# Patient Record
Sex: FEMALE | Race: WHITE | NOT HISPANIC OR LATINO | Employment: OTHER | URBAN - METROPOLITAN AREA
[De-identification: names, ages, dates, MRNs, and addresses within clinical notes are randomized per-mention and may not be internally consistent; named-entity substitution may affect disease eponyms.]

---

## 2017-01-03 ENCOUNTER — TELEPHONE (OUTPATIENT)
Dept: FAMILY MEDICINE | Facility: OTHER | Age: 62
End: 2017-01-03

## 2017-01-03 DIAGNOSIS — R05.9 COUGH: Primary | ICD-10-CM

## 2017-01-03 RX ORDER — CODEINE PHOSPHATE AND GUAIFENESIN 10; 100 MG/5ML; MG/5ML
1 SOLUTION ORAL EVERY 4 HOURS PRN
Qty: 120 ML | Refills: 0 | Status: SHIPPED | OUTPATIENT
Start: 2017-01-03 | End: 2017-02-06

## 2017-01-03 NOTE — TELEPHONE ENCOUNTER
1:22 PM    Reason for Call: OVERBOOK    Patient is having the following symptoms:chest cold cough The patient is requesting an appointment for flaim    Was an appointment offered for this call? no  Preferred method for responding to this message self    If we cannot reach you directly, may we leave a detailed response at the number you provided? Yes  Can this message wait until your PCP/provider returns, if unavailable today? lenard Wei

## 2017-01-03 NOTE — TELEPHONE ENCOUNTER
Pt can wait until Fri to come in if she can get some cough syrup for productive cough, clear phlegm??? To [facility]

## 2017-01-04 ENCOUNTER — HOSPITAL ENCOUNTER (EMERGENCY)
Facility: HOSPITAL | Age: 62
Discharge: HOME | End: 2017-01-04
Attending: PHYSICIAN ASSISTANT | Admitting: PHYSICIAN ASSISTANT
Payer: MEDICARE

## 2017-01-04 VITALS
OXYGEN SATURATION: 97 % | DIASTOLIC BLOOD PRESSURE: 79 MMHG | SYSTOLIC BLOOD PRESSURE: 145 MMHG | TEMPERATURE: 97.2 F | RESPIRATION RATE: 14 BRPM | HEART RATE: 54 BPM

## 2017-01-04 DIAGNOSIS — R53.83 FATIGUE, UNSPECIFIED TYPE: ICD-10-CM

## 2017-01-04 DIAGNOSIS — J01.00 ACUTE MAXILLARY SINUSITIS, RECURRENCE NOT SPECIFIED: ICD-10-CM

## 2017-01-04 LAB
ALBUMIN SERPL-MCNC: 3.4 G/DL (ref 3.4–5)
ALP SERPL-CCNC: 54 U/L (ref 40–150)
ALT SERPL W P-5'-P-CCNC: 35 U/L (ref 0–50)
ANION GAP SERPL CALCULATED.3IONS-SCNC: 10 MMOL/L (ref 3–14)
AST SERPL W P-5'-P-CCNC: 13 U/L (ref 0–45)
BASOPHILS # BLD AUTO: 0.1 10E9/L (ref 0–0.2)
BASOPHILS NFR BLD AUTO: 0.8 %
BILIRUB SERPL-MCNC: 0.3 MG/DL (ref 0.2–1.3)
BUN SERPL-MCNC: 18 MG/DL (ref 7–30)
CALCIUM SERPL-MCNC: 9 MG/DL (ref 8.5–10.1)
CHLORIDE SERPL-SCNC: 104 MMOL/L (ref 94–109)
CK SERPL-CCNC: 109 U/L (ref 30–225)
CO2 SERPL-SCNC: 27 MMOL/L (ref 20–32)
CREAT SERPL-MCNC: 1.07 MG/DL (ref 0.52–1.04)
DEPRECATED S PYO AG THROAT QL EIA: NORMAL
DIFFERENTIAL METHOD BLD: NORMAL
EOSINOPHIL # BLD AUTO: 0.2 10E9/L (ref 0–0.7)
EOSINOPHIL NFR BLD AUTO: 3 %
ERYTHROCYTE [DISTWIDTH] IN BLOOD BY AUTOMATED COUNT: 13.4 % (ref 10–15)
GFR SERPL CREATININE-BSD FRML MDRD: 52 ML/MIN/1.7M2
GLUCOSE SERPL-MCNC: 106 MG/DL (ref 70–99)
HCT VFR BLD AUTO: 39.2 % (ref 35–47)
HETEROPH AB SER QL: NEGATIVE
HGB BLD-MCNC: 13.9 G/DL (ref 11.7–15.7)
IMM GRANULOCYTES # BLD: 0.1 10E9/L (ref 0–0.4)
IMM GRANULOCYTES NFR BLD: 0.8 %
LYMPHOCYTES # BLD AUTO: 1.9 10E9/L (ref 0.8–5.3)
LYMPHOCYTES NFR BLD AUTO: 29.3 %
MCH RBC QN AUTO: 30.8 PG (ref 26.5–33)
MCHC RBC AUTO-ENTMCNC: 35.5 G/DL (ref 31.5–36.5)
MCV RBC AUTO: 87 FL (ref 78–100)
MICRO REPORT STATUS: NORMAL
MONOCYTES # BLD AUTO: 0.5 10E9/L (ref 0–1.3)
MONOCYTES NFR BLD AUTO: 7.4 %
NEUTROPHILS # BLD AUTO: 3.7 10E9/L (ref 1.6–8.3)
NEUTROPHILS NFR BLD AUTO: 58.7 %
NRBC # BLD AUTO: 0 10*3/UL
NRBC BLD AUTO-RTO: 0 /100
PLATELET # BLD AUTO: 215 10E9/L (ref 150–450)
POTASSIUM SERPL-SCNC: 3.5 MMOL/L (ref 3.4–5.3)
PROT SERPL-MCNC: 7.1 G/DL (ref 6.8–8.8)
RBC # BLD AUTO: 4.51 10E12/L (ref 3.8–5.2)
SODIUM SERPL-SCNC: 141 MMOL/L (ref 133–144)
SPECIMEN SOURCE: NORMAL
TSH SERPL DL<=0.05 MIU/L-ACNC: 1.63 MU/L (ref 0.4–4)
WBC # BLD AUTO: 6.3 10E9/L (ref 4–11)

## 2017-01-04 PROCEDURE — 99213 OFFICE O/P EST LOW 20 MIN: CPT | Performed by: PHYSICIAN ASSISTANT

## 2017-01-04 PROCEDURE — 85025 COMPLETE CBC W/AUTO DIFF WBC: CPT | Performed by: PHYSICIAN ASSISTANT

## 2017-01-04 PROCEDURE — 82550 ASSAY OF CK (CPK): CPT | Performed by: PHYSICIAN ASSISTANT

## 2017-01-04 PROCEDURE — 86308 HETEROPHILE ANTIBODY SCREEN: CPT | Performed by: PHYSICIAN ASSISTANT

## 2017-01-04 PROCEDURE — 80053 COMPREHEN METABOLIC PANEL: CPT | Performed by: PHYSICIAN ASSISTANT

## 2017-01-04 PROCEDURE — 87880 STREP A ASSAY W/OPTIC: CPT | Performed by: PHYSICIAN ASSISTANT

## 2017-01-04 PROCEDURE — 36415 COLL VENOUS BLD VENIPUNCTURE: CPT | Performed by: PHYSICIAN ASSISTANT

## 2017-01-04 PROCEDURE — 87081 CULTURE SCREEN ONLY: CPT | Performed by: PHYSICIAN ASSISTANT

## 2017-01-04 PROCEDURE — 99213 OFFICE O/P EST LOW 20 MIN: CPT

## 2017-01-04 PROCEDURE — 84443 ASSAY THYROID STIM HORMONE: CPT | Performed by: PHYSICIAN ASSISTANT

## 2017-01-04 RX ORDER — DOXYCYCLINE 100 MG/1
100 CAPSULE ORAL 2 TIMES DAILY
Qty: 14 CAPSULE | Refills: 0 | Status: SHIPPED | OUTPATIENT
Start: 2017-01-04 | End: 2017-01-11

## 2017-01-04 ASSESSMENT — ENCOUNTER SYMPTOMS
FATIGUE: 1
MYALGIAS: 1
HEADACHES: 1
CHILLS: 1
COUGH: 0
SINUS PRESSURE: 1
EYES NEGATIVE: 1

## 2017-01-04 NOTE — DISCHARGE INSTRUCTIONS
- Doxy will cover sinuses. (Thyroid?, Vtamin D?, Lupus?).     FYI sinuses:  1. Dry out congestion with flonase (1spray in both nostrils 2x daily for 3-5 days) and pseudoephedrine (1-2 tabs every 4-6 hrs for 3-5 days)   2. Use a saline spray/Neti Pot/sinus flush (King Med Sinus Rinse) 2-3 times daily to irrigate sinuses/mucosal tissue. This dilutes and moves secretions.   3. Tylenol or ibuprofen for pain and fevers as needed.   4. Plenty of fluids and rest as needed.   5. Chew, yawn and speak to help eustachian tubes drain.   - Consider the following over-the-counter products if you are older than 1 year and not pregnant: honey/chestal for cough relief and sambucus/elderberry for viral upper-respiratory symptoms.

## 2017-01-04 NOTE — ED AVS SNAPSHOT
[facility]    [address] [address]    Phone:  [phone]                                       Cassy Avila   MRN: [id number]    Department:  [facility]   Date of Visit:  1/4/2017           After Visit Summary Signature Page     I have received my discharge instructions, and my questions have been answered. I have discussed any challenges I see with this plan with the nurse or doctor.    ..........................................................................................................................................  Patient/Patient Representative Signature      ..........................................................................................................................................  Patient Representative Print Name and Relationship to Patient    ..................................................               ................................................  Date                                            Time    ..........................................................................................................................................  Reviewed by Signature/Title    ...................................................              ..............................................  Date                                                            Time

## 2017-01-04 NOTE — ED AVS SNAPSHOT
[facility]    [address] [address]    Phone:  [phone]                                       Cassy Avila   MRN: [id number]    Department:  [facility]   Date of Visit:  1/4/2017           Patient Information     Date Of Birth          1955        Your diagnoses for this visit were:     Fatigue, unspecified type     Acute maxillary sinusitis, recurrence not specified        You were seen by Juan Luis Hale PA.      Follow-up Information     Follow up with Eliz Hartman NP.    Specialties:  Family Practice, Psychiatry    Why:  As needed    Contact information:     [facility]  [address] [id number] [phone]          Discharge Instructions       - Doxy will cover sinuses. (Thyroid?, Vtamin D?, Lupus?).     FYI sinuses:  1. Dry out congestion with flonase (1spray in both nostrils 2x daily for 3-5 days) and pseudoephedrine (1-2 tabs every 4-6 hrs for 3-5 days)   2. Use a saline spray/Neti Pot/sinus flush (King Med Sinus Rinse) 2-3 times daily to irrigate sinuses/mucosal tissue. This dilutes and moves secretions.   3. Tylenol or ibuprofen for pain and fevers as needed.   4. Plenty of fluids and rest as needed.   5. Chew, yawn and speak to help eustachian tubes drain.   - Consider the following over-the-counter products if you are older than 1 year and not pregnant: honey/chestal for cough relief and sambucus/elderberry for viral upper-respiratory symptoms.    Discharge References/Attachments     SINUSITIS (ANTIBIOTIC TREATMENT) (ENGLISH)      Future Appointments        Provider Department Dept Phone Center    1/6/2017 2:30 PM Eliz Hartman NP [facility] [phone] [facility]         Review of your medicines      START taking        Dose / Directions Last dose taken    doxycycline 100 MG capsule   Commonly known as:  VIBRAMYCIN   Dose:  100 mg   Quantity:  14 capsule        Take 1 capsule (100 mg) by mouth 2 times daily for  7 days   Refills:  0          Our records show that you are taking the medicines listed below. If these are incorrect, please call your family doctor or clinic.        Dose / Directions Last dose taken    aspirin 81 MG EC tablet   Dose:  81 mg        Take 81 mg by mouth daily HS   Refills:  0        atenolol 50 MG tablet   Commonly known as:  TENORMIN   Quantity:  135 tablet        TAKE 1 AND ONE-HALF TABLET BY MOUTH DAILY   Refills:  2        escitalopram 20 MG tablet   Commonly known as:  LEXAPRO   Quantity:  90 tablet        TAKE 1 TABLET BY MOUTH EVERY DAY   Refills:  1        fluticasone 50 MCG/ACT spray   Commonly known as:  FLONASE   Dose:  1-2 spray   Quantity:  16 g        Spray 1-2 sprays into both nostrils daily   Refills:  0        guaiFENesin-codeine 100-10 MG/5ML Soln solution   Commonly known as:  ROBITUSSIN AC   Dose:  1 tsp.   Quantity:  120 mL        Take 5 mLs by mouth every 4 hours as needed for cough   Refills:  0        hydrochlorothiazide 25 MG tablet   Commonly known as:  HYDRODIURIL   Dose:  25 mg   Quantity:  90 tablet        Take 1 tablet (25 mg) by mouth daily   Refills:  2        HYDROcodone-acetaminophen 5-325 MG per tablet   Commonly known as:  NORCO   Dose:  1-2 tablet   Quantity:  50 tablet        Take 1-2 tablets by mouth every 4 hours as needed for other (Moderate to Severe Pain)   Refills:  0        losartan 50 MG tablet   Commonly known as:  COZAAR   Quantity:  180 tablet        TAKE 2 TABLETS BY MOUTH EVERY DAY   Refills:  2        omega 3 1000 MG Caps   Dose:  3 g   Quantity:  90 capsule        Take 3 g by mouth daily   Refills:  0        VITAMIN D3 PO   Dose:  3000 mg        Take 3,000 mg by mouth daily AM   Refills:  0        warfarin 5 MG tablet   Commonly known as:  COUMADIN   Quantity:  135 tablet        TAKE 1 AND ONE-HALF TABLET BY MOUTH ON MONDAY WEDNESDAY AND FRIDAYS AND 1 TABLET ON ALL OTHER DAYS OF THE WEEK   Refills:  0                Prescriptions were sent or  printed at these locations (1 Prescription)                   [facility] - [place], [place] - [address] AT [facility] [address]   [address], [place] [address]    Telephone:  [phone]   Fax:  [phone]   Hours:                  E-Prescribed (1 of 1)         doxycycline (VIBRAMYCIN) 100 MG capsule                Procedures and tests performed during your visit     Beta strep group A culture    CBC with platelets differential    CK total    Comprehensive metabolic panel    Mononucleosis screen    Rapid strep screen    TSH      Orders Needing Specimen Collection     None      Pending Results     Date and Time Order Name Status Description    1/4/2017 1455 Beta strep group A culture In process             Pending Culture Results     Date and Time Order Name Status Description    1/4/2017 1455 Beta strep group A culture In process             Thank you for choosing [facility]       Thank you for choosing [facility] for your care. Our goal is always to provide you with excellent care. Hearing back from our patients is one way we can continue to improve our services. Please take a few minutes to complete the written survey that you may receive in the mail after you visit with us. Thank you!        [facility] Information     [facility] gives you secure access to your electronic health record. If you see a primary care provider, you can also send messages to your care team and make appointments. If you have questions, please call your primary care clinic.  If you do not have a primary care provider, please call [phone] and they will assist you.        Care EveryWhere ID     This is your Care EveryWhere ID. This could be used by other organizations to access your [facility] medical records  [id number]        After Visit Summary       This is your record. Keep this with you and show to your community pharmacist(s) and doctor(s) at your next visit.

## 2017-01-04 NOTE — ED NOTES
Pt presents today with significant other for c/o sinus pain and pressure and fatigue for 3 weeks.

## 2017-01-04 NOTE — ED PROVIDER NOTES
History     Chief Complaint   Patient presents with     Fatigue     X 3 weeks     Sinusitis     X 3 weeks     The history is provided by the patient and a significant other. No  was used.     Cassy Avila is a 61 year old female who presents with 5 weeks of fatigue and 3 weeks of sinusitis. She is concerned about the fatigue as she feels like she is up and alert for about 4 hrs at a time until she needs to rest. She reports body aches that came on over past few weeks, possibly with sinus symptoms. Sinus symptoms include: POSITIVE nasal congestion, NO purulent nasal discharge, POSITIVE mild intermittent headache, Maxillary facial pain, throat-clearing cough, NO tooth pain, POSITIVE myalgias, POSITIVE sweats and chills.      She denies CP, ankle swelling. NO new medications, supplements or dietary changes. She is concerned about possible mono as brief contact with child that is mono positive.     I have reviewed the Medications, Allergies, Past Medical History in the Epic system.    Review of Systems   Constitutional: Positive for chills and fatigue.   HENT: Positive for congestion and sinus pressure.    Eyes: Negative.    Respiratory: Negative for cough (throat clearing).    Genitourinary: Negative.    Musculoskeletal: Positive for myalgias.   Skin: Negative.    Neurological: Positive for headaches.       Physical Exam   BP: 145/79 mmHg  Pulse: 54  Temp: 97.2  F (36.2  C)  Resp: 14  SpO2: 97 %  Physical Exam   Constitutional: She is oriented to person, place, and time. She appears well-developed and well-nourished. No distress.   HENT:   Head: Atraumatic.   Right Ear: External ear normal. Tympanic membrane is not erythematous.   Left Ear: External ear normal. Tympanic membrane is not erythematous.   Nose: Mucosal edema present.   Mouth/Throat: Posterior oropharyngeal erythema present. No posterior oropharyngeal edema.   Eyes: Conjunctivae are normal.   Neck: Normal range of motion.    Cardiovascular: Normal rate and normal heart sounds.    Pulmonary/Chest: Effort normal and breath sounds normal.   Neurological: She is alert and oriented to person, place, and time.   Skin: Skin is warm and dry.   Psychiatric: She has a normal mood and affect.   Nursing note and vitals reviewed.      ED Course   Procedures     Labs Ordered and Resulted from Time of ED Arrival Up to the Time of Departure from the ED   COMPREHENSIVE METABOLIC PANEL - Abnormal; Notable for the following:     Glucose 106 (*)     Creatinine 1.07 (*)     GFR Estimate 52 (*)     All other components within normal limits   CBC WITH PLATELETS DIFFERENTIAL   MONONUCLEOSIS SCREEN   TSH   CK TOTAL   RAPID STREP SCREEN   BETA STREP GROUP A CULTURE       Assessments & Plan (with Medical Decision Making)     I have reviewed the nursing notes.    I have reviewed the findings, diagnosis, plan and need for follow up with the patient.    Discharge Medication List as of 1/4/2017  4:15 PM      START taking these medications    Details   doxycycline (VIBRAMYCIN) 100 MG capsule Take 1 capsule (100 mg) by mouth 2 times daily for 7 days, Disp-14 capsule, R-0, E-Prescribe             Final diagnoses:   Fatigue, unspecified type   Acute maxillary sinusitis, recurrence not specified   Labs are acceptable. Will treat sinusitis as above. Continue saline, honey-tea and gargles. Discussed keeping appt with PCP in 48 hrs to re-evaluate. Pt will seek attention sooner with worsening despite treatment. Patient verbally educated and given appropriate education sheets for each of the diagnoses and has no questions.    Juan Luis Hale PA-C   1/4/2017   4:55 PM    1/4/2017   [facility]      Juan Luis Hale PA  01/04/17 8528

## 2017-01-05 ENCOUNTER — ANTICOAGULATION THERAPY VISIT (OUTPATIENT)
Dept: ANTICOAGULATION | Facility: OTHER | Age: 62
End: 2017-01-05

## 2017-01-05 DIAGNOSIS — I26.99 PULMONARY EMBOLISM AND INFARCTION (H): ICD-10-CM

## 2017-01-05 DIAGNOSIS — Z79.01 LONG-TERM (CURRENT) USE OF ANTICOAGULANTS: Primary | ICD-10-CM

## 2017-01-05 NOTE — PROGRESS NOTES
ANTICOAGULATION FOLLOW-UP CLINIC VISIT    Patient Name:  Cassy Avila  Date:  1/5/2017  Contact Type:  Telephone    SUBJECTIVE:     Patient Findings     Positives Antibiotic use or infection    Comments Started doxycycline on 1/4/17 for 7 days. Will complete Tues 1/10/17. States she is scheduled to do PST on Monday 1/9/17           OBJECTIVE    INR   Date Value Ref Range Status   12/12/2016 2.5  Final       ASSESSMENT / PLAN  INR assessment THER    Recheck INR In: 4 DAYS    INR Location Home INR      Anticoagulation Summary as of 1/5/2017     INR goal 2.0-3.0   Selected INR No new INR was available at the time of this encounter.   Maintenance plan 7.5 mg (5 mg x 1.5) on Mon, Wed, Fri; 5 mg (5 mg x 1) all other days   Full instructions 1/6: 5 mg; Otherwise 7.5 mg on Mon, Wed, Fri; 5 mg all other days   Weekly total 42.5 mg   Plan last modified Iram Lord RN (7/20/2016)   Next INR check 1/9/2017   Priority INR   Target end date Indefinite    Indications   Long-term (current) use of anticoagulants [Z79.01] [Z79.01]  Pulmonary embolism and infarction (H) [I26.99]  Deep vein thrombosis (DVT) (H) [I82.409] [I82.409]         Anticoagulation Episode Summary     INR check location Home Draw    Preferred lab     Send INR reminders to [facility] ANTICOAG POOL    Comments PST - [employer] [facility] [employer].  Started Celebrex 10/28 approx 2mos.       Anticoagulation Care Providers     Provider Role Specialty Phone number    Eliz Hartman NP [facility] [phone]            See the Encounter Report to view Anticoagulation Flowsheet and Dosing Calendar (Go to Encounters tab in chart review, and find the Anticoagulation Therapy Visit)        Manju Escalera, RN

## 2017-01-06 ENCOUNTER — OFFICE VISIT (OUTPATIENT)
Dept: FAMILY MEDICINE | Facility: OTHER | Age: 62
End: 2017-01-06
Attending: NURSE PRACTITIONER
Payer: MEDICARE

## 2017-01-06 VITALS
TEMPERATURE: 97.9 F | OXYGEN SATURATION: 97 % | DIASTOLIC BLOOD PRESSURE: 76 MMHG | HEART RATE: 58 BPM | HEIGHT: 65 IN | BODY MASS INDEX: 38.65 KG/M2 | RESPIRATION RATE: 18 BRPM | WEIGHT: 232 LBS | SYSTOLIC BLOOD PRESSURE: 118 MMHG

## 2017-01-06 DIAGNOSIS — E66.9 OBESITY, UNSPECIFIED OBESITY SEVERITY, UNSPECIFIED OBESITY TYPE: Primary | ICD-10-CM

## 2017-01-06 LAB
BACTERIA SPEC CULT: NORMAL
MICRO REPORT STATUS: NORMAL
SPECIMEN SOURCE: NORMAL

## 2017-01-06 PROCEDURE — 99212 OFFICE O/P EST SF 10 MIN: CPT | Performed by: NURSE PRACTITIONER

## 2017-01-06 PROCEDURE — 99212 OFFICE O/P EST SF 10 MIN: CPT

## 2017-01-06 ASSESSMENT — PAIN SCALES - GENERAL: PAINLEVEL: SEVERE PAIN (6)

## 2017-01-06 NOTE — PROGRESS NOTES
SUBJECTIVE:  Cassy Avila is a 61 year old female   Chief Complaint   Patient presents with     Weight Problem     Patient is concerned about weight gain.  Questions about diet pills       Active diagnoses this visit:    Patient is here to discuss weight gain.    She is asking for a diet pill to help to lose weight.         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, congenital     Mammographic microcalcification      resolved from problem list     Herpes zoster without mention of complication      resolved from problem list     Closed dislocation of shoulder, unspecified site      Thrombosis of leg      Gastro-oesophageal reflux disease      Arthritis      Other chronic pain      Major depression 2014     Factor V Leiden (H)      Coughing        Past Surgical History   Procedure Laterality Date     Arthroscopy shoulder       right, bone spurs     ------------other-------------       shoulder replacement; Provider: Karen     Esophagastroduodenoscopy       with biopsy and endoscopic U/S     Cholecystectomy       Elbow ulnar tunnel release  2002     Anastacio/bso        section       x3     Pionidal cyst excision       Fusion lumbar anterior with james cages       L5-S1     Endoscopic sinus surgery, septoplasty, turbinoplasty, maxillary sinusotomy, combined N/A 2015      Procedure: COMBINED ENDOSCOPIC SINUS SURGERY, SEPTOPLASTY, TURBINOPLASTY, MAXILLARY SINUSOTOMY;  Surgeon: Seema Conn MD;  Location: [facility]     Transposition ulnar nerve (elbow)       Arthroplasty knee  6/20/2014     Procedure: ARTHROPLASTY KNEE;  Surgeon: Sean Alexander MD;  Location: [facility]     Orthopedic surgery  2-15     right shoulder     Orthopedic surgery  8/28/15     right knee     Excise neuroma lower extremity Left 7/13/2016     Procedure: EXCISE NEUROMA LOWER EXTREMITY;  Surgeon: Edi Reed MD;  Location: [facility] OR       Family History   Problem Relation Age of Onset     CANCER Paternal Uncle      cause of death     CANCER Mother      Colon Polyps Mother      Heart Failure Mother 87     congestive, cause of death     Myocardial Infarction Mother      myocardial infarction     C.A.D. Brother      Other - See Comments       factor 5 - family h/o     Myocardial Infarction Father      myocardial infarction - cause of death     C.A.D. Father      Asthma No family hx of        Social History   Substance Use Topics     Smoking status: Former Smoker -- 0.50 packs/day for 30 years     Types: Cigarettes, Pipe     Smokeless tobacco: Never Used      Comment: quit in 1999     Alcohol Use: No       Current Outpatient Prescriptions   Medication     doxycycline (VIBRAMYCIN) 100 MG capsule     guaiFENesin-codeine (ROBITUSSIN AC) 100-10 MG/5ML SOLN solution     losartan (COZAAR) 50 MG tablet     warfarin (COUMADIN) 5 MG tablet     escitalopram (LEXAPRO) 20 MG tablet     fluticasone (FLONASE) 50 MCG/ACT nasal spray     HYDROcodone-acetaminophen (NORCO) 5-325 MG per tablet     hydrochlorothiazide (HYDRODIURIL) 25 MG tablet     atenolol (TENORMIN) 50 MG tablet     Cholecalciferol (VITAMIN D3 PO)     omega 3 1000 MG CAPS     aspirin 81 MG EC tablet     No current facility-administered medications for this visit.     Facility-Administered Medications Ordered in Other Visits   Medication     ondansetron (ZOFRAN)  "injection          Allergies   Allergen Reactions     Ace Inhibitors Cough     Albuterol Sulfate Hives     DuoNeb     Amlodipine Besylate Swelling     Norvasc     Amoxicillin      Cephalexin Monohydrate Hives     Keflex     Erythromycin Base [Kdc:Yellow Dye+Erythromycin+Brilliant Blue Fcf] Nausea and Vomiting     Ipratropium Bromide Hives     DuoNeb     Meloxicam Other (See Comments)     Mobic - confusion, depression     Adhesive Tape Rash     Prochlorperazine Edisylate Swelling and Rash     Compazine     Prochlorperazine Maleate Swelling and Rash       REVIEW OF SYSTEMS  Skin: negative  Eyes: negative  Ears/Nose/Throat: recent URI, went to ER, is better  Respiratory: No shortness of breath, dyspnea on exertion, cough, or hemoptysis  Cardiovascular: negative  Gastrointestinal: negative  Genitourinary: negative  Musculoskeletal: back pain  Neurologic: negative  Psychiatric: negative  Hematologic/Lymphatic/Immunologic: negative      OBJECTIVE:  /76 mmHg  Pulse 58  Temp(Src) 97.9  F (36.6  C) (Tympanic)  Resp 18  Ht 5' 5\" (1.651 m)  Wt 232 lb (105.235 kg)  BMI 38.61 kg/m2  SpO2 97%  Constitutional: healthy, alert, no distress and cooperative  Head: Normocephalic. No masses, lesions, or tenderness  Neck: Neck supple. No adenopathy. Thyroid symmetric.  ENT: URI symptoms are improved  Cardiovascular: PMI normal. No murmurs, clicks gallops or rub  Respiratory: negative, Percussion normal. Good diaphragmatic excursion. Lungs clear  Gastrointestinal: Abdomen soft, non-tender. BS normal. No masses, organomegaly  Musculoskeletal: back pain, upcoming appointment to discuss surgery      1. Obesity, unspecified obesity severity, unspecified obesity type  Diet discussed, also possible referral to dietician      Eliz ENCARNACION  [phone]          "

## 2017-01-06 NOTE — MR AVS SNAPSHOT
After Visit Summary   1/6/2017    Cassy Avila    MRN: [id number]           Patient Information     Date Of Birth          1955        Visit Information        Provider Department      1/6/2017 2:30 PM Eliz Hartman NP [facility]        Today's Diagnoses     Obesity, unspecified obesity severity, unspecified obesity type    -  1       Care Instructions      1. Obesity, unspecified obesity severity, unspecified obesity type  Diet discussed, also possible referral to dietician      Eliz Hartman -[facility]  [phone]                Follow-ups after your visit        Who to contact     If you have questions or need follow up information about today's clinic visit or your schedule please contact [facility] ([facility]] directly at [phone].  Normal or non-critical lab and imaging results will be communicated to you by [employer], letter or phone within 4 business days after the clinic has received the results. If you do not hear from us within 7 days, please contact the clinic through [employer] or phone. If you have a critical or abnormal lab result, we will notify you by phone as soon as possible.  Submit refill requests through [employer] or call your pharmacy and they will forward the refill request to us. Please allow 3 business days for your refill to be completed.          Additional Information About Your Visit        MyChart Information     [employer] gives you secure access to your electronic health record. If you see a primary care provider, you can also send messages to your care team and make appointments. If you have questions, please call your primary care clinic.  If you do not have a primary care provider, please call [phone] and they will assist you.        Care EveryWhere ID     This is your Care EveryWhere ID. This could be used by other organizations to access your [facility] medical records  [id number]        Your Vitals Were     Pulse Temperature Respirations Height  "BMI (Body Mass Index) Pulse Oximetry    58 97.9  F (36.6  C) (Tympanic) 18 5' 5\" (1.651 m) 38.61 kg/m2 97%       Blood Pressure from Last 3 Encounters:   01/06/17 118/76   01/04/17 145/79   10/05/16 100/60    Weight from Last 3 Encounters:   01/06/17 232 lb (105.235 kg)   10/05/16 232 lb (105.235 kg)   08/22/16 230 lb 14.4 oz (104.736 kg)              Today, you had the following     No orders found for display         Today's Medication Changes          These changes are accurate as of: 1/6/17  3:32 PM.  If you have any questions, ask your nurse or doctor.               These medicines have changed or have updated prescriptions.        Dose/Directions    atenolol 50 MG tablet   Commonly known as:  TENORMIN   This may have changed:  See the new instructions.   Used for:  HTN (hypertension)        TAKE 1 AND ONE-HALF TABLET BY MOUTH DAILY   Quantity:  135 tablet   Refills:  2       hydrochlorothiazide 25 MG tablet   Commonly known as:  HYDRODIURIL   This may have changed:  additional instructions   Used for:  Essential hypertension        Dose:  25 mg   Take 1 tablet (25 mg) by mouth daily   Quantity:  90 tablet   Refills:  2                Primary Care Provider Office Phone # Fax #    Eliz NORAH Hartman [phone]       [address] [address]        Thank you!     Thank you for choosing [facility]  for your care. Our goal is always to provide you with excellent care. Hearing back from our patients is one way we can continue to improve our services. Please take a few minutes to complete the written survey that you may receive in the mail after your visit with us. Thank you!             Your Updated Medication List - Protect others around you: Learn how to safely use, store and throw away your medicines at www.disposemymeds.org.          This list is accurate as of: 1/6/17  3:32 PM.  Always use your most recent med list.                   Brand Name Dispense " Instructions for use    aspirin 81 MG EC tablet      Take 81 mg by mouth daily HS       atenolol 50 MG tablet    TENORMIN    135 tablet    TAKE 1 AND ONE-HALF TABLET BY MOUTH DAILY       doxycycline 100 MG capsule    VIBRAMYCIN    14 capsule    Take 1 capsule (100 mg) by mouth 2 times daily for 7 days       escitalopram 20 MG tablet    LEXAPRO    90 tablet    TAKE 1 TABLET BY MOUTH EVERY DAY       fluticasone 50 MCG/ACT spray    FLONASE    16 g    Spray 1-2 sprays into both nostrils daily       guaiFENesin-codeine 100-10 MG/5ML Soln solution    ROBITUSSIN AC    120 mL    Take 5 mLs by mouth every 4 hours as needed for cough       hydrochlorothiazide 25 MG tablet    HYDRODIURIL    90 tablet    Take 1 tablet (25 mg) by mouth daily       HYDROcodone-acetaminophen 5-325 MG per tablet    NORCO    50 tablet    Take 1-2 tablets by mouth every 4 hours as needed for other (Moderate to Severe Pain)       losartan 50 MG tablet    COZAAR    180 tablet    TAKE 2 TABLETS BY MOUTH EVERY DAY       omega 3 1000 MG Caps     90 capsule    Take 3 g by mouth daily       VITAMIN D3 PO      Take 3,000 mg by mouth daily AM       warfarin 5 MG tablet    COUMADIN    135 tablet    TAKE 1 AND ONE-HALF TABLET BY MOUTH ON MONDAY WEDNESDAY AND FRIDAYS AND 1 TABLET ON ALL OTHER DAYS OF THE WEEK

## 2017-01-06 NOTE — NURSING NOTE
"Chief Complaint   Patient presents with     Weight Problem     Patient is concerned about weight gain.  Questions about diet pills       Initial /76 mmHg  Pulse 58  Temp(Src) 97.9  F (36.6  C) (Tympanic)  Resp 18  Ht 5' 5\" (1.651 m)  Wt 232 lb (105.235 kg)  BMI 38.61 kg/m2  SpO2 97% Estimated body mass index is 38.61 kg/(m^2) as calculated from the following:    Height as of this encounter: 5' 5\" (1.651 m).    Weight as of this encounter: 232 lb (105.235 kg).  BP completed using cuff size: large    Pamela Nunes    "

## 2017-01-06 NOTE — PATIENT INSTRUCTIONS
1. Obesity, unspecified obesity severity, unspecified obesity type  Diet discussed, also possible referral to dietician      Eliz Hartman [facility]  [phone]

## 2017-01-09 ENCOUNTER — TRANSFERRED RECORDS (OUTPATIENT)
Dept: HEALTH INFORMATION MANAGEMENT | Facility: HOSPITAL | Age: 62
End: 2017-01-09

## 2017-01-09 ENCOUNTER — ANTICOAGULATION THERAPY VISIT (OUTPATIENT)
Dept: ANTICOAGULATION | Facility: OTHER | Age: 62
End: 2017-01-09

## 2017-01-09 DIAGNOSIS — I26.99 PULMONARY EMBOLISM AND INFARCTION (H): ICD-10-CM

## 2017-01-09 DIAGNOSIS — Z79.01 LONG-TERM (CURRENT) USE OF ANTICOAGULANTS: Primary | ICD-10-CM

## 2017-01-09 LAB — INR PPP: 2.1

## 2017-01-09 NOTE — PROGRESS NOTES
ANTICOAGULATION FOLLOW-UP CLINIC VISIT    Patient Name:  Cassy Avila  Date:  1/9/2017  Contact Type:  Telephone    SUBJECTIVE:     Patient Findings     Positives Antibiotic use or infection    Comments Doxycycline to Wednesday 1/11/17           OBJECTIVE    INR   Date Value Ref Range Status   01/09/2017 2.1  Final       ASSESSMENT / PLAN  INR assessment THER    Recheck INR In: 2 WEEKS    INR Location Home INR      Anticoagulation Summary as of 1/9/2017     INR goal 2.0-3.0   Selected INR 2.1 (1/9/2017)   Maintenance plan 7.5 mg (5 mg x 1.5) on Mon, Wed, Fri; 5 mg (5 mg x 1) all other days   Full instructions 1/9: 5 mg; Otherwise 7.5 mg on Mon, Wed, Fri; 5 mg all other days   Weekly total 42.5 mg   Plan last modified Iram Lord RN (7/20/2016)   Next INR check 1/23/2017   Priority INR   Target end date Indefinite    Indications   Long-term (current) use of anticoagulants [Z79.01] [Z79.01]  Pulmonary embolism and infarction (H) [I26.99]  Deep vein thrombosis (DVT) (H) [I82.409] [I82.409]         Anticoagulation Episode Summary     INR check location Home Draw    Preferred lab     Send INR reminders to [facility] ANTICOAG POOL    Comments PST - [facility] [facility].  Started Celebrex 10/28 approx 2mos.       Anticoagulation Care Providers     Provider Role Specialty Phone number    Minnie NORAH [facility] [phone]            See the Encounter Report to view Anticoagulation Flowsheet and Dosing Calendar (Go to Encounters tab in chart review, and find the Anticoagulation Therapy Visit)        Manju Escalera RN

## 2017-01-10 ENCOUNTER — TRANSFERRED RECORDS (OUTPATIENT)
Dept: HEALTH INFORMATION MANAGEMENT | Facility: HOSPITAL | Age: 62
End: 2017-01-10

## 2017-01-23 ENCOUNTER — TRANSFERRED RECORDS (OUTPATIENT)
Dept: HEALTH INFORMATION MANAGEMENT | Facility: HOSPITAL | Age: 62
End: 2017-01-23

## 2017-01-23 ENCOUNTER — ANTICOAGULATION THERAPY VISIT (OUTPATIENT)
Dept: ANTICOAGULATION | Facility: OTHER | Age: 62
End: 2017-01-23

## 2017-01-23 DIAGNOSIS — I26.99 PULMONARY EMBOLISM AND INFARCTION (H): ICD-10-CM

## 2017-01-23 DIAGNOSIS — I82.409 DEEP VEIN THROMBOSIS (DVT) (H): ICD-10-CM

## 2017-01-23 DIAGNOSIS — Z79.01 LONG-TERM (CURRENT) USE OF ANTICOAGULANTS: Primary | ICD-10-CM

## 2017-01-23 LAB — INR PPP: 2.6

## 2017-01-23 NOTE — PROGRESS NOTES
ANTICOAGULATION FOLLOW-UP CLINIC VISIT    Patient Name:  Cassy Avila  Date:  1/23/2017  Contact Type:  Telephone/ message left on home phone re: warfarin dosing and PSt recheck date. she is to notify us if any bleeding/bruisng, changes in diet/meds/activity or questions.    SUBJECTIVE:     Patient Findings     Positives No Problem Findings           OBJECTIVE    INR   Date Value Ref Range Status   01/23/2017 2.6  Final       ASSESSMENT / PLAN  INR assessment THER    Recheck INR In: 4 WEEKS    INR Location Home INR      Anticoagulation Summary as of 1/23/2017     INR goal 2.0-3.0   Selected INR 2.6 (1/23/2017)   Maintenance plan 7.5 mg (5 mg x 1.5) on Mon, Wed, Fri; 5 mg (5 mg x 1) all other days   Full instructions 7.5 mg on Mon, Wed, Fri; 5 mg all other days   Weekly total 42.5 mg   No change documented Manju Escalera RN   Plan last modified Iram Lord RN (7/20/2016)   Next INR check 2/20/2017   Priority INR   Target end date Indefinite    Indications   Long-term (current) use of anticoagulants [Z79.01] [Z79.01]  Pulmonary embolism and infarction (H) [I26.99]  Deep vein thrombosis (DVT) (H) [I82.409] [I82.409]         Anticoagulation Episode Summary     INR check location Home Draw    Preferred lab     Send INR reminders to [facility] POOL    Comments PST - [employer] [facility].  Started Celebrex 10/28 approx 2mos.       Anticoagulation Care Providers     Provider Role Specialty Phone number    Eliz Hartman NP [facility] [phone]            See the Encounter Report to view Anticoagulation Flowsheet and Dosing Calendar (Go to Encounters tab in chart review, and find the Anticoagulation Therapy Visit)        Manju Escalera, RN

## 2017-02-01 DIAGNOSIS — F33.9 MAJOR DEPRESSION, RECURRENT (H): Primary | ICD-10-CM

## 2017-02-01 RX ORDER — ESCITALOPRAM OXALATE 20 MG/1
20 TABLET ORAL DAILY
Qty: 90 TABLET | Refills: 1 | Status: SHIPPED | OUTPATIENT
Start: 2017-02-01 | End: 2017-08-08

## 2017-02-01 NOTE — TELEPHONE ENCOUNTER
Lexapro       Last Written Prescription Date: 8/10/16  Last Fill Quantity: 90; # refills: 1  Last Office Visit with [facility], [facility] or  Health prescribing provider:  1/6/17        Last PHQ-9 score on record=   PHQ-9 SCORE 1/11/2016   Total Score -   Total Score 7       AST       13   1/4/2017  ALT       35   1/4/2017

## 2017-02-06 ENCOUNTER — OFFICE VISIT (OUTPATIENT)
Dept: FAMILY MEDICINE | Facility: OTHER | Age: 62
End: 2017-02-06
Attending: NURSE PRACTITIONER
Payer: MEDICARE

## 2017-02-06 ENCOUNTER — ANTICOAGULATION THERAPY VISIT (OUTPATIENT)
Dept: ANTICOAGULATION | Facility: OTHER | Age: 62
End: 2017-02-06

## 2017-02-06 VITALS
DIASTOLIC BLOOD PRESSURE: 74 MMHG | BODY MASS INDEX: 37.11 KG/M2 | TEMPERATURE: 98.7 F | HEART RATE: 72 BPM | WEIGHT: 223 LBS | RESPIRATION RATE: 14 BRPM | SYSTOLIC BLOOD PRESSURE: 118 MMHG

## 2017-02-06 DIAGNOSIS — I82.409 DEEP VEIN THROMBOSIS (DVT) (H): ICD-10-CM

## 2017-02-06 DIAGNOSIS — H65.191 OTHER ACUTE NONSUPPURATIVE OTITIS MEDIA OF RIGHT EAR: ICD-10-CM

## 2017-02-06 DIAGNOSIS — Z79.01 LONG-TERM (CURRENT) USE OF ANTICOAGULANTS: Primary | ICD-10-CM

## 2017-02-06 DIAGNOSIS — R05.9 COUGH: Primary | ICD-10-CM

## 2017-02-06 DIAGNOSIS — I26.99 PULMONARY EMBOLISM AND INFARCTION (H): ICD-10-CM

## 2017-02-06 PROCEDURE — 99213 OFFICE O/P EST LOW 20 MIN: CPT | Performed by: NURSE PRACTITIONER

## 2017-02-06 PROCEDURE — 99212 OFFICE O/P EST SF 10 MIN: CPT

## 2017-02-06 RX ORDER — DOXYCYCLINE 100 MG/1
100 CAPSULE ORAL 2 TIMES DAILY
Qty: 20 CAPSULE | Refills: 0 | Status: SHIPPED | OUTPATIENT
Start: 2017-02-06 | End: 2017-02-27

## 2017-02-06 RX ORDER — CODEINE PHOSPHATE AND GUAIFENESIN 10; 100 MG/5ML; MG/5ML
1-2 SOLUTION ORAL EVERY 6 HOURS PRN
Qty: 180 ML | Refills: 0 | Status: ON HOLD | OUTPATIENT
Start: 2017-02-06 | End: 2017-03-14

## 2017-02-06 ASSESSMENT — ANXIETY QUESTIONNAIRES
7. FEELING AFRAID AS IF SOMETHING AWFUL MIGHT HAPPEN: NOT AT ALL
1. FEELING NERVOUS, ANXIOUS, OR ON EDGE: NOT AT ALL
6. BECOMING EASILY ANNOYED OR IRRITABLE: NOT AT ALL
2. NOT BEING ABLE TO STOP OR CONTROL WORRYING: NOT AT ALL
3. WORRYING TOO MUCH ABOUT DIFFERENT THINGS: NOT AT ALL
GAD7 TOTAL SCORE: 0
IF YOU CHECKED OFF ANY PROBLEMS ON THIS QUESTIONNAIRE, HOW DIFFICULT HAVE THESE PROBLEMS MADE IT FOR YOU TO DO YOUR WORK, TAKE CARE OF THINGS AT HOME, OR GET ALONG WITH OTHER PEOPLE: NOT DIFFICULT AT ALL
5. BEING SO RESTLESS THAT IT IS HARD TO SIT STILL: NOT AT ALL

## 2017-02-06 ASSESSMENT — PATIENT HEALTH QUESTIONNAIRE - PHQ9: 5. POOR APPETITE OR OVEREATING: NOT AT ALL

## 2017-02-06 NOTE — PROGRESS NOTES
ANTICOAGULATION FOLLOW-UP CLINIC VISIT    Patient Name:  Cassy Avila  Date:  2/6/2017  Contact Type:  Telephone    SUBJECTIVE:     Patient Findings     Positives Antibiotic use or infection, Activity level change    Comments Doxycycline 100mg BID x 10d begins today for Ear and Sinus Infection.  Discussed alternate Coumadin dosing and INR recheck information by phone.  Call ended with pt stating understanding and questions answered.           OBJECTIVE    INR   Date Value Ref Range Status   01/23/2017 2.6  Final       ASSESSMENT / PLAN  No question data found.  Anticoagulation Summary as of 2/6/2017     INR goal 2.0-3.0   Selected INR No new INR was available at the time of this encounter.   Maintenance plan 7.5 mg (5 mg x 1.5) on Mon, Wed, Fri; 5 mg (5 mg x 1) all other days   Full instructions 2/6: 5 mg; 2/8: 5 mg; 2/10: 5 mg; Otherwise 7.5 mg on Mon, Wed, Fri; 5 mg all other days   Weekly total 42.5 mg   Plan last modified Iram Lord RN (7/20/2016)   Next INR check 2/13/2017   Priority INR   Target end date Indefinite    Indications   Long-term (current) use of anticoagulants [Z79.01] [Z79.01]  Pulmonary embolism and infarction (H) [I26.99]  Deep vein thrombosis (DVT) (H) [I82.409] [I82.409]         Anticoagulation Episode Summary     INR check location Home Draw    Preferred lab     Send INR reminders to [facility] ANTICOAG POOL    Comments PST - [employer] [facility].      Anticoagulation Care Providers     Provider Role Specialty Phone number    Eliz Hartman NP [facility] [phone]            See the Encounter Report to view Anticoagulation Flowsheet and Dosing Calendar (Go to Encounters tab in chart review, and find the Anticoagulation Therapy Visit)        Manju Roberts RN

## 2017-02-06 NOTE — NURSING NOTE
"Chief Complaint   Patient presents with     URI     Patient reports chest congestion, stuffy head and painful ears.     Depression     Due       Initial /74 mmHg  Pulse 72  Temp(Src) 98.7  F (37.1  C) (Tympanic)  Resp 14  Wt 223 lb (101.152 kg) Estimated body mass index is 37.11 kg/(m^2) as calculated from the following:    Height as of 1/6/17: 5' 5\" (1.651 m).    Weight as of this encounter: 223 lb (101.152 kg).  Medication Reconciliation: complete   Lis Vickers      "

## 2017-02-06 NOTE — MR AVS SNAPSHOT
After Visit Summary   2/6/2017    Cassy Avila    MRN: [id number]           Patient Information     Date Of Birth          1955        Visit Information        Provider Department      2/6/2017 9:45 AM Maegan Sharma NP [facility]        Today's Diagnoses     Cough    -  1     Other acute nonsuppurative otitis media of right ear           Care Instructions        ASSESSMENT/PLAN:  1. Cough  symptomatic  - guaiFENesin-codeine (ROBITUSSIN AC) 100-10 MG/5ML SOLN solution; Take 5-10 mLs by mouth every 6 hours as needed  Dispense: 180 mL; Refill: 0    2. Other acute nonsuppurative otitis media of right ear  symtpomatic  - doxycycline (VIBRAMYCIN) 100 MG capsule; Take 1 capsule (100 mg) by mouth 2 times daily  Dispense: 20 capsule; Refill: 0      Contact coumadin clinic with new use of antibiotics.   Use acetaminophen, ibuprofen,   Increase fluids and rest.   Follow up if symptoms do not improve or worsen    Maegan Sharma,   Certified Adult Nurse Practitioner  [phone]                     My Depression Action Plan  Name: Cassy Avila   Date of Birth 1955  Date: 2/6/2017    My doctor: Eliz Hartman   My clinic: [facility]  [address] [place] [id number]  [phone]          GREEN    ZONE   Good Control    What it looks like:     Things are going generally well. You have normal up s and down s. You may even feel depressed from time to time, but bad moods usually last less than a day.   What you need to do:  1. Continue to care for yourself (see self care plan)  2. Check your depression survival kit and update it as needed  3. Follow your physician s recommendations including any medication.  4. Do not stop taking medication unless you consult with your physician first.           YELLOW         ZONE Getting Worse    What it looks like:     Depression is starting to interfere with your life.     It may be hard to get out of  bed; you may be starting to isolate yourself from others.    Symptoms of depression are starting to last most all day and this has happened for several days.     You may have suicidal thoughts but they are not constant.   What you need to do:     1. Call your care team, your response to treatment will improve if you keep your care team informed of your progress. Yellow periods are signs an adjustment may need to be made.     2. Continue your self-care, even if you have to fake it!    3. Talk to someone in your support network    4. Open up your depression survival kit           RED    ZONE Medical Alert - Get Help    What it looks like:     Depression is seriously interfering with your life.     You may experience these or other symptoms: You can t get out of bed most days, can t work or engage in other necessary activities, you have trouble taking care of basic hygiene, or basic responsibilities, thoughts of suicide or death that will not go away, self-injurious behavior.     What you need to do:  1. Call your care team and request a same-day appointment. If they are not available (weekends or after hours) call your local crisis line, emergency room or 911.      Electronically signed by: Lis Vickers, February 6, 2017    Depression Self Care Plan / Survival Kit    Self-Care for Depression  Here s the deal. Your body and mind are really not as separate as most people think.  What you do and think affects how you feel and how you feel influences what you do and think. This means if you do things that people who feel good do, it will help you feel better.  Sometimes this is all it takes.  There is also a place for medication and therapy depending on how severe your depression is, so be sure to consult with your medical provider and/ or Behavioral Health Consultant if your symptoms are worsening or not improving.     In order to better manage my stress, I will:    Exercise  Get some form of exercise, every day. This  will help reduce pain and release endorphins, the  feel good  chemicals in your brain. This is almost as good as taking antidepressants!  This is not the same as joining a gym and then never going! (they count on that by the way ) It can be as simple as just going for a walk or doing some gardening, anything that will get you moving.      Hygiene   Maintain good hygiene (Get out of bed in the morning, Make your bed, Brush your teeth, Take a shower, and Get dressed like you were going to work, even if you are unemployed).  If your clothes don't fit try to get ones that do.    Diet  I will strive to eat foods that are good for me, drink plenty of water, and avoid excessive sugar, caffeine, alcohol, and other mood-altering substances.  Some foods that are helpful in depression are: complex carbohydrates, B vitamins, flaxseed, fish or fish oil, fresh fruits and vegetables.    Psychotherapy  I agree to participate in Individual Therapy (if recommended).    Medication  If prescribed medications, I agree to take them.  Missing doses can result in serious side effects.  I understand that drinking alcohol, or other illicit drug use, may cause potential side effects.  I will not stop my medication abruptly without first discussing it with my provider.    Staying Connected With Others  I will stay in touch with my friends, family members, and my primary care provider/team.    Use your imagination  Be creative.  We all have a creative side; it doesn t matter if it s oil painting, sand castles, or mud pies! This will also kick up the endorphins.    Witness Beauty  (AKA stop and smell the roses) Take a look outside, even in mid-winter. Notice colors, textures. Watch the squirrels and birds.     Service to others  Be of service to others.  There is always someone else in need.  By helping others we can  get out of ourselves  and remember the really important things.  This also provides opportunities for practicing all the other  parts of the program.    Humor  Laugh and be silly!  Adjust your TV habits for less news and crime-drama and more comedy.    Control your stress  Try breathing deep, massage therapy, biofeedback, and meditation. Find time to relax each day.     My support system    Clinic Contact:  Phone number:    Contact 1:  Phone number:    Contact 2:  Phone number:    Baptism/:  Phone number:    Therapist:  Phone number:    Local [facility] center:    Phone number:    Other community support:  Phone number:            Follow-ups after your visit        Who to contact     If you have questions or need follow up information about today's clinic visit or your schedule please contact [facility] directly at [phone].  Normal or non-critical lab and imaging results will be communicated to you by [employer], letter or phone within 4 business days after the clinic has received the results. If you do not hear from us within 7 days, please contact the clinic through [employer] or phone. If you have a critical or abnormal lab result, we will notify you by phone as soon as possible.  Submit refill requests through [facility] or call your pharmacy and they will forward the refill request to us. Please allow 3 business days for your refill to be completed.          Additional Information About Your Visit        [employer] Information     [facility] gives you secure access to your electronic health record. If you see a primary care provider, you can also send messages to your care team and make appointments. If you have questions, please call your primary care clinic.  If you do not have a primary care provider, please call [phone] and they will assist you.        Care EveryWhere ID     This is your Care EveryWhere ID. This could be used by other organizations to access your [place] medical records  [id number]        Your Vitals Were     Pulse Temperature Respirations             72 98.7  F (37.1  C) (Tympanic) 14           Blood Pressure from Last 3 Encounters:   02/06/17 118/74   01/06/17 118/76   01/04/17 145/79    Weight from Last 3 Encounters:   02/06/17 223 lb (101.152 kg)   01/06/17 232 lb (105.235 kg)   10/05/16 232 lb (105.235 kg)              Today, you had the following     No orders found for display         Today's Medication Changes          These changes are accurate as of: 2/6/17 10:16 AM.  If you have any questions, ask your nurse or doctor.               Start taking these medicines.        Dose/Directions    doxycycline 100 MG capsule   Commonly known as:  VIBRAMYCIN   Used for:  Other acute nonsuppurative otitis media of right ear   Started by:  Maegan Sharma NP        Dose:  100 mg   Take 1 capsule (100 mg) by mouth 2 times daily   Quantity:  20 capsule   Refills:  0       guaiFENesin-codeine 100-10 MG/5ML Soln solution   Commonly known as:  ROBITUSSIN AC   Used for:  Cough   Started by:  Maegan Sharma NP        Dose:  1-2 tsp.   Take 5-10 mLs by mouth every 6 hours as needed   Quantity:  180 mL   Refills:  0         These medicines have changed or have updated prescriptions.        Dose/Directions    atenolol 50 MG tablet   Commonly known as:  TENORMIN   This may have changed:  See the new instructions.   Used for:  HTN (hypertension)        TAKE 1 AND ONE-HALF TABLET BY MOUTH DAILY   Quantity:  135 tablet   Refills:  2       hydrochlorothiazide 25 MG tablet   Commonly known as:  HYDRODIURIL   This may have changed:  additional instructions   Used for:  Essential hypertension        Dose:  25 mg   Take 1 tablet (25 mg) by mouth daily   Quantity:  90 tablet   Refills:  2            Where to get your medicines      These medications were sent to [id number] - [place] - [address] AT [facility] [address]  [address] [address]     Phone:  [phone]    - doxycycline 100 MG capsule      Some of these will need a paper prescription  and others can be bought over the counter.  Ask your nurse if you have questions.     Bring a paper prescription for each of these medications    - guaiFENesin-codeine 100-10 MG/5ML Soln solution             Primary Care Provider Office Phone # Fax #    Eliz Hartman [phone]       [address] [address]        Thank you!     Thank you for choosing [facility]  for your care. Our goal is always to provide you with excellent care. Hearing back from our patients is one way we can continue to improve our services. Please take a few minutes to complete the written survey that you may receive in the mail after your visit with us. Thank you!             Your Updated Medication List - Protect others around you: Learn how to safely use, store and throw away your medicines at www.disposemymeds.org.          This list is accurate as of: 2/6/17 10:16 AM.  Always use your most recent med list.                   Brand Name Dispense Instructions for use    aspirin 81 MG EC tablet      Take 81 mg by mouth daily HS       atenolol 50 MG tablet    TENORMIN    135 tablet    TAKE 1 AND ONE-HALF TABLET BY MOUTH DAILY       doxycycline 100 MG capsule    VIBRAMYCIN    20 capsule    Take 1 capsule (100 mg) by mouth 2 times daily       escitalopram 20 MG tablet    LEXAPRO    90 tablet    Take 1 tablet (20 mg) by mouth daily       fluticasone 50 MCG/ACT spray    FLONASE    16 g    Spray 1-2 sprays into both nostrils daily       guaiFENesin-codeine 100-10 MG/5ML Soln solution    ROBITUSSIN AC    180 mL    Take 5-10 mLs by mouth every 6 hours as needed       hydrochlorothiazide 25 MG tablet    HYDRODIURIL    90 tablet    Take 1 tablet (25 mg) by mouth daily       HYDROcodone-acetaminophen 5-325 MG per tablet    NORCO    50 tablet    Take 1-2 tablets by mouth every 4 hours as needed for other (Moderate to Severe Pain)       losartan 50 MG tablet    COZAAR    180 tablet    TAKE 2  TABLETS BY MOUTH EVERY DAY       omega 3 1000 MG Caps     90 capsule    Take 3 g by mouth daily       VITAMIN D3 PO      Take 3,000 mg by mouth daily AM       warfarin 5 MG tablet    COUMADIN    135 tablet    TAKE 1 AND ONE-HALF TABLET BY MOUTH ON MONDAY WEDNESDAY AND FRIDAYS AND 1 TABLET ON ALL OTHER DAYS OF THE WEEK

## 2017-02-06 NOTE — PROGRESS NOTES
CHIEF COMPLAINT:  Chief Complaint   Patient presents with     URI     Patient reports chest congestion, stuffy head and painful ears.     Depression     Due       SUBJECTIVE:   Cassy Avila  is here today because of:Sinus Pain and Cough  The patient has had symptoms of cough, earache, sore throat, nasal congestion/runny nose, sinus pain, chest congestion, wheezing, myalgias and fatigue.   Onset of symptoms was 2 days ago. Course of illness is worsening.  Patient denies exposure to illness at home or work/school.   Patient denies vomiting and diarrhea  Treatment measures tried include robitussin AC, helped with cough - now out. .  Patient is not a smoker        Past Medical History   Diagnosis Date     Migraine, unspecified, without mention of intractable migraine without mention of status migrainosus 3/5/2001     Unspecified essential hypertension 2/20/2001     Other pulmonary embolism and infarction 4/11/2003     Contact dermatitis and other eczema, due to unspecified cause 6/1/2004     Cervicalgia 1/9/2001     Edema 1/18/2002     Tachycardia, unspecified 2/12/2001     Long term (current) use of anticoagulants 8/20/2003     Other and unspecified hyperlipidemia 7/11/2002     Neurofibromatosis, unspecified(237.70) 8/22/2012     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, congenital     Mammographic microcalcification 2003     resolved from problem list     Herpes zoster without mention of complication 2003     resolved from problem list     Closed dislocation of shoulder, unspecified site 2000     Thrombosis of leg      Gastro-oesophageal reflux disease      Arthritis      Other chronic pain      Major depression 8/8/2014     Factor V Leiden (H)      Coughing      Past Surgical History   Procedure Laterality Date     Arthroscopy shoulder  2012     right, bone spurs     ------------other-------------  2012     shoulder replacement; Provider: Karen      Esophagastroduodenoscopy       with biopsy and endoscopic U/S     Cholecystectomy       Elbow ulnar tunnel release       Anastacio/bso        section       x3     Pionidal cyst excision       Fusion lumbar anterior with bak cages       L5-S1     Endoscopic sinus surgery, septoplasty, turbinoplasty, maxillary sinusotomy, combined N/A 2015     Procedure: COMBINED ENDOSCOPIC SINUS SURGERY, SEPTOPLASTY, TURBINOPLASTY, MAXILLARY SINUSOTOMY;  Surgeon: Seema Conn MD;  Location: [facility] OR     Transposition ulnar nerve (elbow)       Arthroplasty knee  2014     Procedure: ARTHROPLASTY KNEE;  Surgeon: Sean Alexander MD;  Location: [facility] OR     Orthopedic surgery  2-15     right shoulder     Orthopedic surgery  8/28/15     right knee     Excise neuroma lower extremity Left 2016     Procedure: EXCISE NEUROMA LOWER EXTREMITY;  Surgeon: Edi Reed MD;  Location: [facility] OR     Current Outpatient Prescriptions   Medication Sig Dispense Refill     escitalopram (LEXAPRO) 20 MG tablet Take 1 tablet (20 mg) by mouth daily 90 tablet 1     losartan (COZAAR) 50 MG tablet TAKE 2 TABLETS BY MOUTH EVERY  tablet 2     warfarin (COUMADIN) 5 MG tablet TAKE 1 AND ONE-HALF TABLET BY MOUTH ON  AND  AND 1 TABLET ON ALL OTHER DAYS OF THE WEEK 135 tablet 0     HYDROcodone-acetaminophen (NORCO) 5-325 MG per tablet Take 1-2 tablets by mouth every 4 hours as needed for other (Moderate to Severe Pain) 50 tablet 0     hydrochlorothiazide (HYDRODIURIL) 25 MG tablet Take 1 tablet (25 mg) by mouth daily (Patient taking differently: Take 25 mg by mouth daily AM) 90 tablet 2     atenolol (TENORMIN) 50 MG tablet TAKE 1 AND ONE-HALF TABLET BY MOUTH DAILY (Patient taking differently: TAKE 1 AND ONE-HALF TABLET BY MOUTH DAILY in AM) 135 tablet 2     Cholecalciferol (VITAMIN D3 PO) Take 3,000 mg by mouth daily AM       omega 3 1000 MG CAPS Take 3 g by mouth daily  90 capsule      aspirin 81 MG EC tablet  Take 81 mg by mouth daily HS       fluticasone (FLONASE) 50 MCG/ACT nasal spray Spray 1-2 sprays into both nostrils daily 16 g 0      Allergies   Allergen Reactions     Ace Inhibitors Cough     Albuterol Sulfate Hives     DuoNeb     Amlodipine Besylate Swelling     Norvasc     Amoxicillin      Cephalexin Monohydrate Hives     Keflex     Erythromycin Base [Kdc:Yellow Dye+Erythromycin+Brilliant Blue Fcf] Nausea and Vomiting     Ipratropium Bromide Hives     DuoNeb     Meloxicam Other (See Comments)     Mobic - confusion, depression     Adhesive Tape Rash     Prochlorperazine Edisylate Swelling and Rash     Compazine     Prochlorperazine Maleate Swelling and Rash       Family and Social History are reviewed.    REVIEW OF SYSTEMS  Skin: negative  Eyes: negative  Ears/Nose/Throat: as above  Respiratory: Shortness of breath-  and Cough- productive  Cardiovascular: negative  Gastrointestinal: negative  Genitourinary: negative  Musculoskeletal: myalgia  Neurologic: negative  Psychiatric: negative  Hematologic/Lymphatic/Immunologic: negative  Endocrine: negative    OBJECTIVE:   Vital signs:/74 mmHg  Pulse 72  Temp(Src) 98.7  F (37.1  C) (Tympanic)  Resp 14  Wt 223 lb (101.152 kg)   General: alert and no distress  Skin is unremarkable.  HEENT: left TM normal without fluid or infection, right TM red, dull, bulging, neck has bilateral anterior cervical nodes enlarged, pharynx erythematous without exudate and post nasal drip noted.  Lungs chest clear to IPPA, no tachypnea, retractions or cyanosis and S1, S2 normal, no murmur, no gallop, rate regular  Rapid Strep Test is not performed    LABS AND IMAGING  Results for orders placed or performed in visit on 01/23/17   INR   Result Value Ref Range    INR 2.6        ASSESSMENT/PLAN:  1. Cough  symptomatic  - guaiFENesin-codeine (ROBITUSSIN AC) 100-10 MG/5ML SOLN solution; Take 5-10 mLs by mouth every 6 hours as needed  Dispense: 180 mL; Refill: 0    2. Other acute  nonsuppurative otitis media of right ear  symtpomatic  - doxycycline (VIBRAMYCIN) 100 MG capsule; Take 1 capsule (100 mg) by mouth 2 times daily  Dispense: 20 capsule; Refill: 0      Contact coumadin clinic with new use of antibiotics.   Use acetaminophen, ibuprofen,   Increase fluids and rest.   Follow up if symptoms do not improve or worsen    Maegan Sharma,   Certified Adult Nurse Practitioner  [phone]

## 2017-02-07 ASSESSMENT — PATIENT HEALTH QUESTIONNAIRE - PHQ9: SUM OF ALL RESPONSES TO PHQ QUESTIONS 1-9: 0

## 2017-02-07 ASSESSMENT — ANXIETY QUESTIONNAIRES: GAD7 TOTAL SCORE: 0

## 2017-02-13 ENCOUNTER — ANTICOAGULATION THERAPY VISIT (OUTPATIENT)
Dept: ANTICOAGULATION | Facility: OTHER | Age: 62
End: 2017-02-13

## 2017-02-13 ENCOUNTER — TRANSFERRED RECORDS (OUTPATIENT)
Dept: HEALTH INFORMATION MANAGEMENT | Facility: HOSPITAL | Age: 62
End: 2017-02-13

## 2017-02-13 DIAGNOSIS — I82.409 DEEP VEIN THROMBOSIS (DVT) (H): ICD-10-CM

## 2017-02-13 DIAGNOSIS — I26.99 PULMONARY EMBOLISM AND INFARCTION (H): ICD-10-CM

## 2017-02-13 DIAGNOSIS — Z79.01 LONG-TERM (CURRENT) USE OF ANTICOAGULANTS: ICD-10-CM

## 2017-02-13 LAB — INR PPP: 3.2

## 2017-02-13 NOTE — PROGRESS NOTES
ANTICOAGULATION FOLLOW-UP CLINIC VISIT    Patient Name:  Cassy Avila  Date:  2/13/2017  Contact Type:  Telephone    SUBJECTIVE:     Patient Findings     Positives Antibiotic use or infection    Comments Doxycycline 100mg BID x 10d through 2/16/17.  New dosing instructions left on the pt's cell phone voice messaging system per her request.  Pt informed the slight elevation is most likely due to ABX, despite dose adjustment.             OBJECTIVE    INR   Date Value Ref Range Status   02/13/2017 3.2  Final       ASSESSMENT / PLAN  INR assessment SUPRA    Recheck INR In: 2 WEEKS    INR Location Home INR      Anticoagulation Summary as of 2/13/2017     INR goal 2.0-3.0   Today's INR 3.2!   Maintenance plan 7.5 mg (5 mg x 1.5) on Mon, Wed, Fri; 5 mg (5 mg x 1) all other days   Full instructions 2/13: 5 mg; 2/15: 5 mg; Otherwise 7.5 mg on Mon, Wed, Fri; 5 mg all other days   Weekly total 42.5 mg   Plan last modified Iram Lord RN (7/20/2016)   Next INR check 2/27/2017   Priority INR   Target end date Indefinite    Indications   Long-term (current) use of anticoagulants [Z79.01] [Z79.01]  Pulmonary embolism and infarction (H) [I26.99]  Deep vein thrombosis (DVT) (H) [I82.409] [I82.409]         Anticoagulation Episode Summary     INR check location Home Draw    Preferred lab     Send INR reminders to [facility] POOL    Comments PST - [facility].      Anticoagulation Care Providers     Provider Role Specialty Phone number    Eliz Hartman NP [facility] [phone]            See the Encounter Report to view Anticoagulation Flowsheet and Dosing Calendar (Go to Encounters tab in chart review, and find the Anticoagulation Therapy Visit)        Manju Roberts RN

## 2017-02-13 NOTE — MR AVS SNAPSHOT
Cassy CHIU Austin   2/13/2017   Anticoagulation Therapy Visit    Description:  61 year old female   Provider:  Eliz Hartman NP   Department:  [facility] Anti Coagulation           INR as of 2/13/2017     Today's INR 3.2!      Anticoagulation Summary as of 2/13/2017     INR goal 2.0-3.0   Today's INR 3.2!   Full instructions 2/13: 5 mg; 2/15: 5 mg; Otherwise 7.5 mg on Mon, Wed, Fri; 5 mg all other days   Next INR check 2/27/2017    Indications   Long-term (current) use of anticoagulants [Z79.01] [Z79.01]  Pulmonary embolism and infarction (H) [I26.99]  Deep vein thrombosis (DVT) (H) [I82.409] [I82.409]         February 2017 Details    Sun Mon Tue Wed Thu Fri Sat        1               2               3               4                 5               6               7               8               9               10               11                 12               13      5 mg   See details      14      5 mg         15      5 mg         16      5 mg         17      7.5 mg         18      5 mg           19      5 mg         20      7.5 mg         21      5 mg         22      7.5 mg         23      5 mg         24      7.5 mg         25      5 mg           26      5 mg         27            28                    Date Details   02/13 This INR check   Take 5 mg (5 mg tablets x 1)   Doxycycline thru 2/16/17       Date of next INR:  2/27/2017         How to take your warfarin dose     To take:  5 mg Take 1 of the 5 mg tablets.    To take:  7.5 mg Take 1.5 of the 5 mg tablets.

## 2017-02-27 ENCOUNTER — ANTICOAGULATION THERAPY VISIT (OUTPATIENT)
Dept: ANTICOAGULATION | Facility: OTHER | Age: 62
End: 2017-02-27

## 2017-02-27 ENCOUNTER — TRANSFERRED RECORDS (OUTPATIENT)
Dept: HEALTH INFORMATION MANAGEMENT | Facility: HOSPITAL | Age: 62
End: 2017-02-27

## 2017-02-27 DIAGNOSIS — Z79.01 LONG-TERM (CURRENT) USE OF ANTICOAGULANTS: ICD-10-CM

## 2017-02-27 DIAGNOSIS — I26.99 PULMONARY EMBOLISM AND INFARCTION (H): ICD-10-CM

## 2017-02-27 LAB — INR PPP: 2.4

## 2017-02-27 NOTE — PROGRESS NOTES
ANTICOAGULATION FOLLOW-UP CLINIC VISIT    Patient Name:  Cassy Avila  Date:  2/27/2017  Contact Type:  Telephone    SUBJECTIVE:     Patient Findings     Positives No Problem Findings           OBJECTIVE    INR   Date Value Ref Range Status   02/27/2017 2.4  Final       ASSESSMENT / PLAN  INR assessment THER    Recheck INR In: 4 WEEKS    INR Location Home INR      Anticoagulation Summary as of 2/27/2017     INR goal 2.0-3.0   Today's INR 2.4   Maintenance plan 7.5 mg (5 mg x 1.5) on Mon, Wed, Fri; 5 mg (5 mg x 1) all other days   Full instructions 7.5 mg on Mon, Wed, Fri; 5 mg all other days   Weekly total 42.5 mg   No change documented Manju Escalera RN   Plan last modified Iram Lord RN (7/20/2016)   Next INR check 3/27/2017   Priority INR   Target end date Indefinite    Indications   Long-term (current) use of anticoagulants [Z79.01] [Z79.01]  Pulmonary embolism and infarction (H) [I26.99]  Deep vein thrombosis (DVT) (H) [I82.409] [I82.409]         Anticoagulation Episode Summary     INR check location Home Draw    Preferred lab     Send INR reminders to [facility]TICOAG POOL    Comments PST - [facility].      Anticoagulation Care Providers     Provider Role Specialty Phone number    Eliz Hartman, NOR[facility] [phone]            See the Encounter Report to view Anticoagulation Flowsheet and Dosing Calendar (Go to Encounters tab in chart review, and find the Anticoagulation Therapy Visit)        Manju Escalera, RN

## 2017-02-27 NOTE — MR AVS SNAPSHOT
Cassy APPLE Avila   2/27/2017   Anticoagulation Therapy Visit    Description:  61 year old female   Provider:  Eliz Hartman NP   Department:  [facility]           INR as of 2/27/2017     Today's INR 2.4      Anticoagulation Summary as of 2/27/2017     INR goal 2.0-3.0   Today's INR 2.4   Full instructions 7.5 mg on Mon, Wed, Fri; 5 mg all other days   Next INR check 3/27/2017    Indications   Long-term (current) use of anticoagulants [Z79.01] [Z79.01]  Pulmonary embolism and infarction (H) [I26.99]  Deep vein thrombosis (DVT) (H) [I82.409] [I82.409]         February 2017 Details    Sun Mon Tue Wed Thu Fri Sat        1               2               3               4                 5               6               7               8               9               10               11                 12               13               14               15               16               17               18                 19               20               21               22               23               24               25                 26               27      7.5 mg   See details      28      5 mg              Date Details   02/27 This INR check               How to take your warfarin dose     To take:  5 mg Take 1 of the 5 mg tablets.    To take:  7.5 mg Take 1.5 of the 5 mg tablets.           March 2017 Details    Sun Mon Tue Wed Thu Fri Sat        1      7.5 mg         2      5 mg         3      7.5 mg         4      5 mg           5      5 mg         6      7.5 mg         7      5 mg         8      7.5 mg         9      5 mg         10      7.5 mg         11      5 mg           12      5 mg         13      7.5 mg         14      5 mg         15      7.5 mg         16      5 mg         17      7.5 mg         18      5 mg           19      5 mg         20      7.5 mg         21      5 mg         22      7.5 mg         23      5 mg         24      7.5 mg         25      5 mg           26      5 mg          27            28               29               30               31                 Date Details   No additional details    Date of next INR:  3/27/2017         How to take your warfarin dose     To take:  5 mg Take 1 of the 5 mg tablets.    To take:  7.5 mg Take 1.5 of the 5 mg tablets.

## 2017-03-14 ENCOUNTER — HOSPITAL ENCOUNTER (INPATIENT)
Facility: HOSPITAL | Age: 62
LOS: 1 days | Discharge: HOME | DRG: 194 | End: 2017-03-15
Attending: PHYSICIAN ASSISTANT | Admitting: INTERNAL MEDICINE
Payer: MEDICARE

## 2017-03-14 DIAGNOSIS — Z79.01 LONG-TERM (CURRENT) USE OF ANTICOAGULANTS: Primary | ICD-10-CM

## 2017-03-14 DIAGNOSIS — J96.01 ACUTE RESPIRATORY FAILURE WITH HYPOXIA (H): ICD-10-CM

## 2017-03-14 DIAGNOSIS — I82.409 DEEP VEIN THROMBOSIS (DVT) (H): ICD-10-CM

## 2017-03-14 DIAGNOSIS — J18.9 PNEUMONIA OF LEFT LOWER LOBE DUE TO INFECTIOUS ORGANISM: ICD-10-CM

## 2017-03-14 LAB
ALBUMIN SERPL-MCNC: 3.3 G/DL (ref 3.4–5)
ALP SERPL-CCNC: 52 U/L (ref 40–150)
ALT SERPL W P-5'-P-CCNC: 30 U/L (ref 0–50)
ANION GAP SERPL CALCULATED.3IONS-SCNC: 11 MMOL/L (ref 3–14)
AST SERPL W P-5'-P-CCNC: 10 U/L (ref 0–45)
BASOPHILS # BLD AUTO: 0 10E9/L (ref 0–0.2)
BASOPHILS NFR BLD AUTO: 0.2 %
BILIRUB SERPL-MCNC: 0.8 MG/DL (ref 0.2–1.3)
BUN SERPL-MCNC: 12 MG/DL (ref 7–30)
CALCIUM SERPL-MCNC: 8.9 MG/DL (ref 8.5–10.1)
CHLORIDE SERPL-SCNC: 103 MMOL/L (ref 94–109)
CO2 SERPL-SCNC: 23 MMOL/L (ref 20–32)
CREAT SERPL-MCNC: 0.97 MG/DL (ref 0.52–1.04)
DIFFERENTIAL METHOD BLD: ABNORMAL
EOSINOPHIL # BLD AUTO: 0 10E9/L (ref 0–0.7)
EOSINOPHIL NFR BLD AUTO: 0 %
ERYTHROCYTE [DISTWIDTH] IN BLOOD BY AUTOMATED COUNT: 13.9 % (ref 10–15)
FLUAV+FLUBV AG SPEC QL: NEGATIVE
FLUAV+FLUBV AG SPEC QL: NORMAL
GFR SERPL CREATININE-BSD FRML MDRD: 58 ML/MIN/1.7M2
GLUCOSE SERPL-MCNC: 102 MG/DL (ref 70–99)
HCT VFR BLD AUTO: 37.1 % (ref 35–47)
HGB BLD-MCNC: 13.2 G/DL (ref 11.7–15.7)
IMM GRANULOCYTES # BLD: 0.1 10E9/L (ref 0–0.4)
IMM GRANULOCYTES NFR BLD: 0.5 %
INR PPP: 2.2 (ref 0.8–1.2)
LACTATE SERPL-SCNC: 0.7 MMOL/L (ref 0.4–2)
LYMPHOCYTES # BLD AUTO: 1 10E9/L (ref 0.8–5.3)
LYMPHOCYTES NFR BLD AUTO: 8.2 %
MCH RBC QN AUTO: 31.4 PG (ref 26.5–33)
MCHC RBC AUTO-ENTMCNC: 35.6 G/DL (ref 31.5–36.5)
MCV RBC AUTO: 88 FL (ref 78–100)
MONOCYTES # BLD AUTO: 0.9 10E9/L (ref 0–1.3)
MONOCYTES NFR BLD AUTO: 7.9 %
NEUTROPHILS # BLD AUTO: 9.9 10E9/L (ref 1.6–8.3)
NEUTROPHILS NFR BLD AUTO: 83.2 %
NRBC # BLD AUTO: 0 10*3/UL
NRBC BLD AUTO-RTO: 0 /100
PLATELET # BLD AUTO: 192 10E9/L (ref 150–450)
POTASSIUM SERPL-SCNC: 3.6 MMOL/L (ref 3.4–5.3)
PROT SERPL-MCNC: 7.2 G/DL (ref 6.8–8.8)
RBC # BLD AUTO: 4.2 10E12/L (ref 3.8–5.2)
SODIUM SERPL-SCNC: 137 MMOL/L (ref 133–144)
SPECIMEN SOURCE: NORMAL
WBC # BLD AUTO: 11.8 10E9/L (ref 4–11)

## 2017-03-14 PROCEDURE — 25000132 ZZH RX MED GY IP 250 OP 250 PS 637: Mod: GY | Performed by: PHYSICIAN ASSISTANT

## 2017-03-14 PROCEDURE — 96365 THER/PROPH/DIAG IV INF INIT: CPT

## 2017-03-14 PROCEDURE — 12000000 ZZH R&B MED SURG/OB

## 2017-03-14 PROCEDURE — 71020 ZZHC CHEST TWO VIEWS, FRONT/LAT: CPT | Mod: TC

## 2017-03-14 PROCEDURE — 96375 TX/PRO/DX INJ NEW DRUG ADDON: CPT

## 2017-03-14 PROCEDURE — 80053 COMPREHEN METABOLIC PANEL: CPT | Performed by: PHYSICIAN ASSISTANT

## 2017-03-14 PROCEDURE — 99285 EMERGENCY DEPT VISIT HI MDM: CPT | Mod: 25

## 2017-03-14 PROCEDURE — 85610 PROTHROMBIN TIME: CPT | Performed by: PHYSICIAN ASSISTANT

## 2017-03-14 PROCEDURE — 36415 COLL VENOUS BLD VENIPUNCTURE: CPT | Performed by: PHYSICIAN ASSISTANT

## 2017-03-14 PROCEDURE — 99285 EMERGENCY DEPT VISIT HI MDM: CPT | Performed by: PHYSICIAN ASSISTANT

## 2017-03-14 PROCEDURE — 85025 COMPLETE CBC W/AUTO DIFF WBC: CPT | Performed by: PHYSICIAN ASSISTANT

## 2017-03-14 PROCEDURE — 83605 ASSAY OF LACTIC ACID: CPT | Performed by: PHYSICIAN ASSISTANT

## 2017-03-14 PROCEDURE — 87804 INFLUENZA ASSAY W/OPTIC: CPT | Performed by: FAMILY MEDICINE

## 2017-03-14 PROCEDURE — 25000128 H RX IP 250 OP 636: Performed by: PHYSICIAN ASSISTANT

## 2017-03-14 PROCEDURE — A9270 NON-COVERED ITEM OR SERVICE: HCPCS | Mod: GY | Performed by: PHYSICIAN ASSISTANT

## 2017-03-14 PROCEDURE — 87040 BLOOD CULTURE FOR BACTERIA: CPT | Performed by: PHYSICIAN ASSISTANT

## 2017-03-14 PROCEDURE — 99223 1ST HOSP IP/OBS HIGH 75: CPT | Mod: AI | Performed by: INTERNAL MEDICINE

## 2017-03-14 RX ORDER — LOSARTAN POTASSIUM 50 MG/1
50 TABLET ORAL DAILY
Status: DISCONTINUED | OUTPATIENT
Start: 2017-03-15 | End: 2017-03-15 | Stop reason: HOSPADM

## 2017-03-14 RX ORDER — SODIUM CHLORIDE AND POTASSIUM CHLORIDE 150; 900 MG/100ML; MG/100ML
INJECTION, SOLUTION INTRAVENOUS CONTINUOUS
Status: DISCONTINUED | OUTPATIENT
Start: 2017-03-14 | End: 2017-03-15 | Stop reason: HOSPADM

## 2017-03-14 RX ORDER — HYDROCHLOROTHIAZIDE 25 MG/1
25 TABLET ORAL DAILY
Status: DISCONTINUED | OUTPATIENT
Start: 2017-03-15 | End: 2017-03-15 | Stop reason: HOSPADM

## 2017-03-14 RX ORDER — CEFTRIAXONE SODIUM 2 G/50ML
2 INJECTION, SOLUTION INTRAVENOUS ONCE
Status: COMPLETED | OUTPATIENT
Start: 2017-03-14 | End: 2017-03-14

## 2017-03-14 RX ORDER — ATENOLOL 50 MG/1
50 TABLET ORAL DAILY
Status: DISCONTINUED | OUTPATIENT
Start: 2017-03-15 | End: 2017-03-15 | Stop reason: HOSPADM

## 2017-03-14 RX ORDER — CEFTRIAXONE SODIUM 1 G
1 VIAL (EA) INJECTION ONCE
Status: DISCONTINUED | OUTPATIENT
Start: 2017-03-14 | End: 2017-03-14

## 2017-03-14 RX ORDER — WARFARIN SODIUM 5 MG/1
5 TABLET ORAL
Status: DISCONTINUED | OUTPATIENT
Start: 2017-03-15 | End: 2017-03-15

## 2017-03-14 RX ORDER — CHLORAL HYDRATE 500 MG
3 CAPSULE ORAL DAILY
Status: DISCONTINUED | OUTPATIENT
Start: 2017-03-15 | End: 2017-03-15 | Stop reason: HOSPADM

## 2017-03-14 RX ORDER — LEVOFLOXACIN 5 MG/ML
INJECTION, SOLUTION INTRAVENOUS
Status: COMPLETED
Start: 2017-03-14 | End: 2017-03-15

## 2017-03-14 RX ORDER — ESCITALOPRAM OXALATE 20 MG/1
20 TABLET ORAL DAILY
Status: DISCONTINUED | OUTPATIENT
Start: 2017-03-15 | End: 2017-03-15 | Stop reason: HOSPADM

## 2017-03-14 RX ORDER — ACETAMINOPHEN 325 MG/1
975 TABLET ORAL ONCE
Status: COMPLETED | OUTPATIENT
Start: 2017-03-14 | End: 2017-03-14

## 2017-03-14 RX ORDER — ASPIRIN 81 MG/1
81 TABLET ORAL DAILY
Status: DISCONTINUED | OUTPATIENT
Start: 2017-03-15 | End: 2017-03-15 | Stop reason: HOSPADM

## 2017-03-14 RX ORDER — LEVOFLOXACIN 5 MG/ML
750 INJECTION, SOLUTION INTRAVENOUS EVERY 24 HOURS
Status: DISCONTINUED | OUTPATIENT
Start: 2017-03-14 | End: 2017-03-15

## 2017-03-14 RX ADMIN — AZITHROMYCIN MONOHYDRATE 500 MG: 500 INJECTION, POWDER, LYOPHILIZED, FOR SOLUTION INTRAVENOUS at 21:55

## 2017-03-14 RX ADMIN — ACETAMINOPHEN 975 MG: 325 TABLET, FILM COATED ORAL at 20:19

## 2017-03-14 RX ADMIN — CEFTRIAXONE SODIUM 2 G: 2 INJECTION, SOLUTION INTRAVENOUS at 21:23

## 2017-03-14 ASSESSMENT — ENCOUNTER SYMPTOMS
CHEST TIGHTNESS: 0
NAUSEA: 0
COUGH: 0
BACK PAIN: 0
ABDOMINAL PAIN: 0
WHEEZING: 0
HEADACHES: 1
SORE THROAT: 0
STRIDOR: 0
PALPITATIONS: 0
VOMITING: 0
NECK PAIN: 0
NECK STIFFNESS: 0
CHILLS: 1
FEVER: 1
SHORTNESS OF BREATH: 1

## 2017-03-14 NOTE — IP AVS SNAPSHOT
[facility]    [address] [address]    Phone:  [phone]    Fax:  [phone]                                       After Visit Summary   3/14/2017    Cassy Avila    MRN: [id number]           After Visit Summary Signature Page     I have received my discharge instructions, and my questions have been answered. I have discussed any challenges I see with this plan with the nurse or doctor.    ..........................................................................................................................................  Patient/Patient Representative Signature      ..........................................................................................................................................  Patient Representative Print Name and Relationship to Patient    ..................................................               ................................................  Date                                            Time    ..........................................................................................................................................  Reviewed by Signature/Title    ...................................................              ..............................................  Date                                                            Time

## 2017-03-14 NOTE — IP AVS SNAPSHOT
MRN:[id number]                      After Visit Summary   3/14/2017    Cassy Avila    MRN: [id number]           Thank you!     Thank you for choosing [facility] for your care. Our goal is always to provide you with excellent care. Hearing back from our patients is one way we can continue to improve our services. Please take a few minutes to complete the written survey that you may receive in the mail after you visit with us. Thank you!        Patient Information     Date Of Birth          1955        About your hospital stay     You were admitted on:  March 14, 2017 You last received care in the:  [facility]    You were discharged on:  March 15, 2017        Reason for your hospital stay       This patient is a 61 year old who presented to ED noting fever with chills that started while she was baby sitting her grand children the day piror to presentation. She left to rest at home, but then developed pleuritic pain over left lateral chest with dyspnea with any exertion. She had a fever up to 102.5 F. As she continued to have high fever this morning, and felt extremely weak and tired,therefore decided to come to ED. She has had no cough or sputum. Chest radiograph showed a left lower lobe focal infiltrate.  After arrival to medical wards, symptoms have improved markedly.  She has persistent pleuritic pain.  It is, however, improved.  She is no longer requiring supplemental oxygen.  She feels fit to leave the hospital.  She was discharged to continue a course of levofloxacin and continue INR monitoring at home with coordination with INR clinic.                  Who to Call     For medical emergencies, please call 911.  For non-urgent questions about your medical care, please call your primary care provider or clinic, [phone]          Attending Provider     Provider Specialty    Jelly Hebert PA-C Emergency Medicine    Briana Singleton MD Internal Medicine    Ever Mishra  MD PRATIBHA Internal Medicine       Primary Care Provider Office Phone # Fax #    Eliz Hartman [phone]        [facility] [address] [address]        After Care Instructions     Activity       Your activity upon discharge: activity as tolerated            Diet       Follow this diet upon discharge: Orders Placed This Encounter      Regular Diet Adult            Discharge Instructions       Use incentive spirometer or cough and take deep breaths frequently while awake, at least every 2-3 hours.                  Follow-up Appointments     Follow-up and recommended labs and tests        Follow up with primary care provider, Eliz Hartman, within 7 days for hospital follow- up.    Contact INR clinic with INR measurements. Please check INR daily until stablized, or as directed by clinic  2.5 mg warfarin tonight (3/15)                  Your next 10 appointments already scheduled     Mar 20, 2017  1:15 PM CDT   (Arrive by 1:00 PM)   Office Visit with Maegan Sharma NP   [facility] ([facility] ([facility] )    [address] [id number] [phone]           Bring a current list of meds and any records pertaining to this visit.  For Physicals, please bring immunization records and any forms needing to be filled out.    Please arrive 15 minutes early to complete paperwork and register.              Additional Services     INR Clinic Referral                 Future tests that were ordered for you     XR Chest 2 Views                 Further instructions from your care team       A follow up appointment has been scheduled for March 20th at 1:00pm with Randall Batres. If you are unable to make this appointment please call [phone] to reschedule.    Warfarin Instruction     You have started taking a medicine called warfarin. This is a blood-thinning medicine (anticoagulant). It helps prevent and treat blood clots.      Before leaving the hospital, make  "sure you know how much to take and how long to take it.      You will need regular blood tests to make sure your blood is clotting safely. It is very important to see your doctor for regular blood tests.    Talk to your doctor before taking any new medicine (this includes over-the-counter drugs and herbal products). Many medicines can interact with warfarin. This may cause more bleeding or too much clotting.     Eating a lot of vitamin K--found in green, leafy vegetables--can change the way warfarin works in your body. Do NOT avoid these foods. Instead, try to eat the same amount each day.     Bleeding is the most common side-effect of warfarin. You may notice bleeding gums, a bloody nose, bruises and bleeding longer when you cut yourself. See a doctor at once if:   o You cough up blood  o You find blood in your stool (poop)  o You have a deep cut, or a cut that bleeds longer than 10 minutes   o You have a bad cut, hard fall, accident or hit your head (go to urgent care or the emergency room).    For women who can get pregnant: This medicine can harm an unborn baby. Be very careful not to get pregnant while taking this medicine. If you think you might be pregnant, call your doctor right away.    For more information, read \"Guide to Warfarin Therapy,  the booklet you received in the hospital.        Pending Results     Date and Time Order Name Status Description    3/15/2017 0026 Strep Pneumoniae Agn 13 yrs and older In process     3/14/2017 2017 Blood culture Preliminary     3/14/2017 2017 Blood culture Preliminary             Statement of Approval     Ordered          03/15/17 1336  I have reviewed and agree with all the recommendations and orders detailed in this document.  EFFECTIVE NOW     Approved and electronically signed by:  Ever Mishra MD             Admission Information     Date & Time Provider Department Dept. Phone    3/14/2017 Ever Mishra MD [facility] [phone]      Your " "Vitals Were     Blood Pressure Temperature Respirations Height Weight Pulse Oximetry    130/56 96.8  F (36  C) (Tympanic) 20 1.651 m (5' 5\") 102.3 kg (225 lb 8.5 oz) 96%    BMI (Body Mass Index)                   37.53 kg/m2           [facility] Information     [facility]  gives you secure access to your electronic health record. If you see a primary care provider, you can also send messages to your care team and make appointments. If you have questions, please call your primary care clinic.  If you do not have a primary care provider, please call [phone] and they will assist you.        Care EveryWhere ID     This is your Care EveryWhere ID. This could be used by other organizations to access your [facility] medical records  [id number]           Review of your medicines      START taking        Dose / Directions    acetaminophen 325 MG tablet   Commonly known as:  TYLENOL   Used for:  Pneumonia of left lower lobe due to infectious organism        Dose:  975 mg   Take 3 tablets (975 mg) by mouth every 4 hours as needed for mild pain or fever   Quantity:  100 tablet   Refills:  0       levofloxacin 750 MG tablet   Commonly known as:  LEVAQUIN   Indication:  Community Acquired Pneumonia   Used for:  Pneumonia of left lower lobe due to infectious organism        Dose:  750 mg   Take 1 tablet (750 mg) by mouth every 24 hours for 4 days   Quantity:  4 tablet   Refills:  0       oxyCODONE 5 MG IR tablet   Commonly known as:  ROXICODONE   Used for:  Pneumonia of left lower lobe due to infectious organism        Dose:  5 mg   Take 1 tablet (5 mg) by mouth every 4 hours as needed for severe pain   Quantity:  10 tablet   Refills:  0         CONTINUE these medicines which may have CHANGED, or have new prescriptions. If we are uncertain of the size of tablets/capsules you have at home, strength may be listed as something that might have changed.        Dose / Directions    atenolol 50 MG tablet   Commonly known as:  TENORMIN "   This may have changed:  See the new instructions.   Used for:  HTN (hypertension)        TAKE 1 AND ONE-HALF TABLET BY MOUTH DAILY   Quantity:  135 tablet   Refills:  2       hydrochlorothiazide 25 MG tablet   Commonly known as:  HYDRODIURIL   This may have changed:  additional instructions   Used for:  Essential hypertension        Dose:  25 mg   Take 1 tablet (25 mg) by mouth daily   Quantity:  90 tablet   Refills:  2       warfarin 5 MG tablet   Commonly known as:  COUMADIN   This may have changed:  See the new instructions.   Used for:  Long-term (current) use of anticoagulants        Take 1/2 tab tonight, then TAKE 1 AND ONE-HALF TABLET BY MOUTH ON MONDAY WEDNESDAY AND FRIDAYS AND 1 TABLET ON ALL OTHER DAYS OF THE WEEK or as directed   Quantity:  135 tablet   Refills:  0         CONTINUE these medicines which have NOT CHANGED        Dose / Directions    aspirin 81 MG EC tablet        Dose:  81 mg   Take 81 mg by mouth daily HS   Refills:  0       escitalopram 20 MG tablet   Commonly known as:  LEXAPRO   Used for:  Major depression, recurrent (H)        Dose:  20 mg   Take 1 tablet (20 mg) by mouth daily   Quantity:  90 tablet   Refills:  1       losartan 50 MG tablet   Commonly known as:  COZAAR   Used for:  Benign essential hypertension        TAKE 2 TABLETS BY MOUTH EVERY DAY   Quantity:  180 tablet   Refills:  2       omega 3 1000 MG Caps        Dose:  3 g   Take 3 g by mouth daily   Quantity:  90 capsule   Refills:  0       VITAMIN D3 PO        Dose:  3000 mg   Take 3,000 mg by mouth daily AM   Refills:  0            Where to get your medicines      These medications were sent to [facility] [address]  [place], [place] - [address] AT [facility] [address]  [address], [place] [address]     Phone:  [phone]     levofloxacin 750 MG tablet    warfarin 5 MG tablet         Some of these will need a paper prescription and others can be bought over the counter. Ask your nurse if  you have questions.     Bring a paper prescription for each of these medications     oxyCODONE 5 MG IR tablet       You don't need a prescription for these medications     acetaminophen 325 MG tablet                Protect others around you: Learn how to safely use, store and throw away your medicines at www.disposemymeds.org.             Medication List: This is a list of all your medications and when to take them. Check marks below indicate your daily home schedule. Keep this list as a reference.      Medications           Morning Afternoon Evening Bedtime As Needed    acetaminophen 325 MG tablet   Commonly known as:  TYLENOL   Take 3 tablets (975 mg) by mouth every 4 hours as needed for mild pain or fever   Last time this was given:  975 mg on 3/15/2017  9:55 AM                                aspirin 81 MG EC tablet   Take 81 mg by mouth daily HS   Last time this was given:  81 mg on 3/15/2017  8:58 AM                                atenolol 50 MG tablet   Commonly known as:  TENORMIN   TAKE 1 AND ONE-HALF TABLET BY MOUTH DAILY   Last time this was given:  50 mg on 3/15/2017  8:58 AM                                escitalopram 20 MG tablet   Commonly known as:  LEXAPRO   Take 1 tablet (20 mg) by mouth daily                                hydrochlorothiazide 25 MG tablet   Commonly known as:  HYDRODIURIL   Take 1 tablet (25 mg) by mouth daily   Last time this was given:  25 mg on 3/15/2017  8:58 AM                                levofloxacin 750 MG tablet   Commonly known as:  LEVAQUIN   Take 1 tablet (750 mg) by mouth every 24 hours for 4 days                                losartan 50 MG tablet   Commonly known as:  COZAAR   TAKE 2 TABLETS BY MOUTH EVERY DAY   Last time this was given:  50 mg on 3/15/2017  8:58 AM                                omega 3 1000 MG Caps   Take 3 g by mouth daily   Last time this was given:  3 g on 3/15/2017  8:55 AM                                oxyCODONE 5 MG IR tablet   Commonly  known as:  ROXICODONE   Take 1 tablet (5 mg) by mouth every 4 hours as needed for severe pain                                VITAMIN D3 PO   Take 3,000 mg by mouth daily AM   Last time this was given:  3,000 Units on 3/15/2017  8:57 AM                                warfarin 5 MG tablet   Commonly known as:  COUMADIN   Take 1/2 tab tonight, then TAKE 1 AND ONE-HALF TABLET BY MOUTH ON MONDAY WEDNESDAY AND FRIDAYS AND 1 TABLET ON ALL OTHER DAYS OF THE WEEK or as directed                                          More Information        Patient Education    Levofloxacin Ophthalmic drops, solution    Levofloxacin Oral solution    Levofloxacin Oral tablet    Levofloxacin Solution for injection    Levofloxacin, Dextrose Solution for injection  Levofloxacin Oral tablet  What is this medicine?  LEVOFLOXACIN (brittani voe FLOX a sin) is a quinolone antibiotic. It is used to treat certain kinds of bacterial infections. It will not work for colds, flu, or other viral infections.  This medicine may be used for other purposes; ask your health care provider or pharmacist if you have questions.  What should I tell my health care provider before I take this medicine?  They need to know if you have any of these conditions:    cerebral disease    irregular heartbeat    kidney disease    seizure disorder    an unusual or allergic reaction to levofloxacin, other antibiotics or medicines, foods, dyes, or preservatives    pregnant or trying to get pregnant    breast-feeding  How should I use this medicine?  Take this medicine by mouth with a full glass of water. Follow the directions on the prescription label. This medicine can be taken with or without food. Take your medicine at regular intervals. Do not take your medicine more often than directed. Do not skip doses or stop your medicine early even if you feel better. Do not stop taking except on your doctor's advice.  A special MedGuide will be given to you by the pharmacist with each  prescription and refill. Be sure to read this information carefully each time.  Talk to your pediatrician regarding the use of this medicine in children. While this drug may be prescribed for children as young as 6 months for selected conditions, precautions do apply.  Overdosage: If you think you have taken too much of this medicine contact a poison control center or emergency room at once.  NOTE: This medicine is only for you. Do not share this medicine with others.  What if I miss a dose?  If you miss a dose, take it as soon as you remember. If it is almost time for your next dose, take only that dose. Do not take double or extra doses.  What may interact with this medicine?  Do not take this medicine with any of the following medications:    arsenic trioxide    chloroquine    droperidol    medicines for irregular heart rhythm like amiodarone, disopyramide, dofetilide, flecainide, quinidine, procainamide, sotalol    some medicines for depression or mental problems like phenothiazines, pimozide, and ziprasidone  This medicine may also interact with the following medications:    amoxapine    antacids    cisapride    dairy products    didanosine (ddI) buffered tablets or powder    haloperidol    multivitamins    NSAIDS, medicines for pain and inflammation, like ibuprofen or naproxen    retinoid products like tretinoin or isotretinoin    risperidone    some other antibiotics like clarithromycin or erythromycin    sucralfate    theophylline    warfarin  This list may not describe all possible interactions. Give your health care provider a list of all the medicines, herbs, non-prescription drugs, or dietary supplements you use. Also tell them if you smoke, drink alcohol, or use illegal drugs. Some items may interact with your medicine.  What should I watch for while using this medicine?  Tell your doctor or health care professional if your symptoms do not improve or if they get worse. Drink several glasses of water a  day and cut down on drinks that contain caffeine. You must not get dehydrated while taking this medicine.  You may get drowsy or dizzy. Do not drive, use machinery, or do anything that needs mental alertness until you know how this medicine affects you. Do not sit or stand up quickly, especially if you are an older patient. This reduces the risk of dizzy or fainting spells.  This medicine can make you more sensitive to the sun. Keep out of the sun. If you cannot avoid being in the sun, wear protective clothing and use a sunscreen. Do not use sun lamps or tanning beds/booths. Contact your doctor if you get a sunburn.  If you are a diabetic monitor your blood glucose carefully. If you get an unusual reading stop taking this medicine and call your doctor right away.  Do not treat diarrhea with over-the-counter products. Contact your doctor if you have diarrhea that lasts more than 2 days or if the diarrhea is severe and watery.  Avoid antacids, calcium, iron, and zinc products for 2 hours before and 2 hours after taking a dose of this medicine.  What side effects may I notice from receiving this medicine?  Side effects that you should report to your doctor or health care professional as soon as possible:    allergic reactions like skin rash or hives, swelling of the face, lips, or tongue    changes in vision    confusion, nightmares or hallucinations    difficulty breathing    irregular heartbeat, chest pain    joint, muscle or tendon pain    pain or difficulty passing urine    persistent headache with or without blurred vision    redness, blistering, peeling or loosening of the skin, including inside the mouth    seizures    unusual pain, numbness, tingling, or weakness    vaginal irritation, discharge  Side effects that usually do not require medical attention (report to your doctor or health care professional if they continue or are bothersome):    diarrhea    dry mouth    headache    stomach upset,  nausea    trouble sleeping  This list may not describe all possible side effects. Call your doctor for medical advice about side effects. You may report side effects to FDA at 6-168-BSX-6124.  Where should I keep my medicine?  Keep out of the reach of children.  Store at room temperature between 15 and 30 degrees C (59 and 86 degrees F). Keep in a tightly closed container. Throw away any unused medicine after the expiration date.  NOTE:This sheet is a summary. It may not cover all possible information. If you have questions about this medicine, talk to your doctor, pharmacist, or health care provider. Copyright  2016 [employer]                Patient Education    Oxycodone Hydrochloride Oral capsule    Oxycodone Hydrochloride Oral solution    Oxycodone Hydrochloride Oral tablet    Oxycodone Hydrochloride Oral tablet [Abuse Deterrent]    Oxycodone Hydrochloride Oral tablet, extended-release  Oxycodone Hydrochloride Oral tablet  What is this medicine?  OXYCODONE (ox i KOE done) is a pain reliever. It is used to treat moderate to severe pain.  This medicine may be used for other purposes; ask your health care provider or pharmacist if you have questions.  What should I tell my health care provider before I take this medicine?  They need to know if you have any of these conditions:    Sha's disease    brain tumor    drug abuse or addiction    head injury    heart disease    if you frequently drink alcohol containing drinks    kidney disease or problems going to the bathroom    liver disease    lung disease, asthma, or breathing problems    mental problems    an unusual or allergic reaction to oxycodone, codeine, hydrocodone, morphine, other medicines, foods, dyes, or preservatives    pregnant or trying to get pregnant    breast-feeding  How should I use this medicine?  Take this medicine by mouth with a glass of water. Follow the directions on the prescription label. You can take it with or without food. If it  upsets your stomach, take it with food. Take your medicine at regular intervals. Do not take it more often than directed. Do not stop taking except on your doctor's advice.  Some brands of this medicine, like Oxecta, have special instructions. Ask your doctor or pharmacist if these directions are for you: Do not cut, crush or chew this medicine. Swallow only one tablet at a time. Do not wet, soak, or lick the tablet before you take it.  Talk to your pediatrician regarding the use of this medicine in children. Special care may be needed.  Overdosage: If you think you have taken too much of this medicine contact a poison control center or emergency room at once.  NOTE: This medicine is only for you. Do not share this medicine with others.  What if I miss a dose?  If you miss a dose, take it as soon as you can. If it is almost time for your next dose, take only that dose. Do not take double or extra doses.  What may interact with this medicine?    alcohol    antihistamines    certain medicines used for nausea like chlorpromazine, droperidol    erythromycin    ketoconazole    medicines for depression, anxiety, or psychotic disturbances    medicines for sleep    muscle relaxants    naloxone    naltrexone    narcotic medicines (opiates) for pain    nilotinib    phenobarbital    phenytoin    rifampin    ritonavir    voriconazole  This list may not describe all possible interactions. Give your health care provider a list of all the medicines, herbs, non-prescription drugs, or dietary supplements you use. Also tell them if you smoke, drink alcohol, or use illegal drugs. Some items may interact with your medicine.  What should I watch for while using this medicine?  Tell your doctor or health care professional if your pain does not go away, if it gets worse, or if you have new or a different type of pain. You may develop tolerance to the medicine. Tolerance means that you will need a higher dose of the medicine for pain  relief. Tolerance is normal and is expected if you take this medicine for a long time.  Do not suddenly stop taking your medicine because you may develop a severe reaction. Your body becomes used to the medicine. This does NOT mean you are addicted. Addiction is a behavior related to getting and using a drug for a non-medical reason. If you have pain, you have a medical reason to take pain medicine. Your doctor will tell you how much medicine to take. If your doctor wants you to stop the medicine, the dose will be slowly lowered over time to avoid any side effects.  You may get drowsy or dizzy when you first start taking this medicine or change doses. Do not drive, use machinery, or do anything that may be dangerous until you know how the medicine affects you. Stand or sit up slowly.  There are different types of narcotic medicines (opiates) for pain. If you take more than one type at the same time, you may have more side effects. Give your health care provider a list of all medicines you use. Your doctor will tell you how much medicine to take. Do not take more medicine than directed. Call emergency for help if you have problems breathing.  This medicine will cause constipation. Try to have a bowel movement at least every 2 to 3 days. If you do not have a bowel movement for 3 days, call your doctor or health care professional.  Your mouth may get dry. Drinking water, chewing sugarless gum, or sucking on hard candy may help. See your dentist every 6 months.  What side effects may I notice from receiving this medicine?  Side effects that you should report to your doctor or health care professional as soon as possible:    allergic reactions like skin rash, itching or hives, swelling of the face, lips, or tongue    breathing problems    confusion    feeling faint or lightheaded, falls    trouble passing urine or change in the amount of urine    unusually weak or tired  Side effects that usually do not require medical  attention (report to your doctor or health care professional if they continue or are bothersome):    constipation    dry mouth    itching    nausea, vomiting    upset stomach  This list may not describe all possible side effects. Call your doctor for medical advice about side effects. You may report side effects to FDA at 6-448-FDA-1471.  Where should I keep my medicine?  Keep out of the reach of children. This medicine can be abused. Keep your medicine in a safe place to protect it from theft. Do not share this medicine with anyone. Selling or giving away this medicine is dangerous and against the law.  Store at room temperature between 15 and 30 degrees C (59 and 86 degrees F). Protect from light. Keep container tightly closed.  This medicine may cause accidental overdose and death if it is taken by other adults, children, or pets. Flush any unused medicine down the toilet to reduce the chance of harm. Do not use the medicine after the expiration date.  NOTE: This sheet is a summary. It may not cover all possible information. If you have questions about this medicine, talk to your doctor, pharmacist, or health care provider.  NOTE:This sheet is a summary. It may not cover all possible information. If you have questions about this medicine, talk to your doctor, pharmacist, or health care provider. Copyright  2016 [employer] Standard                Patient Education    Acetaminophen Chewable tablet    Acetaminophen Elixir    Acetaminophen Oral capsule    Acetaminophen Oral disintegrating tablet    Acetaminophen Oral drops, solution    Acetaminophen Oral drops, suspension    Acetaminophen Oral solution    Acetaminophen Oral suspension    Acetaminophen Oral tablet    Acetaminophen Oral tablet, biphasic release    Acetaminophen Oral tablet, extended-release    Acetaminophen Rectal suppository    Acetaminophen Solution for injection  Acetaminophen Oral tablet  What is this medicine?  ACETAMINOPHEN (a set a MARIPOSA mays) is a  pain reliever. It is used to treat mild pain and fever.  This medicine may be used for other purposes; ask your health care provider or pharmacist if you have questions.  What should I tell my health care provider before I take this medicine?  They need to know if you have any of these conditions:    if you often drink alcohol    liver disease    an unusual or allergic reaction to acetaminophen, other medicines, foods, dyes, or preservatives    pregnant or trying to get pregnant    breast-feeding  How should I use this medicine?  Take this medicine by mouth with a glass of water. Follow the directions on the package or prescription label. Take your medicine at regular intervals. Do not take your medicine more often than directed.  Talk to your pediatrician regarding the use of this medicine in children. While this drug may be prescribed for children as young as 6 years of age for selected conditions, precautions do apply.  Overdosage: If you think you have taken too much of this medicine contact a poison control center or emergency room at once.  NOTE: This medicine is only for you. Do not share this medicine with others.  What if I miss a dose?  If you miss a dose, take it as soon as you can. If it is almost time for your next dose, take only that dose. Do not take double or extra doses.  What may interact with this medicine?    alcohol    imatinib    isoniazid    other medicines with acetaminophen  This list may not describe all possible interactions. Give your health care provider a list of all the medicines, herbs, non-prescription drugs, or dietary supplements you use. Also tell them if you smoke, drink alcohol, or use illegal drugs. Some items may interact with your medicine.  What should I watch for while using this medicine?  Tell your doctor or health care professional if the pain lasts more than 10 days (5 days for children), if it gets worse, or if there is a new or different kind of pain. Also, check  with your doctor if a fever lasts for more than 3 days.  Do not take other medicines that contain acetaminophen with this medicine. Always read labels carefully. If you have questions, ask your doctor or pharmacist.  If you take too much acetaminophen get medical help right away. Too much acetaminophen can be very dangerous and cause liver damage. Even if you do not have symptoms, it is important to get help right away.  What side effects may I notice from receiving this medicine?  Side effects that you should report to your doctor or health care professional as soon as possible:    allergic reactions like skin rash, itching or hives, swelling of the face, lips, or tongue    breathing problems    fever or sore throat    redness, blistering, peeling or loosening of the skin, including inside the mouth    trouble passing urine or change in the amount of urine    unusual bleeding or bruising    unusually weak or tired    yellowing of the eyes or skin  Side effects that usually do not require medical attention (report to your doctor or health care professional if they continue or are bothersome):    headache    nausea, stomach upset  This list may not describe all possible side effects. Call your doctor for medical advice about side effects. You may report side effects to FDA at 4-390-FDA-4105.  Where should I keep my medicine?  Keep out of reach of children.  Store at room temperature between 20 and 25 degrees C (68 and 77 degrees F). Protect from moisture and heat. Throw away any unused medicine after the expiration date.  NOTE:This sheet is a summary. It may not cover all possible information. If you have questions about this medicine, talk to your doctor, pharmacist, or health care provider. Copyright  2016 Gold Standard                Pneumonia (Adult)  Pneumonia is an infection deep within the lungs. It is in the small air sacs (alveoli). Pneumonia may be caused by a virus or bacteria. Pneumonia caused by  bacteria is usually treated with an antibiotic. Severe cases may need to be treated in the hospital. Milder cases can be treated at home. Symptoms usually start to get better during the first 2 days of treatment.    Home care  Follow these guidelines when caring for yourself at home:    Rest at home for the first 2 to 3 days, or until you feel stronger. Don t let yourself get overly tired when you go back to your activities.    Stay away from cigarette smoke - yours or other people s.    You may use acetaminophen or ibuprofen to control fever or pain, unless another medicine was prescribed. If you have chronic liver or kidney disease, talk with your health care provider before using these medicines. Also talk with your provider if you ve had a stomach ulcer or GI bleeding. Don t give aspirin to anyone younger than 18 years of age who is ill with a fever. It may cause severe liver damage.    Your appetite may be poor, so a light diet is fine.    Drink 6 to 8 glasses of fluids every day to make sure you are getting enough fluids. Beverages can include water, sport drinks, sodas without caffeine, juices, tea, or soup. Fluids will help loosen secretions in the lung. This will make it easier for you to cough up the phlegm (sputum). If you also have heart or kidney disease, check with your health care provider before you drink extra fluids.    Take antibiotic medicine prescribed until it is all gone, even if you are feeling better after a few days.  Follow-up care  Follow up with your health care provider in the next 2 to 3 days, or as advised. This is to be sure the medicine is helping you get better.  If you are 65 or older, you should get a pneumococcal vaccine and a yearly flu (influenza) shot. You should also get these vaccines if you have chronic lung disease like asthma, emphysema, or COPD. Ask your provider about this.  When to seek medical advice  Call your health care provider right away if any of these  occur:    You don t get better within the first 48 hours of treatment    Shortness of breath gets worse    Rapid breathing (more than 25 breaths per minute)    Coughing up blood    Chest pain gets worse with breathing    Fever of 102 F (38 C) or higher that doesn t get better with fever medicine    Weakness, dizziness, or fainting that gets worse    Thirst or dry mouth that gets worse    Sinus pain, headache, or a stiff neck    Chest pain not caused by coughing    9666-1313 [employer]. [address] [place] [address] [id number]. All rights reserved. This information is not intended as a substitute for professional medical care. Always follow your healthcare professional's instructions.                Discharge Instructions for Pneumonia  You have been diagnosed with pneumonia, a serious lung infection. Most cases of pneumonia are caused by bacteria. Pneumonia most often occurs in older adults, young children, and people with chronic health problems.  Home care    Take your medication exactly as directed. Don t skip doses. Continue taking your antibiotics as directed until they are all gone -- even if you start to feel better. This will prevent the pneumonia from coming back.    Drink at least 8 glasses of water daily, unless directed otherwise. This helps to loosen and thin secretions so that you can cough them up.    Use a cool-mist humidifier in your bedroom. Be sure to clean the humidifier daily.    Coughing up mucus is normal. Don t use medications to suppress your cough unless your cough is dry, painful, or interferes with your sleep. You may use an expectorant if ordered by your doctor.    Warm compresses or a heating pad on the lowest setting can be used to relieve chest discomfort. Use several times a day for 15 to 20 minutes at a time. (To prevent injuring your skin, be sure the temperature of the compress or heating pad is warm, not hot.)    Get plenty of rest until your fever, shortness of  breath, and chest pain go away.    Plan to get a flu shot every year.    Ask your doctor about pneumonia vaccinations.     Follow-up care  Make a follow-up appointment as directed by our staff.     When to seek medical care  Call 911 right away if you have any of the following:    Chest pain    Trouble breathing    Blue lips or fingernails  Otherwise, call your doctor if you have any of the following:    Fever above 101.5 F  (38.6 C)    Yellow, green, bloody, or smelly sputum    More than normal mucus production    Vomiting     7627-9075 [employer]. [address] [place] [id number]. All rights reserved. This information is not intended as a substitute for professional medical care. Always follow your healthcare professional's instructions.                What is Pneumonia?  Pneumonia is a serious lung infection. Many cases of pneumonia are caused by bacteria or viruses. Other less common causes include    Fungi    Chemicals    Gases    You may also get pneumonia after another illness, such as a cold, flu, or bronchitis. Those most at risk include the elderly and people with chronic health problems.  Healthy Lungs    Air travels in and out of the lungs through tubes called airways.    The tubes branch into smaller passages called bronchioles. These end in balloonlike sacs called alveoli.    Blood vessels surrounding the alveoli take oxygen into the bloodstream. At the same time, the alveoli remove carbon dioxide (a waste gas) from the blood. The carbon dioxide is then exhaled.  When You Have Pneumonia      Pneumonia causes the bronchioles and the alveoli to fill with excess mucus and become inflamed.    Your body s response may be to cough. This can help clear out the fluid.    The fluid (or mucus) you cough up may appear green or dark yellow.    The excess mucus may make you feel short of breath.    The inflammation and infection may give you a fever.  What are the Symptoms?  Symptoms of  pneumonia can come without warning. At first, you may think you have a cold or flu. But symptoms may get worse quickly, turning into pneumonia. Symyptoms can be different for bacterial and viral pneumonia. Common symptoms may include the following:    Severe cough with green or yellow mucus that doesn't improve or gets worse    Fever and chills    Nausea, vomiting or diarrhea    Shortness of breath with normal daily activities    Increased heart rate    Chest pain or discomfort when breathing in or coughing    Headache    Excessive sweating and clammy skin    4908-5078 [employer]. [address] [place] [address]. All rights reserved. This information is not intended as a substitute for professional medical care. Always follow your healthcare professional's instructions.                Treating Pneumonia  Pneumonia is an infection of one or both of the lungs. Pneumonia:    Is usually caused by a virus    Can be very serious, especially in infants, young children, and older adults. And also in those with other long-term health problems or weakened immune systems    Is sometimes treated at home and sometimes in the hospital    Antibiotic Medications  Antibiotics may be prescribed or administered for pneumonia caused by bacteria. They may be pills or oral medications, or shots or injections. Or they may be given intravenously (IV) or into the vein. If you are taking oral medications at home:    Fill your prescription and start taking your medication as soon as you can.    You will likely start to feel better in a day or two, but don t stop taking the antibiotic.    Use a pill organizer to help you remember to take your medication.    Let your health care provider know if you have side effects.    Take your medication exactly as directed on the label. Talk to your pharmacist if you have any questions.  Antiviral Medications  Antiviral medication may be prescribed or given for pneumonia cause by a  virus. For example, antiviral medication may be prescribed for pneumonia caused by the flu virus. Antibiotics do not work against viruses. If you are taking antiviral medication at home:    Fill your prescription and start taking your medication as soon as you can.    Talk with your provider or pharmacist about possible side effects.    Take the medication exactly as instructed.  To Relieve Symptoms  There are many medications that can help relieve symptoms of pneumonia. Some are prescription and some are over-the-counter.  Your health care provider may recommend:    Acetaminophen or ibuprofen to lower your fever and to lessen headache or other pain    Cough medication to loosen mucus or to reduce coughing  Make sure you check with your health care provider or pharmacist before taking any over-the-counter medications.  Special Treatments  If you are hospitalized for pneumonia, you may have other therapies, including:    Inhaled medications to help with breathing or chest congestion    Supplemental oxygen to increase low oxygen levels  Drink Fluids and Eat Healthy  You should eat healthy to help your body fight the infection and drink a lot of fluids to replace fluids lost from fever and to help loosen mucus in the chest.    Diet. Make health food choices, including fruits and vegetables, lean meats and other proteins, 100% whole grain and low- or no-fat dairy products.    Fluids. Drinks at least 6-8 tall glasses a day. Water and 100% fruit or vegetable juice are best.  Get Plenty of Rest and Sleep  You may be more tired than usual for a while. It is important to get enough sleep at night. And, it's important to rest during the day. Talk with your health care provider if coughing or other symptoms are interfering with your sleep.  Preventing the Spread of Germs  The best thing you can do to prevent spreading germs is to wash your hands often. You should:    Rub your hand with soap and water for 20 to 30  seconds.    Clean in between your fingers, the backs of your hands, and around your nails.    Dry your hands on a separate towel or use paper towels.  You should also:    Keep alcohol-based hand  nearby.    Make sure you also clean surfaces that you touch. Use a product that kills all types of germs.    Stay away from others until you are feeling better.  When to Call Your Health Care Provider  Call your health care provider if you have any of these:    Symptoms get worse    Fever continues    Shortness of breath gets worse    Increased mucus or mucus that is darker in color    Coughing gets worse    Lips or fingers are bluish in color    Side effects from your medication     9342-5182 [employer]. [address] [place] [address], [place] [address]. All rights reserved. This information is not intended as a substitute for professional medical care. Always follow your healthcare professional's instructions.                Patient Education    Warfarin Sodium Oral tablet    Warfarin Sodium Solution for injection  Warfarin Sodium Oral tablet  What is this medicine?  WARFARIN (WAR far in) is an anticoagulant. It is used to treat or prevent clots in the veins, arteries, lungs, or heart.  This medicine may be used for other purposes; ask your health care provider or pharmacist if you have questions.  What should I tell my health care provider before I take this medicine?  They need to know if you have any of these conditions:    alcoholism    anemia    bleeding disorders    cancer    diabetes    heart disease    high blood pressure    history of bleeding in the gastrointestinal tract    history of stroke or other brain injury or disease    kidney or liver disease    protein C deficiency    protein S deficiency    psychosis or dementia    recent injury, recent or planned surgery or procedure    an unusual or allergic reaction to warfarin, other medicines, foods, dyes, or preservatives    pregnant or trying to  get pregnant    breast-feeding  How should I use this medicine?  Take this medicine by mouth with a glass of water. Follow the directions on the prescription label. You can take this medicine with or without food. Take your medicine at the same time each day. Do not take it more often than directed. Do not stop taking except on your doctor's advice. Stopping this medicine may increase your risk of a blood clot. Be sure to refill your prescription before you run out of medicine.  If your doctor or healthcare professional calls to change your dose, write down the dose and any other instructions. Always read the dose and instructions back to him or her to make sure you understand them. Tell your doctor or healthcare professional what strength of tablets you have on hand. Ask how many tablets you should take to equal your new dose. Write the date on the new instructions and keep them near your medicine. If you are told to stop taking your medicine until your next blood test, call your doctor or healthcare professional if you do not hear anything within 24 hours of the test to find out your new dose or when to restart your prior dose.  A special MedGuide will be given to you by the pharmacist with each prescription and refill. Be sure to read this information carefully each time.  Talk to your pediatrician regarding the use of this medicine in children. Special care may be needed.  Overdosage: If you think you have taken too much of this medicine contact a poison control center or emergency room at once.  NOTE: This medicine is only for you. Do not share this medicine with others.  What if I miss a dose?  It is important not to miss a dose. If you miss a dose, call your healthcare provider. Take the dose as soon as possible on the same day. If it is almost time for your next dose, take only that dose. Do not take double or extra doses to make up for a missed dose.  What may interact with this medicine?  Do not take this  medicine with any of the following medications:    agents that prevent or dissolve blood clots    aspirin or other salicylates    danshen    dextrothyroxine    mifepristone    [place]'s Wort    red yeast rice  This medicine may also interact with the following medications:    acetaminophen    agents that lower cholesterol    alcohol    allopurinol    amiodarone    antibiotics or medicines for treating bacterial, fungal or viral infections    azathioprine    barbiturate medicines for inducing sleep or treating seizures    certain medicines for diabetes    certain medicines for heart rhythm problems    certain medicines for high blood pressure    chloral hydrate    cisapride    disulfiram    female hormones, including contraceptive or birth control pills    general anesthetics    herbal or dietary products like garlic, ginkgo, ginseng, green tea, or kava kava    influenza virus vaccine    male hormones    medicines for mental depression or psychosis    medicines for some types of cancer    medicines for stomach problems    methylphenidate    NSAIDs, medicines for pain and inflammation, like ibuprofen or naproxen    propoxyphene    quinidine, quinine    raloxifene    seizure or epilepsy medicine like carbamazepine, phenytoin, and valproic acid    steroids like cortisone and prednisone    tamoxifen    thyroid medicine    tramadol    vitamin c, vitamin e, and vitamin K    zafirlukast    zileuton  This list may not describe all possible interactions. Give your health care provider a list of all the medicines, herbs, non-prescription drugs, or dietary supplements you use. Also tell them if you smoke, drink alcohol, or use illegal drugs. Some items may interact with your medicine.  What should I watch for while using this medicine?  Visit your doctor or health care professional for regular checks on your progress. You will need to have a blood test called a PT/INR regularly. The PT/INR blood test is done to make sure you  are getting the right dose of this medicine. It is important to not miss your appointment for the blood tests. When you first start taking this medicine, these tests are done often. Once the correct dose is determined and you take your medicine properly, these tests can be done less often.  Notify your doctor or health care professional and seek emergency treatment if you develop breathing problems; changes in vision; chest pain; severe, sudden headache; pain, swelling, warmth in the leg; trouble speaking; sudden numbness or weakness of the face, arm or leg. These can be signs that your condition has gotten worse.  While you are taking this medicine, carry an identification card with your name, the name and dose of medicine(s) being used, and the name and phone number of your doctor or health care professional or person to contact in an emergency.  Do not start taking or stop taking any medicines or over-the-counter medicines except on the advice of your doctor or health care professional.  You should discuss your diet with your doctor or health care professional. Do not make major changes in your diet. Vitamin K can affect how well this medicine works. Many foods contain vitamin K. It is important to eat a consistent amount of foods with vitamin K. Other foods with vitamin K that you should eat in consistent amounts are asparagus, basil, beef or pork liver, black eyed peas, broccoli, brussel sprouts, cabbage, chickpeas, cucumber with peel, green onions, green tea, okra, parsley, peas, thyme, and green leafy vegetables like beet greens, bethany greens, endive, kale, mustard greens, spinach, turnip greens, watercress, or certain lettuces like green leaf or wero.  This medicine can cause birth defects or bleeding in an unborn child. Women of childbearing age should use effective birth control while taking this medicine. If a woman becomes pregnant while taking this medicine, she should discuss the potential risks  and her options with her health care professional.  Avoid sports and activities that might cause injury while you are using this medicine. Severe falls or injuries can cause unseen bleeding. Be careful when using sharp tools or knives. Consider using an electric razor. Take special care brushing or flossing your teeth. Report any injuries, bruising, or red spots on the skin to your doctor or health care professional.  If you have an illness that causes vomiting, diarrhea, or fever for more than a few days, contact your doctor. Also check with your doctor if you are unable to eat for several days. These problems can change the effect of this medicine.  Even after you stop taking this medicine, it takes several days before your body recovers its normal ability to clot blood. Ask your doctor or health care professional how long you need to be careful. If you are going to have surgery or dental work, tell your doctor or health care professional that you have been taking this medicine.  What side effects may I notice from receiving this medicine?  Side effects that you should report to your doctor or health care professional as soon as possible:    back pain    chills    dizziness    fever    heavy menstrual bleeding or vaginal bleeding    painful, blue, or purple toes    painful, prolonged erection    signs and symptoms of bleeding such as bloody or black, tarry stools; red or dark-brown urine; spitting up blood or brown material that looks like coffee grounds; red spots on the skin; unusual bruising or bleeding from the eye, gums, or noseskin rash, itching or skin damage    stomach pain    unusually weak or tired    yellowing of skin or eyes  Side effects that usually do not require medical attention (report to your doctor or health care professional if they continue or are bothersome):    diarrhea    hair loss  This list may not describe all possible side effects. Call your doctor for medical advice about side  effects. You may report side effects to FDA at 4-613-MMZ-5493.  Where should I keep my medicine?  Keep out of the reach of children.  Store at room temperature between 15 and 30 degrees C (59 and 86 degrees F). Protect from light. Throw away any unused medicine after the expiration date. Do not flush down the toilet.  NOTE: This sheet is a summary. It may not cover all possible information. If you have questions about this medicine, talk to your doctor, pharmacist, or health care provider.  NOTE:This sheet is a summary. It may not cover all possible information. If you have questions about this medicine, talk to your doctor, pharmacist, or health care provider. Copyright  2016 Gold Standard

## 2017-03-15 VITALS
RESPIRATION RATE: 20 BRPM | HEIGHT: 65 IN | OXYGEN SATURATION: 96 % | BODY MASS INDEX: 37.58 KG/M2 | TEMPERATURE: 96.8 F | SYSTOLIC BLOOD PRESSURE: 130 MMHG | DIASTOLIC BLOOD PRESSURE: 56 MMHG | WEIGHT: 225.53 LBS

## 2017-03-15 LAB
ALBUMIN UR-MCNC: NEGATIVE MG/DL
APPEARANCE UR: CLEAR
BACTERIA #/AREA URNS HPF: ABNORMAL /HPF
BILIRUB UR QL STRIP: NEGATIVE
COLOR UR AUTO: YELLOW
GLUCOSE UR STRIP-MCNC: NEGATIVE MG/DL
HGB UR QL STRIP: ABNORMAL
INR PPP: 2.37 (ref 0.8–1.2)
KETONES UR STRIP-MCNC: NEGATIVE MG/DL
LEUKOCYTE ESTERASE UR QL STRIP: ABNORMAL
MICRO REPORT STATUS: NORMAL
MUCOUS THREADS #/AREA URNS LPF: PRESENT /LPF
NITRATE UR QL: NEGATIVE
PH UR STRIP: 6 PH (ref 4.7–8)
RBC #/AREA URNS AUTO: 11 /HPF (ref 0–2)
S PNEUM AG SPEC QL: NORMAL
SP GR UR STRIP: 1.01 (ref 1–1.03)
SPECIMEN SOURCE: NORMAL
SQUAMOUS #/AREA URNS AUTO: 4 /HPF (ref 0–1)
URN SPEC COLLECT METH UR: ABNORMAL
UROBILINOGEN UR STRIP-MCNC: NORMAL MG/DL (ref 0–2)
WBC #/AREA URNS AUTO: 29 /HPF (ref 0–2)

## 2017-03-15 PROCEDURE — 25000132 ZZH RX MED GY IP 250 OP 250 PS 637: Mod: GY | Performed by: INTERNAL MEDICINE

## 2017-03-15 PROCEDURE — A9270 NON-COVERED ITEM OR SERVICE: HCPCS | Mod: GY | Performed by: INTERNAL MEDICINE

## 2017-03-15 PROCEDURE — 81001 URINALYSIS AUTO W/SCOPE: CPT | Performed by: INTERNAL MEDICINE

## 2017-03-15 PROCEDURE — 36415 COLL VENOUS BLD VENIPUNCTURE: CPT | Performed by: INTERNAL MEDICINE

## 2017-03-15 PROCEDURE — 99239 HOSP IP/OBS DSCHRG MGMT >30: CPT | Performed by: INTERNAL MEDICINE

## 2017-03-15 PROCEDURE — 25000125 ZZHC RX 250: Performed by: INTERNAL MEDICINE

## 2017-03-15 PROCEDURE — 25000128 H RX IP 250 OP 636

## 2017-03-15 PROCEDURE — 87899 AGENT NOS ASSAY W/OPTIC: CPT | Performed by: INTERNAL MEDICINE

## 2017-03-15 PROCEDURE — 85610 PROTHROMBIN TIME: CPT | Performed by: INTERNAL MEDICINE

## 2017-03-15 RX ORDER — IBUPROFEN 600 MG/1
600 TABLET, FILM COATED ORAL EVERY 6 HOURS PRN
Status: DISCONTINUED | OUTPATIENT
Start: 2017-03-15 | End: 2017-03-15

## 2017-03-15 RX ORDER — OXYCODONE HYDROCHLORIDE 5 MG/1
5 TABLET ORAL EVERY 4 HOURS PRN
Qty: 10 TABLET | Refills: 0 | Status: SHIPPED | OUTPATIENT
Start: 2017-03-15 | End: 2017-07-25

## 2017-03-15 RX ORDER — LEVOFLOXACIN 250 MG/1
250 TABLET, FILM COATED ORAL EVERY 24 HOURS
Status: DISCONTINUED | OUTPATIENT
Start: 2017-03-15 | End: 2017-03-15 | Stop reason: HOSPADM

## 2017-03-15 RX ORDER — WARFARIN SODIUM 5 MG/1
TABLET ORAL
Qty: 135 TABLET | Refills: 0 | Status: SHIPPED
Start: 2017-03-15 | End: 2017-07-25

## 2017-03-15 RX ORDER — NALOXONE HYDROCHLORIDE 0.4 MG/ML
.1-.4 INJECTION, SOLUTION INTRAMUSCULAR; INTRAVENOUS; SUBCUTANEOUS
Status: DISCONTINUED | OUTPATIENT
Start: 2017-03-15 | End: 2017-03-15 | Stop reason: HOSPADM

## 2017-03-15 RX ORDER — OXYCODONE HYDROCHLORIDE 5 MG/1
5 TABLET ORAL EVERY 4 HOURS PRN
Status: DISCONTINUED | OUTPATIENT
Start: 2017-03-15 | End: 2017-03-15 | Stop reason: HOSPADM

## 2017-03-15 RX ORDER — ACETAMINOPHEN 325 MG/1
975 TABLET ORAL EVERY 4 HOURS PRN
Qty: 100 TABLET | COMMUNITY
Start: 2017-03-15 | End: 2018-04-03

## 2017-03-15 RX ORDER — LEVOFLOXACIN 750 MG/1
750 TABLET, FILM COATED ORAL EVERY 24 HOURS
Qty: 4 TABLET | Refills: 0 | Status: SHIPPED | OUTPATIENT
Start: 2017-03-15 | End: 2017-03-19

## 2017-03-15 RX ORDER — WARFARIN SODIUM 2.5 MG/1
2.5 TABLET ORAL
Status: DISCONTINUED | OUTPATIENT
Start: 2017-03-15 | End: 2017-03-15 | Stop reason: HOSPADM

## 2017-03-15 RX ORDER — ACETAMINOPHEN 325 MG/1
975 TABLET ORAL EVERY 4 HOURS PRN
Status: DISCONTINUED | OUTPATIENT
Start: 2017-03-15 | End: 2017-03-15 | Stop reason: HOSPADM

## 2017-03-15 RX ADMIN — ASPIRIN 81 MG: 81 TABLET, COATED ORAL at 08:58

## 2017-03-15 RX ADMIN — LEVOFLOXACIN 750 MG: 5 INJECTION, SOLUTION INTRAVENOUS at 00:21

## 2017-03-15 RX ADMIN — POTASSIUM CHLORIDE AND SODIUM CHLORIDE: 900; 150 INJECTION, SOLUTION INTRAVENOUS at 11:14

## 2017-03-15 RX ADMIN — HYDROCHLOROTHIAZIDE 25 MG: 25 TABLET ORAL at 08:58

## 2017-03-15 RX ADMIN — Medication 3 G: at 08:55

## 2017-03-15 RX ADMIN — POTASSIUM CHLORIDE AND SODIUM CHLORIDE: 900; 150 INJECTION, SOLUTION INTRAVENOUS at 00:21

## 2017-03-15 RX ADMIN — IBUPROFEN 600 MG: 600 TABLET ORAL at 04:10

## 2017-03-15 RX ADMIN — ACETAMINOPHEN 975 MG: 325 TABLET, FILM COATED ORAL at 09:55

## 2017-03-15 RX ADMIN — ATENOLOL 50 MG: 50 TABLET ORAL at 08:58

## 2017-03-15 RX ADMIN — LOSARTAN POTASSIUM 50 MG: 50 TABLET, FILM COATED ORAL at 08:58

## 2017-03-15 RX ADMIN — VITAMIN D, TAB 1000IU (100/BT) 3000 UNITS: 25 TAB at 08:57

## 2017-03-15 ASSESSMENT — PAIN DESCRIPTION - DESCRIPTORS
DESCRIPTORS: ACHING
DESCRIPTORS: PATIENT UNABLE TO DESCRIBE
DESCRIPTORS: SORE
DESCRIPTORS: SHARP

## 2017-03-15 NOTE — PLAN OF CARE
Problem: Pneumonia (Adult)  Goal: Signs and Symptoms of Listed Potential Problems Will be Absent or Manageable (Pneumonia)  Signs and symptoms of listed potential problems will be absent or manageable by discharge/transition of care (reference Pneumonia (Adult) CPG).   Outcome: Improving    03/15/17 0100   Pneumonia   Problems Assessed (Pneumonia) all   Problems Present (Pneumonia) respiratory compromise         Problem: Goal Outcome Summary  Goal: Goal Outcome Summary  Outcome: Improving  WEENED DOWN TO 1LPN nc, SATS 91-97% WITH Activity. SBA. Lung sounds dim LT>RT.  Snoring and breif (<7 seconds) apneic periods. Improved with HOB 30 degrees.   Pleasant, encouraging IS.   Still need a UA.

## 2017-03-15 NOTE — PLAN OF CARE
Problem: Discharge Planning  Goal: Discharge Planning (Adult, OB, Behavioral, Peds)  Outcome: Adequate for Discharge Date Met:  03/15/17  Patient discharged at 3:15 PM via wheel chair accompanied by spouse and staff. Prescriptions filled and sent with patient upon discharge. All belongings sent with patient.      Discharge instructions reviewed with Patient. Listed belongings gathered and returned to patient. Yes     Patient discharged to Home.   Report called to N/A     Core Measures and Vaccines  Core Measures applicable during stay: Yes. If yes, state diagnosis: Pneumonia  Pneumonia and Influenza given prior to discharge, if indicated: No and N/A     Surgical Patient   Surgical Procedures during stay: No  Did patient receive discharge instruction on wound care and recognition of infection symptoms? N/A     MISC  Follow up appointment made:  Yes  Home and hospital aquired medications returned to patient: N/A  Patient reports pain was well managed at discharge: Yes

## 2017-03-15 NOTE — PLAN OF CARE
[facility] Inpatient Admission Note:    Patient admitted to 3104/3104-1 at approximately 2200 via cart accompanied by spouse from emergency room . Report received from Adriana ALVARADO RN. in SBAR format at 2150 via telephone. Patient ambulated to bed via self.. Patient is alert and oriented X 3, denies pain; rates at 7 on 0-10 scale.  Patient oriented to room, unit, hourly rounding, and plan of care. Explained admission packet and patient handbook with patient bill of rights brochure. Will continue to monitor and document as needed.     Inpatient Nursing criteria listed below was met:      Health care directives status obtained and documented: No      Care Everywhere authorization obtained N/A      MRSA swab completed for patient 65 years and older: N/A      Patient identifies a surrogate decision maker: Yes If yes, who:Barry-spouse Contact Information:[phone].      Core Measure diagnosis present:Yes. If yes, state diagnosis: pneumonia.       If initial lactic acid >2.0, repeat lactic acid drawn within one hour of arrival to unit: Yes. If no, state reason: lactic=0.7.      Vaccination assessment and education completed: Yes   Vaccinations received prior to admission: Pneumovax yes  Influenza(seasonal)  YES   Vaccination(s) ordered: not given today because already vaccinated.      Clergy visit ordered if patient requests: not ordered-goes to [facility] in [facility].      Skin issues/needs documented: N/A      Isolation Patient: no Education given, correct sign in place and documentation row added to PCS:  No      Fall Prevention Yes: Care plan updated, education given and documented, sticker and magnet in place: Yes      Care Plan initiated: Yes      Education Documented (including assessment): Yes      Patient has discharge needs : No If yes, please explain:N/A.

## 2017-03-15 NOTE — PLAN OF CARE
Problem: Goal Outcome Summary  Goal: Goal Outcome Summary  Outcome: No Change  A&O. VSS, afebrile. C/o left rib cage pain 6/10, administered 975mg tylenol with decrease to 4/10. LS fine crackles in the bases with dyspnea on exertion reported. No need for oxygen, RA maintaining sats in high 90's. UA collected with abnormal results. Up ambulating in room independently during shift with no increase in work of breathing. Showered this shift. Good oral intake and adequate output. No falls or injuries this shift with call light with in reach.

## 2017-03-15 NOTE — DISCHARGE INSTRUCTIONS
A follow up appointment has been scheduled for March 20th at 1:00pm with Randall Batres. If you are unable to make this appointment please call [phone] to reschedule.

## 2017-03-15 NOTE — PLAN OF CARE
Problem: Patient Goal: Social Work Focus  Goal: 1. Patient Goal: Social Work Focus  Outcome: Adequate for Discharge Date Met:  03/15/17  Assessment completed via flowsheet.  Cassy is awake, up in her chair. Her PCP is Eliz Hartman NP. She has not seen a dental provider since getting her upper plate, but has a provider she could see if desired. She see's Dr Mcgowan for eye care. She does not have a healthcare directive and received the information to complete with a request that she bring it in when done. She uses [facility] in [place]. She is not a .      She lives with her  and 4 dogs in a house in [place]. 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 She is generally independent with personal cares, household upkeep, and in the community. She had one fall in the past few months after she slipped on ice. She has 2 daughters and one son and feels well supported by family and friends. She has adequate resources for food, meds, heat, and transportation.      No sleep concerns. She uses celexa and can adjust the dose if needed. She would also be able to talk with her PCP if she felt it were necessary. She has never worked with a counselor. She does not smoke, seldom drinks alcohol, and does not use street drugs. Her bogdan is important to her, but she does not want any bogdan support at this time.

## 2017-03-15 NOTE — DISCHARGE SUMMARY
[facility]    Discharge Summary  Hospitalist    Date of Admission:  3/14/2017  Date of Discharge:  3/15/2017  Discharging Provider: Ever Baltazar  Date of Service (when I saw the patient): 03/15/17    Discharge Diagnoses   Left lower lobe pneumonia, likely pneumococcal.  Hypoxemia related to pneumonia, resolved.  Pleuritic chest pain related to pneumonia  Hypertension, related to above, resolved  Atelectasis related to pleuritic chest pain.  Warfarin anticoagulation.  Established factor V Leiden    History of Present Illness   This patient is a 61 year old Jose, who presented to emergency Department with the complaint of fever with chills that started while she was baby sitting her grandchildren the day prior to presentation.She started shivering and felt extremely weak, with symptoms severe enough that she called her  to come and relieve her, so she could rest at home. However, during the night night she developed pleuritic pain over left lateral chest aand noted progressive dyspnea with minimal exertion.  She also developed a fever up to 102.5 F. Continued to have high fever this morning, and she continued to feelt extremely weak and tired, leading her to present to ED.She does not report any cough or sputum. Evaluation in emergency department including chest radiograph which showed a left lower lobe airspace infiltrate. She was referred for further evaluation and management  Hospital Course   Cassy Avila was admitted on 3/14/2017.  The following problems were addressed during her hospitalization:    1.  Community-acquired pneumonia.  Likely pneumococcal pneumonia with abrupt onset of symptoms including fever, chills without rigors, and sweats with generalized fatigue.  Chest radiograph shows an air space infiltrate.  There are also areas of atelectasis likely related to her pleuritic pain and local hypopnea. She has, however, showed a prominent improvement in terms of the symptoms.   Bedtime of discharge, she is awake, alert and anxious to leave the hospital.  She has not required supplemental oxygen for a number of hours.  Pleuritic pain is under better control.  She was initially treated with azithromycin and ceftriaxone in the emergency department.  She carries a history of hives in response to cephalexin, although she notes that this was in the remote past.  Nevertheless, because of concerns for a possible adverse reaction treatment was changed to levofloxacin and she received a dose of this last night.  Therefore, we will continue levofloxacin with a plan for course of treatment of a total of 5 days.  This will require adjustment and her warfarin dosing.  She did receive pneumococcal vaccine in the past.  It is quite likely that this attenuated her response.  I discussed with her that her development of pneumonia is not a failure of vaccine.  She should receive a follow-up chest radiograph in 3-6 weeks. I have discussed with her the importance of regular pulmonary hygiene.  She already received an incentive spirometer which she can continue to use.  I have discussed with her that deep breaths and cough are likely as affective as the use of any particular device.  2.  Hypoxemia.  Relatively brief in duration and likely representing atelectasis rather than parenchymal gas exchange abnormality resulting from her pneumonia.  She is not require supplemental oxygen for a number of hours.  She did not have other findings suggestive of overt respiratory failure such as tachypnea or increased respiratory effort.   3.  Warfarin anticoagulation.  Factor V Leiden with in the past, deep vein thrombosis and pulmonary embolism.  She is chronically treated with aspirin and warfarin. bboth levofloxacin as well as changing her oral intake because of her acute illness.  We will  Complicate her warfarin dosing.  She monitors INR at home and communicates with the INR clinic by telephone.  INR mildly elevated  "today.  I have asked her to take 2.5 mg tonight with follow-up INR levels beginning tomorrow coordinated by the INR clinic.  4.  Hypertension.  Likely related to acute pain as well as generally her acute illness.  This resolved with initial treatment in emergency department.  55.  Pleuritic chest pain.  Localized to left lateral chest wall, quite consistent with location of her infiltrate.  She is reasonably concerned with use of nonsteroidals, given increased risk of  Platelet dysfunction and bleeding.  She has had a reasonable response to acetaminophen so far today.  We will also give short course of as needed oxycodone.  I have had discussion with her regarding treatment of pleuritic pain, the importance of vigorous pulmonary hygiene, and that oxycodone likely will only be required at night when pain may be more noticeable.    Ever Baltazar    Significant Results and Procedures   Chest radiograph demonstrating atelectasis and a left lower lobe infiltrate    Pending Results   These results will be followed up by Eliz Hartman  Unresulted Labs Ordered in the Past 30 Days of this Admission     Date and Time Order Name Status Description    3/15/2017 0026 Strep Pneumoniae Agn 13 yrs and older In process     3/14/2017 2017 Blood culture Preliminary     3/14/2017 2017 Blood culture Preliminary           Code Status   Full Code       Primary Care Physician   lEiz Hartman    Vital signs:  Temp: 96.8  F (36  C) Temp src: Tympanic BP: 130/56   Heart Rate: 63 Resp: 20 SpO2: 96 % O2 Device: None (Room air) Oxygen Delivery: 1 LPM Height: 165.1 cm (5' 5\") Weight: 102.3 kg (225 lb 8.5 oz)  Estimated body mass index is 37.53 kg/(m^2) as calculated from the following:    Height as of this encounter: 1.651 m (5' 5\").    Weight as of this encounter: 102.3 kg (225 lb 8.5 oz).    Awake, alert, pleasant, interactive woman sitting in chair on medical wards.  Speech is clear.  Oriented ×3.HEENT: Pupils equal, conjugate. No icterus or " nystagmus. Oral mucosa moist. No facial asymmetry.   Neck: Supple, jugular veins not elevated. Trachea midline   Chest: No chest wall movement asymmetry. Aeration preserved the basis. Accessory muscles not in use. Expiratory time not increased. No tidal wheezes. Few left mid zone medium rhonchi.  No egophony.  Slight dullness to percussion in left lateral chest wall. No discrete crackles.   Cardiac: PMI not displaced. S1, S2 unremarkable. No S3, S4. P2 not accentuated. No murmurs. Carotid upstroke preserved. Carotid amplitude preserved.   Abdomen: Soft. No palpation or percussion tenderness. No distention. Normoactive bowel sounds. Liver and spleen not increased in size. No bruits, masses, or pulsations.   Extremities: No lower extremity edema. Extremities warm distally.  Brisk capillary refill.No eccymoses, clubbing.   Neurologic: Mental state above. Motor 5/5 and bilaterally equal. Tone preserved. No fasiculations or tremors. Sensation intact to light touch. DTR 2/4 and bilaterally equal.     Discharge Disposition   Discharged to home  Condition at discharge: Stable    Consultations This Hospital Stay   PHARMACY TO DOSE WARFARIN    Time Spent on this Encounter   I, Ever Baltazar, personally saw the patient today and spent greater than 30 minutes discharging this patient.    Discharge Orders     XR Chest 2 Views     INR Clinic Referral     Reason for your hospital stay   This patient is a 61 year old who presented to ED noting fever with chills that started while she was baby sitting her grand children the day piror to presentation. She left to rest at home, but then developed pleuritic pain over left lateral chest with dyspnea with any exertion. She had a fever up to 102.5 F. As she continued to have high fever this morning, and felt extremely weak and tired,therefore decided to come to ED. She has had no cough or sputum. Chest radiograph showed a left lower lobe focal infiltrate.  After arrival to medical  wards, symptoms have improved markedly.  She has persistent pleuritic pain.  It is, however, improved.  She is no longer requiring supplemental oxygen.  She feels fit to leave the hospital.  She was discharged to continue a course of levofloxacin and continue INR monitoring at home with coordination with INR clinic.     Follow-up and recommended labs and tests    Follow up with primary care provider, Eliz Hartman, within 7 days for hospital follow- up.    Contact INR clinic with INR measurements. Please check INR daily until stablized, or as directed by clinic  2.5 mg warfarin tonight (3/15)     Activity   Your activity upon discharge: activity as tolerated     Discharge Instructions   Use incentive spirometer or cough and take deep breaths frequently while awake, at least every 2-3 hours.     Full Code     Diet   Follow this diet upon discharge: Orders Placed This Encounter     Regular Diet Adult       Discharge Medications   Current Discharge Medication List      START taking these medications    Details   oxyCODONE (ROXICODONE) 5 MG IR tablet Take 1 tablet (5 mg) by mouth every 4 hours as needed for severe pain  Qty: 10 tablet, Refills: 0    Associated Diagnoses: Pneumonia of left lower lobe due to infectious organism      acetaminophen (TYLENOL) 325 MG tablet Take 3 tablets (975 mg) by mouth every 4 hours as needed for mild pain or fever  Qty: 100 tablet    Associated Diagnoses: Pneumonia of left lower lobe due to infectious organism      levofloxacin (LEVAQUIN) 750 MG tablet Take 1 tablet (750 mg) by mouth every 24 hours for 4 days  Qty: 4 tablet, Refills: 0    Associated Diagnoses: Pneumonia of left lower lobe due to infectious organism         CONTINUE these medications which have CHANGED    Details   warfarin (COUMADIN) 5 MG tablet Take 1/2 tab tonight, then TAKE 1 AND ONE-HALF TABLET BY MOUTH ON MONDAY WEDNESDAY AND FRIDAYS AND 1 TABLET ON ALL OTHER DAYS OF THE WEEK or as directed  Qty: 135 tablet, Refills: 0     Associated Diagnoses: Long-term (current) use of anticoagulants         CONTINUE these medications which have NOT CHANGED    Details   escitalopram (LEXAPRO) 20 MG tablet Take 1 tablet (20 mg) by mouth daily  Qty: 90 tablet, Refills: 1    Associated Diagnoses: Major depression, recurrent (H)      losartan (COZAAR) 50 MG tablet TAKE 2 TABLETS BY MOUTH EVERY DAY  Qty: 180 tablet, Refills: 2    Associated Diagnoses: Benign essential hypertension      hydrochlorothiazide (HYDRODIURIL) 25 MG tablet Take 1 tablet (25 mg) by mouth daily  Qty: 90 tablet, Refills: 2    Associated Diagnoses: Essential hypertension      atenolol (TENORMIN) 50 MG tablet TAKE 1 AND ONE-HALF TABLET BY MOUTH DAILY  Qty: 135 tablet, Refills: 2    Associated Diagnoses: HTN (hypertension)      Cholecalciferol (VITAMIN D3 PO) Take 3,000 mg by mouth daily AM      omega 3 1000 MG CAPS Take 3 g by mouth daily   Qty: 90 capsule      aspirin 81 MG EC tablet Take 81 mg by mouth daily HS           Allergies   Allergies   Allergen Reactions     Ace Inhibitors Cough     Albuterol Sulfate Hives     DuoNeb     Amlodipine Besylate Swelling     Norvasc     Amoxicillin      Cephalexin Monohydrate Hives     Keflex     Erythromycin Base [Kdc:Yellow Dye+Erythromycin+Brilliant Blue Fcf] Nausea and Vomiting     Ipratropium Bromide Hives     DuoNeb     Meloxicam Other (See Comments)     Mobic - confusion, depression     Adhesive Tape Rash     Prochlorperazine Edisylate Swelling and Rash     Compazine     Prochlorperazine Maleate Swelling and Rash     Data   Most Recent 3 CBC's:  Recent Labs   Lab Test  03/14/17 2028 01/04/17   1459  09/28/15   1207   WBC  11.8*  6.3  6.0   HGB  13.2  13.9  13.6   MCV  88  87  84   PLT  192  215  228      Most Recent 3 BMP's:  Recent Labs   Lab Test  03/14/17 2028 01/04/17   1459  07/13/16   1017  01/11/16   0935   NA  137  141   --   138   POTASSIUM  3.6  3.5  3.6  3.4   CHLORIDE  103  104   --   102   CO2  23  27   --   28    BUN  12  18   --   12   CR  0.97  1.07*   --   1.02   ANIONGAP  11  10   --   8   JIMENA  8.9  9.0   --   9.0   GLC  102*  106*   --   103*     Most Recent 2 LFT's:  Recent Labs   Lab Test  03/14/17 2028 01/04/17   1459   AST  10  13   ALT  30  35   ALKPHOS  52  54   BILITOTAL  0.8  0.3     Most Recent INR's and Anticoagulation Dosing History:  Anticoagulation Dose History     Recent Dosing and Labs Latest Ref Rng & Units 12/12/2016 1/9/2017 1/23/2017 2/13/2017 2/27/2017 3/14/2017 3/15/2017    INR 0.80 - 1.20 2.5 2.1 2.6 3.2 2.4 2.20(H) 2.37(H)    INR 0.86 - 1.14 - - - - - - -    INR Point of Care 0.86 - 1.14 - - - - - - -        Most Recent 3 Troponin's:No lab results found.  Most Recent Cholesterol Panel:  Recent Labs   Lab Test  01/11/16   0935   CHOL  217*   LDL  126*   HDL  38*   TRIG  265*     Most Recent 6 Bacteria Isolates From Any Culture (See EPIC Reports for Culture Details):  Recent Labs   Lab Test  03/14/17 2038 03/14/17 2028 01/04/17   1455  01/11/16   0934   CULT  No growth after 9 hours  No growth after 10 hours  No beta hemolytic Streptococcus Group A isolated  10,000 to 50,000 colonies/mL Mixed bacterial mirian No further identification or   sensitivity done  *     Most Recent TSH, T4 and A1c Labs:  Recent Labs   Lab Test  01/04/17   1459   09/28/15   1207   11/05/13   1528   TSH  1.63   < >  2.85   < >   --    T4   --    --   0.88   --   0.61   A1C   --    --    --    --   5.5    < > = values in this interval not displayed.     Results for orders placed or performed during the hospital encounter of 03/14/17   Chest XR,  PA & LAT    Narrative    CHEST TWO VIEWS    REPORT:  There is left lower lobe opacity consistent with pneumonia.  The right lung is clear.  The heart is normal in size.  Costophrenic  angles are sharp.    IMPRESSION:  LEFT LOWER LOBE INFILTRATE SUSPICIOUS FOR PNEUMONIA.  Exam Date: Mar 14, 2017 08:44:56 PM  Author: MARCELLE VENEGAS  This report is preliminary and  transcribed

## 2017-03-15 NOTE — PLAN OF CARE
Face to face report given with opportunity to observe patient.    Report given to MINDY Esquivel   3/15/2017  7:41 AM

## 2017-03-15 NOTE — ED NOTES
61 year old female presents to ER with c/o  10/10 left sided rib pain, which started approx 2 days ago, symptoms worsening today. Also states having episdes of shortness of breath with activity, c/o nausea but no vomiting. Denies cough. See assessments. Call light within reach

## 2017-03-15 NOTE — PLAN OF CARE
Face to face report given with opportunity to observe patient.    Report given to Seda MCGINNIS RN.    Barbie Nava RN.  3/14/2017  11:35 PM

## 2017-03-15 NOTE — ED PROVIDER NOTES
History     Chief Complaint   Patient presents with     Fever     fever, lt lower rib pain     HPI  Cassy Avila is a 61 year old female who presents with fevers, left lower rib pain, dyspnea, body aches, and headache x 2 days. She denies cough or sore throat. She is on coumadin for Factor V Leiden deficiency.     I have reviewed the Medications, Allergies, Past Medical and Surgical History, and Social History in the Epic system.    Review of Systems   Constitutional: Positive for chills and fever.   HENT: Negative for congestion and sore throat.    Respiratory: Positive for shortness of breath. Negative for cough, chest tightness, wheezing and stridor.    Cardiovascular: Negative for chest pain, palpitations and leg swelling.   Gastrointestinal: Negative for abdominal pain, nausea and vomiting.   Genitourinary: Negative.    Musculoskeletal: Negative for back pain, neck pain and neck stiffness.   Skin: Negative.    Neurological: Positive for headaches.   All other systems reviewed and are negative.    Past Medical History:   Past Medical History   Diagnosis Date     Arthritis      Cervicalgia 1/9/2001     Closed dislocation of shoulder, unspecified site 2000     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, congenital     Contact dermatitis and other eczema, due to unspecified cause 6/1/2004     Coughing      Edema 1/18/2002     Factor V Leiden (H)      Gastro-oesophageal reflux disease      Herpes zoster without mention of complication 2003     resolved from problem list     Long term (current) use of anticoagulants 8/20/2003     Major depression 8/8/2014     Mammographic microcalcification 2003     resolved from problem list     Migraine, unspecified, without mention of intractable migraine without mention of status migrainosus 3/5/2001     Neurofibromatosis, unspecified(237.70) 8/22/2012     Other and unspecified hyperlipidemia 7/11/2002     Other chronic pain      Other  pulmonary embolism and infarction 2003     Tachycardia, unspecified 2001     Thrombosis of leg      Unspecified essential hypertension 2001       Past Surgical History   Procedure Laterality Date     Arthroscopy shoulder       right, bone spurs     ------------other-------------       shoulder replacement; Provider: Karen     Esophagastroduodenoscopy       with biopsy and endoscopic U/S     Cholecystectomy       Elbow ulnar tunnel release       Anastacio/bso        section       x3     Pionidal cyst excision       Fusion lumbar anterior with james cages       L5-S1     Endoscopic sinus surgery, septoplasty, turbinoplasty, maxillary sinusotomy, combined N/A 2015     Procedure: COMBINED ENDOSCOPIC SINUS SURGERY, SEPTOPLASTY, TURBINOPLASTY, MAXILLARY SINUSOTOMY;  Surgeon: Seema Conn MD;  Location: [facility]     Transposition ulnar nerve (elbow)       Arthroplasty knee  2014     Procedure: ARTHROPLASTY KNEE;  Surgeon: Sean Alexander MD;  Location: [facility]     Orthopedic surgery  2-15     right shoulder     Orthopedic surgery  8/28/15     right knee     Excise neuroma lower extremity Left 2016     Procedure: EXCISE NEUROMA LOWER EXTREMITY;  Surgeon: Edi Reed MD;  Location: [facility] OR       Social History     Social History     Marital status:      Spouse name: N/A     Number of children: N/A     Years of education: N/A     Occupational History     Not on file.     Social History Main Topics     Smoking status: Former Smoker     Packs/day: 0.50     Years: 30.00     Types: Cigarettes, Pipe     Smokeless tobacco: Never Used      Comment: quit in      Alcohol use No     Drug use: No     Sexual activity: Yes     Partners: Male     Other Topics Concern     Blood Transfusions Yes     Caffeine Concern Yes     2 cups coffee daily     Exercise Yes     walking, aerobic daily (5-10 hrs/week)     Parent/Sibling W/ Cabg, Mi Or Angioplasty Before 65f 55m? Yes      Social History Narrative       Patient's Medications   New Prescriptions    No medications on file   Previous Medications    ASPIRIN 81 MG EC TABLET    Take 81 mg by mouth daily HS    ATENOLOL (TENORMIN) 50 MG TABLET    TAKE 1 AND ONE-HALF TABLET BY MOUTH DAILY    CHOLECALCIFEROL (VITAMIN D3 PO)    Take 3,000 mg by mouth daily AM    ESCITALOPRAM (LEXAPRO) 20 MG TABLET    Take 1 tablet (20 mg) by mouth daily    FLUTICASONE (FLONASE) 50 MCG/ACT NASAL SPRAY    Spray 1-2 sprays into both nostrils daily    GUAIFENESIN-CODEINE (ROBITUSSIN AC) 100-10 MG/5ML SOLN SOLUTION    Take 5-10 mLs by mouth every 6 hours as needed    HYDROCHLOROTHIAZIDE (HYDRODIURIL) 25 MG TABLET    Take 1 tablet (25 mg) by mouth daily    LOSARTAN (COZAAR) 50 MG TABLET    TAKE 2 TABLETS BY MOUTH EVERY DAY    OMEGA 3 1000 MG CAPS    Take 3 g by mouth daily     WARFARIN (COUMADIN) 5 MG TABLET    TAKE 1 AND ONE-HALF TABLET BY MOUTH ON MONDAY WEDNESDAY AND FRIDAYS AND 1 TABLET ON ALL OTHER DAYS OF THE WEEK   Modified Medications    No medications on file   Discontinued Medications    HYDROCODONE-ACETAMINOPHEN (NORCO) 5-325 MG PER TABLET    Take 1-2 tablets by mouth every 4 hours as needed for other (Moderate to Severe Pain)       Allergies: Ace inhibitors; Albuterol sulfate; Amlodipine besylate; Amoxicillin; Cephalexin monohydrate; Erythromycin base [kdc:yellow dye+erythromycin+brilliant blue fcf]; Ipratropium bromide; Meloxicam; Adhesive tape; Prochlorperazine edisylate; and Prochlorperazine maleate    Physical Exam   BP: (!) 205/95  Heart Rate: 90  Temp: (!) 103.6  F (39.8  C)  Resp: 20  SpO2: 94 %  Physical Exam   Constitutional: She is oriented to person, place, and time. She appears well-developed and well-nourished.  Non-toxic appearance. She does not have a sickly appearance. She appears ill. No distress.   HENT:   Head: Normocephalic and atraumatic.   Right Ear: External ear normal.   Left Ear: External ear normal.   Nose: Nose normal.    Mouth/Throat: Oropharynx is clear and moist. No oropharyngeal exudate.   Eyes: Conjunctivae and EOM are normal. Pupils are equal, round, and reactive to light. Right eye exhibits no discharge. Left eye exhibits no discharge. No scleral icterus.   Neck: Normal range of motion. Neck supple.   Cardiovascular: Normal rate, regular rhythm, normal heart sounds and intact distal pulses.  Exam reveals no gallop and no friction rub.    No murmur heard.  Pulmonary/Chest: Effort normal. No respiratory distress. She has decreased breath sounds in the right lower field and the left lower field. She has no wheezes. She has no rales. She exhibits no tenderness.   Abdominal: Soft. Bowel sounds are normal. There is no tenderness.   Musculoskeletal: She exhibits no edema.   Lymphadenopathy:     She has no cervical adenopathy.   Neurological: She is alert and oriented to person, place, and time. No cranial nerve deficit. Coordination normal.   Skin: Skin is warm and dry. No rash noted. She is not diaphoretic. No erythema. No pallor.   Psychiatric: She has a normal mood and affect. Her behavior is normal. Judgment and thought content normal.   Nursing note and vitals reviewed.      ED Course     ED Course     Procedures             Labs Ordered and Resulted from Time of ED Arrival Up to the Time of Departure from the ED   CBC WITH PLATELETS DIFFERENTIAL - Abnormal; Notable for the following:        Result Value    WBC 11.8 (*)     Absolute Neutrophil 9.9 (*)     All other components within normal limits   COMPREHENSIVE METABOLIC PANEL - Abnormal; Notable for the following:     Glucose 102 (*)     GFR Estimate 58 (*)     Albumin 3.3 (*)     All other components within normal limits   INR - Abnormal; Notable for the following:     INR 2.20 (*)     All other components within normal limits   LACTIC ACID   PERIPHERAL IV CATHETER   INFLUENZA A/B ANTIGEN   BLOOD CULTURE   BLOOD CULTURE       Assessments & Plan (with Medical Decision  Making)   Cassy has a LLL pneumonia confirmed via XR. Her white count is up at 11.8 with left shift. Lactic acid is WNL. Blood cultures x 2 drawn and pending. No signs of sepsis. She was given Tylenol 1g PO for her fever of 103.6 upon arrival and her fever has been coming down. She drops down to 89%RA with exertion in the room. She requests admission. Rocephin 2g IV and Azithromycin 500mg IV given in ED. She was kindly accepted for admission by Dr. Luna to our med/surg unit for further care.     I have reviewed the nursing notes.    I have reviewed the findings, diagnosis, plan and need for follow up with the patient.    New Prescriptions    No medications on file       Final diagnoses:   Pneumonia of left lower lobe due to infectious organism   Acute respiratory failure with hypoxia (H)       3/14/2017   [facility]     Jelly Hebert PA-C  03/14/17 0838

## 2017-03-15 NOTE — PLAN OF CARE
Problem: Goal Outcome Summary  Goal: Goal Outcome Summary  Outcome: No Change  Sent up from ER with DX=pneumonia, on 2LNP O2 with sats=92%, MG=314/51-Hr=74-R=20 & T=99.2, states pneumonia pain #7-Hospitalist will order ? Motrin or other analgesia when examined.

## 2017-03-15 NOTE — H&P
History and Physical     Cassy Avila MRN# [id number]   YOB: 1955 Age: 61 year old      Date of Admission:  3/14/2017    Primary care provider: Eliz Hartman          Assessment and Plan:   Active Problems:    Community acquired pneumonia    Assessment: Cassy Avila ,61 year old female presents with sudden onset of fever with chills , left sided pleuritic chest pain and shortness of breath on exertion since last night.Has had high fevers up to 103 F.Chest X-ray shows left lower lobe patchy consolidation.Signs and symptoms consistent with CAP.  Patient does not have a cough.She was hypoxic on room air with oxygen saturation around 89%.    Plan: 1.Blood cultures drawn.              2. Oxygen supplementation as she was hypoxic on room air,2 L per minute by nasal                cannula.              3. Levaquin 750 mg IV daily.              4. Gentle hydration with 1 liter of 0.9 NS with 20 meq Kcl ,as patient appears toxic and                 dehydrated.              5. Urine for Strep Pneumo antigen.     Hypoxia : Secondary to CAP ,supplement oxygen.    Pleuritic chest pain : on the left , which correlated with location of pneumonia.                                    Treat with Ibuprofen.  Factor 5 Leiden deficiency : Levaquin may increase INR ,will monitor PT/INR daily and decrease                                                Coumadin dose if INR increases.                  Chief Complaint:    Fever with chills since yesterday.    History is obtained from the patient         History of Present Illness:   This patient is a 61 year old female who presents with the complaint of fever with chills that started while she was baby sitting her grand children.She started shivering and felt extremely weak. Called her  to come and relieve her , so she could rest at home. But last night she developed pain over left lateral chest whenever she took a breath and she began feel very short of breath with  any exertion.Developed a fever up to 102.5 F last night. Continued to have high fever this morning,and she felt extremely weak and tired,therefore decided to come to ED.She does not report any cough or sputum. Shortness of breath improved after she was put on oxygen in the ED.             Past Medical History:     Past Medical History   Diagnosis Date     Arthritis      Cervicalgia 2001     Closed dislocation of shoulder, unspecified site      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, congenital     Contact dermatitis and other eczema, due to unspecified cause 2004     Coughing      Edema 2002     Factor V Leiden (H)      Gastro-oesophageal reflux disease      Herpes zoster without mention of complication      resolved from problem list     Long term (current) use of anticoagulants 2003     Major depression 2014     Mammographic microcalcification      resolved from problem list     Migraine, unspecified, without mention of intractable migraine without mention of status migrainosus 3/5/2001     Neurofibromatosis, unspecified(237.70) 2012     Other and unspecified hyperlipidemia 2002     Other chronic pain      Other pulmonary embolism and infarction 2003     Tachycardia, unspecified 2001     Thrombosis of leg      Unspecified essential hypertension 2001             Past Surgical History:     Past Surgical History   Procedure Laterality Date     Arthroscopy shoulder       right, bone spurs     ------------other-------------       shoulder replacement; Provider: Karen     Esophagastroduodenoscopy       with biopsy and endoscopic U/S     Cholecystectomy       Elbow ulnar tunnel release  2002     Anastacio/bso        section       x3     Pionidal cyst excision       Fusion lumbar anterior with james cages       L5-S1     Endoscopic sinus surgery, septoplasty, turbinoplasty, maxillary sinusotomy, combined  N/A 4/29/2015     Procedure: COMBINED ENDOSCOPIC SINUS SURGERY, SEPTOPLASTY, TURBINOPLASTY, MAXILLARY SINUSOTOMY;  Surgeon: Seema Conn MD;  Location: [facility]     Transposition ulnar nerve (elbow)       Arthroplasty knee  6/20/2014     Procedure: ARTHROPLASTY KNEE;  Surgeon: Sean Alexander MD;  Location: [facility]     Orthopedic surgery  2-15     right shoulder     Orthopedic surgery  8/28/15     right knee     Excise neuroma lower extremity Left 7/13/2016     Procedure: EXCISE NEUROMA LOWER EXTREMITY;  Surgeon: Edi Reed MD;  Location: [facility] OR               Gynecologic History:   Patient is post-menopausal          Obstetrical History:   This patient's obstetrical history is not applicable to this admission         Social History:     Social History   Substance Use Topics     Smoking status: Former Smoker     Packs/day: 0.50     Years: 30.00     Types: Cigarettes, Pipe     Smokeless tobacco: Never Used      Comment: quit in 1999     Alcohol use No             Family History:     Family History   Problem Relation Age of Onset     CANCER Paternal Uncle      cause of death     CANCER Mother      Colon Polyps Mother      Heart Failure Mother 87     congestive, cause of death     Myocardial Infarction Mother      myocardial infarction     C.A.D. Brother      Other - See Comments       factor 5 - family h/o     Myocardial Infarction Father      myocardial infarction - cause of death     C.A.D. Father      Asthma No family hx of              Immunizations:     Immunization History   Administered Date(s) Administered     Hepatitis A Vac Ped/Adol-2 Dose 04/15/2008, 10/15/2008     Hepatitis B 04/16/2008, 07/03/2008, 10/15/2008     Influenza (H1N1) 12/09/2009     Influenza (IIV3) 11/15/2005, 10/24/2006, 10/11/2007, 10/15/2008, 09/24/2010, 09/25/2012, 10/16/2013     Influenza Vaccine IM 3yrs+ 4 Valent IIV4 10/21/2014, 09/28/2015, 10/18/2016     Influenza vaccine ages 6-35 months 11/02/2011     Pneumococcal 23  valent 09/28/2015     Tdap (Adacel,Boostrix) 08/18/2011            Allergies:     Allergies   Allergen Reactions     Ace Inhibitors Cough     Albuterol Sulfate Hives     DuoNeb     Amlodipine Besylate Swelling     Norvasc     Amoxicillin      Cephalexin Monohydrate Hives     Keflex     Erythromycin Base [Kdc:Yellow Dye+Erythromycin+Brilliant Blue Fcf] Nausea and Vomiting     Ipratropium Bromide Hives     DuoNeb     Meloxicam Other (See Comments)     Mobic - confusion, depression     Adhesive Tape Rash     Prochlorperazine Edisylate Swelling and Rash     Compazine     Prochlorperazine Maleate Swelling and Rash             Medications:     Prescriptions Prior to Admission   Medication Sig Dispense Refill Last Dose     escitalopram (LEXAPRO) 20 MG tablet Take 1 tablet (20 mg) by mouth daily 90 tablet 1 3/13/2017 at 2100     losartan (COZAAR) 50 MG tablet TAKE 2 TABLETS BY MOUTH EVERY  tablet 2 3/14/2017 at 0900     warfarin (COUMADIN) 5 MG tablet TAKE 1 AND ONE-HALF TABLET BY MOUTH ON MONDAY WEDNESDAY AND FRIDAYS AND 1 TABLET ON ALL OTHER DAYS OF THE WEEK 135 tablet 0 3/14/2017 at 0900     hydrochlorothiazide (HYDRODIURIL) 25 MG tablet Take 1 tablet (25 mg) by mouth daily (Patient taking differently: Take 25 mg by mouth daily AM) 90 tablet 2 3/14/2017 at 0900     atenolol (TENORMIN) 50 MG tablet TAKE 1 AND ONE-HALF TABLET BY MOUTH DAILY (Patient taking differently: TAKE 1 AND ONE-HALF TABLET BY MOUTH DAILY in AM) 135 tablet 2 3/14/2017 at 0900     Cholecalciferol (VITAMIN D3 PO) Take 3,000 mg by mouth daily AM   3/14/2017 at 0900     omega 3 1000 MG CAPS Take 3 g by mouth daily  90 capsule  3/14/2017 at 0900     aspirin 81 MG EC tablet Take 81 mg by mouth daily HS   3/14/2017 at 0900             Review of Systems:   Constitutional : reports fever,chills , fatigue and loss of appetite.  HEENT :No nasal congestion,sore throat,headache , dizziness, ear ache.  Pulmonary:Pleuritic chest pain over left lateral chest.  No cough, shortness of breath on exertion.  GI : No abdominal pain,nausea, vomiting or diarrhea. Reports constipation.   : No dysuria,urgency, hematuria , stress incontinence.  Musculoskeletal :has DJD in large joints and SI joint.  Neuro : Hx of migraines , no CVA or TIA or syncope.No neuropathy or seizures.  Heme : no anemia, has Factor 5 Leiden deficiency.  Endo : No DM type 2 or thyroid disease ,Has dyslipidemia and obesity.  Psych : Denies depression,anxiety,insomnia or hallucinations.              Physical Exam:   Vitals were reviewed  -  Temp: 99.2  F (37.3  C) Temp  Min: 98.9  F (37.2  C)  Max: 103.6  F (39.8  C)  Constitutional:   awake, alert, cooperative, and appears stated age, toxic and flushed and clammy.  HEENT : Lips are dry, oral mucosa is dry, tongue is coated,no sinus tenderness.  NECK :Supple, no adenopathy, no JVD or carotid bruit.  CHEST : Symmetrical , moves equally with respirations , resonant ,breath sounds are equal,coarse crackles heard over left base.  HEART : No cardiomegaly, no palpable thrill or parasternal heave, mild tachycardia, normal S1and S2, no murmur.  ABDOMEN : obese, non tender, liver and spleen are not palpable, bowel sounds are present.  EXTREMITIES : no cyanosis , edema or clubbing noted. Bruise noted over proximal medial leg which is 3 weeks old per patient.  NEURO : alert and oriented X3 , no cranial nerve deficits, motor strength equal on either side,no sensory loss.  SKIN: warm and dry, face is flushed.          Data:     Lab Results   Component Value Date    WBC 11.8 (H) 03/14/2017    HGB 13.2 03/14/2017    HCT 37.1 03/14/2017     03/14/2017     03/14/2017    POTASSIUM 3.6 03/14/2017    CHLORIDE 103 03/14/2017    CO2 23 03/14/2017    BUN 12 03/14/2017    CR 0.97 03/14/2017     (H) 03/14/2017    NTBNP 174 (H) 01/12/2016    AST 10 03/14/2017    ALT 30 03/14/2017    ALKPHOS 52 03/14/2017    BILITOTAL 0.8 03/14/2017    INR 2.20 (H) 03/14/2017

## 2017-03-16 ENCOUNTER — TELEPHONE (OUTPATIENT)
Dept: CASE MANAGEMENT | Facility: HOSPITAL | Age: 62
End: 2017-03-16

## 2017-03-16 ENCOUNTER — ANTICOAGULATION THERAPY VISIT (OUTPATIENT)
Dept: ANTICOAGULATION | Facility: OTHER | Age: 62
End: 2017-03-16

## 2017-03-16 DIAGNOSIS — I26.99 PULMONARY EMBOLISM AND INFARCTION (H): ICD-10-CM

## 2017-03-16 DIAGNOSIS — Z79.01 LONG-TERM (CURRENT) USE OF ANTICOAGULANTS: ICD-10-CM

## 2017-03-16 NOTE — TELEPHONE ENCOUNTER
Cassy Avila, was discharged to home on 3/15/17  from [facility]. I spoke today with Cassy regarding her discharge.   She indicates she  receive(d) sufficient information upon discharge. Medications were reviewed in full on discharge, including: Medications to be started ; medications to be continued from preadmission and any side effects. Prescriptions were received at discharge and were able to be filled. Medications are being taken as prescribed.   She indicates she has an appointment scheduled for Mar 20 and has transportation available. She was able to confirm her appointment time.   She did not have any questions regarding discharge instructions or condition.  Per their request, the following employee (s) can be recognized for their outstanding services delivered:Everyone did very well.  Suggestions to improve service: Her room was too hot.  She was informed she may receive a survey in the mail from the hospital. Asked if she would kindly complete the survey in order for [facility] to know if services fully met patient needs.

## 2017-03-16 NOTE — PHARMACY-ANTICOAGULATION SERVICE
Patient discharged on 3/15/17.  I called and spoke to Manju at the Coumadin clinic today.  I gave her patient's INRs while inhouse as well as her daily Coumadin dosing.  I told her the discharge order of 2.5 mg x1 on 3/15/17, followed by 7.5 mg M,W,F and 5 mg ROW.  I also let her know that patient has discharge order for Levaquin 750 mg po daily x4 days

## 2017-03-16 NOTE — PROGRESS NOTES
ANTICOAGULATION FOLLOW-UP CLINIC VISIT    Patient Name:  Cassy Avila  Date:  3/16/2017  Contact Type:  Telephone    SUBJECTIVE:     Patient Findings     Comments levaquin x 4 days, starting 3/16, done on 3/20.  Had been in hospital and was discharged on 3/15/17.            OBJECTIVE    INR   Date Value Ref Range Status   03/15/2017 2.37 (H) 0.80 - 1.20 Final       ASSESSMENT / PLAN  No question data found.  Anticoagulation Summary as of 3/16/2017     INR goal 2.0-3.0   Today's INR No new INR was available at the time of this encounter.   Maintenance plan 7.5 mg (5 mg x 1.5) on Mon, Wed, Fri; 5 mg (5 mg x 1) all other days   Full instructions 3/17: 5 mg; 3/18: 2.5 mg; Otherwise 7.5 mg on Mon, Wed, Fri; 5 mg all other days   Weekly total 42.5 mg   Plan last modified Iram Lord RN (7/20/2016)   Next INR check 3/21/2017   Priority INR   Target end date Indefinite    Indications   Long-term (current) use of anticoagulants [Z79.01] [Z79.01]  Pulmonary embolism and infarction (H) [I26.99]  Deep vein thrombosis (DVT) (H) [I82.409] [I82.409]         Anticoagulation Episode Summary     INR check location Home Draw    Preferred lab     Send INR reminders to [facility] ANTICOAG POOL    Comments PST - [facility].      Anticoagulation Care Providers     Provider Role Specialty Phone number    Flmadalyn NORAH [facility] [phone]            See the Encounter Report to view Anticoagulation Flowsheet and Dosing Calendar (Go to Encounters tab in chart review, and find the Anticoagulation Therapy Visit)        Manju Escalera, RN

## 2017-03-17 DIAGNOSIS — J18.9 PNEUMONIA OF LEFT LOWER LOBE DUE TO INFECTIOUS ORGANISM: ICD-10-CM

## 2017-03-20 ENCOUNTER — ANTICOAGULATION THERAPY VISIT (OUTPATIENT)
Dept: ANTICOAGULATION | Facility: OTHER | Age: 62
End: 2017-03-20

## 2017-03-20 ENCOUNTER — OFFICE VISIT (OUTPATIENT)
Dept: FAMILY MEDICINE | Facility: OTHER | Age: 62
End: 2017-03-20
Attending: NURSE PRACTITIONER
Payer: MEDICARE

## 2017-03-20 ENCOUNTER — TRANSFERRED RECORDS (OUTPATIENT)
Dept: HEALTH INFORMATION MANAGEMENT | Facility: HOSPITAL | Age: 62
End: 2017-03-20

## 2017-03-20 VITALS
TEMPERATURE: 97.9 F | DIASTOLIC BLOOD PRESSURE: 78 MMHG | SYSTOLIC BLOOD PRESSURE: 122 MMHG | BODY MASS INDEX: 37.15 KG/M2 | HEIGHT: 65 IN | WEIGHT: 223 LBS | HEART RATE: 58 BPM

## 2017-03-20 DIAGNOSIS — Z79.01 LONG-TERM (CURRENT) USE OF ANTICOAGULANTS: ICD-10-CM

## 2017-03-20 DIAGNOSIS — J18.9 COMMUNITY ACQUIRED PNEUMONIA: Primary | ICD-10-CM

## 2017-03-20 DIAGNOSIS — I26.99 PULMONARY EMBOLISM AND INFARCTION (H): ICD-10-CM

## 2017-03-20 LAB
BACTERIA SPEC CULT: NORMAL
BACTERIA SPEC CULT: NORMAL
INR PPP: 1.9
Lab: NORMAL
MICRO REPORT STATUS: NORMAL
MICRO REPORT STATUS: NORMAL
SPECIMEN SOURCE: NORMAL
SPECIMEN SOURCE: NORMAL

## 2017-03-20 PROCEDURE — 99212 OFFICE O/P EST SF 10 MIN: CPT

## 2017-03-20 PROCEDURE — 99213 OFFICE O/P EST LOW 20 MIN: CPT | Performed by: NURSE PRACTITIONER

## 2017-03-20 NOTE — NURSING NOTE
"Chief Complaint   Patient presents with     Other     Patient is following up, she had pneumonia. She was hopitalized last Tuesday and Wednesday. Finished Levaquin yesterday.     Other     Would like to discuss [employer]       Initial /78 (BP Location: Left arm, Patient Position: Chair, Cuff Size: Adult Large)  Pulse 58  Temp 97.9  F (36.6  C) (Tympanic)  Ht 5' 5\" (1.651 m)  Wt 223 lb (101.2 kg)  Breastfeeding? No  BMI 37.11 kg/m2 Estimated body mass index is 37.11 kg/(m^2) as calculated from the following:    Height as of this encounter: 5' 5\" (1.651 m).    Weight as of this encounter: 223 lb (101.2 kg).  Medication Reconciliation: complete   Lis Vickers      "

## 2017-03-20 NOTE — PROGRESS NOTES
SUBJECTIVE:  Cassy Avila, 61 year old, female presents with the following Chief Complaint(s) with HPI to follow:  Chief Complaint   Patient presents with     Other     Patient is following up, she had pneumonia. She was hopitalized last Tuesday and Wednesday. Finished Levaquin yesterday.     Other     Would like to discuss [employer]          HPI:  Cassy presents today with the above concern. She was hospitalized through the ED on 3/14/2017 for CAP. She was discharged to home on 3/15/2017 with Levaquin. She took her last dose of Levaquin yesterday. Her INR was re-checked today since completion of antibiotics and dose adjusted by [facility]. She continues to feel fatigued but has a good appetite, is drinking plenty of fluids, and is voiding every couple of hours without dysuria. She continues to have 4/10 left lower rib pain. She has tried acetaminophen for the pain without relief and 2 nights ago she tried an oxycodone she had at home with relief of pain but she thinks it kept her up all night so she did not get any sleep. Her cough has resolved but she still feels rattling in her chest like there is something to cough up. Denies SOB at rest but has dyspnea on exertion. Denies otalgia, sore throat, nasal congestion, sinus discomfort. She has not tried a humidifier but is diffusing eucalyptus oil.        Patient Active Problem List   Diagnosis     Migraine     Essential hypertension     Pulmonary embolism and infarction (H)     Contact dermatitis and other eczema, due to unspecified cause     Edema     Hyperlipidemia     Neurofibromatosis (H)     Advanced care planning/counseling discussion     S/P total hip arthroplasty     Total knee replacement status     Major depression     Anticoagulated     Long-term (current) use of anticoagulants [Z79.01]     Deep vein thrombosis (DVT) (H) [I82.409]     Community acquired pneumonia       Past Medical History   Diagnosis Date     Arthritis      Cervicalgia  2001     Closed dislocation of shoulder, unspecified site 2000     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, congenital     Contact dermatitis and other eczema, due to unspecified cause 2004     Coughing      Edema 2002     Factor V Leiden (H)      Gastro-oesophageal reflux disease      Herpes zoster without mention of complication      resolved from problem list     Long term (current) use of anticoagulants 2003     Major depression 2014     Mammographic microcalcification      resolved from problem list     Migraine, unspecified, without mention of intractable migraine without mention of status migrainosus 3/5/2001     Neurofibromatosis, unspecified(237.70) 2012     Other and unspecified hyperlipidemia 2002     Other chronic pain      Other pulmonary embolism and infarction 2003     Tachycardia, unspecified 2001     Thrombosis of leg      Unspecified essential hypertension 2001       Past Surgical History   Procedure Laterality Date     Arthroscopy shoulder       right, bone spurs     ------------other-------------       shoulder replacement; Provider: Karen     Esophagastroduodenoscopy       with biopsy and endoscopic U/S     Cholecystectomy       Elbow ulnar tunnel release       Anastacio/bso        section       x3     Pionidal cyst excision       Fusion lumbar anterior with james cages       L5-S1     Endoscopic sinus surgery, septoplasty, turbinoplasty, maxillary sinusotomy, combined N/A 2015     Procedure: COMBINED ENDOSCOPIC SINUS SURGERY, SEPTOPLASTY, TURBINOPLASTY, MAXILLARY SINUSOTOMY;  Surgeon: Seema Conn MD;  Location: [facility]     Transposition ulnar nerve (elbow)       Arthroplasty knee  2014     Procedure: ARTHROPLASTY KNEE;  Surgeon: Sean Alexander MD;  Location: [facility]     Orthopedic surgery  2-15     right shoulder     Orthopedic surgery  8/28/15     right knee      Excise neuroma lower extremity Left 7/13/2016     Procedure: EXCISE NEUROMA LOWER EXTREMITY;  Surgeon: Edi Reed MD;  Location: [facility]       Family History   Problem Relation Age of Onset     CANCER Paternal Uncle      cause of death     CANCER Mother      Colon Polyps Mother      Heart Failure Mother 87     congestive, cause of death     Myocardial Infarction Mother      myocardial infarction     C.A.D. Brother      Other - See Comments       factor 5 - family h/o     Myocardial Infarction Father      myocardial infarction - cause of death     C.A.D. Father      Asthma No family hx of        Social History   Substance Use Topics     Smoking status: Former Smoker     Packs/day: 0.50     Years: 30.00     Types: Cigarettes, Pipe     Smokeless tobacco: Never Used      Comment: quit in 1999     Alcohol use No       Current Outpatient Prescriptions   Medication Sig Dispense Refill     oxyCODONE (ROXICODONE) 5 MG IR tablet Take 1 tablet (5 mg) by mouth every 4 hours as needed for severe pain 10 tablet 0     acetaminophen (TYLENOL) 325 MG tablet Take 3 tablets (975 mg) by mouth every 4 hours as needed for mild pain or fever 100 tablet      warfarin (COUMADIN) 5 MG tablet Take 1/2 tab tonight, then TAKE 1 AND ONE-HALF TABLET BY MOUTH ON MONDAY WEDNESDAY AND FRIDAYS AND 1 TABLET ON ALL OTHER DAYS OF THE WEEK or as directed 135 tablet 0     escitalopram (LEXAPRO) 20 MG tablet Take 1 tablet (20 mg) by mouth daily 90 tablet 1     losartan (COZAAR) 50 MG tablet TAKE 2 TABLETS BY MOUTH EVERY  tablet 2     atenolol (TENORMIN) 50 MG tablet TAKE 1 AND ONE-HALF TABLET BY MOUTH DAILY (Patient taking differently: TAKE 1 AND ONE-HALF TABLET BY MOUTH DAILY in AM) 135 tablet 2     Cholecalciferol (VITAMIN D3 PO) Take 3,000 mg by mouth daily AM       omega 3 1000 MG CAPS Take 3 g by mouth daily  90 capsule      aspirin 81 MG EC tablet Take 81 mg by mouth daily HS         Allergies   Allergen Reactions     Ace Inhibitors  Cough     Albuterol Sulfate Hives     DuoNeb     Amlodipine Besylate Swelling     Norvasc     Amoxicillin      Cephalexin Monohydrate Hives     Keflex     Erythromycin Base [Kdc:Yellow Dye+Erythromycin+Brilliant Blue Fcf] Nausea and Vomiting     Ipratropium Bromide Hives     DuoNeb     Meloxicam Other (See Comments)     Mobic - confusion, depression     Adhesive Tape Rash     Prochlorperazine Edisylate Swelling and Rash     Compazine     Prochlorperazine Maleate Swelling and Rash       Recent Labs   Lab Test  03/14/17   2028  01/04/17   1459   01/11/16   0935   02/17/15   1149  08/08/14   0815   11/05/13   1528   A1C   --    --    --    --    --    --    --    --   5.5   LDL   --    --    --   126*   --   119  51   --    --    HDL   --    --    --   38*   --   36*  33*   --    --    TRIG   --    --    --   265*   --   272*  149   --    --    ALT  30  35   --    --    --    --   29   < >   --    CR  0.97  1.07*   --   1.02   < >  0.78  0.81   < >   --    GFRESTIMATED  58*  52*   --   55*   < >  75  72   < >   --    GFRESTBLACK  70  63   --   67   < >  >90   GFR Calc    87   < >   --    POTASSIUM  3.6  3.5   < >  3.4   < >  4.0  3.8   < >   --    TSH   --   1.63   --   3.62   < >   --   2.07   --    --     < > = values in this interval not displayed.      BP Readings from Last 3 Encounters:   03/20/17 122/78   03/15/17 130/56   02/06/17 118/74    Wt Readings from Last 3 Encounters:   03/20/17 223 lb (101.2 kg)   03/14/17 225 lb 8.5 oz (102.3 kg)   02/06/17 223 lb (101.2 kg)                    REVIEW OF SYSTEMS  Skin: negative for, rash  Ears/Nose/Throat: As above  Respiratory: Dyspnea on exertion- See above, No shortness of breath, No cough and No hemoptysis. History of PE.  Cardiovascular: negative for, chest pain and exertional chest pain or pressure  Gastrointestinal: As above  Genitourinary: As above  Musculoskeletal: positive for chronic back pain  Hematologic/Lymphatic/Immunologic: negative for,  chills, fever and swollen nodes  Endocrine: negative for and diabetes    OBJECTIVE:    B/P: 122/78, T: 97.9, P: 58, R: Data Unavailable, W: 223 lbs 0 oz, BMI: Body mass index is 37.11 kg/(m^2).  Constitutional: healthy, alert and no distress  Head: Normocephalic. No masses, lesions, tenderness or abnormalities  ENT: ENT exam normal, no neck nodes or sinus tenderness, throat normal without erythema or exudate and sinuses nontender  Neck: neck supple, no lymphadenopathy, trachea midline, no thyromegaly.  Carotid arteries without bruit  Cardiovascular: RRR. Normal S1 and S2. No S3 or S4. No murmurs, clicks gallops or rub  Respiratory:  Good diaphragmatic excursion. Lungs clear  Musculoskeletal: gait normal  Skin: no suspicious lesions or rashes  Neurologic: Gait normal  Psychiatric: mentation appears normal and affect normal/bright  Hematologic/Lymphatic/Immunologic: normal ant/post cervical and supraclavicular nodes      ASSESSMENT / PLAN:  1. Community acquired pneumonia  Resolved  - Antibiotic treatment completed but it may take up to 1 month to feel improvement in symptoms of fatigue, shortness of breath, and pain in left rib area.  - You can try a humidifier at night.  - Continue with over the counter acetaminophen for discomfort. You can also apply heat and/or cold packs to left lower ribs for comfort.   - If your symptoms worsen or you have new symptoms such as fever return to the clinic for further evaluation.  - return to clinic for repeat chest XR (order placed) on 4/3/2017.    Note provided for weight loss supplement.      STACY Leiva-Student    Patient is seen in conjunction with NP student.  History is reviewed with patient and pertinent portions of the exam are repeated.  Assessment and plan is reviewed with the patient.    Maegan Sharma,   Certified Adult Nurse Practitioner  [phone]

## 2017-03-20 NOTE — MR AVS SNAPSHOT
Cassy Shawkevon   3/20/2017   Anticoagulation Therapy Visit    Description:  61 year old female   Provider:  Eliz Hartman NP   Department:  [facility] Anti Coagulation           INR as of 3/20/2017     Today's INR 1.9!      Anticoagulation Summary as of 3/20/2017     INR goal 2.0-3.0   Today's INR 1.9!   Full instructions 7.5 mg on Mon, Wed, Fri; 5 mg all other days   Next INR check 4/3/2017    Indications   Long-term (current) use of anticoagulants [Z79.01] [Z79.01]  Pulmonary embolism and infarction (H) [I26.99]  Deep vein thrombosis (DVT) (H) [I82.409] [I82.409]         March 2017 Details    Sun Mon Tue Wed Thu Fri Sat        1               2               3               4                 5               6               7               8               9               10               11                 12               13               14               15               16               17               18                 19               20      7.5 mg   See details      21      5 mg         22      7.5 mg         23      5 mg         24      7.5 mg         25      5 mg           26      5 mg         27      7.5 mg         28      5 mg         29      7.5 mg         30      5 mg         31      7.5 mg           Date Details   03/20 This INR check               How to take your warfarin dose     To take:  5 mg Take 1 of the 5 mg tablets.    To take:  7.5 mg Take 1.5 of the 5 mg tablets.           April 2017 Details    Sun Mon Tue Wed Thu Fri Sat           1      5 mg           2      5 mg         3            4               5               6               7               8                 9               10               11               12               13               14               15                 16               17               18               19               20               21               22                 23               24               25               26               27               28                29 30                      Date Details   No additional details    Date of next INR:  4/3/2017         How to take your warfarin dose     To take:  5 mg Take 1 of the 5 mg tablets.    To take:  7.5 mg Take 1.5 of the 5 mg tablets.

## 2017-03-20 NOTE — MR AVS SNAPSHOT
"              After Visit Summary   3/20/2017    Cassy Avila    MRN: [id number]           Patient Information     Date Of Birth          1955        Visit Information        Provider Department      3/20/2017 1:15 PM Maegan Sharma NP [facility]3]        Today's Diagnoses     Community acquired pneumonia    -  1       Follow-ups after your visit        Follow-up notes from your care team     Return in about 3 weeks (around 4/10/2017), or if symptoms worsen or fail to improve.      Who to contact     If you have questions or need follow up information about today's clinic visit or your schedule please contact [facility] directly at [phone].  Normal or non-critical lab and imaging results will be communicated to you by MyChart, letter or phone within 4 business days after the clinic has received the results. If you do not hear from us within 7 days, please contact the clinic through [employer] or phone. If you have a critical or abnormal lab result, we will notify you by phone as soon as possible.  Submit refill requests through [employer] or call your pharmacy and they will forward the refill request to us. Please allow 3 business days for your refill to be completed.          Additional Information About Your Visit        MyChart Information     [employer] gives you secure access to your electronic health record. If you see a primary care provider, you can also send messages to your care team and make appointments. If you have questions, please call your primary care clinic.  If you do not have a primary care provider, please call [phone] and they will assist you.        Care EveryWhere ID     This is your Care EveryWhere ID. This could be used by other organizations to access your [facility] medical records  [id number]        Your Vitals Were     Pulse Temperature Height Breastfeeding? BMI (Body Mass Index)       58 97.9  F (36.6  C) (Tympanic) 5' 5\" (1.651 m) No " 37.11 kg/m2        Blood Pressure from Last 3 Encounters:   03/20/17 122/78   03/15/17 130/56   02/06/17 118/74    Weight from Last 3 Encounters:   03/20/17 223 lb (101.2 kg)   03/14/17 225 lb 8.5 oz (102.3 kg)   02/06/17 223 lb (101.2 kg)              Today, you had the following     No orders found for display         Today's Medication Changes          These changes are accurate as of: 3/20/17  3:32 PM.  If you have any questions, ask your nurse or doctor.               These medicines have changed or have updated prescriptions.        Dose/Directions    atenolol 50 MG tablet   Commonly known as:  TENORMIN   This may have changed:  See the new instructions.   Used for:  HTN (hypertension)        TAKE 1 AND ONE-HALF TABLET BY MOUTH DAILY   Quantity:  135 tablet   Refills:  2                Primary Care Provider Office Phone # Fax #    Eliz SchroederNORAH arriaga [phone]       [address] [address]        Thank you!     Thank you for choosing [facility]  for your care. Our goal is always to provide you with excellent care. Hearing back from our patients is one way we can continue to improve our services. Please take a few minutes to complete the written survey that you may receive in the mail after your visit with us. Thank you!             Your Updated Medication List - Protect others around you: Learn how to safely use, store and throw away your medicines at www.disposemymeds.org.          This list is accurate as of: 3/20/17  3:32 PM.  Always use your most recent med list.                   Brand Name Dispense Instructions for use    acetaminophen 325 MG tablet    TYLENOL    100 tablet    Take 3 tablets (975 mg) by mouth every 4 hours as needed for mild pain or fever       aspirin 81 MG EC tablet      Take 81 mg by mouth daily HS       atenolol 50 MG tablet    TENORMIN    135 tablet    TAKE 1 AND ONE-HALF TABLET BY MOUTH DAILY       escitalopram 20 MG tablet     LEXAPRO    90 tablet    Take 1 tablet (20 mg) by mouth daily       losartan 50 MG tablet    COZAAR    180 tablet    TAKE 2 TABLETS BY MOUTH EVERY DAY       omega 3 1000 MG Caps     90 capsule    Take 3 g by mouth daily       oxyCODONE 5 MG IR tablet    ROXICODONE    10 tablet    Take 1 tablet (5 mg) by mouth every 4 hours as needed for severe pain       VITAMIN D3 PO      Take 3,000 mg by mouth daily AM       warfarin 5 MG tablet    COUMADIN    135 tablet    Take 1/2 tab tonight, then TAKE 1 AND ONE-HALF TABLET BY MOUTH ON MONDAY WEDNESDAY AND FRIDAYS AND 1 TABLET ON ALL OTHER DAYS OF THE WEEK or as directed

## 2017-03-20 NOTE — PROGRESS NOTES
ANTICOAGULATION FOLLOW-UP CLINIC VISIT    Patient Name:  Cassy Avila  Date:  3/20/2017  Contact Type:  Telephone    SUBJECTIVE:     Patient Findings     Comments Done with antibiotics yesterday           OBJECTIVE    INR   Date Value Ref Range Status   03/20/2017 1.9  Final       ASSESSMENT / PLAN  INR assessment THER    Recheck INR In: 2 WEEKS    INR Location Home INR      Anticoagulation Summary as of 3/20/2017     INR goal 2.0-3.0   Today's INR 1.9!   Maintenance plan 7.5 mg (5 mg x 1.5) on Mon, Wed, Fri; 5 mg (5 mg x 1) all other days   Full instructions 7.5 mg on Mon, Wed, Fri; 5 mg all other days   Weekly total 42.5 mg   No change documented Manju Escalera RN   Plan last modified Iram Lord RN (7/20/2016)   Next INR check 4/3/2017   Priority INR   Target end date Indefinite    Indications   Long-term (current) use of anticoagulants [Z79.01] [Z79.01]  Pulmonary embolism and infarction (H) [I26.99]  Deep vein thrombosis (DVT) (H) [I82.409] [I82.409]         Anticoagulation Episode Summary     INR check location Home Draw    Preferred lab     Send INR reminders to HC ANTICOAG POOL    Comments PST - [facility].      Anticoagulation Care Providers     Provider Role Specialty Phone number    Eliz Hartman NP [facility] [phone]            See the Encounter Report to view Anticoagulation Flowsheet and Dosing Calendar (Go to Encounters tab in chart review, and find the Anticoagulation Therapy Visit)        Manju Escalera, RN

## 2017-03-20 NOTE — LETTER
[facility]  [address]  [place] [address]  Phone: [phone]    March 20, 2017        Cassy Avila  [address] [address]          To whom it may concern:    RE: Cassy Avila    Patient was seen and treated today at our clinic.  She is advised not to use [employer] due to underlying medical conditions.     Please contact me for questions or concerns.      Sincerely,        Maegan Sharma NP

## 2017-03-30 ENCOUNTER — TRANSFERRED RECORDS (OUTPATIENT)
Dept: HEALTH INFORMATION MANAGEMENT | Facility: HOSPITAL | Age: 62
End: 2017-03-30

## 2017-04-03 ENCOUNTER — TRANSFERRED RECORDS (OUTPATIENT)
Dept: HEALTH INFORMATION MANAGEMENT | Facility: HOSPITAL | Age: 62
End: 2017-04-03

## 2017-04-03 ENCOUNTER — ANTICOAGULATION THERAPY VISIT (OUTPATIENT)
Dept: ANTICOAGULATION | Facility: OTHER | Age: 62
End: 2017-04-03

## 2017-04-03 DIAGNOSIS — I26.99 PULMONARY EMBOLISM AND INFARCTION (H): ICD-10-CM

## 2017-04-03 DIAGNOSIS — Z79.01 LONG-TERM (CURRENT) USE OF ANTICOAGULANTS: ICD-10-CM

## 2017-04-03 LAB — INR PPP: 2.1

## 2017-04-03 NOTE — PROGRESS NOTES
ANTICOAGULATION FOLLOW-UP CLINIC VISIT    Patient Name:  Cassy Avila  Date:  4/3/2017  Contact Type:  Telephone    SUBJECTIVE:     Patient Findings     Positives No Problem Findings           OBJECTIVE    INR   Date Value Ref Range Status   04/03/2017 2.1  Final       ASSESSMENT / PLAN  INR assessment THER    Recheck INR In: 4 WEEKS    INR Location Clinic      Anticoagulation Summary as of 4/3/2017     INR goal 2.0-3.0   Today's INR 2.1   Maintenance plan 7.5 mg (5 mg x 1.5) on Mon, Wed, Fri; 5 mg (5 mg x 1) all other days   Full instructions 7.5 mg on Mon, Wed, Fri; 5 mg all other days   Weekly total 42.5 mg   No change documented Manju Escalera RN   Plan last modified Iram Lord RN (7/20/2016)   Next INR check 5/1/2017   Priority INR   Target end date Indefinite    Indications   Long-term (current) use of anticoagulants [Z79.01] [Z79.01]  Pulmonary embolism and infarction (H) [I26.99]  Deep vein thrombosis (DVT) (H) [I82.409] [I82.409]         Anticoagulation Episode Summary     INR check location Home Draw    Preferred lab     Send INR reminders to [facility] ANTICOAG POOL    Comments PST - [facility].      Anticoagulation Care Providers     Provider Role Specialty Phone number    Eliz Hartman, [facility] [phone]            See the Encounter Report to view Anticoagulation Flowsheet and Dosing Calendar (Go to Encounters tab in chart review, and find the Anticoagulation Therapy Visit)        Manju Escalera, RN

## 2017-04-03 NOTE — MR AVS SNAPSHOT
Cassy APPLE Avila   4/3/2017   Anticoagulation Therapy Visit    Description:  61 year old female   Provider:  Eliz Hartman NP   Department:  [facility] Anti Coagulation           INR as of 4/3/2017     Today's INR 2.1      Anticoagulation Summary as of 4/3/2017     INR goal 2.0-3.0   Today's INR 2.1   Full instructions 7.5 mg on Mon, Wed, Fri; 5 mg all other days   Next INR check 5/1/2017    Indications   Long-term (current) use of anticoagulants [Z79.01] [Z79.01]  Pulmonary embolism and infarction (H) [I26.99]  Deep vein thrombosis (DVT) (H) [I82.409] [I82.409]         April 2017 Details    Sun Mon Tue Wed Thu Fri Sat           1                 2               3      7.5 mg   See details      4      5 mg         5      7.5 mg         6      5 mg         7      7.5 mg         8      5 mg           9      5 mg         10      7.5 mg         11      5 mg         12      7.5 mg         13      5 mg         14      7.5 mg         15      5 mg           16      5 mg         17      7.5 mg         18      5 mg         19      7.5 mg         20      5 mg         21      7.5 mg         22      5 mg           23      5 mg         24      7.5 mg         25      5 mg         26      7.5 mg         27      5 mg         28      7.5 mg         29      5 mg           30      5 mg                Date Details   04/03 This INR check               How to take your warfarin dose     To take:  5 mg Take 1 of the 5 mg tablets.    To take:  7.5 mg Take 1.5 of the 5 mg tablets.           May 2017 Details    Sun Mon Tue Wed Thu Fri Sat      1            2               3               4               5               6                 7               8               9               10               11               12               13                 14               15               16               17               18               19               20                 21               22               23               24               25                26               27                 28               29               30               31                   Date Details   No additional details    Date of next INR:  5/1/2017         How to take your warfarin dose     To take:  7.5 mg Take 1.5 of the 5 mg tablets.

## 2017-04-10 DIAGNOSIS — J18.9 PNEUMONIA OF LEFT LOWER LOBE DUE TO INFECTIOUS ORGANISM: Primary | ICD-10-CM

## 2017-04-10 PROCEDURE — 71020 ZZHC CHEST TWO VIEWS, FRONT/LAT: CPT | Mod: TC

## 2017-04-17 ENCOUNTER — TELEPHONE (OUTPATIENT)
Dept: FAMILY MEDICINE | Facility: OTHER | Age: 62
End: 2017-04-17

## 2017-04-17 NOTE — TELEPHONE ENCOUNTER
9:44 AM    Reason for Call: Phone Call    Description: Pt is requesting the results from an Xray on 04/10/2017 Monday.  Nurse please advise with a call back.    Was an appointment offered for this call? No    Preferred method for responding to this message: Telephone Call: [phone] primary phone for the pt    If we cannot reach you directly, may we leave a detailed response at the number you provided? Yes    Can this message wait until your PCP/provider returns, if available today? Not applicable, PCP Minnie is in today.    Jade Burns

## 2017-04-17 NOTE — TELEPHONE ENCOUNTER
Was ordered by Dr. Osorio - results as follows:    IMPRESSION: THE LEFT LOWER LOBE PROCESS SEEN ON MARCH 14, 2017 HAS  REGRESSED. THE APPEARANCE OF THE CHEST RADIOGRAPH IS NOW SIMILAR  COMPARED TO THE JANUARY 19, 2013 STUDY.  Exam Date: Apr 10, 2017 01:40:00 PM  Author: OSKAR CHANEY  This report is final and null    Pneumonia appears to be improving.

## 2017-04-24 DIAGNOSIS — I10 ESSENTIAL HYPERTENSION: ICD-10-CM

## 2017-04-24 NOTE — TELEPHONE ENCOUNTER
HCTZ      Last Written Prescription Date: 5/23/16  Last Fill Quantity: 90, # refills: 2  Last Office Visit with [facility], [facility] or [facility] prescribing provider: 3/20/17       Potassium   Date Value Ref Range Status   03/14/2017 3.6 3.4 - 5.3 mmol/L Final     Creatinine   Date Value Ref Range Status   03/14/2017 0.97 0.52 - 1.04 mg/dL Final     BP Readings from Last 3 Encounters:   03/20/17 122/78   03/15/17 130/56   02/06/17 118/74

## 2017-04-26 RX ORDER — HYDROCHLOROTHIAZIDE 25 MG/1
25 TABLET ORAL DAILY
Qty: 90 TABLET | Refills: 0 | Status: SHIPPED | OUTPATIENT
Start: 2017-04-26 | End: 2017-05-31

## 2017-04-26 NOTE — TELEPHONE ENCOUNTER
Please note that HCTZ is not on current Epic medication list.Discontinued on 3.15.17. Medication pended at this time.Thank you.

## 2017-05-01 ENCOUNTER — ANTICOAGULATION THERAPY VISIT (OUTPATIENT)
Dept: ANTICOAGULATION | Facility: OTHER | Age: 62
End: 2017-05-01

## 2017-05-01 ENCOUNTER — TRANSFERRED RECORDS (OUTPATIENT)
Dept: HEALTH INFORMATION MANAGEMENT | Facility: HOSPITAL | Age: 62
End: 2017-05-01

## 2017-05-01 DIAGNOSIS — I26.99 PULMONARY EMBOLISM AND INFARCTION (H): ICD-10-CM

## 2017-05-01 DIAGNOSIS — Z79.01 LONG-TERM (CURRENT) USE OF ANTICOAGULANTS: ICD-10-CM

## 2017-05-01 LAB — INR PPP: 2.2

## 2017-05-01 NOTE — PROGRESS NOTES
ANTICOAGULATION FOLLOW-UP CLINIC VISIT    Patient Name:  Cassy Avila  Date:  5/1/2017  Contact Type:  Telephone    SUBJECTIVE:     Patient Findings     Positives No Problem Findings           OBJECTIVE    INR   Date Value Ref Range Status   05/01/2017 2.2  Final       ASSESSMENT / PLAN  INR assessment THER    Recheck INR In: 4 WEEKS    INR Location Home INR      Anticoagulation Summary as of 5/1/2017     INR goal 2.0-3.0   Today's INR 2.2   Maintenance plan 7.5 mg (5 mg x 1.5) on Mon, Wed, Fri; 5 mg (5 mg x 1) all other days   Full instructions 7.5 mg on Mon, Wed, Fri; 5 mg all other days   Weekly total 42.5 mg   No change documented Manju Escalera RN   Plan last modified Iram Lord RN (7/20/2016)   Next INR check 5/30/2017   Priority INR   Target end date Indefinite    Indications   Long-term (current) use of anticoagulants [Z79.01] [Z79.01]  Pulmonary embolism and infarction (H) [I26.99]  Deep vein thrombosis (DVT) (H) [I82.409] [I82.409]         Anticoagulation Episode Summary     INR check location Home Draw    Preferred lab     Send INR reminders to HC ANTICOAG POOL    Comments PST - [facility].      Anticoagulation Care Providers     Provider Role Specialty Phone number    Eliz Hartman, [facility] [phone]            See the Encounter Report to view Anticoagulation Flowsheet and Dosing Calendar (Go to Encounters tab in chart review, and find the Anticoagulation Therapy Visit)        Manju Escalera, RN

## 2017-05-11 ENCOUNTER — ANTICOAGULATION THERAPY VISIT (OUTPATIENT)
Dept: ANTICOAGULATION | Facility: OTHER | Age: 62
End: 2017-05-11

## 2017-05-11 DIAGNOSIS — I26.99 PULMONARY EMBOLISM AND INFARCTION (H): ICD-10-CM

## 2017-05-11 DIAGNOSIS — Z79.01 LONG-TERM (CURRENT) USE OF ANTICOAGULANTS: ICD-10-CM

## 2017-05-11 NOTE — MR AVS SNAPSHOT
Cassy APPLE Avila   5/11/2017   Anticoagulation Therapy Visit    Description:  61 year old female   Provider:  Eliz Hartman NP   Department:  [facility] Anti Coagulation           INR as of 5/11/2017     Today's INR No new INR was available at the time of this encounter.      Anticoagulation Summary as of 5/11/2017     INR goal 2.0-3.0   Today's INR No new INR was available at the time of this encounter.   Full instructions 5/31: Hold; 6/1: Hold; 6/2: 10 mg; Otherwise 7.5 mg on Mon, Wed, Fri; 5 mg all other days   Next INR check 5/30/2017    Indications   Long-term (current) use of anticoagulants [Z79.01] [Z79.01]  Pulmonary embolism and infarction (H) [I26.99]  Deep vein thrombosis (DVT) (H) [I82.409] [I82.409]         May 2017 Details    Sun Mon Tue Wed Thu Fri Sat      1               2               3               4               5               6                 7               8               9               10               11      5 mg   See details      12      7.5 mg         13      5 mg           14      5 mg         15      7.5 mg         16      5 mg         17      7.5 mg         18      5 mg         19      7.5 mg         20      5 mg           21      5 mg         22      7.5 mg         23      5 mg         24      7.5 mg         25      5 mg         26      7.5 mg         27      5 mg           28      5 mg         29      7.5 mg         30            31                   Date Details   05/11 This INR check       Date of next INR:  5/30/2017         How to take your warfarin dose     To take:  5 mg Take 1 of the 5 mg tablets.    To take:  7.5 mg Take 1.5 of the 5 mg tablets.

## 2017-05-11 NOTE — PROGRESS NOTES
ANTICOAGULATION FOLLOW-UP CLINIC VISIT    Patient Name:  Cassy Avila  Date:  5/11/2017  Contact Type:  Telephone    SUBJECTIVE:     Patient Findings     Comments Having some kind of neuro stimulator inserted into spine. She states that she was told to hold warfarin for only 1 day. Procedure 6/1/17           OBJECTIVE    INR   Date Value Ref Range Status   05/01/2017 2.2  Final       ASSESSMENT / PLAN  No question data found.  Anticoagulation Summary as of 5/11/2017     INR goal 2.0-3.0   Today's INR No new INR was available at the time of this encounter.   Maintenance plan 7.5 mg (5 mg x 1.5) on Mon, Wed, Fri; 5 mg (5 mg x 1) all other days   Full instructions 5/31: Hold; 6/1: Hold; 6/2: 10 mg; Otherwise 7.5 mg on Mon, Wed, Fri; 5 mg all other days   Weekly total 42.5 mg   Plan last modified Iram Lord RN (7/20/2016)   Next INR check 5/30/2017   Priority INR   Target end date Indefinite    Indications   Long-term (current) use of anticoagulants [Z79.01] [Z79.01]  Pulmonary embolism and infarction (H) [I26.99]  Deep vein thrombosis (DVT) (H) [I82.409] [I82.409]         Anticoagulation Episode Summary     INR check location Home Draw    Preferred lab     Send INR reminders to [facility] ANTICOAG POOL    Comments PST - [facility].  having neuro stimulator inserted into back 6/1/17, she states warfarin hold only 1 day per provider she is seeing      Anticoagulation Care Providers     Provider Role Specialty Phone number    Eliz Hartman NP [facility] [phone]            See the Encounter Report to view Anticoagulation Flowsheet and Dosing Calendar (Go to Encounters tab in chart review, and find the Anticoagulation Therapy Visit)        Manuj Escalera, RN

## 2017-05-23 DIAGNOSIS — I10 BENIGN ESSENTIAL HYPERTENSION: ICD-10-CM

## 2017-05-25 RX ORDER — ATENOLOL 50 MG/1
TABLET ORAL
Qty: 135 TABLET | Refills: 1 | Status: SHIPPED | OUTPATIENT
Start: 2017-05-25 | End: 2017-07-12

## 2017-05-25 NOTE — TELEPHONE ENCOUNTER
Atenolol      Last Written Prescription Date: 2/9/2016  Last Fill Quantity: 135, # refills: 5    Last Office Visit with [facility], [facility] or [facility] prescribing provider:  3/20/2017   Future Office Visit:        BP Readings from Last 3 Encounters:   03/20/17 122/78   03/15/17 130/56   02/06/17 118/74

## 2017-05-30 ENCOUNTER — TRANSFERRED RECORDS (OUTPATIENT)
Dept: HEALTH INFORMATION MANAGEMENT | Facility: HOSPITAL | Age: 62
End: 2017-05-30

## 2017-05-30 ENCOUNTER — ANTICOAGULATION THERAPY VISIT (OUTPATIENT)
Dept: ANTICOAGULATION | Facility: OTHER | Age: 62
End: 2017-05-30

## 2017-05-30 DIAGNOSIS — Z79.01 LONG-TERM (CURRENT) USE OF ANTICOAGULANTS: ICD-10-CM

## 2017-05-30 DIAGNOSIS — I26.99 PULMONARY EMBOLISM AND INFARCTION (H): ICD-10-CM

## 2017-05-30 LAB — INR PPP: 2

## 2017-05-30 NOTE — PROGRESS NOTES
ANTICOAGULATION FOLLOW-UP CLINIC VISIT    Patient Name:  Cassy Avila  Date:  5/30/2017  Contact Type:  Telephone    SUBJECTIVE:     Patient Findings     Comments Nerve stimulator being put in back on 6/1/17.  Warfarin hold for 3 days.She states she was told he will tell her after procedure when she should restart warfarin. She will call warfarin clinic on Fri 6/2 to let us know what he told her           OBJECTIVE    INR   Date Value Ref Range Status   05/30/2017 2.0  Final       ASSESSMENT / PLAN  INR assessment THER    Recheck INR In: 3 DAYS    INR Location Home INR      Anticoagulation Summary as of 5/30/2017     INR goal 2.0-3.0   Today's INR 2.0   Maintenance plan 7.5 mg (5 mg x 1.5) on Mon, Wed, Fri; 5 mg (5 mg x 1) all other days   Full instructions 5/30: Hold; 5/31: Hold; 6/1: Hold; 6/2: 10 mg; Otherwise 7.5 mg on Mon, Wed, Fri; 5 mg all other days   Weekly total 42.5 mg   Plan last modified Iram Lord RN (7/20/2016)   Next INR check 6/2/2017   Priority INR   Target end date Indefinite    Indications   Long-term (current) use of anticoagulants [Z79.01] [Z79.01]  Pulmonary embolism and infarction (H) [I26.99]  Deep vein thrombosis (DVT) (H) [I82.409] [I82.409]         Anticoagulation Episode Summary     INR check location Home Draw    Preferred lab     Send INR reminders to [facility] ANTICOAG POOL    Comments PST - [facility].  having neuro stimulator inserted into back 6/1/17, she states warfarin hold only 1 day per provider she is seeing      Anticoagulation Care Providers     Provider Role Specialty Phone number    FlmadalynEliz NP [facility] [phone]            See the Encounter Report to view Anticoagulation Flowsheet and Dosing Calendar (Go to Encounters tab in chart review, and find the Anticoagulation Therapy Visit)        Manju Escalera, RN

## 2017-05-30 NOTE — MR AVS SNAPSHOT
Cassy CHIU [place]   5/30/2017   Anticoagulation Therapy Visit    Description:  61 year old female   Provider:  Eliz Hartman NP   Department:  [facility] Anti Coagulation           INR as of 5/30/2017     Today's INR 2.0      Anticoagulation Summary as of 5/30/2017     INR goal 2.0-3.0   Today's INR 2.0   Full instructions 5/30: Hold; 5/31: Hold; 6/1: Hold; 6/2: 10 mg; Otherwise 7.5 mg on Mon, Wed, Fri; 5 mg all other days   Next INR check 6/2/2017    Indications   Long-term (current) use of anticoagulants [Z79.01] [Z79.01]  Pulmonary embolism and infarction (H) [I26.99]  Deep vein thrombosis (DVT) (H) [I82.409] [I82.409]         May 2017 Details    Sun Mon Tue Wed Thu Fri Sat      1               2               3               4               5               6                 7               8               9               10               11               12               13                 14               15               16               17               18               19               20                 21               22               23               24               25               26               27                 28               29               30      Hold   See details      31      Hold             Date Details   05/30 This INR check               How to take your warfarin dose     Hold Do not take your warfarin dose. See the Details table to the right for additional instructions.                June 2017 Details    Sun Mon Tue Wed Thu Fri Sat         1      Hold         2            3                 4               5               6               7               8               9               10                 11               12               13               14               15               16               17                 18               19               20               21               22               23               24                 25               26               27                28               29               30                 Date Details   No additional details    Date of next INR:  6/2/2017         How to take your warfarin dose     To take:  10 mg Take 2 of the 5 mg tablets.    Hold Do not take your warfarin dose. See the Details table to the right for additional instructions.

## 2017-05-31 DIAGNOSIS — I10 ESSENTIAL HYPERTENSION: ICD-10-CM

## 2017-05-31 RX ORDER — HYDROCHLOROTHIAZIDE 25 MG/1
25 TABLET ORAL DAILY
Qty: 90 TABLET | Refills: 2 | Status: SHIPPED | OUTPATIENT
Start: 2017-05-31 | End: 2018-03-12

## 2017-05-31 NOTE — TELEPHONE ENCOUNTER
HCTZ      Last Written Prescription Date: 4/26/17  Last Fill Quantity: 90, # refills: 0  Last Office Visit with [facility], [facility] or [facility] prescribing provider: 4/20/17       Potassium   Date Value Ref Range Status   03/14/2017 3.6 3.4 - 5.3 mmol/L Final     Creatinine   Date Value Ref Range Status   03/14/2017 0.97 0.52 - 1.04 mg/dL Final     BP Readings from Last 3 Encounters:   03/20/17 122/78   03/15/17 130/56   02/06/17 118/74

## 2017-06-02 ENCOUNTER — ANTICOAGULATION THERAPY VISIT (OUTPATIENT)
Dept: ANTICOAGULATION | Facility: OTHER | Age: 62
End: 2017-06-02

## 2017-06-02 DIAGNOSIS — I26.99 PULMONARY EMBOLISM AND INFARCTION (H): ICD-10-CM

## 2017-06-02 DIAGNOSIS — Z79.01 LONG-TERM (CURRENT) USE OF ANTICOAGULANTS: ICD-10-CM

## 2017-06-02 LAB — INR PPP: 1.2

## 2017-06-02 RX ORDER — CIPROFLOXACIN 250 MG/1
250 TABLET, FILM COATED ORAL 2 TIMES DAILY
COMMUNITY
Start: 2017-06-01 | End: 2017-06-10

## 2017-06-02 NOTE — MR AVS SNAPSHOT
Cassy [place]   6/2/2017   Anticoagulation Therapy Visit    Description:  61 year old female   Provider:  Eliz Hartman NP   Department:  [facility] Anti Coagulation           INR as of 6/2/2017     Today's INR 1.2!      Anticoagulation Summary as of 6/2/2017     INR goal 2.0-3.0   Today's INR 1.2!   Full instructions 6/2: 10 mg; 6/3: 10 mg; Otherwise 7.5 mg on Mon, Wed, Fri; 5 mg all other days   Next INR check 6/7/2017    Indications   Long-term (current) use of anticoagulants [Z79.01] [Z79.01]  Pulmonary embolism and infarction (H) [I26.99]  Deep vein thrombosis (DVT) (H) [I82.409] [I82.409]         June 2017 Details    Sun Mon Tue Wed Thu Fri Sat         1               2      10 mg   See details      3      10 mg           4      5 mg         5      7.5 mg         6      5 mg         7            8               9               10                 11               12               13               14               15               16               17                 18               19               20               21               22               23               24                 25               26               27               28               29               30                 Date Details   06/02 This INR check       Date of next INR:  6/7/2017         How to take your warfarin dose     To take:  5 mg Take 1 of the 5 mg tablets.    To take:  7.5 mg Take 1.5 of the 5 mg tablets.    To take:  10 mg Take 2 of the 5 mg tablets.

## 2017-06-02 NOTE — PROGRESS NOTES
ANTICOAGULATION FOLLOW-UP CLINIC VISIT    Patient Name:  Cassy Avila  Date:  6/2/2017  Contact Type:  Telephone    SUBJECTIVE:     Patient Findings     Comments Had nerve stimulator inserted on  6/1/17.  States back pain is gone. She said this stimulator is a temporary one but if it works permanent one will be put in. She sees MD on 6/9/17           OBJECTIVE    INR   Date Value Ref Range Status   06/02/2017 1.2  Final       ASSESSMENT / PLAN  INR assessment SUB    Recheck INR In: 5 DAYS    INR Location Home INR      Anticoagulation Summary as of 6/2/2017     INR goal 2.0-3.0   Today's INR 1.2!   Maintenance plan 7.5 mg (5 mg x 1.5) on Mon, Wed, Fri; 5 mg (5 mg x 1) all other days   Full instructions 6/2: 10 mg; 6/3: 10 mg; Otherwise 7.5 mg on Mon, Wed, Fri; 5 mg all other days   Weekly total 42.5 mg   Plan last modified Iram Lord RN (7/20/2016)   Next INR check 6/7/2017   Priority INR   Target end date Indefinite    Indications   Long-term (current) use of anticoagulants [Z79.01] [Z79.01]  Pulmonary embolism and infarction (H) [I26.99]  Deep vein thrombosis (DVT) (H) [I82.409] [I82.409]         Anticoagulation Episode Summary     INR check location Home Draw    Preferred lab     Send INR reminders to [facility] ANTICOAG POOL    Comments PST - [facility].  having neuro stimulator inserted into back 6/1/17, she states warfarin hold only 1 day per provider she is seeing      Anticoagulation Care Providers     Provider Role Specialty Phone number    Eliz Hartman NP [facility] [phone]            See the Encounter Report to view Anticoagulation Flowsheet and Dosing Calendar (Go to Encounters tab in chart review, and find the Anticoagulation Therapy Visit)        Manju Escalera, RN

## 2017-06-07 ENCOUNTER — ANTICOAGULATION THERAPY VISIT (OUTPATIENT)
Dept: ANTICOAGULATION | Facility: OTHER | Age: 62
End: 2017-06-07

## 2017-06-07 ENCOUNTER — TRANSFERRED RECORDS (OUTPATIENT)
Dept: HEALTH INFORMATION MANAGEMENT | Facility: HOSPITAL | Age: 62
End: 2017-06-07

## 2017-06-07 DIAGNOSIS — Z79.01 LONG-TERM (CURRENT) USE OF ANTICOAGULANTS: ICD-10-CM

## 2017-06-07 DIAGNOSIS — I26.99 PULMONARY EMBOLISM AND INFARCTION (H): ICD-10-CM

## 2017-06-07 LAB — INR PPP: 2.1

## 2017-06-07 NOTE — PROGRESS NOTES
ANTICOAGULATION FOLLOW-UP CLINIC VISIT    Patient Name:  Cassy Avila  Date:  6/7/2017  Contact Type:  Telephone    SUBJECTIVE:     Patient Findings     Comments Implant removed yesterday.  States will have permanent one put in about 3 weeks from today. She states she was told that the doctor does not want her bridged with lovenox prior to insertion of neurotransmitter and also wants warfarin held for at least 3 days after procedure with no lovenox.  Will talk to Dr. Tai's nurse and patient provider, GABBY Hartman regarding this so we can appropriately dose patient.            OBJECTIVE    INR   Date Value Ref Range Status   06/07/2017 2.1  Final       ASSESSMENT / PLAN  INR assessment THER    Recheck INR In: 3 WEEKS    INR Location Home INR      Anticoagulation Summary as of 6/7/2017     INR goal 2.0-3.0   Today's INR 2.1   Maintenance plan 7.5 mg (5 mg x 1.5) on Mon, Wed, Fri; 5 mg (5 mg x 1) all other days   Full instructions 7.5 mg on Mon, Wed, Fri; 5 mg all other days   Weekly total 42.5 mg   No change documented Manju Escalera, RN   Plan last modified Iram Lodr, RN (7/20/2016)   Next INR check 6/28/2017   Priority INR   Target end date Indefinite    Indications   Long-term (current) use of anticoagulants [Z79.01] [Z79.01]  Pulmonary embolism and infarction (H) [I26.99]  Deep vein thrombosis (DVT) (H) [I82.409] [I82.409]         Anticoagulation Episode Summary     INR check location Home Draw    Preferred lab     Send INR reminders to  ANTICOAG POOL    Comments PST - [facility].  having neuro stimulator inserted into back around end of June she states warfarin 5 days per provider she is seeing      Anticoagulation Care Providers     Provider Role Specialty Phone number    Eliz Hartman NP Responsible [facility] [phone]            See the Encounter Report to view Anticoagulation Flowsheet and Dosing Calendar (Go to Encounters tab in chart review, and find the Anticoagulation  Therapy Visit)        Manju Escalera RN

## 2017-06-07 NOTE — MR AVS SNAPSHOT
Cassy CHIU [place]   6/7/2017   Anticoagulation Therapy Visit    Description:  61 year old female   Provider:  Eliz Hartman NP   Department:  Hc Anti Coagulation           INR as of 6/7/2017     Today's INR 2.1      Anticoagulation Summary as of 6/7/2017     INR goal 2.0-3.0   Today's INR 2.1   Full instructions 7.5 mg on Mon, Wed, Fri; 5 mg all other days   Next INR check 6/28/2017    Indications   Long-term (current) use of anticoagulants [Z79.01] [Z79.01]  Pulmonary embolism and infarction (H) [I26.99]  Deep vein thrombosis (DVT) (H) [I82.409] [I82.409]         June 2017 Details    Sun Mon Tue Wed Thu Fri Sat         1               2               3                 4               5               6               7      7.5 mg   See details      8      5 mg         9      7.5 mg         10      5 mg           11      5 mg         12      7.5 mg         13      5 mg         14      7.5 mg         15      5 mg         16      7.5 mg         17      5 mg           18      5 mg         19      7.5 mg         20      5 mg         21      7.5 mg         22      5 mg         23      7.5 mg         24      5 mg           25      5 mg         26      7.5 mg         27      5 mg         28            29               30                 Date Details   06/07 This INR check       Date of next INR:  6/28/2017         How to take your warfarin dose     To take:  5 mg Take 1 of the 5 mg tablets.    To take:  7.5 mg Take 1.5 of the 5 mg tablets.

## 2017-07-03 ENCOUNTER — TRANSFERRED RECORDS (OUTPATIENT)
Dept: HEALTH INFORMATION MANAGEMENT | Facility: HOSPITAL | Age: 62
End: 2017-07-03

## 2017-07-03 ENCOUNTER — ANTICOAGULATION THERAPY VISIT (OUTPATIENT)
Dept: ANTICOAGULATION | Facility: OTHER | Age: 62
End: 2017-07-03

## 2017-07-03 DIAGNOSIS — Z79.01 LONG-TERM (CURRENT) USE OF ANTICOAGULANTS: ICD-10-CM

## 2017-07-03 DIAGNOSIS — I26.99 PULMONARY EMBOLISM AND INFARCTION (H): ICD-10-CM

## 2017-07-03 LAB — INR PPP: 2.8

## 2017-07-03 NOTE — MR AVS SNAPSHOT
Cassy CHIU [place]   7/3/2017   Anticoagulation Therapy Visit    Description:  61 year old female   Provider:  Eliz Hartman NP   Department:  Hc Anti Coagulation           INR as of 7/3/2017     Today's INR 2.8      Anticoagulation Summary as of 7/3/2017     INR goal 2.0-3.0   Today's INR 2.8   Full instructions 7.5 mg on Mon, Wed, Fri; 5 mg all other days   Next INR check 7/10/2017    Indications   Long-term (current) use of anticoagulants [Z79.01] [Z79.01]  Pulmonary embolism and infarction (H) [I26.99]  Deep vein thrombosis (DVT) (H) [I82.409] [I82.409]         July 2017 Details    Sun Mon Tue Wed Thu Fri Sat           1                 2               3      7.5 mg   See details      4      5 mg         5      7.5 mg         6      5 mg         7      7.5 mg         8      5 mg           9      5 mg         10            11               12               13               14               15                 16               17               18               19               20               21               22                 23               24               25               26               27               28               29                 30               31                     Date Details   07/03 This INR check       Date of next INR:  7/10/2017         How to take your warfarin dose     To take:  5 mg Take 1 of the 5 mg tablets.    To take:  7.5 mg Take 1.5 of the 5 mg tablets.

## 2017-07-03 NOTE — PROGRESS NOTES
ANTICOAGULATION FOLLOW-UP CLINIC VISIT    Patient Name:  Cassy Avila  Date:  7/3/2017  Contact Type:  Telephone    SUBJECTIVE:     Patient Findings     Comments Will be having permanent neuro stimulator inserted on 7/20/17           OBJECTIVE    INR   Date Value Ref Range Status   07/03/2017 2.8  Final       ASSESSMENT / PLAN  INR assessment THER    Recheck INR In: 1 WEEK    INR Location Clinic      Anticoagulation Summary as of 7/3/2017     INR goal 2.0-3.0   Today's INR 2.8   Maintenance plan 7.5 mg (5 mg x 1.5) on Mon, Wed, Fri; 5 mg (5 mg x 1) all other days   Full instructions 7.5 mg on Mon, Wed, Fri; 5 mg all other days   Weekly total 42.5 mg   No change documented Manju Escalera RN   Plan last modified Iram Lord RN (7/20/2016)   Next INR check 7/10/2017   Priority INR   Target end date Indefinite    Indications   Long-term (current) use of anticoagulants [Z79.01] [Z79.01]  Pulmonary embolism and infarction (H) [I26.99]  Deep vein thrombosis (DVT) (H) [I82.409] [I82.409]         Anticoagulation Episode Summary     INR check location Home Draw    Preferred lab     Send INR reminders to HC ANTICOAG POOL    Comments PST - [facility].  having neuro stimulator inserted into back around end of June she states warfarin 5 days per provider she is seeing      Anticoagulation Care Providers     Provider Role Specialty Phone number    Minnie NORAH [facility] [phone]            See the Encounter Report to view Anticoagulation Flowsheet and Dosing Calendar (Go to Encounters tab in chart review, and find the Anticoagulation Therapy Visit)        Manju Escalera, RN

## 2017-07-12 ENCOUNTER — OFFICE VISIT (OUTPATIENT)
Dept: FAMILY MEDICINE | Facility: OTHER | Age: 62
End: 2017-07-12
Attending: NURSE PRACTITIONER
Payer: MEDICARE

## 2017-07-12 VITALS
SYSTOLIC BLOOD PRESSURE: 122 MMHG | HEART RATE: 56 BPM | RESPIRATION RATE: 18 BRPM | TEMPERATURE: 98.1 F | OXYGEN SATURATION: 94 % | HEIGHT: 65 IN | BODY MASS INDEX: 38.09 KG/M2 | DIASTOLIC BLOOD PRESSURE: 78 MMHG | WEIGHT: 228.6 LBS

## 2017-07-12 DIAGNOSIS — I10 ESSENTIAL HYPERTENSION: ICD-10-CM

## 2017-07-12 DIAGNOSIS — M47.16 LUMBAR SPONDYLOSIS WITH MYELOPATHY: ICD-10-CM

## 2017-07-12 DIAGNOSIS — Z01.818 PREOP GENERAL PHYSICAL EXAM: Primary | ICD-10-CM

## 2017-07-12 DIAGNOSIS — Z79.01 LONG TERM (CURRENT) USE OF ANTICOAGULANTS: ICD-10-CM

## 2017-07-12 DIAGNOSIS — M51.379 DEGENERATION OF LUMBAR OR LUMBOSACRAL INTERVERTEBRAL DISC: ICD-10-CM

## 2017-07-12 LAB
ALBUMIN SERPL-MCNC: 3.6 G/DL (ref 3.4–5)
ALBUMIN UR-MCNC: NEGATIVE MG/DL
ALP SERPL-CCNC: 53 U/L (ref 40–150)
ALT SERPL W P-5'-P-CCNC: 34 U/L (ref 0–50)
ANION GAP SERPL CALCULATED.3IONS-SCNC: 7 MMOL/L (ref 3–14)
APPEARANCE UR: CLEAR
APTT PPP: 38 SEC (ref 24–37)
AST SERPL W P-5'-P-CCNC: 19 U/L (ref 0–45)
BASOPHILS # BLD AUTO: 0.1 10E9/L (ref 0–0.2)
BASOPHILS NFR BLD AUTO: 0.9 %
BILIRUB SERPL-MCNC: 0.4 MG/DL (ref 0.2–1.3)
BILIRUB UR QL STRIP: NEGATIVE
BUN SERPL-MCNC: 12 MG/DL (ref 7–30)
CALCIUM SERPL-MCNC: 9.4 MG/DL (ref 8.5–10.1)
CHLORIDE SERPL-SCNC: 104 MMOL/L (ref 94–109)
CO2 SERPL-SCNC: 28 MMOL/L (ref 20–32)
COLOR UR AUTO: YELLOW
CREAT SERPL-MCNC: 0.91 MG/DL (ref 0.52–1.04)
DIFFERENTIAL METHOD BLD: NORMAL
EOSINOPHIL # BLD AUTO: 0.2 10E9/L (ref 0–0.7)
EOSINOPHIL NFR BLD AUTO: 2.8 %
ERYTHROCYTE [DISTWIDTH] IN BLOOD BY AUTOMATED COUNT: 14.3 % (ref 10–15)
GFR SERPL CREATININE-BSD FRML MDRD: 63 ML/MIN/1.7M2
GLUCOSE SERPL-MCNC: 92 MG/DL (ref 70–99)
GLUCOSE UR STRIP-MCNC: NEGATIVE MG/DL
HCT VFR BLD AUTO: 39.4 % (ref 35–47)
HGB BLD-MCNC: 13.9 G/DL (ref 11.7–15.7)
HGB UR QL STRIP: NEGATIVE
INR BLD: 2.8 (ref 0.86–1.14)
KETONES UR STRIP-MCNC: NEGATIVE MG/DL
LEUKOCYTE ESTERASE UR QL STRIP: ABNORMAL
LYMPHOCYTES # BLD AUTO: 1.6 10E9/L (ref 0.8–5.3)
LYMPHOCYTES NFR BLD AUTO: 27.4 %
MCH RBC QN AUTO: 30.3 PG (ref 26.5–33)
MCHC RBC AUTO-ENTMCNC: 35.3 G/DL (ref 31.5–36.5)
MCV RBC AUTO: 86 FL (ref 78–100)
MONOCYTES # BLD AUTO: 0.6 10E9/L (ref 0–1.3)
MONOCYTES NFR BLD AUTO: 10.4 %
NEUTROPHILS # BLD AUTO: 3.3 10E9/L (ref 1.6–8.3)
NEUTROPHILS NFR BLD AUTO: 58.5 %
NITRATE UR QL: NEGATIVE
NON-SQ EPI CELLS #/AREA URNS LPF: NORMAL /LPF
PH UR STRIP: 7.5 PH (ref 5–7)
PLATELET # BLD AUTO: 192 10E9/L (ref 150–450)
POTASSIUM SERPL-SCNC: 3.6 MMOL/L (ref 3.4–5.3)
PROT SERPL-MCNC: 7 G/DL (ref 6.8–8.8)
RBC # BLD AUTO: 4.58 10E12/L (ref 3.8–5.2)
RBC #/AREA URNS AUTO: NORMAL /HPF (ref 0–2)
SODIUM SERPL-SCNC: 139 MMOL/L (ref 133–144)
SP GR UR STRIP: 1.01 (ref 1–1.03)
URN SPEC COLLECT METH UR: ABNORMAL
UROBILINOGEN UR STRIP-ACNC: 0.2 EU/DL (ref 0.2–1)
WBC # BLD AUTO: 5.7 10E9/L (ref 4–11)
WBC #/AREA URNS AUTO: NORMAL /HPF (ref 0–2)

## 2017-07-12 PROCEDURE — 81001 URINALYSIS AUTO W/SCOPE: CPT | Mod: ZL | Performed by: NURSE PRACTITIONER

## 2017-07-12 PROCEDURE — 36416 COLLJ CAPILLARY BLOOD SPEC: CPT | Mod: ZL | Performed by: NURSE PRACTITIONER

## 2017-07-12 PROCEDURE — 85730 THROMBOPLASTIN TIME PARTIAL: CPT | Mod: ZL | Performed by: NURSE PRACTITIONER

## 2017-07-12 PROCEDURE — 99214 OFFICE O/P EST MOD 30 MIN: CPT | Performed by: NURSE PRACTITIONER

## 2017-07-12 PROCEDURE — 93005 ELECTROCARDIOGRAM TRACING: CPT

## 2017-07-12 PROCEDURE — 93010 ELECTROCARDIOGRAM REPORT: CPT | Performed by: INTERNAL MEDICINE

## 2017-07-12 PROCEDURE — 36415 COLL VENOUS BLD VENIPUNCTURE: CPT | Mod: ZL | Performed by: NURSE PRACTITIONER

## 2017-07-12 PROCEDURE — 85025 COMPLETE CBC W/AUTO DIFF WBC: CPT | Mod: ZL | Performed by: NURSE PRACTITIONER

## 2017-07-12 PROCEDURE — 80053 COMPREHEN METABOLIC PANEL: CPT | Mod: ZL | Performed by: NURSE PRACTITIONER

## 2017-07-12 PROCEDURE — 85610 PROTHROMBIN TIME: CPT | Mod: QW,ZL | Performed by: NURSE PRACTITIONER

## 2017-07-12 PROCEDURE — 99213 OFFICE O/P EST LOW 20 MIN: CPT | Mod: 25

## 2017-07-12 ASSESSMENT — PATIENT HEALTH QUESTIONNAIRE - PHQ9: 5. POOR APPETITE OR OVEREATING: SEVERAL DAYS

## 2017-07-12 ASSESSMENT — ANXIETY QUESTIONNAIRES
5. BEING SO RESTLESS THAT IT IS HARD TO SIT STILL: NOT AT ALL
GAD7 TOTAL SCORE: 1
3. WORRYING TOO MUCH ABOUT DIFFERENT THINGS: NOT AT ALL
1. FEELING NERVOUS, ANXIOUS, OR ON EDGE: NOT AT ALL
7. FEELING AFRAID AS IF SOMETHING AWFUL MIGHT HAPPEN: NOT AT ALL
6. BECOMING EASILY ANNOYED OR IRRITABLE: NOT AT ALL
IF YOU CHECKED OFF ANY PROBLEMS ON THIS QUESTIONNAIRE, HOW DIFFICULT HAVE THESE PROBLEMS MADE IT FOR YOU TO DO YOUR WORK, TAKE CARE OF THINGS AT HOME, OR GET ALONG WITH OTHER PEOPLE: NOT DIFFICULT AT ALL
2. NOT BEING ABLE TO STOP OR CONTROL WORRYING: NOT AT ALL

## 2017-07-12 ASSESSMENT — PAIN SCALES - GENERAL: PAINLEVEL: EXTREME PAIN (8)

## 2017-07-12 NOTE — PROGRESS NOTES
[facility]  [address]  [facility] [address]  [phone]  Dept: [phone]    PRE-OP EVALUATION:  Today's date: 2017    Cassy Avila (: 1955) presents for pre-operative evaluation assessment as requested by Dr. Tai.  She requires evaluation and anesthesia risk assessment prior to undergoing surgery/procedure for treatment of back pain .  Proposed procedure: Stimulator implant of spine.    Date of Surgery/ Procedure: 17  Time of Surgery/ Procedure: 0945  Hospital/Surgical Facility: [place], [place]    Primary Physician: Eliz Hartman  Type of Anesthesia Anticipated: to be determined    Patient has a Health Care Directive or Living Will:  NO    1. NO - Do you have a history of heart attack, stroke, stent, bypass or surgery on an artery in the head, neck, heart or legs?  2. NO - Do you ever have any pain or discomfort in your chest?  3. NO - Do you have a history of  Heart Failure?  4. NO - Are you troubled by shortness of breath when: walking on the level, up a slight hill or at night?  5. NO - Do you currently have a cold, bronchitis or other respiratory infection?  6. yes - Do you have a cough, shortness of breath or wheezing?  7. NO - Do you sometimes get pains in the calves of your legs when you walk?  8. yes - Do you or anyone in your family have previous history of blood clots? On coumadin, has been working with coumadin clinic for stop and start.  She is not a candidate for bridging with lovenox due to spinal surgery.    9. NO - Do you or does anyone in your family have a serious bleeding problem such as prolonged bleeding following surgeries or cuts?  10. NO - Have you ever had problems with anemia or been told to take iron pills?  11. NO - Have you had any abnormal blood loss such as black, tarry or bloody stools, or abnormal vaginal bleeding?  12. NO - Have you ever had a blood transfusion?  13. NO - Have you or any of your relatives  ever had problems with anesthesia?  14. yes - Do you have sleep apnea, excessive snoring or daytime drowsiness?  15. NO - Do you have any prosthetic heart valves?  16. YES  - Do you have prosthetic joints? Left shoulder and left knee  17. NO - Is there any chance that you may be pregnant?      HPI:                                                      Brief HPI related to upcoming procedure: Longstanding back issues.  She has had several MRI studies which indicated lumbar spondylosis, DDD.  She notes radicular pain down right leg.  She has had injections in lumbar area and SI joints, no relief.  She had a trial of pain stimulator which was successful.  The decision has been made to proceed with implantable device as consertative and surgical measures have been unsuccessful.        HYPERTENSION - Patient has longstanding history of mod-severe HTN , currently denies any symptoms referable to elevated blood pressure. Specifically denies chest pain, palpitations, dyspnea, orthopnea, PND or peripheral edema. Blood pressure readings have not been in normal range. Current medication regimen is as listed below. Patient denies any side effects of medication.                     BP Readings from Last 6 Encounters:   07/12/17 122/78   03/20/17 122/78   03/15/17 130/56   02/06/17 118/74   01/06/17 118/76   01/04/17 145/79                                                                                                                                                                            .  DEPRESSION - Patient has a long history of Depression of moderate severity requiring medication for control with recent symptoms being stable..Current symptoms of depression include none.                             She is on coumadin for DVT - she will stop as arranged with coumadin clinic due to upcoming procedure - she will restart the day following her procedure.                                                                                                                                                              .    MEDICAL HISTORY:                                                      Patient Active Problem List    Diagnosis Date Noted     Lumbar spondylosis with myelopathy 07/12/2017     Priority: Medium     Degeneration of lumbar or lumbosacral intervertebral disc 07/12/2017     Priority: Medium     Community acquired pneumonia 03/14/2017     Priority: Medium     Long-term (current) use of anticoagulants [Z79.01] 07/20/2016     Priority: Medium     Deep vein thrombosis (DVT) (H) [I82.409] 07/20/2016     Priority: Medium     Anticoagulated 04/29/2015     Priority: Medium     Major depression 08/08/2014     Priority: Medium     Total knee replacement status 06/20/2014     Priority: Medium     Advanced care planning/counseling discussion 03/12/2013     Priority: Medium     Pt has information at home , not completed       Neurofibromatosis (H) 08/22/2012     Priority: Medium     Problem list name updated by automated process. Provider to review       Contact dermatitis and other eczema, due to unspecified cause 06/01/2004     Priority: Medium     Pulmonary embolism and infarction (H) 04/11/2003     Priority: Medium     Problem list name updated by automated process. Provider to review       Hyperlipidemia 07/11/2002     Priority: Medium     Problem list name updated by automated process. Provider to review       Edema 01/18/2002     Priority: Medium     Migraine 03/05/2001     Priority: Medium     Problem list name updated by automated process. Provider to review       Essential hypertension 02/20/2001     Priority: Medium     Problem list name updated by automated process. 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, congenital     Contact  dermatitis and other eczema, due to unspecified cause 2004     Coughing      Edema 2002     Factor V Leiden (H)      Gastro-oesophageal reflux disease      Herpes zoster without mention of complication     resolved from problem list     Long term (current) use of anticoagulants 2003     Major depression 2014     Mammographic microcalcification     resolved from problem list     Migraine, unspecified, without mention of intractable migraine without mention of status migrainosus 3/5/2001     Neurofibromatosis (H) 2012     Other and unspecified hyperlipidemia 2002     Other chronic pain      Other pulmonary embolism and infarction 2003     Tachycardia, unspecified 2001     Thrombosis of leg      Unspecified essential hypertension 2001     Past Surgical History:   Procedure Laterality Date     ------------OTHER-------------      shoulder replacement; Provider: Karen     ARTHROPLASTY KNEE  2014    Procedure: ARTHROPLASTY KNEE;  Surgeon: Sean Alexander MD;  Location: [facility] OR     ARTHROSCOPY SHOULDER  2012    right, bone spurs      SECTION      x3     CHOLECYSTECTOMY       elbow ulnar tunnel release       ENDOSCOPIC SINUS SURGERY, SEPTOPLASTY, TURBINOPLASTY, MAXILLARY SINUSOTOMY, COMBINED N/A 2015    Procedure: COMBINED ENDOSCOPIC SINUS SURGERY, SEPTOPLASTY, TURBINOPLASTY, MAXILLARY SINUSOTOMY;  Surgeon: Seema Conn MD;  Location: [facility] OR     esophagastroduodenoscopy      with biopsy and endoscopic U/S     EXCISE NEUROMA LOWER EXTREMITY Left 2016    Procedure: EXCISE NEUROMA LOWER EXTREMITY;  Surgeon: Edi Reed MD;  Location: [facility] OR     FUSION LUMBAR ANTERIOR WITH MARCY CAGES      L5-S1     ORTHOPEDIC SURGERY  2-15    right shoulder     ORTHOPEDIC SURGERY  8/28/15    right knee     pionidal cyst excision       SARAHI/BSO       TRANSPOSITION ULNAR NERVE (ELBOW)       Current Outpatient Prescriptions   Medication Sig Dispense  Refill     hydrochlorothiazide (HYDRODIURIL) 25 MG tablet Take 1 tablet (25 mg) by mouth daily 90 tablet 2     oxyCODONE (ROXICODONE) 5 MG IR tablet Take 1 tablet (5 mg) by mouth every 4 hours as needed for severe pain 10 tablet 0     acetaminophen (TYLENOL) 325 MG tablet Take 3 tablets (975 mg) by mouth every 4 hours as needed for mild pain or fever 100 tablet      warfarin (COUMADIN) 5 MG tablet Take 1/2 tab tonight, then TAKE 1 AND ONE-HALF TABLET BY MOUTH ON MONDAY WEDNESDAY AND FRIDAYS AND 1 TABLET ON ALL OTHER DAYS OF THE WEEK or as directed 135 tablet 0     escitalopram (LEXAPRO) 20 MG tablet Take 1 tablet (20 mg) by mouth daily 90 tablet 1     losartan (COZAAR) 50 MG tablet TAKE 2 TABLETS BY MOUTH EVERY  tablet 2     atenolol (TENORMIN) 50 MG tablet TAKE 1 AND ONE-HALF TABLET BY MOUTH DAILY (Patient taking differently: TAKE 1 AND ONE-HALF TABLET BY MOUTH DAILY in AM) 135 tablet 2     Cholecalciferol (VITAMIN D3 PO) Take 3,000 mg by mouth daily AM       omega 3 1000 MG CAPS Take 3 g by mouth daily  90 capsule      aspirin 81 MG EC tablet Take 81 mg by mouth daily HS       [DISCONTINUED] atenolol (TENORMIN) 50 MG tablet TAKE 1 AND ONE-HALF TABLETS BY MOUTH EVERY  tablet 1     OTC products: None, except as noted above    Allergies   Allergen Reactions     Ace Inhibitors Cough     Albuterol Sulfate Hives     DuoNeb     Amlodipine Besylate Swelling     Norvasc     Amoxicillin      Cephalexin Monohydrate Hives     Keflex     Erythromycin Base [Kdc:Yellow Dye+Erythromycin+Brilliant Blue Fcf] Nausea and Vomiting     Ipratropium Bromide Hives     DuoNeb     Meloxicam Other (See Comments)     Mobic - confusion, depression     Adhesive Tape Rash     Prochlorperazine Edisylate Swelling and Rash     Compazine     Prochlorperazine Maleate Swelling and Rash      Latex Allergy: NO    Social History   Substance Use Topics     Smoking status: Former Smoker     Packs/day: 0.50     Years: 30.00     Types:  "Cigarettes, Pipe     Smokeless tobacco: Never Used      Comment: quit in 1999     Alcohol use No     History   Drug Use No       REVIEW OF SYSTEMS:                                                    C: NEGATIVE for fever, chills, change in weight  I: NEGATIVE for worrisome rashes, moles or lesions  E: NEGATIVE for vision changes or irritation  E/M: NEGATIVE for ear, mouth and throat problems  R: NEGATIVE for significant cough or SOB  CV: NEGATIVE for chest pain, palpitations or peripheral edema  GI: NEGATIVE for nausea, abdominal pain, heartburn, or change in bowel habits  : NEGATIVE for frequency, dysuria, or hematuria  M: POSITIVE:  Low back pain, chronic  N: NEGATIVE for weakness, dizziness or paresthesias  E: NEGATIVE for temperature intolerance, skin/hair changes  H: NEGATIVE for bleeding problems  P: NEGATIVE for changes in mood or affect    EXAM:                                                    /78 (BP Location: Left arm, Patient Position: Chair, Cuff Size: Adult Large)  Pulse 56  Temp 98.1  F (36.7  C) (Tympanic)  Resp 18  Ht 5' 5\" (1.651 m)  Wt 228 lb 9.6 oz (103.7 kg)  SpO2 94%  BMI 38.04 kg/m2    GENERAL APPEARANCE: healthy, alert and no distress     HENT: ear canals and TM's normal and nose and mouth without ulcers or lesions     NECK: no adenopathy, no asymmetry, masses, or scars and thyroid normal to palpation     RESP: lungs clear to auscultation - no rales, rhonchi or wheezes     CV: regular rates and rhythm, normal S1 S2, no S3 or S4 and no murmur, click or rub     ABDOMEN:  soft, nontender, no HSM or masses and bowel sounds normal     MS: extremities normal- no gross deformities noted, no evidence of inflammation in joints, FROM in all extremities.     SKIN: no suspicious lesions or rashes     NEURO: Normal strength and tone, sensory exam grossly normal, mentation intact and speech normal     PSYCH: mentation appears normal. and affect normal/bright    DIAGNOSTICS:                  "                                     Results for orders placed or performed in visit on 07/12/17   CBC with platelets and differential   Result Value Ref Range    WBC 5.7 4.0 - 11.0 10e9/L    RBC Count 4.58 3.8 - 5.2 10e12/L    Hemoglobin 13.9 11.7 - 15.7 g/dL    Hematocrit 39.4 35.0 - 47.0 %    MCV 86 78 - 100 fl    MCH 30.3 26.5 - 33.0 pg    MCHC 35.3 31.5 - 36.5 g/dL    RDW 14.3 10.0 - 15.0 %    Platelet Count 192 150 - 450 10e9/L    Diff Method Automated Method     % Neutrophils 58.5 %    % Lymphocytes 27.4 %    % Monocytes 10.4 %    % Eosinophils 2.8 %    % Basophils 0.9 %    Absolute Neutrophil 3.3 1.6 - 8.3 10e9/L    Absolute Lymphocytes 1.6 0.8 - 5.3 10e9/L    Absolute Monocytes 0.6 0.0 - 1.3 10e9/L    Absolute Eosinophils 0.2 0.0 - 0.7 10e9/L    Absolute Basophils 0.1 0.0 - 0.2 10e9/L   *[facility]scopic [facility]   Result Value Ref Range    Color Urine Yellow     Appearance Urine Clear     Glucose Urine Negative NEG mg/dL    Bilirubin Urine Negative NEG    Ketones Urine Negative NEG mg/dL    Specific Gravity Urine 1.015 1.003 - 1.035    Blood Urine Negative NEG    pH Urine 7.5 (H) 5.0 - 7.0 pH    Protein Albumin Urine Negative NEG mg/dL    Urobilinogen Urine 0.2 0.2 - 1.0 EU/dL    Nitrite Urine Negative NEG    Leukocyte Esterase Urine Small (A) NEG    Source Midstream Urine    Comprehensive metabolic panel   Result Value Ref Range    Sodium 139 133 - 144 mmol/L    Potassium 3.6 3.4 - 5.3 mmol/L    Chloride 104 94 - 109 mmol/L    Carbon Dioxide 28 20 - 32 mmol/L    Anion Gap 7 3 - 14 mmol/L    Glucose 92 70 - 99 mg/dL    Urea Nitrogen 12 7 - 30 mg/dL    Creatinine 0.91 0.52 - 1.04 mg/dL    GFR Estimate 63 >60 mL/min/1.7m2    GFR Estimate If Black 76 >60 mL/min/1.7m2    Calcium 9.4 8.5 - 10.1 mg/dL    Bilirubin Total 0.4 0.2 - 1.3 mg/dL    Albumin 3.6 3.4 - 5.0 g/dL    Protein Total 7.0 6.8 - 8.8 g/dL    Alkaline Phosphatase 53 40 - 150 U/L    ALT 34 0 - 50 U/L    AST 19 0 - 45  U/L   INR point of care   Result Value Ref Range    INR Point of Care 2.8 (H) 0.86 - 1.14   Partial thromboplastin time   Result Value Ref Range    PTT 38 (H) 24.00 - 37.00 sec   Urine Microscopic   Result Value Ref Range    WBC Urine O - 2 0 - 2 /HPF    RBC Urine O - 2 0 - 2 /HPF    Squamous Epithelial /LPF Urine Few FEW /LPF     EKG reviewed with Dr. Pittman, no acute ST changes noted.     Recent Labs   Lab Test 07/03/17 06/07/17 03/14/17 2028   01/04/17   1459   11/05/13   1528   HGB   --    --    --   13.2   --   13.9   < >   --    PLT   --    --    --   192   --   215   < >   --    INR  2.8  2.1   < >  2.20*   < >   --    < >   --    NA   --    --    --   137   --   141   < >   --    POTASSIUM   --    --    --   3.6   --   3.5   < >   --    CR   --    --    --   0.97   --   1.07*   < >   --    A1C   --    --    --    --    --    --    --   5.5    < > = values in this interval not displayed.        IMPRESSION:                                                    Reason for surgery/procedure: longstanding low back pain  Diagnosis/reason for consult: anesthesia risk assessment    The proposed surgical procedure is considered INTERMEDIATE risk.    REVISED CARDIAC RISK INDEX  The patient has the following serious cardiovascular risks for perioperative complications such as (MI, PE, VFib and 3  AV Block):  No serious cardiac risks  INTERPRETATION: 0 risks: Class I (very low risk - 0.4% complication rate)    The patient has the following additional risks for perioperative complications:    History of DVT and PE, coumadin therapy that will be stopped due to upcoming procedure as directed by coumadin clinic.       ICD-10-CM    1. Preop general physical exam Z01.818 CBC with platelets and differential     *UA reflex to Microscopic and Culture - [facility]     Comprehensive metabolic panel     EKG 12-lead complete w/read - (Clinic Performed)     Partial thromboplastin time   2. Lumbar spondylosis with  myelopathy M47.16    3. Degeneration of lumbar or lumbosacral intervertebral disc M51.37    4. Essential hypertension I10    5. Long term (current) use of anticoagulants Z79.01 INR point of care     Partial thromboplastin time       RECOMMENDATIONS:                                                        Cardiovascular Risk  Performs 4 METs exercise without symptoms (Climb a flight of stairs and Walk on level ground at 15 minutes per mile (4 miles/hour)) .         APPROVAL GIVEN to proceed with proposed procedure, without further diagnostic evaluation       Signed Electronically by: Maegan Sharma NP    Copy of this evaluation report is provided to requesting physician.    [facility] Preop Guidelines

## 2017-07-13 ENCOUNTER — ANTICOAGULATION THERAPY VISIT (OUTPATIENT)
Dept: ANTICOAGULATION | Facility: OTHER | Age: 62
End: 2017-07-13

## 2017-07-13 DIAGNOSIS — Z79.01 LONG-TERM (CURRENT) USE OF ANTICOAGULANTS: ICD-10-CM

## 2017-07-13 DIAGNOSIS — I26.99 PULMONARY EMBOLISM AND INFARCTION (H): ICD-10-CM

## 2017-07-13 ASSESSMENT — ANXIETY QUESTIONNAIRES: GAD7 TOTAL SCORE: 1

## 2017-07-13 ASSESSMENT — PATIENT HEALTH QUESTIONNAIRE - PHQ9: SUM OF ALL RESPONSES TO PHQ QUESTIONS 1-9: 3

## 2017-07-13 NOTE — MR AVS SNAPSHOT
Cassy CHIU [place]   7/13/2017   Anticoagulation Therapy Visit    Description:  61 year old female   Provider:  Eliz Hartman NP   Department:  [facility] Anti Coagulation           INR as of 7/13/2017     Today's INR 2.8 (7/12/2017)      Anticoagulation Summary as of 7/13/2017     INR goal 2.0-3.0   Today's INR 2.8 (7/12/2017)   Full instructions 7/17: Hold; 7/18: Hold; 7/19: Hold; 7/20: Hold; 7/21: 10 mg; 7/22: 10 mg; Otherwise 7.5 mg on Mon, Wed, Fri; 5 mg all other days   Next INR check 7/25/2017    Indications   Long-term (current) use of anticoagulants [Z79.01] [Z79.01]  Pulmonary embolism and infarction (H) [I26.99]  Deep vein thrombosis (DVT) (H) [I82.409] [I82.409]         July 2017 Details    Sun Mon Tue Wed Thu Fri Sat           1                 2               3               4               5               6               7               8                 9               10               11               12               13      5 mg   See details      14      7.5 mg         15      5 mg           16      5 mg         17      Hold         18      Hold         19      Hold         20      Hold         21      10 mg         22      10 mg           23      5 mg         24      7.5 mg         25            26               27               28               29                 30               31                     Date Details   07/13 This INR check       Date of next INR:  7/25/2017         How to take your warfarin dose     To take:  5 mg Take 1 of the 5 mg tablets.    To take:  7.5 mg Take 1.5 of the 5 mg tablets.    To take:  10 mg Take 2 of the 5 mg tablets.    Hold Do not take your warfarin dose. See the Details table to the right for additional instructions.

## 2017-07-13 NOTE — PROGRESS NOTES
ANTICOAGULATION FOLLOW-UP CLINIC VISIT    Patient Name:  Cassy Avila  Date:  7/13/2017  Contact Type:  Telephone    SUBJECTIVE:     Patient Findings     Comments Procedure on 7/20/17  Per patient she is not to be bridged with lovenox while off warfarin.  Her surgeon and OLLIE Batres both are aware of this and have approved per patient as she had pre op eval on 7/12/17.             OBJECTIVE    INR Point of Care   Date Value Ref Range Status   07/12/2017 2.8 (H) 0.86 - 1.14 Final     Comment:     This test is intended for monitoring Coumadin therapy.  Results are not   accurate   in patients with prolonged INR due to factor deficiency.         ASSESSMENT / PLAN  INR assessment THER    Recheck INR In: 2 WEEKS    INR Location Home INR      Anticoagulation Summary as of 7/13/2017     INR goal 2.0-3.0   Today's INR 2.8 (7/12/2017)   Maintenance plan 7.5 mg (5 mg x 1.5) on Mon, Wed, Fri; 5 mg (5 mg x 1) all other days   Full instructions 7/17: Hold; 7/18: Hold; 7/19: Hold; 7/20: Hold; 7/21: 10 mg; 7/22: 10 mg; Otherwise 7.5 mg on Mon, Wed, Fri; 5 mg all other days   Weekly total 42.5 mg   Plan last modified Iram Lord RN (7/20/2016)   Next INR check 7/25/2017   Priority INR   Target end date Indefinite    Indications   Long-term (current) use of anticoagulants [Z79.01] [Z79.01]  Pulmonary embolism and infarction (H) [I26.99]  Deep vein thrombosis (DVT) (H) [I82.409] [I82.409]         Anticoagulation Episode Summary     INR check location Home Draw    Preferred lab     Send INR reminders to [facility] ANTICOAG POOL    Comments PST - [facility].  having neuro stimulator inserted into back around end of June she states warfarin 5 days per provider she is seeing      Anticoagulation Care Providers     Provider Role Specialty Phone number    Eliz Hartman NP [facility] [phone]            See the Encounter Report to view Anticoagulation Flowsheet and Dosing Calendar (Go to Encounters tab in  chart review, and find the Anticoagulation Therapy Visit)        Manju Escalera RN

## 2017-07-17 ENCOUNTER — TELEPHONE (OUTPATIENT)
Dept: FAMILY MEDICINE | Facility: OTHER | Age: 62
End: 2017-07-17

## 2017-07-17 NOTE — TELEPHONE ENCOUNTER
Faxed PreOp 7/12/17, Labs, Demo, Med List and EKG to: Dr. Rojas [facility]  Ph. [phone] Fx. [phone] for Procedure 7/20/17

## 2017-07-17 NOTE — PROGRESS NOTES
Faxed PreOp 7/12/17, Labs, Demo, Med List and EKG to: Dr. Rojas [facility]  Ph. [phone]  Fx. [phone] for Procedure 7/20/17

## 2017-07-25 ENCOUNTER — ANTICOAGULATION THERAPY VISIT (OUTPATIENT)
Dept: ANTICOAGULATION | Facility: OTHER | Age: 62
End: 2017-07-25

## 2017-07-25 ENCOUNTER — TRANSFERRED RECORDS (OUTPATIENT)
Dept: HEALTH INFORMATION MANAGEMENT | Facility: HOSPITAL | Age: 62
End: 2017-07-25

## 2017-07-25 DIAGNOSIS — Z79.01 LONG-TERM (CURRENT) USE OF ANTICOAGULANTS: ICD-10-CM

## 2017-07-25 DIAGNOSIS — I26.99 PULMONARY EMBOLISM AND INFARCTION (H): ICD-10-CM

## 2017-07-25 LAB — INR PPP: 1.8

## 2017-07-25 RX ORDER — WARFARIN SODIUM 5 MG/1
TABLET ORAL
Qty: 150 TABLET | Refills: 3 | Status: SHIPPED | OUTPATIENT
Start: 2017-07-25 | End: 2018-08-08

## 2017-07-25 RX ORDER — CYANOCOBALAMIN (VITAMIN B-12) 1000 MCG
1 TABLET, SUBLINGUAL SUBLINGUAL EVERY OTHER DAY
COMMUNITY
End: 2018-01-02

## 2017-07-25 NOTE — PROGRESS NOTES
ANTICOAGULATION FOLLOW-UP CLINIC VISIT    Patient Name:  Cassy Avila  Date:  7/25/2017  Contact Type:  Telephone    SUBJECTIVE:     Patient Findings     Positives Other complaints    Comments Had neurostimulator put in her back last week. She states she is having no pain at all           OBJECTIVE    INR   Date Value Ref Range Status   07/25/2017 1.8  Final       ASSESSMENT / PLAN  INR assessment SUB    Recheck INR In: 4 WEEKS    INR Location Home INR      Anticoagulation Summary as of 7/25/2017     INR goal 2.0-3.0   Today's INR 1.8!   Maintenance plan 7.5 mg (5 mg x 1.5) on Mon, Wed, Fri; 5 mg (5 mg x 1) all other days   Full instructions 7/25: 7.5 mg; Otherwise 7.5 mg on Mon, Wed, Fri; 5 mg all other days   Weekly total 42.5 mg   Plan last modified Iram Lord RN (7/20/2016)   Next INR check 8/22/2017   Priority INR   Target end date Indefinite    Indications   Long-term (current) use of anticoagulants [Z79.01] [Z79.01]  Pulmonary embolism and infarction (H) [I26.99]  Deep vein thrombosis (DVT) (H) [I82.409] [I82.409]         Anticoagulation Episode Summary     INR check location Home Draw    Preferred lab     Send INR reminders to [facility] ANTICOAG POOL    Comments PST - [facility].  having neuro stimulator inserted into back around end of June she states warfarin 5 days per provider she is seeing      Anticoagulation Care Providers     Provider Role Specialty Phone number    Eliz Hartman, [facility] [phone]            See the Encounter Report to view Anticoagulation Flowsheet and Dosing Calendar (Go to Encounters tab in chart review, and find the Anticoagulation Therapy Visit)        Manju Escalera, RN

## 2017-07-25 NOTE — MR AVS SNAPSHOT
Cassy APPLE Avila   7/25/2017   Anticoagulation Therapy Visit    Description:  61 year old female   Provider:  Eliz Hartman NP   Department:  [facility] Anti Coagulation           INR as of 7/25/2017     Today's INR 1.8!      Anticoagulation Summary as of 7/25/2017     INR goal 2.0-3.0   Today's INR 1.8!   Full instructions 7/25: 7.5 mg; Otherwise 7.5 mg on Mon, Wed, Fri; 5 mg all other days   Next INR check 8/22/2017    Indications   Long-term (current) use of anticoagulants [Z79.01] [Z79.01]  Pulmonary embolism and infarction (H) [I26.99]  Deep vein thrombosis (DVT) (H) [I82.409] [I82.409]         July 2017 Details    Sun Mon Tue Wed Thu Fri Sat           1                 2               3               4               5               6               7               8                 9               10               11               12               13               14               15                 16               17               18               19               20               21               22                 23               24               25      7.5 mg   See details      26      7.5 mg         27      5 mg         28      7.5 mg         29      5 mg           30      5 mg         31      7.5 mg               Date Details   07/25 This INR check               How to take your warfarin dose     To take:  5 mg Take 1 of the 5 mg tablets.    To take:  7.5 mg Take 1.5 of the 5 mg tablets.           August 2017 Details    Sun Mon Tue Wed Thu Fri Sat       1      5 mg         2      7.5 mg         3      5 mg         4      7.5 mg         5      5 mg           6      5 mg         7      7.5 mg         8      5 mg         9      7.5 mg         10      5 mg         11      7.5 mg         12      5 mg           13      5 mg         14      7.5 mg         15      5 mg         16      7.5 mg         17      5 mg         18      7.5 mg         19      5 mg           20      5 mg         21      7.5 mg         22             23               24               25               26                 27               28               29               30               31                  Date Details   No additional details    Date of next INR:  8/22/2017         How to take your warfarin dose     To take:  5 mg Take 1 of the 5 mg tablets.    To take:  7.5 mg Take 1.5 of the 5 mg tablets.

## 2017-08-08 DIAGNOSIS — F33.9 EPISODE OF RECURRENT MAJOR DEPRESSIVE DISORDER, UNSPECIFIED DEPRESSION EPISODE SEVERITY (H): ICD-10-CM

## 2017-08-08 RX ORDER — ESCITALOPRAM OXALATE 20 MG/1
20 TABLET ORAL DAILY
Qty: 90 TABLET | Refills: 1 | Status: SHIPPED | OUTPATIENT
Start: 2017-08-08 | End: 2017-10-06

## 2017-08-08 NOTE — TELEPHONE ENCOUNTER
Lexapro      Last Written Prescription Date: 2/1/2017  Last Fill Quantity: 90, # refills: 1  Last Office Visit with G primary care provider:  7/12/2017        Last PHQ-9 score on record=   PHQ-9 SCORE 7/12/2017   Total Score -   Total Score 3

## 2017-08-21 ENCOUNTER — TELEPHONE (OUTPATIENT)
Dept: FAMILY MEDICINE | Facility: OTHER | Age: 62
End: 2017-08-21

## 2017-08-21 DIAGNOSIS — E66.01 MORBID OBESITY, UNSPECIFIED OBESITY TYPE (H): Primary | ICD-10-CM

## 2017-08-21 NOTE — TELEPHONE ENCOUNTER
12:42 PM    Reason for Call: Phone Call    Description: Kaitlin would like either Eliz Hartman or Randall Batres to do a referral to Dr. Ortiz at [facility] for Weight loss. Please call Kaitlin to advise    Was an appointment offered for this call? No      Preferred method for responding to this message: [phone]    If we cannot reach you directly, may we leave a detailed response at the number you provided?  Yes    Manju Grier

## 2017-08-22 ENCOUNTER — TRANSFERRED RECORDS (OUTPATIENT)
Dept: HEALTH INFORMATION MANAGEMENT | Facility: HOSPITAL | Age: 62
End: 2017-08-22

## 2017-08-22 ENCOUNTER — ANTICOAGULATION THERAPY VISIT (OUTPATIENT)
Dept: ANTICOAGULATION | Facility: OTHER | Age: 62
End: 2017-08-22

## 2017-08-22 DIAGNOSIS — I10 ESSENTIAL HYPERTENSION: Primary | ICD-10-CM

## 2017-08-22 DIAGNOSIS — Z79.01 LONG-TERM (CURRENT) USE OF ANTICOAGULANTS: ICD-10-CM

## 2017-08-22 DIAGNOSIS — I26.99 PULMONARY EMBOLISM AND INFARCTION (H): ICD-10-CM

## 2017-08-22 DIAGNOSIS — I10 ESSENTIAL HYPERTENSION: ICD-10-CM

## 2017-08-22 LAB — INR PPP: 3

## 2017-08-22 RX ORDER — METOPROLOL SUCCINATE 50 MG/1
75 TABLET, EXTENDED RELEASE ORAL DAILY
Qty: 90 TABLET | Refills: 1 | Status: SHIPPED | OUTPATIENT
Start: 2017-08-22 | End: 2017-08-22

## 2017-08-22 RX ORDER — METOPROLOL SUCCINATE 50 MG/1
TABLET, EXTENDED RELEASE ORAL
Qty: 135 TABLET | Refills: 2 | Status: SHIPPED | OUTPATIENT
Start: 2017-08-22 | End: 2018-08-14

## 2017-08-22 NOTE — PROGRESS NOTES
ANTICOAGULATION FOLLOW-UP CLINIC VISIT    Patient Name:  Cassy Avila  Date:  8/22/2017  Contact Type:  Telephone/ message left on home phone    SUBJECTIVE:     Patient Findings     Comments Call to patient home phone and message left re: receipt of PSt result, warfarin dosing and PST recheck date. She is to notify warfarin clinic if any bleeding/bruising, changes in diet/meds/activity or questions. Also requested that she increase vit K intake a little bit.            OBJECTIVE    INR   Date Value Ref Range Status   08/22/2017 3.0  Final       ASSESSMENT / PLAN  INR assessment THER    Recheck INR In: 4 WEEKS    INR Location Home INR      Anticoagulation Summary as of 8/22/2017     INR goal 2.0-3.0   Today's INR 3.0   Maintenance plan 7.5 mg (5 mg x 1.5) on Mon, Wed, Fri; 5 mg (5 mg x 1) all other days   Full instructions 7.5 mg on Mon, Wed, Fri; 5 mg all other days   Weekly total 42.5 mg   No change documented Manju Escalera RN   Plan last modified Iram Lord RN (7/20/2016)   Next INR check 9/19/2017   Priority INR   Target end date Indefinite    Indications   Long-term (current) use of anticoagulants [Z79.01] [Z79.01]  Pulmonary embolism and infarction (H) [I26.99]  Deep vein thrombosis (DVT) (H) [I82.409] [I82.409]         Anticoagulation Episode Summary     INR check location Home Draw    Preferred lab     Send INR reminders to HC ANTICOAG POOL    Comments PST - [facility].  having neuro stimulator inserted into back around end of June she states warfarin 5 days per provider she is seeing      Anticoagulation Care Providers     Provider Role Specialty Phone number    Flmadalyn NORAH [facility] [phone]            See the Encounter Report to view Anticoagulation Flowsheet and Dosing Calendar (Go to Encounters tab in chart review, and find the Anticoagulation Therapy Visit)        Manju Escalera, RN

## 2017-08-22 NOTE — MR AVS SNAPSHOT
Cassy CHIU Austin   8/22/2017   Anticoagulation Therapy Visit    Description:  61 year old female   Provider:  Eliz Hartman NP   Department:  Hc Anti Coagulation           INR as of 8/22/2017     Today's INR 3.0      Anticoagulation Summary as of 8/22/2017     INR goal 2.0-3.0   Today's INR 3.0   Full instructions 7.5 mg on Mon, Wed, Fri; 5 mg all other days   Next INR check 9/19/2017    Indications   Long-term (current) use of anticoagulants [Z79.01] [Z79.01]  Pulmonary embolism and infarction (H) [I26.99]  Deep vein thrombosis (DVT) (H) [I82.409] [I82.409]         August 2017 Details    Sun Mon Tue Wed Thu Fri Sat       1               2               3               4               5                 6               7               8               9               10               11               12                 13               14               15               16               17               18               19                 20               21               22      5 mg   See details      23      7.5 mg         24      5 mg         25      7.5 mg         26      5 mg           27      5 mg         28      7.5 mg         29      5 mg         30      7.5 mg         31      5 mg            Date Details   08/22 This INR check               How to take your warfarin dose     To take:  5 mg Take 1 of the 5 mg tablets.    To take:  7.5 mg Take 1.5 of the 5 mg tablets.           September 2017 Details    Sun Mon Tue Wed Thu Fri Sat          1      7.5 mg         2      5 mg           3      5 mg         4      7.5 mg         5      5 mg         6      7.5 mg         7      5 mg         8      7.5 mg         9      5 mg           10      5 mg         11      7.5 mg         12      5 mg         13      7.5 mg         14      5 mg         15      7.5 mg         16      5 mg           17      5 mg         18      7.5 mg         19            20               21               22               23                 24                25               26               27               28               29               30                Date Details   No additional details    Date of next INR:  9/19/2017         How to take your warfarin dose     To take:  5 mg Take 1 of the 5 mg tablets.    To take:  7.5 mg Take 1.5 of the 5 mg tablets.

## 2017-09-19 ENCOUNTER — ANTICOAGULATION THERAPY VISIT (OUTPATIENT)
Dept: ANTICOAGULATION | Facility: OTHER | Age: 62
End: 2017-09-19

## 2017-09-19 ENCOUNTER — TRANSFERRED RECORDS (OUTPATIENT)
Dept: HEALTH INFORMATION MANAGEMENT | Facility: HOSPITAL | Age: 62
End: 2017-09-19

## 2017-09-19 DIAGNOSIS — I26.99 PULMONARY EMBOLISM AND INFARCTION (H): ICD-10-CM

## 2017-09-19 DIAGNOSIS — Z79.01 LONG-TERM (CURRENT) USE OF ANTICOAGULANTS: ICD-10-CM

## 2017-09-19 LAB — INR POINT OF CARE: 2.1 (ref 0.86–1.14)

## 2017-09-19 NOTE — MR AVS SNAPSHOT
Cassy CHIU Austin   9/19/2017   Anticoagulation Therapy Visit    Description:  61 year old female   Provider:  Eliz Hartman NP   Department:  Hc Anti Coagulation           INR as of 9/19/2017     Today's INR 2.1      Anticoagulation Summary as of 9/19/2017     INR goal 2.0-3.0   Today's INR 2.1   Full instructions 7.5 mg on Mon, Wed, Fri; 5 mg all other days   Next INR check 10/17/2017    Indications   Long-term (current) use of anticoagulants [Z79.01] [Z79.01]  Pulmonary embolism and infarction (H) [I26.99]  Deep vein thrombosis (DVT) (H) [I82.409] [I82.409]         September 2017 Details    Sun Mon Tue Wed Thu Fri Sat          1               2                 3               4               5               6               7               8               9                 10               11               12               13               14               15               16                 17               18               19      5 mg   See details      20      7.5 mg         21      5 mg         22      7.5 mg         23      5 mg           24      5 mg         25      7.5 mg         26      5 mg         27      7.5 mg         28      5 mg         29      7.5 mg         30      5 mg          Date Details   09/19 This INR check               How to take your warfarin dose     To take:  5 mg Take 1 of the 5 mg tablets.    To take:  7.5 mg Take 1.5 of the 5 mg tablets.           October 2017 Details    Sun Mon Tue Wed Thu Fri Sat     1      5 mg         2      7.5 mg         3      5 mg         4      7.5 mg         5      5 mg         6      7.5 mg         7      5 mg           8      5 mg         9      7.5 mg         10      5 mg         11      7.5 mg         12      5 mg         13      7.5 mg         14      5 mg           15      5 mg         16      7.5 mg         17            18               19               20               21                 22               23               24               25                26               27               28                 29               30               31                    Date Details   No additional details    Date of next INR:  10/17/2017         How to take your warfarin dose     To take:  5 mg Take 1 of the 5 mg tablets.    To take:  7.5 mg Take 1.5 of the 5 mg tablets.

## 2017-09-19 NOTE — PROGRESS NOTES
ANTICOAGULATION FOLLOW-UP CLINIC VISIT    Patient Name:  Cassy Avlia  Date:  9/19/2017  Contact Type:  Telephone    SUBJECTIVE:     Patient Findings     Positives No Problem Findings           OBJECTIVE    INR Protime   Date Value Ref Range Status   09/19/2017 2.1 (A) 0.86 - 1.14 Final       ASSESSMENT / PLAN  INR assessment THER    Recheck INR In: 4 WEEKS    INR Location Clinic      Anticoagulation Summary as of 9/19/2017     INR goal 2.0-3.0   Today's INR 2.1   Maintenance plan 7.5 mg (5 mg x 1.5) on Mon, Wed, Fri; 5 mg (5 mg x 1) all other days   Full instructions 7.5 mg on Mon, Wed, Fri; 5 mg all other days   Weekly total 42.5 mg   No change documented Manju Escalera RN   Plan last modified Iram Lord RN (7/20/2016)   Next INR check 10/17/2017   Priority INR   Target end date Indefinite    Indications   Long-term (current) use of anticoagulants [Z79.01] [Z79.01]  Pulmonary embolism and infarction (H) [I26.99]  Deep vein thrombosis (DVT) (H) [I82.409] [I82.409]         Anticoagulation Episode Summary     INR check location Home Draw    Preferred lab     Send INR reminders to HC ANTICOAG POOL    Comments PST - [facility].  having neuro stimulator inserted into back around end of June she states warfarin 5 days per provider she is seeing      Anticoagulation Care Providers     Provider Role Specialty Phone number    Minnie NORAH [facility] [phone]            See the Encounter Report to view Anticoagulation Flowsheet and Dosing Calendar (Go to Encounters tab in chart review, and find the Anticoagulation Therapy Visit)        Manju Escalera, RN

## 2017-10-02 DIAGNOSIS — I10 BENIGN ESSENTIAL HYPERTENSION: ICD-10-CM

## 2017-10-03 RX ORDER — LOSARTAN POTASSIUM 50 MG/1
TABLET ORAL
Qty: 180 TABLET | Refills: 2 | Status: SHIPPED | OUTPATIENT
Start: 2017-10-03 | End: 2018-06-21

## 2017-10-03 NOTE — TELEPHONE ENCOUNTER
losartan (COZAAR) 50 MG tablet    Last Written Prescription Date: 12/29/16  Last Fill Quantity: 180, # refills: 0  Last Office Visit with G, P or [facility] prescribing provider: 7/12/17       Potassium   Date Value Ref Range Status   07/12/2017 3.6 3.4 - 5.3 mmol/L Final     Creatinine   Date Value Ref Range Status   07/12/2017 0.91 0.52 - 1.04 mg/dL Final     BP Readings from Last 3 Encounters:   07/12/17 122/78   03/20/17 122/78   03/15/17 130/56

## 2017-10-05 ENCOUNTER — TELEPHONE (OUTPATIENT)
Dept: FAMILY MEDICINE | Facility: OTHER | Age: 62
End: 2017-10-05

## 2017-10-05 NOTE — PATIENT INSTRUCTIONS
"10:19 AM    Reason for Call: Phone Call    Description: Patient is due soon for her refill, but patient would like if Rx can be increased to 2 pills per day. Patient stated: Eliz Gorerosiebart told me \" I could take 2 pills if needed, and I have been taking two the last few weeks.\" Patient uses "[facility]" for pharmacy.    Was an appointment offered for this call? No  If yes : Appointment type              Date    Preferred method for responding to this message: Telephone Call  What is your phone number ?    If we cannot reach you directly, may we leave a detailed response at the number you provided? Yes    Can this message wait until your PCP/provider returns, if available today? YES,     Blanche Welch     "

## 2017-10-06 DIAGNOSIS — F33.9 EPISODE OF RECURRENT MAJOR DEPRESSIVE DISORDER, UNSPECIFIED DEPRESSION EPISODE SEVERITY (H): ICD-10-CM

## 2017-10-06 RX ORDER — ESCITALOPRAM OXALATE 20 MG/1
TABLET ORAL
Qty: 90 TABLET | Refills: 1 | COMMUNITY
Start: 2017-10-06 | End: 2018-02-16

## 2017-10-06 NOTE — TELEPHONE ENCOUNTER
Pt requesting to increase Lexapro to 20mg daily and an extra dose if needed.  Ok per Eliz, called to [facility]

## 2017-10-06 NOTE — TELEPHONE ENCOUNTER
Spoke with pt, per Eliz may increase to Lexapro 20mg 2 daily, pt reports takes 1 daily, and another if needed.  Medication changed on med list and called to [facility]

## 2017-10-17 ENCOUNTER — ALLIED HEALTH/NURSE VISIT (OUTPATIENT)
Dept: FAMILY MEDICINE | Facility: OTHER | Age: 62
End: 2017-10-17
Attending: FAMILY MEDICINE
Payer: MEDICARE

## 2017-10-17 ENCOUNTER — ANTICOAGULATION THERAPY VISIT (OUTPATIENT)
Dept: ANTICOAGULATION | Facility: OTHER | Age: 62
End: 2017-10-17

## 2017-10-17 DIAGNOSIS — I26.99 PULMONARY EMBOLISM AND INFARCTION (H): ICD-10-CM

## 2017-10-17 DIAGNOSIS — Z79.01 LONG-TERM (CURRENT) USE OF ANTICOAGULANTS: ICD-10-CM

## 2017-10-17 DIAGNOSIS — Z23 NEED FOR PROPHYLACTIC VACCINATION AND INOCULATION AGAINST INFLUENZA: Primary | ICD-10-CM

## 2017-10-17 LAB — INR PPP: 2.3

## 2017-10-17 PROCEDURE — G0008 ADMIN INFLUENZA VIRUS VAC: HCPCS

## 2017-10-17 PROCEDURE — 90686 IIV4 VACC NO PRSV 0.5 ML IM: CPT

## 2017-10-17 NOTE — PROGRESS NOTES
Injectable Influenza Immunization Documentation    1.  Is the person to be vaccinated sick today?   No    2. Does the person to be vaccinated have an allergy to a component   of the vaccine?   No    3. Has the person to be vaccinated ever had a serious reaction   to influenza vaccine in the past?   No    4. Has the person to be vaccinated ever had Guillain-Barré syndrome?   No    Form completed by Patria Rodriguez

## 2017-10-17 NOTE — PROGRESS NOTES
ANTICOAGULATION FOLLOW-UP CLINIC VISIT    Patient Name:  Cassy Avila  Date:  10/17/2017  Contact Type:  Telephone    SUBJECTIVE:     Patient Findings     Positives No Problem Findings           OBJECTIVE    INR   Date Value Ref Range Status   10/17/2017 2.3  Final       ASSESSMENT / PLAN  INR assessment THER    Recheck INR In: 4 WEEKS    INR Location Home INR      Anticoagulation Summary as of 10/17/2017     INR goal 2.0-3.0   Today's INR 2.3   Maintenance plan 7.5 mg (5 mg x 1.5) on Mon, Wed, Fri; 5 mg (5 mg x 1) all other days   Full instructions 7.5 mg on Mon, Wed, Fri; 5 mg all other days   Weekly total 42.5 mg   No change documented Manju Escalera RN   Plan last modified Iram Lord RN (7/20/2016)   Next INR check 11/14/2017   Priority INR   Target end date Indefinite    Indications   Long-term (current) use of anticoagulants [Z79.01] [Z79.01]  Pulmonary embolism and infarction (H) [I26.99]  Deep vein thrombosis (DVT) (H) [I82.409] [I82.409]         Anticoagulation Episode Summary     INR check location Home Draw    Preferred lab     Send INR reminders to HC ANTICOAG POOL    Comments PST - [facility].  having neuro stimulator inserted into back around end of June she states warfarin 5 days per provider she is seeing      Anticoagulation Care Providers     Provider Role Specialty Phone number    Minnie ElizNORAH [facility] [phone]            See the Encounter Report to view Anticoagulation Flowsheet and Dosing Calendar (Go to Encounters tab in chart review, and find the Anticoagulation Therapy Visit)        Manju Escalera, RN

## 2017-10-17 NOTE — MR AVS SNAPSHOT
Cassy CHIU Austin   10/17/2017   Anticoagulation Therapy Visit    Description:  61 year old female   Provider:  Eliz Hartman NP   Department:  Hc Anti Coagulation           INR as of 10/17/2017     Today's INR 2.3      Anticoagulation Summary as of 10/17/2017     INR goal 2.0-3.0   Today's INR 2.3   Full instructions 7.5 mg on Mon, Wed, Fri; 5 mg all other days   Next INR check 11/14/2017    Indications   Long-term (current) use of anticoagulants [Z79.01] [Z79.01]  Pulmonary embolism and infarction (H) [I26.99]  Deep vein thrombosis (DVT) (H) [I82.409] [I82.409]         October 2017 Details    Sun Mon Tue Wed Thu Fri Sat     1               2               3               4               5               6               7                 8               9               10               11               12               13               14                 15               16               17      5 mg   See details      18      7.5 mg         19      5 mg         20      7.5 mg         21      5 mg           22      5 mg         23      7.5 mg         24      5 mg         25      7.5 mg         26      5 mg         27      7.5 mg         28      5 mg           29      5 mg         30      7.5 mg         31      5 mg              Date Details   10/17 This INR check               How to take your warfarin dose     To take:  5 mg Take 1 of the 5 mg tablets.    To take:  7.5 mg Take 1.5 of the 5 mg tablets.           November 2017 Details    Sun Mon Tue Wed Thu Fri Sat        1      7.5 mg         2      5 mg         3      7.5 mg         4      5 mg           5      5 mg         6      7.5 mg         7      5 mg         8      7.5 mg         9      5 mg         10      7.5 mg         11      5 mg           12      5 mg         13      7.5 mg         14            15               16               17               18                 19               20               21               22               23               24                25                 26               27               28               29               30                  Date Details   No additional details    Date of next INR:  11/14/2017         How to take your warfarin dose     To take:  5 mg Take 1 of the 5 mg tablets.    To take:  7.5 mg Take 1.5 of the 5 mg tablets.

## 2017-10-17 NOTE — MR AVS SNAPSHOT
After Visit Summary   10/17/2017    Cassy Avila    MRN: [id number]           Patient Information     Date Of Birth          1955        Visit Information        Provider Department      10/17/2017 1:50 PM MT FLU SHOT [facility]        Today's Diagnoses     Need for prophylactic vaccination and inoculation against influenza    -  1       Follow-ups after your visit        Your next 10 appointments already scheduled     Oct 17, 2017  1:50 PM CDT   (Arrive by 1:35 PM)   Flu Shot with MT FLU SHOT [facility] ([facility] )    [address]  [facility] [address]   [phone]              Who to contact     If you have questions or need follow up information about today's clinic visit or your schedule please contact [facility] directly at [phone].  Normal or non-critical lab and imaging results will be communicated to you by Scriptedhart, letter or phone within 4 business days after the clinic has received the results. If you do not hear from us within 7 days, please contact the clinic through Scriptedhart or phone. If you have a critical or abnormal lab result, we will notify you by phone as soon as possible.  Submit refill requests through Peerby or call your pharmacy and they will forward the refill request to us. Please allow 3 business days for your refill to be completed.          Additional Information About Your Visit        MyChart Information     Peerby gives you secure access to your electronic health record. If you see a primary care provider, you can also send messages to your care team and make appointments. If you have questions, please call your primary care clinic.  If you do not have a primary care provider, please call [phone] and they will assist you.        Care EveryWhere ID     This is your Care EveryWhere ID. This could be used by other organizations to access your [place]  medical records  [id number]         Blood Pressure from Last 3 Encounters:   07/12/17 122/78   03/20/17 122/78   03/15/17 130/56    Weight from Last 3 Encounters:   07/12/17 228 lb 9.6 oz (103.7 kg)   03/20/17 223 lb (101.2 kg)   03/14/17 225 lb 8.5 oz (102.3 kg)              We Performed the Following     ADMIN INFLUENZA (For MEDICARE Patients ONLY) []     FLU VAC, SPLIT VIRUS IM > 3 YO (QUADRIVALENT) [95859]        Primary Care Provider Office Phone # Fax #    Eliz HartmanNORAH [phone]       [facility] [address] [place] [address]        Equal Access to Services     SWEETIE PARRISH : aJmes lehmano Soomaali, waaxda luqadaha, qaybta kaalmada adeegyada, jeanie brito. So [facility] [phone].    ATENCIÓN: Si habla español, tiene a noriega disposición servicios gratuitos de asistencia lingüística. Llame al [phone].    We comply with applicable federal civil rights laws and [place] laws. We do not discriminate on the basis of race, color, national origin, age, disability, sex, sexual orientation, or gender identity.            Thank you!     Thank you for choosing [facility]  for your care. Our goal is always to provide you with excellent care. Hearing back from our patients is one way we can continue to improve our services. Please take a few minutes to complete the written survey that you may receive in the mail after your visit with us. Thank you!             Your Updated Medication List - Protect others around you: Learn how to safely use, store and throw away your medicines at www.disposemymeds.org.          This list is accurate as of: 10/17/17  1:44 PM.  Always use your most recent med list.                   Brand Name Dispense Instructions for use Diagnosis    acetaminophen 325 MG tablet    TYLENOL    100 tablet    Take 3 tablets (975 mg) by mouth every 4 hours as needed for mild pain or fever    Pneumonia of left lower lobe due to  infectious organism (H)       aspirin 81 MG EC tablet      Take 81 mg by mouth daily HS        escitalopram 20 MG tablet    LEXAPRO    90 tablet    Take 20mg daily, may take an extra 20mg once daily as needed    Episode of recurrent major depressive disorder, unspecified depression episode severity (H)       hydrochlorothiazide 25 MG tablet    HYDRODIURIL    90 tablet    Take 1 tablet (25 mg) by mouth daily    Essential hypertension       losartan 50 MG tablet    COZAAR    180 tablet    TAKE 2 TABLETS BY MOUTH EVERY DAY    Benign essential hypertension       metoprolol 50 MG 24 hr tablet    TOPROL-XL    135 tablet    TAKE 1 AND ONE-HALF TABLETS BY MOUTH EVERY DAY    Essential hypertension       omega 3 1000 MG Caps     90 capsule    Take 3 g by mouth daily        Vitamin B-12 500 MCG Subl      Place 1 tablet under the tongue every other day        VITAMIN D3 PO      Take 3,000 mg by mouth daily AM        warfarin 5 MG tablet    COUMADIN    150 tablet    7.5mg Mon,Wed,Fri and 5mg all other days or as directed by warfarin clinic    Long-term (current) use of anticoagulants

## 2017-11-14 ENCOUNTER — ANTICOAGULATION THERAPY VISIT (OUTPATIENT)
Dept: ANTICOAGULATION | Facility: OTHER | Age: 62
End: 2017-11-14

## 2017-11-14 ENCOUNTER — TRANSFERRED RECORDS (OUTPATIENT)
Dept: HEALTH INFORMATION MANAGEMENT | Facility: HOSPITAL | Age: 62
End: 2017-11-14

## 2017-11-14 DIAGNOSIS — I82.409 DEEP VEIN THROMBOSIS (DVT) (H): ICD-10-CM

## 2017-11-14 DIAGNOSIS — I26.99 PULMONARY EMBOLISM AND INFARCTION (H): ICD-10-CM

## 2017-11-14 DIAGNOSIS — Z79.01 LONG-TERM (CURRENT) USE OF ANTICOAGULANTS: ICD-10-CM

## 2017-11-14 LAB — INR PPP: 2.3

## 2017-11-14 NOTE — MR AVS SNAPSHOT
Cassy CHIU Austin   11/14/2017   Anticoagulation Therapy Visit    Description:  62 year old female   Provider:  Eliz Hartman NP   Department:  [facility]           INR as of 11/14/2017     Today's INR 2.3      Anticoagulation Summary as of 11/14/2017     INR goal 2.0-3.0   Today's INR 2.3   Full instructions 7.5 mg on Mon, Wed, Fri; 5 mg all other days   Next INR check 12/12/2017    Indications   Long-term (current) use of anticoagulants [Z79.01] [Z79.01]  Pulmonary embolism and infarction (H) [I26.99]  Deep vein thrombosis (DVT) (H) [I82.409] [I82.409]         November 2017 Details    Sun Mon Tue Wed Thu Fri Sat        1               2               3               4                 5               6               7               8               9               10               11                 12               13               14      5 mg   See details      15      7.5 mg         16      5 mg         17      7.5 mg         18      5 mg           19      5 mg         20      7.5 mg         21      5 mg         22      7.5 mg         23      5 mg         24      7.5 mg         25      5 mg           26      5 mg         27      7.5 mg         28      5 mg         29      7.5 mg         30      5 mg            Date Details   11/14 This INR check               How to take your warfarin dose     To take:  5 mg Take 1 of the 5 mg tablets.    To take:  7.5 mg Take 1.5 of the 5 mg tablets.           December 2017 Details    Sun Mon Tue Wed Thu Fri Sat          1      7.5 mg         2      5 mg           3      5 mg         4      7.5 mg         5      5 mg         6      7.5 mg         7      5 mg         8      7.5 mg         9      5 mg           10      5 mg         11      7.5 mg         12            13               14               15               16                 17               18               19               20               21               22               23                 24                25               26               27               28               29               30                 31                      Date Details   No additional details    Date of next INR:  12/12/2017         How to take your warfarin dose     To take:  5 mg Take 1 of the 5 mg tablets.    To take:  7.5 mg Take 1.5 of the 5 mg tablets.

## 2017-11-14 NOTE — PROGRESS NOTES
ANTICOAGULATION FOLLOW-UP CLINIC VISIT    Patient Name:  Cassy Avila  Date:  11/14/2017  Contact Type:  Telephone    SUBJECTIVE:     Patient Findings     Positives No Problem Findings    Comments INR results received by fax from [facility] today.  INR results/Coumadin dosing instructions and INR recheck discussed by phone w/pt today.  Call ended with questions answered and understanding stated.           OBJECTIVE    INR   Date Value Ref Range Status   11/14/2017 2.3  Final       ASSESSMENT / PLAN  INR assessment THER    Recheck INR In: 4 WEEKS    INR Location Home INR      Anticoagulation Summary as of 11/14/2017     INR goal 2.0-3.0   Today's INR 2.3   Maintenance plan 7.5 mg (5 mg x 1.5) on Mon, Wed, Fri; 5 mg (5 mg x 1) all other days   Full instructions 7.5 mg on Mon, Wed, Fri; 5 mg all other days   Weekly total 42.5 mg   No change documented Manju Roberts RN   Plan last modified Iram Lord RN (7/20/2016)   Next INR check 12/12/2017   Priority INR   Target end date Indefinite    Indications   Long-term (current) use of anticoagulants [Z79.01] [Z79.01]  Pulmonary embolism and infarction (H) [I26.99]  Deep vein thrombosis (DVT) (H) [I82.409] [I82.409]         Anticoagulation Episode Summary     INR check location Home Draw    Preferred lab     Send INR reminders to  ANTICOAG POOL    Comments PST - [facility].        Anticoagulation Care Providers     Provider Role Specialty Phone number    Minnie Eliz, [facility] [phone]            See the Encounter Report to view Anticoagulation Flowsheet and Dosing Calendar (Go to Encounters tab in chart review, and find the Anticoagulation Therapy Visit)        Manju Roberts, MAKAYLA

## 2017-12-19 ENCOUNTER — ANTICOAGULATION THERAPY VISIT (OUTPATIENT)
Dept: ANTICOAGULATION | Facility: OTHER | Age: 62
End: 2017-12-19
Payer: MEDICARE

## 2017-12-19 ENCOUNTER — TRANSFERRED RECORDS (OUTPATIENT)
Dept: HEALTH INFORMATION MANAGEMENT | Facility: HOSPITAL | Age: 62
End: 2017-12-19

## 2017-12-19 DIAGNOSIS — Z79.01 LONG-TERM (CURRENT) USE OF ANTICOAGULANTS: ICD-10-CM

## 2017-12-19 DIAGNOSIS — I26.99 PULMONARY EMBOLISM AND INFARCTION (H): ICD-10-CM

## 2017-12-19 DIAGNOSIS — I82.409 DEEP VEIN THROMBOSIS (DVT) (H): ICD-10-CM

## 2017-12-19 LAB — INR PPP: 2.4

## 2017-12-19 NOTE — PROGRESS NOTES
ANTICOAGULATION FOLLOW-UP CLINIC VISIT    Patient Name:  Cassy Avila  Date:  12/19/2017  Contact Type:  Telephone    SUBJECTIVE:     Patient Findings     Positives No Problem Findings    Comments PST INR results received by fax today from [facility].  PST INR results/Coumadin dosing instructions and INR recheck information left on the pt's telephone voice messaging system today.  Pt encouraged to contact the AC clinic with any questions.concerns or new issues.           OBJECTIVE    INR   Date Value Ref Range Status   12/19/2017 2.4  Final       ASSESSMENT / PLAN  INR assessment THER    Recheck INR In: 4 WEEKS    INR Location Home INR      Anticoagulation Summary as of 12/19/2017     INR goal 2.0-3.0   Today's INR 2.4   Maintenance plan 7.5 mg (5 mg x 1.5) on Mon, Wed, Fri; 5 mg (5 mg x 1) all other days   Full instructions 7.5 mg on Mon, Wed, Fri; 5 mg all other days   Weekly total 42.5 mg   No change documented Manju Roberts RN   Plan last modified Iram Lord RN (7/20/2016)   Next INR check 1/16/2018   Priority INR   Target end date Indefinite    Indications   Long-term (current) use of anticoagulants [Z79.01] [Z79.01]  Pulmonary embolism and infarction (H) [I26.99]  Deep vein thrombosis (DVT) (H) [I82.409] [I82.409]         Anticoagulation Episode Summary     INR check location Home Draw    Preferred lab     Send INR reminders to [facility] ANTICOAG POOL    Comments PST - [facility].        Anticoagulation Care Providers     Provider Role Specialty Phone number    Eliz Hartman NP [facility] [phone]            See the Encounter Report to view Anticoagulation Flowsheet and Dosing Calendar (Go to Encounters tab in chart review, and find the Anticoagulation Therapy Visit)        Manju Roberts, RN

## 2017-12-19 NOTE — MR AVS SNAPSHOT
Cassy CHIU Austin   12/19/2017   Anticoagulation Therapy Visit    Description:  62 year old female   Provider:  Eliz Hartman NP   Department:  [facility]           INR as of 12/19/2017     Today's INR 2.4      Anticoagulation Summary as of 12/19/2017     INR goal 2.0-3.0   Today's INR 2.4   Full instructions 7.5 mg on Mon, Wed, Fri; 5 mg all other days   Next INR check 1/16/2018    Indications   Long-term (current) use of anticoagulants [Z79.01] [Z79.01]  Pulmonary embolism and infarction (H) [I26.99]  Deep vein thrombosis (DVT) (H) [I82.409] [I82.409]         December 2017 Details    Sun Mon Tue Wed Thu Fri Sat          1               2                 3               4               5               6               7               8               9                 10               11               12               13               14               15               16                 17               18               19      5 mg   See details      20      7.5 mg         21      5 mg         22      7.5 mg         23      5 mg           24      5 mg         25      7.5 mg         26      5 mg         27      7.5 mg         28      5 mg         29      7.5 mg         30      5 mg           31      5 mg                Date Details   12/19 This INR check               How to take your warfarin dose     To take:  5 mg Take 1 of the 5 mg tablets.    To take:  7.5 mg Take 1.5 of the 5 mg tablets.           January 2018 Details    Sun Mon Tue Wed Thu Fri Sat      1      7.5 mg         2      5 mg         3      7.5 mg         4      5 mg         5      7.5 mg         6      5 mg           7      5 mg         8      7.5 mg         9      5 mg         10      7.5 mg         11      5 mg         12      7.5 mg         13      5 mg           14      5 mg         15      7.5 mg         16            17               18               19               20                 21               22               23                24               25               26               27                 28               29               30               31                   Date Details   No additional details    Date of next INR:  1/16/2018         How to take your warfarin dose     To take:  5 mg Take 1 of the 5 mg tablets.    To take:  7.5 mg Take 1.5 of the 5 mg tablets.

## 2018-01-02 ENCOUNTER — OFFICE VISIT (OUTPATIENT)
Dept: FAMILY MEDICINE | Facility: OTHER | Age: 63
End: 2018-01-02
Attending: NURSE PRACTITIONER
Payer: MEDICARE

## 2018-01-02 VITALS
BODY MASS INDEX: 37.94 KG/M2 | HEART RATE: 60 BPM | SYSTOLIC BLOOD PRESSURE: 110 MMHG | WEIGHT: 228 LBS | DIASTOLIC BLOOD PRESSURE: 70 MMHG | RESPIRATION RATE: 14 BRPM | TEMPERATURE: 98.2 F

## 2018-01-02 DIAGNOSIS — Z80.0 FAMILY HISTORY OF COLON CANCER: ICD-10-CM

## 2018-01-02 DIAGNOSIS — L91.8 SKIN TAG: ICD-10-CM

## 2018-01-02 DIAGNOSIS — J01.01 ACUTE RECURRENT MAXILLARY SINUSITIS: Primary | ICD-10-CM

## 2018-01-02 DIAGNOSIS — F33.1 MODERATE EPISODE OF RECURRENT MAJOR DEPRESSIVE DISORDER (H): ICD-10-CM

## 2018-01-02 DIAGNOSIS — H65.91 FLUID LEVEL BEHIND TYMPANIC MEMBRANE OF RIGHT EAR: ICD-10-CM

## 2018-01-02 DIAGNOSIS — Z12.31 ENCOUNTER FOR SCREENING MAMMOGRAM FOR BREAST CANCER: ICD-10-CM

## 2018-01-02 DIAGNOSIS — Z12.11 SPECIAL SCREENING FOR MALIGNANT NEOPLASMS, COLON: ICD-10-CM

## 2018-01-02 PROBLEM — J18.9 COMMUNITY ACQUIRED PNEUMONIA: Status: RESOLVED | Noted: 2017-03-14 | Resolved: 2018-01-02

## 2018-01-02 PROCEDURE — G0463 HOSPITAL OUTPT CLINIC VISIT: HCPCS

## 2018-01-02 PROCEDURE — 99215 OFFICE O/P EST HI 40 MIN: CPT | Performed by: NURSE PRACTITIONER

## 2018-01-02 RX ORDER — AZITHROMYCIN 250 MG/1
TABLET, FILM COATED ORAL
Qty: 11 TABLET | Refills: 0 | Status: SHIPPED | OUTPATIENT
Start: 2018-01-02 | End: 2018-01-17

## 2018-01-02 ASSESSMENT — ANXIETY QUESTIONNAIRES
3. WORRYING TOO MUCH ABOUT DIFFERENT THINGS: NOT AT ALL
GAD7 TOTAL SCORE: 0
IF YOU CHECKED OFF ANY PROBLEMS ON THIS QUESTIONNAIRE, HOW DIFFICULT HAVE THESE PROBLEMS MADE IT FOR YOU TO DO YOUR WORK, TAKE CARE OF THINGS AT HOME, OR GET ALONG WITH OTHER PEOPLE: NOT DIFFICULT AT ALL
1. FEELING NERVOUS, ANXIOUS, OR ON EDGE: NOT AT ALL
5. BEING SO RESTLESS THAT IT IS HARD TO SIT STILL: NOT AT ALL
2. NOT BEING ABLE TO STOP OR CONTROL WORRYING: NOT AT ALL
6. BECOMING EASILY ANNOYED OR IRRITABLE: NOT AT ALL
4. TROUBLE RELAXING: NOT AT ALL
7. FEELING AFRAID AS IF SOMETHING AWFUL MIGHT HAPPEN: NOT AT ALL

## 2018-01-02 ASSESSMENT — PATIENT HEALTH QUESTIONNAIRE - PHQ9: SUM OF ALL RESPONSES TO PHQ QUESTIONS 1-9: 5

## 2018-01-02 NOTE — NURSING NOTE
"Chief Complaint   Patient presents with     Ear Problem     Skin Tags       Initial /70 (BP Location: Right arm, Patient Position: Sitting, Cuff Size: Adult Large)  Pulse 60  Temp 98.2  F (36.8  C)  Resp 14  Wt 228 lb (103.4 kg)  BMI 37.94 kg/m2 Estimated body mass index is 37.94 kg/(m^2) as calculated from the following:    Height as of 7/12/17: 5' 5\" (1.651 m).    Weight as of this encounter: 228 lb (103.4 kg).  Medication Reconciliation: complete     Patria Rodriguez      "

## 2018-01-02 NOTE — PROGRESS NOTES
SUBJECTIVE:   Cassy Avila is a 62 year old female who presents to clinic today for the following health issues:      ENT Symptoms             Symptoms: cc Present Absent Comment   Fever/Chills   x    Fatigue  x     Muscle Aches   x    Eye Irritation   x    Sneezing   x    Nasal Eliel/Drg   x    Sinus Pressure/Pain  x     Loss of smell   x    Dental pain   x    Sore Throat   x    Swollen Glands   x    Ear Pain/Fullness  x     Cough   x    Wheeze   x    Chest Pain   x    Shortness of breath   x    Rash   x    Other   x      Symptom duration:  right ear pain   Symptom severity:  10   Treatments tried:  ricebag   Contacts:       History of nasal polyp, and cyst, cannot blow sinus drainage from nose        Concern - skin tags  Onset:     Description:   braline and under right arm    Intensity: moderate    Progression of Symptoms:  worsening and constant    Accompanying Signs & Symptoms:  multiplying    Previous history of similar problem:       Precipitating factors:   Worsened by: clothing rubbing    Alleviating factors:  Improved by: nothing    Therapies Tried and outcome: nothing  nothing        Depression Followup    Status since last visit: Stable     See PHQ-9 for current symptoms.  Other associated symptoms: None    Complicating factors:   Significant life event:  No   Current substance abuse:  None  Anxiety or Panic symptoms:  No        PHQ-9 Score and MyChart F/U Questions 2/6/2017 7/12/2017 1/2/2018   Total Score 0 3 5   Q9: Suicide Ideation Not at all Not at all Not at all     In the past two weeks have you had thoughts of suicide or self-harm?  No.    Do you have concerns about your personal safety or the safety of others?   No    PHQ-9  English  PHQ-9   Any Language  Suicide Assessment Five-step Evaluation and Treatment (SAFE-T)      History of migraines, resolved, since piercing years ago      Due for colon cancer screening - referred  Due for mammogram - ordered  Chronic disease management due - will  schedule    Problem list and histories reviewed & adjusted, as indicated.  Additional history: as documented    Patient Active Problem List   Diagnosis     Essential hypertension     Pulmonary embolism and infarction (H)     Contact dermatitis and other eczema, due to unspecified cause     Edema     Hyperlipidemia LDL goal <100     Neurofibromatosis (H)     Advanced care planning/counseling discussion     Moderate episode of recurrent major depressive disorder (H)     Long-term (current) use of anticoagulants [Z79.01]     Deep vein thrombosis (DVT) (H) [I82.409]     Lumbar spondylosis with myelopathy     Degeneration of lumbar or lumbosacral intervertebral disc     Past Surgical History:   Procedure Laterality Date     ------------OTHER-------------      shoulder replacement; Provider: Karen     ARTHROPLASTY KNEE  2014    Procedure: ARTHROPLASTY KNEE;  Surgeon: Sean Alexander MD;  Location: [facility] OR     ARTHROSCOPY SHOULDER  2012    right, bone spurs      SECTION      x3     CHOLECYSTECTOMY       elbow ulnar tunnel release       ELECTROTHERMAL THERAPY INTRADISC  2017    stimulator     ENDOSCOPIC SINUS SURGERY, SEPTOPLASTY, TURBINOPLASTY, MAXILLARY SINUSOTOMY, COMBINED N/A 2015    Procedure: COMBINED ENDOSCOPIC SINUS SURGERY, SEPTOPLASTY, TURBINOPLASTY, MAXILLARY SINUSOTOMY;  Surgeon: Seema Conn MD;  Location: [facility] OR     esophagastroduodenoscopy      with biopsy and endoscopic U/S     EXCISE NEUROMA LOWER EXTREMITY Left 2016    Procedure: EXCISE NEUROMA LOWER EXTREMITY;  Surgeon: Edi Reed MD;  Location: [facility] OR     FUSION LUMBAR ANTERIOR WITH MARCY CAGES      L5-S1     ORTHOPEDIC SURGERY  2-15    right shoulder     ORTHOPEDIC SURGERY  8/28/15    right knee     pionidal cyst excision       SARAHI/BSO       TRANSPOSITION ULNAR NERVE (ELBOW)         Social History   Substance Use Topics     Smoking status: Former Smoker     Packs/day: 0.50     Years: 30.00     Types:  Cigarettes, Pipe     Smokeless tobacco: Never Used      Comment: quit in 1999     Alcohol use No     Family History   Problem Relation Age of Onset     CANCER Mother      Colon Polyps Mother      Heart Failure Mother 87     congestive, cause of death     Myocardial Infarction Mother      myocardial infarction     Myocardial Infarction Father      myocardial infarction - cause of death     C.A.D. Father      CANCER Paternal Uncle      cause of death     C.A.D. Brother      Other - See Comments Other      factor 5 - family h/o     Asthma No family hx of          Current Outpatient Prescriptions   Medication Sig Dispense Refill     escitalopram (LEXAPRO) 20 MG tablet Take 20mg daily, may take an extra 20mg once daily as needed 90 tablet 1     losartan (COZAAR) 50 MG tablet TAKE 2 TABLETS BY MOUTH EVERY  tablet 2     metoprolol (TOPROL-XL) 50 MG 24 hr tablet TAKE 1 AND ONE-HALF TABLETS BY MOUTH EVERY  tablet 2     warfarin (COUMADIN) 5 MG tablet 7.5mg Mon,Wed,Fri and 5mg all other days or as directed by warfarin clinic 150 tablet 3     hydrochlorothiazide (HYDRODIURIL) 25 MG tablet Take 1 tablet (25 mg) by mouth daily 90 tablet 2     acetaminophen (TYLENOL) 325 MG tablet Take 3 tablets (975 mg) by mouth every 4 hours as needed for mild pain or fever 100 tablet      Cholecalciferol (VITAMIN D3 PO) Take 3,000 mg by mouth daily AM       omega 3 1000 MG CAPS Take 3 g by mouth daily  90 capsule      aspirin 81 MG EC tablet Take 81 mg by mouth daily HS       Allergies   Allergen Reactions     Ace Inhibitors Cough     Albuterol Sulfate Hives     DuoNeb     Amlodipine Besylate Swelling     Norvasc     Amoxicillin      Cephalexin Monohydrate Hives     Keflex     Erythromycin Base [Kdc:Yellow Dye+Erythromycin+Brilliant Blue Fcf] Nausea and Vomiting     Ipratropium Bromide Hives     DuoNeb     Meloxicam Other (See Comments)     Mobic - confusion, depression     Adhesive Tape Rash     Prochlorperazine Edisylate  Swelling and Rash     Compazine     Prochlorperazine Maleate Swelling and Rash     Recent Labs   Lab Test  07/12/17   1117  03/14/17   2028  01/04/17   1459   01/11/16   0935   02/17/15   1149  08/08/14   0815   11/05/13   1528   A1C   --    --    --    --    --    --    --    --    --   5.5   LDL   --    --    --    --   126*   --   119  51   --    --    HDL   --    --    --    --   38*   --   36*  33*   --    --    TRIG   --    --    --    --   265*   --   272*  149   --    --    ALT  34  30  35   --    --    --    --   29   < >   --    CR  0.91  0.97  1.07*   --   1.02   < >  0.78  0.81   < >   --    GFRESTIMATED  63  58*  52*   --   55*   < >  75  72   < >   --    GFRESTBLACK  76  70  63   --   67   < >  >90   GFR Calc    87   < >   --    POTASSIUM  3.6  3.6  3.5   < >  3.4   < >  4.0  3.8   < >   --    TSH   --    --   1.63   --   3.62   < >   --   2.07   --    --     < > = values in this interval not displayed.      BP Readings from Last 3 Encounters:   01/02/18 110/70   07/12/17 122/78   03/20/17 122/78    Wt Readings from Last 3 Encounters:   01/02/18 228 lb (103.4 kg)   07/12/17 228 lb 9.6 oz (103.7 kg)   03/20/17 223 lb (101.2 kg)                  Labs reviewed in EPIC          Reviewed and updated as needed this visit by clinical staffTobacco  Allergies  Meds  Med Hx  Surg Hx  Fam Hx  Soc Hx      Reviewed and updated as needed this visit by Provider         ROS:  C: NEGATIVE for fever, chills, change in weight  INTEGUMENTARY/SKIN: as above  E: NEGATIVE for vision changes or irritation  ENT:  Right ear pain, pressure, sinus pressure - green drainage  R: NEGATIVE for significant cough or SOB  B: NEGATIVE for masses, tenderness or discharge  CV: NEGATIVE for chest pain, palpitations or peripheral edema  GI: NEGATIVE for nausea, abdominal pain, heartburn, or change in bowel habits  : NEGATIVE for frequency, dysuria, or hematuria  MUSCULOSKELETAL:chronic myalgia  N: NEGATIVE for  weakness, dizziness or paresthesias  E: NEGATIVE for temperature intolerance, skin/hair changes  H: NEGATIVE for bleeding problems  P: NEGATIVE for changes in mood or affect    OBJECTIVE:     /70 (BP Location: Right arm, Patient Position: Sitting, Cuff Size: Adult Large)  Pulse 60  Temp 98.2  F (36.8  C)  Resp 14  Wt 228 lb (103.4 kg)  BMI 37.94 kg/m2  Body mass index is 37.94 kg/(m^2).       GENERAL: healthy, alert and no distress  EYES: Eyes grossly normal to inspection, PERRL and conjunctivae and sclerae normal  HENT: right ear: erythematous, bulging membrane - effusion  NECK: no adenopathy, no asymmetry, masses, or scars and thyroid normal to palpation  RESP: lungs clear to auscultation - no rales, rhonchi or wheezes  CV: regular rate and rhythm, normal S1 S2, no S3 or S4, no murmur, click or rub, no peripheral edema and peripheral pulses strong  ABDOMEN: soft, nontender, no hepatosplenomegaly, no masses and bowel sounds normal  MS: no gross musculoskeletal defects noted, no edema  SKIN:  Skin tags - extensive, right axilla -and beneath right breast  PSYCH: mentation appears normal, affect normal/bright  LYMPH: no cervical, supraclavicular, axillary adenopathy      Visit time:   Over 40 minutes are spent with the patient, over 50% of which was in education and counseling regarding current conditions and treatment/therapy.  Time spent with patient, including examination, face to face patient education, review of disease process, and plan of care  20 mn  Visit Content: During our face to face time, patient education was provided regarding diagnosis, and treatment pan.  Relevant laboratory and diagnostic testing is reviewed prior to visit, and updated at this appointment as indicated  Health Maintenance - updated as appropriate.  Record review completed      ASSESSMENT/PLAN:     Depression; recurrent episode-- Moderate   Associated with the following complications:    None   Plan:  No changes in the  patient's current treatment plan        1. Acute recurrent maxillary sinusitis  azithromycin (ZITHROMAX) 250 MG tablet; Two tablets first day, then one tablet daily for 9 days.  Dispense: 11 tablet; Refill: 0  Fluids  Rest  Humidity at home - add bacteriostatic solution to humidifier  OTC Mucinex liquid cough and cold  Add Zicam or other zinc daily until you feel better    2. Fluid level behind tympanic membrane of right ear  Chiropractic care discussed  OTC zyrtec PRN    3. Skin tag  Return for excision - scheduled    4. Moderate episode of recurrent major depressive disorder (H)  Continue plan of care  DEPRESSION EDUCATION - see attached    5. Encounter for screening mammogram for breast cancer  [facility] Screening Digital Bilateral ([facility]); Future    6. Special screening for malignant neoplasms, colon  GENERAL SURG ADULT REFERRAL    7. Family history of colon cancer  GENERAL SURG ADULT REFERRAL        FU visit scheduled, please come fasting so we can update chronic disease management visits      Eliz Hartman NP  [facility]

## 2018-01-02 NOTE — Clinical Note
Referred to you for colon cancer screening - [place] Positive FH, last scope 2008, no polyps Is on chronic coumadin - if she needs a pre-op, I will ask Dr Pittman to do it.

## 2018-01-02 NOTE — MR AVS SNAPSHOT
After Visit Summary   1/2/2018    Cassy Avila    MRN: [id number]           Patient Information     Date Of Birth          1955        Visit Information        Provider Department      1/2/2018 8:30 AM Eliz Hartman, NP [facility]        Today's Diagnoses     Acute recurrent maxillary sinusitis    -  1    Fluid level behind tympanic membrane of right ear        Skin tag        Moderate episode of recurrent major depressive disorder (H)        Encounter for screening mammogram for breast cancer        Special screening for malignant neoplasms, colon        Family history of colon cancer          Care Instructions        1. Acute recurrent maxillary sinusitis  azithromycin (ZITHROMAX) 250 MG tablet; Two tablets first day, then one tablet daily for 9 days.  Dispense: 11 tablet; Refill: 0  Fluids  Rest  Humidity at home - add bacteriostatic solution to humidifier  OTC Mucinex liquid cough and cold  Add Zicam or other zinc daily until you feel better    2. Fluid level behind tympanic membrane of right ear  - Chiropractic care discussed  - OTC zyrtec PRN    3. Skin tag  - Return for excision - scheduled    4. Moderate episode of recurrent major depressive disorder (H)  - Continue plan of care  - DEPRESSION EDUCATION - see attached    5. Encounter for screening mammogram for breast cancer  - MA Screening Digital Bilateral ([facility]); Future    6. Special screening for malignant neoplasms, colon  - GENERAL SURG ADULT REFERRAL    7. Family history of colon cancer  - GENERAL SURG ADULT REFERRAL        FU visit scheduled, please come fasting so we can update chronic disease management visits      Eliz Hartman NP  [facility]            Follow-ups after your visit        Additional Services     GENERAL SURG ADULT REFERRAL       Your provider has referred you to: General surgery consult - Dr Pang, [facility]  Please be aware that coverage of these services is subject to  the terms and limitations of your health insurance plan.  Call member services at your health plan with any benefit or coverage questions.      Please bring the following with you to your appointment:    (1) Any X-Rays, CTs or MRIs which have been performed.  Contact the facility where they were done to arrange for  prior to your scheduled appointment.   (2) List of current medications   (3) This referral request   (4) Any documents/labs given to you for this referral                  Future tests that were ordered for you today     Open Future Orders        Priority Expected Expires Ordered    MA Screening Digital Bilateral ([facility]) Routine  1/2/2019 1/2/2018            Who to contact     If you have questions or need follow up information about today's clinic visit or your schedule please contact [facility] directly at [phone].  Normal or non-critical lab and imaging results will be communicated to you by MyChart, letter or phone within 4 business days after the clinic has received the results. If you do not hear from us within 7 days, please contact the clinic through MyChart or phone. If you have a critical or abnormal lab result, we will notify you by phone as soon as possible.  Submit refill requests through [employer] or call your pharmacy and they will forward the refill request to us. Please allow 3 business days for your refill to be completed.          Additional Information About Your Visit        [employer] Information     [employer] gives you secure access to your electronic health record. If you see a primary care provider, you can also send messages to your care team and make appointments. If you have questions, please call your primary care clinic.  If you do not have a primary care provider, please call [phone] and they will assist you.        Care EveryWhere ID     This is your Care EveryWhere ID. This could be used by other organizations to access your [facility]  medical records  [id number]        Your Vitals Were     Pulse Temperature Respirations BMI (Body Mass Index)          60 98.2  F (36.8  C) 14 37.94 kg/m2         Blood Pressure from Last 3 Encounters:   01/02/18 110/70   07/12/17 122/78   03/20/17 122/78    Weight from Last 3 Encounters:   01/02/18 228 lb (103.4 kg)   07/12/17 228 lb 9.6 oz (103.7 kg)   03/20/17 223 lb (101.2 kg)              We Performed the Following     DEPRESSION EDUCATION     GENERAL SURG ADULT REFERRAL          Today's Medication Changes          These changes are accurate as of: 1/2/18  9:33 AM.  If you have any questions, ask your nurse or doctor.               Start taking these medicines.        Dose/Directions    azithromycin 250 MG tablet   Commonly known as:  ZITHROMAX   Used for:  Acute recurrent maxillary sinusitis   Started by:  Eliz Hartman NP        Two tablets first day, then one tablet daily for 9 days.   Quantity:  11 tablet   Refills:  0         Stop taking these medicines if you haven't already. Please contact your care team if you have questions.     Vitamin B-12 500 MCG Subl   Stopped by:  Eliz Hartman NP                Where to get your medicines      These medications were sent to [facility] [address] [place] [address] [address] [facility] [address]  [address], [place] [address]     Phone:  [phone]     azithromycin 250 MG tablet                Primary Care Provider Office Phone # Fax #    Eliz Hartman [phone] [address]  [place] [address]        Equal Access to Services     [facility] AH: Hadii aad ku hadasho Soomaali, waaxda luqadaha, qaybta kaalmada adeegyada, jeanie brito. So [facility] [phone].    ATENCIÓN: Si habla español, tiene a noriega disposición servicios gratuitos de asistencia lingüística. Llame al [phone].    We comply with applicable federal civil rights laws and [place] laws. We do not  discriminate on the basis of race, color, national origin, age, disability, sex, sexual orientation, or gender identity.            Thank you!     Thank you for choosing [facility]  for your care. Our goal is always to provide you with excellent care. Hearing back from our patients is one way we can continue to improve our services. Please take a few minutes to complete the written survey that you may receive in the mail after your visit with us. Thank you!             Your Updated Medication List - Protect others around you: Learn how to safely use, store and throw away your medicines at www.disposemymeds.org.          This list is accurate as of: 1/2/18  9:33 AM.  Always use your most recent med list.                   Brand Name Dispense Instructions for use Diagnosis    acetaminophen 325 MG tablet    TYLENOL    100 tablet    Take 3 tablets (975 mg) by mouth every 4 hours as needed for mild pain or fever    Pneumonia of left lower lobe due to infectious organism (H)       aspirin 81 MG EC tablet      Take 81 mg by mouth daily HS        azithromycin 250 MG tablet    ZITHROMAX    11 tablet    Two tablets first day, then one tablet daily for 9 days.    Acute recurrent maxillary sinusitis       escitalopram 20 MG tablet    LEXAPRO    90 tablet    Take 20mg daily, may take an extra 20mg once daily as needed    Episode of recurrent major depressive disorder, unspecified depression episode severity (H)       hydrochlorothiazide 25 MG tablet    HYDRODIURIL    90 tablet    Take 1 tablet (25 mg) by mouth daily    Essential hypertension       losartan 50 MG tablet    COZAAR    180 tablet    TAKE 2 TABLETS BY MOUTH EVERY DAY    Benign essential hypertension       metoprolol 50 MG 24 hr tablet    TOPROL-XL    135 tablet    TAKE 1 AND ONE-HALF TABLETS BY MOUTH EVERY DAY    Essential hypertension       omega 3 1000 MG Caps     90 capsule    Take 3 g by mouth daily        VITAMIN D3 PO      Take 3,000 mg by mouth  daily AM        warfarin 5 MG tablet    COUMADIN    150 tablet    7.5mg Mon,Wed,Fri and 5mg all other days or as directed by warfarin clinic    Long-term (current) use of anticoagulants

## 2018-01-02 NOTE — PATIENT INSTRUCTIONS
1. Acute recurrent maxillary sinusitis  azithromycin (ZITHROMAX) 250 MG tablet; Two tablets first day, then one tablet daily for 9 days.  Dispense: 11 tablet; Refill: 0  Fluids  Rest  Humidity at home - add bacteriostatic solution to humidifier  OTC Mucinex liquid cough and cold  Add Zicam or other zinc daily until you feel better    2. Fluid level behind tympanic membrane of right ear  - Chiropractic care discussed  - OTC zyrtec PRN    3. Skin tag  - Return for excision - scheduled    4. Moderate episode of recurrent major depressive disorder (H)  - Continue plan of care  - DEPRESSION EDUCATION - see attached    5. Encounter for screening mammogram for breast cancer  - MA Screening Digital Bilateral ([facility]); Future    6. Special screening for malignant neoplasms, colon  - GENERAL SURG ADULT REFERRAL    7. Family history of colon cancer  - GENERAL SURG ADULT REFERRAL        FU visit scheduled, please come fasting so we can update chronic disease management visits      Eliz Hartman NP  [facility]

## 2018-01-02 NOTE — LETTER
My Depression Action Plan  Name: Cassy Avila   Date of Birth 1955  Date: 1/2/2018    My doctor: Eliz Hartman   My clinic: [facility]  [address] [place] [address] [address]  [place] [address]  [phone]          GREEN    ZONE   Good Control    What it looks like:     Things are going generally well. You have normal up s and down s. You may even feel depressed from time to time, but bad moods usually last less than a day.   What you need to do:  1. Continue to care for yourself (see self care plan)  2. Check your depression survival kit and update it as needed  3. Follow your physician s recommendations including any medication.  4. Do not stop taking medication unless you consult with your physician first.           YELLOW         ZONE Getting Worse    What it looks like:     Depression is starting to interfere with your life.     It may be hard to get out of bed; you may be starting to isolate yourself from others.    Symptoms of depression are starting to last most all day and this has happened for several days.     You may have suicidal thoughts but they are not constant.   What you need to do:     1. Call your care team, your response to treatment will improve if you keep your care team informed of your progress. Yellow periods are signs an adjustment may need to be made.     2. Continue your self-care, even if you have to fake it!    3. Talk to someone in your support network    4. Open up your depression survival kit           RED    ZONE Medical Alert - Get Help    What it looks like:     Depression is seriously interfering with your life.     You may experience these or other symptoms: You can t get out of bed most days, can t work or engage in other necessary activities, you have trouble taking care of basic hygiene, or basic responsibilities, thoughts of suicide or death that will not go away, self-injurious behavior.     What you need to do:  1. Call your care team and  request a same-day appointment. If they are not available (weekends or after hours) call your local crisis line, emergency room or 911.      Electronically signed by: Eliz Hartman, January 2, 2018    Depression Self Care Plan / Survival Kit    Self-Care for Depression  Here s the deal. Your body and mind are really not as separate as most people think.  What you do and think affects how you feel and how you feel influences what you do and think. This means if you do things that people who feel good do, it will help you feel better.  Sometimes this is all it takes.  There is also a place for medication and therapy depending on how severe your depression is, so be sure to consult with your medical provider and/ or Behavioral Health Consultant if your symptoms are worsening or not improving.     In order to better manage my stress, I will:    Exercise  Get some form of exercise, every day. This will help reduce pain and release endorphins, the  feel good  chemicals in your brain. This is almost as good as taking antidepressants!  This is not the same as joining a gym and then never going! (they count on that by the way ) It can be as simple as just going for a walk or doing some gardening, anything that will get you moving.      Hygiene   Maintain good hygiene (Get out of bed in the morning, Make your bed, Brush your teeth, Take a shower, and Get dressed like you were going to work, even if you are unemployed).  If your clothes don't fit try to get ones that do.    Diet  I will strive to eat foods that are good for me, drink plenty of water, and avoid excessive sugar, caffeine, alcohol, and other mood-altering substances.  Some foods that are helpful in depression are: complex carbohydrates, B vitamins, flaxseed, fish or fish oil, fresh fruits and vegetables.    Psychotherapy  I agree to participate in Individual Therapy (if recommended).    Medication  If prescribed medications, I agree to take them.  Missing doses can  result in serious side effects.  I understand that drinking alcohol, or other illicit drug use, may cause potential side effects.  I will not stop my medication abruptly without first discussing it with my provider.    Staying Connected With Others  I will stay in touch with my friends, family members, and my primary care provider/team.    Use your imagination  Be creative.  We all have a creative side; it doesn t matter if it s oil painting, sand castles, or mud pies! This will also kick up the endorphins.    Witness Beauty  (AKA stop and smell the roses) Take a look outside, even in mid-winter. Notice colors, textures. Watch the squirrels and birds.     Service to others  Be of service to others.  There is always someone else in need.  By helping others we can  get out of ourselves  and remember the really important things.  This also provides opportunities for practicing all the other parts of the program.    Humor  Laugh and be silly!  Adjust your TV habits for less news and crime-drama and more comedy.    Control your stress  Try breathing deep, massage therapy, biofeedback, and meditation. Find time to relax each day.     My support system    Clinic Contact:  Phone number:    Contact 1:  Phone number:    Contact 2:  Phone number:    Lutheran/:  Phone number:    Therapist:  Phone number:    Local crisis center:    Phone number:    Other community support:  Phone number:

## 2018-01-03 ENCOUNTER — RADIANT APPOINTMENT (OUTPATIENT)
Dept: MAMMOGRAPHY | Facility: OTHER | Age: 63
End: 2018-01-03
Attending: NURSE PRACTITIONER
Payer: MEDICARE

## 2018-01-03 DIAGNOSIS — Z12.31 ENCOUNTER FOR SCREENING MAMMOGRAM FOR BREAST CANCER: ICD-10-CM

## 2018-01-03 PROCEDURE — 77067 SCR MAMMO BI INCL CAD: CPT | Mod: TC

## 2018-01-03 ASSESSMENT — ANXIETY QUESTIONNAIRES: GAD7 TOTAL SCORE: 0

## 2018-01-08 ENCOUNTER — TELEPHONE (OUTPATIENT)
Dept: FAMILY MEDICINE | Facility: OTHER | Age: 63
End: 2018-01-08

## 2018-01-08 ENCOUNTER — ANTICOAGULATION THERAPY VISIT (OUTPATIENT)
Dept: ANTICOAGULATION | Facility: OTHER | Age: 63
End: 2018-01-08
Payer: MEDICARE

## 2018-01-08 DIAGNOSIS — H65.191 ACUTE MUCOID OTITIS MEDIA OF RIGHT EAR: Primary | ICD-10-CM

## 2018-01-08 DIAGNOSIS — Z79.01 LONG-TERM (CURRENT) USE OF ANTICOAGULANTS: ICD-10-CM

## 2018-01-08 DIAGNOSIS — I26.99 PULMONARY EMBOLISM AND INFARCTION (H): ICD-10-CM

## 2018-01-08 LAB — INR PPP: 2.4

## 2018-01-08 NOTE — TELEPHONE ENCOUNTER
4:00 PM    Reason for Call: Phone Call    Description: Pt called and stated her RT ear is not getting better. Wondering if she should see Dr Conn? Please call her back at [phone]    Was an appointment offered for this call? No  If yes : Appointment type              Date    Preferred method for responding to this message: Telephone Call  What is your phone number ?    If we cannot reach you directly, may we leave a detailed response at the number you provided? Yes    Can this message wait until your PCP/provider returns, if available today? Not applicable    More Gutierrez

## 2018-01-08 NOTE — PROGRESS NOTES
ANTICOAGULATION FOLLOW-UP CLINIC VISIT    Patient Name:  Cassy Avila  Date:  1/8/2018  Contact Type:  Telephone    SUBJECTIVE:     Patient Findings     Positives Antibiotic use or infection    Comments Call from patient stating she was on a zpak last week. States she will probably get a different antibiotic as she still is not feeling well and still is having really bad ear pain. She did a PST and result was 2.4.  We discussed warfarin dosing and INR recheck date and she verbalized understanding. She will let me know if she gets put on another antibiotic.            OBJECTIVE    INR   Date Value Ref Range Status   01/08/2018 2.4  Final       ASSESSMENT / PLAN  INR assessment THER    Recheck INR In: 4 WEEKS    INR Location Home INR      Anticoagulation Summary as of 1/8/2018     INR goal 2.0-3.0   Today's INR 2.4   Maintenance plan 7.5 mg (5 mg x 1.5) on Mon, Wed, Fri; 5 mg (5 mg x 1) all other days   Full instructions 7.5 mg on Mon, Wed, Fri; 5 mg all other days   Weekly total 42.5 mg   No change documented Manju Escalera RN   Plan last modified Iram Lord RN (7/20/2016)   Next INR check 2/5/2018   Priority INR   Target end date Indefinite    Indications   Long-term (current) use of anticoagulants [Z79.01] [Z79.01]  Pulmonary embolism and infarction (H) [I26.99]  Deep vein thrombosis (DVT) (H) [I82.409] [I82.409]         Anticoagulation Episode Summary     INR check location Home Draw    Preferred lab     Send INR reminders to [facility] ANTICOAG POOL    Comments PST - [facility].        Anticoagulation Care Providers     Provider Role Specialty Phone number    Eliz Hartman NP [facility] [phone]            See the Encounter Report to view Anticoagulation Flowsheet and Dosing Calendar (Go to Encounters tab in chart review, and find the Anticoagulation Therapy Visit)        Manju Escalera, RN

## 2018-01-08 NOTE — MR AVS SNAPSHOT
Cassy Shawkevon   1/8/2018   Anticoagulation Therapy Visit    Description:  62 year old female   Provider:  Eliz Hartman NP   Department:  [facility] Anti Coagulation           INR as of 1/8/2018     Today's INR 2.4      Anticoagulation Summary as of 1/8/2018     INR goal 2.0-3.0   Today's INR 2.4   Full instructions 7.5 mg on Mon, Wed, Fri; 5 mg all other days   Next INR check 2/5/2018    Indications   Long-term (current) use of anticoagulants [Z79.01] [Z79.01]  Pulmonary embolism and infarction (H) [I26.99]  Deep vein thrombosis (DVT) (H) [I82.409] [I82.409]         January 2018 Details    Sun Mon Tue Wed Thu Fri Sat      1               2               3               4               5               6                 7               8      7.5 mg   See details      9      5 mg         10      7.5 mg         11      5 mg         12      7.5 mg         13      5 mg           14      5 mg         15      7.5 mg         16      5 mg         17      7.5 mg         18      5 mg         19      7.5 mg         20      5 mg           21      5 mg         22      7.5 mg         23      5 mg         24      7.5 mg         25      5 mg         26      7.5 mg         27      5 mg           28      5 mg         29      7.5 mg         30      5 mg         31      7.5 mg             Date Details   01/08 This INR check               How to take your warfarin dose     To take:  5 mg Take 1 of the 5 mg tablets.    To take:  7.5 mg Take 1.5 of the 5 mg tablets.           February 2018 Details    Sun Mon Tue Wed Thu Fri Sat         1      5 mg         2      7.5 mg         3      5 mg           4      5 mg         5            6               7               8               9               10                 11               12               13               14               15               16               17                 18               19               20               21               22               23               24                  25               26               27               28                   Date Details   No additional details    Date of next INR:  2/5/2018         How to take your warfarin dose     To take:  5 mg Take 1 of the 5 mg tablets.    To take:  7.5 mg Take 1.5 of the 5 mg tablets.

## 2018-01-11 DIAGNOSIS — H91.93 DECREASED HEARING OF BOTH EARS: Primary | ICD-10-CM

## 2018-01-17 ENCOUNTER — APPOINTMENT (OUTPATIENT)
Dept: LAB | Facility: OTHER | Age: 63
End: 2018-01-17
Attending: NURSE PRACTITIONER
Payer: MEDICARE

## 2018-01-17 ENCOUNTER — OFFICE VISIT (OUTPATIENT)
Dept: FAMILY MEDICINE | Facility: OTHER | Age: 63
End: 2018-01-17
Attending: NURSE PRACTITIONER
Payer: MEDICARE

## 2018-01-17 VITALS
TEMPERATURE: 97.2 F | BODY MASS INDEX: 37.99 KG/M2 | WEIGHT: 228 LBS | SYSTOLIC BLOOD PRESSURE: 118 MMHG | HEIGHT: 65 IN | RESPIRATION RATE: 14 BRPM | HEART RATE: 62 BPM | DIASTOLIC BLOOD PRESSURE: 72 MMHG | OXYGEN SATURATION: 96 %

## 2018-01-17 DIAGNOSIS — L91.8 SKIN TAG: ICD-10-CM

## 2018-01-17 DIAGNOSIS — Z53.8 STATIN MEDICATION NOT PRESCRIBED PER PHYSICIAN ORDERS: ICD-10-CM

## 2018-01-17 DIAGNOSIS — I10 ESSENTIAL HYPERTENSION: Primary | ICD-10-CM

## 2018-01-17 DIAGNOSIS — M71.372 OTHER BURSAL CYST, LEFT ANKLE AND FOOT: ICD-10-CM

## 2018-01-17 DIAGNOSIS — F33.1 MODERATE EPISODE OF RECURRENT MAJOR DEPRESSIVE DISORDER (H): ICD-10-CM

## 2018-01-17 DIAGNOSIS — E78.5 HYPERLIPIDEMIA LDL GOAL <100: ICD-10-CM

## 2018-01-17 LAB
ALBUMIN UR-MCNC: NEGATIVE MG/DL
ANION GAP SERPL CALCULATED.3IONS-SCNC: 13 MMOL/L (ref 3–14)
APPEARANCE UR: CLEAR
BILIRUB UR QL STRIP: NEGATIVE
BUN SERPL-MCNC: 13 MG/DL (ref 7–30)
CALCIUM SERPL-MCNC: 8.8 MG/DL (ref 8.5–10.1)
CHLORIDE SERPL-SCNC: 102 MMOL/L (ref 94–109)
CHOLEST SERPL-MCNC: 218 MG/DL
CO2 SERPL-SCNC: 24 MMOL/L (ref 20–32)
COLOR UR AUTO: YELLOW
CREAT SERPL-MCNC: 0.91 MG/DL (ref 0.52–1.04)
GFR SERPL CREATININE-BSD FRML MDRD: 62 ML/MIN/1.7M2
GLUCOSE SERPL-MCNC: 98 MG/DL (ref 70–99)
GLUCOSE UR STRIP-MCNC: NEGATIVE MG/DL
HDLC SERPL-MCNC: 43 MG/DL
HGB UR QL STRIP: NEGATIVE
KETONES UR STRIP-MCNC: NEGATIVE MG/DL
LDLC SERPL CALC-MCNC: 137 MG/DL
LEUKOCYTE ESTERASE UR QL STRIP: NEGATIVE
NITRATE UR QL: NEGATIVE
NONHDLC SERPL-MCNC: 175 MG/DL
PH UR STRIP: 6.5 PH (ref 5–7)
POTASSIUM SERPL-SCNC: 3.2 MMOL/L (ref 3.4–5.3)
SODIUM SERPL-SCNC: 139 MMOL/L (ref 133–144)
SOURCE: NORMAL
SP GR UR STRIP: 1.02 (ref 1–1.03)
T4 FREE SERPL-MCNC: 0.89 NG/DL (ref 0.76–1.46)
TRIGL SERPL-MCNC: 190 MG/DL
TSH SERPL DL<=0.005 MIU/L-ACNC: 5.75 MU/L (ref 0.4–4)
UROBILINOGEN UR STRIP-ACNC: 0.2 EU/DL (ref 0.2–1)

## 2018-01-17 PROCEDURE — 81003 URINALYSIS AUTO W/O SCOPE: CPT | Mod: ZL | Performed by: NURSE PRACTITIONER

## 2018-01-17 PROCEDURE — 36415 COLL VENOUS BLD VENIPUNCTURE: CPT | Mod: ZL | Performed by: NURSE PRACTITIONER

## 2018-01-17 PROCEDURE — 80048 BASIC METABOLIC PNL TOTAL CA: CPT | Mod: ZL | Performed by: NURSE PRACTITIONER

## 2018-01-17 PROCEDURE — 11200 RMVL SKIN TAGS UP TO&INC 15: CPT | Performed by: NURSE PRACTITIONER

## 2018-01-17 PROCEDURE — 80061 LIPID PANEL: CPT | Mod: ZL | Performed by: NURSE PRACTITIONER

## 2018-01-17 PROCEDURE — 84443 ASSAY THYROID STIM HORMONE: CPT | Mod: ZL | Performed by: NURSE PRACTITIONER

## 2018-01-17 PROCEDURE — 99214 OFFICE O/P EST MOD 30 MIN: CPT | Mod: 25 | Performed by: NURSE PRACTITIONER

## 2018-01-17 PROCEDURE — 84439 ASSAY OF FREE THYROXINE: CPT | Mod: ZL | Performed by: NURSE PRACTITIONER

## 2018-01-17 PROCEDURE — G0463 HOSPITAL OUTPT CLINIC VISIT: HCPCS | Mod: 25

## 2018-01-17 ASSESSMENT — PATIENT HEALTH QUESTIONNAIRE - PHQ9
5. POOR APPETITE OR OVEREATING: SEVERAL DAYS
SUM OF ALL RESPONSES TO PHQ QUESTIONS 1-9: 5

## 2018-01-17 ASSESSMENT — ANXIETY QUESTIONNAIRES
3. WORRYING TOO MUCH ABOUT DIFFERENT THINGS: SEVERAL DAYS
6. BECOMING EASILY ANNOYED OR IRRITABLE: SEVERAL DAYS
2. NOT BEING ABLE TO STOP OR CONTROL WORRYING: NOT AT ALL
7. FEELING AFRAID AS IF SOMETHING AWFUL MIGHT HAPPEN: NOT AT ALL
1. FEELING NERVOUS, ANXIOUS, OR ON EDGE: NOT AT ALL
GAD7 TOTAL SCORE: 3
5. BEING SO RESTLESS THAT IT IS HARD TO SIT STILL: NOT AT ALL

## 2018-01-17 NOTE — NURSING NOTE
"Chief Complaint   Patient presents with     Depression     Hypertension     Hyperlipidemia     Derm Problem     Skin tag removal, under arm pit, bra line and back       Initial /72 (BP Location: Left arm, Patient Position: Sitting, Cuff Size: Adult Large)  Pulse 62  Temp 97.2  F (36.2  C) (Tympanic)  Resp 14  Ht 5' 5\" (1.651 m)  Wt 228 lb (103.4 kg)  SpO2 96%  BMI 37.94 kg/m2 Estimated body mass index is 37.94 kg/(m^2) as calculated from the following:    Height as of this encounter: 5' 5\" (1.651 m).    Weight as of this encounter: 228 lb (103.4 kg).  Medication Reconciliation: padmini Vickers      "

## 2018-01-17 NOTE — LETTER
My Depression Action Plan  Name: Cassy Avila   Date of Birth 1955  Date: 1/17/2018    My doctor: Eliz Hartman   My clinic: [facility]  [address] [place] [address] [address]  [place] [address]  [phone]          GREEN    ZONE   Good Control    What it looks like:     Things are going generally well. You have normal up s and down s. You may even feel depressed from time to time, but bad moods usually last less than a day.   What you need to do:  1. Continue to care for yourself (see self care plan)  2. Check your depression survival kit and update it as needed  3. Follow your physician s recommendations including any medication.  4. Do not stop taking medication unless you consult with your physician first.           YELLOW         ZONE Getting Worse    What it looks like:     Depression is starting to interfere with your life.     It may be hard to get out of bed; you may be starting to isolate yourself from others.    Symptoms of depression are starting to last most all day and this has happened for several days.     You may have suicidal thoughts but they are not constant.   What you need to do:     1. Call your care team, your response to treatment will improve if you keep your care team informed of your progress. Yellow periods are signs an adjustment may need to be made.     2. Continue your self-care, even if you have to fake it!    3. Talk to someone in your support network    4. Open up your depression survival kit           RED    ZONE Medical Alert - Get Help    What it looks like:     Depression is seriously interfering with your life.     You may experience these or other symptoms: You can t get out of bed most days, can t work or engage in other necessary activities, you have trouble taking care of basic hygiene, or basic responsibilities, thoughts of suicide or death that will not go away, self-injurious behavior.     What you need to do:  1. Call your care team and  request a same-day appointment. If they are not available (weekends or after hours) call your local crisis line, emergency room or 911.      Electronically signed by: Eliz Hartman, January 17, 2018    Depression Self Care Plan / Survival Kit    Self-Care for Depression  Here s the deal. Your body and mind are really not as separate as most people think.  What you do and think affects how you feel and how you feel influences what you do and think. This means if you do things that people who feel good do, it will help you feel better.  Sometimes this is all it takes.  There is also a place for medication and therapy depending on how severe your depression is, so be sure to consult with your medical provider and/ or Behavioral Health Consultant if your symptoms are worsening or not improving.     In order to better manage my stress, I will:    Exercise  Get some form of exercise, every day. This will help reduce pain and release endorphins, the  feel good  chemicals in your brain. This is almost as good as taking antidepressants!  This is not the same as joining a gym and then never going! (they count on that by the way ) It can be as simple as just going for a walk or doing some gardening, anything that will get you moving.      Hygiene   Maintain good hygiene (Get out of bed in the morning, Make your bed, Brush your teeth, Take a shower, and Get dressed like you were going to work, even if you are unemployed).  If your clothes don't fit try to get ones that do.    Diet  I will strive to eat foods that are good for me, drink plenty of water, and avoid excessive sugar, caffeine, alcohol, and other mood-altering substances.  Some foods that are helpful in depression are: complex carbohydrates, B vitamins, flaxseed, fish or fish oil, fresh fruits and vegetables.    Psychotherapy  I agree to participate in Individual Therapy (if recommended).    Medication  If prescribed medications, I agree to take them.  Missing doses can  result in serious side effects.  I understand that drinking alcohol, or other illicit drug use, may cause potential side effects.  I will not stop my medication abruptly without first discussing it with my provider.    Staying Connected With Others  I will stay in touch with my friends, family members, and my primary care provider/team.    Use your imagination  Be creative.  We all have a creative side; it doesn t matter if it s oil painting, sand castles, or mud pies! This will also kick up the endorphins.    Witness Beauty  (AKA stop and smell the roses) Take a look outside, even in mid-winter. Notice colors, textures. Watch the squirrels and birds.     Service to others  Be of service to others.  There is always someone else in need.  By helping others we can  get out of ourselves  and remember the really important things.  This also provides opportunities for practicing all the other parts of the program.    Humor  Laugh and be silly!  Adjust your TV habits for less news and crime-drama and more comedy.    Control your stress  Try breathing deep, massage therapy, biofeedback, and meditation. Find time to relax each day.     My support system    Clinic Contact:  Phone number:    Contact 1:  Phone number:    Contact 2:  Phone number:    Muslim/:  Phone number:    Therapist:  Phone number:    Local crisis center:    Phone number:    Other community support:  Phone number:

## 2018-01-17 NOTE — PROGRESS NOTES
Cant take statin  Willing to try Lopid?  If so, pls call in  Fish oil 3 per day  Low fat diet    TSH a bit up, recheck 4 weeks    Potassium a bit low - add potassium rich foods, recheck that as well, 4 weeks.    Eliz MCFARLANE  [phone]

## 2018-01-17 NOTE — PROGRESS NOTES
SUBJECTIVE:   Cassy Avila is a 62 year old female who presents to clinic today for the following health issues:      Hyperlipidemia Follow-Up    Rate your low fat/cholesterol diet?: good    Taking statin?  No    Other lipid medications/supplements?:  Fish oil/Omega 3, dose  without side effects    Hypertension Follow-up    Outpatient blood pressures are not being checked.    Low Salt Diet: low salt    Depression and Anxiety Follow-Up    Status since last visit: No change    Other associated symptoms:None    Complicating factors:     Significant life event: No     Current substance abuse: None      PHQ-9 Score and MyChart F/U Questions 2/6/2017 7/12/2017 1/2/2018   Total Score 0 3 5   Q9: Suicide Ideation Not at all Not at all Not at all     MARGA-7 SCORE 2/6/2017 7/12/2017 1/2/2018   Total Score 0 1 0     In the past two weeks have you had thoughts of suicide or self-harm?  No.    Do you have concerns about your personal safety or the safety of others?   No    PHQ-9  English  PHQ-9   Any Language  GAD7  Suicide Assessment Five-step Evaluation and Treatment (SAFE-T)      History of DVT and PE, on coumadin, managed by our coumadin clinic.      Left outer ankle - cyst - larger, soft, tender, requests ortho consultation      Skin tags - see smart set      Amount of exercise or physical activity: No    Problems taking medications regularly: No    Medication side effects: none      Diet: low salt and low fat/cholesterol        EXAM: MA SCREENING DIGITAL BILATERAL, 1/3/2018 10:11 AM     COMPARISONS: 2011 2016     HISTORY: annual overdue; Encounter for screening mammogram for breast  cancer     FINDINGS: No dominant masses or malignant calcifications are noted.  This of asymmetric densities in the left breast which are stable from  previous examination.     BREAST DENSITY: The breasts are heterogeneously dense.         IMPRESSION: BI-RADS CATEGORY: 2 - Benign.     RECOMMENDED FOLLOW-UP: Annual Mammography.        MARCELLE  MD THEO        Problem list and histories reviewed & adjusted, as indicated.  Additional history: as documented    Patient Active Problem List   Diagnosis     Essential hypertension     Pulmonary embolism and infarction (H)     Contact dermatitis and other eczema, due to unspecified cause     Edema     Hyperlipidemia LDL goal <100     Neurofibromatosis (H)     Advanced care planning/counseling discussion     Moderate episode of recurrent major depressive disorder (H)     Long-term (current) use of anticoagulants [Z79.01]     Deep vein thrombosis (DVT) (H) [I82.409]     Lumbar spondylosis with myelopathy     Degeneration of lumbar or lumbosacral intervertebral disc     Family history of colon cancer     Statin medication not prescribed per physician orders     Past Surgical History:   Procedure Laterality Date     ------------OTHER-------------      shoulder replacement; Provider: Karen     ARTHROPLASTY KNEE  2014    Procedure: ARTHROPLASTY KNEE;  Surgeon: Sean Alexander MD;  Location: [facility] OR     ARTHROSCOPY SHOULDER  2012    right, bone spurs      SECTION      x3     CHOLECYSTECTOMY       elbow ulnar tunnel release       ELECTROTHERMAL THERAPY INTRADISC  2017    stimulator     ENDOSCOPIC SINUS SURGERY, SEPTOPLASTY, TURBINOPLASTY, MAXILLARY SINUSOTOMY, COMBINED N/A 2015    Procedure: COMBINED ENDOSCOPIC SINUS SURGERY, SEPTOPLASTY, TURBINOPLASTY, MAXILLARY SINUSOTOMY;  Surgeon: Seema Conn MD;  Location: [facility] OR     esophagastroduodenoscopy      with biopsy and endoscopic U/S     EXCISE NEUROMA LOWER EXTREMITY Left 2016    Procedure: EXCISE NEUROMA LOWER EXTREMITY;  Surgeon: Edi Reed MD;  Location: [facility] OR     FUSION LUMBAR ANTERIOR WITH MARCY CAGES      L5-S1     ORTHOPEDIC SURGERY  2-15    right shoulder     ORTHOPEDIC SURGERY  8/28/15    right knee     pionidal cyst excision       SARAHI/BSO       TRANSPOSITION ULNAR NERVE (ELBOW)         Social History    Substance Use Topics     Smoking status: Former Smoker     Packs/day: 0.50     Years: 30.00     Types: Cigarettes, Pipe     Smokeless tobacco: Never Used      Comment: quit in 1999     Alcohol use No     Family History   Problem Relation Age of Onset     CANCER Mother      Colon Polyps Mother      Heart Failure Mother 87     congestive, cause of death     Myocardial Infarction Mother      myocardial infarction     Myocardial Infarction Father      myocardial infarction - cause of death     C.A.D. Father      CANCER Paternal Uncle      cause of death     C.A.D. Brother      Other - See Comments Other      factor 5 - family h/o     Asthma No family hx of          Current Outpatient Prescriptions   Medication Sig Dispense Refill     STATIN NOT PRESCRIBED, INTENTIONAL, Please choose reason not prescribed, below       escitalopram (LEXAPRO) 20 MG tablet Take 20mg daily, may take an extra 20mg once daily as needed 90 tablet 1     losartan (COZAAR) 50 MG tablet TAKE 2 TABLETS BY MOUTH EVERY  tablet 2     metoprolol (TOPROL-XL) 50 MG 24 hr tablet TAKE 1 AND ONE-HALF TABLETS BY MOUTH EVERY  tablet 2     warfarin (COUMADIN) 5 MG tablet 7.5mg Mon,Wed,Fri and 5mg all other days or as directed by warfarin clinic 150 tablet 3     hydrochlorothiazide (HYDRODIURIL) 25 MG tablet Take 1 tablet (25 mg) by mouth daily 90 tablet 2     acetaminophen (TYLENOL) 325 MG tablet Take 3 tablets (975 mg) by mouth every 4 hours as needed for mild pain or fever 100 tablet      Cholecalciferol (VITAMIN D3 PO) Take 3,000 mg by mouth daily AM       omega 3 1000 MG CAPS Take 3 g by mouth daily  90 capsule      aspirin 81 MG EC tablet Take 81 mg by mouth daily HS       Allergies   Allergen Reactions     Ace Inhibitors Cough     Albuterol Sulfate Hives     DuoNeb     Amlodipine Besylate Swelling     Norvasc     Amoxicillin      Atorvastatin      myualgia     Cephalexin Monohydrate Hives     Keflex     Erythromycin Base [Kdc:Yellow  Dye+Erythromycin+Brilliant Blue Fcf] Nausea and Vomiting     Ipratropium Bromide Hives     DuoNeb     Meloxicam Other (See Comments)     Mobic - confusion, depression     Adhesive Tape Rash     Prochlorperazine Edisylate Swelling and Rash     Compazine     Prochlorperazine Maleate Swelling and Rash     Recent Labs   Lab Test  07/12/17   1117  03/14/17   2028  01/04/17   1459   01/11/16   0935   02/17/15   1149  08/08/14   0815   11/05/13   1528   A1C   --    --    --    --    --    --    --    --    --   5.5   LDL   --    --    --    --   126*   --   119  51   --    --    HDL   --    --    --    --   38*   --   36*  33*   --    --    TRIG   --    --    --    --   265*   --   272*  149   --    --    ALT  34  30  35   --    --    --    --   29   < >   --    CR  0.91  0.97  1.07*   --   1.02   < >  0.78  0.81   < >   --    GFRESTIMATED  63  58*  52*   --   55*   < >  75  72   < >   --    GFRESTBLACK  76  70  63   --   67   < >  >90   GFR Calc    87   < >   --    POTASSIUM  3.6  3.6  3.5   < >  3.4   < >  4.0  3.8   < >   --    TSH   --    --   1.63   --   3.62   < >   --   2.07   --    --     < > = values in this interval not displayed.      BP Readings from Last 3 Encounters:   01/17/18 118/72   01/02/18 110/70   07/12/17 122/78    Wt Readings from Last 3 Encounters:   01/17/18 228 lb (103.4 kg)   01/02/18 228 lb (103.4 kg)   07/12/17 228 lb 9.6 oz (103.7 kg)                  Labs reviewed in EPIC          Reviewed and updated as needed this visit by clinical staffTobacco  Allergies  Meds       Reviewed and updated as needed this visit by Provider         ROS:  C: NEGATIVE for fever, chills, change in weight  INTEGUMENTARY/SKIN: see smart set  E: NEGATIVE for vision changes or irritation  E/M: NEGATIVE for ear, mouth and throat problems  R: NEGATIVE for significant cough or SOB  B: NEGATIVE for masses, tenderness or discharge  CV: NEGATIVE for chest pain, palpitations or peripheral edema  GI:  "NEGATIVE for nausea, abdominal pain, heartburn, or change in bowel habits  : NEGATIVE for frequency, dysuria, or hematuria  MUSCULOSKELETAL:as above  N: NEGATIVE for weakness, dizziness or paresthesias  E: NEGATIVE for temperature intolerance, skin/hair changes  H: NEGATIVE for bleeding problems  P: NEGATIVE for changes in mood or affect    OBJECTIVE:     /72 (BP Location: Left arm, Patient Position: Sitting, Cuff Size: Adult Large)  Pulse 62  Temp 97.2  F (36.2  C) (Tympanic)  Resp 14  Ht 5' 5\" (1.651 m)  Wt 228 lb (103.4 kg)  SpO2 96%  BMI 37.94 kg/m2  Body mass index is 37.94 kg/(m^2).       GENERAL: healthy, alert and no distress  EYES: Eyes grossly normal to inspection, PERRL and conjunctivae and sclerae normal  HENT: Bilateral middle ear effusion sees Dr Conn in near future  NECK: no adenopathy, no asymmetry, masses, or scars and thyroid normal to palpation  RESP: lungs clear to auscultation - no rales, rhonchi or wheezes  BREAST: normal without masses, tenderness or nipple discharge and no palpable axillary masses or adenopathy  CV: regular rate and rhythm, normal S1 S2, no S3 or S4, no murmur, click or rub, no peripheral edema and peripheral pulses strong  ABDOMEN: soft, nontender, no hepatosplenomegaly, no masses and bowel sounds normal  MS: Left ankle - lateral malleolus - soft cyst, tender, would like to see Ortho  SKIN: see below - smart set  PSYCH: mentation appears normal, affect normal/bright        Skin tag excision.   Scattered skin tags - beneath each breast - and bilateral axilla.  Narrow base, catching on clothing.     Pause for the cause has been completed prior to the prceedure.   2. Cassy was identified by both name and date of birth   3. The correct site was identified   4. Site was marked by provider    5. Written informed consent correct and signed or verbal authorization  to proceed was obtained   6. Verifed necessary supplies, equipment, and diagnostics are available "    7. Time out was performed immediately prior to procedure    Objective: The lesion(s) is/are of the above mentioned size and location and is/are typical. The area was prepped and appropriately anesthetized. Using the usual technique, excision with an iris scissors was performed. An appropriate dressing was applied. The procedure was well tolerated and without complications.     Assessment: skin tags    Plan: Follow up: The patient may return prn.. Wound care instructions provided.  Patient was instructed to be alert for any signs of cutaneous infection.       Results for orders placed or performed in visit on 01/17/18 (from the past 48 hour(s))   Lipid Profile   Result Value Ref Range    Cholesterol 218 (H) <200 mg/dL    Triglycerides 190 (H) <150 mg/dL    HDL Cholesterol 43 (L) >49 mg/dL    LDL Cholesterol Calculated 137 (H) <100 mg/dL    Non HDL Cholesterol 175 (H) <130 mg/dL   Basic metabolic panel   Result Value Ref Range    Sodium 139 133 - 144 mmol/L    Potassium 3.2 (L) 3.4 - 5.3 mmol/L    Chloride 102 94 - 109 mmol/L    Carbon Dioxide 24 20 - 32 mmol/L    Anion Gap 13 3 - 14 mmol/L    Glucose 98 70 - 99 mg/dL    Urea Nitrogen 13 7 - 30 mg/dL    Creatinine 0.91 0.52 - 1.04 mg/dL    GFR Estimate 62 >60 mL/min/1.7m2    GFR Estimate If Black 75 >60 mL/min/1.7m2    Calcium 8.8 8.5 - 10.1 mg/dL   TSH with free T4 reflex   Result Value Ref Range    TSH 5.75 (H) 0.40 - 4.00 mU/L   T4 free   Result Value Ref Range    T4 Free 0.89 0.76 - 1.46 ng/dL   *UA reflex to Microscopic   Result Value Ref Range    Color Urine Yellow     Appearance Urine Clear     Glucose Urine Negative NEG^Negative mg/dL    Bilirubin Urine Negative NEG^Negative    Ketones Urine Negative NEG^Negative mg/dL    Specific Gravity Urine 1.020 1.003 - 1.035    Blood Urine Negative NEG^Negative    pH Urine 6.5 5.0 - 7.0 pH    Protein Albumin Urine Negative NEG^Negative mg/dL    Urobilinogen Urine 0.2 0.2 - 1.0 EU/dL    Nitrite Urine Negative  NEG^Negative    Leukocyte Esterase Urine Negative NEG^Negative    Source Midstream Urine          Visit time:   Over 25  minutes are spent with the patient, over 50% of which was in education and counseling regarding current conditions and treatment/therapy.  Time spent with patient, including examination, face to face patient education, review of disease process, and plan of care  15  mn  Visit Content: During our face to face time, patient education was provided regarding diagnosis, and treatment pan.  Relevant laboratory and diagnostic testing is reviewed prior to visit, and updated at this appointment as indicated  Health Maintenance - updated as appropriate.  Record review completed            ASSESSMENT/PLAN:     Hyperlipidemia; controlled   Plan:  No changes in the patient's current treatment plan    Hypertension; controlled   Associated with the following complications:    None   Plan:  No changes in the patient's current treatment plan    Depression; recurrent episode-- Mild   Associated with the following complications:    None   Plan:  No changes in the patient's current treatment plan          1. Essential hypertension  - UA reflex to Microscopic  - Basic metabolic panel  - TSH with free T4 reflex    2. Hyperlipidemia LDL goal <100  - STATIN NOT PRESCRIBED, INTENTIONAL,; Please choose reason not prescribed, below  - Lipid Profile    3. Statin medication not prescribed per physician orders  - Myalgia    4. Moderate episode of recurrent major depressive disorder (H)  - Continue plan of care  - DEPRESSION EDUCATION    5. Other bursal cyst, left ankle and foot  - ORTHOPEDICS ADULT REFERRAL    6. Skin tags  - REMOVAL OF SKIN TAGS, FIRST 15         Elzi Hartman NP  [facility]

## 2018-01-17 NOTE — PATIENT INSTRUCTIONS
1. Essential hypertension  - UA reflex to Microscopic  - Basic metabolic panel  - TSH with free T4 reflex    2. Hyperlipidemia LDL goal <100  - STATIN NOT PRESCRIBED, INTENTIONAL,; Please choose reason not prescribed, below  - Lipid Profile    3. Statin medication not prescribed per physician orders  - Myalgia    4. Moderate episode of recurrent major depressive disorder (H)  - Continue plan of care  - DEPRESSION EDUCATION    5. Other bursal cyst, left ankle and foot  - ORTHOPEDICS ADULT REFERRAL    6. Skin tags  - REMOVAL OF SKIN TAGS, FIRST 15         Eliz Hartman NP  [facility]

## 2018-01-17 NOTE — MR AVS SNAPSHOT
After Visit Summary   1/17/2018    Cassy Avila    MRN: [id number]           Patient Information     Date Of Birth          1955        Visit Information        Provider Department      1/17/2018 8:15 AM Eliz Hartman NP [facility]        Today's Diagnoses     Essential hypertension    -  1    Hyperlipidemia LDL goal <100        Statin medication not prescribed per physician orders        Moderate episode of recurrent major depressive disorder (H)        Other bursal cyst, left ankle and foot        Skin tag          Care Instructions      1. Essential hypertension  - UA reflex to Microscopic  - Basic metabolic panel  - TSH with free T4 reflex    2. Hyperlipidemia LDL goal <100  - STATIN NOT PRESCRIBED, INTENTIONAL,; Please choose reason not prescribed, below  - Lipid Profile    3. Statin medication not prescribed per physician orders  - Myalgia    4. Moderate episode of recurrent major depressive disorder (H)  - Continue plan of care  - DEPRESSION EDUCATION    5. Other bursal cyst, left ankle and foot  - ORTHOPEDICS ADULT REFERRAL    6. Skin tags  - REMOVAL OF SKIN TAGS, FIRST 15         Eliz Hartman NP  [facility]              Follow-ups after your visit        Additional Services     ORTHOPEDICS ADULT REFERRAL       Your provider has referred you to: [facility] [place]  Please be aware that coverage of these services is subject to the terms and limitations of your health insurance plan.  Call member services at your health plan with any benefit or coverage questions.      Please bring the following to your appointment:    >>   Any x-rays, CTs or MRIs which have been performed.  Contact the facility where they were done to arrange for  prior to your scheduled appointment.    >>   List of current medications   >>   This referral request   >>   Any documents/labs given to you for this referral                  Your next 10 appointments already scheduled   "   Jan 29, 2018  8:45 AM CST   (Arrive by 8:30 AM)   New Visit with Flora Sharp   [facility] [place] ([facility] [place] [facility] [place] [facility]    [address]  [place] [address]   [phone]            Jan 29, 2018  9:15 AM CST   (Arrive by 9:00 AM)   Return Visit with Seema Conn MD   [facility] [place] ([facility] [place] [facility] [place] )    [address]  [place] [address]   [phone]              Who to contact     If you have questions or need follow up information about today's clinic visit or your schedule please contact [facility] directly at [phone].  Normal or non-critical lab and imaging results will be communicated to you by MyChart, letter or phone within 4 business days after the clinic has received the results. If you do not hear from us within 7 days, please contact the clinic through MyChart or phone. If you have a critical or abnormal lab result, we will notify you by phone as soon as possible.  Submit refill requests through [employer] or call your pharmacy and they will forward the refill request to us. Please allow 3 business days for your refill to be completed.          Additional Information About Your Visit        [employer] Information     [employer]Account gives you secure access to your electronic health record. If you see a primary care provider, you can also send messages to your care team and make appointments. If you have questions, please call your primary care clinic.  If you do not have a primary care provider, please call [phone] and they will assist you.        Care EveryWhere ID     This is your Care EveryWhere ID. This could be used by other organizations to access your [place] medical records  [id number]        Your Vitals Were     Pulse Temperature Respirations Height Pulse Oximetry BMI (Body Mass Index)    62 97.2  F (36.2  C) (Tympanic) 14 5' 5\" (1.651 m) 96% 37.94 kg/m2       Blood Pressure from Last 3 Encounters:   01/17/18 " 118/72   01/02/18 110/70   07/12/17 122/78    Weight from Last 3 Encounters:   01/17/18 228 lb (103.4 kg)   01/02/18 228 lb (103.4 kg)   07/12/17 228 lb 9.6 oz (103.7 kg)              We Performed the Following     *UA reflex to Microscopic     Basic metabolic panel     DEPRESSION EDUCATION     Lipid Profile     ORTHOPEDICS ADULT REFERRAL     REMOVAL OF SKIN TAGS, FIRST 15     TSH with free T4 reflex          Today's Medication Changes          These changes are accurate as of: 1/17/18  9:05 AM.  If you have any questions, ask your nurse or doctor.               Start taking these medicines.        Dose/Directions    STATIN NOT PRESCRIBED (INTENTIONAL)   Used for:  Hyperlipidemia LDL goal <100   Started by:  Eliz Hartman NP        Please choose reason not prescribed, below   Refills:  0            Where to get your medicines      Some of these will need a paper prescription and others can be bought over the counter.  Ask your nurse if you have questions.     You don't need a prescription for these medications     STATIN NOT PRESCRIBED (INTENTIONAL)                Primary Care Provider Office Phone # Fax #    Eliz Hartman [phone] [address]  [place] [address]        Equal Access to Services     [facility] AH: Hadii aad ku hadasho Soomaali, waaxda luqadaha, qaybta kaalmada adeegyada, jeanie brito. So [facility] [phone].    ATENCIÓN: Si habla español, tiene a noriega disposición servicios gratuitos de asistencia lingüística. Llame al [phone].    We comply with applicable federal civil rights laws and [place] laws. We do not discriminate on the basis of race, color, national origin, age, disability, sex, sexual orientation, or gender identity.            Thank you!     Thank you for choosing [facility]  for your care. Our goal is always to provide you with excellent care. Hearing back from our patients is one way we can continue to  improve our services. Please take a few minutes to complete the written survey that you may receive in the mail after your visit with us. Thank you!             Your Updated Medication List - Protect others around you: Learn how to safely use, store and throw away your medicines at www.disposemymeds.org.          This list is accurate as of: 1/17/18  9:05 AM.  Always use your most recent med list.                   Brand Name Dispense Instructions for use Diagnosis    acetaminophen 325 MG tablet    TYLENOL    100 tablet    Take 3 tablets (975 mg) by mouth every 4 hours as needed for mild pain or fever    Pneumonia of left lower lobe due to infectious organism (H)       aspirin 81 MG EC tablet      Take 81 mg by mouth daily HS        escitalopram 20 MG tablet    LEXAPRO    90 tablet    Take 20mg daily, may take an extra 20mg once daily as needed    Episode of recurrent major depressive disorder, unspecified depression episode severity (H)       hydrochlorothiazide 25 MG tablet    HYDRODIURIL    90 tablet    Take 1 tablet (25 mg) by mouth daily    Essential hypertension       losartan 50 MG tablet    COZAAR    180 tablet    TAKE 2 TABLETS BY MOUTH EVERY DAY    Benign essential hypertension       metoprolol succinate 50 MG 24 hr tablet    TOPROL-XL    135 tablet    TAKE 1 AND ONE-HALF TABLETS BY MOUTH EVERY DAY    Essential hypertension       omega 3 1000 MG Caps     90 capsule    Take 3 g by mouth daily        STATIN NOT PRESCRIBED (INTENTIONAL)      Please choose reason not prescribed, below    Hyperlipidemia LDL goal <100       VITAMIN D3 PO      Take 3,000 mg by mouth daily AM        warfarin 5 MG tablet    COUMADIN    150 tablet    7.5mg Mon,Wed,Fri and 5mg all other days or as directed by warfarin clinic    Long-term (current) use of anticoagulants

## 2018-01-17 NOTE — Clinical Note
Chronic disease mgmt and skin tag excision, smart set for derm attached.  No path pending.   Split bill jane Hartman [facility] [phone]

## 2018-01-18 ENCOUNTER — TELEPHONE (OUTPATIENT)
Dept: FAMILY MEDICINE | Facility: OTHER | Age: 63
End: 2018-01-18

## 2018-01-18 DIAGNOSIS — E03.9 HYPOTHYROIDISM, UNSPECIFIED TYPE: Primary | ICD-10-CM

## 2018-01-18 DIAGNOSIS — E78.5 HYPERLIPIDEMIA LDL GOAL <100: ICD-10-CM

## 2018-01-18 RX ORDER — GEMFIBROZIL 600 MG/1
600 TABLET, FILM COATED ORAL 2 TIMES DAILY
Qty: 180 TABLET | Refills: 1 | Status: SHIPPED | OUTPATIENT
Start: 2018-01-18 | End: 2018-02-05

## 2018-01-19 ENCOUNTER — TRANSFERRED RECORDS (OUTPATIENT)
Dept: HEALTH INFORMATION MANAGEMENT | Facility: HOSPITAL | Age: 63
End: 2018-01-19

## 2018-01-19 ASSESSMENT — ANXIETY QUESTIONNAIRES: GAD7 TOTAL SCORE: 3

## 2018-01-22 ENCOUNTER — ANTICOAGULATION THERAPY VISIT (OUTPATIENT)
Dept: ANTICOAGULATION | Facility: OTHER | Age: 63
End: 2018-01-22
Payer: MEDICARE

## 2018-01-22 ENCOUNTER — TRANSFERRED RECORDS (OUTPATIENT)
Dept: HEALTH INFORMATION MANAGEMENT | Facility: HOSPITAL | Age: 63
End: 2018-01-22

## 2018-01-22 DIAGNOSIS — I26.99 PULMONARY EMBOLISM AND INFARCTION (H): ICD-10-CM

## 2018-01-22 DIAGNOSIS — Z79.01 LONG-TERM (CURRENT) USE OF ANTICOAGULANTS: ICD-10-CM

## 2018-01-22 LAB — INR POINT OF CARE: 2.7 (ref 0.86–1.14)

## 2018-01-22 NOTE — MR AVS SNAPSHOT
Cassy Laceyjared   1/22/2018   Anticoagulation Therapy Visit    Description:  62 year old female   Provider:  Eliz Hartman NP   Department:  Hc Anti Coagulation           INR as of 1/22/2018     Today's INR 2.7      Anticoagulation Summary as of 1/22/2018     INR goal 2.0-3.0   Today's INR 2.7   Full instructions 7.5 mg on Mon, Wed, Fri; 5 mg all other days   Next INR check 2/5/2018    Indications   Long-term (current) use of anticoagulants [Z79.01] [Z79.01]  Pulmonary embolism and infarction (H) [I26.99]  Deep vein thrombosis (DVT) (H) [I82.409] [I82.409]         January 2018 Details    Sun Mon Tue Wed Thu Fri Sat      1               2               3               4               5               6                 7               8               9               10               11               12               13                 14               15               16               17               18               19               20                 21               22      7.5 mg   See details      23      5 mg         24      7.5 mg         25      5 mg         26      7.5 mg         27      5 mg           28      5 mg         29      7.5 mg         30      5 mg         31      7.5 mg             Date Details   01/22 This INR check               How to take your warfarin dose     To take:  5 mg Take 1 of the 5 mg tablets.    To take:  7.5 mg Take 1.5 of the 5 mg tablets.           February 2018 Details    Sun Mon Tue Wed Thu Fri Sat         1      5 mg         2      7.5 mg         3      5 mg           4      5 mg         5            6               7               8               9               10                 11               12               13               14               15               16               17                 18               19               20               21               22               23               24                 25               26               27               28                    Date Details   No additional details    Date of next INR:  2/5/2018         How to take your warfarin dose     To take:  5 mg Take 1 of the 5 mg tablets.    To take:  7.5 mg Take 1.5 of the 5 mg tablets.

## 2018-01-22 NOTE — PROGRESS NOTES
ANTICOAGULATION FOLLOW-UP CLINIC VISIT    Patient Name:  Cassy Avila  Date:  1/22/2018  Contact Type:  Telephone    SUBJECTIVE:     Patient Findings     Positives Other complaints    Comments INR result received from Jorge. Call placed to patient and spoke to her re: PST result, warfarin dosing and PST recheck.  She started gemfibrozil 600 BID recently. She verbalized understanding of warfarin dosing and INR recheck date and has no questions.            OBJECTIVE    INR Protime   Date Value Ref Range Status   01/22/2018 2.7 (A) 0.86 - 1.14 Final       ASSESSMENT / PLAN  INR assessment THER    Recheck INR In: 2 WEEKS    INR Location Clinic      Anticoagulation Summary as of 1/22/2018     INR goal 2.0-3.0   Today's INR 2.7   Maintenance plan 7.5 mg (5 mg x 1.5) on Mon, Wed, Fri; 5 mg (5 mg x 1) all other days   Full instructions 7.5 mg on Mon, Wed, Fri; 5 mg all other days   Weekly total 42.5 mg   No change documented Manju Escalera RN   Plan last modified Iram Lord RN (7/20/2016)   Next INR check 2/5/2018   Priority INR   Target end date Indefinite    Indications   Long-term (current) use of anticoagulants [Z79.01] [Z79.01]  Pulmonary embolism and infarction (H) [I26.99]  Deep vein thrombosis (DVT) (H) [I82.409] [I82.409]         Anticoagulation Episode Summary     INR check location Home Draw    Preferred lab     Send INR reminders to [facility] POOL    Comments PST - [facility] [facility].        Anticoagulation Care Providers     Provider Role Specialty Phone number    Eliz Hartman NP [facility] [phone]            See the Encounter Report to view Anticoagulation Flowsheet and Dosing Calendar (Go to Encounters tab in chart review, and find the Anticoagulation Therapy Visit)        Manju Escalera, RN

## 2018-01-24 DIAGNOSIS — E03.9 HYPOTHYROIDISM, UNSPECIFIED TYPE: ICD-10-CM

## 2018-01-24 LAB — TSH SERPL DL<=0.005 MIU/L-ACNC: 3.88 MU/L (ref 0.4–4)

## 2018-01-24 PROCEDURE — 84443 ASSAY THYROID STIM HORMONE: CPT | Mod: ZL | Performed by: NURSE PRACTITIONER

## 2018-01-24 PROCEDURE — 36415 COLL VENOUS BLD VENIPUNCTURE: CPT | Mod: ZL | Performed by: NURSE PRACTITIONER

## 2018-01-29 ENCOUNTER — OFFICE VISIT (OUTPATIENT)
Dept: OTOLARYNGOLOGY | Facility: OTHER | Age: 63
End: 2018-01-29
Attending: NURSE PRACTITIONER
Payer: MEDICARE

## 2018-01-29 ENCOUNTER — OFFICE VISIT (OUTPATIENT)
Dept: AUDIOLOGY | Facility: OTHER | Age: 63
End: 2018-01-29
Attending: NURSE PRACTITIONER
Payer: MEDICARE

## 2018-01-29 VITALS
BODY MASS INDEX: 38.32 KG/M2 | DIASTOLIC BLOOD PRESSURE: 66 MMHG | SYSTOLIC BLOOD PRESSURE: 120 MMHG | HEART RATE: 67 BPM | HEIGHT: 65 IN | TEMPERATURE: 98.5 F | WEIGHT: 230 LBS

## 2018-01-29 DIAGNOSIS — Z98.890 HISTORY OF ENDOSCOPIC SINUS SURGERY: ICD-10-CM

## 2018-01-29 DIAGNOSIS — H69.91 ETD (EUSTACHIAN TUBE DYSFUNCTION), RIGHT: Primary | ICD-10-CM

## 2018-01-29 DIAGNOSIS — H69.92 DYSFUNCTION OF LEFT EUSTACHIAN TUBE: ICD-10-CM

## 2018-01-29 DIAGNOSIS — H90.3 SENSORINEURAL HEARING LOSS, BILATERAL: ICD-10-CM

## 2018-01-29 DIAGNOSIS — H65.491 COME (CHRONIC OTITIS MEDIA WITH EFFUSION), RIGHT: ICD-10-CM

## 2018-01-29 DIAGNOSIS — H92.01 OTALGIA, RIGHT: ICD-10-CM

## 2018-01-29 PROCEDURE — 92504 EAR MICROSCOPY EXAMINATION: CPT | Performed by: OTOLARYNGOLOGY

## 2018-01-29 PROCEDURE — 31231 NASAL ENDOSCOPY DX: CPT | Performed by: OTOLARYNGOLOGY

## 2018-01-29 PROCEDURE — 99214 OFFICE O/P EST MOD 30 MIN: CPT | Mod: 25 | Performed by: OTOLARYNGOLOGY

## 2018-01-29 PROCEDURE — 92567 TYMPANOMETRY: CPT | Performed by: AUDIOLOGIST

## 2018-01-29 PROCEDURE — 92557 COMPREHENSIVE HEARING TEST: CPT | Performed by: AUDIOLOGIST

## 2018-01-29 PROCEDURE — G0463 HOSPITAL OUTPT CLINIC VISIT: HCPCS | Mod: 25

## 2018-01-29 PROCEDURE — 2894A VOIDCORRECT: CPT | Mod: 25 | Performed by: OTOLARYNGOLOGY

## 2018-01-29 ASSESSMENT — PAIN SCALES - GENERAL: PAINLEVEL: MODERATE PAIN (4)

## 2018-01-29 NOTE — MR AVS SNAPSHOT
After Visit Summary   1/29/2018    Cassy Avila    MRN: [id number]           Patient Information     Date Of Birth          1955        Visit Information        Provider Department      1/29/2018 9:15 AM Seema Conn MD [facility]        Care Instructions    Thank you for allowing Dr. Conn and our ENT team to participate in your care.  If your medications are too expensive, please give the nurse a call.  We can possibly change this medication.  If you have a scheduling or an appointment question please contact [facility] Unit Coordinator at their direct line [phone].   ALL nursing questions or concerns can be directed to your ENT nurse at: [phone] - [place]    Follow up at [facility] on February 23rd      Instructions for Myringotomy Tubes ( Ear Tubes)    Recovery - The placement of ear tubes is a brief operation, and therefore the recovery from the anesthetic is usually less than a day.  However, in young children the sleep patterns, feeding, and behavior can be altered for several days.  Try to return to the daily routine as soon as possible and this issue will resolve without problems.  There are no restrictions to diet or activity after ear tube placement.    Medications - Children and adults can return to all preoperative medications after this procedure, including blood thinners.  You were sent home with ear drops, please use them as directed to assist in the rapid healing of the ear drum around the tube.  Pain medication may have been sent home with you, but a vast majority of the time, over the counter Tylenol or ibuprofen (advil) I sufficient. Finish prescription ear drops (4 drops twice a day).     Complications - A low grade fever (up to 100 degrees ) is not unusual in the day after tubes are placed.  Treat this with cool wash cloths to the forehead and Tylenol.  If the fever is higher, or does not respond to medication,  call the Doctor s office or call service after hours.  A small amount of bloody drainage can occur for a day or two after ear tubes, and is perfectly normal, continue the ear drops as directed and it will clear up.    Water Precautions - Recent clinical research has shown that absolute water precautions are not always necessary.  Ear plugs or water head bands are not necessary unless the ear is actively draining, or if your child does not like the sensation of water in the ear.    Follow up - Approximately 1 month after the tubes are placed I like to examine the ears to make sure there are no signs of complications, which are extremely rare.  You should already have an appointment in 1 month with ENT PA and audiology.  If not, call our office at [phone].  In some unusual cases the ears  reject  the tubes.  Depending on the situation, a hearing test may or may not be performed at that time.  Afterwards, follow up is done every 6 months, but of course earlier if there are any issues or problems.    Advantages of Tubes - After ear tube placement, there are certain benefits from having a direct communication of the middle ear space with the ear canal.  In the event of drainage from the ears with ear tubes in place ( which is common with colds and flus ) use the ear drops you were discharged home with using the same dosage and instructions.  This will clear up the ears without the need for oral antibiotics a majority of the time.  Another advantage is that with tubes in place, the ears automatically adjust to changes in atmospheric pressure ( such as in airplanes or elevation ).  In other words, if the tubes are open the ears will not hurt or pop!            Follow-ups after your visit        Your next 10 appointments already scheduled     Jan 31, 2018  1:15 PM CST   (Arrive by 1:00 PM)   MR ANKLE LEFT W/O CONTRAST with HIMR1   HI MRI ([facility] )    [address] [address]    [phone]           Take your medicines as usual, unless your doctor tells you not to. Bring a list of your current medicines to your exam (including vitamins, minerals and over-the-counter drugs). Also bring the results of similar scans you may have had.  Please remove any body piercings and hair extensions before you arrive.  Follow your doctor s orders. If you do not, we may have to postpone your exam.  You will not have contrast for this exam. You do not need to do anything special to prepare.  The MRI machine uses a strong magnet. Please wear clothes without metal (snaps, zippers). A sweatsuit works well, or we may give you a hospital gown.   **IMPORTANT** THE INSTRUCTIONS BELOW ARE ONLY FOR THOSE PATIENTS WHO HAVE BEEN TOLD THEY WILL RECEIVE SEDATION OR GENERAL ANESTHESIA DURING THEIR MRI PROCEDURE:  IF YOU WILL RECEIVE SEDATION (take medicine to help you relax during your exam):   You must get the medicine from your doctor before you arrive. Bring the medicine to the exam. Do not take it at home.   Arrive one hour early. Bring someone who can take you home after the test. Your medicine will make you sleepy. After the exam, you may not drive, take a bus or take a taxi by yourself.   No eating 8 hours before your exam. You may have clear liquids up until 4 hours before your exam. (Clear liquids include water, clear tea, black coffee and fruit juice without pulp.)  IF YOU WILL RECEIVE ANESTHESIA (be asleep for your exam):   Arrive 1 1/2 hours early. Bring someone who can take you home after the test. You may not drive, take a bus or take a taxi by yourself.   No eating 8 hours before your exam. You may have clear liquids up until 4 hours before your exam. (Clear liquids include water, clear tea, black coffee and fruit juice without pulp.)   You will spend four to five hours in the recovery room.  Please call the Imaging Department at your exam site with any questions.            Feb 05, 2018 10:00 AM CST  "  (Arrive by 9:45 AM)   CONSULT with Jaime Pang, DO   [facility] ([facility] )    [place] [address]   [phone]              Who to contact     If you have questions or need follow up information about today's clinic visit or your schedule please contact [facility] directly at [phone].  Normal or non-critical lab and imaging results will be communicated to you by MyChart, letter or phone within 4 business days after the clinic has received the results. If you do not hear from us within 7 days, please contact the clinic through ufindadshart or phone. If you have a critical or abnormal lab result, we will notify you by phone as soon as possible.  Submit refill requests through [employer] or call your pharmacy and they will forward the refill request to us. Please allow 3 business days for your refill to be completed.          Additional Information About Your Visit        [employer] Information     [employer] gives you secure access to your electronic health record. If you see a primary care provider, you can also send messages to your care team and make appointments. If you have questions, please call your primary care clinic.  If you do not have a primary care provider, please call [phone] and they will assist you.        Care EveryWhere ID     This is your Care EveryWhere ID. This could be used by other organizations to access your [facility] medical records  [id number]        Your Vitals Were     Pulse Temperature Height BMI (Body Mass Index)          67 98.5  F (36.9  C) (Tympanic) 5' 5\" (1.651 m) 38.27 kg/m2         Blood Pressure from Last 3 Encounters:   01/29/18 120/66   01/17/18 118/72   01/02/18 110/70    Weight from Last 3 Encounters:   01/29/18 230 lb (104.3 kg)   01/17/18 228 lb (103.4 kg)   01/02/18 228 lb (103.4 kg)              Today, you had the following     No orders found for display       Primary Care Provider Office Phone " # Fax #    Eliz Hartman [phone] [address]  [place] [facility] [address]        Equal Access to Services     SWEETIE PARRISH : Hadii mic ku hadpilaro Soomaali, waaxda luqadaha, qaybta kaalmada adeegjoseda, jeanie gaines naemauro blair laJenniferangie brito. So [facility] [phone].    ATENCIÓN: Si habla español, tiene a noriega disposición servicios gratuitos de asistencia lingüística. Llame al [phone].    We comply with applicable federal civil rights laws and [place] laws. We do not discriminate on the basis of race, color, national origin, age, disability, sex, sexual orientation, or gender identity.            Thank you!     [facility] you [facility]  [facility]. Our goal is always to provide you with excellent care. Hearing back from our patients is one way we can continue to improve our services. Please take a few minutes to complete the written survey that you may receive in the mail after your visit with us. Thank you!             Your Updated Medication List - Protect others around you: Learn how to safely use, store and throw away your medicines at www.disposemymeds.org.          This list is accurate as of 1/29/18  9:51 AM.  Always use your most recent med list.                   Brand Name Dispense Instructions for use Diagnosis    acetaminophen 325 MG tablet    TYLENOL    100 tablet    Take 3 tablets (975 mg) by mouth every 4 hours as needed for mild pain or fever    Pneumonia of left lower lobe due to infectious organism (H)       aspirin 81 MG EC tablet      Take 81 mg by mouth daily HS        escitalopram 20 MG tablet    LEXAPRO    90 tablet    Take 20mg daily, may take an extra 20mg once daily as needed    Episode of recurrent major depressive disorder, unspecified depression episode severity (H)       gemfibrozil 600 MG tablet    LOPID    180 tablet    Take 1 tablet (600 mg) by mouth 2 times daily    Hyperlipidemia LDL goal <100       hydrochlorothiazide 25  MG tablet    HYDRODIURIL    90 tablet    Take 1 tablet (25 mg) by mouth daily    Essential hypertension       losartan 50 MG tablet    COZAAR    180 tablet    TAKE 2 TABLETS BY MOUTH EVERY DAY    Benign essential hypertension       metoprolol succinate 50 MG 24 hr tablet    TOPROL-XL    135 tablet    TAKE 1 AND ONE-HALF TABLETS BY MOUTH EVERY DAY    Essential hypertension       omega 3 1000 MG Caps     90 capsule    Take 3 g by mouth daily        STATIN NOT PRESCRIBED (INTENTIONAL)      Please choose reason not prescribed, below    Hyperlipidemia LDL goal <100       VITAMIN D3 PO      Take 3,000 mg by mouth daily AM        warfarin 5 MG tablet    COUMADIN    150 tablet    7.5mg Mon,Wed,Fri and 5mg all other days or as directed by warfarin clinic    Long-term (current) use of anticoagulants

## 2018-01-29 NOTE — PROGRESS NOTES
Audiology Evaluation Completed. Please refer SCANNED AUDIOGRAM and/or TYMPANOGRAM for BACKGROUND, RESULTS, RECOMMENDATIONS.    Reyna Hanley M.S., [facility]  Audiologist #[id number]

## 2018-01-29 NOTE — NURSING NOTE
"Chief Complaint   Patient presents with     Ear Problem     Pt has been referred by Minnie for acute mucoid OM of right ear.  Pt states that her ear still hurts.       Initial /66 (BP Location: Right arm, Cuff Size: Adult Large)  Pulse 67  Temp 98.5  F (36.9  C) (Tympanic)  Ht 5' 5\" (1.651 m)  Wt 230 lb (104.3 kg)  BMI 38.27 kg/m2 Estimated body mass index is 38.27 kg/(m^2) as calculated from the following:    Height as of this encounter: 5' 5\" (1.651 m).    Weight as of this encounter: 230 lb (104.3 kg).  Medication Reconciliation: complete   Ira Ji LPN      "

## 2018-01-29 NOTE — PROGRESS NOTES
Otolaryngology Consultation    Patient: Cassy Avila  : 1955    Patient presents with:  Ear Problem: Pt has been referred by Minnie for acute mucoid OM of right ear.  Pt states that her ear still hurts.      HPI:  Cassy Avila is a 62 year old female seen today for hx of right mucoid OM  Symptoms include Right ear pressure, pain, no vertigo, no flux HL, no bothersome tinnitus  tx with zithromax /2 with relief    Symptoms present on right only for over 10years, no prior tubes    One episode of seconds of rotatory vertigo last month, no chronic vertigo    No known dental problems, no dentalgia or bruxism    No otorrhea  Distant FESS uses netti pot daily and no sinus complaints, no recurrent sinusitis    Audiogram 18 reviewed with Cassy:  Normal thresholds with symmetric mild to moderate HFSNHL, Type A tymp R, Ad left, SRT 20 dB AU, normal discrim    Last seen 2015 for sinus complaints, note reviewed      Current Outpatient Rx   Medication Sig Dispense Refill     STATIN NOT PRESCRIBED, INTENTIONAL, Please choose reason not prescribed, below       escitalopram (LEXAPRO) 20 MG tablet Take 20mg daily, may take an extra 20mg once daily as needed 90 tablet 1     losartan (COZAAR) 50 MG tablet TAKE 2 TABLETS BY MOUTH EVERY  tablet 2     metoprolol (TOPROL-XL) 50 MG 24 hr tablet TAKE 1 AND ONE-HALF TABLETS BY MOUTH EVERY  tablet 2     warfarin (COUMADIN) 5 MG tablet 7.5mg Mon,Wed,Fri and 5mg all other days or as directed by warfarin clinic 150 tablet 3     hydrochlorothiazide (HYDRODIURIL) 25 MG tablet Take 1 tablet (25 mg) by mouth daily 90 tablet 2     acetaminophen (TYLENOL) 325 MG tablet Take 3 tablets (975 mg) by mouth every 4 hours as needed for mild pain or fever 100 tablet      Cholecalciferol (VITAMIN D3 PO) Take 3,000 mg by mouth daily AM       omega 3 1000 MG CAPS Take 3 g by mouth daily  90 capsule      aspirin 81 MG EC tablet Take 81 mg by mouth daily HS       gemfibrozil  (LOPID) 600 MG tablet Take 1 tablet (600 mg) by mouth 2 times daily (Patient not taking: Reported on 1/29/2018) 180 tablet 1       Allergies: Ace inhibitors; Albuterol sulfate; Amlodipine besylate; Amoxicillin; Atorvastatin; Cephalexin monohydrate; Erythromycin base [kdc:yellow dye+erythromycin+brilliant blue fcf]; Ipratropium bromide; Meloxicam; Adhesive tape; Prochlorperazine edisylate; and Prochlorperazine maleate     Past Medical History:   Diagnosis Date     Arthritis      Cervicalgia 1/9/2001     Closed dislocation of shoulder, unspecified site 2000     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 Provider to review     Factor V Leiden (H)      Family history of colon cancer 1/2/2018     Gastro-oesophageal reflux disease      Herpes zoster without mention of complication 2003    resolved from problem list     Hyperlipidemia LDL goal <100 7/11/2002     Problem list name updated by automated process. Provider to review     Long term (current) use of anticoagulants 8/20/2003     Major depression 8/8/2014     Mammographic microcalcification 2003    resolved from problem list     Migraine, unspecified, without mention of intractable migraine without mention of status migrainosus 3/5/2001     Moderate episode of recurrent major depressive disorder (H) 8/8/2014     Neurofibromatosis, unspecified(237.70) 8/22/2012     Other and unspecified hyperlipidemia 7/11/2002     Other chronic pain      Other pulmonary embolism and infarction 4/11/2003     Statin medication not prescribed per physician orders 1/17/2018     Tachycardia, unspecified 2/12/2001     Thrombosis of leg      Unspecified essential hypertension 2/20/2001       Past Surgical History:   Procedure Laterality Date      "------------OTHER-------------      shoulder replacement; Provider: Karen     ARTHROPLASTY KNEE  2014    Procedure: ARTHROPLASTY KNEE;  Surgeon: Sean Alexander MD;  Location: [facility] OR     ARTHROSCOPY SHOULDER      right, bone spurs      SECTION      x3     CHOLECYSTECTOMY       elbow ulnar tunnel release       ELECTROTHERMAL THERAPY INTRADISC  2017    stimulator     ENDOSCOPIC SINUS SURGERY, SEPTOPLASTY, TURBINOPLASTY, MAXILLARY SINUSOTOMY, COMBINED N/A 2015    Procedure: COMBINED ENDOSCOPIC SINUS SURGERY, SEPTOPLASTY, TURBINOPLASTY, MAXILLARY SINUSOTOMY;  Surgeon: Seema Conn MD;  Location: [facility] OR     esophagastroduodenoscopy      with biopsy and endoscopic U/S     EXCISE NEUROMA LOWER EXTREMITY Left 2016    Procedure: EXCISE NEUROMA LOWER EXTREMITY;  Surgeon: Edi Reed MD;  Location: [facility]     FUSION LUMBAR ANTERIOR WITH MARCY CAGES      L5-S1     ORTHOPEDIC SURGERY  2-15    right shoulder     ORTHOPEDIC SURGERY  8/28/15    right knee     pionidal cyst excision       SARAHI/BSO       TRANSPOSITION ULNAR NERVE (ELBOW)         ENT family history reviewed    Social History   Substance Use Topics     Smoking status: Former Smoker     Packs/day: 0.50     Years: 30.00     Types: Cigarettes, Pipe     Smokeless tobacco: Never Used      Comment: quit in      Alcohol use No       Review of Systems  ROS: 10 point ROS neg other than the symptoms noted above in the HPI     Physical Exam  /66 (BP Location: Right arm, Cuff Size: Adult Large)  Pulse 67  Temp 98.5  F (36.9  C) (Tympanic)  Ht 5' 5\" (1.651 m)  Wt 230 lb (104.3 kg)  BMI 38.27 kg/m2  General - The patient is well nourished and well developed, and appears to have good nutritional status.  Alert and oriented to person and place, answers questions and cooperates with examination appropriately.   Head and Face - Normocephalic and atraumatic, with no gross asymmetry noted.  The facial nerve is intact, " with strong symmetric movements.  Voice and Breathing - The patient was breathing comfortably without the use of accessory muscles. There was no wheezing, stridor, or stertor.  The patients voice was clear and strong, and had appropriate pitch and quality.  Ears -.examined under microscopy bilaterally  The external auditory canals are patent, the tympanic membranes are intact without effusion, retraction or mass.  Bony landmarks are intact.  Right TM with slight anterior retraction  TMJ no cepitus, click or tenderness to bimanual palpation, upper dentures removed, no mass  Eyes - Extraocular movements intact, and the pupils were reactive to light.  Sclera were not icteric or injected, conjunctiva were pink and moist.  Mouth - Examination of the oral cavity showed pink, healthy oral mucosa. No lesions or ulcerations noted.  The tongue was mobile and midline, and the dentition were in good condition.    Throat - The walls of the oropharynx were smooth, pink, moist, symmetric, and had no lesions or ulcerations.  The tonsillar pillars and soft palate were symmetric.  The uvula was midline on elevation.    Neck - Normal midline excursion of the laryngotracheal complex during swallowing.  Full range of motion on passive movement.  Palpation of the occipital, submental, submandibular, internal jugular chain, and supraclavicular nodes did not demonstrate any abnormal lymph nodes or masses.  Palpation of the thyroid was soft and smooth, with no nodules or goiter appreciated.  The trachea was mobile and midline.  Nose - External contour is symmetric, no gross deflection or scars.  Nasal mucosa is pink and moist with no abnormal mucus.  The septum was intact  no TH    To evaluate the nose and sinuses in the post operative state, I performed rigid nasal endoscopy. The LPN had previously sprayed both nares with lidocaine and neosynephrine.    I began with the LEFT side using a 0 degree rigid nasal endoscope, and then similarly  examined the RIGHT side    Findings:  Inferior turbinates:  Non edematous  Middle turbinate and middle meatus:  No purulence, no polyposis, no synechiae, slight crusting removed left MT  Antrostomy patent left, cannot clearly viz R but no purulence nor edema    Mucosa is  healthy throughout without polyps nor polypoid degeneration  ET patent bilaterally without edema, no NP mass        Impression and Plan- Cassy Avila is a 62 year old female with:    ICD-10-CM    1. ETD (Eustachian tube dysfunction), right H69.81    2. COME (chronic otitis media with effusion), right H65.31    3. Otalgia, right H92.01    4. History of endoscopic sinus surgery Z98.890      I discussed the risks and complications of RIGHT myringotomy with insertion of  t- tube including anesthesia, bleeding, infection, change in hearing or hearing loss, tympanic membrane perforation, need for additional surgery, chronic ear drainage, tube occlusion or need for tube reinsertion, cholesteatoma.   Alternatives to tympanostomy tube insertion were discussed, and are largely limited to surveillance.  Medical therapy (antibiotics, antihistamines, decongestants, systemic steroids, and topical nasal steroids) are ineffective for bilateral chronic otitis media with effusion, and not recommended.  Antibiotics are indicated in recurrent acute otitis media during active infections.  All questions were answered and the patient/and or guardian wishes are to proceed with surgical intervention.     She does NOT need to come off of her coumadin, d/w her today    Kaitlin states she cannot tolerate an office procedure    Sinuses look great, continue suha med irrigations      Seema Conn D.O.  Otolaryngology/Head and Neck Surgery  Allergy

## 2018-01-29 NOTE — MR AVS SNAPSHOT
After Visit Summary   1/29/2018    Cassy Avila    MRN: [id number]           Patient Information     Date Of Birth          1955        Visit Information        Provider Department      1/29/2018 8:45 AM Reyna Hanley, [facility]        Today's Diagnoses     Dysfunction of left eustachian tube        Sensorineural hearing loss, bilateral           Follow-ups after your visit        Your next 10 appointments already scheduled     Jan 29, 2018  8:45 AM CST   (Arrive by 8:30 AM)   New Visit with Flora Sharp   [facility] ([facility] )    [address] [address]   [phone]            Jan 29, 2018  9:15 AM CST   (Arrive by 9:00 AM)   Return Visit with Seema Conn MD   [facility] ([facility] )    [address] [address]   [phone]            Jan 31, 2018  1:15 PM CST   (Arrive by 1:00 PM)   MR ANKLE LEFT W/O CONTRAST with [address] MRI ([facility] )    [address] [id number] [phone]           Take your medicines as usual, unless your doctor tells you not to. Bring a list of your current medicines to your exam (including vitamins, minerals and over-the-counter drugs). Also bring the results of similar scans you may have had.  Please remove any body piercings and hair extensions before you arrive.  Follow your doctor s orders. If you do not, we may have to postpone your exam.  You will not have contrast for this exam. You do not need to do anything special to prepare.  The MRI machine uses a strong magnet. Please wear clothes without metal (snaps, zippers). A sweatsuit works well, or we may give you a hospital gown.   **IMPORTANT** THE INSTRUCTIONS BELOW ARE ONLY FOR THOSE PATIENTS WHO HAVE BEEN TOLD THEY WILL RECEIVE SEDATION OR GENERAL ANESTHESIA DURING THEIR MRI PROCEDURE:  IF YOU WILL RECEIVE SEDATION (take medicine  to help you relax during your exam):   You must get the medicine from your doctor before you arrive. Bring the medicine to the exam. Do not take it at home.   Arrive one hour early. Bring someone who can take you home after the test. Your medicine will make you sleepy. After the exam, you may not drive, take a bus or take a taxi by yourself.   No eating 8 hours before your exam. You may have clear liquids up until 4 hours before your exam. (Clear liquids include water, clear tea, black coffee and fruit juice without pulp.)  IF YOU WILL RECEIVE ANESTHESIA (be asleep for your exam):   Arrive 1 1/2 hours early. Bring someone who can take you home after the test. You may not drive, take a bus or take a taxi by yourself.   No eating 8 hours before your exam. You may have clear liquids up until 4 hours before your exam. (Clear liquids include water, clear tea, black coffee and fruit juice without pulp.)   You will spend four to five hours in the recovery room.  Please call the Imaging Department at your exam site with any questions.            Feb 05, 2018 10:00 AM CST   (Arrive by 9:45 AM)   CONSULT with Jaime Pang,    [facility] ([facility] )    [address] [place] [address]   [phone]              Who to contact     If you have questions or need follow up information about today's clinic visit or your schedule please contact [facility] directly at [phone].  Normal or non-critical lab and imaging results will be communicated to you by MyChart, letter or phone within 4 business days after the clinic has received the results. If you do not hear from us within 7 days, please contact the clinic through [employer] or phone. If you have a critical or abnormal lab result, we will notify you by phone as soon as possible.  Submit refill requests through [employer] or call your pharmacy and they will forward the refill request to us. Please allow 3 business days  for your refill to be completed.          Additional Information About Your Visit        MyChart Information     [employer] gives you secure access to your electronic health record. If you see a primary care provider, you can also send messages to your care team and make appointments. If you have questions, please call your primary care clinic.  If you do not have a primary care provider, please call [phone] and they will assist you.        Care EveryWhere ID     This is your Care EveryWhere ID. This could be used by other organizations to access your [place] medical records  [id number]         Blood Pressure from Last 3 Encounters:   01/17/18 118/72   01/02/18 110/70   07/12/17 122/78    Weight from Last 3 Encounters:   01/17/18 228 lb (103.4 kg)   01/02/18 228 lb (103.4 kg)   07/12/17 228 lb 9.6 oz (103.7 kg)              We Performed the Following     AUDIOGRAM/TYMPANOGRAM - INTERFACE        Primary Care Provider Office Phone # Fax #    Eliz NORAH Hartman [phone] [address]  [place] [facility] [address]        Equal Access to Services     [facility] : Hadii aad ku hadasho Soomaali, waaxda luqadaha, qaybta kaalmada adeegyajude, jeanie tobar . So [facility] [phone].    ATENCIÓN: Si habla español, tiene a noriega disposición servicios gratuitos de asistencia lingüística. [facility] [phone].    We comply with applicable federal civil rights laws and [place] laws. We do not discriminate on the basis of race, color, national origin, age, disability, sex, sexual orientation, or gender identity.            Thank you!     Thank you for choosing [facility]  for your care. Our goal is always to provide you with excellent care. Hearing back from our patients is one way we can continue to improve our services. Please take a few minutes to complete the written survey that you may receive in the mail after your visit with us. Thank you!             Your  Updated Medication List - Protect others around you: Learn how to safely use, store and throw away your medicines at www.disposemymeds.org.          This list is accurate as of 1/29/18  8:29 AM.  Always use your most recent med list.                   Brand Name Dispense Instructions for use Diagnosis    acetaminophen 325 MG tablet    TYLENOL    100 tablet    Take 3 tablets (975 mg) by mouth every 4 hours as needed for mild pain or fever    Pneumonia of left lower lobe due to infectious organism (H)       aspirin 81 MG EC tablet      Take 81 mg by mouth daily HS        escitalopram 20 MG tablet    LEXAPRO    90 tablet    Take 20mg daily, may take an extra 20mg once daily as needed    Episode of recurrent major depressive disorder, unspecified depression episode severity (H)       gemfibrozil 600 MG tablet    LOPID    180 tablet    Take 1 tablet (600 mg) by mouth 2 times daily    Hyperlipidemia LDL goal <100       hydrochlorothiazide 25 MG tablet    HYDRODIURIL    90 tablet    Take 1 tablet (25 mg) by mouth daily    Essential hypertension       losartan 50 MG tablet    COZAAR    180 tablet    TAKE 2 TABLETS BY MOUTH EVERY DAY    Benign essential hypertension       metoprolol succinate 50 MG 24 hr tablet    TOPROL-XL    135 tablet    TAKE 1 AND ONE-HALF TABLETS BY MOUTH EVERY DAY    Essential hypertension       omega 3 1000 MG Caps     90 capsule    Take 3 g by mouth daily        STATIN NOT PRESCRIBED (INTENTIONAL)      Please choose reason not prescribed, below    Hyperlipidemia LDL goal <100       VITAMIN D3 PO      Take 3,000 mg by mouth daily AM        warfarin 5 MG tablet    COUMADIN    150 tablet    7.5mg Mon,Wed,Fri and 5mg all other days or as directed by warfarin clinic    Long-term (current) use of anticoagulants

## 2018-01-29 NOTE — PATIENT INSTRUCTIONS
Thank you for allowing Dr. Conn and our ENT team to participate in your care.  If your medications are too expensive, please give the nurse a call.  We can possibly change this medication.  If you have a scheduling or an appointment question please contact [facility] our Health Unit Coordinator at their direct line [phone].   ALL nursing questions or concerns can be directed to your ENT nurse at: [phone] Kendra Wallace    Follow up at [facility] on February 23rd      Instructions for Myringotomy Tubes ( Ear Tubes)    Recovery - The placement of ear tubes is a brief operation, and therefore the recovery from the anesthetic is usually less than a day.  However, in young children the sleep patterns, feeding, and behavior can be altered for several days.  Try to return to the daily routine as soon as possible and this issue will resolve without problems.  There are no restrictions to diet or activity after ear tube placement.    Medications - Children and adults can return to all preoperative medications after this procedure, including blood thinners.  You were sent home with ear drops, please use them as directed to assist in the rapid healing of the ear drum around the tube.  Pain medication may have been sent home with you, but a vast majority of the time, over the counter Tylenol or ibuprofen (advil) I sufficient. Finish prescription ear drops (4 drops twice a day).     Complications - A low grade fever (up to 100 degrees ) is not unusual in the day after tubes are placed.  Treat this with cool wash cloths to the forehead and Tylenol.  If the fever is higher, or does not respond to medication, call the Doctor s office or call service after hours.  A small amount of bloody drainage can occur for a day or two after ear tubes, and is perfectly normal, continue the ear drops as directed and it will clear up.    Water Precautions - Recent clinical research has shown that absolute water precautions are  not always necessary.  Ear plugs or water head bands are not necessary unless the ear is actively draining, or if your child does not like the sensation of water in the ear.    Follow up - Approximately 1 month after the tubes are placed I like to examine the ears to make sure there are no signs of complications, which are extremely rare.  You should already have an appointment in 1 month with ENT PA and audiology.  If not, call our office at [phone].  In some unusual cases the ears  reject  the tubes.  Depending on the situation, a hearing test may or may not be performed at that time.  Afterwards, follow up is done every 6 months, but of course earlier if there are any issues or problems.    Advantages of Tubes - After ear tube placement, there are certain benefits from having a direct communication of the middle ear space with the ear canal.  In the event of drainage from the ears with ear tubes in place ( which is common with colds and flus ) use the ear drops you were discharged home with using the same dosage and instructions.  This will clear up the ears without the need for oral antibiotics a majority of the time.  Another advantage is that with tubes in place, the ears automatically adjust to changes in atmospheric pressure ( such as in airplanes or elevation ).  In other words, if the tubes are open the ears will not hurt or pop!

## 2018-01-29 NOTE — LETTER
2018         RE: Cassy Avila  [address] [place] [address]        Dear Colleague,    Thank you for referring your patient, Cassy Avila, to the [facility]. Please see a copy of my visit note below.    Otolaryngology Consultation    Patient: Cassy Avila  : 1955    Patient presents with:  Ear Problem: Pt has been referred by Minnie for acute mucoid OM of right ear.  Pt states that her ear still hurts.      HPI:  Cassy Avila is a 62 year old female seen today for hx of right mucoid OM  Symptoms include Right ear pressure, pain, no vertigo, no flux HL, no bothersome tinnitus  tx with zithromax  with relief    Symptoms present on right only for over 10years, no prior tubes    One episode of seconds of rotatory vertigo last month, no chronic vertigo    No known dental problems, no dentalgia or bruxism    No otorrhea  Distant FESS uses netti pot daily and no sinus complaints, no recurrent sinusitis    Audiogram 18 reviewed with Cassy:  Normal thresholds with symmetric mild to moderate HFSNHL, Type A tymp R, Ad left, SRT 20 dB AU, normal discrim    Last seen 2015 for sinus complaints, note reviewed      Current Outpatient Rx   Medication Sig Dispense Refill     STATIN NOT PRESCRIBED, INTENTIONAL, Please choose reason not prescribed, below       escitalopram (LEXAPRO) 20 MG tablet Take 20mg daily, may take an extra 20mg once daily as needed 90 tablet 1     losartan (COZAAR) 50 MG tablet TAKE 2 TABLETS BY MOUTH EVERY  tablet 2     metoprolol (TOPROL-XL) 50 MG 24 hr tablet TAKE 1 AND ONE-HALF TABLETS BY MOUTH EVERY  tablet 2     warfarin (COUMADIN) 5 MG tablet 7.5mg Mon,Wed,Fri and 5mg all other days or as directed by warfarin clinic 150 tablet 3     hydrochlorothiazide (HYDRODIURIL) 25 MG tablet Take 1 tablet (25 mg) by mouth daily 90 tablet 2     acetaminophen (TYLENOL) 325 MG tablet Take 3 tablets (975 mg) by mouth every 4 hours as needed for  mild pain or fever 100 tablet      Cholecalciferol (VITAMIN D3 PO) Take 3,000 mg by mouth daily AM       omega 3 1000 MG CAPS Take 3 g by mouth daily  90 capsule      aspirin 81 MG EC tablet Take 81 mg by mouth daily HS       gemfibrozil (LOPID) 600 MG tablet Take 1 tablet (600 mg) by mouth 2 times daily (Patient not taking: Reported on 1/29/2018) 180 tablet 1       Allergies: Ace inhibitors; Albuterol sulfate; Amlodipine besylate; Amoxicillin; Atorvastatin; Cephalexin monohydrate; Erythromycin base [kdc:yellow dye+erythromycin+brilliant blue fcf]; Ipratropium bromide; Meloxicam; Adhesive tape; Prochlorperazine edisylate; and Prochlorperazine maleate     Past Medical History:   Diagnosis Date     Arthritis      Cervicalgia 1/9/2001     Closed dislocation of shoulder, unspecified site 2000     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 Provider to review     Factor V Leiden (H)      Family history of colon cancer 1/2/2018     Gastro-oesophageal reflux disease      Herpes zoster without mention of complication 2003    resolved from problem list     Hyperlipidemia LDL goal <100 7/11/2002     Problem list name updated by automated process. Provider to review     Long term (current) use of anticoagulants 8/20/2003     Major depression 8/8/2014     Mammographic microcalcification 2003    resolved from problem list     Migraine, unspecified, without mention of intractable migraine without mention of status migrainosus 3/5/2001     Moderate episode of recurrent major depressive disorder (H) 8/8/2014     Neurofibromatosis, unspecified(237.70) 8/22/2012     Other and unspecified hyperlipidemia 7/11/2002     Other chronic pain      Other pulmonary embolism and infarction 4/11/2003      "Statin medication not prescribed per physician orders 2018     Tachycardia, unspecified 2001     Thrombosis of leg      Unspecified essential hypertension 2001       Past Surgical History:   Procedure Laterality Date     ------------OTHER-------------      shoulder replacement; Provider: Karen     ARTHROPLASTY KNEE  2014    Procedure: ARTHROPLASTY KNEE;  Surgeon: Sean Alexander MD;  Location: [facility]     ARTHROSCOPY SHOULDER      right, bone spurs      SECTION      x3     CHOLECYSTECTOMY       elbow ulnar tunnel release       ELECTROTHERMAL THERAPY INTRADISC  2017    stimulator     ENDOSCOPIC SINUS SURGERY, SEPTOPLASTY, TURBINOPLASTY, MAXILLARY SINUSOTOMY, COMBINED N/A 2015    Procedure: COMBINED ENDOSCOPIC SINUS SURGERY, SEPTOPLASTY, TURBINOPLASTY, MAXILLARY SINUSOTOMY;  Surgeon: Seema Conn MD;  Location: [facility]     esophagastroduodenoscopy      with biopsy and endoscopic U/S     EXCISE NEUROMA LOWER EXTREMITY Left 2016    Procedure: EXCISE NEUROMA LOWER EXTREMITY;  Surgeon: Edi Reed MD;  Location: [facility]     FUSION LUMBAR ANTERIOR WITH MARCY CAGES      L5-S1     ORTHOPEDIC SURGERY  2-15    right shoulder     ORTHOPEDIC SURGERY  8/28/15    right knee     pionidal cyst excision       SARAHI/BSO       TRANSPOSITION ULNAR NERVE (ELBOW)         ENT family history reviewed    Social History   Substance Use Topics     Smoking status: Former Smoker     Packs/day: 0.50     Years: 30.00     Types: Cigarettes, Pipe     Smokeless tobacco: Never Used      Comment: quit in      Alcohol use No       Review of Systems  ROS: 10 point ROS neg other than the symptoms noted above in the HPI     Physical Exam  /66 (BP Location: Right arm, Cuff Size: Adult Large)  Pulse 67  Temp 98.5  F (36.9  C) (Tympanic)  Ht 5' 5\" (1.651 m)  Wt 230 lb (104.3 kg)  BMI 38.27 kg/m2  General - The patient is well nourished and well developed, and appears to have good " nutritional status.  Alert and oriented to person and place, answers questions and cooperates with examination appropriately.   Head and Face - Normocephalic and atraumatic, with no gross asymmetry noted.  The facial nerve is intact, with strong symmetric movements.  Voice and Breathing - The patient was breathing comfortably without the use of accessory muscles. There was no wheezing, stridor, or stertor.  The patients voice was clear and strong, and had appropriate pitch and quality.  Ears -.examined under microscopy bilaterally  The external auditory canals are patent, the tympanic membranes are intact without effusion, retraction or mass.  Bony landmarks are intact.  Right TM with slight anterior retraction  TMJ no cepitus, click or tenderness to bimanual palpation, upper dentures removed, no mass  Eyes - Extraocular movements intact, and the pupils were reactive to light.  Sclera were not icteric or injected, conjunctiva were pink and moist.  Mouth - Examination of the oral cavity showed pink, healthy oral mucosa. No lesions or ulcerations noted.  The tongue was mobile and midline, and the dentition were in good condition.    Throat - The walls of the oropharynx were smooth, pink, moist, symmetric, and had no lesions or ulcerations.  The tonsillar pillars and soft palate were symmetric.  The uvula was midline on elevation.    Neck - Normal midline excursion of the laryngotracheal complex during swallowing.  Full range of motion on passive movement.  Palpation of the occipital, submental, submandibular, internal jugular chain, and supraclavicular nodes did not demonstrate any abnormal lymph nodes or masses.  Palpation of the thyroid was soft and smooth, with no nodules or goiter appreciated.  The trachea was mobile and midline.  Nose - External contour is symmetric, no gross deflection or scars.  Nasal mucosa is pink and moist with no abnormal mucus.  The septum was intact   no TH    To evaluate the nose and  sinuses in the post operative state, I performed rigid nasal endoscopy. The LPN had previously sprayed both nares with lidocaine and neosynephrine.    I began with the LEFT side using a 0 degree rigid nasal endoscope, and then similarly examined the RIGHT side    Findings:  Inferior turbinates:  Non edematous  Middle turbinate and middle meatus:  No purulence, no polyposis, no synechiae, slight crusting removed left MT  Antrostomy patent left, cannot clearly viz R but no purulence nor edema    Mucosa is  healthy throughout without polyps nor polypoid degeneration  ET patent bilaterally without edema, no NP mass        Impression and Plan- Cassy APPLE Avila is a 62 year old female with:    ICD-10-CM    1. ETD (Eustachian tube dysfunction), right H69.81    2. COME (chronic otitis media with effusion), right H65.31    3. Otalgia, right H92.01    4. History of endoscopic sinus surgery Z98.890      I discussed the risks and complications of RIGHT myringotomy with insertion of  t- tube including anesthesia, bleeding, infection, change in hearing or hearing loss, tympanic membrane perforation, need for additional surgery, chronic ear drainage, tube occlusion or need for tube reinsertion, cholesteatoma.   Alternatives to tympanostomy tube insertion were discussed, and are largely limited to surveillance.  Medical therapy (antibiotics, antihistamines, decongestants, systemic steroids, and topical nasal steroids) are ineffective for bilateral chronic otitis media with effusion, and not recommended.  Antibiotics are indicated in recurrent acute otitis media during active infections.  All questions were answered and the patient/and or guardian wishes are to proceed with surgical intervention.     She does NOT need to come off of her coumadin, d/w her today    Kaitlin states she cannot tolerate an office procedure    Sinuses look great, continue suha med irrigations      Seema Conn D.O.  Otolaryngology/Head and Neck  Surgery  Allergy      Again, thank you for allowing me to participate in the care of your patient.        Sincerely,        Seema Conn MD

## 2018-01-31 ENCOUNTER — HOSPITAL ENCOUNTER (OUTPATIENT)
Dept: MRI IMAGING | Facility: HOSPITAL | Age: 63
Discharge: HOME | End: 2018-01-31
Attending: ORTHOPAEDIC SURGERY | Admitting: ORTHOPAEDIC SURGERY
Payer: MEDICARE

## 2018-01-31 DIAGNOSIS — M25.572 LEFT ANKLE PAIN: ICD-10-CM

## 2018-01-31 PROCEDURE — 73721 MRI JNT OF LWR EXTRE W/O DYE: CPT | Mod: TC,LT

## 2018-02-05 ENCOUNTER — TRANSFERRED RECORDS (OUTPATIENT)
Dept: HEALTH INFORMATION MANAGEMENT | Facility: HOSPITAL | Age: 63
End: 2018-02-05

## 2018-02-05 ENCOUNTER — OFFICE VISIT (OUTPATIENT)
Dept: SURGERY | Facility: OTHER | Age: 63
End: 2018-02-05
Attending: NURSE PRACTITIONER
Payer: MEDICARE

## 2018-02-05 ENCOUNTER — ANTICOAGULATION THERAPY VISIT (OUTPATIENT)
Dept: ANTICOAGULATION | Facility: OTHER | Age: 63
End: 2018-02-05
Payer: MEDICARE

## 2018-02-05 ENCOUNTER — TELEPHONE (OUTPATIENT)
Dept: SURGERY | Facility: OTHER | Age: 63
End: 2018-02-05

## 2018-02-05 VITALS
SYSTOLIC BLOOD PRESSURE: 122 MMHG | OXYGEN SATURATION: 95 % | HEART RATE: 67 BPM | BODY MASS INDEX: 38.32 KG/M2 | HEIGHT: 65 IN | DIASTOLIC BLOOD PRESSURE: 68 MMHG | TEMPERATURE: 97.9 F | WEIGHT: 230 LBS

## 2018-02-05 DIAGNOSIS — Z79.01 LONG-TERM (CURRENT) USE OF ANTICOAGULANTS: ICD-10-CM

## 2018-02-05 DIAGNOSIS — E66.812 CLASS 2 OBESITY DUE TO EXCESS CALORIES WITH BODY MASS INDEX (BMI) OF 38.0 TO 38.9 IN ADULT, UNSPECIFIED WHETHER SERIOUS COMORBIDITY PRESENT: ICD-10-CM

## 2018-02-05 DIAGNOSIS — E66.09 CLASS 2 OBESITY DUE TO EXCESS CALORIES WITH BODY MASS INDEX (BMI) OF 38.0 TO 38.9 IN ADULT, UNSPECIFIED WHETHER SERIOUS COMORBIDITY PRESENT: ICD-10-CM

## 2018-02-05 DIAGNOSIS — Z12.11 SPECIAL SCREENING FOR MALIGNANT NEOPLASMS, COLON: Primary | ICD-10-CM

## 2018-02-05 DIAGNOSIS — Z86.718 PERSONAL HISTORY OF DVT (DEEP VEIN THROMBOSIS): Primary | ICD-10-CM

## 2018-02-05 DIAGNOSIS — I10 ESSENTIAL HYPERTENSION: ICD-10-CM

## 2018-02-05 DIAGNOSIS — Z01.818 PRE-OP EXAM: ICD-10-CM

## 2018-02-05 DIAGNOSIS — I26.99 PULMONARY EMBOLISM AND INFARCTION (H): ICD-10-CM

## 2018-02-05 DIAGNOSIS — Z80.0 FAMILY HISTORY OF COLON CANCER: ICD-10-CM

## 2018-02-05 LAB — INR PPP: 2.2

## 2018-02-05 PROCEDURE — 99214 OFFICE O/P EST MOD 30 MIN: CPT | Performed by: CLINICAL NURSE SPECIALIST

## 2018-02-05 PROCEDURE — G0463 HOSPITAL OUTPT CLINIC VISIT: HCPCS

## 2018-02-05 RX ORDER — SODIUM, POTASSIUM,MAG SULFATES 17.5-3.13G
2 SOLUTION, RECONSTITUTED, ORAL ORAL SEE ADMIN INSTRUCTIONS
Qty: 2 BOTTLE | Refills: 0 | Status: SHIPPED | OUTPATIENT
Start: 2018-02-05 | End: 2018-04-03

## 2018-02-05 ASSESSMENT — PAIN SCALES - GENERAL: PAINLEVEL: NO PAIN (0)

## 2018-02-05 NOTE — PROGRESS NOTES
Surgery Consult Clinic Note      RE: Cassy Avila  : 1955  CONNIE: 2018      Chief Complaint:  Screening colon malignant neoplasm  Blood in stool    History of Present Illness:  I am seeing Cassy Avila at the request of Eliz Hartman NP for evaluation regarding screening malignant colon neoplasm and consideration for colonoscopy.  She reports a family history of cancerous polyps in her mother, diagnosed in her late 80's.  She had an episode of blood in her stool approximately two months ago.  She denies changes in bowel habits, weight loss, or abdominal pain.  Previous abdominal surgeries include hysterectomy, cholecystectomy, and back surgery with placement of a neurostimulator.  She had a colonoscopy in  which reports was normal, the report is not available to me at this time.  She specifically denies fevers, chills, nausea, vomiting, chest pain, shortness of breath, palpitations, sore throat, cough, or generalized feeling ill.      Medical history:  Past Medical History:   Diagnosis Date     Arthritis      Cervicalgia 2001     Closed dislocation of shoulder, unspecified site      Congenital deficiency of other clotting factors 2012    factor V deficiency, congenital     Contact dermatitis and other eczema, due to unspecified cause 2004     Coughing      Edema 2002     Essential hypertension 2001     Problem list name updated by automated process. Provider to review     Factor V Leiden (H)      Family history of colon cancer 2018     Gastro-oesophageal reflux disease      Herpes zoster without mention of complication     resolved from problem list     Hyperlipidemia LDL goal <100 2002     Problem list name updated by automated process. Provider to review     Long term (current) use of anticoagulants 2003     Major depression 2014     Mammographic microcalcification     resolved from problem list     Migraine, unspecified, without mention of  intractable migraine without mention of status migrainosus 3/5/2001     Moderate episode of recurrent major depressive disorder (H) 2014     Neurofibromatosis, unspecified(237.70) 2012     Other and unspecified hyperlipidemia 2002     Other chronic pain      Other pulmonary embolism and infarction 2003     Statin medication not prescribed per physician orders 2018     Tachycardia, unspecified 2001     Thrombosis of leg      Unspecified essential hypertension 2001       Surgical history:  Past Surgical History:   Procedure Laterality Date     ------------OTHER-------------      shoulder replacement; Provider: Karen     ARTHROPLASTY KNEE  2014    Procedure: ARTHROPLASTY KNEE;  Surgeon: Sean Alexander MD;  Location: [facility]     ARTHROSCOPY SHOULDER  2012    right, bone spurs      SECTION      x3     CHOLECYSTECTOMY       elbow ulnar tunnel release       ELECTROTHERMAL THERAPY INTRADISC  2017    stimulator     ENDOSCOPIC SINUS SURGERY, SEPTOPLASTY, TURBINOPLASTY, MAXILLARY SINUSOTOMY, COMBINED N/A 2015    Procedure: COMBINED ENDOSCOPIC SINUS SURGERY, SEPTOPLASTY, TURBINOPLASTY, MAXILLARY SINUSOTOMY;  Surgeon: Seema Conn MD;  Location: [facility]     esophagastroduodenoscopy      with biopsy and endoscopic U/S     EXCISE NEUROMA LOWER EXTREMITY Left 2016    Procedure: EXCISE NEUROMA LOWER EXTREMITY;  Surgeon: Edi Reed MD;  Location: [facility]     FUSION LUMBAR ANTERIOR WITH MARCY CAGES      L5-S1     ORTHOPEDIC SURGERY  2-15    right shoulder     ORTHOPEDIC SURGERY  8/28/15    right knee     pionidal cyst excision       SARAHI/BSO       TRANSPOSITION ULNAR NERVE (ELBOW)         Family history:  Family History   Problem Relation Age of Onset     CANCER Mother      Colon Polyps Mother      Heart Failure Mother 87     congestive, cause of death     Myocardial Infarction Mother      myocardial infarction     Myocardial Infarction Father       myocardial infarction - cause of death     C.A.D. Father      CANCER Paternal Uncle      cause of death     C.A.D. Brother      Other - See Comments Other      factor 5 - family h/o     Asthma No family hx of        Medications:  Current Outpatient Prescriptions   Medication Sig Dispense Refill     STATIN NOT PRESCRIBED, INTENTIONAL, Please choose reason not prescribed, below       escitalopram (LEXAPRO) 20 MG tablet Take 20mg daily, may take an extra 20mg once daily as needed 90 tablet 1     losartan (COZAAR) 50 MG tablet TAKE 2 TABLETS BY MOUTH EVERY  tablet 2     metoprolol (TOPROL-XL) 50 MG 24 hr tablet TAKE 1 AND ONE-HALF TABLETS BY MOUTH EVERY  tablet 2     warfarin (COUMADIN) 5 MG tablet 7.5mg Mon,Wed,Fri and 5mg all other days or as directed by warfarin clinic 150 tablet 3     hydrochlorothiazide (HYDRODIURIL) 25 MG tablet Take 1 tablet (25 mg) by mouth daily 90 tablet 2     acetaminophen (TYLENOL) 325 MG tablet Take 3 tablets (975 mg) by mouth every 4 hours as needed for mild pain or fever 100 tablet      Cholecalciferol (VITAMIN D3 PO) Take 3,000 mg by mouth daily AM       omega 3 1000 MG CAPS Take 3 g by mouth daily  90 capsule      aspirin 81 MG EC tablet Take 81 mg by mouth daily HS       Allergies:  The patient is allergic to ace inhibitors; albuterol sulfate; amlodipine besylate; amoxicillin; atorvastatin; cephalexin monohydrate; erythromycin base [kdc:yellow dye+erythromycin+brilliant blue fcf]; ipratropium bromide; meloxicam; adhesive tape; prochlorperazine edisylate; and prochlorperazine maleate.  .  Social history:  Social History   Substance Use Topics     Smoking status: Former Smoker     Packs/day: 0.50     Years: 30.00     Types: Cigarettes, Pipe     Smokeless tobacco: Never Used      Comment: quit in 1999     Alcohol use No     Marital status: .  Occupation: Retired.    Review of Systems:    Constitutional: Negative for fever, chills and weight loss.   HENT: Negative for  "ear pain, nosebleeds, congestion, sore throat, tinnitus and ear discharge.    Eyes: Negative for blurred vision, double vision, photophobia and pain.   Respiratory: Negative for cough, hemoptysis, shortness of breath, wheezing and stridor.    Cardiovascular: Negative for chest pain, palpitations and orthopnea.   Gastrointestinal: Negative for heartburn, nausea, vomiting, abdominal pain and blood in stool.   Genitourinary: Negative for urgency, frequency and hematuria.   Musculoskeletal: Negative for myalgias, back pain and joint pain.   Neurological: Negative for tingling, speech change and headaches.   Endo/Heme/Allergies: Factor V Leiden, chronic coumadin    Psychiatric/Behavioral: Negative for depression, suicidal ideas and hallucinations. The patient is not nervous/anxious.    Physical Examination:  /68 (BP Location: Right arm, Patient Position: Chair, Cuff Size: Adult Large)  Pulse 67  Temp 97.9  F (36.6  C) (Tympanic)  Ht 5' 5\" (1.651 m)  Wt 230 lb (104.3 kg)  SpO2 95%  BMI 38.27 kg/m2  General: AAOx4, NAD, WN/WD, ambulating without assistance  HEENT:NCAT, EOMI, PERRL Sclerae anicteric; Trachea mideline, no JVD  Chest:   Clear to auscultation bilaterally.  Cardiac: S1S2 , regular rate and rhythm without additional sounds  Abdomen: Obese, soft, non-tender, non-distended  Extremities: Cursory exam unremarkable.  No peripheral edema noted.  Skin: Warm, dry, < 2 sec cap refill  Neuro: CN 2-12 grossly intact, no focal deficit, GCS 15  Psych: Pleasant, calm, asks appropriate questions      Assessment/Plan:  #1 Screening colon malignant neoplasm  #2 First degree relative with cancer colon polyp  #3 Factor V Leiden  #4 HTN  #5 Obesity    Cassy Avila and I had a discussion about colonoscopies.  The indications, risks, benefits, althernatives and technical aspects of whole colon colonoscopy were outlined with risks including, but not limited to, perforation, bleeding and inability to visualize entire " colon.  Management of each was reviewed including the risk for life saving surgery and possible admittance to the hospital.  Her questions were asked and answered.    We will schedule colonoscopy with Dr. Pang at the next available date at [facility].  She has a neurotransmitter and she has the device to shut it off, if necessary.  She is on coumadin, therefore will require bridging with Lovenox, I discussed this with her and she verbalizes understanding.  She reports that she is scheduled for a pre-op evaluation with Eliz Hartman NP next week.  Iraida Larson [facility]  [address] [place] [address]    Referring Provider:  Eliz Hartman NP  [address] [place] [address] [place] [address]     Primary Care Provider:  Eliz Hartman

## 2018-02-05 NOTE — PROGRESS NOTES
ANTICOAGULATION FOLLOW-UP CLINIC VISIT    Patient Name:  Cassy Avila  Date:  2/5/2018  Contact Type:  Telephone    SUBJECTIVE:     Patient Findings     Comments Call from patient stating she did PST.  She states PST was 2.2.  She has no new medications and no bleeding/bruising. She states she is feeling good.  We discussed warfarin dosing and INR recheck date and she verbalized understanding and has no questions           OBJECTIVE    INR   Date Value Ref Range Status   02/05/2018 2.2  Final       ASSESSMENT / PLAN  INR assessment THER    Recheck INR In: 4 WEEKS    INR Location Home INR      Anticoagulation Summary as of 2/5/2018     INR goal 2.0-3.0   Today's INR 2.2   Maintenance plan 7.5 mg (5 mg x 1.5) on Mon, Wed, Fri; 5 mg (5 mg x 1) all other days   Full instructions 7.5 mg on Mon, Wed, Fri; 5 mg all other days   Weekly total 42.5 mg   No change documented Manju Escalera RN   Plan last modified Iram Lord RN (7/20/2016)   Next INR check 3/5/2018   Priority INR   Target end date Indefinite    Indications   Long-term (current) use of anticoagulants [Z79.01] [Z79.01]  Pulmonary embolism and infarction (H) [I26.99]  Deep vein thrombosis (DVT) (H) [I82.409] [I82.409]         Anticoagulation Episode Summary     INR check location Home Draw    Preferred lab     Send INR reminders to  [facility]    Comments PST - [facility].        Anticoagulation Care Providers     Provider Role Specialty Phone number    Eliz Hartman NP [facility] [phone]            See the Encounter Report to view Anticoagulation Flowsheet and Dosing Calendar (Go to Encounters tab in chart review, and find the Anticoagulation Therapy Visit)        Manju Escalera, RN

## 2018-02-05 NOTE — NURSING NOTE
"Chief Complaint   Patient presents with     Consult     Colon-Referred by Eliz Hartman       Initial /68 (BP Location: Right arm, Patient Position: Chair, Cuff Size: Adult Large)  Pulse 67  Temp 97.9  F (36.6  C) (Tympanic)  Ht 5' 5\" (1.651 m)  Wt 230 lb (104.3 kg)  SpO2 95%  BMI 38.27 kg/m2 Estimated body mass index is 38.27 kg/(m^2) as calculated from the following:    Height as of this encounter: 5' 5\" (1.651 m).    Weight as of this encounter: 230 lb (104.3 kg).  Medication Reconciliation: complete     Cassy Mandujano LPN           "

## 2018-02-05 NOTE — TELEPHONE ENCOUNTER
Surgical orders faxed to [facility] for colonoscopy on 2/15/18 with Dr. Melendrez. Confirmation fax received by surgical nurse on 2/5/18.     Johana Ashton

## 2018-02-05 NOTE — MR AVS SNAPSHOT
Cassy CHIU [place]   2/5/2018   Anticoagulation Therapy Visit    Description:  62 year old female   Provider:  Eliz Hartman NP   Department:  [facility] Anti Coagulation           INR as of 2/5/2018     Today's INR 2.2      Anticoagulation Summary as of 2/5/2018     INR goal 2.0-3.0   Today's INR 2.2   Full instructions 7.5 mg on Mon, Wed, Fri; 5 mg all other days   Next INR check 3/5/2018    Indications   Long-term (current) use of anticoagulants [Z79.01] [Z79.01]  Pulmonary embolism and infarction (H) [I26.99]  Deep vein thrombosis (DVT) (H) [I82.409] [I82.409]         February 2018 Details    Sun Mon Tue Wed Thu Fri Sat         1               2               3                 4               5      7.5 mg   See details      6      5 mg         7      7.5 mg         8      5 mg         9      7.5 mg         10      5 mg           11      5 mg         12      7.5 mg         13      5 mg         14      7.5 mg         15      5 mg         16      7.5 mg         17      5 mg           18      5 mg         19      7.5 mg         20      5 mg         21      7.5 mg         22      5 mg         23      7.5 mg         24      5 mg           25      5 mg         26      7.5 mg         27      5 mg         28      7.5 mg             Date Details   02/05 This INR check               How to take your warfarin dose     To take:  5 mg Take 1 of the 5 mg tablets.    To take:  7.5 mg Take 1.5 of the 5 mg tablets.           March 2018 Details    Sun Mon Tue Wed Thu Fri Sat         1      5 mg         2      7.5 mg         3      5 mg           4      5 mg         5            6               7               8               9               10                 11               12               13               14               15               16               17                 18               19               20               21               22               23               24                 25               26               27                28               29               30               31                Date Details   No additional details    Date of next INR:  3/5/2018         How to take your warfarin dose     To take:  5 mg Take 1 of the 5 mg tablets.    To take:  7.5 mg Take 1.5 of the 5 mg tablets.

## 2018-02-05 NOTE — MR AVS SNAPSHOT
After Visit Summary   2/5/2018    Cassy Avila    MRN: [id number]           Patient Information     Date Of Birth          1955        Visit Information        Provider Department      2/5/2018 10:00 AM Iraida Larson APRN [facility]        Today's Diagnoses     Special screening for malignant neoplasms, colon    -  1      Care Instructions     Your procedure will be at the [facility] in [place]  [address] [place] [address]  Mary () [phone]  Fax Number [phone]            Thank you for allowing Dr. Pang and our surgical team to participate in your care.  If you have a scheduling or an appointment question please contact Aretha our Health Unit Coordinator at her direct line [phone].   ALL nursing questions or concerns can be directed to Johana at: [phone]     You are scheduled for a: colonoscopy  Your procedure date is: 2/15/18    You need a friend or family member available to drive you home AND stay with you for 24 hours after you leave the hospital. You will not be allowed to drive yourself. IF you need to take a taxi or the bus you MUST have a responsible person to ride with you. YOUR PROCEDURE WILL BE CANCELLED IF YOU DO NOT HAVE A RESPONSIBLE ADULT TO DRIVE YOU HOME.       You CANNOT have anything to eat or drink after midnight the night before your surgery, ncluding water and coffee. Your stomach needs to be completely empty. Do NOT chew gum, suck on hard candy, or smoke. You can brush your teeth the morning of surgery.       You need to call our Surgery Education Nurses 1-2 weeks prior to your surgery date at  [phone] or toll free [phone]. Please have you medication and allergy lists ready.      Stop your aspirin or other NSAIDs(Ibuprofen, Motrin, Aleve, Celebrex, Naproxen, etc...) 7 days before your surgery.      Hospital admitting will call you the day before your surgery with your arrival  time. If you are scheduled on a Monday admitting will call you the Friday before.      Please call your primary care physician if you should become ill within 24 hours of scheduled surgery. (ex.vomiting, diarrhea, fever)    Surgery center will contact you the day before your procedure between the hours of noon and 5 pm with the time you need to register in admitting at the hospital. Call Johana with any questions [phone]    On your prep day (2-14-18) you are going to hold your hydrchlorothiazide.     day of surgery you are going to take your metoprolol and your cozaar with just sips of water to get the medications down.      I will let the coumadin girls know about your procedure so they will contact you about bridging to lovenox.     Hold all other medications day of surgery. Once you return home you may resume like normal.    Hold your fish oil for 5 days prior to your procedure. Once you return home you may resume that like normal also.             Follow-ups after your visit        Your next 10 appointments already scheduled     Feb 14, 2018 11:00 AM CST   (Arrive by 10:45 AM)   Pre-Op physical with Eliz Hartman NP   [facility] ([facility] )    [address]  [facility] [address]   [phone]              Who to contact     If you have questions or need follow up information about today's clinic visit or your schedule please contact [facility] directly at [phone].  Normal or non-critical lab and imaging results will be communicated to you by MyChart, letter or phone within 4 business days after the clinic has received the results. If you do not hear from us within 7 days, please contact the clinic through MyChart or phone. If you have a critical or abnormal lab result, we will notify you by phone as soon as possible.  Submit refill requests through [employer] or call your pharmacy and they will forward the refill request to us. Please allow  "3 business days for your refill to be completed.          Additional Information About Your Visit        MyChart Information     [facility] gives you secure access to your electronic health record. If you see a primary care provider, you can also send messages to your care team and make appointments. If you have questions, please call your primary care clinic.  If you do not have a primary care provider, please call [phone] and they will assist you.        Care EveryWhere ID     This is your Care EveryWhere ID. This could be used by other organizations to access your [facility] medical records  [id number]        Your Vitals Were     Pulse Temperature Height Pulse Oximetry BMI (Body Mass Index)       67 97.9  F (36.6  C) (Tympanic) 5' 5\" (1.651 m) 95% 38.27 kg/m2        Blood Pressure from Last 3 Encounters:   02/05/18 122/68   01/29/18 120/66   01/17/18 118/72    Weight from Last 3 Encounters:   02/05/18 230 lb (104.3 kg)   01/29/18 230 lb (104.3 kg)   01/17/18 228 lb (103.4 kg)              Today, you had the following     No orders found for display         Today's Medication Changes          These changes are accurate as of 2/5/18 10:25 AM.  If you have any questions, ask your nurse or doctor.               Stop taking these medicines if you haven't already. Please contact your care team if you have questions.     gemfibrozil 600 MG tablet   Commonly known as:  LOPID   Stopped by:  Iraida Larson APRN CNS                    Primary Care Provider Office Phone # Fax #    Eliz Hartman [phone] [address]  [place] [facility] [address]        Equal Access to Services     [facility]: Hadii mic Hernandez, wilver velasco, qaybta jeanie alonso . So [facility] [phone].    ATENCIÓN: Si habla español, tiene a noriega disposición servicios gratuitos de asistencia lingüística. Llame al [phone].    We comply with applicable federal " civil rights laws and [place] laws. We do not discriminate on the basis of race, color, national origin, age, disability, sex, sexual orientation, or gender identity.            Thank you!     Thank you for choosing [facility]  for your care. Our goal is always to provide you with excellent care. Hearing back from our patients is one way we can continue to improve our services. Please take a few minutes to complete the written survey that you may receive in the mail after your visit with us. Thank you!             Your Updated Medication List - Protect others around you: Learn how to safely use, store and throw away your medicines at www.disposemymeds.org.          This list is accurate as of 2/5/18 10:25 AM.  Always use your most recent med list.                   Brand Name Dispense Instructions for use Diagnosis    acetaminophen 325 MG tablet    TYLENOL    100 tablet    Take 3 tablets (975 mg) by mouth every 4 hours as needed for mild pain or fever    Pneumonia of left lower lobe due to infectious organism (H)       aspirin 81 MG EC tablet      Take 81 mg by mouth daily HS        escitalopram 20 MG tablet    LEXAPRO    90 tablet    Take 20mg daily, may take an extra 20mg once daily as needed    Episode of recurrent major depressive disorder, unspecified depression episode severity (H)       hydrochlorothiazide 25 MG tablet    HYDRODIURIL    90 tablet    Take 1 tablet (25 mg) by mouth daily    Essential hypertension       losartan 50 MG tablet    COZAAR    180 tablet    TAKE 2 TABLETS BY MOUTH EVERY DAY    Benign essential hypertension       metoprolol succinate 50 MG 24 hr tablet    TOPROL-XL    135 tablet    TAKE 1 AND ONE-HALF TABLETS BY MOUTH EVERY DAY    Essential hypertension       omega 3 1000 MG Caps     90 capsule    Take 3 g by mouth daily        STATIN NOT PRESCRIBED (INTENTIONAL)      Please choose reason not prescribed, below    Hyperlipidemia LDL goal <100       VITAMIN D3 PO       Take 3,000 mg by mouth daily AM        warfarin 5 MG tablet    COUMADIN    150 tablet    7.5mg Mon,Wed,Fri and 5mg all other days or as directed by warfarin clinic    Long-term (current) use of anticoagulants

## 2018-02-05 NOTE — LETTER
Allergies   Ace Inhibitors Cough     Past Updates...   Albuterol Sulfate Hives     Past Updates...   DuoNeb      Amlodipine Besylate Swelling     Past Updates...   Norvasc      Amoxicillin    6/21/2013  Past Updates...   Cephalexin Monohydrate Hives     Past Updates...   Keflex      Erythromycin Base [Kdc:yellow Dye+erythromycin+brilliant Blue Fcf] Nausea and Vomiting     Past Updates...   Ipratropium Bromide Hives     Past Updates...   DuoNeb      Atorvastatin   Allergy or Hypersensitivity 1/17/2018  Past Updates...   myualgia      Meloxicam Other (See Comments)     Past Updates...   Mobic - confusion, depression      Adhesive Tape Rash Low    Past Updates...   Prochlorperazine Edisylate Swelling, Rash Low    Past Updates...   Compazine      Prochlorperazine Maleate Swelling, Rash Low    Past Updates...      [place] as Reviewed

## 2018-02-05 NOTE — PATIENT INSTRUCTIONS
Your procedure will be at the [facility] in [place]  [address] [facility] [address]  Mary () [phone]  Fax Number [phone]            Thank you for allowing Dr. Pang and our surgical team to participate in your care.  If you have a scheduling or an appointment question please contact Aretha [facility] Health Unit Coordinator at her direct line [phone].   ALL nursing questions or concerns can be directed to Johana at: [phone]     You are scheduled for a: colonoscopy  Your procedure date is: 2/15/18    You need a friend or family member available to drive you home AND stay with you for 24 hours after you leave the hospital. You will not be allowed to drive yourself. IF you need to take a taxi or the bus you MUST have a responsible person to ride with you. YOUR PROCEDURE WILL BE CANCELLED IF YOU DO NOT HAVE A RESPONSIBLE ADULT TO DRIVE YOU HOME.       You CANNOT have anything to eat or drink after midnight the night before your surgery, ncluding water and coffee. Your stomach needs to be completely empty. Do NOT chew gum, suck on hard candy, or smoke. You can brush your teeth the morning of surgery.       You need to call our Surgery Education Nurses 1-2 weeks prior to your surgery date at  [phone] or toll free [phone]. Please have you medication and allergy lists ready.      Stop your aspirin or other NSAIDs(Ibuprofen, Motrin, Aleve, Celebrex, Naproxen, etc...) 7 days before your surgery.      Hospital admitting will call you the day before your surgery with your arrival time. If you are scheduled on a Monday admitting will call you the Friday before.      Please call your primary care physician if you should become ill within 24 hours of scheduled surgery. (ex.vomiting, diarrhea, fever)    Surgery center will contact you the day before your procedure between the hours of noon and 5 pm with the time you need to register in admitting at the hospital. Call  Johana with any questions [phone]    On your prep day (2-14-18) you are going to hold your hydrchlorothiazide.     day of surgery you are going to take your metoprolol and your cozaar with just sips of water to get the medications down.      I will let the coumadin girls know about your procedure so they will contact you about bridging to lovenox.     Hold all other medications day of surgery. Once you return home you may resume like normal.    Hold your fish oil for 5 days prior to your procedure. Once you return home you may resume that like normal also.

## 2018-02-07 ENCOUNTER — TRANSFERRED RECORDS (OUTPATIENT)
Dept: HEALTH INFORMATION MANAGEMENT | Facility: HOSPITAL | Age: 63
End: 2018-02-07

## 2018-02-08 ENCOUNTER — ANTICOAGULATION THERAPY VISIT (OUTPATIENT)
Dept: ANTICOAGULATION | Facility: OTHER | Age: 63
End: 2018-02-08
Payer: MEDICARE

## 2018-02-08 DIAGNOSIS — I26.99 PULMONARY EMBOLISM AND INFARCTION (H): ICD-10-CM

## 2018-02-08 DIAGNOSIS — Z79.01 LONG-TERM (CURRENT) USE OF ANTICOAGULANTS: ICD-10-CM

## 2018-02-08 NOTE — PROGRESS NOTES
ANTICOAGULATION FOLLOW-UP CLINIC VISIT    Patient Name:  Cassy Avila  Date:  2/8/2018  Contact Type:  Telephone    SUBJECTIVE:     Patient Findings     Comments Call from patient stating she is having another surgery on 1/22/18, 1 week after the first surgery and was told to hold her warfarin for 5 days prior. . We discussed that she is bridged with lovenox 40mg daily for the 1st surgery and to give her back warfarin for 2 days and then hold again for 5 days for surgery on the 22nd is pointless. We discussed that she should continue her lovenox between surgeries.  We discussed warfarin and lovenox dosing pre and post colonoscopy and pre and post second surgery on 1/22/18 for removal of ankle lesions. She verbalized understanding of warfarin/lovenox dosing and has no questions. Increased amount of lovenox ordered for patient.            OBJECTIVE    INR   Date Value Ref Range Status   02/05/2018 2.2  Final       ASSESSMENT / PLAN  No question data found.  Anticoagulation Summary as of 2/8/2018     INR goal 2.0-3.0   Today's INR No new INR was available at the time of this encounter.   Maintenance plan 7.5 mg (5 mg x 1.5) on Mon, Wed, Fri; 5 mg (5 mg x 1) all other days   Full instructions 2/10: Hold; 2/11: Hold; 2/12: Hold; 2/13: Hold; 2/14: Hold; 2/15: Hold; 2/16: Hold; 2/17: Hold; 2/18: Hold; 2/19: Hold; 2/20: Hold; 2/21: Hold; 2/23: 10 mg; 2/24: 10 mg; 2/25: 7.5 mg; Otherwise 7.5 mg on Mon, Wed, Fri; 5 mg all other days   Weekly total 42.5 mg   Plan last modified Iram Lord RN (7/20/2016)   Next INR check 2/28/2018   Priority INR   Target end date Indefinite    Indications   Long-term (current) use of anticoagulants [Z79.01] [Z79.01]  Pulmonary embolism and infarction (H) [I26.99]  Deep vein thrombosis (DVT) (H) [I82.409] [I82.409]         Anticoagulation Episode Summary     INR check location Home Draw    Preferred lab     Send INR reminders to [facility] POOL    Comments PST - [facility]  Monitoring.  colonoscopy on 1/15/18 and removal of ankle lesion on 1/22/18.  Bridged with lovenox 40mg daily      Anticoagulation Care Providers     Provider Role Specialty Phone number    Eliz Hartman NP [facility] [phone]            See the Encounter Report to view Anticoagulation Flowsheet and Dosing Calendar (Go to Encounters tab in chart review, and find the Anticoagulation Therapy Visit)        Manju Escalera RN

## 2018-02-08 NOTE — Clinical Note
Kaitlin Wellington is having a colonoscopy and a week later is having a lesion removed from her ankle. She was told to hold warfarin for 5 days for the lesion removal.  Instead of putting her on warfarin for 2 days between procedures I am just having her continue her lovenox 40mg daily.  If you want anything different just let me know.  Manju Escalera RN Anticoagulation clinic

## 2018-02-08 NOTE — MR AVS SNAPSHOT
Cassy Avila   2/8/2018   Anticoagulation Therapy Visit    Description:  62 year old female   Provider:  Eliz Hartman NP   Department:  [facility] Anti Coagulation           INR as of 2/8/2018     Today's INR No new INR was available at the time of this encounter.      Anticoagulation Summary as of 2/8/2018     INR goal 2.0-3.0   Today's INR No new INR was available at the time of this encounter.   Full instructions 2/10: Hold; 2/11: Hold; 2/12: Hold; 2/13: Hold; 2/14: Hold; 2/15: Hold; 2/16: Hold; 2/17: Hold; 2/18: Hold; 2/19: Hold; 2/20: Hold; 2/21: Hold; 2/23: 10 mg; 2/24: 10 mg; 2/25: 7.5 mg; Otherwise 7.5 mg on Mon, Wed, Fri; 5 mg all other days   Next INR check 2/28/2018    Indications   Long-term (current) use of anticoagulants [Z79.01] [Z79.01]  Pulmonary embolism and infarction (H) [I26.99]  Deep vein thrombosis (DVT) (H) [I82.409] [I82.409]         February 2018 Details    Sun Mon Tue Wed Thu Fri Sat         1               2               3                 4               5               6               7               8      5 mg   See details      9      7.5 mg         10      Hold           11      Hold         12      Hold   See details      13      Hold   See details      14      Hold   See details      15      Hold   See details      16      Hold   See details      17      Hold   See details        18      Hold   See details      19      Hold   See details      20      Hold   See details      21      Hold   See details      22      5 mg   See details      23      10 mg   See details      24      10 mg   See details        25      7.5 mg   See details      26      7.5 mg   See details      27      5 mg   See details      28      See details          Date Details   02/08 This INR check      02/12 Hold dose   lovenox 40mg in AM      02/13 Hold dose   lovenox 40mg in AM      02/14 Hold dose   lovenox 40mg in AM      02/15 Hold dose   NO lovenox      02/16 Hold dose   lovenox 40mg in AM      02/17 Hold  dose   lovenox 40mg in AM      02/18 Hold dose   lovenox 40mg in AM      02/19 Hold dose   lovenox 40mg in AM      02/20 Hold dose   lovenox 40mg in AM      02/21 Hold dose   lovenox 40mg in AM      02/22 no lovenox      02/23 Take 10 mg (5 mg tablets x 2)   lovenox 40mg in AM      02/24 Take 10 mg (5 mg tablets x 2)   lovenox 40mg in AM      02/25 Take 7.5 mg (5 mg tablets x 1.5)   lovenox 40mg in AM      02/26 lovenox 40mg in AM      02/27 lovenox 40mg in AM      02/28 lovenox 40mg in AM       Date of next INR:  2/28/2018         How to take your warfarin dose     To take:  5 mg Take 1 of the 5 mg tablets.    To take:  7.5 mg Take 1.5 of the 5 mg tablets.    To take:  10 mg Take 2 of the 5 mg tablets.    Hold Do not take your warfarin dose. See the Details table to the right for additional instructions.

## 2018-02-14 ENCOUNTER — RADIANT APPOINTMENT (OUTPATIENT)
Dept: GENERAL RADIOLOGY | Facility: OTHER | Age: 63
End: 2018-02-14
Attending: INTERNAL MEDICINE
Payer: MEDICARE

## 2018-02-14 ENCOUNTER — OFFICE VISIT (OUTPATIENT)
Dept: INTERNAL MEDICINE | Facility: OTHER | Age: 63
End: 2018-02-14
Attending: INTERNAL MEDICINE
Payer: MEDICARE

## 2018-02-14 VITALS
BODY MASS INDEX: 36.65 KG/M2 | TEMPERATURE: 98.4 F | OXYGEN SATURATION: 97 % | WEIGHT: 220 LBS | HEIGHT: 65 IN | SYSTOLIC BLOOD PRESSURE: 132 MMHG | HEART RATE: 56 BPM | DIASTOLIC BLOOD PRESSURE: 82 MMHG

## 2018-02-14 DIAGNOSIS — Z01.818 PREOP GENERAL PHYSICAL EXAM: ICD-10-CM

## 2018-02-14 DIAGNOSIS — Z01.818 PREOP GENERAL PHYSICAL EXAM: Primary | ICD-10-CM

## 2018-02-14 LAB
BASOPHILS # BLD AUTO: 0.1 10E9/L (ref 0–0.2)
BASOPHILS NFR BLD AUTO: 1 %
DIFFERENTIAL METHOD BLD: NORMAL
EOSINOPHIL # BLD AUTO: 0.1 10E9/L (ref 0–0.7)
EOSINOPHIL NFR BLD AUTO: 2.3 %
ERYTHROCYTE [DISTWIDTH] IN BLOOD BY AUTOMATED COUNT: 13.6 % (ref 10–15)
HCT VFR BLD AUTO: 36.3 % (ref 35–47)
HGB BLD-MCNC: 13 G/DL (ref 11.7–15.7)
LYMPHOCYTES # BLD AUTO: 2.1 10E9/L (ref 0.8–5.3)
LYMPHOCYTES NFR BLD AUTO: 40.7 %
MCH RBC QN AUTO: 30.4 PG (ref 26.5–33)
MCHC RBC AUTO-ENTMCNC: 35.8 G/DL (ref 31.5–36.5)
MCV RBC AUTO: 85 FL (ref 78–100)
MONOCYTES # BLD AUTO: 0.6 10E9/L (ref 0–1.3)
MONOCYTES NFR BLD AUTO: 11.6 %
NEUTROPHILS # BLD AUTO: 2.3 10E9/L (ref 1.6–8.3)
NEUTROPHILS NFR BLD AUTO: 44.4 %
PLATELET # BLD AUTO: 189 10E9/L (ref 150–450)
RBC # BLD AUTO: 4.28 10E12/L (ref 3.8–5.2)
WBC # BLD AUTO: 5.3 10E9/L (ref 4–11)

## 2018-02-14 PROCEDURE — G0463 HOSPITAL OUTPT CLINIC VISIT: HCPCS | Mod: 25

## 2018-02-14 PROCEDURE — 93005 ELECTROCARDIOGRAM TRACING: CPT

## 2018-02-14 PROCEDURE — 71046 X-RAY EXAM CHEST 2 VIEWS: CPT | Mod: TC,FY

## 2018-02-14 PROCEDURE — 85025 COMPLETE CBC W/AUTO DIFF WBC: CPT | Mod: ZL | Performed by: INTERNAL MEDICINE

## 2018-02-14 PROCEDURE — 80048 BASIC METABOLIC PNL TOTAL CA: CPT | Mod: ZL | Performed by: INTERNAL MEDICINE

## 2018-02-14 PROCEDURE — 99214 OFFICE O/P EST MOD 30 MIN: CPT | Mod: 25 | Performed by: INTERNAL MEDICINE

## 2018-02-14 PROCEDURE — 93010 ELECTROCARDIOGRAM REPORT: CPT | Performed by: INTERNAL MEDICINE

## 2018-02-14 PROCEDURE — 36415 COLL VENOUS BLD VENIPUNCTURE: CPT | Mod: ZL | Performed by: INTERNAL MEDICINE

## 2018-02-14 ASSESSMENT — ANXIETY QUESTIONNAIRES
5. BEING SO RESTLESS THAT IT IS HARD TO SIT STILL: NOT AT ALL
6. BECOMING EASILY ANNOYED OR IRRITABLE: NOT AT ALL
2. NOT BEING ABLE TO STOP OR CONTROL WORRYING: SEVERAL DAYS
GAD7 TOTAL SCORE: 1
IF YOU CHECKED OFF ANY PROBLEMS ON THIS QUESTIONNAIRE, HOW DIFFICULT HAVE THESE PROBLEMS MADE IT FOR YOU TO DO YOUR WORK, TAKE CARE OF THINGS AT HOME, OR GET ALONG WITH OTHER PEOPLE: NOT DIFFICULT AT ALL
1. FEELING NERVOUS, ANXIOUS, OR ON EDGE: NOT AT ALL
7. FEELING AFRAID AS IF SOMETHING AWFUL MIGHT HAPPEN: NOT AT ALL
3. WORRYING TOO MUCH ABOUT DIFFERENT THINGS: NOT AT ALL

## 2018-02-14 ASSESSMENT — PATIENT HEALTH QUESTIONNAIRE - PHQ9: 5. POOR APPETITE OR OVEREATING: NOT AT ALL

## 2018-02-14 ASSESSMENT — PAIN SCALES - GENERAL: PAINLEVEL: NO PAIN (0)

## 2018-02-14 NOTE — PROGRESS NOTES
Faxed PreOp 2/14/18 ECG Labs Chest Xrays to:  Dr. Pang, [facility]   Ph. [phone] Fx. [phone]    Basic Metabolic panel final to be available by noon 2/15/18

## 2018-02-14 NOTE — MR AVS SNAPSHOT
After Visit Summary   2/14/2018    Cassy Avila    MRN: [id number]           Patient Information     Date Of Birth          1955        Visit Information        Provider Department      2/14/2018 2:00 PM Facundo Pittman, DO [facility] [place]        Today's Diagnoses     Preop general physical exam    -  1      Care Instructions    NPO after midnight  Take Toprol XL with a small sip of water morning of procedure      Before Your Surgery      Call your surgeon if there is any change in your health. This includes signs of a cold or flu (such as a sore throat, runny nose, cough, rash or fever).    Do not smoke, drink alcohol or take over the counter medicine (unless your surgeon or primary care doctor tells you to) for the 24 hours before and after surgery.    If you take prescribed drugs: Follow your doctor s orders about which medicines to take and which to stop until after surgery.    Eating and drinking prior to surgery: follow the instructions from your surgeon    Take a shower or bath the night before surgery. Use the soap your surgeon gave you to gently clean your skin. If you do not have soap from your surgeon, use your regular soap. Do not shave or scrub the surgery site.  Wear clean pajamas and have clean sheets on your bed.           Follow-ups after your visit        Your next 10 appointments already scheduled     Mar 22, 2018  2:15 PM CDT   (Arrive by 2:00 PM)   Hearing Eval with Flora Sharp   [facility] [place] ([facility] [place] )    [address]  [place] [address]   [phone]            Mar 22, 2018  2:45 PM CDT   (Arrive by 2:30 PM)   Post Op with Michelle Ly PA-C   [facility] [place] ([facility] [place] )    [address]  [place] [address]   [phone]              Future tests that were ordered for you today     Open Future Orders        Priority Expected Expires Ordered    XR CHEST 2 VW (Clinic  "Performed) Routine 2/14/2018 2/14/2019 2/14/2018            Who to contact     If you have questions or need follow up information about today's clinic visit or your schedule please contact [facility] directly at [phone].  Normal or non-critical lab and imaging results will be communicated to you by MyChart, letter or phone within 4 business days after the clinic has received the results. If you do not hear from us within 7 days, please contact the clinic through [facility] or phone. If you have a critical or abnormal lab result, we will notify you by phone as soon as possible.  Submit refill requests through [employer] or call your pharmacy and they will forward the refill request to us. Please allow 3 business days for your refill to be completed.          Additional Information About Your Visit        [facility] Information     [employer] gives you secure access to your electronic health record. If you see a primary care provider, you can also send messages to your care team and make appointments. If you have questions, please call your primary care clinic.  If you do not have a primary care provider, please call [phone] and they will assist you.        Care EveryWhere ID     This is your Care EveryWhere ID. This could be used by other organizations to access your [facility] records  [id number]        Your Vitals Were     Pulse Temperature Height Pulse Oximetry BMI (Body Mass Index)       56 98.4  F (36.9  C) (Tympanic) 5' 5\" (1.651 m) 97% 36.61 kg/m2        Blood Pressure from Last 3 Encounters:   02/14/18 132/82   02/05/18 122/68   01/29/18 120/66    Weight from Last 3 Encounters:   02/14/18 220 lb (99.8 kg)   02/05/18 230 lb (104.3 kg)   01/29/18 230 lb (104.3 kg)              We Performed the Following     Basic metabolic panel     CBC with platelets and differential     EKG 12-lead complete w/read - (Clinic Performed)        Primary Care Provider Office Phone # Fax #    Eliz Hartman NP " [phone]       [address] [place]  [place] [address]        Equal Access to Services     DILAN PARRISH : Hadii aad ku hadpilarmyesha Marlenecarlos, wasabasda gabyshirleneha, yaya kachela araujo, jeanie siennain hayaapineda sonimauro anahiyangberlin brito. So [facility] [phone].    ATENCIÓN: Si habla español, tiene a noriega disposición servicios gratuitos de asistencia lingüística. Llame al [phone].    We comply with applicable federal civil rights laws and [place] laws. We do not discriminate on the basis of race, color, national origin, age, disability, sex, sexual orientation, or gender identity.            Thank you!     Thank you for choosing [facility]  for your care. Our goal is always to provide you with excellent care. Hearing back from our patients is one way we can continue to improve our services. Please take a few minutes to complete the written survey that you may receive in the mail after your visit with us. Thank you!             Your Updated Medication List - Protect others around you: Learn how to safely use, store and throw away your medicines at www.disposemymeds.org.          This list is accurate as of 2/14/18  2:29 PM.  Always use your most recent med list.                   Brand Name Dispense Instructions for use Diagnosis    acetaminophen 325 MG tablet    TYLENOL    100 tablet    Take 3 tablets (975 mg) by mouth every 4 hours as needed for mild pain or fever    Pneumonia of left lower lobe due to infectious organism (H)       aspirin 81 MG EC tablet      Take 81 mg by mouth daily HS        enoxaparin 40 MG/0.4ML injection    LOVENOX    8 Syringe    Inject 0.4 mLs (40 mg) Subcutaneous daily (with breakfast) for 8 doses    Long-term (current) use of anticoagulants, Pulmonary embolism and infarction (H)       escitalopram 20 MG tablet    LEXAPRO    90 tablet    Take 20mg daily, may take an extra 20mg once daily as needed    Episode of recurrent major depressive disorder, unspecified  depression episode severity (H)       hydrochlorothiazide 25 MG tablet    HYDRODIURIL    90 tablet    Take 1 tablet (25 mg) by mouth daily    Essential hypertension       losartan 50 MG tablet    COZAAR    180 tablet    TAKE 2 TABLETS BY MOUTH EVERY DAY    Benign essential hypertension       metoprolol succinate 50 MG 24 hr tablet    TOPROL-XL    135 tablet    TAKE 1 AND ONE-HALF TABLETS BY MOUTH EVERY DAY    Essential hypertension       Na Sulfate-K Sulfate-Mg Sulf solution    SUPREP BOWEL PREP    2 Bottle    Take 354 mLs (2 Bottles) by mouth See Admin Instructions    Special screening for malignant neoplasms, colon       omega 3 1000 MG Caps     90 capsule    Take 3 g by mouth daily        STATIN NOT PRESCRIBED (INTENTIONAL)      Please choose reason not prescribed, below    Hyperlipidemia LDL goal <100       VITAMIN D3 PO      Take 3,000 mg by mouth daily AM        warfarin 5 MG tablet    COUMADIN    150 tablet    7.5mg Mon,Wed,Fri and 5mg all other days or as directed by warfarin clinic    Long-term (current) use of anticoagulants

## 2018-02-14 NOTE — PROGRESS NOTES
[facility]  [address]  [place] [address]  [phone]  Dept: [phone]    PRE-OP EVALUATION:  Today's date: 2018    Cassy Avila (: 1955) presents for pre-operative evaluation assessment as requested by Dr. Pang, Dr. Conn, and Dr. Alexander.  She requires evaluation and anesthesia risk assessment prior to undergoing surgery/procedure for treatment of colonoscopy, Fluid in ear drum, cyst on left ankle.  Proposed procedure: Colonoscopy, Right ear tube placement and excising the cyst off ankle.    Date of Surgery/ Procedure: Colonoscopy (2/15/18), Cyst removal (2018), right tube placement (2018)  Time of Surgery/ Procedure: [facility]  Hospital/Surgical Facility: Colonoscopy and Right Tube Placement ([facility]) and Cyst removal ([facility])    Primary Physician: Eliz Hartman  Type of Anesthesia Anticipated: to be determined    Patient has a Health Care Directive or Living Will:  NO    1. NO - Do you have a history of heart attack, stroke, stent, bypass or surgery on an artery in the head, neck, heart or legs?  2. NO - Do you ever have any pain or discomfort in your chest?  3. NO - Do you have a history of  Heart Failure?  4. NO - Are you troubled by shortness of breath when: walking on the level, up a slight hill or at night?  5. NO - Do you currently have a cold, bronchitis or other respiratory infection?  6. NO - Do you have a cough, shortness of breath or wheezing?  7. NO - Do you sometimes get pains in the calves of your legs when you walk?  8. YES - DO YOU OR ANYONE IN YOUR FAMILY HAVE PREVIOUS HISTORY OF BLOOD CLOTS? Yes patient does.  9. YES - DO YOU OR DOES ANYONE IN YOUR FAMILY HAVE A SERIOUS BLEEDING PROBLEM SUCH AS PROLONGED BLEEDING FOLLOWING SURGERIES OR CUTS? Yes, mother. Patient clots too easily.  10. NO - Have you ever had problems with anemia or been told to take iron pills?  11. NO - Have you had any  abnormal blood loss such as black, tarry or bloody stools, or abnormal vaginal bleeding?  12. NO - Have you ever had a blood transfusion?  13. NO - Have you or any of your relatives ever had problems with anesthesia?  14. NO - Do you have sleep apnea, excessive snoring or daytime drowsiness?  15. NO - Do you have any prosthetic heart valves?  16. YES - DO YOU HAVE PROSTHETIC JOINTS? Left knee and left shoulder.  17. NO - Is there any chance that you could be pregnant? NO      HPI:                                                      Brief HPI related to upcoming procedure: Colonoscopy, Tube in right ear and Lipoma removal from left ankle.        DVT -  History of factor V Lidein on chronic coumadin therapy.  Lovenox Bridge currently.      HYPERTENSION - Patient has longstanding history of mod-severe HTN , currently denies any symptoms referable to elevated blood pressure. Specifically denies chest pain, palpitations, dyspnea, orthopnea, PND or peripheral edema. Blood pressure readings have been in normal range. Current medication regimen is as listed below. Patient denies any side effects of medication.                                               HYPERLIPIDEMIA - Patient has a long history of significant Hyperlipidemia requiring medication for treatment with recent good control. Patient reports no problems or side effects with the medication.         DEPRESSION - Patient has a long history of Depression of moderate severity requiring medication for control with recent symptoms being stable. Current symptoms of depression include depressed mood.    The patient also has a history of stimulator in her spine-7/20/17.                                                                                                                                  MEDICAL HISTORY:                                                      Patient Active Problem List    Diagnosis Date Noted     Statin medication not prescribed per physician  orders 01/17/2018     Priority: Medium     Family history of colon cancer 01/02/2018     Priority: Medium     Lumbar spondylosis with myelopathy 07/12/2017     Priority: Medium     Degeneration of lumbar or lumbosacral intervertebral disc 07/12/2017     Priority: Medium     Long-term (current) use of anticoagulants [Z79.01] 07/20/2016     Priority: Medium     Deep vein thrombosis (DVT) (H) [I82.409] 07/20/2016     Priority: Medium     Moderate episode of recurrent major depressive disorder (H) 08/08/2014     Priority: Medium     Advanced care planning/counseling discussion 03/12/2013     Priority: Medium     Pt has information at home , not completed       Neurofibromatosis (H) 08/22/2012     Priority: Medium     Problem list name updated by automated process. Provider to review       Contact dermatitis and other eczema, due to unspecified cause 06/01/2004     Priority: Medium     Pulmonary embolism and infarction (H) 04/11/2003     Priority: Medium     Problem list name updated by automated process. Provider to review       Hyperlipidemia LDL goal <100 07/11/2002     Priority: Medium     Problem list name updated by automated process. Provider to review       Edema 01/18/2002     Priority: Medium     Essential hypertension 02/20/2001     Priority: Medium     Problem list name updated by automated process. 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 Provider to review     Factor V Leiden (H)      Family history of colon cancer 1/2/2018     Gastro-oesophageal reflux disease      Herpes zoster without mention of  complication     resolved from problem list     Hyperlipidemia LDL goal <100 2002     Problem list name updated by automated process. Provider to review     Long term (current) use of anticoagulants 2003     Major depression 2014     Mammographic microcalcification     resolved from problem list     Migraine, unspecified, without mention of intractable migraine without mention of status migrainosus 3/5/2001     Moderate episode of recurrent major depressive disorder (H) 2014     Neurofibromatosis, unspecified(237.70) 2012     Other and unspecified hyperlipidemia 2002     Other chronic pain      Other pulmonary embolism and infarction 2003     Statin medication not prescribed per physician orders 2018     Tachycardia, unspecified 2001     Thrombosis of leg      Unspecified essential hypertension 2001     Past Surgical History:   Procedure Laterality Date     ------------OTHER-------------      shoulder replacement; Provider: Karen     ARTHROPLASTY KNEE  2014    Procedure: ARTHROPLASTY KNEE;  Surgeon: Sean Alexander MD;  Location: [facility] OR     ARTHROSCOPY SHOULDER  2012    right, bone spurs      SECTION      x3     CHOLECYSTECTOMY       elbow ulnar tunnel release  2002     ELECTROTHERMAL THERAPY INTRADISC  2017    stimulator     ENDOSCOPIC SINUS SURGERY, SEPTOPLASTY, TURBINOPLASTY, MAXILLARY SINUSOTOMY, COMBINED N/A 2015    Procedure: COMBINED ENDOSCOPIC SINUS SURGERY, SEPTOPLASTY, TURBINOPLASTY, MAXILLARY SINUSOTOMY;  Surgeon: Seema Conn MD;  Location: [facility] OR     esophagastroduodenoscopy      with biopsy and endoscopic U/S     EXCISE NEUROMA LOWER EXTREMITY Left 2016    Procedure: EXCISE NEUROMA LOWER EXTREMITY;  Surgeon: Edi Reed MD;  Location: [facility] OR     FUSION LUMBAR ANTERIOR WITH MARCY CAGES      L5-S1     ORTHOPEDIC SURGERY  2-15    right shoulder     ORTHOPEDIC SURGERY  8/28/15    right knee     pionidal  cyst excision       SARAHI/BSO       TRANSPOSITION ULNAR NERVE (ELBOW)       Current Outpatient Prescriptions   Medication Sig Dispense Refill     enoxaparin (LOVENOX) 40 MG/0.4ML injection Inject 0.4 mLs (40 mg) Subcutaneous daily (with breakfast) for 8 doses 8 Syringe 1     Na Sulfate-K Sulfate-Mg Sulf (SUPREP BOWEL PREP) solution Take 354 mLs (2 Bottles) by mouth See Admin Instructions 2 Bottle 0     STATIN NOT PRESCRIBED, INTENTIONAL, Please choose reason not prescribed, below       escitalopram (LEXAPRO) 20 MG tablet Take 20mg daily, may take an extra 20mg once daily as needed 90 tablet 1     losartan (COZAAR) 50 MG tablet TAKE 2 TABLETS BY MOUTH EVERY  tablet 2     metoprolol (TOPROL-XL) 50 MG 24 hr tablet TAKE 1 AND ONE-HALF TABLETS BY MOUTH EVERY  tablet 2     warfarin (COUMADIN) 5 MG tablet 7.5mg Mon,Wed,Fri and 5mg all other days or as directed by warfarin clinic 150 tablet 3     hydrochlorothiazide (HYDRODIURIL) 25 MG tablet Take 1 tablet (25 mg) by mouth daily 90 tablet 2     Cholecalciferol (VITAMIN D3 PO) Take 3,000 mg by mouth daily AM       omega 3 1000 MG CAPS Take 3 g by mouth daily  90 capsule      acetaminophen (TYLENOL) 325 MG tablet Take 3 tablets (975 mg) by mouth every 4 hours as needed for mild pain or fever (Patient not taking: Reported on 2/14/2018) 100 tablet      aspirin 81 MG EC tablet Take 81 mg by mouth daily HS       OTC products: None, except as noted above    Allergies   Allergen Reactions     Ace Inhibitors Cough     Albuterol Sulfate Hives     DuoNeb     Amlodipine Besylate Swelling     Norvasc     Amoxicillin      Atorvastatin      myualgia     Cephalexin Monohydrate Hives     Keflex     Erythromycin Base [Kdc:Yellow Dye+Erythromycin+Brilliant Blue Fcf] Nausea and Vomiting     Ipratropium Bromide Hives     DuoNeb     Meloxicam Other (See Comments)     Mobic - confusion, depression     Adhesive Tape Rash     Prochlorperazine Edisylate Swelling and Rash     Compazine  "    Prochlorperazine Maleate Swelling and Rash      Latex Allergy: NO    Social History   Substance Use Topics     Smoking status: Former Smoker     Packs/day: 0.50     Years: 30.00     Types: Cigarettes, Pipe     Smokeless tobacco: Never Used      Comment: quit in 1999     Alcohol use No     History   Drug Use No       REVIEW OF SYSTEMS:                                                    Review Of Systems  Skin: negative  Eyes: negative  Ears/Nose/Throat: Right ear plugged  Respiratory: No shortness of breath, dyspnea on exertion, cough, or hemoptysis  Cardiovascular: negative  Gastrointestinal: negative  Genitourinary: negative  Musculoskeletal: as above  Neurologic: negative  Psychiatric: negative  Hematologic/Lymphatic/Immunologic: negative  Endocrine: diabetes    EXAM:                                                    /82 (BP Location: Left arm, Patient Position: Chair, Cuff Size: Adult Large)  Pulse 56  Temp 98.4  F (36.9  C) (Tympanic)  Ht 5' 5\" (1.651 m)  Wt 220 lb (99.8 kg)  SpO2 97%  BMI 36.61 kg/m2    GENERAL APPEARANCE: healthy, alert and no distress     EYES: EOMI, PERRL     HENT: ear canals and TM's normal and nose and mouth without ulcers or lesions     NECK: no adenopathy, no asymmetry, masses, or scars and thyroid normal to palpation, no bruits.       RESP: lungs clear to auscultation - no rales, rhonchi or wheezes     CV: regular rates and rhythm, normal S1 S2, no S3 or S4 and no murmur, click or rub     ABDOMEN:  Obese-soft, nontender, no HSM or masses and bowel sounds normal     MS: Left ankle Lipoma.       SKIN: no suspicious lesions or rashes     NEURO: No focal neurologic deficits.       PSYCH: mentation appears normal. and affect normal/bright     LYMPHATICS: No cervical, or supraclavicular nodes    DIAGNOSTICS:                                                    EKG: Sinus Bradycardia -unchanged from previous  Chest XRay:  Reinaldo Mandel MD 2/14/2018            Narrative    "          PROCEDURE:  XR CHEST 2 VW    HISTORY:  ; Preop general physical exam.     COMPARISON:  None.    FINDINGS:   The cardiac silhouette is normal in size. The pulmonary vasculature is  normal.  Costophrenic angle region on the right there is some linear  opacities and just above the left hemidiaphragm is some ill-defined  somewhat nodular opacities. These are both stable and unchanged from  previous examination in April 2017. No pleural effusion or  pneumothorax.             Impression             IMPRESSION:  Bibasilar areas of increased density stable from previous  examination in April 2017      MARCELLE VENEGAS MD          Recent Labs   Lab Test 02/05/18 01/22/18 01/17/18   0822   07/12/17   1117   03/14/17 2028   11/05/13   1528   HGB   --    --    --    --   13.9   --   13.2   < >   --    PLT   --    --    --    --   192   --   192   < >   --    INR  2.2  2.7*   --    < >  2.8*   < >  2.20*   < >   --    NA   --    --   139   --   139   --   137   < >   --    POTASSIUM   --    --   3.2*   --   3.6   --   3.6   < >   --    CR   --    --   0.91   --   0.91   --   0.97   < >   --    A1C   --    --    --    --    --    --    --    --   5.5    < > = values in this interval not displayed.      Results for orders placed or performed in visit on 02/14/18   CBC with platelets and differential   Result Value Ref Range    WBC 5.3 4.0 - 11.0 10e9/L    RBC Count 4.28 3.8 - 5.2 10e12/L    Hemoglobin 13.0 11.7 - 15.7 g/dL    Hematocrit 36.3 35.0 - 47.0 %    MCV 85 78 - 100 fl    MCH 30.4 26.5 - 33.0 pg    MCHC 35.8 31.5 - 36.5 g/dL    RDW 13.6 10.0 - 15.0 %    Platelet Count 189 150 - 450 10e9/L    Diff Method Automated Method     % Neutrophils 44.4 %    % Lymphocytes 40.7 %    % Monocytes 11.6 %    % Eosinophils 2.3 %    % Basophils 1.0 %    Absolute Neutrophil 2.3 1.6 - 8.3 10e9/L    Absolute Lymphocytes 2.1 0.8 - 5.3 10e9/L    Absolute Monocytes 0.6 0.0 - 1.3 10e9/L    Absolute Eosinophils 0.1 0.0 - 0.7 10e9/L     Absolute Basophils 0.1 0.0 - 0.2 10e9/L       IMPRESSION:                                                    Reason for surgery/procedure: As above    The proposed surgical procedure is considered LOW risk.    REVISED CARDIAC RISK INDEX  The patient has the following serious cardiovascular risks for perioperative complications such as (MI, PE, VFib and 3  AV Block):  No serious cardiac risks  INTERPRETATION: 0 risks: Class I (very low risk - 0.4% complication rate)    The patient has the following additional risks for perioperative complications:  No identified additional risks  The 10-year ASCVD risk score (Moon DC Jr, et al., 2013) is: 7.7%    Values used to calculate the score:      Age: 62 years      Sex: Female      Is Non- : No      Diabetic: No      Tobacco smoker: No      Systolic Blood Pressure: 132 mmHg      Is BP treated: Yes      HDL Cholesterol: 43 mg/dL      Total Cholesterol: 218 mg/dL      ICD-10-CM    1. Preop general physical exam Z01.818        RECOMMENDATIONS:                                                      --Patient is to take Lovenox until above procedures are complete and to hold Lovenox on morning of procedure dates. She will then resume Coumadin after these are complete.      APPROVAL GIVEN to proceed with proposed procedures, without further diagnostic evaluation.       Signed Electronically by: Facundo Pittman DO    Copy of this evaluation report is provided to requesting physician.    [place] Preop Guidelines

## 2018-02-14 NOTE — PATIENT INSTRUCTIONS
NPO after midnight  Take Toprol XL with a small sip of water morning of procedure      Before Your Surgery      Call your surgeon if there is any change in your health. This includes signs of a cold or flu (such as a sore throat, runny nose, cough, rash or fever).    Do not smoke, drink alcohol or take over the counter medicine (unless your surgeon or primary care doctor tells you to) for the 24 hours before and after surgery.    If you take prescribed drugs: Follow your doctor s orders about which medicines to take and which to stop until after surgery.    Eating and drinking prior to surgery: follow the instructions from your surgeon    Take a shower or bath the night before surgery. Use the soap your surgeon gave you to gently clean your skin. If you do not have soap from your surgeon, use your regular soap. Do not shave or scrub the surgery site.  Wear clean pajamas and have clean sheets on your bed.

## 2018-02-14 NOTE — NURSING NOTE
"Chief Complaint   Patient presents with     Pre-Op Exam     ON 02/15/2018 colonoscopy with Dr. Pang at [facility]. then on 02-22-18 Left ankle lipoma excise with Dr. Alexander at [facility]. Then on 02/23/2018 right ear tube surgery with Dr. Conn at [facility].        Initial /82 (BP Location: Left arm, Patient Position: Chair, Cuff Size: Adult Large)  Pulse 56  Temp 98.4  F (36.9  C) (Tympanic)  Ht 5' 5\" (1.651 m)  Wt 220 lb (99.8 kg)  SpO2 97%  BMI 36.61 kg/m2 Estimated body mass index is 36.61 kg/(m^2) as calculated from the following:    Height as of this encounter: 5' 5\" (1.651 m).    Weight as of this encounter: 220 lb (99.8 kg).  Medication Reconciliation: complete   Liv Trevino LPN    "

## 2018-02-15 ENCOUNTER — OFFICE VISIT (OUTPATIENT)
Dept: ANESTHESIOLOGY | Facility: HOSPITAL | Age: 63
End: 2018-02-15
Attending: OTOLARYNGOLOGY
Payer: MEDICARE

## 2018-02-15 LAB
ANION GAP SERPL CALCULATED.3IONS-SCNC: 9 MMOL/L (ref 3–14)
BUN SERPL-MCNC: 11 MG/DL (ref 7–30)
CALCIUM SERPL-MCNC: 9.1 MG/DL (ref 8.5–10.1)
CHLORIDE SERPL-SCNC: 104 MMOL/L (ref 94–109)
CO2 SERPL-SCNC: 26 MMOL/L (ref 20–32)
CREAT SERPL-MCNC: 0.94 MG/DL (ref 0.52–1.04)
GFR SERPL CREATININE-BSD FRML MDRD: 60 ML/MIN/1.7M2
GLUCOSE SERPL-MCNC: 81 MG/DL (ref 70–99)
POTASSIUM SERPL-SCNC: 3.5 MMOL/L (ref 3.4–5.3)
SODIUM SERPL-SCNC: 139 MMOL/L (ref 133–144)

## 2018-02-15 PROCEDURE — G0121 COLON CA SCRN NOT HI RSK IND: HCPCS | Performed by: SURGERY

## 2018-02-15 PROCEDURE — 00812 ANES LWR INTST SCR COLSC: CPT | Mod: QZ | Performed by: NURSE ANESTHETIST, CERTIFIED REGISTERED

## 2018-02-15 ASSESSMENT — ANXIETY QUESTIONNAIRES: GAD7 TOTAL SCORE: 1

## 2018-02-15 ASSESSMENT — PATIENT HEALTH QUESTIONNAIRE - PHQ9: SUM OF ALL RESPONSES TO PHQ QUESTIONS 1-9: 10

## 2018-02-16 DIAGNOSIS — F33.9 EPISODE OF RECURRENT MAJOR DEPRESSIVE DISORDER, UNSPECIFIED DEPRESSION EPISODE SEVERITY (H): ICD-10-CM

## 2018-02-16 RX ORDER — ESCITALOPRAM OXALATE 20 MG/1
TABLET ORAL
Qty: 90 TABLET | Refills: 0 | Status: SHIPPED | OUTPATIENT
Start: 2018-02-16 | End: 2018-04-13

## 2018-02-20 DIAGNOSIS — H91.93 DECREASED HEARING OF BOTH EARS: Primary | ICD-10-CM

## 2018-02-22 ENCOUNTER — TRANSFERRED RECORDS (OUTPATIENT)
Dept: HEALTH INFORMATION MANAGEMENT | Facility: HOSPITAL | Age: 63
End: 2018-02-22

## 2018-02-23 ENCOUNTER — OFFICE VISIT (OUTPATIENT)
Dept: ANESTHESIOLOGY | Facility: HOSPITAL | Age: 63
End: 2018-02-23
Attending: OTOLARYNGOLOGY
Payer: MEDICARE

## 2018-02-23 PROCEDURE — 00170 ANES INTRAORAL PX NOS: CPT | Mod: QZ | Performed by: NURSE ANESTHETIST, CERTIFIED REGISTERED

## 2018-02-23 PROCEDURE — 69436 CREATE EARDRUM OPENING: CPT | Performed by: OTOLARYNGOLOGY

## 2018-02-28 ENCOUNTER — ANTICOAGULATION THERAPY VISIT (OUTPATIENT)
Dept: ANTICOAGULATION | Facility: OTHER | Age: 63
End: 2018-02-28
Payer: MEDICARE

## 2018-02-28 ENCOUNTER — TRANSFERRED RECORDS (OUTPATIENT)
Dept: HEALTH INFORMATION MANAGEMENT | Facility: HOSPITAL | Age: 63
End: 2018-02-28

## 2018-02-28 DIAGNOSIS — Z79.01 LONG-TERM (CURRENT) USE OF ANTICOAGULANTS: ICD-10-CM

## 2018-02-28 DIAGNOSIS — I26.99 PULMONARY EMBOLISM AND INFARCTION (H): ICD-10-CM

## 2018-02-28 LAB — INR POINT OF CARE: 1.8 (ref 0.86–1.14)

## 2018-02-28 NOTE — MR AVS SNAPSHOT
Cassy CHIU Austin   2/28/2018   Anticoagulation Therapy Visit    Description:  62 year old female   Provider:  Eliz Hartman NP   Department:  [facility] Anti Coagulation           INR as of 2/28/2018     Today's INR 1.8!      Anticoagulation Summary as of 2/28/2018     INR goal 2.0-3.0   Today's INR 1.8!   Full instructions 2/28: 10 mg; Otherwise 7.5 mg on Mon, Wed, Fri; 5 mg all other days   Next INR check 3/2/2018    Indications   Long-term (current) use of anticoagulants [Z79.01] [Z79.01]  Pulmonary embolism and infarction (H) [I26.99]  Deep vein thrombosis (DVT) (H) [I82.409] [I82.409]         February 2018 Details    Sun Mon Tue Wed Thu Fri Sat         1               2               3                 4               5               6               7               8               9               10                 11               12               13               14               15               16               17                 18               19               20               21               22               23               24                 25               26               27               28      10 mg   See details          Date Details   02/28 This INR check   Take 10 mg (5 mg tablets x 2)   lovenox 40mg in AM               How to take your warfarin dose     To take:  10 mg Take 2 of the 5 mg tablets.           March 2018 Details    Sun Mon Tue Wed Thu Fri Sat         1      5 mg   See details      2      See details      3                 4               5               6               7               8               9               10                 11               12               13               14               15               16               17                 18               19               20               21               22               23               24                 25               26               27               28               29               30               31                 Date Details   03/01 lovenox 40mg in AM      03/02 lovenox 40mg in AM       Date of next INR:  3/2/2018         How to take your warfarin dose     To take:  5 mg Take 1 of the 5 mg tablets.    To take:  7.5 mg Take 1.5 of the 5 mg tablets.

## 2018-02-28 NOTE — PROGRESS NOTES
ANTICOAGULATION FOLLOW-UP CLINIC VISIT    Patient Name:  Cassy Avila  Date:  2/28/2018  Contact Type:  Telephone    SUBJECTIVE:     Patient Findings     Positives Other complaints    Comments PSt done by patient and she called result to warfarin clinic. She states she is feeling good. She has surgery last week for PE tube. States this AM there was a tiny bit of blood on cotton ball when she removed it. We discussed that bleeding and if it continues she should call ENT.  We also discussed since her INR is 1.8 today she should take 10mg warfarin today, 5mg tomorrow, continue lovenox 40mg daily and recheck INR in 2 days. She verbalized understanding of instruction and has no questions.            OBJECTIVE    INR Protime   Date Value Ref Range Status   02/28/2018 1.8 (A) 0.86 - 1.14 Final       ASSESSMENT / PLAN  INR assessment THER    Recheck INR In: 2 DAYS    INR Location Home INR      Anticoagulation Summary as of 2/28/2018     INR goal 2.0-3.0   Today's INR 1.8!   Maintenance plan 7.5 mg (5 mg x 1.5) on Mon, Wed, Fri; 5 mg (5 mg x 1) all other days   Full instructions 2/28: 10 mg; Otherwise 7.5 mg on Mon, Wed, Fri; 5 mg all other days   Weekly total 42.5 mg   Plan last modified Iram Lord RN (7/20/2016)   Next INR check 3/2/2018   Priority INR   Target end date Indefinite    Indications   Long-term (current) use of anticoagulants [Z79.01] [Z79.01]  Pulmonary embolism and infarction (H) [I26.99]  Deep vein thrombosis (DVT) (H) [I82.409] [I82.409]         Anticoagulation Episode Summary     INR check location Home Draw    Preferred lab     Send INR reminders to  ANTICO POOL    Comments PST - [facility].  colonoscopy on 1/15/18 and removal of ankle lesion on 1/22/18.  Bridged with lovenox 40mg daily      Anticoagulation Care Providers     Provider Role Specialty Phone number    Eliz Hartman NP [facility] [phone]            See the Encounter Report to view  Anticoagulation Flowsheet and Dosing Calendar (Go to Encounters tab in chart review, and find the Anticoagulation Therapy Visit)        Manju Escalera RN

## 2018-03-02 ENCOUNTER — TRANSFERRED RECORDS (OUTPATIENT)
Dept: HEALTH INFORMATION MANAGEMENT | Facility: HOSPITAL | Age: 63
End: 2018-03-02

## 2018-03-02 ENCOUNTER — ANTICOAGULATION THERAPY VISIT (OUTPATIENT)
Dept: ANTICOAGULATION | Facility: OTHER | Age: 63
End: 2018-03-02
Payer: MEDICARE

## 2018-03-02 DIAGNOSIS — Z79.01 LONG-TERM (CURRENT) USE OF ANTICOAGULANTS: ICD-10-CM

## 2018-03-02 DIAGNOSIS — I26.99 PULMONARY EMBOLISM AND INFARCTION (H): ICD-10-CM

## 2018-03-02 LAB — INR PPP: 2.3

## 2018-03-02 NOTE — MR AVS SNAPSHOT
Cassy CHIU Austin   3/2/2018   Anticoagulation Therapy Visit    Description:  62 year old female   Provider:  Eliz Hartman NP   Department:  [facility] Anti Coagulation           INR as of 3/2/2018     Today's INR 2.3      Anticoagulation Summary as of 3/2/2018     INR goal 2.0-3.0   Today's INR 2.3   Full instructions 7.5 mg on Mon, Wed, Fri; 5 mg all other days   Next INR check 3/13/2018    Indications   Long-term (current) use of anticoagulants [Z79.01] [Z79.01]  Pulmonary embolism and infarction (H) [I26.99]  Deep vein thrombosis (DVT) (H) [I82.409] [I82.409]         March 2018 Details    Sun Mon Tue Wed Thu Fri Sat         1               2      7.5 mg   See details      3      5 mg           4      5 mg         5      7.5 mg         6      5 mg         7      7.5 mg         8      5 mg         9      7.5 mg         10      5 mg           11      5 mg         12      7.5 mg         13            14               15               16               17                 18               19               20               21               22               23               24                 25               26               27               28               29               30               31                Date Details   03/02 This INR check   lovenox 40mg in AM       Date of next INR:  3/13/2018         How to take your warfarin dose     To take:  5 mg Take 1 of the 5 mg tablets.    To take:  7.5 mg Take 1.5 of the 5 mg tablets.

## 2018-03-12 DIAGNOSIS — I10 ESSENTIAL HYPERTENSION: ICD-10-CM

## 2018-03-13 ENCOUNTER — ANTICOAGULATION THERAPY VISIT (OUTPATIENT)
Dept: ANTICOAGULATION | Facility: OTHER | Age: 63
End: 2018-03-13
Payer: MEDICARE

## 2018-03-13 ENCOUNTER — TRANSFERRED RECORDS (OUTPATIENT)
Dept: HEALTH INFORMATION MANAGEMENT | Facility: CLINIC | Age: 63
End: 2018-03-13

## 2018-03-13 DIAGNOSIS — I26.99 PULMONARY EMBOLISM AND INFARCTION (H): ICD-10-CM

## 2018-03-13 DIAGNOSIS — Z79.01 LONG-TERM (CURRENT) USE OF ANTICOAGULANTS: ICD-10-CM

## 2018-03-13 LAB — INR PPP: 2.6

## 2018-03-13 RX ORDER — HYDROCHLOROTHIAZIDE 25 MG/1
TABLET ORAL
Qty: 90 TABLET | Refills: 2 | Status: SHIPPED | OUTPATIENT
Start: 2018-03-13 | End: 2018-12-04

## 2018-03-13 NOTE — MR AVS SNAPSHOT
Cassy Laceyjared   3/13/2018   Anticoagulation Therapy Visit    Description:  62 year old female   Provider:  Eliz Hartman NP   Department:  [facility]c Anti Coagulation           INR as of 3/13/2018     Today's INR 2.6      Anticoagulation Summary as of 3/13/2018     INR goal 2.0-3.0   Today's INR 2.6   Full instructions 3/24: Hold; 3/25: Hold; 3/27: 10 mg; Otherwise 7.5 mg on Mon, Wed, Fri; 5 mg all other days   Next INR check 4/2/2018    Indications   Long-term (current) use of anticoagulants [Z79.01] [Z79.01]  Pulmonary embolism and infarction (H) [I26.99]  Deep vein thrombosis (DVT) (H) [I82.409] [I82.409]         March 2018 Details    Sun Mon Tue Wed Thu Fri Sat         1               2               3                 4               5               6               7               8               9               10                 11               12               13      5 mg   See details      14      7.5 mg         15      5 mg         16      7.5 mg         17      5 mg           18      5 mg         19      7.5 mg         20      5 mg         21      7.5 mg         22      5 mg         23      7.5 mg         24      Hold           25      Hold         26      7.5 mg         27      10 mg         28      7.5 mg         29      5 mg         30      7.5 mg         31      5 mg          Date Details   03/13 This INR check               How to take your warfarin dose     To take:  5 mg Take 1 of the 5 mg tablets.    To take:  7.5 mg Take 1.5 of the 5 mg tablets.    To take:  10 mg Take 2 of the 5 mg tablets.    Hold Do not take your warfarin dose. See the Details table to the right for additional instructions.                April 2018 Details    Sun Mon Tue Wed Thu Fri Sat     1      5 mg         2            3               4               5               6               7                 8               9               10               11               12               13               14                 15                16               17               18               19               20               21                 22               23               24               25               26               27               28                 29               30                     Date Details   No additional details    Date of next INR:  4/2/2018         How to take your warfarin dose     To take:  5 mg Take 1 of the 5 mg tablets.    To take:  7.5 mg Take 1.5 of the 5 mg tablets.

## 2018-03-13 NOTE — PROGRESS NOTES
ANTICOAGULATION FOLLOW-UP CLINIC VISIT    Patient Name:  Cassy Avila  Date:  3/13/2018  Contact Type:  Telephone    SUBJECTIVE:     Patient Findings     Positives Other complaints    Comments PST done and result faxed to warfarin clinic via [facility]. Call placed to patient and spoke to her re: INR result, warfarin dosing and INR recheck date. She is having a lipoma removed from ankle on 3/26/18 and is to hold warfarin x 2 days. We discussed what to do with warfarin dosing after procedure. She verbalized understanding and has no questions           OBJECTIVE    INR   Date Value Ref Range Status   03/13/2018 2.6  Final       ASSESSMENT / PLAN  INR assessment THER    Recheck INR In: 3 WEEKS    INR Location Home INR      Anticoagulation Summary as of 3/13/2018     INR goal 2.0-3.0   Today's INR 2.6   Maintenance plan 7.5 mg (5 mg x 1.5) on Mon, Wed, Fri; 5 mg (5 mg x 1) all other days   Full instructions 3/24: Hold; 3/25: Hold; 3/27: 10 mg; Otherwise 7.5 mg on Mon, Wed, Fri; 5 mg all other days   Weekly total 42.5 mg   Plan last modified Iram Lord RN (7/20/2016)   Next INR check 4/2/2018   Priority INR   Target end date Indefinite    Indications   Long-term (current) use of anticoagulants [Z79.01] [Z79.01]  Pulmonary embolism and infarction (H) [I26.99]  Deep vein thrombosis (DVT) (H) [I82.409] [I82.409]         Anticoagulation Episode Summary     INR check location Home Draw    Preferred lab     Send INR reminders to [facility] ANTICOAG POOL    Comments PST - [facility].  colonoscopy on 1/15/18 and removal of ankle lesion on 1/22/18.  Bridged with lovenox 40mg daily      Anticoagulation Care Providers     Provider Role Specialty Phone number    Eliz Hartman NP [facility] [phone]            See the Encounter Report to view Anticoagulation Flowsheet and Dosing Calendar (Go to Encounters tab in chart review, and find the Anticoagulation Therapy Visit)        Manju LEVIN  Jw RN

## 2018-03-13 NOTE — TELEPHONE ENCOUNTER
HYDROCHLOROTHIAZIDE 25MG TABLETS      Last Written Prescription Date:  053117  Last Fill Quantity: 90,   # refills: 2  Last Office Visit: [id number]  Future Office visit:       Routing refill request to provider for review/approval because:  Medication is reported/historical

## 2018-03-22 ENCOUNTER — OFFICE VISIT (OUTPATIENT)
Dept: OTOLARYNGOLOGY | Facility: OTHER | Age: 63
End: 2018-03-22
Attending: AUDIOLOGIST
Payer: MEDICARE

## 2018-03-22 ENCOUNTER — OFFICE VISIT (OUTPATIENT)
Dept: AUDIOLOGY | Facility: OTHER | Age: 63
End: 2018-03-22
Attending: AUDIOLOGIST
Payer: MEDICARE

## 2018-03-22 VITALS
BODY MASS INDEX: 36.65 KG/M2 | SYSTOLIC BLOOD PRESSURE: 136 MMHG | OXYGEN SATURATION: 95 % | HEIGHT: 65 IN | DIASTOLIC BLOOD PRESSURE: 70 MMHG | TEMPERATURE: 97.5 F | HEART RATE: 64 BPM | WEIGHT: 220 LBS

## 2018-03-22 DIAGNOSIS — H90.3 SENSORINEURAL HEARING LOSS, BILATERAL: Primary | ICD-10-CM

## 2018-03-22 DIAGNOSIS — H69.91 ETD (EUSTACHIAN TUBE DYSFUNCTION), RIGHT: ICD-10-CM

## 2018-03-22 DIAGNOSIS — Z98.890 HISTORY OF ENDOSCOPIC SINUS SURGERY: ICD-10-CM

## 2018-03-22 DIAGNOSIS — H92.01 OTALGIA, RIGHT: ICD-10-CM

## 2018-03-22 DIAGNOSIS — Z96.22 S/P TYMPANOSTOMY TUBE PLACEMENT: Primary | ICD-10-CM

## 2018-03-22 PROCEDURE — 92552 PURE TONE AUDIOMETRY AIR: CPT | Performed by: AUDIOLOGIST

## 2018-03-22 PROCEDURE — G0463 HOSPITAL OUTPT CLINIC VISIT: HCPCS | Mod: 25

## 2018-03-22 PROCEDURE — 92567 TYMPANOMETRY: CPT | Performed by: AUDIOLOGIST

## 2018-03-22 PROCEDURE — 99213 OFFICE O/P EST LOW 20 MIN: CPT | Performed by: PHYSICIAN ASSISTANT

## 2018-03-22 PROCEDURE — 92556 SPEECH AUDIOMETRY COMPLETE: CPT | Performed by: AUDIOLOGIST

## 2018-03-22 ASSESSMENT — PAIN SCALES - GENERAL: PAINLEVEL: NO PAIN (0)

## 2018-03-22 NOTE — PROGRESS NOTES
Audiology Evaluation Completed. Please refer SCANNED AUDIOGRAM and/or TYMPANOGRAM for BACKGROUND, RESULTS, RECOMMENDATIONS.    Reyna Hanley M.S., [facility]  Audiologist #[id number]

## 2018-03-22 NOTE — MR AVS SNAPSHOT
After Visit Summary   3/22/2018    Cassy Avila    MRN: [id number]           Patient Information     Date Of Birth          1955        Visit Information        Provider Department      3/22/2018 2:15 PM [facility]        Today's Diagnoses     Sensorineural hearing loss, bilateral    -  1       Follow-ups after your visit        Your next 10 appointments already scheduled     Mar 22, 2018  2:15 PM CDT   (Arrive by 2:00 PM)   Hearing Eval with Flora Sharp   [facility] [place] ([facility] [place] )    [address]  [place] [address]   [phone]            Mar 22, 2018  2:45 PM CDT   (Arrive by 2:30 PM)   Post Op with Michelle Ly PA-C   [facility] [place] ([facility] [place] )    [address]  [place] [address]   [phone]              Who to contact     If you have questions or need follow up information about today's clinic visit or your schedule please contact [facility] directly at [phone].  Normal or non-critical lab and imaging results will be communicated to you by MyChart, letter or phone within 4 business days after the clinic has received the results. If you do not hear from us within 7 days, please contact the clinic through [employer] or phone. If you have a critical or abnormal lab result, we will notify you by phone as soon as possible.  Submit refill requests through [employer] or call your pharmacy and they will forward the refill request to us. Please allow 3 business days for your refill to be completed.          Additional Information About Your Visit        MyChart Information     [employer] gives you secure access to your electronic health record. If you see a primary care provider, you can also send messages to your care team and make appointments. If you have questions, please call your primary care clinic.  If you do not have a primary care provider, please call  [phone] and they will assist you.        Care EveryWhere ID     This is your Care EveryWhere ID. This could be used by other organizations to access your [facility] medical records  [id number]         Blood Pressure from Last 3 Encounters:   02/14/18 132/82   02/05/18 122/68   01/29/18 120/66    Weight from Last 3 Encounters:   02/14/18 220 lb (99.8 kg)   02/05/18 230 lb (104.3 kg)   01/29/18 230 lb (104.3 kg)              We Performed the Following     AUDIOGRAM/TYMPANOGRAM - INTERFACE        Primary Care Provider Office Phone # Fax #    Eliz SchroederNORAH arriaga [phone] [address]  [place] [address]        Equal Access to Services     SWEETIE PARRISH : Hadii aad ku hadasho Soomaali, waaxda luqadaha, qaybta kaalmada adeegyada, jeanie tobar . So [facility] [phone].    ATENCIÓN: Si habla español, tiene a noriega disposición servicios gratuitos de asistencia lingüística. Llame al [phone].    We comply with applicable federal civil rights laws and [place] laws. We do not discriminate on the basis of race, color, national origin, age, disability, sex, sexual orientation, or gender identity.            Thank you!     Thank you for choosing [facility]  for your care. Our goal is always to provide you with excellent care. Hearing back from our patients is one way we can continue to improve our services. Please take a few minutes to complete the written survey that you may receive in the mail after your visit with us. Thank you!             Your Updated Medication List - Protect others around you: Learn how to safely use, store and throw away your medicines at www.disposemymeds.org.          This list is accurate as of 3/22/18  1:49 PM.  Always use your most recent med list.                   Brand Name Dispense Instructions for use Diagnosis    acetaminophen 325 MG tablet    TYLENOL    100 tablet    Take 3 tablets (975 mg) by mouth every 4 hours as  needed for mild pain or fever    Pneumonia of left lower lobe due to infectious organism (H)       aspirin 81 MG EC tablet      Take 81 mg by mouth daily HS        escitalopram 20 MG tablet    LEXAPRO    90 tablet    TAKE 1 TABLET BY MOUTH EVERY DAY AND 1 TABLET BY MOUTH EVERY DAY AS NEEDED    Episode of recurrent major depressive disorder, unspecified depression episode severity (H)       hydrochlorothiazide 25 MG tablet    HYDRODIURIL    90 tablet    TAKE 1 TABLET(25 MG) BY MOUTH DAILY    Essential hypertension       losartan 50 MG tablet    COZAAR    180 tablet    TAKE 2 TABLETS BY MOUTH EVERY DAY    Benign essential hypertension       metoprolol succinate 50 MG 24 hr tablet    TOPROL-XL    135 tablet    TAKE 1 AND ONE-HALF TABLETS BY MOUTH EVERY DAY    Essential hypertension       Na Sulfate-K Sulfate-Mg Sulf solution    SUPREP BOWEL PREP    2 Bottle    Take 354 mLs (2 Bottles) by mouth See Admin Instructions    Special screening for malignant neoplasms, colon       omega 3 1000 MG Caps     90 capsule    Take 3 g by mouth daily        STATIN NOT PRESCRIBED (INTENTIONAL)      Please choose reason not prescribed, below    Hyperlipidemia LDL goal <100       VITAMIN D3 PO      Take 3,000 mg by mouth daily AM        warfarin 5 MG tablet    COUMADIN    150 tablet    7.5mg Mon,Wed,Fri and 5mg all other days or as directed by warfarin clinic    Long-term (current) use of anticoagulants

## 2018-03-22 NOTE — PATIENT INSTRUCTIONS
Tube is in good position and open  Check tubes in 6-12 months  Hearing is stable.  If ear drainage, pain- contact nurse or return to ENT    Thank you for allowing PRAVEENA Fisher and our ENT team to participate in your care.  If your medications are too expensive, please give the nurse a call.  We can possibly change this medication.  If you have a scheduling or an appointment question please contact [facility] Unit Coordinator at their direct line [phone].   ALL nursing questions or concerns can be directed to your ENT nurse at: [phone] Ira

## 2018-03-22 NOTE — PROGRESS NOTES
Chief Complaint   Patient presents with     Surgical Followup     s/p right tube placement- 2/23/18 at [facility]        History of Present Illness - Cassy Avila is a 62 year old female who is status post \Right myringotomy tube placement on 2/2/31/8.  No concerns with right ear.  There has been no drainage or bleeding from the ears, no fevers or chills.  No otorrhea. Mild fullness with right ear intermittently.   Hearing is stable.     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 Provider to review     Factor V Leiden (H)      Family history of colon cancer 1/2/2018     Gastro-oesophageal reflux disease      Herpes zoster without mention of complication 2003    resolved from problem list     Hyperlipidemia LDL goal <100 7/11/2002     Problem list name updated by automated process. Provider to review     Long term (current) use of anticoagulants 8/20/2003     Major depression 8/8/2014     Mammographic microcalcification 2003    resolved from problem list     Migraine, unspecified, without mention of intractable migraine without mention of status migrainosus 3/5/2001     Moderate episode of recurrent major depressive disorder (H) 8/8/2014     Neurofibromatosis, unspecified(237.70) 8/22/2012     Other and unspecified hyperlipidemia 7/11/2002     Other chronic pain      Other pulmonary embolism and infarction 4/11/2003     Statin medication not prescribed per physician orders 1/17/2018     Tachycardia, unspecified 2/12/2001     Thrombosis of leg      Unspecified essential hypertension 2/20/2001        Allergies   Allergen  "Reactions     Ace Inhibitors Cough     Albuterol Sulfate Hives     DuoNeb     Amlodipine Besylate Swelling     Norvasc     Amoxicillin      Atorvastatin      myualgia     Cephalexin Monohydrate Hives     Keflex     Erythromycin Base [Kdc:Yellow Dye+Erythromycin+Brilliant Blue Fcf] Nausea and Vomiting     Ipratropium Bromide Hives     DuoNeb     Meloxicam Other (See Comments)     Mobic - confusion, depression     Adhesive Tape Rash     Prochlorperazine Edisylate Swelling and Rash     Compazine     Prochlorperazine Maleate Swelling and Rash     Current Outpatient Prescriptions   Medication     hydrochlorothiazide (HYDRODIURIL) 25 MG tablet     escitalopram (LEXAPRO) 20 MG tablet     Na Sulfate-K Sulfate-Mg Sulf (SUPREP BOWEL PREP) solution     STATIN NOT PRESCRIBED, INTENTIONAL,     losartan (COZAAR) 50 MG tablet     metoprolol (TOPROL-XL) 50 MG 24 hr tablet     warfarin (COUMADIN) 5 MG tablet     acetaminophen (TYLENOL) 325 MG tablet     Cholecalciferol (VITAMIN D3 PO)     omega 3 1000 MG CAPS     aspirin 81 MG EC tablet     No current facility-administered medications for this visit.      Facility-Administered Medications Ordered in Other Visits   Medication     ondansetron (ZOFRAN) injection      ROS: 10 point ROS neg other than the symptoms noted above in the HPI.  /70 (BP Location: Right arm, Patient Position: Chair, Cuff Size: Adult Regular)  Pulse 64  Temp 97.5  F (36.4  C) (Tympanic)  Ht 5' 5\" (1.651 m)  Wt 220 lb (99.8 kg)  SpO2 95%  BMI 36.61 kg/m2    General - The patient is well nourished and well developed, and appears to have good nutritional status.    Head and Face - Normocephalic and atraumatic, with no gross asymmetry noted of the contour of the facial features.  The facial nerve is intact, with strong symmetric movements.  Eyes - Extraocular movements intact, and the pupils were reactive to light.  Sclera were not icteric or injected, conjunctiva were pink and moist.  Mouth - Examination " of the oral cavity shows pink, healthy, moist mucosa.  No lesions or ulceration noted.  The dentition are in good repair.  The tongue is mobile and midline.  Ears - Examination of the ear RIGHT  showed myringotomy tubes in good position RIGHT.  The tympanic membranes were gray and translucent.  No evidence of middle ear effusion, granulation tissue, or cholesteatoma.  Right patent tube.   LEFT TM intact without effusion        ASSESSMENT:    ICD-10-CM    1. S/P tympanostomy tube placement RIGHT Z96.22    2. Otalgia, right H92.01    3. ETD (Eustachian tube dysfunction), right H69.81    4. History of endoscopic sinus surgery Z98.890       Cassy Avila is status post bilateral myringotomy and tube placement.  No sign of complications at this point.  I have rediscussed water precautions, and will see the patient back in 6 months for a routine tube check. I have also recommended the use of the post-op ear drops in the event of otorrhea during a URI.  If the drainage continues, however, they should come to me for earlier follow up.      Michelle Ly PA-C  ENT  [facility] [place]  [phone]

## 2018-03-22 NOTE — LETTER
3/22/2018         RE: Cassy Avila  [address] [place] [address]        Dear Colleague,    Thank you for referring your patient, Cassy Avila, to the [facility]. Please see a copy of my visit note below.    Chief Complaint   Patient presents with     Surgical Followup     s/p right tube placement- 2/23/18 at [facility]        History of Present Illness - Cassy Avila is a 62 year old female who is status post \Right myringotomy tube placement on 2/2/31/8.  No concerns with right ear.  There has been no drainage or bleeding from the ears, no fevers or chills.  No otorrhea. Mild fullness with right ear intermittently.   Hearing is stable.     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 Provider to review     Factor V Leiden (H)      Family history of colon cancer 1/2/2018     Gastro-oesophageal reflux disease      Herpes zoster without mention of complication 2003    resolved from problem list     Hyperlipidemia LDL goal <100 7/11/2002     Problem list name updated by automated process. Provider to review     Long term (current) use of anticoagulants 8/20/2003     Major depression 8/8/2014     Mammographic microcalcification 2003    resolved from problem list     Migraine, unspecified, without mention of intractable migraine without mention of status migrainosus 3/5/2001     Moderate episode of recurrent major depressive disorder (H) 8/8/2014     Neurofibromatosis, unspecified(237.70) 8/22/2012     Other and unspecified hyperlipidemia 7/11/2002     Other chronic pain      Other pulmonary  "embolism and infarction 4/11/2003     Statin medication not prescribed per physician orders 1/17/2018     Tachycardia, unspecified 2/12/2001     Thrombosis of leg      Unspecified essential hypertension 2/20/2001        Allergies   Allergen Reactions     Ace Inhibitors Cough     Albuterol Sulfate Hives     DuoNeb     Amlodipine Besylate Swelling     Norvasc     Amoxicillin      Atorvastatin      myualgia     Cephalexin Monohydrate Hives     Keflex     Erythromycin Base [Kdc:Yellow Dye+Erythromycin+Brilliant Blue Fcf] Nausea and Vomiting     Ipratropium Bromide Hives     DuoNeb     Meloxicam Other (See Comments)     Mobic - confusion, depression     Adhesive Tape Rash     Prochlorperazine Edisylate Swelling and Rash     Compazine     Prochlorperazine Maleate Swelling and Rash     Current Outpatient Prescriptions   Medication     hydrochlorothiazide (HYDRODIURIL) 25 MG tablet     escitalopram (LEXAPRO) 20 MG tablet     Na Sulfate-K Sulfate-Mg Sulf (SUPREP BOWEL PREP) solution     STATIN NOT PRESCRIBED, INTENTIONAL,     losartan (COZAAR) 50 MG tablet     metoprolol (TOPROL-XL) 50 MG 24 hr tablet     warfarin (COUMADIN) 5 MG tablet     acetaminophen (TYLENOL) 325 MG tablet     Cholecalciferol (VITAMIN D3 PO)     omega 3 1000 MG CAPS     aspirin 81 MG EC tablet     No current facility-administered medications for this visit.      Facility-Administered Medications Ordered in Other Visits   Medication     ondansetron (ZOFRAN) injection      ROS: 10 point ROS neg other than the symptoms noted above in the HPI.  /70 (BP Location: Right arm, Patient Position: Chair, Cuff Size: Adult Regular)  Pulse 64  Temp 97.5  F (36.4  C) (Tympanic)  Ht 5' 5\" (1.651 m)  Wt 220 lb (99.8 kg)  SpO2 95%  BMI 36.61 kg/m2    General - The patient is well nourished and well developed, and appears to have good nutritional status.    Head and Face - Normocephalic and atraumatic, with no gross asymmetry noted of the contour of the facial " features.  The facial nerve is intact, with strong symmetric movements.  Eyes - Extraocular movements intact, and the pupils were reactive to light.  Sclera were not icteric or injected, conjunctiva were pink and moist.  Mouth - Examination of the oral cavity shows pink, healthy, moist mucosa.  No lesions or ulceration noted.  The dentition are in good repair.  The tongue is mobile and midline.  Ears - Examination of the ear RIGHT  showed myringotomy tubes in good position RIGHT.  The tympanic membranes were gray and translucent.  No evidence of middle ear effusion, granulation tissue, or cholesteatoma.  Right patent tube.   LEFT TM intact without effusion        ASSESSMENT:    ICD-10-CM    1. S/P tympanostomy tube placement RIGHT Z96.22    2. Otalgia, right H92.01    3. ETD (Eustachian tube dysfunction), right H69.81    4. History of endoscopic sinus surgery Z98.890       Cassy Avila is status post bilateral myringotomy and tube placement.  No sign of complications at this point.  I have rediscussed water precautions, and will see the patient back in 6 months for a routine tube check. I have also recommended the use of the post-op ear drops in the event of otorrhea during a URI.  If the drainage continues, however, they should come to me for earlier follow up.      Michelle Ly PA-C  ENT  [facility] [place]  [phone]      Again, thank you for allowing me to participate in the care of your patient.        Sincerely,        Michelle Ly PA-C

## 2018-03-22 NOTE — MR AVS SNAPSHOT
After Visit Summary   3/22/2018    Cassy Avila    MRN: [id number]           Patient Information     Date Of Birth          1955        Visit Information        Provider Department      3/22/2018 2:45 PM Michelle Ly PA-C [facility]        Today's Diagnoses     S/P tympanostomy tube placement RIGHT    -  1    Otalgia, right        ETD (Eustachian tube dysfunction), right        History of endoscopic sinus surgery          Care Instructions    Tube is in good position and open  Check tubes in 6-12 months  Hearing is stable.  If ear drainage, pain- contact nurse or return to ENT    Thank you for allowing PRAVEENA Fisher and our ENT team to participate in your care.  If your medications are too expensive, please give the nurse a call.  We can possibly change this medication.  If you have a scheduling or an appointment question please contact [facility] Unit Coordinator at their direct line [phone].   ALL nursing questions or concerns can be directed to your ENT nurse at: [phone] [facility]              Follow-ups after your visit        Your next 10 appointments already scheduled     Mar 22, 2018  2:15 PM CDT   (Arrive by 2:00 PM)   Hearing Eval with Flora Sharp   [facility] [place] ([facility] [place] )    [address]  [place] [id number] [phone]            Mar 22, 2018  2:45 PM CDT   (Arrive by 2:30 PM)   Post Op with Michelle Ly PA-C   [facility] ([facility] [place] )    [address]  [place] [address]   [phone]              Who to contact     If you have questions or need follow up information about today's clinic visit or your schedule please contact [facility] directly at [phone].  Normal or non-critical lab and imaging results will be communicated to you by MyChart, letter or phone within 4 business days after the clinic has received the results. If you do not hear from us  "within 7 days, please contact the clinic through [employer] or phone. If you have a critical or abnormal lab result, we will notify you by phone as soon as possible.  Submit refill requests through [employer] or call your pharmacy and they will forward the refill request to us. Please allow 3 business days for your refill to be completed.          Additional Information About Your Visit        [employer] Information     [employer] gives you secure access to your electronic health record. If you see a primary care provider, you can also send messages to your care team and make appointments. If you have questions, please call your primary care clinic.  If you do not have a primary care provider, please call [phone] and they will assist you.        Care EveryWhere ID     This is your Care EveryWhere ID. This could be used by other organizations to access your [facility] medical records  [id number]        Your Vitals Were     Pulse Temperature Height Pulse Oximetry BMI (Body Mass Index)       64 97.5  F (36.4  C) (Tympanic) 5' 5\" (1.651 m) 95% 36.61 kg/m2        Blood Pressure from Last 3 Encounters:   03/22/18 136/70   02/14/18 132/82   02/05/18 122/68    Weight from Last 3 Encounters:   03/22/18 220 lb (99.8 kg)   02/14/18 220 lb (99.8 kg)   02/05/18 230 lb (104.3 kg)              Today, you had the following     No orders found for display       Primary Care Provider Office Phone # Fax #    Eliz NORAH Hartman [phone] [address]  [facility] [address]        Equal Access to Services     [facility]: Hadii aad ku hadasho Soomaali, waaxda luqadaha, qaybta kaalmada adeegsue, jeanie tobar . So [facility] [phone].    ATENCIÓN: Si habla español, tiene a noriega disposición servicios gratuitos de asistencia lingüística. Llame al [phone].    We comply with applicable federal civil rights laws and [place] laws. We do not discriminate on the basis of race, color, " national origin, age, disability, sex, sexual orientation, or gender identity.            Thank you!     Thank you for choosing [facility]  for your care. Our goal is always to provide you with excellent care. Hearing back from our patients is one way we can continue to improve our services. Please take a few minutes to complete the written survey that you may receive in the mail after your visit with us. Thank you!             Your Updated Medication List - Protect others around you: Learn how to safely use, store and throw away your medicines at www.disposemymeds.org.          This list is accurate as of 3/22/18  2:04 PM.  Always use your most recent med list.                   Brand Name Dispense Instructions for use Diagnosis    acetaminophen 325 MG tablet    TYLENOL    100 tablet    Take 3 tablets (975 mg) by mouth every 4 hours as needed for mild pain or fever    Pneumonia of left lower lobe due to infectious organism (H)       aspirin 81 MG EC tablet      Take 81 mg by mouth daily HS        escitalopram 20 MG tablet    LEXAPRO    90 tablet    TAKE 1 TABLET BY MOUTH EVERY DAY AND 1 TABLET BY MOUTH EVERY DAY AS NEEDED    Episode of recurrent major depressive disorder, unspecified depression episode severity (H)       hydrochlorothiazide 25 MG tablet    HYDRODIURIL    90 tablet    TAKE 1 TABLET(25 MG) BY MOUTH DAILY    Essential hypertension       losartan 50 MG tablet    COZAAR    180 tablet    TAKE 2 TABLETS BY MOUTH EVERY DAY    Benign essential hypertension       metoprolol succinate 50 MG 24 hr tablet    TOPROL-XL    135 tablet    TAKE 1 AND ONE-HALF TABLETS BY MOUTH EVERY DAY    Essential hypertension       Na Sulfate-K Sulfate-Mg Sulf solution    SUPREP BOWEL PREP    2 Bottle    Take 354 mLs (2 Bottles) by mouth See Admin Instructions    Special screening for malignant neoplasms, colon       omega 3 1000 MG Caps     90 capsule    Take 3 g by mouth daily        STATIN NOT PRESCRIBED  (INTENTIONAL)      Please choose reason not prescribed, below    Hyperlipidemia LDL goal <100       VITAMIN D3 PO      Take 3,000 mg by mouth daily AM        warfarin 5 MG tablet    COUMADIN    150 tablet    7.5mg Mon,Wed,Fri and 5mg all other days or as directed by warfarin clinic    Long-term (current) use of anticoagulants

## 2018-03-22 NOTE — NURSING NOTE
"Chief Complaint   Patient presents with     Surgical Followup     s/p right tube placement- 2/23/18 at [facility]        Initial /70 (BP Location: Right arm, Patient Position: Chair, Cuff Size: Adult Regular)  Pulse 64  Temp 97.5  F (36.4  C) (Tympanic)  Ht 5' 5\" (1.651 m)  Wt 220 lb (99.8 kg)  SpO2 95%  BMI 36.61 kg/m2 Estimated body mass index is 36.61 kg/(m^2) as calculated from the following:    Height as of this encounter: 5' 5\" (1.651 m).    Weight as of this encounter: 220 lb (99.8 kg).  Medication Reconciliation: complete     Jennifer Chambers LPN      "

## 2018-03-26 ENCOUNTER — TRANSFERRED RECORDS (OUTPATIENT)
Dept: HEALTH INFORMATION MANAGEMENT | Facility: CLINIC | Age: 63
End: 2018-03-26

## 2018-04-02 ENCOUNTER — ANTICOAGULATION THERAPY VISIT (OUTPATIENT)
Dept: ANTICOAGULATION | Facility: OTHER | Age: 63
End: 2018-04-02
Payer: MEDICARE

## 2018-04-02 ENCOUNTER — TRANSFERRED RECORDS (OUTPATIENT)
Dept: HEALTH INFORMATION MANAGEMENT | Facility: CLINIC | Age: 63
End: 2018-04-02

## 2018-04-02 DIAGNOSIS — Z79.01 LONG-TERM (CURRENT) USE OF ANTICOAGULANTS: ICD-10-CM

## 2018-04-02 DIAGNOSIS — I26.99 PULMONARY EMBOLISM AND INFARCTION (H): ICD-10-CM

## 2018-04-02 LAB — INR PPP: 1.7

## 2018-04-02 NOTE — PROGRESS NOTES
ANTICOAGULATION FOLLOW-UP CLINIC VISIT    Patient Name:  Cassy Avila  Date:  4/2/2018  Contact Type:  Telephone    SUBJECTIVE:     Patient Findings     Positives No Problem Findings    Comments PST done and patient called result to warfarin clinic. Spoke to patient re: PST result, warfarin dosing and PST recheck date. She states she has a little bit of oozing from surgery site on ankle but it is not swollen, no redness and drainage is clear. She states she gets the staples out in 4 days. She verbalized understanding of warfarin dosing and INR recheck date and has no questions.            OBJECTIVE    INR   Date Value Ref Range Status   04/02/2018 1.7  Final       ASSESSMENT / PLAN  INR assessment SUB    Recheck INR In: 2 WEEKS    INR Location Home INR      Anticoagulation Summary as of 4/2/2018     INR goal 2.0-3.0   Today's INR 1.7!   Maintenance plan 7.5 mg (5 mg x 1.5) on Mon, Wed, Fri; 5 mg (5 mg x 1) all other days   Full instructions 4/2: 10 mg; Otherwise 7.5 mg on Mon, Wed, Fri; 5 mg all other days   Weekly total 42.5 mg   Plan last modified Iram Lord, RN (7/20/2016)   Next INR check 4/16/2018   Priority INR   Target end date Indefinite    Indications   Long-term (current) use of anticoagulants [Z79.01] [Z79.01]  Pulmonary embolism and infarction (H) [I26.99]  Deep vein thrombosis (DVT) (H) [I82.409] [I82.409]         Anticoagulation Episode Summary     INR check location Home Draw    Preferred lab     Send INR reminders to  ANTICOAG POOL    Comments PST - [facility].  colonoscopy on 1/15/18 and removal of ankle lesion on 1/22/18.  Bridged with lovenox 40mg daily      Anticoagulation Care Providers     Provider Role Specialty Phone number    Eliz Hartman NP [facility] [phone]            See the Encounter Report to view Anticoagulation Flowsheet and Dosing Calendar (Go to Encounters tab in chart review, and find the Anticoagulation Therapy Visit)        Manju  OLLIE Escalera, RN

## 2018-04-02 NOTE — MR AVS SNAPSHOT
Cassy Shawkevon   4/2/2018   Anticoagulation Therapy Visit    Description:  62 year old female   Provider:  Eliz Hartman NP   Department:  [facility] Anti Coagulation           INR as of 4/2/2018     Today's INR 1.7!      Anticoagulation Summary as of 4/2/2018     INR goal 2.0-3.0   Today's INR 1.7!   Full instructions 4/2: 10 mg; Otherwise 7.5 mg on Mon, Wed, Fri; 5 mg all other days   Next INR check 4/16/2018    Indications   Long-term (current) use of anticoagulants [Z79.01] [Z79.01]  Pulmonary embolism and infarction (H) [I26.99]  Deep vein thrombosis (DVT) (H) [I82.409] [I82.409]         April 2018 Details    Sun Mon Tue Wed Thu Fri Sat     1               2      10 mg   See details      3      5 mg         4      7.5 mg         5      5 mg         6      7.5 mg         7      5 mg           8      5 mg         9      7.5 mg         10      5 mg         11      7.5 mg         12      5 mg         13      7.5 mg         14      5 mg           15      5 mg         16            17               18               19               20               21                 22               23               24               25               26               27               28                 29               30                     Date Details   04/02 This INR check       Date of next INR:  4/16/2018         How to take your warfarin dose     To take:  5 mg Take 1 of the 5 mg tablets.    To take:  7.5 mg Take 1.5 of the 5 mg tablets.    To take:  10 mg Take 2 of the 5 mg tablets.

## 2018-04-03 ENCOUNTER — OFFICE VISIT (OUTPATIENT)
Dept: FAMILY MEDICINE | Facility: OTHER | Age: 63
End: 2018-04-03
Attending: NURSE PRACTITIONER
Payer: MEDICARE

## 2018-04-03 ENCOUNTER — TELEPHONE (OUTPATIENT)
Dept: FAMILY MEDICINE | Facility: OTHER | Age: 63
End: 2018-04-03

## 2018-04-03 VITALS
BODY MASS INDEX: 37.65 KG/M2 | HEIGHT: 65 IN | RESPIRATION RATE: 14 BRPM | WEIGHT: 226 LBS | HEART RATE: 74 BPM | SYSTOLIC BLOOD PRESSURE: 108 MMHG | DIASTOLIC BLOOD PRESSURE: 64 MMHG | TEMPERATURE: 98 F | OXYGEN SATURATION: 95 %

## 2018-04-03 DIAGNOSIS — R05.9 COUGH: ICD-10-CM

## 2018-04-03 DIAGNOSIS — J40 BRONCHITIS: Primary | ICD-10-CM

## 2018-04-03 PROCEDURE — G0463 HOSPITAL OUTPT CLINIC VISIT: HCPCS | Mod: 25

## 2018-04-03 PROCEDURE — G0463 HOSPITAL OUTPT CLINIC VISIT: HCPCS

## 2018-04-03 PROCEDURE — 94640 AIRWAY INHALATION TREATMENT: CPT

## 2018-04-03 PROCEDURE — 99214 OFFICE O/P EST MOD 30 MIN: CPT | Performed by: NURSE PRACTITIONER

## 2018-04-03 RX ORDER — BUDESONIDE AND FORMOTEROL FUMARATE DIHYDRATE 80; 4.5 UG/1; UG/1
2 AEROSOL RESPIRATORY (INHALATION) 2 TIMES DAILY
Qty: 1 INHALER | Refills: 1 | COMMUNITY
Start: 2018-04-03 | End: 2019-09-13

## 2018-04-03 RX ORDER — BENZONATATE 200 MG/1
200 CAPSULE ORAL 3 TIMES DAILY PRN
Qty: 30 CAPSULE | Refills: 1 | Status: SHIPPED | OUTPATIENT
Start: 2018-04-03 | End: 2018-05-30

## 2018-04-03 ASSESSMENT — ANXIETY QUESTIONNAIRES
7. FEELING AFRAID AS IF SOMETHING AWFUL MIGHT HAPPEN: NOT AT ALL
3. WORRYING TOO MUCH ABOUT DIFFERENT THINGS: NOT AT ALL
6. BECOMING EASILY ANNOYED OR IRRITABLE: SEVERAL DAYS
GAD7 TOTAL SCORE: 2
IF YOU CHECKED OFF ANY PROBLEMS ON THIS QUESTIONNAIRE, HOW DIFFICULT HAVE THESE PROBLEMS MADE IT FOR YOU TO DO YOUR WORK, TAKE CARE OF THINGS AT HOME, OR GET ALONG WITH OTHER PEOPLE: NOT DIFFICULT AT ALL
1. FEELING NERVOUS, ANXIOUS, OR ON EDGE: NOT AT ALL
2. NOT BEING ABLE TO STOP OR CONTROL WORRYING: SEVERAL DAYS
5. BEING SO RESTLESS THAT IT IS HARD TO SIT STILL: NOT AT ALL

## 2018-04-03 ASSESSMENT — PATIENT HEALTH QUESTIONNAIRE - PHQ9: 5. POOR APPETITE OR OVEREATING: NOT AT ALL

## 2018-04-03 NOTE — TELEPHONE ENCOUNTER
11:47 AM    Reason for Call: OVERBOOK    Patient is having the following symptoms: cough for 2 days.    The patient is requesting an appointment for 04/03/18 with Eliz Hartman.    Was an appointment offered for this call? No  If yes : Appointment type              Date    Preferred method for responding to this message: Telephone Call  What is your phone number ?[phone]    If we cannot reach you directly, may we leave a detailed response at the number you provided? Yes    Can this message wait until your PCP/provider returns, if unavailable today? Not applicable, PCP is in     [facility]

## 2018-04-03 NOTE — PROGRESS NOTES
SUBJECTIVE:   Cassy Avila is a 62 year old female who presents to clinic today for the following health issues:      Acute Illness   Acute illness concerns: cough  Onset: 1 weeks    Fever: no     Chills/Sweats: no     Headache (location?): no     Sinus Pressure:no    Conjunctivitis:  no    Ear Pain: a little full    Rhinorrhea: no     Congestion: no     Sore Throat: no      Cough: YES    Wheeze: no     Decreased Appetite: no     Nausea: no     Vomiting: no     Diarrhea:  no     Dysuria/Freq.: no     Fatigue/Achiness: YES- tired    Sick/Strep Exposure: YES- Maybe at a tea tasting also had ankle surgery on the 3/28     Therapies Tried and outcome: OTC Robitussin        Problem list and histories reviewed & adjusted, as indicated.  Additional history: as documented    Patient Active Problem List   Diagnosis     Essential hypertension     Pulmonary embolism and infarction (H)     Contact dermatitis and other eczema, due to unspecified cause     Edema     Hyperlipidemia LDL goal <100     Neurofibromatosis (H)     Advanced care planning/counseling discussion     Moderate episode of recurrent major depressive disorder (H)     Long-term (current) use of anticoagulants [Z79.01]     Deep vein thrombosis (DVT) (H) [I82.409]     Lumbar spondylosis with myelopathy     Degeneration of lumbar or lumbosacral intervertebral disc     Family history of colon cancer     Statin medication not prescribed per physician orders     Past Surgical History:   Procedure Laterality Date     ------------OTHER-------------      shoulder replacement; Provider: Karen     ARTHROPLASTY KNEE  2014    Procedure: ARTHROPLASTY KNEE;  Surgeon: Sean Alexander MD;  Location: [facility]     ARTHROSCOPY SHOULDER      right, bone spurs      SECTION      x3     CHOLECYSTECTOMY       COLONOSCOPY  02/15/2018    Cullen,,polyps     elbow ulnar tunnel release  2002     ELECTROTHERMAL THERAPY INTRADISC  2017    stimulator     ENDOSCOPIC  SINUS SURGERY, SEPTOPLASTY, TURBINOPLASTY, MAXILLARY SINUSOTOMY, COMBINED N/A 4/29/2015    Procedure: COMBINED ENDOSCOPIC SINUS SURGERY, SEPTOPLASTY, TURBINOPLASTY, MAXILLARY SINUSOTOMY;  Surgeon: Seema Conn MD;  Location: [facility]     esophagastroduodenoscopy  2011    with biopsy and endoscopic U/S     EXCISE NEUROMA LOWER EXTREMITY Left 7/13/2016    Procedure: EXCISE NEUROMA LOWER EXTREMITY;  Surgeon: dEi Reed MD;  Location: [facility]     FUSION LUMBAR ANTERIOR WITH BAK CAGES      L5-S1     ORTHOPEDIC SURGERY  2-15    right shoulder     ORTHOPEDIC SURGERY  8/28/15    right knee     pionidal cyst excision       SARAHI/BSO       TRANSPOSITION ULNAR NERVE (ELBOW)         Social History   Substance Use Topics     Smoking status: Former Smoker     Packs/day: 0.50     Years: 30.00     Types: Cigarettes, Pipe     Smokeless tobacco: Never Used      Comment: quit in 1999     Alcohol use No     Family History   Problem Relation Age of Onset     CANCER Mother      Colon Polyps Mother      Heart Failure Mother 87     congestive, cause of death     Myocardial Infarction Mother      myocardial infarction     Myocardial Infarction Father      myocardial infarction - cause of death     C.A.D. Father      CANCER Paternal Uncle      cause of death     C.A.D. Brother      Other - See Comments Other      factor 5 - family h/o     Asthma No family hx of          Current Outpatient Prescriptions   Medication Sig Dispense Refill     hydrochlorothiazide (HYDRODIURIL) 25 MG tablet TAKE 1 TABLET(25 MG) BY MOUTH DAILY 90 tablet 2     escitalopram (LEXAPRO) 20 MG tablet TAKE 1 TABLET BY MOUTH EVERY DAY AND 1 TABLET BY MOUTH EVERY DAY AS NEEDED 90 tablet 0     STATIN NOT PRESCRIBED, INTENTIONAL, Please choose reason not prescribed, below       losartan (COZAAR) 50 MG tablet TAKE 2 TABLETS BY MOUTH EVERY  tablet 2     metoprolol (TOPROL-XL) 50 MG 24 hr tablet TAKE 1 AND ONE-HALF TABLETS BY MOUTH EVERY  tablet 2      warfarin (COUMADIN) 5 MG tablet 7.5mg Mon,Wed,Fri and 5mg all other days or as directed by warfarin clinic 150 tablet 3     Cholecalciferol (VITAMIN D3 PO) Take 3,000 mg by mouth daily AM       omega 3 1000 MG CAPS Take 3 g by mouth daily  90 capsule      aspirin 81 MG EC tablet Take 81 mg by mouth daily HS       Allergies   Allergen Reactions     Ace Inhibitors Cough     Albuterol Sulfate Hives     DuoNeb     Amlodipine Besylate Swelling     Norvasc     Amoxicillin      Atorvastatin      myualgia     Cephalexin Monohydrate Hives     Keflex     Erythromycin Base [Kdc:Yellow Dye+Erythromycin+Brilliant Blue Fcf] Nausea and Vomiting     Ipratropium Bromide Hives     DuoNeb     Meloxicam Other (See Comments)     Mobic - confusion, depression     Adhesive Tape Rash     Prochlorperazine Edisylate Swelling and Rash     Compazine     Prochlorperazine Maleate Swelling and Rash     Recent Labs   Lab Test  02/14/18   1430  01/24/18   1336  01/17/18   0822  07/12/17   1117  03/14/17   2028  01/04/17   1459   01/11/16   0935   02/17/15   1149   11/05/13   1528   A1C   --    --    --    --    --    --    --    --    --    --    --   5.5   LDL   --    --   137*   --    --    --    --   126*   --   119   < >   --    HDL   --    --   43*   --    --    --    --   38*   --   36*   < >   --    TRIG   --    --   190*   --    --    --    --   265*   --   272*   < >   --    ALT   --    --    --   34  30  35   --    --    --    --    < >   --    CR  0.94   --   0.91  0.91  0.97  1.07*   --   1.02   < >  0.78   < >   --    GFRESTIMATED  60*   --   62  63  58*  52*   --   55*   < >  75   < >   --    GFRESTBLACK  73   --   75  76  70  63   --   67   < >  >90   GFR Calc     < >   --    POTASSIUM  3.5   --   3.2*  3.6  3.6  3.5   < >  3.4   < >  4.0   < >   --    TSH   --   3.88  5.75*   --    --   1.63   --   3.62   < >   --    < >   --     < > = values in this interval not displayed.      BP Readings from Last 3 Encounters:  "  04/03/18 108/64   03/22/18 136/70   02/14/18 132/82    Wt Readings from Last 3 Encounters:   04/03/18 226 lb (102.5 kg)   03/22/18 220 lb (99.8 kg)   02/14/18 220 lb (99.8 kg)                  Labs reviewed in EPIC    Reviewed and updated as needed this visit by clinical staff  Tobacco  Allergies  Meds       Reviewed and updated as needed this visit by Provider         ROS:  Constitutional, HEENT, cardiovascular, pulmonary, gi and gu systems are negative, except as otherwise noted.    OBJECTIVE:     /64 (BP Location: Right arm, Patient Position: Sitting, Cuff Size: Adult Large)  Pulse 74  Temp 98  F (36.7  C) (Tympanic)  Resp 14  Ht 5' 5\" (1.651 m)  Wt 226 lb (102.5 kg)  SpO2 95%  BMI 37.61 kg/m2  Body mass index is 37.61 kg/(m^2).       GENERAL: healthy, alert and no distress  EYES: Eyes grossly normal to inspection, PERRL and conjunctivae and sclerae normal  HENT: ear canals and TM's normal, nose and mouth without ulcers or lesions  NECK: no adenopathy, no asymmetry, masses, or scars and thyroid normal to palpation  RESP: lungs clear to auscultation - no rales, rhonchi or wheezes.  Deep harsh cough  CV: regular rate and rhythm, normal S1 S2, no S3 or S4, no murmur, click or rub, no peripheral edema and peripheral pulses strong  SKIN: no suspicious lesions or rashes  PSYCH: mentation appears normal, affect normal/bright        Unsure of prior reaction to albuterol, has had since - neb given in clinic to assure tolerance to bronchodilator, well tolerated.         Visit time:   Over 25 minutes are spent with the patient.   Greater than 50 % of our visit time was spent face to face and included education and counseling regarding current conditions,  medication management, and plan of care.  We discussed the importance of medication compliance.  Visit Content:   Any medication adjustment has been reviewed  Health Maintenance - updated as appropriate.  Record review completed        ASSESSMENT/PLAN: "     1. Bronchitis  - budesonide-formoterol (SYMBICORT) 80-4.5 MCG/ACT Inhaler; Inhale 2 puffs into the lungs 2 times daily  Dispense: 1 Inhaler; Refill: 1  - benzonatate (TESSALON) 200 MG capsule; Take 1 capsule (200 mg) by mouth 3 times daily as needed for cough  Dispense: 30 capsule; Refill: 1    2. Cough  - benzonatate (TESSALON) 200 MG capsule; Take 1 capsule (200 mg) by mouth 3 times daily as needed for cough  Dispense: 30 capsule; Refill: 1          Fluids  Rest  Humidity at home - add bacteriostatic solution to humidifier  OTC Mucinex liquid cough and cold  Add Zicam or other zinc daily until you feel better  Monitor for temp at home, treat as needed  Please go to the ER or UC if your symptoms worsen   Please return to clinic if unimproved          Eliz Hartman NP  [facility]

## 2018-04-03 NOTE — NURSING NOTE
"Chief Complaint   Patient presents with     Cough       Initial /64 (BP Location: Right arm, Patient Position: Sitting, Cuff Size: Adult Large)  Pulse 74  Temp 98  F (36.7  C) (Tympanic)  Resp 14  Ht 5' 5\" (1.651 m)  Wt 226 lb (102.5 kg)  SpO2 95%  BMI 37.61 kg/m2 Estimated body mass index is 37.61 kg/(m^2) as calculated from the following:    Height as of this encounter: 5' 5\" (1.651 m).    Weight as of this encounter: 226 lb (102.5 kg).  Medication Reconciliation: complete   Lis Vickers      "

## 2018-04-03 NOTE — MR AVS SNAPSHOT
After Visit Summary   4/3/2018    Cassy Avila    MRN: [id number]           Patient Information     Date Of Birth          1955        Visit Information        Provider Department      4/3/2018 1:30 PM Eilz Hartman NP [facility]        Today's Diagnoses     Bronchitis    -  1    Cough          Care Instructions      ASSESSMENT/PLAN:     1. Bronchitis  - budesonide-formoterol (SYMBICORT) 80-4.5 MCG/ACT Inhaler; Inhale 2 puffs into the lungs 2 times daily  Dispense: 1 Inhaler; Refill: 1  - benzonatate (TESSALON) 200 MG capsule; Take 1 capsule (200 mg) by mouth 3 times daily as needed for cough  Dispense: 30 capsule; Refill: 1    2. Cough  - benzonatate (TESSALON) 200 MG capsule; Take 1 capsule (200 mg) by mouth 3 times daily as needed for cough  Dispense: 30 capsule; Refill: 1          Fluids  Rest  Humidity at home - add bacteriostatic solution to humidifier  OTC Mucinex liquid cough and cold  Add Zicam or other zinc daily until you feel better  Monitor for temp at home, treat as needed  Please go to the ER or UC if your symptoms worsen   Please return to clinic if unimproved          Eliz Hartman NP  [facility]              Follow-ups after your visit        Future tests that were ordered for you today     Open Standing Orders        Priority Remaining Interval Expires Ordered    INR CLINIC REFERRAL Routine 1/1 4/2/2019 4/2/2018            Who to contact     If you have questions or need follow up information about today's clinic visit or your schedule please contact [facility] directly at [phone].  Normal or non-critical lab and imaging results will be communicated to you by MyChart, letter or phone within 4 business days after the clinic has received the results. If you do not hear from us within 7 days, please contact the clinic through MyChart or phone. If you have a critical or abnormal lab result, we will notify you by phone as soon as  "possible.  Submit refill requests through [employer] or call your pharmacy and they will forward the refill request to us. Please allow 3 business days for your refill to be completed.          Additional Information About Your Visit        [employer] Information     [employer] gives you secure access to your electronic health record. If you see a primary care provider, you can also send messages to your care team and make appointments. If you have questions, please call your primary care clinic.  If you do not have a primary care provider, please call [phone] and they will assist you.        Care EveryWhere ID     This is your Care EveryWhere ID. This could be used by other organizations to access your [place] medical records  [id number]        Your Vitals Were     Pulse Temperature Respirations Height Pulse Oximetry BMI (Body Mass Index)    74 98  F (36.7  C) (Tympanic) 14 5' 5\" (1.651 m) 95% 37.61 kg/m2       Blood Pressure from Last 3 Encounters:   04/03/18 108/64   03/22/18 136/70   02/14/18 132/82    Weight from Last 3 Encounters:   04/03/18 226 lb (102.5 kg)   03/22/18 220 lb (99.8 kg)   02/14/18 220 lb (99.8 kg)              Today, you had the following     No orders found for display         Today's Medication Changes          These changes are accurate as of 4/3/18  2:05 PM.  If you have any questions, ask your nurse or doctor.               Start taking these medicines.        Dose/Directions    benzonatate 200 MG capsule   Commonly known as:  TESSALON   Used for:  Bronchitis, Cough   Started by:  Eliz Hartman NP        Dose:  200 mg   Take 1 capsule (200 mg) by mouth 3 times daily as needed for cough   Quantity:  30 capsule   Refills:  1            Where to get your medicines      These medications were sent to [id number] - [place]  [address] [facility] [address]  [address] [address]     Phone:  [phone]     benzonatate 200 MG capsule    "             Primary Care Provider Office Phone # Fax #    Eliz HartmanNORAH [phone] [address]  [place] [address]        Equal Access to Services     SWEETIE PARRISH : Hadii aad ku hadpilarmyesha Mary, waaxda luqadaha, qaybta kaalmada aderoshni, jeanie gaines naemauro blair amadou brito. So [facility] [phone].    ATENCIÓN: Si habla español, tiene a noriega disposición servicios gratuitos de asistencia lingüística. Llame al [phone].    We comply with applicable federal civil rights laws and [place] laws. We do not discriminate on the basis of race, color, national origin, age, disability, sex, sexual orientation, or gender identity.            Thank you!     Thank you for choosing [facility]  for your care. Our goal is always to provide you with excellent care. Hearing back from our patients is one way we can continue to improve our services. Please take a few minutes to complete the written survey that you may receive in the mail after your visit with us. Thank you!             Your Updated Medication List - Protect others around you: Learn how to safely use, store and throw away your medicines at www.disposemymeds.org.          This list is accurate as of 4/3/18  2:05 PM.  Always use your most recent med list.                   Brand Name Dispense Instructions for use Diagnosis    aspirin 81 MG EC tablet      Take 81 mg by mouth daily HS        benzonatate 200 MG capsule    TESSALON    30 capsule    Take 1 capsule (200 mg) by mouth 3 times daily as needed for cough    Bronchitis, Cough       budesonide-formoterol 80-4.5 MCG/ACT Inhaler    SYMBICORT    1 Inhaler    Inhale 2 puffs into the lungs 2 times daily    Bronchitis       escitalopram 20 MG tablet    LEXAPRO    90 tablet    TAKE 1 TABLET BY MOUTH EVERY DAY AND 1 TABLET BY MOUTH EVERY DAY AS NEEDED    Episode of recurrent major depressive disorder, unspecified depression episode severity (H)        hydrochlorothiazide 25 MG tablet    HYDRODIURIL    90 tablet    TAKE 1 TABLET(25 MG) BY MOUTH DAILY    Essential hypertension       losartan 50 MG tablet    COZAAR    180 tablet    TAKE 2 TABLETS BY MOUTH EVERY DAY    Benign essential hypertension       metoprolol succinate 50 MG 24 hr tablet    TOPROL-XL    135 tablet    TAKE 1 AND ONE-HALF TABLETS BY MOUTH EVERY DAY    Essential hypertension       omega 3 1000 MG Caps     90 capsule    Take 3 g by mouth daily        STATIN NOT PRESCRIBED (INTENTIONAL)      Please choose reason not prescribed, below    Hyperlipidemia LDL goal <100       VITAMIN D3 PO      Take 3,000 mg by mouth daily AM        warfarin 5 MG tablet    COUMADIN    150 tablet    7.5mg Mon,Wed,Fri and 5mg all other days or as directed by warfarin clinic    Long-term (current) use of anticoagulants

## 2018-04-03 NOTE — PATIENT INSTRUCTIONS
ASSESSMENT/PLAN:     1. Bronchitis  - budesonide-formoterol (SYMBICORT) 80-4.5 MCG/ACT Inhaler; Inhale 2 puffs into the lungs 2 times daily  Dispense: 1 Inhaler; Refill: 1  - benzonatate (TESSALON) 200 MG capsule; Take 1 capsule (200 mg) by mouth 3 times daily as needed for cough  Dispense: 30 capsule; Refill: 1    2. Cough  - benzonatate (TESSALON) 200 MG capsule; Take 1 capsule (200 mg) by mouth 3 times daily as needed for cough  Dispense: 30 capsule; Refill: 1          Fluids  Rest  Humidity at home - add bacteriostatic solution to humidifier  OTC Mucinex liquid cough and cold  Add Zicam or other zinc daily until you feel better  Monitor for temp at home, treat as needed  Please go to the ER or UC if your symptoms worsen   Please return to clinic if unimproved          Eliz Hartman NP  [facility]

## 2018-04-04 ASSESSMENT — ANXIETY QUESTIONNAIRES: GAD7 TOTAL SCORE: 2

## 2018-04-04 ASSESSMENT — PATIENT HEALTH QUESTIONNAIRE - PHQ9: SUM OF ALL RESPONSES TO PHQ QUESTIONS 1-9: 5

## 2018-04-04 NOTE — PROGRESS NOTES
Called to check on pt, was in yesterday with cough.  No reaction to albuterol neb or symbicort.  No help with cough, up all night , tessalon perls no help.  Is feeling alittle better overall.

## 2018-04-05 ENCOUNTER — TELEPHONE (OUTPATIENT)
Dept: FAMILY MEDICINE | Facility: OTHER | Age: 63
End: 2018-04-05

## 2018-04-05 DIAGNOSIS — J40 BRONCHITIS: ICD-10-CM

## 2018-04-05 DIAGNOSIS — R05.9 COUGH: Primary | ICD-10-CM

## 2018-04-05 DIAGNOSIS — J40 BRONCHITIS: Primary | ICD-10-CM

## 2018-04-05 RX ORDER — ALBUTEROL SULFATE 0.83 MG/ML
SOLUTION RESPIRATORY (INHALATION)
Qty: 1 BOX | Refills: 1 | Status: CANCELLED | OUTPATIENT
Start: 2018-04-05

## 2018-04-05 RX ORDER — LEVALBUTEROL INHALATION SOLUTION 1.25 MG/3ML
1 SOLUTION RESPIRATORY (INHALATION) EVERY 4 HOURS PRN
Qty: 270 ML | Refills: 0 | Status: SHIPPED | OUTPATIENT
Start: 2018-04-05 | End: 2018-04-05

## 2018-04-05 RX ORDER — CODEINE PHOSPHATE AND GUAIFENESIN 10; 100 MG/5ML; MG/5ML
1-2 SOLUTION ORAL EVERY 6 HOURS PRN
Qty: 180 ML | Refills: 0 | Status: SHIPPED | OUTPATIENT
Start: 2018-04-05 | End: 2018-04-24

## 2018-04-05 NOTE — TELEPHONE ENCOUNTER
Patient called and would like a nebulizer.Will need medication too.She had a neb in office yesterday.Dx.bronchitis. Please advise.Thank you    Was offered neb machine but declined. Now wants it.

## 2018-04-05 NOTE — TELEPHONE ENCOUNTER
She has both duoneb and albuterol listed as allergy - hives.  Will try xopenex which is sent to pharmacy.  DME order is printed.

## 2018-04-05 NOTE — TELEPHONE ENCOUNTER
Pt was in 4-3-18 with URI, given tessalon pearls, no help, up all night coughing.  Requesting something else for cough.  To [facility].

## 2018-04-06 ENCOUNTER — RADIANT APPOINTMENT (OUTPATIENT)
Dept: GENERAL RADIOLOGY | Facility: OTHER | Age: 63
End: 2018-04-06
Attending: ORTHOPAEDIC SURGERY
Payer: MEDICARE

## 2018-04-06 ENCOUNTER — TRANSFERRED RECORDS (OUTPATIENT)
Dept: HEALTH INFORMATION MANAGEMENT | Facility: CLINIC | Age: 63
End: 2018-04-06

## 2018-04-06 DIAGNOSIS — R52 PAIN: ICD-10-CM

## 2018-04-06 PROCEDURE — 73502 X-RAY EXAM HIP UNI 2-3 VIEWS: CPT | Mod: TC,FY

## 2018-04-06 RX ORDER — LEVALBUTEROL INHALATION SOLUTION 1.25 MG/3ML
SOLUTION RESPIRATORY (INHALATION)
Qty: 1650 ML | Refills: 0 | Status: SHIPPED | OUTPATIENT
Start: 2018-04-06 | End: 2019-09-13

## 2018-04-13 ENCOUNTER — ANTICOAGULATION THERAPY VISIT (OUTPATIENT)
Dept: ANTICOAGULATION | Facility: OTHER | Age: 63
End: 2018-04-13
Payer: MEDICARE

## 2018-04-13 DIAGNOSIS — Z79.01 LONG-TERM (CURRENT) USE OF ANTICOAGULANTS: ICD-10-CM

## 2018-04-13 DIAGNOSIS — I26.99 PULMONARY EMBOLISM AND INFARCTION (H): ICD-10-CM

## 2018-04-13 NOTE — PROGRESS NOTES
ANTICOAGULATION FOLLOW-UP CLINIC VISIT    Patient Name:  Cassy Avila  Date:  4/13/2018  Contact Type:  Telephone    SUBJECTIVE:     Patient Findings     Positives Other complaints    Comments Patient had called warfarin clinic and left message re: upcoming MRI of hip with contrast. I called patient back. She said she was told she probably would have to stop warfarin. I told her I would call her back because if the contrast was IV there was no reason to stop warfarin. Call placed to radiology nurse and spoke to Sammie.  Per Sammie, contrast will be given directly into the hip joint and INR has to be 1.8 or lower.  I called patient back and told her that contrast would be injected into her hip and we would have to have INR at 1.8.  We discussed warfarin dosing change to ensure INR is below 1.8. Patient verbalized understanding of instruction and has no questions. She will also increase vit K intake the 2 days she is off warfarin           OBJECTIVE    INR   Date Value Ref Range Status   04/02/2018 1.7  Final       ASSESSMENT / PLAN  No question data found.  Anticoagulation Summary as of 4/13/2018     INR goal 2.0-3.0   Today's INR No new INR was available at the time of this encounter.   Maintenance plan 7.5 mg (5 mg x 1.5) on Mon, Wed, Fri; 5 mg (5 mg x 1) all other days   Full instructions 4/16: Hold; 4/17: Hold; Otherwise 7.5 mg on Mon, Wed, Fri; 5 mg all other days   Weekly total 42.5 mg   Plan last modified Iram Lord RN (7/20/2016)   Next INR check 4/18/2018   Priority INR   Target end date Indefinite    Indications   Long-term (current) use of anticoagulants [Z79.01] [Z79.01]  Pulmonary embolism and infarction (H) [I26.99]  Deep vein thrombosis (DVT) (H) [I82.409] [I82.409]         Anticoagulation Episode Summary     INR check location Home Draw    Preferred lab     Send INR reminders to  ANTICOAG POOL    Comments PST - [facility].  colonoscopy on 1/15/18 and removal of ankle lesion on  1/22/18.  Bridged with lovenox 40mg daily      Anticoagulation Care Providers     Provider Role Specialty Phone number    Eliz Hartman NP [facility] [phone]            See the Encounter Report to view Anticoagulation Flowsheet and Dosing Calendar (Go to Encounters tab in chart review, and find the Anticoagulation Therapy Visit)        Manju Escalera RN

## 2018-04-13 NOTE — MR AVS SNAPSHOT
Cassy CHIU Austin   4/13/2018   Anticoagulation Therapy Visit    Description:  62 year old female   Provider:  Eliz Hartman NP   Department:  [facility] Anti Coagulation           INR as of 4/13/2018     Today's INR No new INR was available at the time of this encounter.      Anticoagulation Summary as of 4/13/2018     INR goal 2.0-3.0   Today's INR No new INR was available at the time of this encounter.   Full instructions 4/16: Hold; 4/17: Hold; Otherwise 7.5 mg on Mon, Wed, Fri; 5 mg all other days   Next INR check 4/18/2018    Indications   Long-term (current) use of anticoagulants [Z79.01] [Z79.01]  Pulmonary embolism and infarction (H) [I26.99]  Deep vein thrombosis (DVT) (H) [I82.409] [I82.409]         Your next Anticoagulation Clinic appointment(s)     Apr 18, 2018  2:30 PM CDT   Anticoagulation Visit with [facility] ANTI COAGULATION   [facility] ([facility] )    [address] [facility] [address]   [phone]              April 2018 Details    Sun Mon Tue Wed Thu Fri Sat     1               2               3               4               5               6               7                 8               9               10               11               12               13      7.5 mg   See details      14      5 mg           15      5 mg         16      Hold         17      Hold         18            19               20               21                 22               23               24               25               26               27               28                 29               30                     Date Details   04/13 This INR check       Date of next INR:  4/18/2018         How to take your warfarin dose     To take:  5 mg Take 1 of the 5 mg tablets.    To take:  7.5 mg Take 1.5 of the 5 mg tablets.    Hold Do not take your warfarin dose. See the Details table to the right for additional instructions.

## 2018-04-18 ENCOUNTER — ANTICOAGULATION THERAPY VISIT (OUTPATIENT)
Dept: ANTICOAGULATION | Facility: OTHER | Age: 63
End: 2018-04-18
Attending: NURSE PRACTITIONER
Payer: MEDICARE

## 2018-04-18 ENCOUNTER — HOSPITAL ENCOUNTER (OUTPATIENT)
Dept: GENERAL RADIOLOGY | Facility: HOSPITAL | Age: 63
Discharge: HOME | End: 2018-04-18
Attending: ORTHOPAEDIC SURGERY | Admitting: ORTHOPAEDIC SURGERY
Payer: MEDICARE

## 2018-04-18 ENCOUNTER — HOSPITAL ENCOUNTER (OUTPATIENT)
Dept: MRI IMAGING | Facility: HOSPITAL | Age: 63
End: 2018-04-18
Attending: ORTHOPAEDIC SURGERY
Payer: MEDICARE

## 2018-04-18 DIAGNOSIS — I26.99 PULMONARY EMBOLISM AND INFARCTION (H): ICD-10-CM

## 2018-04-18 DIAGNOSIS — M25.551 RIGHT HIP PAIN: ICD-10-CM

## 2018-04-18 DIAGNOSIS — M25.559 HIP PAIN: ICD-10-CM

## 2018-04-18 DIAGNOSIS — Z79.01 LONG-TERM (CURRENT) USE OF ANTICOAGULANTS: ICD-10-CM

## 2018-04-18 LAB — INR POINT OF CARE: 1.4 (ref 0.86–1.14)

## 2018-04-18 PROCEDURE — 25000128 H RX IP 250 OP 636: Performed by: RADIOLOGY

## 2018-04-18 PROCEDURE — 85610 PROTHROMBIN TIME: CPT | Mod: QW,ZL

## 2018-04-18 PROCEDURE — A9585 GADOBUTROL INJECTION: HCPCS | Performed by: RADIOLOGY

## 2018-04-18 PROCEDURE — 73722 MRI JOINT OF LWR EXTR W/DYE: CPT | Mod: TC,RT

## 2018-04-18 PROCEDURE — 25000125 ZZHC RX 250: Performed by: RADIOLOGY

## 2018-04-18 PROCEDURE — 27093 INJECTION FOR HIP X-RAY: CPT | Mod: TC

## 2018-04-18 RX ORDER — GADOBUTROL 604.72 MG/ML
0.1 INJECTION INTRAVENOUS ONCE
Status: COMPLETED | OUTPATIENT
Start: 2018-04-18 | End: 2018-04-18

## 2018-04-18 RX ORDER — LIDOCAINE HYDROCHLORIDE 10 MG/ML
INJECTION, SOLUTION EPIDURAL; INFILTRATION; INTRACAUDAL; PERINEURAL
Status: DISCONTINUED
Start: 2018-04-18 | End: 2018-04-19 | Stop reason: HOSPADM

## 2018-04-18 RX ORDER — IOPAMIDOL 612 MG/ML
50 INJECTION, SOLUTION INTRAVASCULAR ONCE
Status: COMPLETED | OUTPATIENT
Start: 2018-04-18 | End: 2018-04-18

## 2018-04-18 RX ADMIN — GADOBUTROL 0.1 ML: 604.72 INJECTION INTRAVENOUS at 15:51

## 2018-04-18 RX ADMIN — LIDOCAINE HYDROCHLORIDE 3 ML: 10 INJECTION, SOLUTION EPIDURAL; INFILTRATION; INTRACAUDAL; PERINEURAL at 15:43

## 2018-04-18 RX ADMIN — IOPAMIDOL 5 ML: 612 INJECTION, SOLUTION INTRAVENOUS at 15:44

## 2018-04-18 NOTE — PROGRESS NOTES
ANTICOAGULATION FOLLOW-UP CLINIC VISIT    Patient Name:  Cassy Avila  Date:  4/18/2018  Contact Type:  Face to Face    SUBJECTIVE:     Patient Findings     Comments Hip injection today. INR has to be 1.8 or lower. She verbalized understanding of new warfarin dosing and PSt date and has no questions           OBJECTIVE    INR Protime   Date Value Ref Range Status   04/18/2018 1.4 (A) 0.86 - 1.14 Final       ASSESSMENT / PLAN  INR assessment THER    Recheck INR In: 5 DAYS    INR Location Clinic      Anticoagulation Summary as of 4/18/2018     INR goal 2.0-3.0   Today's INR 1.4!   Maintenance plan 7.5 mg (5 mg x 1.5) on Mon, Wed, Fri; 5 mg (5 mg x 1) all other days   Full instructions 4/19: 10 mg; 4/20: 10 mg; Otherwise 7.5 mg on Mon, Wed, Fri; 5 mg all other days   Weekly total 42.5 mg   Plan last modified Iram Lord RN (7/20/2016)   Next INR check 4/24/2018   Priority INR   Target end date Indefinite    Indications   Long-term (current) use of anticoagulants [Z79.01] [Z79.01]  Pulmonary embolism and infarction (H) [I26.99]  Deep vein thrombosis (DVT) (H) [I82.409] [I82.409]         Anticoagulation Episode Summary     INR check location Home Draw    Preferred lab     Send INR reminders to [facility] ANTICOAG POOL    Comments PST - [facility].  colonoscopy on 1/15/18 and removal of ankle lesion on 1/22/18.  Bridged with lovenox 40mg daily      Anticoagulation Care Providers     Provider Role Specialty Phone number    Eliz Hartman NP [facility] [phone]            See the Encounter Report to view Anticoagulation Flowsheet and Dosing Calendar (Go to Encounters tab in chart review, and find the Anticoagulation Therapy Visit)        Manju Escalera, RN

## 2018-04-18 NOTE — MR AVS SNAPSHOT
Cassy APPLE Avila   4/18/2018 2:30 PM   Anticoagulation Therapy Visit    Description:  62 year old female   Provider:   ANTI COAGULATION   Department:  [facility] Anti Coagulation           INR as of 4/18/2018     Today's INR 1.4!      Anticoagulation Summary as of 4/18/2018     INR goal 2.0-3.0   Today's INR 1.4!   Full instructions 4/19: 10 mg; 4/20: 10 mg; Otherwise 7.5 mg on Mon, Wed, Fri; 5 mg all other days   Next INR check 4/24/2018    Indications   Long-term (current) use of anticoagulants [Z79.01] [Z79.01]  Pulmonary embolism and infarction (H) [I26.99]  Deep vein thrombosis (DVT) (H) [I82.409] [I82.409]         April 2018 Details    Sun Mon Tue Wed Thu Fri Sat     1               2               3               4               5               6               7                 8               9               10               11               12               13               14                 15               16               17               18      7.5 mg   See details      19      10 mg         20      10 mg         21      5 mg           22      5 mg         23      7.5 mg         24            25               26               27               28                 29               30                     Date Details   04/18 This INR check       Date of next INR:  4/24/2018         How to take your warfarin dose     To take:  5 mg Take 1 of the 5 mg tablets.    To take:  7.5 mg Take 1.5 of the 5 mg tablets.    To take:  10 mg Take 2 of the 5 mg tablets.

## 2018-04-24 ENCOUNTER — OFFICE VISIT (OUTPATIENT)
Dept: FAMILY MEDICINE | Facility: OTHER | Age: 63
End: 2018-04-24
Attending: NURSE PRACTITIONER
Payer: MEDICARE

## 2018-04-24 ENCOUNTER — TRANSFERRED RECORDS (OUTPATIENT)
Dept: HEALTH INFORMATION MANAGEMENT | Facility: CLINIC | Age: 63
End: 2018-04-24

## 2018-04-24 ENCOUNTER — RADIANT APPOINTMENT (OUTPATIENT)
Dept: GENERAL RADIOLOGY | Facility: OTHER | Age: 63
End: 2018-04-24
Attending: NURSE PRACTITIONER
Payer: MEDICARE

## 2018-04-24 ENCOUNTER — ANTICOAGULATION THERAPY VISIT (OUTPATIENT)
Dept: FAMILY MEDICINE | Facility: OTHER | Age: 63
End: 2018-04-24
Payer: MEDICARE

## 2018-04-24 VITALS
HEIGHT: 65 IN | TEMPERATURE: 98.5 F | WEIGHT: 230 LBS | SYSTOLIC BLOOD PRESSURE: 126 MMHG | OXYGEN SATURATION: 97 % | BODY MASS INDEX: 38.32 KG/M2 | DIASTOLIC BLOOD PRESSURE: 76 MMHG | HEART RATE: 68 BPM | RESPIRATION RATE: 16 BRPM

## 2018-04-24 DIAGNOSIS — I26.99 PULMONARY EMBOLISM AND INFARCTION (H): ICD-10-CM

## 2018-04-24 DIAGNOSIS — M79.671 RIGHT FOOT PAIN: Primary | ICD-10-CM

## 2018-04-24 DIAGNOSIS — Z79.01 LONG-TERM (CURRENT) USE OF ANTICOAGULANTS: ICD-10-CM

## 2018-04-24 LAB — INR PPP: 2.2

## 2018-04-24 PROCEDURE — 99213 OFFICE O/P EST LOW 20 MIN: CPT | Performed by: NURSE PRACTITIONER

## 2018-04-24 PROCEDURE — 73630 X-RAY EXAM OF FOOT: CPT | Mod: TC,RT,FY

## 2018-04-24 PROCEDURE — G0463 HOSPITAL OUTPT CLINIC VISIT: HCPCS

## 2018-04-24 ASSESSMENT — ANXIETY QUESTIONNAIRES
2. NOT BEING ABLE TO STOP OR CONTROL WORRYING: NOT AT ALL
4. TROUBLE RELAXING: SEVERAL DAYS
IF YOU CHECKED OFF ANY PROBLEMS ON THIS QUESTIONNAIRE, HOW DIFFICULT HAVE THESE PROBLEMS MADE IT FOR YOU TO DO YOUR WORK, TAKE CARE OF THINGS AT HOME, OR GET ALONG WITH OTHER PEOPLE: NOT DIFFICULT AT ALL
7. FEELING AFRAID AS IF SOMETHING AWFUL MIGHT HAPPEN: NOT AT ALL
3. WORRYING TOO MUCH ABOUT DIFFERENT THINGS: NOT AT ALL
5. BEING SO RESTLESS THAT IT IS HARD TO SIT STILL: SEVERAL DAYS
6. BECOMING EASILY ANNOYED OR IRRITABLE: SEVERAL DAYS
1. FEELING NERVOUS, ANXIOUS, OR ON EDGE: SEVERAL DAYS
GAD7 TOTAL SCORE: 4

## 2018-04-24 ASSESSMENT — PAIN SCALES - GENERAL: PAINLEVEL: MODERATE PAIN (4)

## 2018-04-24 NOTE — NURSING NOTE
"Chief Complaint   Patient presents with     Musculoskeletal Problem       Initial /76 (BP Location: Left arm, Patient Position: Chair, Cuff Size: Adult Large)  Pulse 68  Temp 98.5  F (36.9  C) (Tympanic)  Resp 16  Ht 5' 5\" (1.651 m)  Wt 230 lb (104.3 kg)  SpO2 97%  BMI 38.27 kg/m2 Estimated body mass index is 38.27 kg/(m^2) as calculated from the following:    Height as of this encounter: 5' 5\" (1.651 m).    Weight as of this encounter: 230 lb (104.3 kg).  Medication Reconciliation: complete   Pamela M Lechevalier LPN      "

## 2018-04-24 NOTE — MR AVS SNAPSHOT
Cassy CHIU Austin   4/24/2018   Anticoagulation Therapy Visit    Description:  62 year old female   Provider:  Eliz Hartman NP   Department:   Family Practice           INR as of 4/24/2018     Today's INR 2.2      Anticoagulation Summary as of 4/24/2018     INR goal 2.0-3.0   Today's INR 2.2   Full instructions 7.5 mg on Mon, Wed, Fri; 5 mg all other days   Next INR check 5/1/2018    Indications   Long-term (current) use of anticoagulants [Z79.01] [Z79.01]  Pulmonary embolism and infarction (H) [I26.99]  Deep vein thrombosis (DVT) (H) [I82.409] [I82.409]         April 2018 Details    Sun Mon Tue Wed Thu Fri Sat     1               2               3               4               5               6               7                 8               9               10               11               12               13               14                 15               16               17               18               19               20               21                 22               23               24      5 mg   See details      25      7.5 mg         26      5 mg         27      7.5 mg         28      5 mg           29      5 mg         30      7.5 mg               Date Details   04/24 This INR check               How to take your warfarin dose     To take:  5 mg Take 1 of the 5 mg tablets.    To take:  7.5 mg Take 1.5 of the 5 mg tablets.           May 2018 Details    Sun Mon Tue Wed Thu Fri Sat       1            2               3               4               5                 6               7               8               9               10               11               12                 13               14               15               16               17               18               19                 20               21               22               23               24               25               26                 27               28               29               30               31                   Date Details   No additional details    Date of next INR:  5/1/2018         How to take your warfarin dose     To take:  5 mg Take 1 of the 5 mg tablets.

## 2018-04-24 NOTE — PROGRESS NOTES
SUBJECTIVE:   Cassy Avila is a 62 year old female who presents to clinic today for the following health issues:    Right foot pain    Joint Pain    Onset: 2 weeks ago    Description:   Location: right foot  Character: Stabbing    Intensity: moderate    Progression of Symptoms: worse    Accompanying Signs & Symptoms:  Other symptoms: radiation of pain to toes    History:   Previous similar pain: no       Precipitating factors:   Trauma or overuse: YES- had surgery a month ago removed mass     Alleviating factors:  Improved by: nothing  Therapies Tried and outcome: nothing       Surgery with Dr Alexander, one month ago - lipoid excision Right outer dorsum.  Over 2 weeks, she has developed foot pain, dorsal, no injury.      Problem list and histories reviewed & adjusted, as indicated.  Additional history: as documented    Patient Active Problem List   Diagnosis     Essential hypertension     Pulmonary embolism and infarction (H)     Contact dermatitis and other eczema, due to unspecified cause     Edema     Hyperlipidemia LDL goal <100     Neurofibromatosis (H)     Advanced care planning/counseling discussion     Moderate episode of recurrent major depressive disorder (H)     Long-term (current) use of anticoagulants [Z79.01]     Deep vein thrombosis (DVT) (H) [I82.409]     Lumbar spondylosis with myelopathy     Degeneration of lumbar or lumbosacral intervertebral disc     Family history of colon cancer     Statin medication not prescribed per physician orders     Past Surgical History:   Procedure Laterality Date     ------------OTHER-------------      shoulder replacement; Provider: Karen     ARTHROPLASTY KNEE  2014    Procedure: ARTHROPLASTY KNEE;  Surgeon: Sean Alexander MD;  Location: [facility]     ARTHROSCOPY SHOULDER      right, bone spurs      SECTION      x3     CHOLECYSTECTOMY       COLONOSCOPY  02/15/2018    [place],,dinesh     elbow ulnar tunnel release       ELECTROTHERMAL  THERAPY INTRADISC  2017    stimulator     ENDOSCOPIC SINUS SURGERY, SEPTOPLASTY, TURBINOPLASTY, MAXILLARY SINUSOTOMY, COMBINED N/A 4/29/2015    Procedure: COMBINED ENDOSCOPIC SINUS SURGERY, SEPTOPLASTY, TURBINOPLASTY, MAXILLARY SINUSOTOMY;  Surgeon: Seema Conn MD;  Location: [facility] OR     esophagastroduodenoscopy  2011    with biopsy and endoscopic U/S     EXCISE NEUROMA LOWER EXTREMITY Left 7/13/2016    Procedure: EXCISE NEUROMA LOWER EXTREMITY;  Surgeon: Edi Reed MD;  Location: [facility]     FUSION LUMBAR ANTERIOR WITH BAK CAGES      L5-S1     ORTHOPEDIC SURGERY  2-15    right shoulder     ORTHOPEDIC SURGERY  8/28/15    right knee     pionidal cyst excision       SARAHI/BSO       TRANSPOSITION ULNAR NERVE (ELBOW)         Social History   Substance Use Topics     Smoking status: Former Smoker     Packs/day: 0.50     Years: 30.00     Types: Cigarettes, Pipe     Smokeless tobacco: Never Used      Comment: quit in 1999     Alcohol use No     Family History   Problem Relation Age of Onset     CANCER Mother      Colon Polyps Mother      Heart Failure Mother 87     congestive, cause of death     Myocardial Infarction Mother      myocardial infarction     Myocardial Infarction Father      myocardial infarction - cause of death     C.A.D. Father      CANCER Paternal Uncle      cause of death     C.A.D. Brother      Other - See Comments Other      factor 5 - family h/o     Asthma No family hx of          Current Outpatient Prescriptions   Medication Sig Dispense Refill     aspirin 81 MG EC tablet Take 81 mg by mouth daily HS       benzonatate (TESSALON) 200 MG capsule Take 1 capsule (200 mg) by mouth 3 times daily as needed for cough 30 capsule 1     budesonide-formoterol (SYMBICORT) 80-4.5 MCG/ACT Inhaler Inhale 2 puffs into the lungs 2 times daily 1 Inhaler 1     Cholecalciferol (VITAMIN D3 PO) Take 3,000 mg by mouth daily AM       escitalopram (LEXAPRO) 20 MG tablet TAKE 1 TABLET BY MOUTH EVERY DAY AND 1  TABLET BY MOUTH EVERY DAY AS NEEDED 90 tablet 1     hydrochlorothiazide (HYDRODIURIL) 25 MG tablet TAKE 1 TABLET(25 MG) BY MOUTH DAILY 90 tablet 2     levalbuterol (XOPENEX) 1.25 MG/3ML neb solution NEBULIZE 1 VIAL EVERY 4 HOURS AS NEEDED FOR SHORTNESS OF BREATH, DYSPNEA, OR WHEEZING 1650 mL 0     losartan (COZAAR) 50 MG tablet TAKE 2 TABLETS BY MOUTH EVERY  tablet 2     metoprolol (TOPROL-XL) 50 MG 24 hr tablet TAKE 1 AND ONE-HALF TABLETS BY MOUTH EVERY  tablet 2     omega 3 1000 MG CAPS Take 3 g by mouth daily  90 capsule      order for DME Nebulizer machine and tubing    DX:  Bronchitis 1 Device 0     STATIN NOT PRESCRIBED, INTENTIONAL, Please choose reason not prescribed, below       warfarin (COUMADIN) 5 MG tablet 7.5mg Mon,Wed,Fri and 5mg all other days or as directed by warfarin clinic 150 tablet 3     Allergies   Allergen Reactions     Ace Inhibitors Cough     Amlodipine Besylate Swelling     Norvasc     Amoxicillin      Atorvastatin      myualgia     Cephalexin Monohydrate Hives     Keflex     Erythromycin Base [Kdc:Yellow Dye+Erythromycin+Brilliant Blue Fcf] Nausea and Vomiting     Meloxicam Other (See Comments)     Mobic - confusion, depression     Adhesive Tape Rash     Prochlorperazine Edisylate Swelling and Rash     Compazine     Prochlorperazine Maleate Swelling and Rash     Recent Labs   Lab Test  02/14/18   1430  01/24/18   1336  01/17/18   0822  07/12/17   1117  03/14/17   2028  01/04/17   1459   01/11/16   0935   02/17/15   1149   11/05/13   1528   A1C   --    --    --    --    --    --    --    --    --    --    --   5.5   LDL   --    --   137*   --    --    --    --   126*   --   119   < >   --    HDL   --    --   43*   --    --    --    --   38*   --   36*   < >   --    TRIG   --    --   190*   --    --    --    --   265*   --   272*   < >   --    ALT   --    --    --   34  30  35   --    --    --    --    < >   --    CR  0.94   --   0.91  0.91  0.97  1.07*   --   1.02   < >  0.78  "  < >   --    GFRESTIMATED  60*   --   62  63  58*  52*   --   55*   < >  75   < >   --    GFRESTBLACK  73   --   75  76  70  63   --   67   < >  >90   GFR Calc     < >   --    POTASSIUM  3.5   --   3.2*  3.6  3.6  3.5   < >  3.4   < >  4.0   < >   --    TSH   --   3.88  5.75*   --    --   1.63   --   3.62   < >   --    < >   --     < > = values in this interval not displayed.      BP Readings from Last 3 Encounters:   04/24/18 126/76   04/03/18 108/64   03/22/18 136/70    Wt Readings from Last 3 Encounters:   04/24/18 230 lb (104.3 kg)   04/03/18 226 lb (102.5 kg)   03/22/18 220 lb (99.8 kg)                  Labs reviewed in EPIC    Reviewed and updated as needed this visit by clinical staff  Tobacco  Allergies       Reviewed and updated as needed this visit by Provider         ROS:  Constitutional, HEENT, cardiovascular, pulmonary, gi and gu systems are negative, except as otherwise noted.    OBJECTIVE:     /76 (BP Location: Left arm, Patient Position: Chair, Cuff Size: Adult Large)  Pulse 68  Temp 98.5  F (36.9  C) (Tympanic)  Resp 16  Ht 5' 5\" (1.651 m)  Wt 230 lb (104.3 kg)  SpO2 97%  BMI 38.27 kg/m2  Body mass index is 38.27 kg/(m^2).     GENERAL: healthy, alert and no distress  EYES: Eyes grossly normal to inspection, PERRL and conjunctivae and sclerae normal  NECK: no adenopathy, no asymmetry, masses, or scars and thyroid normal to palpation  RESP: lungs clear to auscultation - no rales, rhonchi or wheezes  CV: regular rate and rhythm, normal S1 S2, no S3 or S4, no murmur, click or rub, no peripheral edema and peripheral pulses strong  MS: Well healed incision - mild ecchymosis - base of toes - tender, no swelling  SKIN: no suspicious lesions or rashes  PSYCH: mentation appears normal, affect normal/bright          XR FOOT RT G/E 3 VW     HISTORY: 62 yearsFemale ; Right foot pain     TECHNIQUE: 3 views right foot     COMPARISON: 6/24/2009     FINDINGS: There is surgical change " of bunionectomy.     There is first metatarsal phalangeal osteoarthritic change of moderate  severity. There is no evidence of fracture or dislocation.         IMPRESSION: Interval bunionectomy. There is moderate first metatarsal  phalangeal osteoarthritic change. There is no evidence of fracture or  dislocation.     ANGELIA BRANDT MD      ASSESSMENT/PLAN:     1. Right foot pain  - XR Foot Right G/E 3 Views - as discussed  - See Dr. Alexander as scheduled  - Ace wrap at home  - Supportive footwear  - Elevate   -ice    To ER as needed    Eliz Hartman NP  [facility], [place]

## 2018-04-24 NOTE — PROGRESS NOTES
ANTICOAGULATION FOLLOW-UP CLINIC VISIT    Patient Name:  Cassy Avila  Date:  4/24/2018  Contact Type:  Telephone    SUBJECTIVE:     Patient Findings     Positives Change in diet/appetite    Comments PST done by patient and she called result to warfarin clinic. She has no bleeding/bruising and no changes in diet/meds/activity at this time. She did say she is going to be starting a diet for weight loss and her intake of vit K will increase a fair amount. She will start this diet tomorrow. We discussed that she should do her INR in 1 week and warfarin dosing will be adjusted to accomodate her new diet. She verbalized understanding of instructions and had no questions.           OBJECTIVE    INR   Date Value Ref Range Status   04/24/2018 2.2  Final       ASSESSMENT / PLAN  INR assessment THER    Recheck INR In: 1 WEEK    INR Location Home INR      Anticoagulation Summary as of 4/24/2018     INR goal 2.0-3.0   Today's INR 2.2   Maintenance plan 7.5 mg (5 mg x 1.5) on Mon, Wed, Fri; 5 mg (5 mg x 1) all other days   Full instructions 7.5 mg on Mon, Wed, Fri; 5 mg all other days   Weekly total 42.5 mg   No change documented Manju Escalera, RN   Plan last modified Iram Lord, RN (7/20/2016)   Next INR check 5/1/2018   Priority INR   Target end date Indefinite    Indications   Long-term (current) use of anticoagulants [Z79.01] [Z79.01]  Pulmonary embolism and infarction (H) [I26.99]  Deep vein thrombosis (DVT) (H) [I82.409] [I82.409]         Anticoagulation Episode Summary     INR check location Home Draw    Preferred lab     Send INR reminders to [facility] POOL    Comments PST - [facility] [facility].  colonoscopy on 1/15/18 and removal of ankle lesion on 1/22/18.  Bridged with lovenox 40mg daily      Anticoagulation Care Providers     Provider Role Specialty Phone number    Eliz Hartman NP [facility] [phone]            See the Encounter Report to view Anticoagulation Flowsheet and  Dosing Calendar (Go to Encounters tab in chart review, and find the Anticoagulation Therapy Visit)        Manju Escalera RN

## 2018-04-24 NOTE — MR AVS SNAPSHOT
After Visit Summary   4/24/2018    Cassy Avila    MRN: [id number]           Patient Information     Date Of Birth          1955        Visit Information        Provider Department      4/24/2018 2:30 PM Eliz Hartman NP [facility]        Today's Diagnoses     Right foot pain    -  1      Care Instructions      ASSESSMENT/PLAN:     1. Right foot pain  - XR Foot Right G/E 3 Views - as discussed  - See Dr. Alexander as scheduled  - Ace wrap at home  - Supportive footwear  - Elevate   -ice    To ER as needed    Eliz Hartman NP  [facility]            Follow-ups after your visit        Who to contact     If you have questions or need follow up information about today's clinic visit or your schedule please contact [facility] directly at [phone].  Normal or non-critical lab and imaging results will be communicated to you by MyChart, letter or phone within 4 business days after the clinic has received the results. If you do not hear from us within 7 days, please contact the clinic through [employer] or phone. If you have a critical or abnormal lab result, we will notify you by phone as soon as possible.  Submit refill requests through [employer] or call your pharmacy and they will forward the refill request to us. Please allow 3 business days for your refill to be completed.          Additional Information About Your Visit        MyChart Information     [employer] gives you secure access to your electronic health record. If you see a primary care provider, you can also send messages to your care team and make appointments. If you have questions, please call your primary care clinic.  If you do not have a primary care provider, please call [phone] and they will assist you.        Care EveryWhere ID     This is your Care EveryWhere ID. This could be used by other organizations to access your [place] medical records  [id number]        Your Vitals Were     Pulse  "Temperature Respirations Height Pulse Oximetry BMI (Body Mass Index)    68 98.5  F (36.9  C) (Tympanic) 16 5' 5\" (1.651 m) 97% 38.27 kg/m2       Blood Pressure from Last 3 Encounters:   04/24/18 126/76   04/03/18 108/64   03/22/18 136/70    Weight from Last 3 Encounters:   04/24/18 230 lb (104.3 kg)   04/03/18 226 lb (102.5 kg)   03/22/18 220 lb (99.8 kg)              We Performed the Following     XR Foot Right G/E 3 Views        Primary Care Provider Office Phone # Fax #    Eliz Hartman [phone] [address]  [place] [address]        Equal Access to Services     DILAN PARRISH : Hadii mic steele Socarlos, waaxda luqadaha, qaybta kaalmada adeegyada, jeanie tobar . So [facility] [phone].    ATENCIÓN: Si habla español, tiene a noriega disposición servicios gratuitos de asistencia lingüística. Llame al [phone].    We comply with applicable federal civil rights laws and [place] laws. We do not discriminate on the basis of race, color, national origin, age, disability, sex, sexual orientation, or gender identity.            Thank you!     Thank you for choosing [facility]  for your care. Our goal is always to provide you with excellent care. Hearing back from our patients is one way we can continue to improve our services. Please take a few minutes to complete the written survey that you may receive in the mail after your visit with us. Thank you!             Your Updated Medication List - Protect others around you: Learn how to safely use, store and throw away your medicines at www.disposemymeds.org.          This list is accurate as of 4/24/18  3:41 PM.  Always use your most recent med list.                   Brand Name Dispense Instructions for use Diagnosis    aspirin 81 MG EC tablet      Take 81 mg by mouth daily HS        benzonatate 200 MG capsule    TESSALON    30 capsule    Take 1 capsule (200 mg) by mouth 3 times daily as " needed for cough    Bronchitis, Cough       budesonide-formoterol 80-4.5 MCG/ACT Inhaler    SYMBICORT    1 Inhaler    Inhale 2 puffs into the lungs 2 times daily    Bronchitis       escitalopram 20 MG tablet    LEXAPRO    90 tablet    TAKE 1 TABLET BY MOUTH EVERY DAY AND 1 TABLET BY MOUTH EVERY DAY AS NEEDED    Episode of recurrent major depressive disorder, unspecified depression episode severity (H)       hydrochlorothiazide 25 MG tablet    HYDRODIURIL    90 tablet    TAKE 1 TABLET(25 MG) BY MOUTH DAILY    Essential hypertension       levalbuterol 1.25 MG/3ML neb solution    XOPENEX    1650 mL    NEBULIZE 1 VIAL EVERY 4 HOURS AS NEEDED FOR SHORTNESS OF BREATH, DYSPNEA, OR WHEEZING    Bronchitis       losartan 50 MG tablet    COZAAR    180 tablet    TAKE 2 TABLETS BY MOUTH EVERY DAY    Benign essential hypertension       metoprolol succinate 50 MG 24 hr tablet    TOPROL-XL    135 tablet    TAKE 1 AND ONE-HALF TABLETS BY MOUTH EVERY DAY    Essential hypertension       omega 3 1000 MG Caps     90 capsule    Take 3 g by mouth daily        order for DME     1 Device    Nebulizer machine and tubing  DX:  Bronchitis    Bronchitis       STATIN NOT PRESCRIBED (INTENTIONAL)      Please choose reason not prescribed, below    Hyperlipidemia LDL goal <100       VITAMIN D3 PO      Take 3,000 mg by mouth daily AM        warfarin 5 MG tablet    COUMADIN    150 tablet    7.5mg Mon,Wed,Fri and 5mg all other days or as directed by warfarin clinic    Long-term (current) use of anticoagulants

## 2018-04-24 NOTE — PATIENT INSTRUCTIONS
ASSESSMENT/PLAN:     1. Right foot pain  - XR Foot Right G/E 3 Views - as discussed  - See Dr. Alexander as scheduled  - Ace wrap at home  - Supportive footwear  - Elevate   -ice    To ER as needed    Eliz Hartman NP  [facility]

## 2018-04-25 ASSESSMENT — ANXIETY QUESTIONNAIRES: GAD7 TOTAL SCORE: 4

## 2018-04-25 ASSESSMENT — PATIENT HEALTH QUESTIONNAIRE - PHQ9: SUM OF ALL RESPONSES TO PHQ QUESTIONS 1-9: 7

## 2018-05-01 ENCOUNTER — ANTICOAGULATION THERAPY VISIT (OUTPATIENT)
Dept: ANTICOAGULATION | Facility: OTHER | Age: 63
End: 2018-05-01
Payer: MEDICARE

## 2018-05-01 ENCOUNTER — TRANSFERRED RECORDS (OUTPATIENT)
Dept: HEALTH INFORMATION MANAGEMENT | Facility: CLINIC | Age: 63
End: 2018-05-01

## 2018-05-01 DIAGNOSIS — I26.99 PULMONARY EMBOLISM AND INFARCTION (H): ICD-10-CM

## 2018-05-01 DIAGNOSIS — Z79.01 LONG-TERM (CURRENT) USE OF ANTICOAGULANTS: ICD-10-CM

## 2018-05-01 LAB — INR PPP: 2.4

## 2018-05-01 NOTE — PROGRESS NOTES
ANTICOAGULATION FOLLOW-UP CLINIC VISIT    Patient Name:  Cassy Avila  Date:  5/1/2018  Contact Type:  Telephone    SUBJECTIVE:     Patient Findings     Positives Other complaints    Comments PST done and patient called result to warfarin clinic. We discussed PST result, warfarin dosing and INR recheck date. She verbalized understanding and has no questions. She states she has no changes in diet/meds/activity and no bleeding/bruising. She is having a hip injection tomorrow and states INR has to be below 3.0. She will notify me if any changes.            OBJECTIVE    INR   Date Value Ref Range Status   05/01/2018 2.4  Final       ASSESSMENT / PLAN  INR assessment THER    Recheck INR In: 4 WEEKS    INR Location Home INR      Anticoagulation Summary as of 5/1/2018     INR goal 2.0-3.0   Today's INR 2.4   Maintenance plan 7.5 mg (5 mg x 1.5) on Mon, Wed, Fri; 5 mg (5 mg x 1) all other days   Full instructions 7.5 mg on Mon, Wed, Fri; 5 mg all other days   Weekly total 42.5 mg   No change documented Manju Escalera, RN   Plan last modified Iram Lord, RN (7/20/2016)   Next INR check 5/29/2018   Priority INR   Target end date Indefinite    Indications   Long-term (current) use of anticoagulants [Z79.01] [Z79.01]  Pulmonary embolism and infarction (H) [I26.99]  Deep vein thrombosis (DVT) (H) [I82.409] [I82.409]         Anticoagulation Episode Summary     INR check location Home Draw    Preferred lab     Send INR reminders to [facility] POOL    Comments PST - [facility] [facility].  colonoscopy on 1/15/18 and removal of ankle lesion on 1/22/18.  Bridged with lovenox 40mg daily      Anticoagulation Care Providers     Provider Role Specialty Phone number    Eliz Hartman, [facility] [phone]            See the Encounter Report to view Anticoagulation Flowsheet and Dosing Calendar (Go to Encounters tab in chart review, and find the Anticoagulation Therapy Visit)        Manju Escalera,  RN

## 2018-05-01 NOTE — MR AVS SNAPSHOT
Cassy APPLE Avila   5/1/2018   Anticoagulation Therapy Visit    Description:  62 year old female   Provider:  Eliz Hartman NP   Department:  [facility]           INR as of 5/1/2018     Today's INR 2.4      Anticoagulation Summary as of 5/1/2018     INR goal 2.0-3.0   Today's INR 2.4   Full instructions 7.5 mg on Mon, Wed, Fri; 5 mg all other days   Next INR check 5/29/2018    Indications   Long-term (current) use of anticoagulants [Z79.01] [Z79.01]  Pulmonary embolism and infarction (H) [I26.99]  Deep vein thrombosis (DVT) (H) [I82.409] [I82.409]         May 2018 Details    Sun Mon Tue Wed Thu Fri Sat       1      5 mg   See details      2      7.5 mg         3      5 mg         4      7.5 mg         5      5 mg           6      5 mg         7      7.5 mg         8      5 mg         9      7.5 mg         10      5 mg         11      7.5 mg         12      5 mg           13      5 mg         14      7.5 mg         15      5 mg         16      7.5 mg         17      5 mg         18      7.5 mg         19      5 mg           20      5 mg         21      7.5 mg         22      5 mg         23      7.5 mg         24      5 mg         25      7.5 mg         26      5 mg           27      5 mg         28      7.5 mg         29            30               31                  Date Details   05/01 This INR check       Date of next INR:  5/29/2018         How to take your warfarin dose     To take:  5 mg Take 1 of the 5 mg tablets.    To take:  7.5 mg Take 1.5 of the 5 mg tablets.

## 2018-05-22 ENCOUNTER — TRANSFERRED RECORDS (OUTPATIENT)
Dept: HEALTH INFORMATION MANAGEMENT | Facility: CLINIC | Age: 63
End: 2018-05-22

## 2018-05-29 ENCOUNTER — ANTICOAGULATION THERAPY VISIT (OUTPATIENT)
Dept: ANTICOAGULATION | Facility: OTHER | Age: 63
End: 2018-05-29
Payer: MEDICARE

## 2018-05-29 ENCOUNTER — TRANSFERRED RECORDS (OUTPATIENT)
Dept: HEALTH INFORMATION MANAGEMENT | Facility: CLINIC | Age: 63
End: 2018-05-29

## 2018-05-29 DIAGNOSIS — Z79.01 LONG-TERM (CURRENT) USE OF ANTICOAGULANTS: ICD-10-CM

## 2018-05-29 DIAGNOSIS — I26.99 PULMONARY EMBOLISM AND INFARCTION (H): ICD-10-CM

## 2018-05-29 LAB — INR PPP: 2.3

## 2018-05-29 NOTE — PROGRESS NOTES
ANTICOAGULATION FOLLOW-UP CLINIC VISIT    Patient Name:  Cassy Avila  Date:  5/29/2018  Contact Type:  Telephone    SUBJECTIVE:     Patient Findings     Positives Other complaints    Comments PST done and result received via fax from [facility]. Call placed to patient and she states that she is having arthroscopic surgery on hip on 6/11/18.  She states that Dr. Grace from [facility] is doing surgery. He did not tell her where he wants INR to be. Will call there and speak to him re: INR and warfarin hold. I got a call back from [facility], who states that Dr. Loving would like INR to be 1.5 or less for surgery. Call placed to patient and spoke to her  as she was not there re: warfarin hold and will let her know if she needs lovenox bridge.           OBJECTIVE    INR   Date Value Ref Range Status   05/29/2018 2.3  Final       ASSESSMENT / PLAN  INR assessment THER    Recheck INR In: 3 WEEKS    INR Location Home INR      Anticoagulation Summary as of 5/29/2018     INR goal 2.0-3.0   Today's INR 2.3   Warfarin maintenance plan 7.5 mg (5 mg x 1.5) on Mon, Wed, Fri; 5 mg (5 mg x 1) all other days   Full warfarin instructions 6/6: Hold; 6/7: Hold; 6/8: Hold; 6/9: Hold; 6/10: Hold; Otherwise 7.5 mg on Mon, Wed, Fri; 5 mg all other days   Weekly warfarin total 42.5 mg   Plan last modified Iram Lord RN (7/20/2016)   Next INR check 6/11/2018   Priority INR   Target end date Indefinite    Indications   Long-term (current) use of anticoagulants [Z79.01] [Z79.01]  Pulmonary embolism and infarction (H) [I26.99]  Deep vein thrombosis (DVT) (H) [I82.409] [I82.409]         Anticoagulation Episode Summary     INR check location Home Draw    Preferred lab     Send INR reminders to [facility]OL    Comments PST - [facility].  Arthroscopic hip surgery on 6/11/18.  Hold warfarin x 5 days (INR 1.5 or lower). Note to GABBY Hartman re: Lovenox bridge      Anticoagulation Care Providers     Provider  Role Specialty Phone number    Eliz Hartman NP [facility] [phone]            See the Encounter Report to view Anticoagulation Flowsheet and Dosing Calendar (Go to Encounters tab in chart review, and find the Anticoagulation Therapy Visit)        Manju Escalera RN

## 2018-05-29 NOTE — MR AVS SNAPSHOT
Cassy CHIU Austin   5/29/2018   Anticoagulation Therapy Visit    Description:  62 year old female   Provider:  Eliz Hartman NP   Department:  [facility] Anti Coagulation           INR as of 5/29/2018     Today's INR 2.3      Anticoagulation Summary as of 5/29/2018     INR goal 2.0-3.0   Today's INR 2.3   Full warfarin instructions 6/6: Hold; 6/7: Hold; 6/8: Hold; 6/9: Hold; 6/10: Hold; Otherwise 7.5 mg on Mon, Wed, Fri; 5 mg all other days   Next INR check 6/11/2018    Indications   Long-term (current) use of anticoagulants [Z79.01] [Z79.01]  Pulmonary embolism and infarction (H) [I26.99]  Deep vein thrombosis (DVT) (H) [I82.409] [I82.409]         May 2018 Details    Sun Mon Tue Wed Thu Fri Sat       1               2               3               4               5                 6               7               8               9               10               11               12                 13               14               15               16               17               18               19                 20               21               22               23               24               25               26                 27               28               29      5 mg   See details      30      7.5 mg         31      5 mg            Date Details   05/29 This INR check               How to take your warfarin dose     To take:  5 mg Take 1 of the 5 mg tablets.    To take:  7.5 mg Take 1.5 of the 5 mg tablets.           June 2018 Details    Sun Mon Tue Wed Thu Fri Sat          1      7.5 mg         2      5 mg           3      5 mg         4      7.5 mg         5      5 mg         6      Hold         7      Hold         8      Hold         9      Hold           10      Hold         11            12               13               14               15               16                 17               18               19               20               21               22               23                 24                25               26               27               28               29               30                Date Details   No additional details    Date of next INR:  6/11/2018         How to take your warfarin dose     To take:  5 mg Take 1 of the 5 mg tablets.    To take:  7.5 mg Take 1.5 of the 5 mg tablets.    Hold Do not take your warfarin dose. See the Details table to the right for additional instructions.

## 2018-05-30 ENCOUNTER — OFFICE VISIT (OUTPATIENT)
Dept: INTERNAL MEDICINE | Facility: OTHER | Age: 63
End: 2018-05-30
Attending: NURSE PRACTITIONER
Payer: MEDICARE

## 2018-05-30 ENCOUNTER — RADIANT APPOINTMENT (OUTPATIENT)
Dept: GENERAL RADIOLOGY | Facility: OTHER | Age: 63
End: 2018-05-30
Attending: INTERNAL MEDICINE
Payer: MEDICARE

## 2018-05-30 ENCOUNTER — TELEPHONE (OUTPATIENT)
Dept: INTERNAL MEDICINE | Facility: OTHER | Age: 63
End: 2018-05-30

## 2018-05-30 VITALS
HEART RATE: 68 BPM | SYSTOLIC BLOOD PRESSURE: 118 MMHG | TEMPERATURE: 97.6 F | OXYGEN SATURATION: 96 % | HEIGHT: 65 IN | DIASTOLIC BLOOD PRESSURE: 70 MMHG | WEIGHT: 233 LBS | BODY MASS INDEX: 38.82 KG/M2

## 2018-05-30 DIAGNOSIS — E87.6 HYPOKALEMIA: Primary | ICD-10-CM

## 2018-05-30 DIAGNOSIS — E78.5 HYPERLIPIDEMIA LDL GOAL <100: ICD-10-CM

## 2018-05-30 DIAGNOSIS — Z01.818 PRE-OP EXAM: ICD-10-CM

## 2018-05-30 DIAGNOSIS — I26.99 PULMONARY EMBOLISM AND INFARCTION (H): ICD-10-CM

## 2018-05-30 DIAGNOSIS — I10 ESSENTIAL HYPERTENSION: ICD-10-CM

## 2018-05-30 DIAGNOSIS — Z01.818 PREOP GENERAL PHYSICAL EXAM: ICD-10-CM

## 2018-05-30 DIAGNOSIS — Z79.01 LONG-TERM (CURRENT) USE OF ANTICOAGULANTS: ICD-10-CM

## 2018-05-30 DIAGNOSIS — Z86.711 HISTORY OF PULMONARY EMBOLISM: ICD-10-CM

## 2018-05-30 DIAGNOSIS — Z01.818 PRE-OP EXAM: Primary | ICD-10-CM

## 2018-05-30 LAB
ALBUMIN UR-MCNC: NEGATIVE MG/DL
ANION GAP SERPL CALCULATED.3IONS-SCNC: 10 MMOL/L (ref 3–14)
APPEARANCE UR: CLEAR
BASOPHILS # BLD AUTO: 0.1 10E9/L (ref 0–0.2)
BASOPHILS NFR BLD AUTO: 1.1 %
BILIRUB UR QL STRIP: NEGATIVE
BUN SERPL-MCNC: 11 MG/DL (ref 7–30)
CALCIUM SERPL-MCNC: 8.8 MG/DL (ref 8.5–10.1)
CHLORIDE SERPL-SCNC: 104 MMOL/L (ref 94–109)
CO2 SERPL-SCNC: 24 MMOL/L (ref 20–32)
COLOR UR AUTO: YELLOW
CREAT SERPL-MCNC: 0.86 MG/DL (ref 0.52–1.04)
DIFFERENTIAL METHOD BLD: NORMAL
EOSINOPHIL # BLD AUTO: 0.2 10E9/L (ref 0–0.7)
EOSINOPHIL NFR BLD AUTO: 3 %
ERYTHROCYTE [DISTWIDTH] IN BLOOD BY AUTOMATED COUNT: 14.3 % (ref 10–15)
GFR SERPL CREATININE-BSD FRML MDRD: 67 ML/MIN/1.7M2
GLUCOSE SERPL-MCNC: 124 MG/DL (ref 70–99)
GLUCOSE UR STRIP-MCNC: NEGATIVE MG/DL
HCT VFR BLD AUTO: 41.5 % (ref 35–47)
HGB BLD-MCNC: 14.7 G/DL (ref 11.7–15.7)
HGB UR QL STRIP: ABNORMAL
KETONES UR STRIP-MCNC: NEGATIVE MG/DL
LEUKOCYTE ESTERASE UR QL STRIP: ABNORMAL
LYMPHOCYTES # BLD AUTO: 1.6 10E9/L (ref 0.8–5.3)
LYMPHOCYTES NFR BLD AUTO: 27.6 %
MCH RBC QN AUTO: 30.9 PG (ref 26.5–33)
MCHC RBC AUTO-ENTMCNC: 35.4 G/DL (ref 31.5–36.5)
MCV RBC AUTO: 87 FL (ref 78–100)
MONOCYTES # BLD AUTO: 0.6 10E9/L (ref 0–1.3)
MONOCYTES NFR BLD AUTO: 10 %
NEUTROPHILS # BLD AUTO: 3.3 10E9/L (ref 1.6–8.3)
NEUTROPHILS NFR BLD AUTO: 58.3 %
NITRATE UR QL: NEGATIVE
NON-SQ EPI CELLS #/AREA URNS LPF: ABNORMAL /LPF
PH UR STRIP: 6 PH (ref 5–7)
PLATELET # BLD AUTO: 192 10E9/L (ref 150–450)
POTASSIUM SERPL-SCNC: 3.2 MMOL/L (ref 3.4–5.3)
RBC # BLD AUTO: 4.76 10E12/L (ref 3.8–5.2)
RBC #/AREA URNS AUTO: ABNORMAL /HPF
SODIUM SERPL-SCNC: 138 MMOL/L (ref 133–144)
SOURCE: ABNORMAL
SP GR UR STRIP: 1.01 (ref 1–1.03)
UROBILINOGEN UR STRIP-ACNC: 0.2 EU/DL (ref 0.2–1)
WBC # BLD AUTO: 5.6 10E9/L (ref 4–11)
WBC #/AREA URNS AUTO: ABNORMAL /HPF

## 2018-05-30 PROCEDURE — 93005 ELECTROCARDIOGRAM TRACING: CPT

## 2018-05-30 PROCEDURE — 85025 COMPLETE CBC W/AUTO DIFF WBC: CPT | Mod: ZL | Performed by: INTERNAL MEDICINE

## 2018-05-30 PROCEDURE — 99215 OFFICE O/P EST HI 40 MIN: CPT | Performed by: INTERNAL MEDICINE

## 2018-05-30 PROCEDURE — 81001 URINALYSIS AUTO W/SCOPE: CPT | Mod: ZL | Performed by: INTERNAL MEDICINE

## 2018-05-30 PROCEDURE — 71046 X-RAY EXAM CHEST 2 VIEWS: CPT | Mod: TC,FY

## 2018-05-30 PROCEDURE — G0463 HOSPITAL OUTPT CLINIC VISIT: HCPCS | Mod: 25

## 2018-05-30 PROCEDURE — 36415 COLL VENOUS BLD VENIPUNCTURE: CPT | Mod: ZL | Performed by: INTERNAL MEDICINE

## 2018-05-30 PROCEDURE — 93010 ELECTROCARDIOGRAM REPORT: CPT | Performed by: INTERNAL MEDICINE

## 2018-05-30 PROCEDURE — 80048 BASIC METABOLIC PNL TOTAL CA: CPT | Mod: ZL | Performed by: INTERNAL MEDICINE

## 2018-05-30 PROCEDURE — G0463 HOSPITAL OUTPT CLINIC VISIT: HCPCS

## 2018-05-30 ASSESSMENT — PAIN SCALES - GENERAL: PAINLEVEL: MODERATE PAIN (5)

## 2018-05-30 NOTE — PROGRESS NOTES
Faxed PreOp ECG Labs and Chest Xray to Dr. Grace @ [facility]  Ph. [phone] Fx. [phone]

## 2018-05-30 NOTE — MR AVS SNAPSHOT
After Visit Summary   5/30/2018    Cassy Avila    MRN: [id number]           Patient Information     Date Of Birth          1955        Visit Information        Provider Department      5/30/2018 9:00 AM Facundo Pittman DO [facility]        Today's Diagnoses     Pre-op exam    -  1    Preop general physical exam        History of pulmonary embolism          Care Instructions    Lovenox 1mg/kg SQ BID while coumadin is on hold  Hold coumadin x 5 days prior to surgery     Do not use Lovenox the night prior to surgery or morning of surgery   Take Symbicort and Toprol XL the morning of surgery  Hold fish oil and Asa/Ibu  7 days prior to surgery        Before Your Surgery      Call your surgeon if there is any change in your health. This includes signs of a cold or flu (such as a sore throat, runny nose, cough, rash or fever).    Do not smoke, drink alcohol or take over the counter medicine (unless your surgeon or primary care doctor tells you to) for the 24 hours before and after surgery.    If you take prescribed drugs: Follow your doctor s orders about which medicines to take and which to stop until after surgery.    Eating and drinking prior to surgery: follow the instructions from your surgeon    Take a shower or bath the night before surgery. Use the soap your surgeon gave you to gently clean your skin. If you do not have soap from your surgeon, use your regular soap. Do not shave or scrub the surgery site.  Wear clean pajamas and have clean sheets on your bed.           Follow-ups after your visit        Who to contact     If you have questions or need follow up information about today's clinic visit or your schedule please contact [facility] directly at [phone].  Normal or non-critical lab and imaging results will be communicated to you by MyChart, letter or phone within 4 business days after the clinic has received the results. If you do not hear  "from us within 7 days, please contact the clinic through [employer] or phone. If you have a critical or abnormal lab result, we will notify you by phone as soon as possible.  Submit refill requests through [employer] or call your pharmacy and they will forward the refill request to us. Please allow 3 business days for your refill to be completed.          Additional Information About Your Visit        [employer] Information     [employer] gives you secure access to your electronic health record. If you see a primary care provider, you can also send messages to your care team and make appointments. If you have questions, please call your primary care clinic.  If you do not have a primary care provider, please call [phone] and they will assist you.        Care EveryWhere ID     This is your Care EveryWhere ID. This could be used by other organizations to access your [place] medical records  [id number]        Your Vitals Were     Pulse Temperature Height Pulse Oximetry BMI (Body Mass Index)       68 97.6  F (36.4  C) (Tympanic) 5' 5\" (1.651 m) 96% 38.77 kg/m2        Blood Pressure from Last 3 Encounters:   05/30/18 118/70   04/24/18 126/76   04/03/18 108/64    Weight from Last 3 Encounters:   05/30/18 233 lb (105.7 kg)   04/24/18 230 lb (104.3 kg)   04/03/18 226 lb (102.5 kg)              We Performed the Following     *UA reflex to Microscopic and Culture - [facility]/[place]     Basic metabolic panel     CBC with platelets and differential     EKG 12-lead complete w/read - (Clinic Performed)     Urine Microscopic          Today's Medication Changes          These changes are accurate as of 5/30/18 10:17 AM.  If you have any questions, ask your nurse or doctor.               Start taking these medicines.        Dose/Directions    enoxaparin 150 MG/ML injection   Commonly known as:  LOVENOX   Used for:  History of pulmonary embolism   Started by:  Facundo Pittman DO        Dose:  1 mg/kg   Inject 0.7 mLs (105 mg) " Subcutaneous every 12 hours   Quantity:  9 Syringe   Refills:  0         Stop taking these medicines if you haven't already. Please contact your care team if you have questions.     benzonatate 200 MG capsule   Commonly known as:  TESSALON   Stopped by:  Facundo Pittman, DO                Where to get your medicines      These medications were sent to [facility] [id number] - [address] [place] AT [facility] [address] [place] [address]     Phone:  [phone]     enoxaparin 150 MG/ML injection                Primary Care Provider Office Phone # Fax #    Eliz Hartman, NORAH [phone] [address] [place]  [facility] [address]        Equal Access to Services     SWEETIE PARRISH : Hadii mic baig hadasho Soomaali, waaxda luqadaha, qaybta kaalmada adeegyada, jeanie tobar . So [facility] [phone].    ATENCIÓN: Si habla español, tiene a noriega disposición servicios gratuitos de asistencia lingüística. [facility] [phone].    We comply with applicable federal civil rights laws and [place] laws. We do not discriminate on the basis of race, color, national origin, age, disability, sex, sexual orientation, or gender identity.            Thank you!     Thank you for choosing [facility]  for your care. Our goal is always to provide you with excellent care. Hearing back from our patients is one way we can continue to improve our services. Please take a few minutes to complete the written survey that you may receive in the mail after your visit with us. Thank you!             Your Updated Medication List - Protect others around you: Learn how to safely use, store and throw away your medicines at www.disposemymeds.org.          This list is accurate as of 5/30/18 10:17 AM.  Always use your most recent med list.                   Brand Name Dispense Instructions for use Diagnosis    aspirin 81 MG EC tablet      Take 81  mg by mouth daily HS        budesonide-formoterol 80-4.5 MCG/ACT Inhaler    SYMBICORT    1 Inhaler    Inhale 2 puffs into the lungs 2 times daily    Bronchitis       enoxaparin 150 MG/ML injection    LOVENOX    9 Syringe    Inject 0.7 mLs (105 mg) Subcutaneous every 12 hours    History of pulmonary embolism       escitalopram 20 MG tablet    LEXAPRO    90 tablet    TAKE 1 TABLET BY MOUTH EVERY DAY AND 1 TABLET BY MOUTH EVERY DAY AS NEEDED    Episode of recurrent major depressive disorder, unspecified depression episode severity (H)       hydrochlorothiazide 25 MG tablet    HYDRODIURIL    90 tablet    TAKE 1 TABLET(25 MG) BY MOUTH DAILY    Essential hypertension       levalbuterol 1.25 MG/3ML neb solution    XOPENEX    1650 mL    NEBULIZE 1 VIAL EVERY 4 HOURS AS NEEDED FOR SHORTNESS OF BREATH, DYSPNEA, OR WHEEZING    Bronchitis       losartan 50 MG tablet    COZAAR    180 tablet    TAKE 2 TABLETS BY MOUTH EVERY DAY    Benign essential hypertension       metoprolol succinate 50 MG 24 hr tablet    TOPROL-XL    135 tablet    TAKE 1 AND ONE-HALF TABLETS BY MOUTH EVERY DAY    Essential hypertension       omega 3 1000 MG Caps     90 capsule    Take 3 g by mouth daily        order for DME     1 Device    Nebulizer machine and tubing  DX:  Bronchitis    Bronchitis       STATIN NOT PRESCRIBED (INTENTIONAL)      Please choose reason not prescribed, below    Hyperlipidemia LDL goal <100       VITAMIN D3 PO      Take 3,000 mg by mouth daily AM        warfarin 5 MG tablet    COUMADIN    150 tablet    7.5mg Mon,Wed,Fri and 5mg all other days or as directed by warfarin clinic    Long-term (current) use of anticoagulants

## 2018-05-30 NOTE — TELEPHONE ENCOUNTER
Patient notified of lab results, will recheck potassium in 1-2 weeks order pending   GINA DAVIDSON

## 2018-05-30 NOTE — PATIENT INSTRUCTIONS
Lovenox 1mg/kg SQ BID while coumadin is on hold  Hold coumadin x 5 days prior to surgery     Do not use Lovenox the night prior to surgery or morning of surgery   Take Symbicort and Toprol XL the morning of surgery  Hold fish oil and Asa/Ibu  7 days prior to surgery        Before Your Surgery      Call your surgeon if there is any change in your health. This includes signs of a cold or flu (such as a sore throat, runny nose, cough, rash or fever).    Do not smoke, drink alcohol or take over the counter medicine (unless your surgeon or primary care doctor tells you to) for the 24 hours before and after surgery.    If you take prescribed drugs: Follow your doctor s orders about which medicines to take and which to stop until after surgery.    Eating and drinking prior to surgery: follow the instructions from your surgeon    Take a shower or bath the night before surgery. Use the soap your surgeon gave you to gently clean your skin. If you do not have soap from your surgeon, use your regular soap. Do not shave or scrub the surgery site.  Wear clean pajamas and have clean sheets on your bed.

## 2018-05-30 NOTE — PROGRESS NOTES
[facility]  [address]  [place] [address]  [phone]  Dept: [phone]    PRE-OP EVALUATION:  Today's date: 2018    Cassy Avila (: 1955) presents for pre-operative evaluation assessment as requested by Dr. Grace.  She requires evaluation and anesthesia risk assessment prior to undergoing surgery/procedure for treatment of labral tear right hip  .    Proposed Surgery/ Procedure: labral tear right hip  Date of Surgery/ Procedure: 18  Time of Surgery/ Procedure: RUST  Hospital/Surgical Facility: [facility]  Primary Physician: Eliz Hartman  Type of Anesthesia Anticipated: to be determined    Patient has a Health Care Directive or Living Will:  NO    1. NO - Do you have a history of heart attack, stroke, stent, bypass or surgery on an artery in the head, neck, heart or legs?  2. NO - Do you ever have any pain or discomfort in your chest?  3. NO - Do you have a history of  Heart Failure?  4. YES - ARE YOUR TROUBLED BY SHORTNESS OF BREATH WHEN WALKING ON THE LEVEL, UP A SLIGHT HILL OR AT NIGHT? If walking for an extended period of time  5. NO - Do you currently have a cold, bronchitis or other respiratory infection?  6. NO - Do you have a cough, shortness of breath or wheezing?  7. NO - Do you sometimes get pains in the calves of your legs when you walk?  8. YES - DO YOU OR ANYONE IN YOUR FAMILY HAVE PREVIOUS HISTORY OF BLOOD CLOTS? Self- (right leg) and  (PE)  9. NO - Do you or does anyone in your family have a serious bleeding problem such as prolonged bleeding following surgeries or cuts?  10. NO - Have you ever had problems with anemia or been told to take iron pills?  11. NO - Have you had any abnormal blood loss such as black, tarry or bloody stools, or abnormal vaginal bleeding?  12. NO - Have you ever had a blood transfusion?  13. NO - Have you or any of your relatives ever had problems with anesthesia?  14. NO - Do you have sleep  apnea, excessive snoring or daytime drowsiness?  15. NO - Do you have any prosthetic heart valves?  16. YES - DO YOU HAVE PROSTHETIC JOINTS? Left knee and left shoulder   17. NO - Is there any chance that you may be pregnant?    I was asked to see this patient in consultation regarding pre operative consultation and clearance in the context of a complex PMH>    HPI:     HPI related to upcoming procedure: Right hip labral repair.        HYPERLIPIDEMIA - Patient has a long history of significant Hyperlipidemia requiring medication for treatment with recent good control. Patient reports no problems or side effects with the medication.     HYPERTENSION - Patient has longstanding history of HTN , currently denies any symptoms referable to elevated blood pressure. Specifically denies chest pain, palpitations, dyspnea, orthopnea, PND or peripheral edema. Blood pressure readings have been in normal range. Current medication regimen is as listed below. Patient denies any side effects of medication.                                                                                                                       PE - On chronic coumadin therapy.  Coumadin will be held five days prior to surgery and she will be bridged with 1mg/kg     MEDICAL HISTORY:     Patient Active Problem List    Diagnosis Date Noted     Statin medication not prescribed per physician orders 01/17/2018     Priority: Medium     Family history of colon cancer 01/02/2018     Priority: Medium     Lumbar spondylosis with myelopathy 07/12/2017     Priority: Medium     Degeneration of lumbar or lumbosacral intervertebral disc 07/12/2017     Priority: Medium     Long-term (current) use of anticoagulants [Z79.01] 07/20/2016     Priority: Medium     Deep vein thrombosis (DVT) (H) [I82.409] 07/20/2016     Priority: Medium     Moderate episode of recurrent major depressive disorder (H) 08/08/2014     Priority: Medium     Advanced care planning/counseling  discussion 03/12/2013     Priority: Medium     Pt has information at home , not completed       Neurofibromatosis (H) 08/22/2012     Priority: Medium     Problem list name updated by automated process. Provider to review       Contact dermatitis and other eczema, due to unspecified cause 06/01/2004     Priority: Medium     Pulmonary embolism and infarction (H) 04/11/2003     Priority: Medium     Problem list name updated by automated process. Provider to review       Hyperlipidemia LDL goal <100 07/11/2002     Priority: Medium     Problem list name updated by automated process. Provider to review       Edema 01/18/2002     Priority: Medium     Essential hypertension 02/20/2001     Priority: Medium     Problem list name updated by automated process. 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 Provider to review     Factor V Leiden (H)      Family history of colon cancer 1/2/2018     Gastro-oesophageal reflux disease      Herpes zoster without mention of complication 2003    resolved from problem list     Hyperlipidemia LDL goal <100 7/11/2002     Problem list name updated by automated process. Provider to review     Long term (current) use of anticoagulants 8/20/2003     Major depression 8/8/2014     Mammographic microcalcification 2003    resolved from problem list     Migraine, unspecified, without mention of intractable migraine without mention of status migrainosus 3/5/2001     Moderate episode of recurrent major depressive disorder (H) 8/8/2014     Neurofibromatosis, unspecified(237.70) 8/22/2012     Other  and unspecified hyperlipidemia 2002     Other chronic pain      Other pulmonary embolism and infarction 2003     Statin medication not prescribed per physician orders 2018     Tachycardia, unspecified 2001     Thrombosis of leg      Unspecified essential hypertension 2001     Past Surgical History:   Procedure Laterality Date     ------------OTHER-------------      shoulder replacement; Provider: Karen     ARTHROPLASTY KNEE  2014    Procedure: ARTHROPLASTY KNEE;  Surgeon: Sean Alexander MD;  Location: [facility] OR     ARTHROSCOPY SHOULDER      right, bone spurs      SECTION      x3     CHOLECYSTECTOMY       COLONOSCOPY  02/15/2018    Bud,,polyps     elbow ulnar tunnel release       ELECTROTHERMAL THERAPY INTRADISC  2017    stimulator     ENDOSCOPIC SINUS SURGERY, SEPTOPLASTY, TURBINOPLASTY, MAXILLARY SINUSOTOMY, COMBINED N/A 2015    Procedure: COMBINED ENDOSCOPIC SINUS SURGERY, SEPTOPLASTY, TURBINOPLASTY, MAXILLARY SINUSOTOMY;  Surgeon: Seema Conn MD;  Location: [facility] OR     esophagastroduodenoscopy      with biopsy and endoscopic U/S     EXCISE NEUROMA LOWER EXTREMITY Left 2016    Procedure: EXCISE NEUROMA LOWER EXTREMITY;  Surgeon: Edi Reed MD;  Location: [facility] OR     FUSION LUMBAR ANTERIOR WITH BAK CAGES      L5-S1     ORTHOPEDIC SURGERY  -15    right shoulder     ORTHOPEDIC SURGERY  8/28/15    right knee     pionidal cyst excision       SARAHI/BSO       TRANSPOSITION ULNAR NERVE (ELBOW)       Current Outpatient Prescriptions   Medication Sig Dispense Refill     aspirin 81 MG EC tablet Take 81 mg by mouth daily HS       benzonatate (TESSALON) 200 MG capsule Take 1 capsule (200 mg) by mouth 3 times daily as needed for cough 30 capsule 1     budesonide-formoterol (SYMBICORT) 80-4.5 MCG/ACT Inhaler Inhale 2 puffs into the lungs 2 times daily 1 Inhaler 1     Cholecalciferol (VITAMIN D3 PO) Take 3,000 mg by mouth daily AM        escitalopram (LEXAPRO) 20 MG tablet TAKE 1 TABLET BY MOUTH EVERY DAY AND 1 TABLET BY MOUTH EVERY DAY AS NEEDED 90 tablet 1     hydrochlorothiazide (HYDRODIURIL) 25 MG tablet TAKE 1 TABLET(25 MG) BY MOUTH DAILY 90 tablet 2     levalbuterol (XOPENEX) 1.25 MG/3ML neb solution NEBULIZE 1 VIAL EVERY 4 HOURS AS NEEDED FOR SHORTNESS OF BREATH, DYSPNEA, OR WHEEZING 1650 mL 0     losartan (COZAAR) 50 MG tablet TAKE 2 TABLETS BY MOUTH EVERY  tablet 2     metoprolol (TOPROL-XL) 50 MG 24 hr tablet TAKE 1 AND ONE-HALF TABLETS BY MOUTH EVERY  tablet 2     omega 3 1000 MG CAPS Take 3 g by mouth daily  90 capsule      order for DME Nebulizer machine and tubing    DX:  Bronchitis 1 Device 0     STATIN NOT PRESCRIBED, INTENTIONAL, Please choose reason not prescribed, below       warfarin (COUMADIN) 5 MG tablet 7.5mg Mon,Wed,Fri and 5mg all other days or as directed by warfarin clinic 150 tablet 3     OTC products: None, except as noted above    Allergies   Allergen Reactions     Ace Inhibitors Cough     Amlodipine Besylate Swelling     Norvasc     Amoxicillin      Atorvastatin      myualgia     Cephalexin Monohydrate Hives     Keflex     Erythromycin Base [Kdc:Yellow Dye+Erythromycin+Brilliant Blue Fcf] Nausea and Vomiting     Meloxicam Other (See Comments)     Mobic - confusion, depression     Adhesive Tape Rash     Prochlorperazine Edisylate Swelling and Rash     Compazine     Prochlorperazine Maleate Swelling and Rash      Latex Allergy: NO    Social History   Substance Use Topics     Smoking status: Former Smoker     Packs/day: 0.50     Years: 30.00     Types: Cigarettes, Pipe     Smokeless tobacco: Never Used      Comment: quit in 1999     Alcohol use No     History   Drug Use No       REVIEW OF SYSTEMS:   Constitutional, neuro, ENT, endocrine, pulmonary, cardiac, gastrointestinal, genitourinary, musculoskeletal, integument and psychiatric systems are negative, except as otherwise noted.    EXAM:   /70 (BP  "Location: Right arm, Patient Position: Sitting, Cuff Size: Adult Large)  Pulse 68  Temp 97.6  F (36.4  C) (Tympanic)  Ht 5' 5\" (1.651 m)  Wt 233 lb (105.7 kg)  SpO2 96%  BMI 38.77 kg/m2    GENERAL APPEARANCE: Alert and no distress     EYES: EOMI, PERRL     HENT: ear canals and TM's normal and nose and mouth without ulcers or lesions     NECK: no adenopathy, no asymmetry, masses, or scars and thyroid normal to palpation, no bruits.       RESP: lungs clear to auscultation - no rales, rhonchi or wheezes     CV: regular rates and rhythm, normal S1 S2, no S3 or S4 and no murmur, click or rub     ABDOMEN:  Obese-soft, nontender, no HSM or masses and bowel sounds normal     MS: extremities normal- no gross deformities noted, no evidence of inflammation in joints, FROM in all extremities.     SKIN: no suspicious lesions or rashes     NEURO: No focal deficits noted.       PSYCH: mentation appears normal. and affect normal/bright     LYMPHATICS: No cervical adenopathy    DIAGNOSTICS:   EKG: appears normal, NSR, unchanged from previous tracings    Chest XRay:  XR CHEST 2 VW     HISTORY: 62 years Female ; Pre-op exam     COMPARISON: 2/14/2018     TECHNIQUE: 2 views of the chest were obtained.     FINDINGS: Two views of the chest were obtained. Heart size and  pulmonary vascularity are within normal limits, lungs are clear both  views. No consolidating air space opacities are present.     Leads for a neurostimulator are again seen in the thoracic spinal  canal.         IMPRESSION: Clear chest.     ANGELIA BRANDT MD    Recent Labs   Lab Test 05/29/18 05/01/18 02/14/18   1430   01/17/18   0822   07/12/17   1117   11/05/13   1528   HGB   --    --    --   13.0   --    --    --   13.9   < >   --    PLT   --    --    --   189   --    --    --   192   < >   --    INR  2.3  2.4   < >   --    < >   --    < >  2.8*   < >   --    NA   --    --    --   139   --   139   --   139   < >   --    POTASSIUM   --    --    --   3.5   --  "  3.2*   --   3.6   < >   --    CR   --    --    --   0.94   --   0.91   --   0.91   < >   --    A1C   --    --    --    --    --    --    --    --    --   5.5    < > = values in this interval not displayed.      Results for orders placed or performed in visit on 05/30/18   CBC with platelets and differential   Result Value Ref Range    WBC 5.6 4.0 - 11.0 10e9/L    RBC Count 4.76 3.8 - 5.2 10e12/L    Hemoglobin 14.7 11.7 - 15.7 g/dL    Hematocrit 41.5 35.0 - 47.0 %    MCV 87 78 - 100 fl    MCH 30.9 26.5 - 33.0 pg    MCHC 35.4 31.5 - 36.5 g/dL    RDW 14.3 10.0 - 15.0 %    Platelet Count 192 150 - 450 10e9/L    Diff Method Automated Method     % Neutrophils 58.3 %    % Lymphocytes 27.6 %    % Monocytes 10.0 %    % Eosinophils 3.0 %    % Basophils 1.1 %    Absolute Neutrophil 3.3 1.6 - 8.3 10e9/L    Absolute Lymphocytes 1.6 0.8 - 5.3 10e9/L    Absolute Monocytes 0.6 0.0 - 1.3 10e9/L    Absolute Eosinophils 0.2 0.0 - 0.7 10e9/L    Absolute Basophils 0.1 0.0 - 0.2 10e9/L   Basic metabolic panel   Result Value Ref Range    Sodium 138 133 - 144 mmol/L    Potassium 3.2 (L) 3.4 - 5.3 mmol/L    Chloride 104 94 - 109 mmol/L    Carbon Dioxide 24 20 - 32 mmol/L    Anion Gap 10 3 - 14 mmol/L    Glucose 124 (H) 70 - 99 mg/dL    Urea Nitrogen 11 7 - 30 mg/dL    Creatinine 0.86 0.52 - 1.04 mg/dL    GFR Estimate 67 >60 mL/min/1.7m2    GFR Estimate If Black 81 >60 mL/min/1.7m2    Calcium 8.8 8.5 - 10.1 mg/dL   *UA reflex to Microscopic and Culture - [facility]   Result Value Ref Range    Color Urine Yellow     Appearance Urine Clear     Glucose Urine Negative NEG^Negative mg/dL    Bilirubin Urine Negative NEG^Negative    Ketones Urine Negative NEG^Negative mg/dL    Specific Gravity Urine 1.015 1.003 - 1.035    Blood Urine Trace (A) NEG^Negative    pH Urine 6.0 5.0 - 7.0 pH    Protein Albumin Urine Negative NEG^Negative mg/dL    Urobilinogen Urine 0.2 0.2 - 1.0 EU/dL    Nitrite Urine Negative NEG^Negative    Leukocyte Esterase  Urine Small (A) NEG^Negative    Source Midstream Urine    Urine Microscopic   Result Value Ref Range    WBC Urine 0 - 5 OTO5^0 - 5 /HPF    RBC Urine O - 2 OTO2^O - 2 /HPF    Squamous Epithelial /LPF Urine Moderate (A) FEW^Few /LPF     IMPRESSION:   Reason for surgery/procedure: Right hip labral tear.      The proposed surgical procedure is considered INTERMEDIATE risk.    REVISED CARDIAC RISK INDEX  The patient has the following serious cardiovascular risks for perioperative complications such as (MI, PE, VFib and 3  AV Block):  No serious cardiac risks  INTERPRETATION: 0 risks: Class I (very low risk - 0.4% complication rate)    The patient has the following additional risks for perioperative complications:  No identified additional risks  The 10-year ASCVD risk score (Len MARLEY Jr, et al., 2013) is: 6.2%    Values used to calculate the score:      Age: 62 years      Sex: Female      Is Non- : No      Diabetic: No      Tobacco smoker: No      Systolic Blood Pressure: 118 mmHg      Is BP treated: Yes      HDL Cholesterol: 43 mg/dL      Total Cholesterol: 218 mg/dL      ICD-10-CM    1. Pre-op exam Z01.818 EKG 12-lead complete w/read - (Clinic Performed)     CBC with platelets and differential     *UA reflex to Microscopic and Culture - [facility]     XR CHEST 2 VW (Clinic Performed)   2. Preop general physical exam Z01.818      HYPERLIPIDEMIA - Patient has a long history of significant Hyperlipidemia requiring medication for treatment with recent good control. Patient reports no problems or side effects with the medication.     HYPERTENSION - Patient has longstanding history of HTN , currently denies any symptoms referable to elevated blood pressure. Specifically denies chest pain, palpitations, dyspnea, orthopnea, PND or peripheral edema. Blood pressure readings have been in normal range. Current medication regimen is as listed below. Patient denies any side effects of medication.                                                                                                                        PE - On chronic coumadin therapy.  Coumadin will be held five days prior to surgery and she will be bridged with 1mg/kg     RECOMMENDATIONS:     Patient instructions:  Lovenox 1mg/kg SQ BID while coumadin is on hold  Hold coumadin x 5 days prior to surgery     Do not use Lovenox the night prior to surgery or morning of surgery   Take Symbicort and Toprol XL the morning of surgery  Hold fish oil and Asa/Ibu  7 days prior to surgery      APPROVAL GIVEN to proceed with proposed procedure, without further diagnostic evaluation       Signed Electronically by: Facundo Pittman DO    Copy of this evaluation report is provided to requesting physician.    [facility] Preop Guidelines    Revised Cardiac Risk Index      CC:  Eliz Franco NP

## 2018-05-31 ASSESSMENT — PATIENT HEALTH QUESTIONNAIRE - PHQ9: SUM OF ALL RESPONSES TO PHQ QUESTIONS 1-9: 1

## 2018-05-31 NOTE — TELEPHONE ENCOUNTER
enoxaparin  Last visit: 5/30/18  Last refill: 5/30/18 9 syringe R-0  GABBY Hartman to advise Dr. Pittman out

## 2018-06-11 ENCOUNTER — TRANSFERRED RECORDS (OUTPATIENT)
Dept: HEALTH INFORMATION MANAGEMENT | Facility: CLINIC | Age: 63
End: 2018-06-11

## 2018-06-11 ENCOUNTER — ANTICOAGULATION THERAPY VISIT (OUTPATIENT)
Dept: ANTICOAGULATION | Facility: OTHER | Age: 63
End: 2018-06-11
Payer: MEDICARE

## 2018-06-11 DIAGNOSIS — Z79.01 LONG-TERM (CURRENT) USE OF ANTICOAGULANTS: ICD-10-CM

## 2018-06-11 DIAGNOSIS — I26.99 PULMONARY EMBOLISM AND INFARCTION (H): ICD-10-CM

## 2018-06-11 NOTE — PROGRESS NOTES
ANTICOAGULATION FOLLOW-UP CLINIC VISIT    Patient Name:  Cassy Avila  Date:  6/11/2018  Contact Type:  Telephone    SUBJECTIVE:     Patient Findings     Positives Intentional hold of therapy    Comments States had her labral hip repair today laproscopically. She states that she saw Dr. Pittman for preop and was bridged with q12h lovenox on Thurs, Fri, and Sat Morning and had no lovenox Saturday evening or Sunday.and no bridging post procedure. I gave her warfarin dose for tonight and she will check her INR tomorrow morning           OBJECTIVE    INR   Date Value Ref Range Status   05/29/2018 2.3  Final       ASSESSMENT / PLAN  No question data found.  Anticoagulation Summary as of 6/11/2018     INR goal 2.0-3.0   Today's INR No new INR was available at the time of this encounter.   Warfarin maintenance plan 7.5 mg (5 mg x 1.5) on Mon, Wed, Fri; 5 mg (5 mg x 1) all other days   Full warfarin instructions 7.5 mg on Mon, Wed, Fri; 5 mg all other days   Weekly warfarin total 42.5 mg   No change documented Manju Escalera, RN   Plan last modified Iram Lord, RN (7/20/2016)   Next INR check 6/12/2018   Priority INR   Target end date Indefinite    Indications   Long-term (current) use of anticoagulants [Z79.01] [Z79.01]  Pulmonary embolism and infarction (H) [I26.99]  Deep vein thrombosis (DVT) (H) [I82.409] [I82.409]         Anticoagulation Episode Summary     INR check location Home Draw    Preferred lab     Send INR reminders to  ANTICOAG POOL    Comments PST - [facility].  Arthroscopic hip surgery on 6/11/18.  Hold warfarin x 5 days (INR 1.5 or lower). Note to GABBY Hartman re: Lovenox bridge      Anticoagulation Care Providers     Provider Role Specialty Phone number    Eliz Hartman NP [facility] [phone]            See the Encounter Report to view Anticoagulation Flowsheet and Dosing Calendar (Go to Encounters tab in chart review, and find the Anticoagulation Therapy  Visit)        Manju Escalera RN

## 2018-06-11 NOTE — MR AVS SNAPSHOT
Cassy Laceyjared   6/11/2018   Anticoagulation Therapy Visit    Description:  62 year old female   Provider:  Eliz Hartman NP   Department:  [facility]           INR as of 6/11/2018     Today's INR No new INR was available at the time of this encounter.      Anticoagulation Summary as of 6/11/2018     INR goal 2.0-3.0   Today's INR No new INR was available at the time of this encounter.   Full warfarin instructions 7.5 mg on Mon, Wed, Fri; 5 mg all other days   Next INR check 6/12/2018    Indications   Long-term (current) use of anticoagulants [Z79.01] [Z79.01]  Pulmonary embolism and infarction (H) [I26.99]  Deep vein thrombosis (DVT) (H) [I82.409] [I82.409]         June 2018 Details    Sun Mon Tue Wed Thu Fri Sat          1               2                 3               4               5               6               7               8               9                 10               11      7.5 mg   See details      12            13               14               15               16                 17               18               19               20               21               22               23                 24               25               26               27               28               29               30                Date Details   06/11 This INR check       Date of next INR:  6/12/2018         How to take your warfarin dose     To take:  5 mg Take 1 of the 5 mg tablets.    To take:  7.5 mg Take 1.5 of the 5 mg tablets.

## 2018-06-12 ENCOUNTER — TRANSFERRED RECORDS (OUTPATIENT)
Dept: HEALTH INFORMATION MANAGEMENT | Facility: CLINIC | Age: 63
End: 2018-06-12

## 2018-06-12 ENCOUNTER — ANTICOAGULATION THERAPY VISIT (OUTPATIENT)
Dept: ANTICOAGULATION | Facility: OTHER | Age: 63
End: 2018-06-12
Payer: MEDICARE

## 2018-06-12 DIAGNOSIS — Z79.01 LONG-TERM (CURRENT) USE OF ANTICOAGULANTS: ICD-10-CM

## 2018-06-12 DIAGNOSIS — Z86.711 HISTORY OF PULMONARY EMBOLISM: ICD-10-CM

## 2018-06-12 DIAGNOSIS — I26.99 PULMONARY EMBOLISM AND INFARCTION (H): ICD-10-CM

## 2018-06-12 DIAGNOSIS — I82.409 DEEP VEIN THROMBOSIS (DVT) (H): ICD-10-CM

## 2018-06-12 LAB — INR PPP: 1

## 2018-06-12 NOTE — MR AVS SNAPSHOT
Cassy CHIU Austin   6/12/2018   Anticoagulation Therapy Visit    Description:  62 year old female   Provider:  Eliz Hartman NP   Department:  [facility]           INR as of 6/12/2018     Today's INR 1.0!      Anticoagulation Summary as of 6/12/2018     INR goal 2.0-3.0   Today's INR 1.0!   Full warfarin instructions 6/12: 10 mg; 6/13: 10 mg; 6/14: 7.5 mg; Otherwise 7.5 mg on Mon, Wed, Fri; 5 mg all other days   Next INR check 6/15/2018    Indications   Long-term (current) use of anticoagulants [Z79.01] [Z79.01]  Pulmonary embolism and infarction (H) [I26.99]  Deep vein thrombosis (DVT) (H) [I82.409] [I82.409]         June 2018 Details    Sun Mon Tue Wed Thu Fri Sat          1               2                 3               4               5               6               7               8               9                 10               11               12      10 mg   See details      13      10 mg   See details      14      7.5 mg   See details      15      See details      16                 17               18               19               20               21               22               23                 24               25               26               27               28               29               30                Date Details   06/12 This INR check   Take 10 mg (5 mg tablets x 2)   Lovenox PM      06/13 Take 10 mg (5 mg tablets x 2)   Lovenox AM and PM      06/14 Take 7.5 mg (5 mg tablets x 1.5)   Lovenox AM and PM      06/15 Lovenox AM       Date of next INR:  6/15/2018         How to take your warfarin dose     To take:  7.5 mg Take 1.5 of the 5 mg tablets.    To take:  10 mg Take 2 of the 5 mg tablets.

## 2018-06-12 NOTE — PROGRESS NOTES
ANTICOAGULATION FOLLOW-UP CLINIC VISIT    Patient Name:  Cassy Avila  Date:  6/12/2018  Contact Type:  Telephone    SUBJECTIVE:     Patient Findings     Positives Change in medications (Oxycodone 5mg 1-2 tabs po q 4 hrs prn), Dental/Other procedures (laproscopic hip surgery 6.11.18)           OBJECTIVE    INR   Date Value Ref Range Status   06/12/2018 1.0  Final       ASSESSMENT / PLAN  INR assessment SUB    Recheck INR In: 3 DAYS    INR Location Home INR      Anticoagulation Summary as of 6/12/2018     INR goal 2.0-3.0   Today's INR 1.0!   Warfarin maintenance plan 7.5 mg (5 mg x 1.5) on Mon, Wed, Fri; 5 mg (5 mg x 1) all other days   Full warfarin instructions 6/12: 10 mg; 6/13: 10 mg; 6/14: 7.5 mg; Otherwise 7.5 mg on Mon, Wed, Fri; 5 mg all other days   Weekly warfarin total 42.5 mg   Plan last modified Iram Lord RN (7/20/2016)   Next INR check 6/15/2018   Priority INR   Target end date Indefinite    Indications   Long-term (current) use of anticoagulants [Z79.01] [Z79.01]  Pulmonary embolism and infarction (H) [I26.99]  Deep vein thrombosis (DVT) (H) [I82.409] [I82.409]         Anticoagulation Episode Summary     INR check location Home Draw    Preferred lab     Send INR reminders to [facility] ANTICOAG POOL    Comments PST - [facility].  Arthroscopic hip surgery on 6/11/18.  Hold warfarin x 5 days (INR 1.5 or lower). Note to GABBY Hartman re: Lovenox bridge      Anticoagulation Care Providers     Provider Role Specialty Phone number    Eliz Hartman, [facility] [phone]            See the Encounter Report to view Anticoagulation Flowsheet and Dosing Calendar (Go to Encounters tab in chart review, and find the Anticoagulation Therapy Visit)    INR result, Warfarin dosing, and INR recheck date discussed via telephone this date with pt.    GABY SOLIS RN

## 2018-06-15 ENCOUNTER — ANTICOAGULATION THERAPY VISIT (OUTPATIENT)
Dept: ANTICOAGULATION | Facility: OTHER | Age: 63
End: 2018-06-15
Payer: MEDICARE

## 2018-06-15 ENCOUNTER — TRANSFERRED RECORDS (OUTPATIENT)
Dept: HEALTH INFORMATION MANAGEMENT | Facility: CLINIC | Age: 63
End: 2018-06-15

## 2018-06-15 DIAGNOSIS — I26.99 PULMONARY EMBOLISM AND INFARCTION (H): ICD-10-CM

## 2018-06-15 DIAGNOSIS — Z79.01 LONG-TERM (CURRENT) USE OF ANTICOAGULANTS: ICD-10-CM

## 2018-06-15 LAB — INR PPP: 1.8

## 2018-06-15 NOTE — PROGRESS NOTES
ANTICOAGULATION FOLLOW-UP CLINIC VISIT    Patient Name:  Cassy Avila  Date:  6/15/2018  Contact Type:  Telephone    SUBJECTIVE:     Patient Findings     Positives No Problem Findings    Comments Call from patient and message left by patient. Call returned to patient and spoke to her re: PSt result, warfarin dosing and PSt recheck date. She can discontinue lovenox at this time.  She is up and walking and having no issues. She verbalized understanding of instruction and has no questions           OBJECTIVE    INR   Date Value Ref Range Status   06/15/2018 1.8  Final       ASSESSMENT / PLAN  INR assessment SUB intentional hold for surgery 6/11   Recheck INR In: 1 WEEK    INR Location Home INR      Anticoagulation Summary as of 6/15/2018     INR goal 2.0-3.0   Today's INR 1.8!   Warfarin maintenance plan 7.5 mg (5 mg x 1.5) on Mon, Wed, Fri; 5 mg (5 mg x 1) all other days   Full warfarin instructions 6/15: 10 mg; Otherwise 7.5 mg on Mon, Wed, Fri; 5 mg all other days   Weekly warfarin total 42.5 mg   Plan last modified Iram Lord RN (7/20/2016)   Next INR check 6/22/2018   Priority INR   Target end date Indefinite    Indications   Long-term (current) use of anticoagulants [Z79.01] [Z79.01]  Pulmonary embolism and infarction (H) [I26.99]  Deep vein thrombosis (DVT) (H) [I82.409] [I82.409]         Anticoagulation Episode Summary     INR check location Home Draw    Preferred lab     Send INR reminders to [facility] POOL    Comments PST - [facility].        Anticoagulation Care Providers     Provider Role Specialty Phone number    Eliz Hartman NP [facility] [phone]            See the Encounter Report to view Anticoagulation Flowsheet and Dosing Calendar (Go to Encounters tab in chart review, and find the Anticoagulation Therapy Visit)        Manju Escalera, RN

## 2018-06-15 NOTE — MR AVS SNAPSHOT
Cassy Shawkevon   6/15/2018   Anticoagulation Therapy Visit    Description:  62 year old female   Provider:  Eliz Hartman NP   Department:  [facility] Anti Coagulation           INR as of 6/15/2018     Today's INR 1.8!      Anticoagulation Summary as of 6/15/2018     INR goal 2.0-3.0   Today's INR 1.8!   Full warfarin instructions 6/15: 10 mg; Otherwise 7.5 mg on Mon, Wed, Fri; 5 mg all other days   Next INR check 6/22/2018    Indications   Long-term (current) use of anticoagulants [Z79.01] [Z79.01]  Pulmonary embolism and infarction (H) [I26.99]  Deep vein thrombosis (DVT) (H) [I82.409] [I82.409]         June 2018 Details    Sun Mon Tue Wed Thu Fri Sat          1               2                 3               4               5               6               7               8               9                 10               11               12               13               14               15      10 mg   See details      16      5 mg           17      5 mg         18      7.5 mg         19      5 mg         20      7.5 mg         21      5 mg         22            23                 24               25               26               27               28               29               30                Date Details   06/15 This INR check   Take 10 mg (5 mg tablets x 2)   Lovenox AM       Date of next INR:  6/22/2018         How to take your warfarin dose     To take:  5 mg Take 1 of the 5 mg tablets.    To take:  7.5 mg Take 1.5 of the 5 mg tablets.    To take:  10 mg Take 2 of the 5 mg tablets.

## 2018-06-20 ENCOUNTER — TRANSFERRED RECORDS (OUTPATIENT)
Dept: HEALTH INFORMATION MANAGEMENT | Facility: CLINIC | Age: 63
End: 2018-06-20

## 2018-06-21 DIAGNOSIS — I10 BENIGN ESSENTIAL HYPERTENSION: ICD-10-CM

## 2018-06-21 RX ORDER — LOSARTAN POTASSIUM 50 MG/1
TABLET ORAL
Qty: 180 TABLET | Refills: 0 | Status: SHIPPED | OUTPATIENT
Start: 2018-06-21 | End: 2018-10-08

## 2018-06-21 NOTE — TELEPHONE ENCOUNTER
Angiotensin-II Receptors Failed  losartan (COZAAR) 50 MG tablet [Pharmacy Med Name: LOSARTAN 50MG TABLETS]       Rerun Protocol (6/21/2018 8:25 AM)        Normal serum potassium on file in past 12 months           Recent Labs   Lab Test  05/30/18   0946   POTASSIUM  3.2*            losartan (COZAAR) 50 MG      Last refill date 10/3/17 #180 R-2     Last office visit 5/30/18         pcp out routed to GIULIA  to advise. Do you want K+ lab? Thank you

## 2018-06-22 ENCOUNTER — ANTICOAGULATION THERAPY VISIT (OUTPATIENT)
Dept: ANTICOAGULATION | Facility: OTHER | Age: 63
End: 2018-06-22
Payer: MEDICARE

## 2018-06-22 DIAGNOSIS — Z79.01 LONG-TERM (CURRENT) USE OF ANTICOAGULANTS: ICD-10-CM

## 2018-06-22 DIAGNOSIS — I26.99 PULMONARY EMBOLISM AND INFARCTION (H): ICD-10-CM

## 2018-06-22 LAB — INR PPP: 2.3

## 2018-06-22 NOTE — PROGRESS NOTES
ANTICOAGULATION FOLLOW-UP CLINIC VISIT    Patient Name:  Cassy Avila  Date:  6/22/2018  Contact Type:  Telephone    SUBJECTIVE:     Patient Findings     Comments PST done by patient and she called result to warfarin clinic. She states no bleeding/bruising and no new changes in diet/meds/activity. We discussed PSt result, warfarin dosing and PSt recheck date. She verbalized understanding and had no questions           OBJECTIVE    INR   Date Value Ref Range Status   06/22/2018 2.3  Final       ASSESSMENT / PLAN  INR assessment THER    Recheck INR In: 3 WEEKS    INR Location Home INR      Anticoagulation Summary as of 6/22/2018     INR goal 2.0-3.0   Today's INR 2.3   Warfarin maintenance plan 7.5 mg (5 mg x 1.5) on Mon, Wed, Fri; 5 mg (5 mg x 1) all other days   Full warfarin instructions 7.5 mg on Mon, Wed, Fri; 5 mg all other days   Weekly warfarin total 42.5 mg   No change documented Manju Escalera RN   Plan last modified Iram Lord RN (7/20/2016)   Next INR check 7/16/2018   Priority INR   Target end date Indefinite    Indications   Long-term (current) use of anticoagulants [Z79.01] [Z79.01]  Pulmonary embolism and infarction (H) [I26.99]  Deep vein thrombosis (DVT) (H) [I82.409] [I82.409]         Anticoagulation Episode Summary     INR check location Home Draw    Preferred lab     Send INR reminders to [facility]TICOAG POOL    Comments PST - [facility].        Anticoagulation Care Providers     Provider Role Specialty Phone number    Eliz Hartman NP [facility] [phone]            See the Encounter Report to view Anticoagulation Flowsheet and Dosing Calendar (Go to Encounters tab in chart review, and find the Anticoagulation Therapy Visit)        Manju Escalera, RN

## 2018-06-22 NOTE — MR AVS SNAPSHOT
Cassy CHIU [place]   6/22/2018   Anticoagulation Therapy Visit    Description:  62 year old female   Provider:  Eliz Hartman NP   Department:  [facility] Anti Coagulation           INR as of 6/22/2018     Today's INR 2.3      Anticoagulation Summary as of 6/22/2018     INR goal 2.0-3.0   Today's INR 2.3   Full warfarin instructions 7.5 mg on Mon, Wed, Fri; 5 mg all other days   Next INR check 7/16/2018    Indications   Long-term (current) use of anticoagulants [Z79.01] [Z79.01]  Pulmonary embolism and infarction (H) [I26.99]  Deep vein thrombosis (DVT) (H) [I82.409] [I82.409]         June 2018 Details    Sun Mon Tue Wed Thu Fri Sat          1               2                 3               4               5               6               7               8               9                 10               11               12               13               14               15               16                 17               18               19               20               21               22      7.5 mg   See details      23      5 mg           24      5 mg         25      7.5 mg         26      5 mg         27      7.5 mg         28      5 mg         29      7.5 mg         30      5 mg          Date Details   06/22 This INR check               How to take your warfarin dose     To take:  5 mg Take 1 of the 5 mg tablets.    To take:  7.5 mg Take 1.5 of the 5 mg tablets.           July 2018 Details    Sun Mon Tue Wed Thu Fri Sat     1      5 mg         2      7.5 mg         3      5 mg         4      7.5 mg         5      5 mg         6      7.5 mg         7      5 mg           8      5 mg         9      7.5 mg         10      5 mg         11      7.5 mg         12      5 mg         13      7.5 mg         14      5 mg           15      5 mg         16            17               18               19               20               21                 22               23               24               25               26                27               28                 29               30               31                    Date Details   No additional details    Date of next INR:  7/16/2018         How to take your warfarin dose     To take:  5 mg Take 1 of the 5 mg tablets.    To take:  7.5 mg Take 1.5 of the 5 mg tablets.

## 2018-06-29 ENCOUNTER — HOSPITAL ENCOUNTER (OUTPATIENT)
Dept: PHYSICAL THERAPY | Facility: OTHER | Age: 63
Discharge: HOME | End: 2018-06-29
Attending: ORTHOPAEDIC SURGERY | Admitting: ORTHOPAEDIC SURGERY
Payer: MEDICARE

## 2018-06-29 PROCEDURE — 97110 THERAPEUTIC EXERCISES: CPT | Mod: GP

## 2018-06-29 PROCEDURE — G8982 BODY POS GOAL STATUS: HCPCS | Mod: GP,CI

## 2018-06-29 PROCEDURE — 97161 PT EVAL LOW COMPLEX 20 MIN: CPT | Mod: GP

## 2018-06-29 PROCEDURE — G8981 BODY POS CURRENT STATUS: HCPCS | Mod: GP,CJ

## 2018-06-29 NOTE — PROGRESS NOTES
06/29/18 0954   General Information   Type of Visit Initial OP Ortho PT Evaluation   Start of Care Date 06/29/18   Referring Physician Dr. Grace, Minh Pisano PA-C   Patient/Family Goals Statement To be able to ride her recumbent trike   Orders Evaluate and Treat   Orders Comment Range of motion, stretching, core/hip strengthening, modalities as needed   Date of Order 06/20/18   Insurance Type Medicare   Medical Diagnosis Pain R hip. s/p correction of CAM deformity, labral debridement   Surgical/Medical history reviewed Yes   Precautions/Limitations no known precautions/limitations   Weight-Bearing Status - RLE weight-bearing as tolerated   General Information Comments Past surgical history-see patient chart (of significance is left total knee, back surgery with a implanted stimulator, left shoulder TSA (   Body Part(s)   Body Part(s) Hip   Presentation and Etiology   Pertinent history of current problem (include personal factors and/or comorbidities that impact the POC) Patient reports having right hip surgery on 611/1 8 by Dr. Grace.  Her surgery was performed at the [facility].  She was seen in follow-up on 6/20/18 by physician assistant.  Stitches were removed at that visit and she is now referred to physical therapy.  She will be following up with orthopedics in mid July-she was unsure of the exact date.  She reports no specific restrictions other than no riding her recumbent trike yet.   Impairments A. Pain;D. Decreased ROM;E. Decreased flexibility;H. Impaired gait   Functional Limitations perform activities of daily living;perform desired leisure / sports activities   Symptom Location General right lateral hip/thigh   Onset date of current episode/exacerbation 06/11/18   Chronicity New   Pain rating (0-10 point scale) Best (/10);Worst (/10)   Best (/10) 3   Worst (/10) 7   Pain quality A. Sharp;C. Aching;F. Stabbing   Frequency of pain/symptoms A. Constant   Pain/symptoms are: The same all the time    Pain/symptoms exacerbated by B. Walking;C. Lifting;D. Carrying;I. Bending;K. Home tasks;M. Other   Pain exacerbation comment Sleeping, donning doffing shoes and socks, prolonged standing   Pain/symptoms eased by C. Rest   Progression of symptoms since onset: Improved   Current Level of Function   Patient role/employment history F. Retired   Living environment House[facility]   Fall Risk Screen   Fall screen completed by PT   Have you fallen 2 or more times in the past year? No   Have you fallen and had an injury in the past year? No   Is patient a fall risk? No   Hip Objective Findings   Side (if bilateral, select both right and left) Right   Observation No acute distress   Integumentary  Portal sites are healed   Gait/Locomotion She ambulates into department without an assistive device   Palpation Tenderness right hip lateral thigh   Right Hip Flexion PROM 0-90    Right Hip Abduction PROM 0-40    Right Hip Adduction PROM Within normal limits   Right Hip Ext PROM To neutral   Planned Therapy Interventions   Planned Therapy Interventions balance training;joint mobilization;manual therapy;gait training;ROM;strengthening;stretching   Planned Modality Interventions   Planned Modality Interventions Cryotherapy;Electrical stimulation;Ultrasound   Clinical Impression   Criteria for Skilled Therapeutic Interventions Met yes, treatment indicated   PT Diagnosis Right hip pain, status post correction of the CAM deformity, labral debridement   Influenced by the following impairments Pain, decreased range of motion, decreased strength, difficulty walking   Functional limitations due to impairments Walking, standing, lifting, carrying, bending, household tasks, ADLs, riding a recumbent bike   Clinical Presentation Stable/Uncomplicated   Clinical Presentation Rationale Clinical judgment, pain and mobility slowly improving   Clinical Decision Making (Complexity) Low complexity   Therapy Frequency 2 times/Week   Predicted Duration  of Therapy Intervention (days/wks) Up to 6 weeks   Risk & Benefits of therapy have been explained Yes   Patient, Family & other staff in agreement with plan of care Yes   Clinical Impression Comments Status post right hip surgery with decreased mobility and strength   Education Assessment   Barriers to Learning No barriers   ORTHO GOALS   PT Ortho Eval Goals 1;2;3   Ortho Goal 1   Goal Identifier ST G1   Goal Description Functional range of motion without increased pain right hip   Target Date 07/13/18   Ortho Goal 2   Goal Identifier LTG 1   Goal Description She will be able to sleep without waking due to hip pain 6-8+ hours   Target Date 08/10/18   Ortho Goal 3   Goal Identifier LTG 2   Goal Description She will be able to demonstrate independence with home exercise program   Target Date 08/10/18   Total Evaluation Time   Total Evaluation Time 25   Therapy Certification   Certification date from 06/29/18   Certification date to 09/29/18   Medical Diagnosis Right hip pain, status post correction of CAM deformity, labral debridement       I certify the need for these services furnished under this plan of treatment and while under my care. (Physician co-signature of this document indicates review and certification of the therapy plan).      _____________________________     __________________________    ____________  Physician's Signature                 Date               Time

## 2018-07-10 ENCOUNTER — TELEPHONE (OUTPATIENT)
Dept: FAMILY MEDICINE | Facility: OTHER | Age: 63
End: 2018-07-10

## 2018-07-10 NOTE — TELEPHONE ENCOUNTER
"Pt calls, reports she having wt gain, constipation and feels a \"lump in her throat when swallows\".  Last TSH was 1-2018, high end of normal. Lab or appt???  "

## 2018-07-12 ENCOUNTER — HOSPITAL ENCOUNTER (OUTPATIENT)
Dept: PHYSICAL THERAPY | Facility: OTHER | Age: 63
Discharge: HOME | End: 2018-07-12
Attending: PHYSICAL THERAPIST | Admitting: PHYSICAL THERAPIST
Payer: MEDICARE

## 2018-07-12 PROCEDURE — 97110 THERAPEUTIC EXERCISES: CPT | Mod: GP | Performed by: PHYSICAL THERAPIST

## 2018-07-13 ENCOUNTER — OFFICE VISIT (OUTPATIENT)
Dept: FAMILY MEDICINE | Facility: OTHER | Age: 63
End: 2018-07-13
Attending: NURSE PRACTITIONER
Payer: MEDICARE

## 2018-07-13 VITALS
SYSTOLIC BLOOD PRESSURE: 110 MMHG | BODY MASS INDEX: 37.49 KG/M2 | HEART RATE: 71 BPM | TEMPERATURE: 97.2 F | DIASTOLIC BLOOD PRESSURE: 70 MMHG | OXYGEN SATURATION: 98 % | WEIGHT: 225 LBS | HEIGHT: 65 IN

## 2018-07-13 DIAGNOSIS — Z86.39 HISTORY OF LOW POTASSIUM: ICD-10-CM

## 2018-07-13 DIAGNOSIS — K59.09 OTHER CONSTIPATION: ICD-10-CM

## 2018-07-13 DIAGNOSIS — E55.9 VITAMIN D DEFICIENCY: ICD-10-CM

## 2018-07-13 DIAGNOSIS — R63.5 WEIGHT GAIN: ICD-10-CM

## 2018-07-13 DIAGNOSIS — F33.1 MODERATE EPISODE OF RECURRENT MAJOR DEPRESSIVE DISORDER (H): ICD-10-CM

## 2018-07-13 DIAGNOSIS — R53.83 FATIGUE, UNSPECIFIED TYPE: Primary | ICD-10-CM

## 2018-07-13 LAB
ANION GAP SERPL CALCULATED.3IONS-SCNC: 11 MMOL/L (ref 3–14)
BUN SERPL-MCNC: 13 MG/DL (ref 7–30)
CALCIUM SERPL-MCNC: 8.6 MG/DL (ref 8.5–10.1)
CHLORIDE SERPL-SCNC: 103 MMOL/L (ref 94–109)
CO2 SERPL-SCNC: 26 MMOL/L (ref 20–32)
CREAT SERPL-MCNC: 0.93 MG/DL (ref 0.52–1.04)
GFR SERPL CREATININE-BSD FRML MDRD: 61 ML/MIN/1.7M2
GLUCOSE SERPL-MCNC: 98 MG/DL (ref 70–99)
POTASSIUM SERPL-SCNC: 3.3 MMOL/L (ref 3.4–5.3)
SODIUM SERPL-SCNC: 140 MMOL/L (ref 133–144)
TSH SERPL DL<=0.005 MIU/L-ACNC: 2.51 MU/L (ref 0.4–4)

## 2018-07-13 PROCEDURE — G0463 HOSPITAL OUTPT CLINIC VISIT: HCPCS

## 2018-07-13 PROCEDURE — 80048 BASIC METABOLIC PNL TOTAL CA: CPT | Mod: ZL | Performed by: NURSE PRACTITIONER

## 2018-07-13 PROCEDURE — 84443 ASSAY THYROID STIM HORMONE: CPT | Mod: ZL | Performed by: NURSE PRACTITIONER

## 2018-07-13 PROCEDURE — 36415 COLL VENOUS BLD VENIPUNCTURE: CPT | Mod: ZL | Performed by: NURSE PRACTITIONER

## 2018-07-13 PROCEDURE — 99214 OFFICE O/P EST MOD 30 MIN: CPT | Performed by: NURSE PRACTITIONER

## 2018-07-13 PROCEDURE — 82306 VITAMIN D 25 HYDROXY: CPT | Mod: ZL | Performed by: NURSE PRACTITIONER

## 2018-07-13 ASSESSMENT — ANXIETY QUESTIONNAIRES
4. TROUBLE RELAXING: SEVERAL DAYS
7. FEELING AFRAID AS IF SOMETHING AWFUL MIGHT HAPPEN: NOT AT ALL
5. BEING SO RESTLESS THAT IT IS HARD TO SIT STILL: NOT AT ALL
2. NOT BEING ABLE TO STOP OR CONTROL WORRYING: NOT AT ALL
3. WORRYING TOO MUCH ABOUT DIFFERENT THINGS: NOT AT ALL
IF YOU CHECKED OFF ANY PROBLEMS ON THIS QUESTIONNAIRE, HOW DIFFICULT HAVE THESE PROBLEMS MADE IT FOR YOU TO DO YOUR WORK, TAKE CARE OF THINGS AT HOME, OR GET ALONG WITH OTHER PEOPLE: NOT DIFFICULT AT ALL
GAD7 TOTAL SCORE: 2
1. FEELING NERVOUS, ANXIOUS, OR ON EDGE: NOT AT ALL
6. BECOMING EASILY ANNOYED OR IRRITABLE: SEVERAL DAYS

## 2018-07-13 ASSESSMENT — PAIN SCALES - GENERAL: PAINLEVEL: NO PAIN (0)

## 2018-07-13 NOTE — NURSING NOTE
"Chief Complaint   Patient presents with     Weight Problem     Constipation     Fatigue       Initial /70  Pulse 71  Temp 97.2  F (36.2  C)  Ht 5' 5\" (1.651 m)  Wt 225 lb (102.1 kg)  SpO2 98%  BMI 37.44 kg/m2 Estimated body mass index is 37.44 kg/(m^2) as calculated from the following:    Height as of this encounter: 5' 5\" (1.651 m).    Weight as of this encounter: 225 lb (102.1 kg).  Medication Reconciliation: complete    SALEEM Irwin    "

## 2018-07-13 NOTE — PROGRESS NOTES
SUBJECTIVE:   Cassy Avila is a 62 year old female who presents to clinic today for the following health issues:      Constipation    Duration: 2 weeks    Description:       Frequency of bowel movements: One every 4th day       Consistency of stool: Solid    Intensity:  moderate    Accompanying signs and symptoms: Bellyache,cramping,can;t pass gas,bloating       Abdominal pain: no        Rectal pain: no        Blood in stool: no        Nausea/vomitting: no     History:        Similar problems in past: YES    Precipitating or alleviating factors: None       Medications worsening symptoms: no     Therapies tried and outcome: Fiber tablets and drinks warm water       Chronic laxative use: no       Weight gain - off and on    Duration: Few months    Description (location/character/radiation): stomach,hips    Intensity:  0/10    Accompanying signs and symptoms: Clothes not fitting ,heavier,can see it    History (similar episodes/previous evaluation): None    Precipitating or alleviating factors: None    Therapies tried and outcome: herbal thyroid med ,no change         Fatigue    Duration: A month    Description (location/character/radiation): N/A    Intensity:  severe    Accompanying signs and symptoms: Can';t stay awake,fatigue    History (similar episodes/previous evaluation): Many years ago    Precipitating or alleviating factors: None    Therapies tried and outcome: None       Hypertension Follow-up    Outpatient blood pressures are not being checked.    Low Salt Diet: no added salt      Depression Followup    Status since last visit: Stable     See PHQ-9 for current symptoms.  Other associated symptoms: None    Complicating factors:   Significant life event:  No   Current substance abuse:  None  Anxiety or Panic symptoms:  No        PHQ-9 SCORE 5/30/2018 7/13/2018 7/13/2018   Total Score - - -   Total Score 1 11 9       Wanted to Re-do PHQ,9  She felt she checked the wrong boxes      PHQ-9  English  PHQ-9    Any Language  Suicide Assessment Five-step Evaluation and Treatment (SAFE-T)      D deficiency - lab due        Potassium was low at last check, recheck due today          Amount of exercise or physical activity: None    Problems taking medications regularly: No    Medication side effects: none    Diet: regular (no restrictions)           Problem list and histories reviewed & adjusted, as indicated.  Additional history: as documented    Patient Active Problem List   Diagnosis     Essential hypertension     Pulmonary embolism and infarction (H)     Contact dermatitis and other eczema, due to unspecified cause     Edema     Hyperlipidemia LDL goal <100     Neurofibromatosis (H)     Advanced care planning/counseling discussion     Moderate episode of recurrent major depressive disorder (H)     Long-term (current) use of anticoagulants [Z79.01]     Deep vein thrombosis (DVT) (H) [I82.409]     Lumbar spondylosis with myelopathy     Degeneration of lumbar or lumbosacral intervertebral disc     Family history of colon cancer     Statin medication not prescribed per physician orders     Vitamin D deficiency     Past Surgical History:   Procedure Laterality Date     ------------OTHER-------------      shoulder replacement; Provider: Karen     ARTHROPLASTY KNEE  2014    Procedure: ARTHROPLASTY KNEE;  Surgeon: Sean Alexander MD;  Location: [facility]     ARTHROSCOPY SHOULDER      right, bone spurs      SECTION      x3     CHOLECYSTECTOMY       COLONOSCOPY  02/15/2018    [place],,polyps     elbow ulnar tunnel release       ELECTROTHERMAL THERAPY INTRADISC  2017    stimulator     ENDOSCOPIC SINUS SURGERY, SEPTOPLASTY, TURBINOPLASTY, MAXILLARY SINUSOTOMY, COMBINED N/A 2015    Procedure: COMBINED ENDOSCOPIC SINUS SURGERY, SEPTOPLASTY, TURBINOPLASTY, MAXILLARY SINUSOTOMY;  Surgeon: Seema Conn MD;  Location: [facility] OR     esophagastroduodenoscopy      with biopsy and endoscopic U/S      EXCISE NEUROMA LOWER EXTREMITY Left 7/13/2016    Procedure: EXCISE NEUROMA LOWER EXTREMITY;  Surgeon: Edi Reed MD;  Location: [facility]     FUSION LUMBAR ANTERIOR WITH BAK CAGES      L5-S1     ORTHOPEDIC SURGERY  2-15    right shoulder     ORTHOPEDIC SURGERY  8/28/15    right knee     pionidal cyst excision       SARAHI/BSO       TRANSPOSITION ULNAR NERVE (ELBOW)         Social History   Substance Use Topics     Smoking status: Former Smoker     Packs/day: 1.00     Years: 30.00     Types: Cigarettes, Pipe     Smokeless tobacco: Never Used      Comment: quit in 1999     Alcohol use No     Family History   Problem Relation Age of Onset     Cancer Mother      Colon Polyps Mother      Heart Failure Mother 87     congestive, cause of death     Myocardial Infarction Mother      myocardial infarction     Myocardial Infarction Father      myocardial infarction - cause of death     C.A.D. Father      Cancer Paternal Uncle      cause of death     C.A.D. Brother      Other - See Comments Other      factor 5 - family h/o     Asthma No family hx of          Current Outpatient Prescriptions   Medication Sig Dispense Refill     aspirin 81 MG EC tablet Take 81 mg by mouth daily HS       budesonide-formoterol (SYMBICORT) 80-4.5 MCG/ACT Inhaler Inhale 2 puffs into the lungs 2 times daily 1 Inhaler 1     Cholecalciferol (VITAMIN D3 PO) Take 3,000 mg by mouth daily AM       escitalopram (LEXAPRO) 20 MG tablet TAKE 1 TABLET BY MOUTH EVERY DAY AND 1 TABLET BY MOUTH EVERY DAY AS NEEDED 90 tablet 1     hydrochlorothiazide (HYDRODIURIL) 25 MG tablet TAKE 1 TABLET(25 MG) BY MOUTH DAILY 90 tablet 2     levalbuterol (XOPENEX) 1.25 MG/3ML neb solution NEBULIZE 1 VIAL EVERY 4 HOURS AS NEEDED FOR SHORTNESS OF BREATH, DYSPNEA, OR WHEEZING 1650 mL 0     losartan (COZAAR) 50 MG tablet TAKE 2 TABLETS BY MOUTH EVERY  tablet 0     metoprolol (TOPROL-XL) 50 MG 24 hr tablet TAKE 1 AND ONE-HALF TABLETS BY MOUTH EVERY  tablet 2     omega  3 1000 MG CAPS Take 3 g by mouth daily  90 capsule      order for DME Nebulizer machine and tubing    DX:  Bronchitis 1 Device 0     STATIN NOT PRESCRIBED, INTENTIONAL, Please choose reason not prescribed, below       warfarin (COUMADIN) 5 MG tablet 7.5mg Mon,Wed,Fri and 5mg all other days or as directed by warfarin clinic 150 tablet 3     Allergies   Allergen Reactions     Ace Inhibitors Cough     Amlodipine Besylate Swelling     Norvasc     Amoxicillin      Atorvastatin      myualgia     Cephalexin Monohydrate Hives     Keflex     Erythromycin Base [Kdc:Yellow Dye+Erythromycin+Brilliant Blue Fcf] Nausea and Vomiting     Meloxicam Other (See Comments)     Mobic - confusion, depression     Adhesive Tape Rash     Prochlorperazine Edisylate Swelling and Rash     Compazine     Prochlorperazine Maleate Swelling and Rash     Recent Labs   Lab Test  05/30/18   0946  02/14/18   1430  01/24/18   1336  01/17/18   0822  07/12/17   1117  03/14/17   2028  01/04/17   1459   01/11/16   0935   02/17/15   1149   11/05/13   1528   A1C   --    --    --    --    --    --    --    --    --    --    --    --   5.5   LDL   --    --    --   137*   --    --    --    --   126*   --   119   < >   --    HDL   --    --    --   43*   --    --    --    --   38*   --   36*   < >   --    TRIG   --    --    --   190*   --    --    --    --   265*   --   272*   < >   --    ALT   --    --    --    --   34  30  35   --    --    --    --    < >   --    CR  0.86  0.94   --   0.91  0.91  0.97  1.07*   --   1.02   < >  0.78   < >   --    GFRESTIMATED  67  60*   --   62  63  58*  52*   --   55*   < >  75   < >   --    GFRESTBLACK  81  73   --   75  76  70  63   --   67   < >  >90   GFR Calc     < >   --    POTASSIUM  3.2*  3.5   --   3.2*  3.6  3.6  3.5   < >  3.4   < >  4.0   < >   --    TSH   --    --   3.88  5.75*   --    --   1.63   --   3.62   < >   --    < >   --     < > = values in this interval not displayed.      BP Readings from  "Last 3 Encounters:   07/13/18 110/70   05/30/18 118/70   04/24/18 126/76    Wt Readings from Last 3 Encounters:   07/13/18 225 lb (102.1 kg)   05/30/18 233 lb (105.7 kg)   04/24/18 230 lb (104.3 kg)                  Labs reviewed in EPIC    Reviewed and updated as needed this visit by clinical staff  Tobacco  Allergies  Meds  Med Hx  Surg Hx  Fam Hx  Soc Hx      Reviewed and updated as needed this visit by Provider         ROS:  Constitutional, HEENT, cardiovascular, pulmonary, GI, , musculoskeletal, neuro, skin, endocrine and psych systems are negative, except as otherwise noted.    OBJECTIVE:     /70  Pulse 71  Temp 97.2  F (36.2  C)  Ht 5' 5\" (1.651 m)  Wt 225 lb (102.1 kg)  SpO2 98%  BMI 37.44 kg/m2  Body mass index is 37.44 kg/(m^2).       GENERAL: healthy, alert and no distress  EYES: Eyes grossly normal to inspection, PERRL and conjunctivae and sclerae normal  HENT: ear canals and TM's normal, nose and mouth without ulcers or lesions  NECK: no adenopathy, no asymmetry, masses, or scars and thyroid normal to palpation  RESP: lungs clear to auscultation - no rales, rhonchi or wheezes  CV: regular rate and rhythm, normal S1 S2, no S3 or S4, no murmur, click or rub, no peripheral edema and peripheral pulses strong  ABDOMEN: soft, nontender, no hepatosplenomegaly, no masses and bowel sounds normal  MS: no gross musculoskeletal defects noted, no edema  SKIN: no suspicious lesions or rashes  PSYCH: mentation appears normal, affect normal/bright      Results for orders placed or performed in visit on 07/13/18 (from the past 24 hour(s))   TSH with free T4 reflex   Result Value Ref Range    TSH 2.51 0.40 - 4.00 mU/L   Basic metabolic panel   Result Value Ref Range    Sodium 140 133 - 144 mmol/L    Potassium 3.3 (L) 3.4 - 5.3 mmol/L    Chloride 103 94 - 109 mmol/L    Carbon Dioxide 26 20 - 32 mmol/L    Anion Gap 11 3 - 14 mmol/L    Glucose 98 70 - 99 mg/dL    Urea Nitrogen 13 7 - 30 mg/dL    " Creatinine 0.93 0.52 - 1.04 mg/dL    GFR Estimate 61 >60 mL/min/1.7m2    GFR Estimate If Black 74 >60 mL/min/1.7m2    Calcium 8.6 8.5 - 10.1 mg/dL       ASSESSMENT/PLAN:     Hypertension; controlled   Associated with the following complications:    None   Plan:  No changes in the patient's current treatment plan    Depression; recurrent episode-- Moderate   Associated with the following complications:    None   Plan:  No changes in the patient's current treatment plan          1. Fatigue, unspecified type  - Ask pharmacy about a multiple vitamin  - TSH with free T4 reflex  - Vitamin D Deficiency  - Basic metabolic panel    2. Weight gain  - Watch sugar intake  - TSH with free T4 reflex  - Vitamin D Deficiency    3. Other constipation  - Bene fiber or equivalent daily  - Colace 2 times daily OTC    4. Vitamin D deficiency  - D3 5000 U daily  - Vitamin D level    5. History of low potassium  - Lab ordered    6. Depression  - Continue plan of care        If unimproved please call Анна next week    Eliz Hartman NP  [facility]

## 2018-07-13 NOTE — MR AVS SNAPSHOT
After Visit Summary   7/13/2018    Cassy Avila    MRN: [id number]           Patient Information     Date Of Birth          1955        Visit Information        Provider Department      7/13/2018 8:15 AM Eliz Hartman NP [facility]        Today's Diagnoses     Fatigue, unspecified type    -  1    Weight gain        Other constipation        Vitamin D deficiency        History of low potassium        Moderate episode of recurrent major depressive disorder (H)          Care Instructions      1. Fatigue, unspecified type  - Ask pharmacy about a multiple vitamin  - TSH with free T4 reflex  - Vitamin D Deficiency  - Basic metabolic panel    2. Weight gain  - Watch sugar intake  - TSH with free T4 reflex  - Vitamin D Deficiency    3. Other constipation  - Bene fiber or equivalent daily  - Colace 2 times daily OTC    4. Vitamin D deficiency  - D3 5000 U daily  - Vitamin D level    5. History of low potassium  - Lab ordered      1. Fatigue, unspecified type  - Ask pharmacy about a multiple vitamin  - TSH with free T4 reflex  - Vitamin D Deficiency  - Basic metabolic panel    2. Weight gain  - Watch sugar intake  - TSH with free T4 reflex  - Vitamin D Deficiency    3. Other constipation  - Bene fiber or equivalent daily  - Colace 2 times daily OTC    4. Vitamin D deficiency  - D3 5000 U daily  - Vitamin D level    5. History of low potassium  - Lab ordered      If unimproved please call Анна next week      FU 3 months      Eliz Hartman NP  [facility]                      Zyrtec 10mg OTC    FU 3 months      Eliz Hartman NP  [facility]                          Follow-ups after your visit        Who to contact     If you have questions or need follow up information about today's clinic visit or your schedule please contact [facility] directly at [phone].  Normal or non-critical lab and imaging results will be communicated to you by Roque, letter  "or phone within 4 business days after the clinic has received the results. If you do not hear from us within 7 days, please contact the clinic through Zinch or phone. If you have a critical or abnormal lab result, we will notify you by phone as soon as possible.  Submit refill requests through Zinch or call your pharmacy and they will forward the refill request to us. Please allow 3 business days for your refill to be completed.          Additional Information About Your Visit        Zinch Information     Zinch gives you secure access to your electronic health record. If you see a primary care provider, you can also send messages to your care team and make appointments. If you have questions, please call your primary care clinic.  If you do not have a primary care provider, please call [phone] and they will assist you.        Care EveryWhere ID     This is your Care EveryWhere ID. This could be used by other organizations to access your [facility] medical records  [id number]        Your Vitals Were     Pulse Temperature Height Pulse Oximetry BMI (Body Mass Index)       71 97.2  F (36.2  C) 5' 5\" (1.651 m) 98% 37.44 kg/m2        Blood Pressure from Last 3 Encounters:   07/13/18 110/70   05/30/18 118/70   04/24/18 126/76    Weight from Last 3 Encounters:   07/13/18 225 lb (102.1 kg)   05/30/18 233 lb (105.7 kg)   04/24/18 230 lb (104.3 kg)              We Performed the Following     Basic metabolic panel     DEPRESSION EDUCATION     TSH with free T4 reflex     Vitamin D Deficiency        Primary Care Provider Office Phone # Fax #    Eliz Hartman [phone] [address]  [place] [facility] [address]        Equal Access to Services     [facility]: Hadii mic Hernandez, wilver velasco, qaybta kaalmajeanie cordova. So [facility] [phone].    ATENCIÓN: Si habla español, tiene a noriega disposición servicios gratuitos de asistencia " lingüística. Jaqueline al [phone].    We comply with applicable federal civil rights laws and [place] laws. We do not discriminate on the basis of race, color, national origin, age, disability, sex, sexual orientation, or gender identity.            Thank you!     Thank you for choosing [facility]  for your care. Our goal is always to provide you with excellent care. Hearing back from our patients is one way we can continue to improve our services. Please take a few minutes to complete the written survey that you may receive in the mail after your visit with us. Thank you!             Your Updated Medication List - Protect others around you: Learn how to safely use, store and throw away your medicines at www.disposemymeds.org.          This list is accurate as of 7/13/18  9:14 AM.  Always use your most recent med list.                   Brand Name Dispense Instructions for use Diagnosis    aspirin 81 MG EC tablet      Take 81 mg by mouth daily HS        budesonide-formoterol 80-4.5 MCG/ACT Inhaler    SYMBICORT    1 Inhaler    Inhale 2 puffs into the lungs 2 times daily    Bronchitis       escitalopram 20 MG tablet    LEXAPRO    90 tablet    TAKE 1 TABLET BY MOUTH EVERY DAY AND 1 TABLET BY MOUTH EVERY DAY AS NEEDED    Episode of recurrent major depressive disorder, unspecified depression episode severity (H)       hydrochlorothiazide 25 MG tablet    HYDRODIURIL    90 tablet    TAKE 1 TABLET(25 MG) BY MOUTH DAILY    Essential hypertension       levalbuterol 1.25 MG/3ML neb solution    XOPENEX    1650 mL    NEBULIZE 1 VIAL EVERY 4 HOURS AS NEEDED FOR SHORTNESS OF BREATH, DYSPNEA, OR WHEEZING    Bronchitis       losartan 50 MG tablet    COZAAR    180 tablet    TAKE 2 TABLETS BY MOUTH EVERY DAY    Benign essential hypertension       metoprolol succinate 50 MG 24 hr tablet    TOPROL-XL    135 tablet    TAKE 1 AND ONE-HALF TABLETS BY MOUTH EVERY DAY    Essential hypertension       omega 3 1000 MG Caps      90 capsule    Take 3 g by mouth daily        order for DME     1 Device    Nebulizer machine and tubing  DX:  Bronchitis    Bronchitis       STATIN NOT PRESCRIBED (INTENTIONAL)      Please choose reason not prescribed, below    Hyperlipidemia LDL goal <100       VITAMIN D3 PO      Take 3,000 mg by mouth daily AM        warfarin 5 MG tablet    COUMADIN    150 tablet    7.5mg Mon,Wed,Fri and 5mg all other days or as directed by warfarin clinic    Long-term (current) use of anticoagulants

## 2018-07-13 NOTE — PATIENT INSTRUCTIONS
1. Fatigue, unspecified type  - Ask pharmacy about a multiple vitamin  - TSH with free T4 reflex  - Vitamin D Deficiency  - Basic metabolic panel    2. Weight gain  - Watch sugar intake  - TSH with free T4 reflex  - Vitamin D Deficiency    3. Other constipation  - Bene fiber or equivalent daily  - Colace 2 times daily OTC    4. Vitamin D deficiency  - D3 5000 U daily  - Vitamin D level    5. History of low potassium  - Lab ordered      1. Fatigue, unspecified type  - Ask pharmacy about a multiple vitamin  - TSH with free T4 reflex  - Vitamin D Deficiency  - Basic metabolic panel    2. Weight gain  - Watch sugar intake  - TSH with free T4 reflex  - Vitamin D Deficiency    3. Other constipation  - Bene fiber or equivalent daily  - Colace 2 times daily OTC    4. Vitamin D deficiency  - D3 5000 U daily  - Vitamin D level    5. History of low potassium  - Lab ordered      If unimproved please call Анна next week      FU 3 months      [facility]                      Zyrtec 10mg OTC    FU 3 months      [facility]

## 2018-07-14 ASSESSMENT — PATIENT HEALTH QUESTIONNAIRE - PHQ9: SUM OF ALL RESPONSES TO PHQ QUESTIONS 1-9: 9

## 2018-07-14 ASSESSMENT — ANXIETY QUESTIONNAIRES: GAD7 TOTAL SCORE: 2

## 2018-07-16 ENCOUNTER — ANTICOAGULATION THERAPY VISIT (OUTPATIENT)
Dept: ANTICOAGULATION | Facility: OTHER | Age: 63
End: 2018-07-16
Payer: MEDICARE

## 2018-07-16 ENCOUNTER — TRANSFERRED RECORDS (OUTPATIENT)
Dept: HEALTH INFORMATION MANAGEMENT | Facility: CLINIC | Age: 63
End: 2018-07-16

## 2018-07-16 DIAGNOSIS — Z79.01 LONG-TERM (CURRENT) USE OF ANTICOAGULANTS: ICD-10-CM

## 2018-07-16 DIAGNOSIS — I26.99 PULMONARY EMBOLISM AND INFARCTION (H): ICD-10-CM

## 2018-07-16 LAB — INR PPP: 2.9

## 2018-07-16 NOTE — MR AVS SNAPSHOT
Cassy CHIU Austin   7/16/2018   Anticoagulation Therapy Visit    Description:  62 year old female   Provider:  Eliz Hartman NP   Department:  [facility] Anti Coagulation           INR as of 7/16/2018     Today's INR 2.9      Anticoagulation Summary as of 7/16/2018     INR goal 2.0-3.0   Today's INR 2.9   Full warfarin instructions 7.5 mg on Mon, Wed, Fri; 5 mg all other days   Next INR check 8/13/2018    Indications   Long-term (current) use of anticoagulants [Z79.01] [Z79.01]  Pulmonary embolism and infarction (H) [I26.99]  Deep vein thrombosis (DVT) (H) [I82.409] [I82.409]         July 2018 Details    Sun Mon Tue Wed Thu Fri Sat     1               2               3               4               5               6               7                 8               9               10               11               12               13               14                 15               16      7.5 mg   See details      17      5 mg         18      7.5 mg         19      5 mg         20      7.5 mg         21      5 mg           22      5 mg         23      7.5 mg         24      5 mg         25      7.5 mg         26      5 mg         27      7.5 mg         28      5 mg           29      5 mg         30      7.5 mg         31      5 mg              Date Details   07/16 This INR check               How to take your warfarin dose     To take:  5 mg Take 1 of the 5 mg tablets.    To take:  7.5 mg Take 1.5 of the 5 mg tablets.           August 2018 Details    Sun Mon Tue Wed Thu Fri Sat        1      7.5 mg         2      5 mg         3      7.5 mg         4      5 mg           5      5 mg         6      7.5 mg         7      5 mg         8      7.5 mg         9      5 mg         10      7.5 mg         11      5 mg           12      5 mg         13            14               15               16               17               18                 19               20               21               22               23               24                25                 26               27               28               29               30               31                 Date Details   No additional details    Date of next INR:  8/13/2018         How to take your warfarin dose     To take:  5 mg Take 1 of the 5 mg tablets.    To take:  7.5 mg Take 1.5 of the 5 mg tablets.

## 2018-07-16 NOTE — PROGRESS NOTES
ANTICOAGULATION FOLLOW-UP CLINIC VISIT    Patient Name:  Cassy Avila  Date:  7/16/2018  Contact Type:  Telephone    SUBJECTIVE:     Patient Findings     Positives No Problem Findings    Comments PST done and patient called result to warfarin clinic.  She states she has no bleeding/bruising and no changes in meds/activity. States vit K intake has been less last few weeks. She was encouraged to increase vit K intake. We discussed PSt result, warfarin dosing and INR recheck date. She verbalized understanding and has no questions.            OBJECTIVE    INR   Date Value Ref Range Status   07/16/2018 2.9  Final       ASSESSMENT / PLAN  INR assessment THER    Recheck INR In: 4 WEEKS    INR Location Home INR      Anticoagulation Summary as of 7/16/2018     INR goal 2.0-3.0   Today's INR 2.9   Warfarin maintenance plan 7.5 mg (5 mg x 1.5) on Mon, Wed, Fri; 5 mg (5 mg x 1) all other days   Full warfarin instructions 7.5 mg on Mon, Wed, Fri; 5 mg all other days   Weekly warfarin total 42.5 mg   No change documented Manju Escalera RN   Plan last modified Iram Lord RN (7/20/2016)   Next INR check 8/13/2018   Priority INR   Target end date Indefinite    Indications   Long-term (current) use of anticoagulants [Z79.01] [Z79.01]  Pulmonary embolism and infarction (H) [I26.99]  Deep vein thrombosis (DVT) (H) [I82.409] [I82.409]         Anticoagulation Episode Summary     INR check location Home Draw    Preferred lab     Send INR reminders to [facility] ANTICOAG POOL    Comments PST - [facility].        Anticoagulation Care Providers     Provider Role Specialty Phone number    Eliz Hartman NP [facility] [phone]            See the Encounter Report to view Anticoagulation Flowsheet and Dosing Calendar (Go to Encounters tab in chart review, and find the Anticoagulation Therapy Visit)        Manju sEcalera, RN

## 2018-07-17 LAB — DEPRECATED CALCIDIOL+CALCIFEROL SERPL-MC: 44 UG/L (ref 20–75)

## 2018-07-24 ENCOUNTER — TRANSFERRED RECORDS (OUTPATIENT)
Dept: HEALTH INFORMATION MANAGEMENT | Facility: CLINIC | Age: 63
End: 2018-07-24

## 2018-08-13 ENCOUNTER — TRANSFERRED RECORDS (OUTPATIENT)
Dept: HEALTH INFORMATION MANAGEMENT | Facility: CLINIC | Age: 63
End: 2018-08-13

## 2018-08-13 ENCOUNTER — ANTICOAGULATION THERAPY VISIT (OUTPATIENT)
Dept: ANTICOAGULATION | Facility: OTHER | Age: 63
End: 2018-08-13
Payer: MEDICARE

## 2018-08-13 DIAGNOSIS — Z79.01 LONG-TERM (CURRENT) USE OF ANTICOAGULANTS: ICD-10-CM

## 2018-08-13 DIAGNOSIS — I26.99 PULMONARY EMBOLISM AND INFARCTION (H): ICD-10-CM

## 2018-08-13 LAB — INR PPP: 2.9

## 2018-08-13 NOTE — PROGRESS NOTES
ANTICOAGULATION FOLLOW-UP CLINIC VISIT    Patient Name:  Cassy Avila  Date:  8/13/2018  Contact Type:  Telephone    SUBJECTIVE:     Patient Findings     Positives No Problem Findings    Comments PSt done and result called to warfarin clinic and message left. Call returned to patient and spoke to her re: INR result, warfarin dosing and INR recheck date. She verbalized understanding and has no questions.            OBJECTIVE    INR   Date Value Ref Range Status   08/13/2018 2.9  Final       ASSESSMENT / PLAN  INR assessment THER    Recheck INR In: 4 WEEKS    INR Location Home INR      Anticoagulation Summary as of 8/13/2018     INR goal 2.0-3.0   Today's INR 2.9   Warfarin maintenance plan 7.5 mg (5 mg x 1.5) on Mon, Wed, Fri; 5 mg (5 mg x 1) all other days   Full warfarin instructions 7.5 mg on Mon, Wed, Fri; 5 mg all other days   Weekly warfarin total 42.5 mg   No change documented Manju Escalera RN   Plan last modified Iram Lord RN (7/20/2016)   Next INR check 9/10/2018   Priority INR   Target end date Indefinite    Indications   Long-term (current) use of anticoagulants [Z79.01] [Z79.01]  Pulmonary embolism and infarction (H) [I26.99]  Deep vein thrombosis (DVT) (H) [I82.409] [I82.409]         Anticoagulation Episode Summary     INR check location Home Draw    Preferred lab     Send INR reminders to [facility] ANTICOAG POOL    Comments PST - [facility].        Anticoagulation Care Providers     Provider Role Specialty Phone number    Delia Hartmane, NORAH [facility] [phone]            See the Encounter Report to view Anticoagulation Flowsheet and Dosing Calendar (Go to Encounters tab in chart review, and find the Anticoagulation Therapy Visit)        Manju Escalera, RN

## 2018-08-13 NOTE — MR AVS SNAPSHOT
Cassy CHIU [place]   8/13/2018   Anticoagulation Therapy Visit    Description:  62 year old female   Provider:  Eliz Hartman NP   Department:  [facility] Anti Coagulation           INR as of 8/13/2018     Today's INR 2.9      Anticoagulation Summary as of 8/13/2018     INR goal 2.0-3.0   Today's INR 2.9   Full warfarin instructions 7.5 mg on Mon, Wed, Fri; 5 mg all other days   Next INR check 9/10/2018    Indications   Long-term (current) use of anticoagulants [Z79.01] [Z79.01]  Pulmonary embolism and infarction (H) [I26.99]  Deep vein thrombosis (DVT) (H) [I82.409] [I82.409]         August 2018 Details    Sun Mon Tue Wed Thu Fri Sat        1               2               3               4                 5               6               7               8               9               10               11                 12               13      7.5 mg   See details      14      5 mg         15      7.5 mg         16      5 mg         17      7.5 mg         18      5 mg           19      5 mg         20      7.5 mg         21      5 mg         22      7.5 mg         23      5 mg         24      7.5 mg         25      5 mg           26      5 mg         27      7.5 mg         28      5 mg         29      7.5 mg         30      5 mg         31      7.5 mg           Date Details   08/13 This INR check               How to take your warfarin dose     To take:  5 mg Take 1 of the 5 mg tablets.    To take:  7.5 mg Take 1.5 of the 5 mg tablets.           September 2018 Details    Sun Mon Tue Wed Thu Fri Sat           1      5 mg           2      5 mg         3      7.5 mg         4      5 mg         5      7.5 mg         6      5 mg         7      7.5 mg         8      5 mg           9      5 mg         10            11               12               13               14               15                 16               17               18               19               20               21               22                 23                24               25               26               27               28               29                 30                      Date Details   No additional details    Date of next INR:  9/10/2018         How to take your warfarin dose     To take:  5 mg Take 1 of the 5 mg tablets.    To take:  7.5 mg Take 1.5 of the 5 mg tablets.

## 2018-08-14 DIAGNOSIS — I10 ESSENTIAL HYPERTENSION: ICD-10-CM

## 2018-08-15 RX ORDER — METOPROLOL SUCCINATE 50 MG/1
TABLET, EXTENDED RELEASE ORAL
Qty: 135 TABLET | Refills: 3 | Status: SHIPPED | OUTPATIENT
Start: 2018-08-15 | End: 2018-08-24

## 2018-08-15 NOTE — TELEPHONE ENCOUNTER
metoprolol      Last Written Prescription Date:  8/22/17  Last Fill Quantity: 135,   # refills: 2  Last Office Visit: 7/13/18  Future Office visit:

## 2018-08-24 DIAGNOSIS — I10 ESSENTIAL HYPERTENSION: ICD-10-CM

## 2018-08-24 NOTE — TELEPHONE ENCOUNTER
metoprolol      Last Written Prescription Date:  8/15/18  Last Fill Quantity: 135,   # refills: 3  Last Office Visit: 7/13/18  Future Office visit:   none    Pharmacy states pt would like 6 refills

## 2018-08-27 RX ORDER — METOPROLOL SUCCINATE 50 MG/1
TABLET, EXTENDED RELEASE ORAL
Qty: 135 TABLET | Refills: 2 | Status: SHIPPED | OUTPATIENT
Start: 2018-08-27 | End: 2019-11-20

## 2018-09-04 ENCOUNTER — ANTICOAGULATION THERAPY VISIT (OUTPATIENT)
Dept: ANTICOAGULATION | Facility: OTHER | Age: 63
End: 2018-09-04
Payer: MEDICARE

## 2018-09-04 ENCOUNTER — TRANSFERRED RECORDS (OUTPATIENT)
Dept: HEALTH INFORMATION MANAGEMENT | Facility: CLINIC | Age: 63
End: 2018-09-04

## 2018-09-04 DIAGNOSIS — Z79.01 LONG-TERM (CURRENT) USE OF ANTICOAGULANTS: ICD-10-CM

## 2018-09-04 DIAGNOSIS — I26.99 PULMONARY EMBOLISM AND INFARCTION (H): ICD-10-CM

## 2018-09-04 LAB — INR PPP: 3.5

## 2018-09-04 NOTE — PROGRESS NOTES
ANTICOAGULATION FOLLOW-UP CLINIC VISIT    Patient Name:  Cassy Avila  Date:  9/4/2018  Contact Type:  Telephone/ message left on home phone voicemail    SUBJECTIVE:     Patient Findings     Comments PST done and result faxed to warfarin clinic by Jorge.  Call placed to patient and message left re: INR result, warfarin dosing and INR recheck date. She is to call if she has any unusual bleeding/bruising, changes in diet/meds/activity or questions           OBJECTIVE    INR   Date Value Ref Range Status   09/04/2018 3.5  Final       ASSESSMENT / PLAN  INR assessment SUPRA    Recheck INR In: 1 WEEK    INR Location Home INR      Anticoagulation Summary as of 9/4/2018     INR goal 2.0-3.0   Today's INR 3.5!   Warfarin maintenance plan 7.5 mg (5 mg x 1.5) on Mon, Wed, Fri; 5 mg (5 mg x 1) all other days   Full warfarin instructions 9/4: Hold; Otherwise 7.5 mg on Mon, Wed, Fri; 5 mg all other days   Weekly warfarin total 42.5 mg   Plan last modified Iram Lord RN (7/20/2016)   Next INR check 9/11/2018   Priority INR   Target end date Indefinite    Indications   Long-term (current) use of anticoagulants [Z79.01] [Z79.01]  Pulmonary embolism and infarction (H) [I26.99]  Deep vein thrombosis (DVT) (H) [I82.409] [I82.409]         Anticoagulation Episode Summary     INR check location Home Draw    Preferred lab     Send INR reminders to [facility] ANTICOAG POOL    Comments PST - [facility].        Anticoagulation Care Providers     Provider Role Specialty Phone number    Eliz Hartman NP [facility] [phone]            See the Encounter Report to view Anticoagulation Flowsheet and Dosing Calendar (Go to Encounters tab in chart review, and find the Anticoagulation Therapy Visit)        Manju Escalera, RN

## 2018-09-04 NOTE — MR AVS SNAPSHOT
Cassy Avila   9/4/2018   Anticoagulation Therapy Visit    Description:  62 year old female   Provider:  Eliz Hartman NP   Department:  [facility] Anti Coagulation           INR as of 9/4/2018     Today's INR 3.5!      Anticoagulation Summary as of 9/4/2018     INR goal 2.0-3.0   Today's INR 3.5!   Full warfarin instructions 9/4: Hold; Otherwise 7.5 mg on Mon, Wed, Fri; 5 mg all other days   Next INR check 9/11/2018    Indications   Long-term (current) use of anticoagulants [Z79.01] [Z79.01]  Pulmonary embolism and infarction (H) [I26.99]  Deep vein thrombosis (DVT) (H) [I82.409] [I82.409]         September 2018 Details    Sun Mon Tue Wed Thu Fri Sat           1                 2               3               4      Hold   See details      5      7.5 mg         6      5 mg         7      7.5 mg         8      5 mg           9      5 mg         10      7.5 mg         11            12               13               14               15                 16               17               18               19               20               21               22                 23               24               25               26               27               28               29                 30                      Date Details   09/04 This INR check       Date of next INR:  9/11/2018         How to take your warfarin dose     To take:  5 mg Take 1 of the 5 mg tablets.    To take:  7.5 mg Take 1.5 of the 5 mg tablets.    Hold Do not take your warfarin dose. See the Details table to the right for additional instructions.

## 2018-09-06 NOTE — PROGRESS NOTES
Outpatient Physical Therapy Discharge Note     Patient: Cassy Avila  : 1955    Beginning/End Dates of Reporting Period:  18 to 2018    Referring Provider: Minh Davila PA-C    Therapy Diagnosis: Pain R hip, S/P correction CAM deformity, labral repair     Client Self Report: Reports exercises are going well, hip hasn't been too sore and she feels she is doing well - can walk about 1 block before fatigue    Objective Measurements:  Objective Measure: Hip ROm  Details: FLEX 110, ER 40, IR 32, ABD 40, EXT NT today                                    Outcome Measures (most recent score):      Goals:  Goal Identifier     Goal Description     Target Date     Date Met      Progress:     Goal Identifier     Goal Description     Target Date     Date Met      Progress:     Goal Identifier     Goal Description     Target Date     Date Met      Progress:     Goal Identifier     Goal Description     Target Date     Date Met      Progress:     Goal Identifier     Goal Description     Target Date     Date Met      Progress:     Goal Identifier     Goal Description     Target Date     Date Met      Progress:     Goal Identifier     Goal Description     Target Date     Date Met      Progress:     Goal Identifier     Goal Description     Target Date     Date Met      Progress:     Progress Toward Goals:   Not assessed this period.          Plan:  Discharge from therapy.    Discharge:    Reason for Discharge: Patient has failed to schedule further appointments.    Equipment Issued: none    Discharge Plan: She was seen in PT 2 sessions with her last visit being 18 with Sydnee Camilo DPT. She did not schedule any further PT appointments.

## 2018-09-11 ENCOUNTER — TRANSFERRED RECORDS (OUTPATIENT)
Dept: HEALTH INFORMATION MANAGEMENT | Facility: CLINIC | Age: 63
End: 2018-09-11

## 2018-09-11 ENCOUNTER — ANTICOAGULATION THERAPY VISIT (OUTPATIENT)
Dept: ANTICOAGULATION | Facility: OTHER | Age: 63
End: 2018-09-11
Payer: MEDICARE

## 2018-09-11 DIAGNOSIS — Z79.01 LONG-TERM (CURRENT) USE OF ANTICOAGULANTS: ICD-10-CM

## 2018-09-11 DIAGNOSIS — I26.99 PULMONARY EMBOLISM AND INFARCTION (H): ICD-10-CM

## 2018-09-11 LAB — INR PPP: 2.9

## 2018-09-11 NOTE — MR AVS SNAPSHOT
Cassy CHIU [place]   9/11/2018   Anticoagulation Therapy Visit    Description:  62 year old female   Provider:  Eliz Hartman NP   Department:  [facility] Anti Coagulation           INR as of 9/11/2018     Today's INR 2.9      Anticoagulation Summary as of 9/11/2018     INR goal 2.0-3.0   Today's INR 2.9   Full warfarin instructions 7.5 mg on Mon, Wed, Fri; 5 mg all other days   Next INR check 10/9/2018    Indications   Long-term (current) use of anticoagulants [Z79.01] [Z79.01]  Pulmonary embolism and infarction (H) [I26.99]  Deep vein thrombosis (DVT) (H) [I82.409] [I82.409]         September 2018 Details    Sun Mon Tue Wed Thu Fri Sat           1                 2               3               4               5               6               7               8                 9               10               11      5 mg   See details      12      7.5 mg         13      5 mg         14      7.5 mg         15      5 mg           16      5 mg         17      7.5 mg         18      5 mg         19      7.5 mg         20      5 mg         21      7.5 mg         22      5 mg           23      5 mg         24      7.5 mg         25      5 mg         26      7.5 mg         27      5 mg         28      7.5 mg         29      5 mg           30      5 mg                Date Details   09/11 This INR check               How to take your warfarin dose     To take:  5 mg Take 1 of the 5 mg tablets.    To take:  7.5 mg Take 1.5 of the 5 mg tablets.           October 2018 Details    Sun Mon Tue Wed Thu Fri Sat      1      7.5 mg         2      5 mg         3      7.5 mg         4      5 mg         5      7.5 mg         6      5 mg           7      5 mg         8      7.5 mg         9            10               11               12               13                 14               15               16               17               18               19               20                 21               22               23               24                25               26               27                 28               29               30               31                   Date Details   No additional details    Date of next INR:  10/9/2018         How to take your warfarin dose     To take:  5 mg Take 1 of the 5 mg tablets.    To take:  7.5 mg Take 1.5 of the 5 mg tablets.

## 2018-09-11 NOTE — PROGRESS NOTES
ANTICOAGULATION FOLLOW-UP CLINIC VISIT    Patient Name:  Cassy Avila  Date:  9/11/2018  Contact Type:  Telephone    SUBJECTIVE:     Patient Findings     Positives Bruising    Comments PST done and patient called result to warfarin clinic. Per patient report she does have a little bruising that she does not know how she got. Her vit K intake has been decreased lately, she will increase vit K intake. No bleeding, no changes in meds. She verbalized understanding of warfarin dosing and INR recheck date and has no questions            OBJECTIVE    INR   Date Value Ref Range Status   09/11/2018 2.9  Final       ASSESSMENT / PLAN  INR assessment THER    Recheck INR In: 4 WEEKS    INR Location Home INR      Anticoagulation Summary as of 9/11/2018     INR goal 2.0-3.0   Today's INR 2.9   Warfarin maintenance plan 7.5 mg (5 mg x 1.5) on Mon, Wed, Fri; 5 mg (5 mg x 1) all other days   Full warfarin instructions 7.5 mg on Mon, Wed, Fri; 5 mg all other days   Weekly warfarin total 42.5 mg   Plan last modified Iram Lord RN (7/20/2016)   Next INR check 10/9/2018   Priority INR   Target end date Indefinite    Indications   Long-term (current) use of anticoagulants [Z79.01] [Z79.01]  Pulmonary embolism and infarction (H) [I26.99]  Deep vein thrombosis (DVT) (H) [I82.409] [I82.409]         Anticoagulation Episode Summary     INR check location Home Draw    Preferred lab     Send INR reminders to [facility] ANTICOAG POOL    Comments PST - [facility].        Anticoagulation Care Providers     Provider Role Specialty Phone number    Eliz Hartman NP [facility] [phone]            See the Encounter Report to view Anticoagulation Flowsheet and Dosing Calendar (Go to Encounters tab in chart review, and find the Anticoagulation Therapy Visit)        Manju Escalera, RN

## 2018-10-08 DIAGNOSIS — I10 BENIGN ESSENTIAL HYPERTENSION: ICD-10-CM

## 2018-10-10 ENCOUNTER — ANTICOAGULATION THERAPY VISIT (OUTPATIENT)
Dept: ANTICOAGULATION | Facility: OTHER | Age: 63
End: 2018-10-10
Payer: MEDICARE

## 2018-10-10 ENCOUNTER — TRANSFERRED RECORDS (OUTPATIENT)
Dept: HEALTH INFORMATION MANAGEMENT | Facility: CLINIC | Age: 63
End: 2018-10-10

## 2018-10-10 DIAGNOSIS — I26.99 PULMONARY EMBOLISM AND INFARCTION (H): ICD-10-CM

## 2018-10-10 LAB — INR PPP: 2.8

## 2018-10-10 RX ORDER — LOSARTAN POTASSIUM 50 MG/1
TABLET ORAL
Qty: 180 TABLET | Refills: 1 | Status: SHIPPED | OUTPATIENT
Start: 2018-10-10 | End: 2019-04-02

## 2018-10-10 NOTE — MR AVS SNAPSHOT
Cassy CHIU [place]   10/10/2018   Anticoagulation Therapy Visit    Description:  62 year old female   Provider:  Eliz Hartman NP   Department:  [facility] Anti Coagulation           INR as of 10/10/2018     Today's INR 2.8      Anticoagulation Summary as of 10/10/2018     INR goal 2.0-3.0   Today's INR 2.8   Full warfarin instructions 7.5 mg on Mon, Wed, Fri; 5 mg all other days   Next INR check 11/7/2018    Indications   Long-term (current) use of anticoagulants [Z79.01] [Z79.01]  Pulmonary embolism and infarction (H) [I26.99]  Deep vein thrombosis (DVT) (H) [I82.409] [I82.409]         October 2018 Details    Sun Mon Tue Wed Thu Fri Sat      1               2               3               4               5               6                 7               8               9               10      7.5 mg   See details      11      5 mg         12      7.5 mg         13      5 mg           14      5 mg         15      7.5 mg         16      5 mg         17      7.5 mg         18      5 mg         19      7.5 mg         20      5 mg           21      5 mg         22      7.5 mg         23      5 mg         24      7.5 mg         25      5 mg         26      7.5 mg         27      5 mg           28      5 mg         29      7.5 mg         30      5 mg         31      7.5 mg             Date Details   10/10 This INR check               How to take your warfarin dose     To take:  5 mg Take 1 of the 5 mg tablets.    To take:  7.5 mg Take 1.5 of the 5 mg tablets.           November 2018 Details    Sun Mon Tue Wed Thu Fri Sat         1      5 mg         2      7.5 mg         3      5 mg           4      5 mg         5      7.5 mg         6      5 mg         7            8               9               10                 11               12               13               14               15               16               17                 18               19               20               21               22               23                24                 25               26               27               28               29               30                 Date Details   No additional details    Date of next INR:  11/7/2018         How to take your warfarin dose     To take:  5 mg Take 1 of the 5 mg tablets.    To take:  7.5 mg Take 1.5 of the 5 mg tablets.

## 2018-10-10 NOTE — PROGRESS NOTES
ANTICOAGULATION FOLLOW-UP CLINIC VISIT    Patient Name:  Cassy Avila  Date:  10/10/2018  Contact Type:  Telephone    SUBJECTIVE:     Patient Findings     Positives No Problem Findings    Comments PST done and result faxed to warfarin clinic by Jorge. Call placed to patient and spoke to her re: INR result, warfarin dosing and INR recheck date. She verbalized understanding and has no questions. She states no bleeding/bruising and no changes in diet/activity. She started using CBD oil for arthritis which she states is oil from the stem or marijuana plants and is non prescription. She states she started using this approx 4 days ago.            OBJECTIVE    INR   Date Value Ref Range Status   10/10/2018 2.8  Final       ASSESSMENT / PLAN  INR assessment THER    Recheck INR In: 4 WEEKS    INR Location Home INR      Anticoagulation Summary as of 10/10/2018     INR goal 2.0-3.0   Today's INR 2.8   Warfarin maintenance plan 7.5 mg (5 mg x 1.5) on Mon, Wed, Fri; 5 mg (5 mg x 1) all other days   Full warfarin instructions 7.5 mg on Mon, Wed, Fri; 5 mg all other days   Weekly warfarin total 42.5 mg   No change documented Manju Escalera, RN   Plan last modified Iram Lord, RN (7/20/2016)   Next INR check 11/7/2018   Priority INR   Target end date Indefinite    Indications   Long-term (current) use of anticoagulants [Z79.01] [Z79.01]  Pulmonary embolism and infarction (H) [I26.99]  Deep vein thrombosis (DVT) (H) [I82.409] [I82.409]         Anticoagulation Episode Summary     INR check location Home Draw    Preferred lab     Send INR reminders to [facility] POOL    Comments PST - [facility].        Anticoagulation Care Providers     Provider Role Specialty Phone number    Eliz Hartman NP [facility] [phone]            See the Encounter Report to view Anticoagulation Flowsheet and Dosing Calendar (Go to Encounters tab in chart review, and find the Anticoagulation Therapy  Visit)        Manju Escalera RN

## 2018-10-24 ENCOUNTER — TRANSFERRED RECORDS (OUTPATIENT)
Dept: HEALTH INFORMATION MANAGEMENT | Facility: CLINIC | Age: 63
End: 2018-10-24

## 2018-10-24 ENCOUNTER — ANTICOAGULATION THERAPY VISIT (OUTPATIENT)
Dept: ANTICOAGULATION | Facility: OTHER | Age: 63
End: 2018-10-24
Payer: MEDICARE

## 2018-10-24 DIAGNOSIS — I26.99 PULMONARY EMBOLISM AND INFARCTION (H): ICD-10-CM

## 2018-10-24 LAB — INR PPP: 3.7

## 2018-10-24 NOTE — PROGRESS NOTES
ANTICOAGULATION FOLLOW-UP CLINIC VISIT    Patient Name:  Cassy Avila  Date:  10/24/2018  Contact Type:  Telephone/ message left on home phone voicemail    SUBJECTIVE:     Patient Findings     Comments PST done and result faxed to warfarin clinic by Jorge. Call placed to patient and message left re: INR result, warfarin dosing and PST recheck date. She is to call warfarin clinic if she has any bleeding/bruising, changes in diet/meds/activity or questions.            OBJECTIVE    INR   Date Value Ref Range Status   10/24/2018 3.7  Final       ASSESSMENT / PLAN  INR assessment SUPRA    Recheck INR In: 2 WEEKS    INR Location Home INR      Anticoagulation Summary as of 10/24/2018     INR goal 2.0-3.0   Today's INR 3.7!   Warfarin maintenance plan 7.5 mg (5 mg x 1.5) on Mon, Wed, Fri; 5 mg (5 mg x 1) all other days   Full warfarin instructions 10/24: Hold; 10/25: 2.5 mg; Otherwise 7.5 mg on Mon, Wed, Fri; 5 mg all other days   Weekly warfarin total 42.5 mg   Plan last modified Iram Lord RN (7/20/2016)   Next INR check 11/7/2018   Priority INR   Target end date Indefinite    Indications   Long-term (current) use of anticoagulants [Z79.01] [Z79.01]  Pulmonary embolism and infarction (H) [I26.99]  Deep vein thrombosis (DVT) (H) [I82.409] [I82.409]         Anticoagulation Episode Summary     INR check location Home Draw    Preferred lab     Send INR reminders to [facility] ANTICOAG POOL    Comments PST - [facility].        Anticoagulation Care Providers     Provider Role Specialty Phone number    Eliz Hartman NP [facility] [phone]            See the Encounter Report to view Anticoagulation Flowsheet and Dosing Calendar (Go to Encounters tab in chart review, and find the Anticoagulation Therapy Visit)        Manju Escalera, RN

## 2018-10-24 NOTE — MR AVS SNAPSHOT
Cassy [place]   10/24/2018   Anticoagulation Therapy Visit    Description:  63 year old female   Provider:  Eliz Hartman NP   Department:  [facility] Anti Coagulation           INR as of 10/24/2018     Today's INR 3.7!      Anticoagulation Summary as of 10/24/2018     INR goal 2.0-3.0   Today's INR 3.7!   Full warfarin instructions 10/24: Hold; 10/25: 2.5 mg; Otherwise 7.5 mg on Mon, Wed, Fri; 5 mg all other days   Next INR check 11/7/2018    Indications   Long-term (current) use of anticoagulants [Z79.01] [Z79.01]  Pulmonary embolism and infarction (H) [I26.99]  Deep vein thrombosis (DVT) (H) [I82.409] [I82.409]         October 2018 Details    Sun Mon Tue Wed Thu Fri Sat      1               2               3               4               5               6                 7               8               9               10               11               12               13                 14               15               16               17               18               19               20                 21               22               23               24      Hold   See details      25      2.5 mg         26      7.5 mg         27      5 mg           28      5 mg         29      7.5 mg         30      5 mg         31      7.5 mg             Date Details   10/24 This INR check               How to take your warfarin dose     To take:  2.5 mg Take 0.5 of a 5 mg tablet.    To take:  5 mg Take 1 of the 5 mg tablets.    To take:  7.5 mg Take 1.5 of the 5 mg tablets.    Hold Do not take your warfarin dose. See the Details table to the right for additional instructions.                November 2018 Details    Sun Mon Tue Wed Thu Fri Sat         1      5 mg         2      7.5 mg         3      5 mg           4      5 mg         5      7.5 mg         6      5 mg         7            8               9               10                 11               12               13               14               15               16                17                 18               19               20               21               22               23               24                 25               26               27               28               29               30                 Date Details   No additional details    Date of next INR:  11/7/2018         How to take your warfarin dose     To take:  5 mg Take 1 of the 5 mg tablets.    To take:  7.5 mg Take 1.5 of the 5 mg tablets.

## 2018-10-26 NOTE — PROGRESS NOTES
SUBJECTIVE:   Cassy Avila is a 63 year old female who presents to clinic today for the following health issues:            Concern - Fatigue  Onset: 2-3 months    Description:   Tired all the time, sleeps a lot, poor sleep quality at night    Intensity: severe    Progression of Symptoms:  same and constant    Accompanying Signs & Symptoms:      Previous history of similar problem:   no  Anemic in the past, many yrs ago  Therapies Tried and outcome: Rest        Musculoskeletal problem    Duration: 2 months    Description  Location: right thigh, 2 lumps, painful, constant, worse with pressure  Has neurofibromatosis     Intensity:  severe    Accompanying signs and symptoms: radiation of pain to foot    History  Previous similar problem: YES  Previous evaluation:  surgery    Precipitating or alleviating factors:  Trauma or overuse: YES  Aggravating factors include: walking, activity and pressure    Therapies tried and outcome: nothing    Has seen Dr. Reed at the [facility] (May 2016) - for excision of nerve sheath tumors      She fell on this area in August, sore since, she would like to give this some time before seeing neurosurgery again.        Hypertension Follow-up    Outpatient blood pressures are not being checked.    Low Salt Diet: not monitoring salt        Insomnia - nightly, tired all the time.  Awake until 3 am.      Depression Followup    Status since last visit: Stable     See PHQ-9 for current symptoms.  Other associated symptoms: None    Complicating factors:   Significant life event:  No   Current substance abuse:  None  Anxiety or Panic symptoms:  No        PHQ-9 SCORE 7/13/2018 7/13/2018 10/31/2018   Total Score - - -   Total Score 11 9 10     MARGA-7 SCORE 4/24/2018 7/13/2018 10/31/2018   Total Score 4 2 5           PHQ-9  English  PHQ-9   Any Language  Suicide Assessment Five-step Evaluation and Treatment (SAFE-T)          Amount of exercise or physical activity: None    Problems taking  medications regularly: No    Medication side effects: none    Diet: regular (no restrictions)         Problem list and histories reviewed & adjusted, as indicated.  Additional history: as documented    Patient Active Problem List   Diagnosis     Essential hypertension     Pulmonary embolism and infarction (H)     Contact dermatitis and other eczema, due to unspecified cause     Edema     Hyperlipidemia LDL goal <100     Neurofibromatosis (H)     Advanced care planning/counseling discussion     Moderate episode of recurrent major depressive disorder (H)     Long-term (current) use of anticoagulants [Z79.01]     Deep vein thrombosis (DVT) (H) [I82.409]     Lumbar spondylosis with myelopathy     Degeneration of lumbar or lumbosacral intervertebral disc     Family history of colon cancer     Statin medication not prescribed per physician orders     Vitamin D deficiency     Failed arthroplasty (H)     H/O total shoulder replacement, left     S/P shoulder surgery     Past Surgical History:   Procedure Laterality Date     ------------OTHER-------------      shoulder replacement; Provider: Karen     ARTHROPLASTY KNEE  2014    Procedure: ARTHROPLASTY KNEE;  Surgeon: Sean Alexander MD;  Location: [facility]     ARTHROSCOPY SHOULDER      right, bone spurs      SECTION      x3     CHOLECYSTECTOMY       COLONOSCOPY  02/15/2018    [place],,polyps     elbow ulnar tunnel release  2002     ELECTROTHERMAL THERAPY INTRADISC  2017    stimulator     ENDOSCOPIC SINUS SURGERY, SEPTOPLASTY, TURBINOPLASTY, MAXILLARY SINUSOTOMY, COMBINED N/A 2015    Procedure: COMBINED ENDOSCOPIC SINUS SURGERY, SEPTOPLASTY, TURBINOPLASTY, MAXILLARY SINUSOTOMY;  Surgeon: Seema Conn MD;  Location: [facility] OR     esophagastroduodenoscopy      with biopsy and endoscopic U/S     EXCISE NEUROMA LOWER EXTREMITY Left 2016    Procedure: EXCISE NEUROMA LOWER EXTREMITY;  Surgeon: Edi Reed MD;  Location:  OR      FUSION LUMBAR ANTERIOR WITH BAK CAGES      L5-S1     ORTHOPEDIC SURGERY  2-15    right shoulder     ORTHOPEDIC SURGERY  8/28/15    right knee     ORTHOPEDIC SURGERY Right 06/11/2018    hip labrum tear     pionidal cyst excision       SARAHI/BSO       TRANSPOSITION ULNAR NERVE (ELBOW)         Social History   Substance Use Topics     Smoking status: Former Smoker     Packs/day: 1.00     Years: 30.00     Types: Cigarettes, Pipe     Smokeless tobacco: Never Used      Comment: quit in 1999     Alcohol use No     Family History   Problem Relation Age of Onset     Cancer Mother      Colon Polyps Mother      Heart Failure Mother 87     congestive, cause of death     Myocardial Infarction Mother      myocardial infarction     Myocardial Infarction Father      myocardial infarction - cause of death     C.A.D. Father      Cancer Paternal Uncle      cause of death     C.A.D. Brother      Other - See Comments Other      factor 5 - family h/o     Asthma No family hx of          Current Outpatient Prescriptions   Medication Sig Dispense Refill     aspirin 81 MG EC tablet Take 81 mg by mouth daily HS       budesonide-formoterol (SYMBICORT) 80-4.5 MCG/ACT Inhaler Inhale 2 puffs into the lungs 2 times daily 1 Inhaler 1     Cholecalciferol (VITAMIN D3 PO) Take 3,000 mg by mouth daily AM       escitalopram (LEXAPRO) 20 MG tablet TAKE 1 TABLET BY MOUTH EVERY DAY AND 1 TABLET BY MOUTH EVERY DAY AS NEEDED 90 tablet 0     hydrochlorothiazide (HYDRODIURIL) 25 MG tablet TAKE 1 TABLET(25 MG) BY MOUTH DAILY 90 tablet 2     levalbuterol (XOPENEX) 1.25 MG/3ML neb solution NEBULIZE 1 VIAL EVERY 4 HOURS AS NEEDED FOR SHORTNESS OF BREATH, DYSPNEA, OR WHEEZING 1650 mL 0     losartan (COZAAR) 50 MG tablet TAKE 2 TABLETS BY MOUTH EVERY  tablet 1     metoprolol succinate (TOPROL-XL) 50 MG 24 hr tablet TAKE 1 AND ONE-HALF TABLETS BY MOUTH EVERY  tablet 2     omega 3 1000 MG CAPS Take 3 g by mouth daily  90 capsule      order for DME  Nebulizer machine and tubing    DX:  Bronchitis 1 Device 0     STATIN NOT PRESCRIBED, INTENTIONAL, Please choose reason not prescribed, below       UNABLE TO FIND CBD oil       vitamin B complex with vitamin C (VITAMIN  B COMPLEX) TABS tablet Take 1 tablet by mouth daily       warfarin (COUMADIN) 5 MG tablet 7.5mg Mon,Wed,Fri and 5mg all other days or as directed by warfarin clinic 150 tablet 3     Allergies   Allergen Reactions     Ace Inhibitors Cough     Amlodipine Besylate Swelling     Norvasc     Amoxicillin      Atorvastatin      myualgia     Cephalexin Monohydrate Hives     Keflex     Erythromycin Base [Kdc:Yellow Dye+Erythromycin+Brilliant Blue Fcf] Nausea and Vomiting     Meloxicam Other (See Comments)     Mobic - confusion, depression     Adhesive Tape Rash     Prochlorperazine Edisylate Swelling and Rash     Compazine     Prochlorperazine Maleate Swelling and Rash     Recent Labs   Lab Test  07/13/18   0840  05/30/18   0946   01/24/18   1336  01/17/18   0822  07/12/17   1117  03/14/17   2028  01/04/17   1459   01/11/16   0935   02/17/15   1149   11/05/13   1528   A1C   --    --    --    --    --    --    --    --    --    --    --    --    --   5.5   LDL   --    --    --    --   137*   --    --    --    --   126*   --   119   < >   --    HDL   --    --    --    --   43*   --    --    --    --   38*   --   36*   < >   --    TRIG   --    --    --    --   190*   --    --    --    --   265*   --   272*   < >   --    ALT   --    --    --    --    --   34  30  35   --    --    --    --    < >   --    CR  0.93  0.86   < >   --   0.91  0.91  0.97  1.07*   --   1.02   < >  0.78   < >   --    GFRESTIMATED  61  67   < >   --   62  63  58*  52*   --   55*   < >  75   < >   --    GFRESTBLACK  74  81   < >   --   75  76  70  63   --   67   < >  >90   GFR Calc     < >   --    POTASSIUM  3.3*  3.2*   < >   --   3.2*  3.6  3.6  3.5   < >  3.4   < >  4.0   < >   --    TSH  2.51   --    --   3.88  5.75*   --  "   --   1.63   --   3.62   < >   --    < >   --     < > = values in this interval not displayed.      BP Readings from Last 3 Encounters:   10/31/18 100/70   07/13/18 110/70   05/30/18 118/70    Wt Readings from Last 3 Encounters:   10/31/18 230 lb (104.3 kg)   07/13/18 225 lb (102.1 kg)   05/30/18 233 lb (105.7 kg)                  Labs reviewed in EPIC    Reviewed and updated as needed this visit by clinical staff  Tobacco  Allergies  Meds       Reviewed and updated as needed this visit by Provider           ROS:  Constitutional, HEENT, cardiovascular, pulmonary, GI, , musculoskeletal, neuro, skin, endocrine and psych systems are negative, except as otherwise noted.    OBJECTIVE:     /70 (BP Location: Right arm, Patient Position: Sitting, Cuff Size: Adult Large)  Pulse 60  Temp 97.5  F (36.4  C)  Resp 14  Ht 5' 5\" (1.651 m)  Wt 230 lb (104.3 kg)  BMI 38.27 kg/m2  Body mass index is 38.27 kg/(m^2).         GENERAL: healthy, alert and no distress  EYES: Eyes grossly normal to inspection, PERRL and conjunctivae and sclerae normal  HENT: ear canals and TM's normal, nose and mouth without ulcers or lesions  NECK: no adenopathy, no asymmetry, masses, or scars and thyroid normal to palpation  RESP: lungs clear to auscultation - no rales, rhonchi or wheezes  CV: regular rate and rhythm, normal S1 S2, no S3 or S4, no murmur, click or rub, no peripheral edema and peripheral pulses strong  ABDOMEN: soft, nontender, no hepatosplenomegaly, no masses and bowel sounds normal  MS: left outer thigh - two small mildly tender nodules   SKIN: no suspicious lesions or rashes  PSYCH: mentation appears normal, affect normal/bright        ASSESSMENT/PLAN:     Hypertension; controlled   Associated with the following complications:    None   Plan:  No changes in the patient's current treatment plan    Depression; recurrent episode-- Moderate   Associated with the following complications:    None   Plan:  No changes in the " patient's current treatment plan      1. Other fatigue  - CBC with platelets differential  - TSH with free T4 reflex    2. Vitamin D deficiency  - Vitamin D level  - D3 5000 U OTC    3. Essential hypertension  - Continue plan of care  - TSH with free T4 reflex  - Comprehensive metabolic panel    4. Moderate episode of recurrent major depressive disorder (H)  - Continue plan of care    5. Primary insomnia  - traZODone (DESYREL) 50 MG tablet; 1-2 po HS PRN  Dispense: 60 tablet; Refill: 5    6. Need for prophylactic vaccination and inoculation against influenza  - C RIV4 (FLUBLOK) VACCINE RECOMBINANT DNA PRSRV ANTIBIO FREE, IM  - ADMIN INFLUENZA (For MEDICARE Patients ONLY) []    7. Neurofibromatosis, peripheral  - Follow-up as needed, can re-refer to Neurosurgery at the [place]            Eliz Hartman, NORAH  [facility]

## 2018-10-31 ENCOUNTER — OFFICE VISIT (OUTPATIENT)
Dept: FAMILY MEDICINE | Facility: OTHER | Age: 63
End: 2018-10-31
Attending: FAMILY MEDICINE
Payer: MEDICARE

## 2018-10-31 VITALS
DIASTOLIC BLOOD PRESSURE: 70 MMHG | HEART RATE: 60 BPM | BODY MASS INDEX: 38.32 KG/M2 | TEMPERATURE: 97.5 F | RESPIRATION RATE: 14 BRPM | WEIGHT: 230 LBS | HEIGHT: 65 IN | SYSTOLIC BLOOD PRESSURE: 100 MMHG

## 2018-10-31 DIAGNOSIS — I10 ESSENTIAL HYPERTENSION: ICD-10-CM

## 2018-10-31 DIAGNOSIS — Z23 NEED FOR PROPHYLACTIC VACCINATION AND INOCULATION AGAINST INFLUENZA: ICD-10-CM

## 2018-10-31 DIAGNOSIS — R53.83 OTHER FATIGUE: Primary | ICD-10-CM

## 2018-10-31 DIAGNOSIS — E55.9 VITAMIN D DEFICIENCY: ICD-10-CM

## 2018-10-31 DIAGNOSIS — F51.01 PRIMARY INSOMNIA: ICD-10-CM

## 2018-10-31 DIAGNOSIS — F33.1 MODERATE EPISODE OF RECURRENT MAJOR DEPRESSIVE DISORDER (H): ICD-10-CM

## 2018-10-31 DIAGNOSIS — Q85.01 NEUROFIBROMATOSIS, PERIPHERAL, NF1 (H): ICD-10-CM

## 2018-10-31 LAB
ALBUMIN SERPL-MCNC: 3.7 G/DL (ref 3.4–5)
ALP SERPL-CCNC: 57 U/L (ref 40–150)
ALT SERPL W P-5'-P-CCNC: 35 U/L (ref 0–50)
ANION GAP SERPL CALCULATED.3IONS-SCNC: 7 MMOL/L (ref 3–14)
AST SERPL W P-5'-P-CCNC: 19 U/L (ref 0–45)
BASOPHILS # BLD AUTO: 0 10E9/L (ref 0–0.2)
BASOPHILS NFR BLD AUTO: 0.7 %
BILIRUB SERPL-MCNC: 0.4 MG/DL (ref 0.2–1.3)
BUN SERPL-MCNC: 11 MG/DL (ref 7–30)
CALCIUM SERPL-MCNC: 9 MG/DL (ref 8.5–10.1)
CHLORIDE SERPL-SCNC: 100 MMOL/L (ref 94–109)
CO2 SERPL-SCNC: 29 MMOL/L (ref 20–32)
CREAT SERPL-MCNC: 0.92 MG/DL (ref 0.52–1.04)
DIFFERENTIAL METHOD BLD: NORMAL
EOSINOPHIL # BLD AUTO: 0.1 10E9/L (ref 0–0.7)
EOSINOPHIL NFR BLD AUTO: 2 %
ERYTHROCYTE [DISTWIDTH] IN BLOOD BY AUTOMATED COUNT: 13.8 % (ref 10–15)
GFR SERPL CREATININE-BSD FRML MDRD: 61 ML/MIN/1.7M2
GLUCOSE SERPL-MCNC: 81 MG/DL (ref 70–99)
HCT VFR BLD AUTO: 40.3 % (ref 35–47)
HGB BLD-MCNC: 14.2 G/DL (ref 11.7–15.7)
LYMPHOCYTES # BLD AUTO: 1.9 10E9/L (ref 0.8–5.3)
LYMPHOCYTES NFR BLD AUTO: 30.8 %
MCH RBC QN AUTO: 30.3 PG (ref 26.5–33)
MCHC RBC AUTO-ENTMCNC: 35.2 G/DL (ref 31.5–36.5)
MCV RBC AUTO: 86 FL (ref 78–100)
MONOCYTES # BLD AUTO: 0.8 10E9/L (ref 0–1.3)
MONOCYTES NFR BLD AUTO: 12.6 %
NEUTROPHILS # BLD AUTO: 3.3 10E9/L (ref 1.6–8.3)
NEUTROPHILS NFR BLD AUTO: 53.9 %
PLATELET # BLD AUTO: 212 10E9/L (ref 150–450)
POTASSIUM SERPL-SCNC: 3.3 MMOL/L (ref 3.4–5.3)
PROT SERPL-MCNC: 7.5 G/DL (ref 6.8–8.8)
RBC # BLD AUTO: 4.69 10E12/L (ref 3.8–5.2)
SODIUM SERPL-SCNC: 136 MMOL/L (ref 133–144)
TSH SERPL DL<=0.005 MIU/L-ACNC: 2.32 MU/L (ref 0.4–4)
WBC # BLD AUTO: 6.1 10E9/L (ref 4–11)

## 2018-10-31 PROCEDURE — 99214 OFFICE O/P EST MOD 30 MIN: CPT | Performed by: NURSE PRACTITIONER

## 2018-10-31 PROCEDURE — G0008 ADMIN INFLUENZA VIRUS VAC: HCPCS | Performed by: NURSE PRACTITIONER

## 2018-10-31 PROCEDURE — 36415 COLL VENOUS BLD VENIPUNCTURE: CPT | Mod: ZL | Performed by: NURSE PRACTITIONER

## 2018-10-31 PROCEDURE — 85025 COMPLETE CBC W/AUTO DIFF WBC: CPT | Mod: ZL | Performed by: NURSE PRACTITIONER

## 2018-10-31 PROCEDURE — 80053 COMPREHEN METABOLIC PANEL: CPT | Mod: ZL | Performed by: NURSE PRACTITIONER

## 2018-10-31 PROCEDURE — 82306 VITAMIN D 25 HYDROXY: CPT | Mod: ZL | Performed by: NURSE PRACTITIONER

## 2018-10-31 PROCEDURE — 84443 ASSAY THYROID STIM HORMONE: CPT | Mod: ZL | Performed by: NURSE PRACTITIONER

## 2018-10-31 PROCEDURE — G0463 HOSPITAL OUTPT CLINIC VISIT: HCPCS | Mod: 25

## 2018-10-31 PROCEDURE — 90682 RIV4 VACC RECOMBINANT DNA IM: CPT | Performed by: NURSE PRACTITIONER

## 2018-10-31 RX ORDER — TRAZODONE HYDROCHLORIDE 50 MG/1
TABLET, FILM COATED ORAL
Qty: 60 TABLET | Refills: 5 | Status: SHIPPED | OUTPATIENT
Start: 2018-10-31 | End: 2018-10-31

## 2018-10-31 ASSESSMENT — ANXIETY QUESTIONNAIRES
IF YOU CHECKED OFF ANY PROBLEMS ON THIS QUESTIONNAIRE, HOW DIFFICULT HAVE THESE PROBLEMS MADE IT FOR YOU TO DO YOUR WORK, TAKE CARE OF THINGS AT HOME, OR GET ALONG WITH OTHER PEOPLE: NOT DIFFICULT AT ALL
5. BEING SO RESTLESS THAT IT IS HARD TO SIT STILL: NOT AT ALL
6. BECOMING EASILY ANNOYED OR IRRITABLE: SEVERAL DAYS
4. TROUBLE RELAXING: SEVERAL DAYS
1. FEELING NERVOUS, ANXIOUS, OR ON EDGE: SEVERAL DAYS
2. NOT BEING ABLE TO STOP OR CONTROL WORRYING: SEVERAL DAYS
GAD7 TOTAL SCORE: 5
3. WORRYING TOO MUCH ABOUT DIFFERENT THINGS: SEVERAL DAYS
7. FEELING AFRAID AS IF SOMETHING AWFUL MIGHT HAPPEN: NOT AT ALL

## 2018-10-31 ASSESSMENT — PATIENT HEALTH QUESTIONNAIRE - PHQ9: SUM OF ALL RESPONSES TO PHQ QUESTIONS 1-9: 10

## 2018-10-31 ASSESSMENT — PAIN SCALES - GENERAL: PAINLEVEL: SEVERE PAIN (6)

## 2018-10-31 NOTE — PROGRESS NOTES
Normal, please notify patient  D level pending    Eliz [facility]  [phone]

## 2018-10-31 NOTE — LETTER
My Depression Action Plan  Name: Cassy Avila   Date of Birth 1955  Date: 10/31/2018    My doctor: Eliz Hartman   My clinic: [facility]  [address]  [place] [address]  [phone]          GREEN    ZONE   Good Control    What it looks like:     Things are going generally well. You have normal up s and down s. You may even feel depressed from time to time, but bad moods usually last less than a day.   What you need to do:  1. Continue to care for yourself (see self care plan)  2. Check your depression survival kit and update it as needed  3. Follow your physician s recommendations including any medication.  4. Do not stop taking medication unless you consult with your physician first.           YELLOW         ZONE Getting Worse    What it looks like:     Depression is starting to interfere with your life.     It may be hard to get out of bed; you may be starting to isolate yourself from others.    Symptoms of depression are starting to last most all day and this has happened for several days.     You may have suicidal thoughts but they are not constant.   What you need to do:     1. Call your care team, your response to treatment will improve if you keep your care team informed of your progress. Yellow periods are signs an adjustment may need to be made.     2. Continue your self-care, even if you have to fake it!    3. Talk to someone in your support network    4. Open up your depression survival kit           RED    ZONE Medical Alert - Get Help    What it looks like:     Depression is seriously interfering with your life.     You may experience these or other symptoms: You can t get out of bed most days, can t work or engage in other necessary activities, you have trouble taking care of basic hygiene, or basic responsibilities, thoughts of suicide or death that will not go away, self-injurious behavior.     What you need to do:  1. Call your care team  and request a same-day appointment. If they are not available (weekends or after hours) call your local crisis line, emergency room or 911.            Depression Self Care Plan / Survival Kit    Self-Care for Depression  Here s the deal. Your body and mind are really not as separate as most people think.  What you do and think affects how you feel and how you feel influences what you do and think. This means if you do things that people who feel good do, it will help you feel better.  Sometimes this is all it takes.  There is also a place for medication and therapy depending on how severe your depression is, so be sure to consult with your medical provider and/ or Behavioral Health Consultant if your symptoms are worsening or not improving.     In order to better manage my stress, I will:    Exercise  Get some form of exercise, every day. This will help reduce pain and release endorphins, the  feel good  chemicals in your brain. This is almost as good as taking antidepressants!  This is not the same as joining a gym and then never going! (they count on that by the way ) It can be as simple as just going for a walk or doing some gardening, anything that will get you moving.      Hygiene   Maintain good hygiene (Get out of bed in the morning, Make your bed, Brush your teeth, Take a shower, and Get dressed like you were going to work, even if you are unemployed).  If your clothes don't fit try to get ones that do.    Diet  I will strive to eat foods that are good for me, drink plenty of water, and avoid excessive sugar, caffeine, alcohol, and other mood-altering substances.  Some foods that are helpful in depression are: complex carbohydrates, B vitamins, flaxseed, fish or fish oil, fresh fruits and vegetables.    Psychotherapy  I agree to participate in Individual Therapy (if recommended).    Medication  If prescribed medications, I agree to take them.  Missing doses can result in serious side effects.  I understand  that drinking alcohol, or other illicit drug use, may cause potential side effects.  I will not stop my medication abruptly without first discussing it with my provider.    Staying Connected With Others  I will stay in touch with my friends, family members, and my primary care provider/team.    Use your imagination  Be creative.  We all have a creative side; it doesn t matter if it s oil painting, sand castles, or mud pies! This will also kick up the endorphins.    Witness Beauty  (AKA stop and smell the roses) Take a look outside, even in mid-winter. Notice colors, textures. Watch the squirrels and birds.     Service to others  Be of service to others.  There is always someone else in need.  By helping others we can  get out of ourselves  and remember the really important things.  This also provides opportunities for practicing all the other parts of the program.    Humor  Laugh and be silly!  Adjust your TV habits for less news and crime-drama and more comedy.    Control your stress  Try breathing deep, massage therapy, biofeedback, and meditation. Find time to relax each day.     My support system    Clinic Contact:  Phone number:    Contact 1:  Phone number:    Contact 2:  Phone number:    Zoroastrianism/:  Phone number:    Therapist:  Phone number:    Local crisis center:    Phone number:    Other community support:  Phone number:

## 2018-10-31 NOTE — NURSING NOTE
"Chief Complaint   Patient presents with     Fatigue     Musculoskeletal Problem     Flu Shot       Initial /70 (BP Location: Right arm, Patient Position: Sitting, Cuff Size: Adult Large)  Pulse 60  Temp 97.5  F (36.4  C)  Resp 14  Ht 5' 5\" (1.651 m)  Wt 230 lb (104.3 kg)  BMI 38.27 kg/m2 Estimated body mass index is 38.27 kg/(m^2) as calculated from the following:    Height as of this encounter: 5' 5\" (1.651 m).    Weight as of this encounter: 230 lb (104.3 kg).  Medication Reconciliation: complete    Patria Rodriguez LPN    "

## 2018-10-31 NOTE — PROGRESS NOTES

## 2018-10-31 NOTE — MR AVS SNAPSHOT
After Visit Summary   10/31/2018    Cassy Avila    MRN: [id number]           Patient Information     Date Of Birth          1955        Visit Information        Provider Department      10/31/2018 11:15 AM Eliz Hartman NP [facility]        Today's Diagnoses     Other fatigue    -  1    Vitamin D deficiency        Essential hypertension        Moderate episode of recurrent major depressive disorder (H)        Primary insomnia        Need for prophylactic vaccination and inoculation against influenza        Neurofibromatosis, peripheral, NF1 (H)          Care Instructions    1. Other fatigue  - CBC with platelets differential  - TSH with free T4 reflex    2. Vitamin D deficiency  - Vitamin D level  - D3 5000 U OTC    3. Essential hypertension  - Continue plan of care  - TSH with free T4 reflex  - Comprehensive metabolic panel    4. Moderate episode of recurrent major depressive disorder (H)  - Continue plan of care    5. Primary insomnia  - traZODone (DESYREL) 50 MG tablet; 1-2 po HS PRN  Dispense: 60 tablet; Refill: 5    6. Need for prophylactic vaccination and inoculation against influenza  - C RIV4 (FLUBLOK) VACCINE RECOMBINANT DNA PRSRV ANTIBIO FREE, IM  - ADMIN INFLUENZA (For MEDICARE Patients ONLY) []    7. Neurofibromatosis, peripheral  - Follow-up as needed, can re-refer to Neurosurgery at the [place]            Eliz Hartman NP  [facility]          Follow-ups after your visit        Who to contact     If you have questions or need follow up information about today's clinic visit or your schedule please contact [facility] directly at [phone].  Normal or non-critical lab and imaging results will be communicated to you by MyChart, letter or phone within 4 business days after the clinic has received the results. If you do not hear from us within 7 days, please contact the clinic through MyChart or phone. If you  "have a critical or abnormal lab result, we will notify you by phone as soon as possible.  Submit refill requests through [employer] or call your pharmacy and they will forward the refill request to us. Please allow 3 business days for your refill to be completed.          Additional Information About Your Visit        [employer] Information     [employer] gives you secure access to your electronic health record. If you see a primary care provider, you can also send messages to your care team and make appointments. If you have questions, please call your primary care clinic.  If you do not have a primary care provider, please call [phone] and they will assist you.        Care EveryWhere ID     This is your Care EveryWhere ID. This could be used by other organizations to access your [facility] medical records  [id number]        Your Vitals Were     Pulse Temperature Respirations Height BMI (Body Mass Index)       60 97.5  F (36.4  C) 14 5' 5\" (1.651 m) 38.27 kg/m2        Blood Pressure from Last 3 Encounters:   10/31/18 100/70   07/13/18 110/70   05/30/18 118/70    Weight from Last 3 Encounters:   10/31/18 230 lb (104.3 kg)   07/13/18 225 lb (102.1 kg)   05/30/18 233 lb (105.7 kg)              We Performed the Following     ADMIN INFLUENZA (For MEDICARE Patients ONLY) []     C RIV4 (FLUBLOK) VACCINE RECOMBINANT DNA PRSRV ANTIBIO FREE, IM     CBC with platelets differential     Comprehensive metabolic panel     TSH with free T4 reflex     Vitamin D Deficiency          Today's Medication Changes          These changes are accurate as of 10/31/18 11:57 AM.  If you have any questions, ask your nurse or doctor.               Start taking these medicines.        Dose/Directions    traZODone 50 MG tablet   Commonly known as:  DESYREL   Used for:  Primary insomnia   Started by:  Eliz Hartman NP        1-2 po HS PRN   Quantity:  60 tablet   Refills:  5            Where to get your medicines      These medications [facility] sent to " [id number] [address] [place]  AT [facility] [address]  [address] [place] [address]     Phone:  [phone]     traZODone 50 MG tablet                Primary Care Provider Office Phone # Fax #    Eliz Hartman [phone]       [address] [place]  [facility] [address]        Equal Access to Services     DILAN PARRISH : Hadii aad ku hadasho Soomaali, waaxda luqadaha, qaybta kaalmada adeegyada, waxay idiin hayaan adeeg kharaberlin la'angie . So [facility] [phone].    ATENCIÓN: Si habla español, tiene a noriega disposición servicios gratuitos de asistencia lingüística. [facility] [phone].    We comply with applicable federal civil rights laws and [place] laws. We do not discriminate on the basis of race, color, national origin, age, disability, sex, sexual orientation, or gender identity.            Thank you!     Thank you for choosing [facility]  for your care. Our goal is always to provide you with excellent care. Hearing back from our patients is one way we can continue to improve our services. Please take a few minutes to complete the written survey that you may receive in the mail after your visit with us. Thank you!             Your Updated Medication List - Protect others around you: Learn how to safely use, store and throw away your medicines at www.disposemymeds.org.          This list is accurate as of 10/31/18 11:57 AM.  Always use your most recent med list.                   Brand Name Dispense Instructions for use Diagnosis    aspirin 81 MG EC tablet      Take 81 mg by mouth daily HS        budesonide-formoterol 80-4.5 MCG/ACT Inhaler    SYMBICORT    1 Inhaler    Inhale 2 puffs into the lungs 2 times daily    Bronchitis       escitalopram 20 MG tablet    LEXAPRO    90 tablet    TAKE 1 TABLET BY MOUTH EVERY DAY AND 1 TABLET BY MOUTH EVERY DAY AS NEEDED    Episode of recurrent major depressive disorder, unspecified  depression episode severity (H)       hydrochlorothiazide 25 MG tablet    HYDRODIURIL    90 tablet    TAKE 1 TABLET(25 MG) BY MOUTH DAILY    Essential hypertension       levalbuterol 1.25 MG/3ML neb solution    XOPENEX    1650 mL    NEBULIZE 1 VIAL EVERY 4 HOURS AS NEEDED FOR SHORTNESS OF BREATH, DYSPNEA, OR WHEEZING    Bronchitis       losartan 50 MG tablet    COZAAR    180 tablet    TAKE 2 TABLETS BY MOUTH EVERY DAY    Benign essential hypertension       metoprolol succinate 50 MG 24 hr tablet    TOPROL-XL    135 tablet    TAKE 1 AND ONE-HALF TABLETS BY MOUTH EVERY DAY    Essential hypertension       omega 3 1000 MG Caps     90 capsule    Take 3 g by mouth daily        order for DME     1 Device    Nebulizer machine and tubing  DX:  Bronchitis    Bronchitis       STATIN NOT PRESCRIBED (INTENTIONAL)      Please choose reason not prescribed, below    Hyperlipidemia LDL goal <100       traZODone 50 MG tablet    DESYREL    60 tablet    1-2 po HS PRN    Primary insomnia       UNABLE TO FIND      CBD oil        vitamin B complex with vitamin C Tabs tablet      Take 1 tablet by mouth daily        VITAMIN D3 PO      Take 3,000 mg by mouth daily AM        warfarin 5 MG tablet    COUMADIN    150 tablet    7.5mg Mon,Wed,Fri and 5mg all other days or as directed by warfarin clinic    Long-term (current) use of anticoagulants

## 2018-10-31 NOTE — PATIENT INSTRUCTIONS
1. Other fatigue  - CBC with platelets differential  - TSH with free T4 reflex    2. Vitamin D deficiency  - Vitamin D level  - D3 5000 U OTC    3. Essential hypertension  - Continue plan of care  - TSH with free T4 reflex  - Comprehensive metabolic panel    4. Moderate episode of recurrent major depressive disorder (H)  - Continue plan of care    5. Primary insomnia  - traZODone (DESYREL) 50 MG tablet; 1-2 po HS PRN  Dispense: 60 tablet; Refill: 5    6. Need for prophylactic vaccination and inoculation against influenza  - C RIV4 (FLUBLOK) VACCINE RECOMBINANT DNA PRSRV ANTIBIO FREE, IM  - ADMIN INFLUENZA (For MEDICARE Patients ONLY) []    7. Neurofibromatosis, peripheral  - Follow-up as needed, can re-refer to Neurosurgery at the [place]            Eliz Hartman NP  [facility]

## 2018-11-01 ASSESSMENT — ANXIETY QUESTIONNAIRES: GAD7 TOTAL SCORE: 5

## 2018-11-02 LAB — DEPRECATED CALCIDIOL+CALCIFEROL SERPL-MC: 49 UG/L (ref 20–75)

## 2018-11-07 ENCOUNTER — ANTICOAGULATION THERAPY VISIT (OUTPATIENT)
Dept: ANTICOAGULATION | Facility: OTHER | Age: 63
End: 2018-11-07
Payer: MEDICARE

## 2018-11-07 ENCOUNTER — TRANSFERRED RECORDS (OUTPATIENT)
Dept: HEALTH INFORMATION MANAGEMENT | Facility: CLINIC | Age: 63
End: 2018-11-07

## 2018-11-07 DIAGNOSIS — I26.99 PULMONARY EMBOLISM AND INFARCTION (H): ICD-10-CM

## 2018-11-07 LAB — INR PPP: 2

## 2018-11-07 NOTE — PROGRESS NOTES
ANTICOAGULATION FOLLOW-UP CLINIC VISIT    Patient Name:  Cassy Avila  Date:  11/7/2018  Contact Type:  Telephone    SUBJECTIVE:     Patient Findings     Positives Change in medications    Comments PSt done by patient and result called to and message left on warfarin clinic voicemail. Call returned to patient and spoke to her re: INR result, warfarin dosing and INR recheck date. She verbalized understanding and has no questions. She states no bleeding/bruising and no new changes in diet/activity. States she did quit taking the Aleve PM and was put on Trazodone instead.            OBJECTIVE    INR   Date Value Ref Range Status   11/07/2018 2.0  Final       ASSESSMENT / PLAN  INR assessment THER    Recheck INR In: 4 WEEKS    INR Location Home INR      Anticoagulation Summary as of 11/7/2018     INR goal 2.0-3.0   Today's INR 2.0   Warfarin maintenance plan 7.5 mg (5 mg x 1.5) on Mon, Wed, Fri; 5 mg (5 mg x 1) all other days   Full warfarin instructions 7.5 mg on Mon, Wed, Fri; 5 mg all other days   Weekly warfarin total 42.5 mg   Plan last modified Iram Lord RN (7/20/2016)   Next INR check 12/5/2018   Priority INR   Target end date Indefinite    Indications   Long-term (current) use of anticoagulants [Z79.01] [Z79.01]  Pulmonary embolism and infarction (H) [I26.99]  Deep vein thrombosis (DVT) (H) [I82.409] [I82.409]         Anticoagulation Episode Summary     INR check location Home Draw    Preferred lab     Send INR reminders to [facility] POOL    Comments PST - [facility].        Anticoagulation Care Providers     Provider Role Specialty Phone number    Eliz Hartman NP [facility] [phone]            See the Encounter Report to view Anticoagulation Flowsheet and Dosing Calendar (Go to Encounters tab in chart review, and find the Anticoagulation Therapy Visit)        Manju Escalera, RN

## 2018-11-07 NOTE — MR AVS SNAPSHOT
Cassy Shawkevon   11/7/2018   Anticoagulation Therapy Visit    Description:  63 year old female   Provider:  Eliz Hartman NP   Department:  [facility] Anti Coagulation           INR as of 11/7/2018     Today's INR 2.0      Anticoagulation Summary as of 11/7/2018     INR goal 2.0-3.0   Today's INR 2.0   Full warfarin instructions 7.5 mg on Mon, Wed, Fri; 5 mg all other days   Next INR check 12/5/2018    Indications   Long-term (current) use of anticoagulants [Z79.01] [Z79.01]  Pulmonary embolism and infarction (H) [I26.99]  Deep vein thrombosis (DVT) (H) [I82.409] [I82.409]         November 2018 Details    Sun Mon Tue Wed Thu Fri Sat         1               2               3                 4               5               6               7      7.5 mg   See details      8      5 mg         9      7.5 mg         10      5 mg           11      5 mg         12      7.5 mg         13      5 mg         14      7.5 mg         15      5 mg         16      7.5 mg         17      5 mg           18      5 mg         19      7.5 mg         20      5 mg         21      7.5 mg         22      5 mg         23      7.5 mg         24      5 mg           25      5 mg         26      7.5 mg         27      5 mg         28      7.5 mg         29      5 mg         30      7.5 mg           Date Details   11/07 This INR check               How to take your warfarin dose     To take:  5 mg Take 1 of the 5 mg tablets.    To take:  7.5 mg Take 1.5 of the 5 mg tablets.           December 2018 Details    Sun Mon Tue Wed Thu Fri Sat           1      5 mg           2      5 mg         3      7.5 mg         4      5 mg         5            6               7               8                 9               10               11               12               13               14               15                 16               17               18               19               20               21               22                 23               24                25               26               27               28               29                 30               31                     Date Details   No additional details    Date of next INR:  12/5/2018         How to take your warfarin dose     To take:  5 mg Take 1 of the 5 mg tablets.    To take:  7.5 mg Take 1.5 of the 5 mg tablets.

## 2018-11-20 ENCOUNTER — ANTICOAGULATION THERAPY VISIT (OUTPATIENT)
Dept: ANTICOAGULATION | Facility: OTHER | Age: 63
End: 2018-11-20
Payer: MEDICARE

## 2018-11-20 ENCOUNTER — TRANSFERRED RECORDS (OUTPATIENT)
Dept: HEALTH INFORMATION MANAGEMENT | Facility: CLINIC | Age: 63
End: 2018-11-20

## 2018-11-20 DIAGNOSIS — I26.99 PULMONARY EMBOLISM AND INFARCTION (H): ICD-10-CM

## 2018-11-20 DIAGNOSIS — I82.409 DEEP VEIN THROMBOSIS (DVT) (H): ICD-10-CM

## 2018-11-20 LAB — INR PPP: 3.9

## 2018-11-20 NOTE — PROGRESS NOTES
"  ANTICOAGULATION FOLLOW-UP CLINIC VISIT    Patient Name:  Cassy Avila  Date:  11/20/2018  Contact Type:  Telephone    SUBJECTIVE:     Patient Findings     Positives OTC meds    Comments PST done and result faxed to warfarin clinic by vishal. Call placed to patient home phone and message left for her to call warfarin clinic as I don't know if she got her new testing strips or if she is using the old ones that were recalled. I did reach patient on her cell phone and we spoke re: INR result, warfarin dosing and INR recheck date. She states she is taking a green coffee pill and another \"Keto\" pill which are new. She is also using CBD oil which she has been using for a short time now. She verbalized understanding of warfarin dosing decrease and recheck date and has no questions. She will continue to do what she is currently doing.            OBJECTIVE    INR   Date Value Ref Range Status   11/20/2018 3.9  Final       ASSESSMENT / PLAN  INR assessment SUPRA New OTC meds   Recheck INR In: 2 WEEKS    INR Location Home INR      Anticoagulation Summary as of 11/20/2018     INR goal 2.0-3.0   Today's INR 3.9!   Warfarin maintenance plan 7.5 mg (5 mg x 1.5) on Mon, Fri; 5 mg (5 mg x 1) all other days   Full warfarin instructions 11/20: Hold; 11/21: 5 mg; Otherwise 7.5 mg on Mon, Fri; 5 mg all other days   Weekly warfarin total 40 mg   Plan last modified Manju Escalera RN (11/20/2018)   Next INR check 12/4/2018   Priority INR   Target end date Indefinite    Indications   Long-term (current) use of anticoagulants [Z79.01] [Z79.01]  Pulmonary embolism and infarction (H) [I26.99]  Deep vein thrombosis (DVT) (H) [I82.409] [I82.409]         Anticoagulation Episode Summary     INR check location Home Draw    Preferred lab     Send INR reminders to  ANTICOAG POOL    Comments PST - [facility].        Anticoagulation Care Providers     Provider Role Specialty Phone number    Eilz Hartman NP [facility] " [phone]            See the Encounter Report to view Anticoagulation Flowsheet and Dosing Calendar (Go to Encounters tab in chart review, and find the Anticoagulation Therapy Visit)        Manju Escalera RN

## 2018-11-20 NOTE — MR AVS SNAPSHOT
Cassy Shawkevon   11/20/2018   Anticoagulation Therapy Visit    Description:  63 year old female   Provider:  Eliz Hartman NP   Department:  [facility] Anti Coagulation           INR as of 11/20/2018     Today's INR 3.9!      Anticoagulation Summary as of 11/20/2018     INR goal 2.0-3.0   Today's INR 3.9!   Full warfarin instructions 11/20: Hold; 11/21: 5 mg; Otherwise 7.5 mg on Mon, Fri; 5 mg all other days   Next INR check 12/4/2018    Indications   Long-term (current) use of anticoagulants [Z79.01] [Z79.01]  Pulmonary embolism and infarction (H) [I26.99]  Deep vein thrombosis (DVT) (H) [I82.409] [I82.409]         November 2018 Details    Sun Mon Tue Wed Thu Fri Sat         1               2               3                 4               5               6               7               8               9               10                 11               12               13               14               15               16               17                 18               19               20      Hold   See details      21      5 mg         22      5 mg         23      7.5 mg         24      5 mg           25      5 mg         26      7.5 mg         27      5 mg         28      5 mg         29      5 mg         30      7.5 mg           Date Details   11/20 This INR check               How to take your warfarin dose     To take:  5 mg Take 1 of the 5 mg tablets.    To take:  7.5 mg Take 1.5 of the 5 mg tablets.    Hold Do not take your warfarin dose. See the Details table to the right for additional instructions.                December 2018 Details    Sun Mon Tue Wed Thu Fri Sat           1      5 mg           2      5 mg         3      7.5 mg         4            5               6               7               8                 9               10               11               12               13               14               15                 16               17               18               19               20                21               22                 23               24               25               26               27               28               29                 30               31                     Date Details   No additional details    Date of next INR:  12/4/2018         How to take your warfarin dose     To take:  5 mg Take 1 of the 5 mg tablets.    To take:  7.5 mg Take 1.5 of the 5 mg tablets.

## 2018-12-04 ENCOUNTER — ANTICOAGULATION THERAPY VISIT (OUTPATIENT)
Dept: ANTICOAGULATION | Facility: OTHER | Age: 63
End: 2018-12-04
Payer: MEDICARE

## 2018-12-04 ENCOUNTER — TRANSFERRED RECORDS (OUTPATIENT)
Dept: HEALTH INFORMATION MANAGEMENT | Facility: CLINIC | Age: 63
End: 2018-12-04

## 2018-12-04 DIAGNOSIS — I26.99 PULMONARY EMBOLISM AND INFARCTION (H): ICD-10-CM

## 2018-12-04 DIAGNOSIS — I82.409 DEEP VEIN THROMBOSIS (DVT) (H): ICD-10-CM

## 2018-12-04 DIAGNOSIS — I10 ESSENTIAL HYPERTENSION: ICD-10-CM

## 2018-12-04 LAB — INR PPP: 2.5

## 2018-12-04 NOTE — PROGRESS NOTES
ANTICOAGULATION FOLLOW-UP CLINIC VISIT    Patient Name:  Cassy Avial  Date:  12/4/2018  Contact Type:  Telephone/ message left on home phone voicemail    SUBJECTIVE:     Patient Findings     Positives No Problem Findings    Comments PSt done and result faxed to warfarin clinic by Jorge. Call placed to patient and message left on home phone voicemail re: INR result, warfarin dosing and INR recheck date. She is to call warfarin clinic if she has any bleeding/bruising, changes in diet/meds/activity or questions.            OBJECTIVE    INR   Date Value Ref Range Status   12/04/2018 2.5  Final       ASSESSMENT / PLAN  INR assessment THER    Recheck INR In: 2 WEEKS    INR Location Home INR      Anticoagulation Summary as of 12/4/2018     INR goal 2.0-3.0   Today's INR 2.5   Warfarin maintenance plan 7.5 mg (5 mg x 1.5) on Mon, Fri; 5 mg (5 mg x 1) all other days   Full warfarin instructions 7.5 mg on Mon, Fri; 5 mg all other days   Weekly warfarin total 40 mg   No change documented Manju Escalera RN   Plan last modified Manju Escalera RN (11/20/2018)   Next INR check 12/18/2018   Priority INR   Target end date Indefinite    Indications   Long-term (current) use of anticoagulants [Z79.01] [Z79.01]  Pulmonary embolism and infarction (H) [I26.99]  Deep vein thrombosis (DVT) (H) [I82.409] [I82.409]         Anticoagulation Episode Summary     INR check location Home Draw    Preferred lab     Send INR reminders to [facility] POOL    Comments PST - [facility].        Anticoagulation Care Providers     Provider Role Specialty Phone number    Eliz Hartman NP [facility] [phone]            See the Encounter Report to view Anticoagulation Flowsheet and Dosing Calendar (Go to Encounters tab in chart review, and find the Anticoagulation Therapy Visit)        Manju Escalera RN

## 2018-12-04 NOTE — MR AVS SNAPSHOT
Cassy Shawkevon   12/4/2018   Anticoagulation Therapy Visit    Description:  63 year old female   Provider:  Eliz Hartman NP   Department:  [facility] Anti Coagulation           INR as of 12/4/2018     Today's INR 2.5      Anticoagulation Summary as of 12/4/2018     INR goal 2.0-3.0   Today's INR 2.5   Full warfarin instructions 7.5 mg on Mon, Fri; 5 mg all other days   Next INR check 12/18/2018    Indications   Long-term (current) use of anticoagulants [Z79.01] [Z79.01]  Pulmonary embolism and infarction (H) [I26.99]  Deep vein thrombosis (DVT) (H) [I82.409] [I82.409]         December 2018 Details    Sun Mon Tue Wed Thu Fri Sat           1                 2               3               4      5 mg   See details      5      5 mg         6      5 mg         7      7.5 mg         8      5 mg           9      5 mg         10      7.5 mg         11      5 mg         12      5 mg         13      5 mg         14      7.5 mg         15      5 mg           16      5 mg         17      7.5 mg         18            19               20               21               22                 23               24               25               26               27               28               29                 30               31                     Date Details   12/04 This INR check       Date of next INR:  12/18/2018         How to take your warfarin dose     To take:  5 mg Take 1 of the 5 mg tablets.    To take:  7.5 mg Take 1.5 of the 5 mg tablets.

## 2018-12-05 RX ORDER — HYDROCHLOROTHIAZIDE 25 MG/1
TABLET ORAL
Qty: 90 TABLET | Refills: 0 | Status: SHIPPED | OUTPATIENT
Start: 2018-12-05 | End: 2019-02-27

## 2018-12-18 ENCOUNTER — ANTICOAGULATION THERAPY VISIT (OUTPATIENT)
Dept: ANTICOAGULATION | Facility: OTHER | Age: 63
End: 2018-12-18
Payer: MEDICARE

## 2018-12-18 ENCOUNTER — TRANSFERRED RECORDS (OUTPATIENT)
Dept: HEALTH INFORMATION MANAGEMENT | Facility: CLINIC | Age: 63
End: 2018-12-18

## 2018-12-18 DIAGNOSIS — I82.409 DEEP VEIN THROMBOSIS (DVT) (H): ICD-10-CM

## 2018-12-18 DIAGNOSIS — I26.99 PULMONARY EMBOLISM AND INFARCTION (H): ICD-10-CM

## 2018-12-18 LAB — INR PPP: 2.4

## 2018-12-18 NOTE — PROGRESS NOTES
ANTICOAGULATION FOLLOW-UP CLINIC VISIT    Patient Name:  Cassy Avila  Date:  2018  Contact Type:  Telephone    SUBJECTIVE:     Patient Findings     Comments:   PST done and result faxed to warfarin clinic by vishal. Call placed to patient and spoke to her re: INR result, warfarin dosing and INR recheck date. She verbalized understanding and has no questions. She has no bleeding/bruising, no changes in diet/meds/activity.            OBJECTIVE    INR   Date Value Ref Range Status   2018 2.4  Final       ASSESSMENT / PLAN  INR assessment THER    Recheck INR In: 4 WEEKS    INR Location Home INR      Anticoagulation Summary  As of 2018    INR goal:   2.0-3.0   TTR:   79.4 % (2.4 y)   INR used for dosin.4 (2018)   Warfarin maintenance plan:   7.5 mg (5 mg x 1.5) every Mon, Fri; 5 mg (5 mg x 1) all other days   Full warfarin instructions:   7.5 mg every Mon, Fri; 5 mg all other days   Weekly warfarin total:   40 mg   No change documented:   Manju Escalera RN   Plan last modified:   Manju Escalera RN (2018)   Next INR check:   1/15/2019   Priority:   INR   Target end date:   Indefinite    Indications    Long-term (current) use of anticoagulants [Z79.01] [Z79.01]  Pulmonary embolism and infarction (H) [I26.99]  Deep vein thrombosis (DVT) (H) [I82.409] [I82.409]             Anticoagulation Episode Summary     INR check location:   Home Draw    Preferred lab:       Send INR reminders to:   [facility]TICOAG POOL    Comments:   PST - [facility].        Anticoagulation Care Providers     Provider Role Specialty Phone number    Eliz Hartman NP [facility] [phone]            See the Encounter Report to view Anticoagulation Flowsheet and Dosing Calendar (Go to Encounters tab in chart review, and find the Anticoagulation Therapy Visit)        Manju Escalera RN

## 2018-12-21 ENCOUNTER — TELEPHONE (OUTPATIENT)
Dept: FAMILY MEDICINE | Facility: OTHER | Age: 63
End: 2018-12-21

## 2018-12-21 ASSESSMENT — ANXIETY QUESTIONNAIRES
4. TROUBLE RELAXING: NOT AT ALL
1. FEELING NERVOUS, ANXIOUS, OR ON EDGE: NOT AT ALL
6. BECOMING EASILY ANNOYED OR IRRITABLE: SEVERAL DAYS
5. BEING SO RESTLESS THAT IT IS HARD TO SIT STILL: NOT AT ALL
GAD7 TOTAL SCORE: 2
IF YOU CHECKED OFF ANY PROBLEMS ON THIS QUESTIONNAIRE, HOW DIFFICULT HAVE THESE PROBLEMS MADE IT FOR YOU TO DO YOUR WORK, TAKE CARE OF THINGS AT HOME, OR GET ALONG WITH OTHER PEOPLE: NOT DIFFICULT AT ALL
2. NOT BEING ABLE TO STOP OR CONTROL WORRYING: NOT AT ALL
7. FEELING AFRAID AS IF SOMETHING AWFUL MIGHT HAPPEN: NOT AT ALL
3. WORRYING TOO MUCH ABOUT DIFFERENT THINGS: SEVERAL DAYS

## 2018-12-21 ASSESSMENT — PATIENT HEALTH QUESTIONNAIRE - PHQ9: SUM OF ALL RESPONSES TO PHQ QUESTIONS 1-9: 1

## 2018-12-22 ASSESSMENT — ANXIETY QUESTIONNAIRES: GAD7 TOTAL SCORE: 2

## 2018-12-31 ENCOUNTER — APPOINTMENT (OUTPATIENT)
Dept: CT IMAGING | Facility: HOSPITAL | Age: 63
End: 2018-12-31
Attending: INTERNAL MEDICINE
Payer: MEDICARE

## 2018-12-31 ENCOUNTER — HOSPITAL ENCOUNTER (EMERGENCY)
Facility: HOSPITAL | Age: 63
Discharge: HOME | End: 2018-12-31
Attending: INTERNAL MEDICINE | Admitting: INTERNAL MEDICINE
Payer: MEDICARE

## 2018-12-31 VITALS
SYSTOLIC BLOOD PRESSURE: 127 MMHG | OXYGEN SATURATION: 96 % | HEART RATE: 69 BPM | TEMPERATURE: 98 F | RESPIRATION RATE: 18 BRPM | DIASTOLIC BLOOD PRESSURE: 70 MMHG

## 2018-12-31 DIAGNOSIS — S76.011A STRAIN OF RIGHT HIP AND THIGH, INITIAL ENCOUNTER: ICD-10-CM

## 2018-12-31 DIAGNOSIS — S76.911A STRAIN OF RIGHT HIP AND THIGH, INITIAL ENCOUNTER: ICD-10-CM

## 2018-12-31 LAB
ALBUMIN SERPL-MCNC: 3.8 G/DL (ref 3.4–5)
ALBUMIN UR-MCNC: NEGATIVE MG/DL
ALP SERPL-CCNC: 63 U/L (ref 40–150)
ALT SERPL W P-5'-P-CCNC: 35 U/L (ref 0–50)
ANION GAP SERPL CALCULATED.3IONS-SCNC: 8 MMOL/L (ref 3–14)
APPEARANCE UR: CLEAR
AST SERPL W P-5'-P-CCNC: 17 U/L (ref 0–45)
BACTERIA #/AREA URNS HPF: ABNORMAL /HPF
BASOPHILS # BLD AUTO: 0.1 10E9/L (ref 0–0.2)
BASOPHILS NFR BLD AUTO: 0.9 %
BILIRUB SERPL-MCNC: 0.4 MG/DL (ref 0.2–1.3)
BILIRUB UR QL STRIP: NEGATIVE
BUN SERPL-MCNC: 13 MG/DL (ref 7–30)
CALCIUM SERPL-MCNC: 9.6 MG/DL (ref 8.5–10.1)
CHLORIDE SERPL-SCNC: 105 MMOL/L (ref 94–109)
CO2 SERPL-SCNC: 27 MMOL/L (ref 20–32)
COLOR UR AUTO: YELLOW
CREAT SERPL-MCNC: 0.89 MG/DL (ref 0.52–1.04)
DIFFERENTIAL METHOD BLD: NORMAL
EOSINOPHIL # BLD AUTO: 0.1 10E9/L (ref 0–0.7)
EOSINOPHIL NFR BLD AUTO: 2.2 %
ERYTHROCYTE [DISTWIDTH] IN BLOOD BY AUTOMATED COUNT: 13.7 % (ref 10–15)
GFR SERPL CREATININE-BSD FRML MDRD: 68 ML/MIN/{1.73_M2}
GLUCOSE SERPL-MCNC: 92 MG/DL (ref 70–99)
GLUCOSE UR STRIP-MCNC: NEGATIVE MG/DL
HCT VFR BLD AUTO: 40 % (ref 35–47)
HGB BLD-MCNC: 14.1 G/DL (ref 11.7–15.7)
HGB UR QL STRIP: NEGATIVE
IMM GRANULOCYTES # BLD: 0 10E9/L (ref 0–0.4)
IMM GRANULOCYTES NFR BLD: 0.4 %
INR PPP: 1.93 (ref 0.8–1.2)
KETONES UR STRIP-MCNC: NEGATIVE MG/DL
LEUKOCYTE ESTERASE UR QL STRIP: ABNORMAL
LIPASE SERPL-CCNC: 169 U/L (ref 73–393)
LYMPHOCYTES # BLD AUTO: 2 10E9/L (ref 0.8–5.3)
LYMPHOCYTES NFR BLD AUTO: 36.6 %
MCH RBC QN AUTO: 29.7 PG (ref 26.5–33)
MCHC RBC AUTO-ENTMCNC: 35.3 G/DL (ref 31.5–36.5)
MCV RBC AUTO: 84 FL (ref 78–100)
MONOCYTES # BLD AUTO: 0.6 10E9/L (ref 0–1.3)
MONOCYTES NFR BLD AUTO: 10.6 %
MUCOUS THREADS #/AREA URNS LPF: PRESENT /LPF
NEUTROPHILS # BLD AUTO: 2.7 10E9/L (ref 1.6–8.3)
NEUTROPHILS NFR BLD AUTO: 49.3 %
NITRATE UR QL: NEGATIVE
NRBC # BLD AUTO: 0 10*3/UL
NRBC BLD AUTO-RTO: 0 /100
PH UR STRIP: 5.5 PH (ref 4.7–8)
PLATELET # BLD AUTO: 214 10E9/L (ref 150–450)
POTASSIUM SERPL-SCNC: 3.6 MMOL/L (ref 3.4–5.3)
PROT SERPL-MCNC: 7.3 G/DL (ref 6.8–8.8)
RBC # BLD AUTO: 4.74 10E12/L (ref 3.8–5.2)
RBC #/AREA URNS AUTO: 2 /HPF (ref 0–2)
SODIUM SERPL-SCNC: 140 MMOL/L (ref 133–144)
SOURCE: ABNORMAL
SP GR UR STRIP: 1.02 (ref 1–1.03)
SQUAMOUS #/AREA URNS AUTO: 4 /HPF (ref 0–1)
UROBILINOGEN UR STRIP-MCNC: NORMAL MG/DL (ref 0–2)
WBC # BLD AUTO: 5.5 10E9/L (ref 4–11)
WBC #/AREA URNS AUTO: 3 /HPF (ref 0–5)

## 2018-12-31 PROCEDURE — 83690 ASSAY OF LIPASE: CPT | Performed by: INTERNAL MEDICINE

## 2018-12-31 PROCEDURE — 85025 COMPLETE CBC W/AUTO DIFF WBC: CPT | Performed by: INTERNAL MEDICINE

## 2018-12-31 PROCEDURE — 74176 CT ABD & PELVIS W/O CONTRAST: CPT | Mod: TC

## 2018-12-31 PROCEDURE — 99285 EMERGENCY DEPT VISIT HI MDM: CPT | Mod: Z6 | Performed by: INTERNAL MEDICINE

## 2018-12-31 PROCEDURE — 80053 COMPREHEN METABOLIC PANEL: CPT | Performed by: INTERNAL MEDICINE

## 2018-12-31 PROCEDURE — 25000128 H RX IP 250 OP 636: Performed by: INTERNAL MEDICINE

## 2018-12-31 PROCEDURE — 96374 THER/PROPH/DIAG INJ IV PUSH: CPT

## 2018-12-31 PROCEDURE — 85610 PROTHROMBIN TIME: CPT | Performed by: INTERNAL MEDICINE

## 2018-12-31 PROCEDURE — 36415 COLL VENOUS BLD VENIPUNCTURE: CPT | Performed by: INTERNAL MEDICINE

## 2018-12-31 PROCEDURE — 99285 EMERGENCY DEPT VISIT HI MDM: CPT | Mod: 25

## 2018-12-31 PROCEDURE — 81001 URINALYSIS AUTO W/SCOPE: CPT | Performed by: INTERNAL MEDICINE

## 2018-12-31 PROCEDURE — 96375 TX/PRO/DX INJ NEW DRUG ADDON: CPT

## 2018-12-31 RX ORDER — ONDANSETRON 2 MG/ML
4 INJECTION INTRAMUSCULAR; INTRAVENOUS ONCE
Status: COMPLETED | OUTPATIENT
Start: 2018-12-31 | End: 2018-12-31

## 2018-12-31 RX ORDER — MORPHINE SULFATE 2 MG/ML
4 INJECTION, SOLUTION INTRAMUSCULAR; INTRAVENOUS ONCE
Status: COMPLETED | OUTPATIENT
Start: 2018-12-31 | End: 2018-12-31

## 2018-12-31 RX ORDER — CYCLOBENZAPRINE HCL 10 MG
10 TABLET ORAL 3 TIMES DAILY PRN
Qty: 20 TABLET | Refills: 0 | Status: SHIPPED | OUTPATIENT
Start: 2018-12-31 | End: 2019-02-04

## 2018-12-31 RX ORDER — ONDANSETRON 2 MG/ML
8 INJECTION INTRAMUSCULAR; INTRAVENOUS ONCE
Status: DISCONTINUED | OUTPATIENT
Start: 2018-12-31 | End: 2018-12-31

## 2018-12-31 RX ADMIN — MORPHINE SULFATE 4 MG: 2 INJECTION, SOLUTION INTRAMUSCULAR; INTRAVENOUS at 07:23

## 2018-12-31 RX ADMIN — ONDANSETRON 4 MG: 2 INJECTION INTRAMUSCULAR; INTRAVENOUS at 07:23

## 2018-12-31 RX ADMIN — SODIUM CHLORIDE 1000 ML: 9 INJECTION, SOLUTION INTRAVENOUS at 07:23

## 2018-12-31 ASSESSMENT — ENCOUNTER SYMPTOMS
VOMITING: 0
DIZZINESS: 0
CHEST TIGHTNESS: 0
ABDOMINAL DISTENTION: 0
LIGHT-HEADEDNESS: 0
BLOOD IN STOOL: 0
COUGH: 0
NECK PAIN: 0
PALPITATIONS: 0
NUMBNESS: 0
NAUSEA: 1
DYSURIA: 0
WOUND: 0
SHORTNESS OF BREATH: 0
COLOR CHANGE: 0
VOICE CHANGE: 0
MYALGIAS: 0
FLANK PAIN: 0
ANAL BLEEDING: 0
CONFUSION: 0
DIAPHORESIS: 0
CHILLS: 0
BACK PAIN: 0
NECK STIFFNESS: 0
HEADACHES: 0
FEVER: 0
ARTHRALGIAS: 0
WHEEZING: 0
ABDOMINAL PAIN: 1
HEMATURIA: 0

## 2018-12-31 NOTE — ED PROVIDER NOTES
I assumed care at change of shift.  CT and labs unremarkable.  She reports right hip pain that started when s he got up out of a chair, worsens when she moves.  Is tender over right lower abdomen just over pelvic rim.  Discharge to home with flexeril, she had hip surgery 2 months ago.       Ce Luna MD  12/31/18 0932      Dx:  Hip strain     Ce Luna MD  01/08/19 2030

## 2018-12-31 NOTE — ED AVS SNAPSHOT
[facility]  [address] [address]  Phone:  [phone]                                    Cassy Avila   MRN: [id number]    Department:  [facility]   Date of Visit:  12/31/2018           After Visit Summary Signature Page    I have received my discharge instructions, and my questions have been answered. I have discussed any challenges I see with this plan with the nurse or doctor.    ..........................................................................................................................................  Patient/Patient Representative Signature      ..........................................................................................................................................  Patient Representative Print Name and Relationship to Patient    ..................................................               ................................................  Date                                   Time    ..........................................................................................................................................  Reviewed by Signature/Title    ...................................................              ..............................................  Date                                               Time          22EPIC Rev 08/18

## 2018-12-31 NOTE — ED PROVIDER NOTES
History     Chief Complaint   Patient presents with     Flank Pain     The history is provided by the patient.   Abdominal Pain   Pain location:  R flank  Pain quality: cramping    Pain radiates to:  RLQ  Pain severity:  Severe  Onset quality:  Gradual  Duration:  4 days  Timing:  Intermittent  Progression:  Waxing and waning  Chronicity:  New  Associated symptoms: nausea    Associated symptoms: no chest pain, no chills, no cough, no dysuria, no fever, no hematuria, no shortness of breath and no vomiting        Problem List:    Patient Active Problem List    Diagnosis Date Noted     Primary insomnia 10/31/2018     Priority: Medium     Vitamin D deficiency 07/13/2018     Priority: Medium     Statin medication not prescribed per physician orders 01/17/2018     Priority: Medium     Family history of colon cancer 01/02/2018     Priority: Medium     Lumbar spondylosis with myelopathy 07/12/2017     Priority: Medium     Degeneration of lumbar or lumbosacral intervertebral disc 07/12/2017     Priority: Medium     Long-term (current) use of anticoagulants [Z79.01] 07/20/2016     Priority: Medium     Deep vein thrombosis (DVT) (H) [I82.409] 07/20/2016     Priority: Medium     S/P shoulder surgery 03/16/2015     Priority: Medium     Moderate episode of recurrent major depressive disorder (H) 08/08/2014     Priority: Medium     H/O total shoulder replacement, left 06/17/2014     Priority: Medium     Failed arthroplasty (H) 01/24/2014     Priority: Medium     Advanced care planning/counseling discussion 03/12/2013     Priority: Medium     Pt has information at home , not completed       Neurofibromatosis, peripheral, NF1 (H) 08/22/2012     Priority: Medium     Problem list name updated by automated process. Provider to review       Contact dermatitis and other eczema, due to unspecified cause 06/01/2004     Priority: Medium     Pulmonary embolism and infarction (H) 04/11/2003     Priority: Medium     Problem list name updated  by automated process. Provider to review       Hyperlipidemia LDL goal <100 07/11/2002     Priority: Medium     Problem list name updated by automated process. Provider to review       Edema 01/18/2002     Priority: Medium     Essential hypertension 02/20/2001     Priority: Medium     Problem list name updated by automated process. 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     Contact dermatitis and other eczema, due to unspecified cause 6/1/2004     Coughing      Edema 1/18/2002     Essential hypertension 2/20/2001     Factor V Leiden (H)      Family history of colon cancer 1/2/2018     Gastro-oesophageal reflux disease      Herpes zoster without mention of complication 2003     Hyperlipidemia LDL goal <100 7/11/2002     Long term (current) use of anticoagulants 8/20/2003     Major depression 8/8/2014     Mammographic microcalcification 2003     Migraine, unspecified, without mention of intractable migraine without mention of status migrainosus 3/5/2001     Moderate episode of recurrent major depressive disorder (H) 8/8/2014     Neurofibromatosis, peripheral, NF1 (H) 8/22/2012     Neurofibromatosis, unspecified(237.70) 8/22/2012     Other and unspecified hyperlipidemia 7/11/2002     Other chronic pain      Other pulmonary embolism and infarction 4/11/2003     Primary insomnia 10/31/2018     Statin medication not prescribed per physician orders 1/17/2018     Tachycardia, unspecified 2/12/2001     Thrombosis of leg      Unspecified essential hypertension 2/20/2001     Vitamin D deficiency 7/13/2018       Past Surgical History:    Past Surgical History:   Procedure Laterality Date     ------------OTHER-------------  2012    shoulder replacement; Provider: Karen     ARTHROPLASTY KNEE  6/20/2014    Procedure:  ARTHROPLASTY KNEE;  Surgeon: Sean Alexander MD;  Location: [facility]     ARTHROSCOPY SHOULDER      right, bone spurs      SECTION      x3     CHOLECYSTECTOMY       COLONOSCOPY  02/15/2018    [place],,polyps     elbow ulnar tunnel release       ELECTROTHERMAL THERAPY INTRADISC  2017    stimulator     ENDOSCOPIC SINUS SURGERY, SEPTOPLASTY, TURBINOPLASTY, MAXILLARY SINUSOTOMY, COMBINED N/A 2015    Procedure: COMBINED ENDOSCOPIC SINUS SURGERY, SEPTOPLASTY, TURBINOPLASTY, MAXILLARY SINUSOTOMY;  Surgeon: Seema Conn MD;  Location: [facility]     esophagastroduodenoscopy      with biopsy and endoscopic U/S     EXCISE NEUROMA LOWER EXTREMITY Left 2016    Procedure: EXCISE NEUROMA LOWER EXTREMITY;  Surgeon: Edi Reed MD;  Location: [facility]     FUSION LUMBAR ANTERIOR WITH MARCY CAGES      L5-S1     HYSTERECTOMY TOTAL ABDOMINAL, BILATERAL SALPINGO-OOPHORECTOMY, COMBINED N/A      ORTHOPEDIC SURGERY  2-15    right shoulder     ORTHOPEDIC SURGERY  8/28/15    right knee     ORTHOPEDIC SURGERY Right 2018    hip labrum tear     pionidal cyst excision       TRANSPOSITION ULNAR NERVE (ELBOW)         Family History:    Family History   Problem Relation Age of Onset     Cancer Mother      Colon Polyps Mother      Heart Failure Mother 87        congestive, cause of death     Myocardial Infarction Mother         myocardial infarction     Myocardial Infarction Father         myocardial infarction - cause of death     C.A.D. Father      Cancer Paternal Uncle         cause of death     C.A.D. Brother      Other - See Comments Other         factor 5 - family h/o     Asthma No family hx of        Social History:  Marital Status:   [2]  Social History     Tobacco Use     Smoking status: Former Smoker     Packs/day: 1.00     Years: 30.00     Pack years: 30.00     Types: Cigarettes, Pipe     Smokeless tobacco: Never Used     Tobacco comment: quit in    Substance Use Topics     Alcohol use:  No     Drug use: No        Medications:      aspirin 81 MG EC tablet   Cholecalciferol (VITAMIN D3 PO)   escitalopram (LEXAPRO) 20 MG tablet   hydrochlorothiazide (HYDRODIURIL) 25 MG tablet   levalbuterol (XOPENEX) 1.25 MG/3ML neb solution   losartan (COZAAR) 50 MG tablet   metoprolol succinate (TOPROL-XL) 50 MG 24 hr tablet   traZODone (DESYREL) 50 MG tablet   UNABLE TO FIND   vitamin B complex with vitamin C (VITAMIN  B COMPLEX) TABS tablet   warfarin (COUMADIN) 5 MG tablet   budesonide-formoterol (SYMBICORT) 80-4.5 MCG/ACT Inhaler   order for DME   STATIN NOT PRESCRIBED, INTENTIONAL,         Review of Systems   Constitutional: Negative for chills, diaphoresis and fever.   HENT: Negative for voice change.    Eyes: Negative for visual disturbance.   Respiratory: Negative for cough, chest tightness, shortness of breath and wheezing.    Cardiovascular: Negative for chest pain, palpitations and leg swelling.   Gastrointestinal: Positive for abdominal pain and nausea. Negative for abdominal distention, anal bleeding, blood in stool and vomiting.   Genitourinary: Negative for decreased urine volume, dysuria, flank pain and hematuria.   Musculoskeletal: Negative for arthralgias, back pain, gait problem, myalgias, neck pain and neck stiffness.   Skin: Negative for color change, pallor, rash and wound.   Neurological: Negative for dizziness, syncope, light-headedness, numbness and headaches.   Psychiatric/Behavioral: Negative for confusion and suicidal ideas.       Physical Exam   BP: 146/88  Pulse: 69  Temp: 98.2  F (36.8  C)  Resp: 18  SpO2: 96 %      Physical Exam   Constitutional: She is oriented to person, place, and time. She appears well-developed and well-nourished.   HENT:   Head: Normocephalic and atraumatic.   Mouth/Throat: No oropharyngeal exudate.   Eyes: Conjunctivae are normal. Pupils are equal, round, and reactive to light.   Neck: Normal range of motion. Neck supple. No JVD present. No tracheal deviation  present. No thyromegaly present.   Cardiovascular: Normal rate, regular rhythm, normal heart sounds and intact distal pulses. Exam reveals no gallop and no friction rub.   No murmur heard.  Pulmonary/Chest: Effort normal and breath sounds normal. No stridor. No respiratory distress. She has no wheezes. She has no rales. She exhibits no tenderness.   Abdominal: Soft. Bowel sounds are normal. She exhibits no distension and no mass. There is tenderness in the right upper quadrant and right lower quadrant. There is no rebound and no guarding.   Musculoskeletal: Normal range of motion. She exhibits no edema or tenderness.   Lymphadenopathy:     She has no cervical adenopathy.   Neurological: She is alert and oriented to person, place, and time.   Skin: Skin is warm and dry. No rash noted. No erythema. No pallor.   Psychiatric: Her behavior is normal.   Nursing note and vitals reviewed.      ED Course        Procedures                 No results found for this or any previous visit (from the past 24 hour(s)).    Medications   0.9% sodium chloride BOLUS (not administered)   morphine (PF) injection 4 mg (not administered)   ondansetron (ZOFRAN) injection 4 mg (not administered)       Assessments & Plan (with Medical Decision Making)   R flank pain radiating to R groin, started 4 days ago off and on , since last night more severe  She has had cholecystectomy in past  CT abd, labs ordered, morphine , zofran for pain control  S/o to Dr Luna at the change of shift  I have reviewed the nursing notes.    I have reviewed the findings, diagnosis, plan and need for follow up with the patient.         Medication List      There are no discharge medications for this visit.         Final diagnoses:   None       12/31/2018   [facility]     Santi Piña MD  12/31/18 [id number]

## 2018-12-31 NOTE — ED NOTES
Pt left ambulatory with  and prescription sent to her pharmacy in [place] with discharge instructions in hand

## 2018-12-31 NOTE — ED TRIAGE NOTES
States right flank pain started 4 days ago. Before today, pain would come and go. This morning pain is worse.  Also nauseated, but no vomiting.

## 2018-12-31 NOTE — ED NOTES
Patient states she has had right flank pain for 4 days and that pain would come and go. States pain would just come on with no precipitating factors and would also go away on its own.  Worse today so she came in. States nausea but no vomiting. States she has had a PE before and this pain is similar.

## 2019-01-15 ENCOUNTER — ANTICOAGULATION THERAPY VISIT (OUTPATIENT)
Dept: ANTICOAGULATION | Facility: OTHER | Age: 64
End: 2019-01-15
Payer: MEDICARE

## 2019-01-15 ENCOUNTER — TRANSFERRED RECORDS (OUTPATIENT)
Dept: HEALTH INFORMATION MANAGEMENT | Facility: CLINIC | Age: 64
End: 2019-01-15

## 2019-01-15 DIAGNOSIS — I82.409 DEEP VEIN THROMBOSIS (DVT) (H): ICD-10-CM

## 2019-01-15 DIAGNOSIS — I26.99 PULMONARY EMBOLISM AND INFARCTION (H): ICD-10-CM

## 2019-01-15 LAB — INR PPP: 2.8

## 2019-01-15 NOTE — PROGRESS NOTES
ANTICOAGULATION FOLLOW-UP CLINIC VISIT    Patient Name:  Cassy Avila  Date:  1/15/2019  Contact Type:  Telephone/ message left on home phone voicemail    SUBJECTIVE:     Patient Findings     Positives:   No Problem Findings    Comments:   PSt done and result faxed to warfarin clinic by Jorge. Call placed to patient and message left on home phone voicemail re: INR result, warfarin dosing and INR recheck date. She is to call warfarin clinic if she has any bleeding/bruising, changes in diet/meds/activity or questions           OBJECTIVE    INR   Date Value Ref Range Status   01/15/2019 2.8  Final       ASSESSMENT / PLAN  INR assessment THER    Recheck INR In: 4 WEEKS    INR Location Home INR      Anticoagulation Summary  As of 1/15/2019    INR goal:   2.0-3.0   TTR:   79.7 % (2.5 y)   INR used for dosin.8 (1/15/2019)   Warfarin maintenance plan:   7.5 mg (5 mg x 1.5) every Mon, Fri; 5 mg (5 mg x 1) all other days   Full warfarin instructions:   7.5 mg every Mon, Fri; 5 mg all other days   Weekly warfarin total:   40 mg   No change documented:   Manju Escalera RN   Plan last modified:   Manju Escalera RN (2018)   Next INR check:   2019   Priority:   INR   Target end date:   Indefinite    Indications    Long-term (current) use of anticoagulants [Z79.01] [Z79.01]  Pulmonary embolism and infarction (H) [I26.99]  Deep vein thrombosis (DVT) (H) [I82.409] [I82.409]             Anticoagulation Episode Summary     INR check location:   Home Draw    Preferred lab:       Send INR reminders to:    ANTICOAG POOL    Comments:   PST - [facility] Monitoring.        Anticoagulation Care Providers     Provider Role Specialty Phone number    Minnie Eliz, [facility] [phone]            See the Encounter Report to view Anticoagulation Flowsheet and Dosing Calendar (Go to Encounters tab in chart review, and find the Anticoagulation Therapy Visit)        Manju Escalera, RN

## 2019-01-20 ENCOUNTER — HOSPITAL ENCOUNTER (EMERGENCY)
Facility: HOSPITAL | Age: 64
Discharge: HOME | End: 2019-01-20
Attending: PHYSICIAN ASSISTANT | Admitting: PHYSICIAN ASSISTANT
Payer: MEDICARE

## 2019-01-20 VITALS
RESPIRATION RATE: 18 BRPM | BODY MASS INDEX: 37.77 KG/M2 | DIASTOLIC BLOOD PRESSURE: 81 MMHG | TEMPERATURE: 98.2 F | SYSTOLIC BLOOD PRESSURE: 160 MMHG | OXYGEN SATURATION: 96 % | WEIGHT: 227 LBS

## 2019-01-20 DIAGNOSIS — R05.9 COUGH: ICD-10-CM

## 2019-01-20 DIAGNOSIS — H66.003 ACUTE SUPPURATIVE OTITIS MEDIA OF BOTH EARS WITHOUT SPONTANEOUS RUPTURE OF TYMPANIC MEMBRANES, RECURRENCE NOT SPECIFIED: ICD-10-CM

## 2019-01-20 PROCEDURE — 99213 OFFICE O/P EST LOW 20 MIN: CPT | Mod: Z6 | Performed by: PHYSICIAN ASSISTANT

## 2019-01-20 PROCEDURE — G0463 HOSPITAL OUTPT CLINIC VISIT: HCPCS

## 2019-01-20 RX ORDER — AMOXICILLIN 875 MG
875 TABLET ORAL 2 TIMES DAILY
Qty: 14 TABLET | Refills: 0 | Status: SHIPPED | OUTPATIENT
Start: 2019-01-20 | End: 2019-02-04

## 2019-01-20 RX ORDER — CODEINE PHOSPHATE AND GUAIFENESIN 10; 100 MG/5ML; MG/5ML
1-2 SOLUTION ORAL EVERY 4 HOURS PRN
Qty: 118 ML | Refills: 0 | Status: SHIPPED | OUTPATIENT
Start: 2019-01-20 | End: 2019-02-04

## 2019-01-20 ASSESSMENT — ENCOUNTER SYMPTOMS
PSYCHIATRIC NEGATIVE: 1
EYES NEGATIVE: 1
CHILLS: 1
WHEEZING: 1
SINUS PRESSURE: 0
FATIGUE: 1
FEVER: 1
SORE THROAT: 0
COUGH: 1
NEUROLOGICAL NEGATIVE: 1
CARDIOVASCULAR NEGATIVE: 1

## 2019-01-20 NOTE — DISCHARGE INSTRUCTIONS
"- Amoxicillin twice daily for 7 days. Eat yogurt, kefir or take over-the-counter \"probiotic\" at least 2 hours before or after a dose of antibiotic. This will replace good bacteria that may have been lost due to the antibiotic. (This may also help to prevent yeast infections and upset stomach during the course of antibiotic.)     - cheratussin for cough. Do use inhalers for the next 3-5 days to keep the lungs open and prevent pneumonia.     * Recheck with poor improvement after 3 or so days.   "

## 2019-01-20 NOTE — ED AVS SNAPSHOT
[facility]  [address] [address]  Phone:  [phone]                                    Cassy Avila   MRN: [id number]    Department:  [facility]   Date of Visit:  1/20/2019           After Visit Summary Signature Page    I have received my discharge instructions, and my questions have been answered. I have discussed any challenges I see with this plan with the nurse or doctor.    ..........................................................................................................................................  Patient/Patient Representative Signature      ..........................................................................................................................................  Patient Representative Print Name and Relationship to Patient    ..................................................               ................................................  Date                                   Time    ..........................................................................................................................................  Reviewed by Signature/Title    ...................................................              ..............................................  Date                                               Time          22EPIC Rev 08/18

## 2019-01-20 NOTE — ED TRIAGE NOTES
Pt presents today with significant other for c/o a cough for the last week, has not been using her inhaler or her NEB's and is now also c/o right ear pain that started over the last 3 days.

## 2019-01-20 NOTE — LETTER
[facility] [address] [place] [address]  Main: [phone]  Dept: [phone]      January 20, 2019      Re: Cassy Avila      TO WHOM IT MAY CONCERN:    Cassy Avila was evaluated on 1/20/2019. Cassy may return to work 24 hours after fevers have resolved. I expect this condition to improve over the next 3-5 days. Please excuse her from any missed work .          Sincerely,      Juan Luis Hale PA-C   1/20/2019   1:05 PM

## 2019-01-20 NOTE — ED PROVIDER NOTES
History     Chief Complaint   Patient presents with     URI     cough, fever, congestion over last week     Otalgia     R ear pain began 3 days ago     HPI  Cassy Avila is a 63 year old female with Hx HTn, currently on warfarin d/t DVTs who presents to the urgent care with 7+ days of cough and fevers. Right ear pain started 3 days ago and is NOT responding to OTCs for pain. She reports plugging/muffled hearing, no drainage although does have a tympanostomy tube in the right. Cough is productive and associated with wheezing at times. She does have inhalers, but not regularly using them.     Allergies:  Allergies   Allergen Reactions     Ace Inhibitors Cough     Amlodipine Besylate Swelling     Norvasc     Amoxicillin      Atorvastatin      myualgia     Cephalexin Monohydrate Hives     Keflex     Erythromycin Base [Kdc:Yellow Dye+Erythromycin+Brilliant Blue Fcf] Nausea and Vomiting     Meloxicam Other (See Comments)     Mobic - confusion, depression     Adhesive Tape Rash     Prochlorperazine Edisylate Swelling and Rash     Compazine     Prochlorperazine Maleate Swelling and Rash       Problem List:    Patient Active Problem List    Diagnosis Date Noted     Primary insomnia 10/31/2018     Priority: Medium     Vitamin D deficiency 07/13/2018     Priority: Medium     Statin medication not prescribed per physician orders 01/17/2018     Priority: Medium     Family history of colon cancer 01/02/2018     Priority: Medium     Lumbar spondylosis with myelopathy 07/12/2017     Priority: Medium     Degeneration of lumbar or lumbosacral intervertebral disc 07/12/2017     Priority: Medium     Long-term (current) use of anticoagulants [Z79.01] 07/20/2016     Priority: Medium     Deep vein thrombosis (DVT) (H) [I82.409] 07/20/2016     Priority: Medium     S/P shoulder surgery 03/16/2015     Priority: Medium     Moderate episode of recurrent major depressive disorder (H) 08/08/2014     Priority: Medium     H/O total shoulder  replacement, left 06/17/2014     Priority: Medium     Failed arthroplasty (H) 01/24/2014     Priority: Medium     Advanced care planning/counseling discussion 03/12/2013     Priority: Medium     Pt has information at home , not completed       Neurofibromatosis, peripheral, NF1 (H) 08/22/2012     Priority: Medium     Problem list name updated by automated process. Provider to review       Contact dermatitis and other eczema, due to unspecified cause 06/01/2004     Priority: Medium     Pulmonary embolism and infarction (H) 04/11/2003     Priority: Medium     Problem list name updated by automated process. Provider to review       Hyperlipidemia LDL goal <100 07/11/2002     Priority: Medium     Problem list name updated by automated process. Provider to review       Edema 01/18/2002     Priority: Medium     Essential hypertension 02/20/2001     Priority: Medium     Problem list name updated by automated process. 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     Contact dermatitis and other eczema, due to unspecified cause 6/1/2004     Coughing      Edema 1/18/2002     Essential hypertension 2/20/2001     Factor V Leiden (H)      Family history of colon cancer 1/2/2018     Gastro-oesophageal reflux disease      Herpes zoster without mention of complication 2003     Hyperlipidemia LDL goal <100 7/11/2002     Long term (current) use of anticoagulants 8/20/2003     Major depression 8/8/2014     Mammographic microcalcification 2003     Migraine, unspecified, without mention of intractable migraine without mention of status migrainosus 3/5/2001     Moderate episode of recurrent major depressive disorder (H) 8/8/2014     Neurofibromatosis, peripheral, NF1 (H) 8/22/2012     Neurofibromatosis, unspecified(237.70) 8/22/2012      Other and unspecified hyperlipidemia 2002     Other chronic pain      Other pulmonary embolism and infarction 2003     Primary insomnia 10/31/2018     Statin medication not prescribed per physician orders 2018     Tachycardia, unspecified 2001     Thrombosis of leg      Unspecified essential hypertension 2001     Vitamin D deficiency 2018       Past Surgical History:    Past Surgical History:   Procedure Laterality Date     ------------OTHER-------------      shoulder replacement; Provider: Karen     ARTHROPLASTY KNEE  2014    Procedure: ARTHROPLASTY KNEE;  Surgeon: Sean Alexander MD;  Location: [facility]     ARTHROSCOPY SHOULDER      right, bone spurs      SECTION      x3     CHOLECYSTECTOMY       COLONOSCOPY  02/15/2018    [place],,polyps     elbow ulnar tunnel release       ELECTROTHERMAL THERAPY INTRADISC  2017    stimulator     ENDOSCOPIC SINUS SURGERY, SEPTOPLASTY, TURBINOPLASTY, MAXILLARY SINUSOTOMY, COMBINED N/A 2015    Procedure: COMBINED ENDOSCOPIC SINUS SURGERY, SEPTOPLASTY, TURBINOPLASTY, MAXILLARY SINUSOTOMY;  Surgeon: Seema Conn MD;  Location: [facility]     esophagastroduodenoscopy      with biopsy and endoscopic U/S     EXCISE NEUROMA LOWER EXTREMITY Left 2016    Procedure: EXCISE NEUROMA LOWER EXTREMITY;  Surgeon: Edi Reed MD;  Location: [facility]     FUSION LUMBAR ANTERIOR WITH MARCY CAGES      L5-S1     HYSTERECTOMY TOTAL ABDOMINAL, BILATERAL SALPINGO-OOPHORECTOMY, COMBINED N/A      ORTHOPEDIC SURGERY  2-15    right shoulder     ORTHOPEDIC SURGERY  8/28/15    right knee     ORTHOPEDIC SURGERY Right 2018    hip labrum tear     pionidal cyst excision       TRANSPOSITION ULNAR NERVE (ELBOW)         Family History:    Family History   Problem Relation Age of Onset     Cancer Mother      Colon Polyps Mother      Heart Failure Mother 87        congestive, cause of death     Myocardial Infarction Mother          myocardial infarction     Myocardial Infarction Father         myocardial infarction - cause of death     C.A.D. Father      Cancer Paternal Uncle         cause of death     C.A.D. Brother      Other - See Comments Other         factor 5 - family h/o     Asthma No family hx of        Social History:  Marital Status:   [2]  Social History     Tobacco Use     Smoking status: Former Smoker     Packs/day: 1.00     Years: 30.00     Pack years: 30.00     Types: Cigarettes, Pipe     Smokeless tobacco: Never Used     Tobacco comment: quit in 1999   Substance Use Topics     Alcohol use: No     Drug use: No        Medications:      amoxicillin (AMOXIL) 875 MG tablet   aspirin 81 MG EC tablet   Cholecalciferol (VITAMIN D3 PO)   escitalopram (LEXAPRO) 20 MG tablet   guaiFENesin-codeine (ROBITUSSIN AC) 100-10 MG/5ML solution   hydrochlorothiazide (HYDRODIURIL) 25 MG tablet   losartan (COZAAR) 50 MG tablet   metoprolol succinate (TOPROL-XL) 50 MG 24 hr tablet   UNABLE TO FIND   vitamin B complex with vitamin C (VITAMIN  B COMPLEX) TABS tablet   warfarin (COUMADIN) 5 MG tablet   budesonide-formoterol (SYMBICORT) 80-4.5 MCG/ACT Inhaler   levalbuterol (XOPENEX) 1.25 MG/3ML neb solution   order for DME   STATIN NOT PRESCRIBED, INTENTIONAL,   traZODone (DESYREL) 50 MG tablet         Review of Systems   Constitutional: Positive for chills, fatigue and fever.   HENT: Positive for congestion, ear pain (R>L) and postnasal drip. Negative for ear discharge, sinus pressure and sore throat.    Eyes: Negative.    Respiratory: Positive for cough and wheezing.    Cardiovascular: Negative.    Skin: Negative.    Neurological: Negative.    Psychiatric/Behavioral: Negative.        Physical Exam   BP: 160/81  Heart Rate: 90  Temp: 98.2  F (36.8  C)  Resp: 18  Weight: 103 kg (227 lb)  SpO2: 96 %      Physical Exam   Constitutional: She appears well-developed and well-nourished. No distress (coughing throughout visit.).   HENT:   Head:  Normocephalic and atraumatic.   Right Ear: External ear normal. Tympanic membrane is erythematous (Tympanostomy tube is visible, however angle does not allow for me to assess for patency.).   Left Ear: External ear normal. Tympanic membrane is erythematous.   Mouth/Throat: Posterior oropharyngeal erythema present. No posterior oropharyngeal edema.   Cardiovascular: Normal rate and regular rhythm.   Pulmonary/Chest: Effort normal. She has wheezes.   Skin: Skin is warm and dry.   Psychiatric: She has a normal mood and affect.   Nursing note and vitals reviewed.      ED Course        Procedures        Assessments & Plan (with Medical Decision Making)     I have reviewed the nursing notes.    I have reviewed the findings, diagnosis, plan and need for follow up with the patient.       Medication List      Started    amoxicillin 875 MG tablet  Commonly known as:  AMOXIL  875 mg, Oral, 2 TIMES DAILY     guaiFENesin-codeine 100-10 MG/5ML solution  Commonly known as:  ROBITUSSIN AC  1-2 tsp., Oral, EVERY 4 HOURS PRN            Final diagnoses:   Acute suppurative otitis media of both ears without spontaneous rupture of tympanic membranes, recurrence not specified   Cough   Advised patient to schedule her inhalers for the next 5 days.  Will use Cheratussin for cough.  Patient prefers to treat ear infections, so will use amoxicillin as noted above.  She does note GI symptoms with amoxicillin, however feels as though she could tolerate the side effects.  Rotate ibuprofen and Tylenol as needed for pain and fevers.  Follow-up with primary care in 3-7 days depending on progression, returning here sooner with worsening despite treatment.  Patient is agreeable to plan and discharged home in stable condition.    1/20/2019   [facility]     Juan Luis Hale PA  01/20/19 [id number]

## 2019-01-24 ENCOUNTER — ANCILLARY PROCEDURE (OUTPATIENT)
Dept: MAMMOGRAPHY | Facility: OTHER | Age: 64
End: 2019-01-24
Attending: NURSE PRACTITIONER
Payer: MEDICARE

## 2019-01-24 DIAGNOSIS — Z12.39 SCREENING BREAST EXAMINATION: ICD-10-CM

## 2019-01-24 PROCEDURE — 77063 BREAST TOMOSYNTHESIS BI: CPT | Mod: TC

## 2019-02-04 ENCOUNTER — OFFICE VISIT (OUTPATIENT)
Dept: FAMILY MEDICINE | Facility: OTHER | Age: 64
End: 2019-02-04
Attending: NURSE PRACTITIONER
Payer: MEDICARE

## 2019-02-04 VITALS
HEART RATE: 68 BPM | HEIGHT: 65 IN | SYSTOLIC BLOOD PRESSURE: 118 MMHG | DIASTOLIC BLOOD PRESSURE: 70 MMHG | BODY MASS INDEX: 38.49 KG/M2 | RESPIRATION RATE: 14 BRPM | WEIGHT: 231 LBS

## 2019-02-04 DIAGNOSIS — I10 ESSENTIAL HYPERTENSION: ICD-10-CM

## 2019-02-04 DIAGNOSIS — F51.01 PRIMARY INSOMNIA: ICD-10-CM

## 2019-02-04 DIAGNOSIS — G89.29 CHRONIC PAIN OF LEFT KNEE: ICD-10-CM

## 2019-02-04 DIAGNOSIS — F33.1 MODERATE EPISODE OF RECURRENT MAJOR DEPRESSIVE DISORDER (H): Primary | ICD-10-CM

## 2019-02-04 DIAGNOSIS — E78.5 HYPERLIPIDEMIA LDL GOAL <100: ICD-10-CM

## 2019-02-04 DIAGNOSIS — M25.562 CHRONIC PAIN OF LEFT KNEE: ICD-10-CM

## 2019-02-04 DIAGNOSIS — I26.99 PULMONARY EMBOLISM AND INFARCTION (H): ICD-10-CM

## 2019-02-04 PROCEDURE — 99214 OFFICE O/P EST MOD 30 MIN: CPT | Performed by: NURSE PRACTITIONER

## 2019-02-04 PROCEDURE — G0463 HOSPITAL OUTPT CLINIC VISIT: HCPCS

## 2019-02-04 RX ORDER — CYCLOBENZAPRINE HCL 10 MG
10 TABLET ORAL 3 TIMES DAILY PRN
COMMUNITY
End: 2019-06-10

## 2019-02-04 ASSESSMENT — ANXIETY QUESTIONNAIRES
6. BECOMING EASILY ANNOYED OR IRRITABLE: NOT AT ALL
GAD7 TOTAL SCORE: 0
3. WORRYING TOO MUCH ABOUT DIFFERENT THINGS: NOT AT ALL
IF YOU CHECKED OFF ANY PROBLEMS ON THIS QUESTIONNAIRE, HOW DIFFICULT HAVE THESE PROBLEMS MADE IT FOR YOU TO DO YOUR WORK, TAKE CARE OF THINGS AT HOME, OR GET ALONG WITH OTHER PEOPLE: NOT DIFFICULT AT ALL
5. BEING SO RESTLESS THAT IT IS HARD TO SIT STILL: NOT AT ALL
7. FEELING AFRAID AS IF SOMETHING AWFUL MIGHT HAPPEN: NOT AT ALL
4. TROUBLE RELAXING: NOT AT ALL
2. NOT BEING ABLE TO STOP OR CONTROL WORRYING: NOT AT ALL
1. FEELING NERVOUS, ANXIOUS, OR ON EDGE: NOT AT ALL

## 2019-02-04 ASSESSMENT — PAIN SCALES - GENERAL: PAINLEVEL: NO PAIN (1)

## 2019-02-04 ASSESSMENT — PATIENT HEALTH QUESTIONNAIRE - PHQ9: SUM OF ALL RESPONSES TO PHQ QUESTIONS 1-9: 3

## 2019-02-04 ASSESSMENT — MIFFLIN-ST. JEOR: SCORE: 1598.69

## 2019-02-04 NOTE — PROGRESS NOTES
SUBJECTIVE:   Cassy Avila is a 63 year old female who presents to clinic today for the following health issues:        Knee pain - left - Medial  History of Left TKA  left inner aspect     Stabbing, sharp burning        Hyperlipidemia Follow-Up    Rate your low fat/cholesterol diet?: good    Taking statin?  No. Muscle pain    Other lipid medications/supplements?:  none        Hypertension Follow-up    Outpatient blood pressures are not being checked.    Low Salt Diet: no added salt      History of PE, on chronic anticoagulaiton    Chest pain or pressure, left side neck or arm pain: No    Shortness of breath/increased sweats/nausea with exertion: Yes rarely    Pain in calves walking 1-2 blocks: No    Worsened or new symptoms since last visit: YES, has a pain in the right outer breast, mammo negative    Nitroglycerin use: no      Depression Followup    Status since last visit: Improved     See PHQ-9 for current symptoms.  Other associated symptoms: None    Complicating factors:   Significant life event:  No   Current substance abuse:  None  Anxiety or Panic symptoms:  No      PHQ-9  English  PHQ-9   Any Language  Suicide Assessment Five-step Evaluation and Treatment (SAFE-T)      PHQ-9 SCORE 10/31/2018 12/21/2018 2/4/2019   PHQ-9 Total Score - - -   PHQ-9 Total Score 10 1 3     MARGA-7 SCORE 10/31/2018 12/21/2018 2/4/2019   Total Score 5 2 0           Amount of exercise or physical activity: 6-7 days/week for an average of 15-30 minutes    Problems taking medications regularly: No    Medication side effects: none    Diet: low salt and low fat/cholesterol      Problem list and histories reviewed & adjusted, as indicated.  Additional history: as documented    Patient Active Problem List   Diagnosis     Essential hypertension     Pulmonary embolism and infarction (H)     Contact dermatitis and other eczema, due to unspecified cause     Edema     Hyperlipidemia LDL goal <100     Neurofibromatosis, peripheral, NF1 (H)      Advanced care planning/counseling discussion     Moderate episode of recurrent major depressive disorder (H)     Long-term (current) use of anticoagulants [Z79.01]     Deep vein thrombosis (DVT) (H) [I82.409]     Lumbar spondylosis with myelopathy     Degeneration of lumbar or lumbosacral intervertebral disc     Family history of colon cancer     Statin medication not prescribed per physician orders     Vitamin D deficiency     Failed arthroplasty (H)     H/O total shoulder replacement, left     S/P shoulder surgery     Primary insomnia     Past Surgical History:   Procedure Laterality Date     ------------OTHER-------------      shoulder replacement; Provider: Karen     ARTHROPLASTY KNEE  2014    Procedure: ARTHROPLASTY KNEE;  Surgeon: Sean Alexander MD;  Location: [facility]     ARTHROSCOPY SHOULDER      right, bone spurs      SECTION      x3     CHOLECYSTECTOMY       COLONOSCOPY  02/15/2018    [place],,polyps     elbow ulnar tunnel release       ELECTROTHERMAL THERAPY INTRADISC  2017    stimulator     ENDOSCOPIC SINUS SURGERY, SEPTOPLASTY, TURBINOPLASTY, MAXILLARY SINUSOTOMY, COMBINED N/A 2015    Procedure: COMBINED ENDOSCOPIC SINUS SURGERY, SEPTOPLASTY, TURBINOPLASTY, MAXILLARY SINUSOTOMY;  Surgeon: Seema Conn MD;  Location: [facility]     esophagastroduodenoscopy      with biopsy and endoscopic U/S     EXCISE NEUROMA LOWER EXTREMITY Left 2016    Procedure: EXCISE NEUROMA LOWER EXTREMITY;  Surgeon: Edi Reed MD;  Location: [facility]     FUSION LUMBAR ANTERIOR WITH MARCY CAGES      L5-S1     HYSTERECTOMY TOTAL ABDOMINAL, BILATERAL SALPINGO-OOPHORECTOMY, COMBINED N/A      ORTHOPEDIC SURGERY  2-15    right shoulder     ORTHOPEDIC SURGERY  8/28/15    right knee     ORTHOPEDIC SURGERY Right 2018    hip labrum tear     pionidal cyst excision       TRANSPOSITION ULNAR NERVE (ELBOW)         Social History     Tobacco Use     Smoking status: Former Smoker      Packs/day: 1.00     Years: 30.00     Pack years: 30.00     Types: Cigarettes, Pipe     Smokeless tobacco: Never Used     Tobacco comment: quit in 1999   Substance Use Topics     Alcohol use: No     Family History   Problem Relation Age of Onset     Cancer Mother      Colon Polyps Mother      Heart Failure Mother 87        congestive, cause of death     Myocardial Infarction Mother         myocardial infarction     Myocardial Infarction Father         myocardial infarction - cause of death     C.A.D. Father      Cancer Paternal Uncle         cause of death     C.A.D. Brother      Other - See Comments Other         factor 5 - family h/o     Asthma No family hx of          Current Outpatient Medications   Medication Sig Dispense Refill     aspirin 81 MG EC tablet Take 81 mg by mouth daily HS       budesonide-formoterol (SYMBICORT) 80-4.5 MCG/ACT Inhaler Inhale 2 puffs into the lungs 2 times daily 1 Inhaler 1     Cholecalciferol (VITAMIN D3 PO) Take 3,000 mg by mouth daily AM       cyclobenzaprine (FLEXERIL) 10 MG tablet Take 10 mg by mouth 3 times daily as needed for muscle spasms       escitalopram (LEXAPRO) 20 MG tablet TAKE 1 TABLET BY MOUTH EVERY DAY AND 1 TABLET BY MOUTH EVERY DAY AS NEEDED 180 tablet 4     hydrochlorothiazide (HYDRODIURIL) 25 MG tablet TAKE 1 TABLET(25 MG) BY MOUTH DAILY 90 tablet 0     levalbuterol (XOPENEX) 1.25 MG/3ML neb solution NEBULIZE 1 VIAL EVERY 4 HOURS AS NEEDED FOR SHORTNESS OF BREATH, DYSPNEA, OR WHEEZING 1650 mL 0     losartan (COZAAR) 50 MG tablet TAKE 2 TABLETS BY MOUTH EVERY  tablet 1     metoprolol succinate (TOPROL-XL) 50 MG 24 hr tablet TAKE 1 AND ONE-HALF TABLETS BY MOUTH EVERY  tablet 2     order for DME Nebulizer machine and tubing    DX:  Bronchitis 1 Device 0     STATIN NOT PRESCRIBED, INTENTIONAL, Please choose reason not prescribed, below       traZODone (DESYREL) 50 MG tablet TAKE 1 TO 2 TABLETS BY MOUTH AT BEDTIME AS NEEDED 180 tablet 1     UNABLE TO  FIND CBD oil       vitamin B complex with vitamin C (VITAMIN  B COMPLEX) TABS tablet Take 1 tablet by mouth daily       warfarin (COUMADIN) 5 MG tablet 7.5mg Mon,Wed,Fri and 5mg all other days or as directed by warfarin clinic 150 tablet 3     Allergies   Allergen Reactions     Ace Inhibitors Cough     Amlodipine Besylate Swelling     Norvasc     Atorvastatin      myualgia     Cephalexin Monohydrate Hives     Keflex     Erythromycin Base [Kdc:Yellow Dye+Erythromycin+Brilliant Blue Fcf] Nausea and Vomiting     Meloxicam Other (See Comments)     Mobic - confusion, depression     Adhesive Tape Rash     Prochlorperazine Edisylate Swelling and Rash     Compazine     Prochlorperazine Maleate Swelling and Rash     Recent Labs   Lab Test 12/31/18  0702 10/31/18  1138 07/13/18  0840  01/17/18  0822 07/12/17  1117  01/11/16  0935  02/17/15  1149  11/05/13  1528   A1C  --   --   --   --   --   --   --   --   --   --   --  5.5   LDL  --   --   --   --  137*  --   --  126*  --  119   < >  --    HDL  --   --   --   --  43*  --   --  38*  --  36*   < >  --    TRIG  --   --   --   --  190*  --   --  265*  --  272*   < >  --    ALT 35 35  --   --   --  34   < >  --   --   --    < >  --    CR 0.89 0.92 0.93   < > 0.91 0.91   < > 1.02   < > 0.78   < >  --    GFRESTIMATED 68 61 61   < > 62 63   < > 55*   < > 75   < >  --    GFRESTBLACK 79 74 74   < > 75 76   < > 67   < > >90   GFR Calc     < >  --    POTASSIUM 3.6 3.3* 3.3*   < > 3.2* 3.6   < > 3.4   < > 4.0   < >  --    TSH  --  2.32 2.51   < > 5.75*  --    < > 3.62   < >  --    < >  --     < > = values in this interval not displayed.      BP Readings from Last 3 Encounters:   02/04/19 118/70   01/20/19 160/81   12/31/18 127/70    Wt Readings from Last 3 Encounters:   02/04/19 104.8 kg (231 lb)   01/20/19 103 kg (227 lb)   10/31/18 104.3 kg (230 lb)                  Labs reviewed in EPIC    Reviewed and updated as needed this visit by clinical staff  Tobacco   "Allergies  Meds  Med Hx  Surg Hx  Fam Hx  Soc Hx      Reviewed and updated as needed this visit by Provider         ROS:  Constitutional, HEENT, cardiovascular, pulmonary, GI, , musculoskeletal, neuro, skin, endocrine and psych systems are negative, except as otherwise noted.    OBJECTIVE:     /70 (BP Location: Right arm, Patient Position: Sitting, Cuff Size: Adult Large)   Pulse 68   Resp 14   Ht 1.651 m (5' 5\")   Wt 104.8 kg (231 lb)   BMI 38.44 kg/m    Body mass index is 38.44 kg/m .       GENERAL: healthy, alert and no distress  EYES: Eyes grossly normal to inspection, PERRL and conjunctivae and sclerae normal  HENT: ear canals and TM's normal, nose and mouth without ulcers or lesions  NECK: no adenopathy, no asymmetry, masses, or scars and thyroid normal to palpation  RESP: lungs clear to auscultation - no rales, rhonchi or wheezes  CV: regular rate and rhythm, normal S1 S2, no S3 or S4, no murmur, click or rub, no peripheral edema and peripheral pulses strong  MS: Left medial knee pain - tendernes on palpation  SKIN: no suspicious lesions or rashes  PSYCH: mentation appears normal, affect normal/bright        ASSESSMENT/PLAN:       1. Moderate episode of recurrent major depressive disorder (H)  - Continue plan of care    2. Primary insomnia  - Continue plan of care    3. Pulmonary embolism and infarction (H)  - Continue plan of care    4. Hyperlipidemia LDL goal <100  - Lipid Profile; Future  - Comprehensive metabolic panel (BMP + Alb, Alk Phos, ALT, AST, Total. Bili, TP); Future    5. Essential hypertension  - Comprehensive metabolic panel (BMP + Alb, Alk Phos, ALT, AST, Total. Bili, TP); Future  - TSH with free T4 reflex; Future  - UA reflex to Microscopic and Culture - [facility]; Future    6. Chronic pain of left knee  - ORTHOPEDICS ADULT REFERRAL        Eliz Hartman NP  [facility]  "

## 2019-02-04 NOTE — NURSING NOTE
"Chief Complaint   Patient presents with     Knee Pain     Hypertension     Lipids       Initial /70 (BP Location: Right arm, Patient Position: Sitting, Cuff Size: Adult Large)   Pulse 68   Resp 14   Ht 1.651 m (5' 5\")   Wt 104.8 kg (231 lb)   BMI 38.44 kg/m   Estimated body mass index is 38.44 kg/m  as calculated from the following:    Height as of this encounter: 1.651 m (5' 5\").    Weight as of this encounter: 104.8 kg (231 lb).  Medication Reconciliation: complete    Patria Rodriguez LPN    "

## 2019-02-04 NOTE — PATIENT INSTRUCTIONS
ASSESSMENT/PLAN:     1. Moderate episode of recurrent major depressive disorder (H)  - Continue plan of care    2. Primary insomnia  - Continue plan of care    3. Pulmonary embolism and infarction (H)  - Continue plan of care    4. Hyperlipidemia LDL goal <100  - Lipid Profile; Future  - Comprehensive metabolic panel (BMP + Alb, Alk Phos, ALT, AST, Total. Bili, TP); Future    5. Essential hypertension  - Comprehensive metabolic panel (BMP + Alb, Alk Phos, ALT, AST, Total. Bili, TP); Future  - TSH with free T4 reflex; Future  - UA reflex to Microscopic and Culture - [facility]; Future    6. Chronic pain of left knee  - ORTHOPEDICS ADULT REFERRAL        Eliz Hartman NP  [facility]

## 2019-02-04 NOTE — LETTER
My Depression Action Plan  Name: Cassy Avila   Date of Birth 1955  Date: 2/4/2019    My doctor: Eliz Hartman   My clinic: [facility]  [address]  [place] [address]  [phone]          GREEN    ZONE   Good Control    What it looks like:     Things are going generally well. You have normal up s and down s. You may even feel depressed from time to time, but bad moods usually last less than a day.   What you need to do:  1. Continue to care for yourself (see self care plan)  2. Check your depression survival kit and update it as needed  3. Follow your physician s recommendations including any medication.  4. Do not stop taking medication unless you consult with your physician first.           YELLOW         ZONE Getting Worse    What it looks like:     Depression is starting to interfere with your life.     It may be hard to get out of bed; you may be starting to isolate yourself from others.    Symptoms of depression are starting to last most all day and this has happened for several days.     You may have suicidal thoughts but they are not constant.   What you need to do:     1. Call your care team, your response to treatment will improve if you keep your care team informed of your progress. Yellow periods are signs an adjustment may need to be made.     2. Continue your self-care, even if you have to fake it!    3. Talk to someone in your support network    4. Open up your depression survival kit           RED    ZONE Medical Alert - Get Help    What it looks like:     Depression is seriously interfering with your life.     You may experience these or other symptoms: You can t get out of bed most days, can t work or engage in other necessary activities, you have trouble taking care of basic hygiene, or basic responsibilities, thoughts of suicide or death that will not go away, self-injurious behavior.     What you need to do:  1. Call your care team  and request a same-day appointment. If they are not available (weekends or after hours) call your local crisis line, emergency room or 911.            Depression Self Care Plan / Survival Kit    Self-Care for Depression  Here s the deal. Your body and mind are really not as separate as most people think.  What you do and think affects how you feel and how you feel influences what you do and think. This means if you do things that people who feel good do, it will help you feel better.  Sometimes this is all it takes.  There is also a place for medication and therapy depending on how severe your depression is, so be sure to consult with your medical provider and/ or Behavioral Health Consultant if your symptoms are worsening or not improving.     In order to better manage my stress, I will:    Exercise  Get some form of exercise, every day. This will help reduce pain and release endorphins, the  feel good  chemicals in your brain. This is almost as good as taking antidepressants!  This is not the same as joining a gym and then never going! (they count on that by the way ) It can be as simple as just going for a walk or doing some gardening, anything that will get you moving.      Hygiene   Maintain good hygiene (Get out of bed in the morning, Make your bed, Brush your teeth, Take a shower, and Get dressed like you were going to work, even if you are unemployed).  If your clothes don't fit try to get ones that do.    Diet  I will strive to eat foods that are good for me, drink plenty of water, and avoid excessive sugar, caffeine, alcohol, and other mood-altering substances.  Some foods that are helpful in depression are: complex carbohydrates, B vitamins, flaxseed, fish or fish oil, fresh fruits and vegetables.    Psychotherapy  I agree to participate in Individual Therapy (if recommended).    Medication  If prescribed medications, I agree to take them.  Missing doses can result in serious side effects.  I understand  that drinking alcohol, or other illicit drug use, may cause potential side effects.  I will not stop my medication abruptly without first discussing it with my provider.    Staying Connected With Others  I will stay in touch with my friends, family members, and my primary care provider/team.    Use your imagination  Be creative.  We all have a creative side; it doesn t matter if it s oil painting, sand castles, or mud pies! This will also kick up the endorphins.    Witness Beauty  (AKA stop and smell the roses) Take a look outside, even in mid-winter. Notice colors, textures. Watch the squirrels and birds.     Service to others  Be of service to others.  There is always someone else in need.  By helping others we can  get out of ourselves  and remember the really important things.  This also provides opportunities for practicing all the other parts of the program.    Humor  Laugh and be silly!  Adjust your TV habits for less news and crime-drama and more comedy.    Control your stress  Try breathing deep, massage therapy, biofeedback, and meditation. Find time to relax each day.     My support system    Clinic Contact:  Phone number:    Contact 1:  Phone number:    Contact 2:  Phone number:    Presybeterian/:  Phone number:    Therapist:  Phone number:    Local crisis center:    Phone number:    Other community support:  Phone number:

## 2019-02-05 DIAGNOSIS — I10 ESSENTIAL HYPERTENSION: ICD-10-CM

## 2019-02-05 DIAGNOSIS — E87.6 HYPOKALEMIA: ICD-10-CM

## 2019-02-05 DIAGNOSIS — E78.5 HYPERLIPIDEMIA LDL GOAL <100: ICD-10-CM

## 2019-02-05 LAB
ALBUMIN SERPL-MCNC: 3.8 G/DL (ref 3.4–5)
ALBUMIN UR-MCNC: NEGATIVE MG/DL
ALP SERPL-CCNC: 55 U/L (ref 40–150)
ALT SERPL W P-5'-P-CCNC: 42 U/L (ref 0–50)
ANION GAP SERPL CALCULATED.3IONS-SCNC: 9 MMOL/L (ref 3–14)
APPEARANCE UR: CLEAR
AST SERPL W P-5'-P-CCNC: 23 U/L (ref 0–45)
BILIRUB SERPL-MCNC: 0.6 MG/DL (ref 0.2–1.3)
BILIRUB UR QL STRIP: NEGATIVE
BUN SERPL-MCNC: 10 MG/DL (ref 7–30)
CALCIUM SERPL-MCNC: 8.9 MG/DL (ref 8.5–10.1)
CHLORIDE SERPL-SCNC: 104 MMOL/L (ref 94–109)
CHOLEST SERPL-MCNC: 199 MG/DL
CO2 SERPL-SCNC: 25 MMOL/L (ref 20–32)
COLOR UR AUTO: YELLOW
CREAT SERPL-MCNC: 0.92 MG/DL (ref 0.52–1.04)
GFR SERPL CREATININE-BSD FRML MDRD: 66 ML/MIN/{1.73_M2}
GLUCOSE SERPL-MCNC: 104 MG/DL (ref 70–99)
GLUCOSE UR STRIP-MCNC: NEGATIVE MG/DL
HDLC SERPL-MCNC: 37 MG/DL
HGB UR QL STRIP: NEGATIVE
KETONES UR STRIP-MCNC: NEGATIVE MG/DL
LDLC SERPL CALC-MCNC: 120 MG/DL
LEUKOCYTE ESTERASE UR QL STRIP: ABNORMAL
NITRATE UR QL: NEGATIVE
NON-SQ EPI CELLS #/AREA URNS LPF: NORMAL /LPF
NONHDLC SERPL-MCNC: 162 MG/DL
PH UR STRIP: 6 PH (ref 5–7)
POTASSIUM SERPL-SCNC: 3.5 MMOL/L (ref 3.4–5.3)
PROT SERPL-MCNC: 7 G/DL (ref 6.8–8.8)
RBC #/AREA URNS AUTO: NORMAL /HPF
SODIUM SERPL-SCNC: 138 MMOL/L (ref 133–144)
SOURCE: ABNORMAL
SP GR UR STRIP: 1.02 (ref 1–1.03)
TRIGL SERPL-MCNC: 208 MG/DL
TSH SERPL DL<=0.005 MIU/L-ACNC: 2.71 MU/L (ref 0.4–4)
UROBILINOGEN UR STRIP-ACNC: 0.2 EU/DL (ref 0.2–1)
WBC #/AREA URNS AUTO: NORMAL /HPF

## 2019-02-05 PROCEDURE — 80053 COMPREHEN METABOLIC PANEL: CPT | Mod: ZL | Performed by: NURSE PRACTITIONER

## 2019-02-05 PROCEDURE — 36415 COLL VENOUS BLD VENIPUNCTURE: CPT | Mod: ZL | Performed by: NURSE PRACTITIONER

## 2019-02-05 PROCEDURE — 81001 URINALYSIS AUTO W/SCOPE: CPT | Mod: ZL | Performed by: NURSE PRACTITIONER

## 2019-02-05 PROCEDURE — 84443 ASSAY THYROID STIM HORMONE: CPT | Mod: ZL | Performed by: NURSE PRACTITIONER

## 2019-02-05 PROCEDURE — 80061 LIPID PANEL: CPT | Mod: ZL | Performed by: NURSE PRACTITIONER

## 2019-02-05 ASSESSMENT — ANXIETY QUESTIONNAIRES: GAD7 TOTAL SCORE: 0

## 2019-02-08 ENCOUNTER — TRANSFERRED RECORDS (OUTPATIENT)
Dept: HEALTH INFORMATION MANAGEMENT | Facility: CLINIC | Age: 64
End: 2019-02-08

## 2019-02-12 ENCOUNTER — TRANSFERRED RECORDS (OUTPATIENT)
Dept: HEALTH INFORMATION MANAGEMENT | Facility: CLINIC | Age: 64
End: 2019-02-12

## 2019-02-12 ENCOUNTER — ANTICOAGULATION THERAPY VISIT (OUTPATIENT)
Dept: ANTICOAGULATION | Facility: OTHER | Age: 64
End: 2019-02-12

## 2019-02-12 DIAGNOSIS — I26.99 PULMONARY EMBOLISM AND INFARCTION (H): ICD-10-CM

## 2019-02-12 LAB — INR PPP: 2.3

## 2019-02-12 NOTE — PROGRESS NOTES
ANTICOAGULATION FOLLOW-UP CLINIC VISIT    Patient Name:  Cassy Avila  Date:  2019  Contact Type:  Telephone    SUBJECTIVE:     Patient Findings     Positives:   No Problem Findings    Comments:   PST done and result faxed to warfarin clinic by E[facility]. Call placed to patient and spoke to her re: INR result, warfarin dosing and PST recheck date. She states she is going to start eating more salads. We discussed this and will leave warfarin dosing where it is and check in 2 weeks to see if dosing changes needed based on new eating pattern. She has no bleeding/bruising and no med changes. She verbalized understanding of instruction and has no questions.            OBJECTIVE    INR   Date Value Ref Range Status   2019 2.3  Final       ASSESSMENT / PLAN  INR assessment THER    Recheck INR In: 2 WEEKS    INR Location Home INR      Anticoagulation Summary  As of 2019    INR goal:   2.0-3.0   TTR:   80.3 % (2.5 y)   INR used for dosin.3 (2019)   Warfarin maintenance plan:   7.5 mg (5 mg x 1.5) every Mon, Fri; 5 mg (5 mg x 1) all other days   Full warfarin instructions:   7.5 mg every Mon, Fri; 5 mg all other days   Weekly warfarin total:   40 mg   Plan last modified:   Manju Escalera RN (2018)   Next INR check:   3/26/2019   Priority:   INR   Target end date:   Indefinite    Indications    Long-term (current) use of anticoagulants [Z79.01] [Z79.01]  Pulmonary embolism and infarction (H) [I26.99]  Deep vein thrombosis (DVT) (H) [I82.409] [I82.409]             Anticoagulation Episode Summary     INR check location:   Home Draw    Preferred lab:       Send INR reminders to:   [facility] ANTICOAG POOL    Comments:   PST - [facility].        Anticoagulation Care Providers     Provider Role Specialty Phone number    Eliz Hartman NP [facility] [phone]            See the Encounter Report to view Anticoagulation Flowsheet and Dosing Calendar (Go to Encounters tab in chart  review, and find the Anticoagulation Therapy Visit)        Manju Escalera RN

## 2019-02-19 ENCOUNTER — ANTICOAGULATION THERAPY VISIT (OUTPATIENT)
Dept: ANTICOAGULATION | Facility: OTHER | Age: 64
End: 2019-02-19

## 2019-02-19 DIAGNOSIS — I26.99 PULMONARY EMBOLISM AND INFARCTION (H): ICD-10-CM

## 2019-02-19 DIAGNOSIS — I82.409 DEEP VEIN THROMBOSIS (DVT) (H): ICD-10-CM

## 2019-02-19 NOTE — PROGRESS NOTES
ANTICOAGULATION FOLLOW-UP CLINIC VISIT    Patient Name:  Cassy Avila  Date:  2/19/2019  Contact Type:  Telephone/ spoke to her     SUBJECTIVE:     Patient Findings     Positives:   Dental/Other procedures    Comments:   Patient had called to let me know she is having nerve stimulator removed on 3/1/19. I had called the provider and had received a message from Dr. Tapia office (myriam) that he would like warfarin held x 5 days prior to procedure. I sent GABBY Hartman a message asking about lovenox bridge while off warfarin but have not heard from her yet. I tried calling patient and he  states she is working today and will not be home until at least 430.  I told him I will try to call patient tomorrow.            OBJECTIVE    INR   Date Value Ref Range Status   02/12/2019 2.3  Final       ASSESSMENT / PLAN  No question data found.  Anticoagulation Summary  As of 2/19/2019    INR goal:   2.0-3.0   TTR:   80.3 % (2.5 y)   INR used for dosing:   No new INR was available at the time of this encounter.   Warfarin maintenance plan:   7.5 mg (5 mg x 1.5) every Mon, Fri; 5 mg (5 mg x 1) all other days   Full warfarin instructions:   2/24: Hold; 2/25: Hold; 2/26: Hold; 2/27: Hold; 2/28: Hold; Otherwise 7.5 mg every Mon, Fri; 5 mg all other days   Weekly warfarin total:   40 mg   Plan last modified:   Manju Escalera RN (11/20/2018)   Next INR check:      Priority:   INR   Target end date:   Indefinite    Indications    Long-term (current) use of anticoagulants [Z79.01] [Z79.01]  Pulmonary embolism and infarction (H) [I26.99]  Deep vein thrombosis (DVT) (H) [I82.409] [I82.409]             Anticoagulation Episode Summary     INR check location:   Home Draw    Preferred lab:       Send INR reminders to:   [facility] ANTICOAG POOL    Comments:   PST - [facility].        Anticoagulation Care Providers     Provider Role Specialty Phone number    Eliz Hartman NP [facility] [phone]            See  the Encounter Report to view Anticoagulation Flowsheet and Dosing Calendar (Go to Encounters tab in chart review, and find the Anticoagulation Therapy Visit)        Manju Escalera RN

## 2019-02-20 ENCOUNTER — ANTICOAGULATION THERAPY VISIT (OUTPATIENT)
Dept: ANTICOAGULATION | Facility: OTHER | Age: 64
End: 2019-02-20

## 2019-02-20 DIAGNOSIS — I26.99 PULMONARY EMBOLISM AND INFARCTION (H): ICD-10-CM

## 2019-02-20 NOTE — PROGRESS NOTES
ANTICOAGULATION FOLLOW-UP CLINIC VISIT    Patient Name:  Cassy Avila  Date:  2/20/2019  Contact Type:  Telephone    SUBJECTIVE:     Patient Findings     Positives:   Dental/Other procedures    Comments:   Call to patient and spoke to her re: upcoming procedure, warfarin hold, lovenox bridging pre procedure and warfarin/lovenox dosing post procedure. Patient has used lovenox numerous times before so needs  No instruction on injections or needle disposal. She verbalized understanding of all instruction and has no questions.            OBJECTIVE    INR   Date Value Ref Range Status   02/12/2019 2.3  Final       ASSESSMENT / PLAN  No question data found.  Anticoagulation Summary  As of 2/20/2019    INR goal:   2.0-3.0   TTR:   80.3 % (2.5 y)   INR used for dosing:   No new INR was available at the time of this encounter.   Warfarin maintenance plan:   7.5 mg (5 mg x 1.5) every Mon, Fri; 5 mg (5 mg x 1) all other days   Full warfarin instructions:   2/24: Hold; 2/25: Hold; 2/26: Hold; 2/27: Hold; 2/28: Hold; 3/2: 10 mg; 3/3: 10 mg; 3/4: 10 mg; Otherwise 7.5 mg every Mon, Fri; 5 mg all other days   Weekly warfarin total:   40 mg   Plan last modified:   Manju Escalera RN (11/20/2018)   Next INR check:   3/6/2019   Priority:   INR   Target end date:   Indefinite    Indications    Long-term (current) use of anticoagulants [Z79.01] [Z79.01]  Pulmonary embolism and infarction (H) [I26.99]  Deep vein thrombosis (DVT) (H) [I82.409] [I82.409]             Anticoagulation Episode Summary     INR check location:   Home Draw    Preferred lab:       Send INR reminders to:   [facility] ANTICOAG POOL    Comments:   PST - [facility].        Anticoagulation Care Providers     Provider Role Specialty Phone number    Eliz Hartman NP [facility] [phone]            See the Encounter Report to view Anticoagulation Flowsheet and Dosing Calendar (Go to Encounters tab in chart review, and find the Anticoagulation  Therapy Visit)        Manju Escalera RN

## 2019-02-27 DIAGNOSIS — I10 ESSENTIAL HYPERTENSION: ICD-10-CM

## 2019-03-01 ENCOUNTER — TRANSFERRED RECORDS (OUTPATIENT)
Dept: HEALTH INFORMATION MANAGEMENT | Facility: CLINIC | Age: 64
End: 2019-03-01

## 2019-03-01 RX ORDER — HYDROCHLOROTHIAZIDE 25 MG/1
TABLET ORAL
Qty: 90 TABLET | Refills: 0 | Status: SHIPPED | OUTPATIENT
Start: 2019-03-01 | End: 2019-05-26

## 2019-03-06 ENCOUNTER — ANTICOAGULATION THERAPY VISIT (OUTPATIENT)
Dept: FAMILY MEDICINE | Facility: OTHER | Age: 64
End: 2019-03-06
Payer: MEDICARE

## 2019-03-06 ENCOUNTER — TRANSFERRED RECORDS (OUTPATIENT)
Dept: HEALTH INFORMATION MANAGEMENT | Facility: CLINIC | Age: 64
End: 2019-03-06

## 2019-03-06 DIAGNOSIS — I26.99 PULMONARY EMBOLISM AND INFARCTION (H): ICD-10-CM

## 2019-03-06 DIAGNOSIS — I82.409 DEEP VEIN THROMBOSIS (DVT) (H): ICD-10-CM

## 2019-03-06 LAB — INR PPP: 1.7

## 2019-03-06 NOTE — PROGRESS NOTES
ANTICOAGULATION FOLLOW-UP CLINIC VISIT    Patient Name:  Cassy Avila  Date:  3/6/2019  Contact Type:  Telephone    SUBJECTIVE:     Patient Findings     Positives:   Change in medications, Change in diet/appetite    Comments:   PST done and result called to warfarin clinic by patient. She is bridge with lovenox 1mg/kg q12h as she had surgery 5 days ago. INR is 1.7 so she will remain on lovenox at this time. We discussed warfarin/lovenox dosing and INR recheck date. She verbalized understanding of instruction and has no questions. She has no bleeding/bruising and no changes in meds/activity. She states she did have increased vit K.           OBJECTIVE    INR   Date Value Ref Range Status   2019 1.7  Final       ASSESSMENT / PLAN  INR assessment SUB doses held for surgery last week, lovenox bridge   Recheck INR In: 2 DAYS    INR Location Home INR      Anticoagulation Summary  As of 3/6/2019    INR goal:   2.0-3.0   TTR:   79.6 % (2.6 y)   INR used for dosin.7! (3/6/2019)   Warfarin maintenance plan:   7.5 mg (5 mg x 1.5) every Mon, Fri; 5 mg (5 mg x 1) all other days   Full warfarin instructions:   3/6: 7.5 mg; Otherwise 7.5 mg every Mon, Fri; 5 mg all other days   Weekly warfarin total:   40 mg   Plan last modified:   Manju Escalera RN (2018)   Next INR check:   3/8/2019   Priority:   INR   Target end date:   Indefinite    Indications    Long-term (current) use of anticoagulants [Z79.01] [Z79.01]  Pulmonary embolism and infarction (H) [I26.99]  Deep vein thrombosis (DVT) (H) [I82.409] [I82.409]             Anticoagulation Episode Summary     INR check location:   Home Draw    Preferred lab:       Send INR reminders to:   [facility] ANTICOAG POOL    Comments:   PST - [facility].        Anticoagulation Care Providers     Provider Role Specialty Phone number    Eliz Hartman NP [facility] [phone]            See the Encounter Report to view Anticoagulation Flowsheet and  Dosing Calendar (Go to Encounters tab in chart review, and find the Anticoagulation Therapy Visit)        Manju Escalera RN

## 2019-03-07 ENCOUNTER — HOSPITAL ENCOUNTER (EMERGENCY)
Facility: HOSPITAL | Age: 64
Discharge: HOME | End: 2019-03-07
Attending: NURSE PRACTITIONER | Admitting: NURSE PRACTITIONER
Payer: MEDICARE

## 2019-03-07 ENCOUNTER — HOSPITAL ENCOUNTER (OUTPATIENT)
Dept: MRI IMAGING | Facility: HOSPITAL | Age: 64
Discharge: HOME | End: 2019-03-07
Attending: SPECIALIST | Admitting: SPECIALIST
Payer: MEDICARE

## 2019-03-07 VITALS
RESPIRATION RATE: 18 BRPM | BODY MASS INDEX: 37.44 KG/M2 | OXYGEN SATURATION: 96 % | WEIGHT: 225 LBS | SYSTOLIC BLOOD PRESSURE: 123 MMHG | DIASTOLIC BLOOD PRESSURE: 63 MMHG | TEMPERATURE: 98.2 F

## 2019-03-07 DIAGNOSIS — J01.90 ACUTE SINUSITIS WITH SYMPTOMS > 10 DAYS: ICD-10-CM

## 2019-03-07 DIAGNOSIS — M16.9 DEGENERATIVE JOINT DISEASE (DJD) OF HIP: ICD-10-CM

## 2019-03-07 DIAGNOSIS — M25.551 RIGHT HIP PAIN: ICD-10-CM

## 2019-03-07 PROCEDURE — G0463 HOSPITAL OUTPT CLINIC VISIT: HCPCS

## 2019-03-07 PROCEDURE — 73721 MRI JNT OF LWR EXTRE W/O DYE: CPT | Mod: TC,RT

## 2019-03-07 PROCEDURE — G0463 HOSPITAL OUTPT CLINIC VISIT: HCPCS | Mod: 25

## 2019-03-07 PROCEDURE — 99213 OFFICE O/P EST LOW 20 MIN: CPT | Mod: Z6 | Performed by: NURSE PRACTITIONER

## 2019-03-07 RX ORDER — CODEINE PHOSPHATE AND GUAIFENESIN 10; 100 MG/5ML; MG/5ML
1-2 SOLUTION ORAL EVERY 4 HOURS PRN
Qty: 118 ML | Refills: 0 | Status: SHIPPED | OUTPATIENT
Start: 2019-03-07 | End: 2019-07-24

## 2019-03-07 RX ORDER — AMOXICILLIN 500 MG/1
500 CAPSULE ORAL 3 TIMES DAILY
Qty: 30 CAPSULE | Refills: 0 | Status: SHIPPED | OUTPATIENT
Start: 2019-03-07 | End: 2019-03-08 | Stop reason: SINTOL

## 2019-03-07 ASSESSMENT — ENCOUNTER SYMPTOMS
GASTROINTESTINAL NEGATIVE: 1
CARDIOVASCULAR NEGATIVE: 1
EYES NEGATIVE: 1
CHEST TIGHTNESS: 1
COUGH: 1
WHEEZING: 1
SORE THROAT: 0
ALLERGIC/IMMUNOLOGIC NEGATIVE: 1
HEMATOLOGIC/LYMPHATIC NEGATIVE: 1
PSYCHIATRIC NEGATIVE: 1
MUSCULOSKELETAL NEGATIVE: 1
SHORTNESS OF BREATH: 1
CHILLS: 1
NEUROLOGICAL NEGATIVE: 1
ENDOCRINE NEGATIVE: 1
SINUS PRESSURE: 1

## 2019-03-07 NOTE — ED TRIAGE NOTES
Pt presents today with c/o productive cough, nasal congestion. For 7 weeks. Tried michael pot at home.

## 2019-03-07 NOTE — ED AVS SNAPSHOT
[facility]  [address] [address]  Phone:  [phone]                                    Cassy Avila   MRN: [id number]    Department:  [facility]   Date of Visit:  3/7/2019           After Visit Summary Signature Page    I have received my discharge instructions, and my questions have been answered. I have discussed any challenges I see with this plan with the nurse or doctor.    ..........................................................................................................................................  Patient/Patient Representative Signature      ..........................................................................................................................................  Patient Representative Print Name and Relationship to Patient    ..................................................               ................................................  Date                                   Time    ..........................................................................................................................................  Reviewed by Signature/Title    ...................................................              ..............................................  Date                                               Time          22EPIC Rev 08/18

## 2019-03-07 NOTE — ED PROVIDER NOTES
History     Chief Complaint   Patient presents with     Cough     cough for last 7 weeks     The history is provided by the patient.     Cassy Avila is a 63 year old female who presents to the  for cough and right ear discomfort for about 7 weeks. Has tried the neti pot and occasional use of the nebulizer without any improvement.     Allergies:  Allergies   Allergen Reactions     Amlodipine Besylate Swelling     Norvasc     Atorvastatin      myualgia     Cephalexin Monohydrate Hives     Keflex     Erythromycin Base [Kdc:Yellow Dye+Erythromycin+Brilliant Blue Fcf] Nausea and Vomiting     Meloxicam Other (See Comments)     Mobic - confusion, depression     Adhesive Tape Rash     Prochlorperazine Edisylate Swelling and Rash     Compazine     Prochlorperazine Maleate Swelling and Rash       Problem List:    Patient Active Problem List    Diagnosis Date Noted     Primary insomnia 10/31/2018     Priority: Medium     Vitamin D deficiency 07/13/2018     Priority: Medium     Statin medication not prescribed per physician orders 01/17/2018     Priority: Medium     Family history of colon cancer 01/02/2018     Priority: Medium     Lumbar spondylosis with myelopathy 07/12/2017     Priority: Medium     Degeneration of lumbar or lumbosacral intervertebral disc 07/12/2017     Priority: Medium     Long-term (current) use of anticoagulants [Z79.01] 07/20/2016     Priority: Medium     Deep vein thrombosis (DVT) (H) [I82.409] 07/20/2016     Priority: Medium     S/P shoulder surgery 03/16/2015     Priority: Medium     Moderate episode of recurrent major depressive disorder (H) 08/08/2014     Priority: Medium     H/O total shoulder replacement, left 06/17/2014     Priority: Medium     Failed arthroplasty (H) 01/24/2014     Priority: Medium     Advanced care planning/counseling discussion 03/12/2013     Priority: Medium     Pt has information at home , not completed       Neurofibromatosis, peripheral, NF1 (H) 08/22/2012      Priority: Medium     Problem list name updated by automated process. Provider to review       Contact dermatitis and other eczema, due to unspecified cause 06/01/2004     Priority: Medium     Pulmonary embolism and infarction (H) 04/11/2003     Priority: Medium     Problem list name updated by automated process. Provider to review       Hyperlipidemia LDL goal <100 07/11/2002     Priority: Medium     Problem list name updated by automated process. Provider to review       Edema 01/18/2002     Priority: Medium     Essential hypertension 02/20/2001     Priority: Medium     Problem list name updated by automated process. 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     Contact dermatitis and other eczema, due to unspecified cause 6/1/2004     Coughing      Edema 1/18/2002     Essential hypertension 2/20/2001     Factor V Leiden (H)      Family history of colon cancer 1/2/2018     Gastro-oesophageal reflux disease      Herpes zoster without mention of complication 2003     Hyperlipidemia LDL goal <100 7/11/2002     Long term (current) use of anticoagulants 8/20/2003     Major depression 8/8/2014     Mammographic microcalcification 2003     Migraine, unspecified, without mention of intractable migraine without mention of status migrainosus 3/5/2001     Moderate episode of recurrent major depressive disorder (H) 8/8/2014     Neurofibromatosis, peripheral, NF1 (H) 8/22/2012     Neurofibromatosis, unspecified(237.70) 8/22/2012     Other and unspecified hyperlipidemia 7/11/2002     Other chronic pain      Other pulmonary embolism and infarction 4/11/2003     Primary insomnia 10/31/2018     Statin medication not prescribed per physician orders 1/17/2018     Tachycardia, unspecified 2/12/2001     Thrombosis of leg       Unspecified essential hypertension 2001     Vitamin D deficiency 2018       Past Surgical History:    Past Surgical History:   Procedure Laterality Date     ------------OTHER-------------      shoulder replacement; Provider: Karen     ARTHROPLASTY KNEE  2014    Procedure: ARTHROPLASTY KNEE;  Surgeon: Sean Alexander MD;  Location: [facility]     ARTHROSCOPY SHOULDER  2012    right, bone spurs      SECTION      x3     CHOLECYSTECTOMY       COLONOSCOPY  02/15/2018    [place],,polyps     elbow ulnar tunnel release       ELECTROTHERMAL THERAPY INTRADISC  2017    stimulator     ENDOSCOPIC SINUS SURGERY, SEPTOPLASTY, TURBINOPLASTY, MAXILLARY SINUSOTOMY, COMBINED N/A 2015    Procedure: COMBINED ENDOSCOPIC SINUS SURGERY, SEPTOPLASTY, TURBINOPLASTY, MAXILLARY SINUSOTOMY;  Surgeon: Seema Conn MD;  Location: [facility]     esophagastroduodenoscopy      with biopsy and endoscopic U/S     EXCISE NEUROMA LOWER EXTREMITY Left 2016    Procedure: EXCISE NEUROMA LOWER EXTREMITY;  Surgeon: Edi Reed MD;  [facility]: [facility]     FUSION LUMBAR ANTERIOR WITH MARCY CAGES      L5-S1     HYSTERECTOMY TOTAL ABDOMINAL, BILATERAL SALPINGO-OOPHORECTOMY, COMBINED N/A      ORTHOPEDIC SURGERY  2-15    right shoulder     ORTHOPEDIC SURGERY  8/28/15    right knee     ORTHOPEDIC SURGERY Right 2018    hip labrum tear     pionidal cyst excision       TRANSPOSITION ULNAR NERVE (ELBOW)         Family History:    Family History   Problem Relation Age of Onset     Cancer Mother      Colon Polyps Mother      Heart Failure Mother 87        congestive, cause of death     Myocardial Infarction Mother         myocardial infarction     Myocardial Infarction Father         myocardial infarction - cause of death     C.A.D. Father      Cancer Paternal Uncle         cause of death     C.A.D. Brother      Other - See Comments Other         factor 5 - family h/o     Asthma No family hx of        Social  History:  Marital Status:   [2]  Social History     Tobacco Use     Smoking status: Former Smoker     Packs/day: 1.00     Years: 30.00     Pack years: 30.00     Types: Cigarettes, Pipe     Smokeless tobacco: Never Used     Tobacco comment: quit in 1999   Substance Use Topics     Alcohol use: No     Drug use: No        Medications:      amoxicillin (AMOXIL) 500 MG capsule   aspirin 81 MG EC tablet   budesonide-formoterol (SYMBICORT) 80-4.5 MCG/ACT Inhaler   Cholecalciferol (VITAMIN D3 PO)   enoxaparin (LOVENOX) 120 MG/0.8ML syringe   escitalopram (LEXAPRO) 20 MG tablet   guaiFENesin-codeine (ROBITUSSIN AC) 100-10 MG/5ML solution   hydrochlorothiazide (HYDRODIURIL) 25 MG tablet   levalbuterol (XOPENEX) 1.25 MG/3ML neb solution   losartan (COZAAR) 50 MG tablet   metoprolol succinate (TOPROL-XL) 50 MG 24 hr tablet   order for DME   STATIN NOT PRESCRIBED, INTENTIONAL,   traZODone (DESYREL) 50 MG tablet   UNABLE TO FIND   vitamin B complex with vitamin C (VITAMIN  B COMPLEX) TABS tablet   warfarin (COUMADIN) 5 MG tablet   cyclobenzaprine (FLEXERIL) 10 MG tablet         Review of Systems   Constitutional: Positive for chills.        Low grade fevers occasionally   HENT: Positive for ear pain and sinus pressure. Negative for sore throat.    Eyes: Negative.    Respiratory: Positive for cough, chest tightness, shortness of breath and wheezing.    Cardiovascular: Negative.    Gastrointestinal: Negative.    Endocrine: Negative.    Genitourinary: Negative.    Musculoskeletal: Negative.    Skin: Negative.    Allergic/Immunologic: Negative.    Neurological: Negative.    Hematological: Negative.    Psychiatric/Behavioral: Negative.        Physical Exam   BP: 123/63  Heart Rate: 77  Temp: 98.2  F (36.8  C)  Resp: 18  Weight: 102.1 kg (225 lb)  SpO2: 96 %      Physical Exam   Constitutional: She is oriented to person, place, and time.   HENT:   Left Ear: A middle ear effusion is present.   Nose: Right sinus exhibits maxillary  sinus tenderness and frontal sinus tenderness. Left sinus exhibits maxillary sinus tenderness and frontal sinus tenderness.   Mouth/Throat: Uvula is midline and mucous membranes are normal.   PE tube right TM, posterior oropharyngeal cobblestone appearing   Cardiovascular: Normal rate and normal heart sounds.   Pulmonary/Chest: Effort normal and breath sounds normal. No respiratory distress.   Harsh cough   Abdominal: Soft. Bowel sounds are normal. There is no tenderness.   Neurological: She is alert and oriented to person, place, and time.   Skin: Skin is warm and dry.   Psychiatric: She has a normal mood and affect.   Nursing note and vitals reviewed.      ED Course        Procedures              Critical Care time:  none                 Medications - No data to display    Assessments & Plan (with Medical Decision Making)     I have reviewed the nursing notes.    I have reviewed the findings, diagnosis, plan and need for follow up with the patient.  Patient verbally educated and given appropriate education sheets for each of the diagnoses and has no questions.  Take OTC motrin or tylenol as directed on the bottle as needed.  Encouraged to stay hydrated  Rest  Sinus rinsing followed by nasal steroid  Cassy requested cough medicine with codeine due to her chronic cough  Take prescription medications as directed.  Increase fluids, wash hands often.  Sleep in a recliner or with multiple pillows until this has resolved.  Follow up with your provider if symptoms increase or if further concerns develop, return to the ER.           Medication List      Started    amoxicillin 500 MG capsule  Commonly known as:  AMOXIL  500 mg, Oral, 3 TIMES DAILY     guaiFENesin-codeine 100-10 MG/5ML solution  Commonly known as:  ROBITUSSIN AC  1-2 tsp., Oral, EVERY 4 HOURS PRN            Final diagnoses:   Acute sinusitis with symptoms > 10 days       3/7/2019   [facility]     [facility] HospitalDayanna APRN CNP  03/07/19  [id number]

## 2019-03-08 ENCOUNTER — TRANSFERRED RECORDS (OUTPATIENT)
Dept: HEALTH INFORMATION MANAGEMENT | Facility: CLINIC | Age: 64
End: 2019-03-08

## 2019-03-08 ENCOUNTER — OFFICE VISIT (OUTPATIENT)
Dept: FAMILY MEDICINE | Facility: OTHER | Age: 64
End: 2019-03-08
Attending: NURSE PRACTITIONER
Payer: MEDICARE

## 2019-03-08 ENCOUNTER — ANTICOAGULATION THERAPY VISIT (OUTPATIENT)
Dept: ANTICOAGULATION | Facility: OTHER | Age: 64
End: 2019-03-08

## 2019-03-08 ENCOUNTER — TELEPHONE (OUTPATIENT)
Dept: FAMILY MEDICINE | Facility: OTHER | Age: 64
End: 2019-03-08

## 2019-03-08 VITALS
HEART RATE: 87 BPM | TEMPERATURE: 98.3 F | BODY MASS INDEX: 37.44 KG/M2 | OXYGEN SATURATION: 94 % | DIASTOLIC BLOOD PRESSURE: 70 MMHG | RESPIRATION RATE: 18 BRPM | WEIGHT: 225 LBS | SYSTOLIC BLOOD PRESSURE: 124 MMHG

## 2019-03-08 DIAGNOSIS — T50.905A ADVERSE EFFECT OF DRUG, INITIAL ENCOUNTER: Primary | ICD-10-CM

## 2019-03-08 DIAGNOSIS — I26.99 PULMONARY EMBOLISM AND INFARCTION (H): ICD-10-CM

## 2019-03-08 DIAGNOSIS — I82.409 DEEP VEIN THROMBOSIS (DVT) (H): ICD-10-CM

## 2019-03-08 LAB — INR PPP: 1.7

## 2019-03-08 PROCEDURE — 99213 OFFICE O/P EST LOW 20 MIN: CPT | Performed by: NURSE PRACTITIONER

## 2019-03-08 PROCEDURE — G0463 HOSPITAL OUTPT CLINIC VISIT: HCPCS

## 2019-03-08 RX ORDER — PREDNISONE 20 MG/1
20 TABLET ORAL DAILY
Qty: 5 TABLET | Refills: 0 | Status: SHIPPED | OUTPATIENT
Start: 2019-03-08 | End: 2019-07-18

## 2019-03-08 ASSESSMENT — PAIN SCALES - GENERAL: PAINLEVEL: NO PAIN (0)

## 2019-03-08 NOTE — NURSING NOTE
"Chief Complaint   Patient presents with     Allergic Reaction     possible reaction to amoxicillin       Initial /70 (BP Location: Left arm, Patient Position: Chair, Cuff Size: Adult Large)   Pulse 87   Temp 98.3  F (36.8  C) (Tympanic)   Resp 18   Wt 102.1 kg (225 lb)   SpO2 94%   BMI 37.44 kg/m   Estimated body mass index is 37.44 kg/m  as calculated from the following:    Height as of 2/4/19: 1.651 m (5' 5\").    Weight as of this encounter: 102.1 kg (225 lb).  Medication Reconciliation: padmini Dominguez LPN     The following medication was given:     MEDICATION: Diphenhydramine HCI oral solution  ROUTE: PO  SITE: mouth  DOSE: 25mg/10mL  LOT #: [id number]  :  [employer]   EXPIRATION DATE:  03//2020  NDC#: 0121-0978-10     "

## 2019-03-08 NOTE — PATIENT INSTRUCTIONS
Zyrtec or generic 2-4 times a day for itching  Zantac 150 mg 2 times a day  Prednisone as prescribed    Stop amoxicillin

## 2019-03-08 NOTE — PROGRESS NOTES
ANTICOAGULATION FOLLOW-UP CLINIC VISIT    Patient Name:  Cassy Avila  Date:  3/8/2019  Contact Type:  Telephone    SUBJECTIVE:     Patient Findings     Positives:   Inflammation, Antibiotic use or infection    Comments:   PST done and result called to warfarin clinic by patient. We discussed PST result, warfarin dosing and PST recheck date. She will also continue the lovenox shots q12h until INR check on 3/11/19. Taking amoxicillin for URI.            OBJECTIVE    INR   Date Value Ref Range Status   2019 1.7  Final       ASSESSMENT / PLAN  INR assessment SUB doses held last week for procedure, lovenox bridge   Recheck INR In: 3 DAYS    INR Location Home INR      Anticoagulation Summary  As of 3/8/2019    INR goal:   2.0-3.0   TTR:   79.5 % (2.6 y)   INR used for dosin.7! (3/8/2019)   Warfarin maintenance plan:   7.5 mg (5 mg x 1.5) every Mon, Fri; 5 mg (5 mg x 1) all other days   Full warfarin instructions:   3/8: 10 mg; Otherwise 7.5 mg every Mon, Fri; 5 mg all other days   Weekly warfarin total:   40 mg   Plan last modified:   Manju Escalera RN (2018)   Next INR check:   3/11/2019   Priority:   INR   Target end date:   Indefinite    Indications    Long-term (current) use of anticoagulants [Z79.01] [Z79.01]  Pulmonary embolism and infarction (H) [I26.99]  Deep vein thrombosis (DVT) (H) [I82.409] [I82.409]             Anticoagulation Episode Summary     INR check location:   Home Draw    Preferred lab:       Send INR reminders to:    ANTICOAG POOL    Comments:   PST - [facility].        Anticoagulation Care Providers     Provider Role Specialty Phone number    Minnie NORAH [facility] [phone]            See the Encounter Report to view Anticoagulation Flowsheet and Dosing Calendar (Go to Encounters tab in chart review, and find the Anticoagulation Therapy Visit)        Manju Escalera, RN

## 2019-03-08 NOTE — TELEPHONE ENCOUNTER
12:49 PM    Reason for call: Phone call    Description: Patient was seen yesterday in Urgent Care.  Started on Amoxcillin.  She took first dose today and now has severe itching everywhere.  Denies difficulty breathing, tingling or itching in mouth.  Patient did take PO Benadryl with no relief.  She does not have a rash or hives, just itching.      Please advise if patient should switch antibiotics and what she can do for the itching.  Thank you    Call back number: [phone]

## 2019-03-08 NOTE — PROGRESS NOTES
"  SUBJECTIVE:   Cassy Avila is a 63 year old female who presents to clinic today for the following health issues:      Possible Allergic Reaction to Amoxicillin      Duration: 1 day    Description (location/character/radiation): patient was seen yesterday 3/7- for Sinusitis- treated with Amoxicillin- and believes she may have had an allergic reaction. No signs of a rash/but cannot stop itching    Intensity:  severe    Accompanying signs and symptoms: itching    History (similar episodes/previous evaluation): Have been treated with amoxcillin before- and never had a reaction     Precipitating or alleviating factors: she took 1 dose of amoxicillin this morning, 15 minutes after 1st dose starting to itch.     Therapies tried and outcome: \"sticking hands in snow bank- made it feel better\"    Gave 10ML of oral Diphenhydramine at 1:20pm    Denies any new creams or lotions, only new medication was the amoxicillin she took 1 dose          Problem list and histories reviewed & adjusted, as indicated.  Additional history: as documented    Patient Active Problem List   Diagnosis     Essential hypertension     Pulmonary embolism and infarction (H)     Contact dermatitis and other eczema, due to unspecified cause     Edema     Hyperlipidemia LDL goal <100     Neurofibromatosis, peripheral, NF1 (H)     Advanced care planning/counseling discussion     Moderate episode of recurrent major depressive disorder (H)     Long-term (current) use of anticoagulants [Z79.01]     Deep vein thrombosis (DVT) (H) [I82.409]     Lumbar spondylosis with myelopathy     Degeneration of lumbar or lumbosacral intervertebral disc     Family history of colon cancer     Statin medication not prescribed per physician orders     Vitamin D deficiency     Failed arthroplasty (H)     H/O total shoulder replacement, left     S/P shoulder surgery     Primary insomnia     Past Surgical History:   Procedure Laterality Date     ------------OTHER-------------  "     shoulder replacement; Provider: Karen     ARTHROPLASTY KNEE  2014    Procedure: ARTHROPLASTY KNEE;  Surgeon: Sean Alexander MD;  Location: [facility]     ARTHROSCOPY SHOULDER  2012    right, bone spurs      SECTION      x3     CHOLECYSTECTOMY       COLONOSCOPY  02/15/2018    [place],,polyps     elbow ulnar tunnel release       ELECTROTHERMAL THERAPY INTRADISC  2017    stimulator     ENDOSCOPIC SINUS SURGERY, SEPTOPLASTY, TURBINOPLASTY, MAXILLARY SINUSOTOMY, COMBINED N/A 2015    Procedure: COMBINED ENDOSCOPIC SINUS SURGERY, SEPTOPLASTY, TURBINOPLASTY, MAXILLARY SINUSOTOMY;  Surgeon: Seema Conn MD;  Location: [facility]     esophagastroduodenoscopy      with biopsy and endoscopic U/S     EXCISE NEUROMA LOWER EXTREMITY Left 2016    Procedure: EXCISE NEUROMA LOWER EXTREMITY;  Surgeon: Edi Reed MD;  Location: [facility]     FUSION LUMBAR ANTERIOR WITH MARCY CAGES      L5-S1     HYSTERECTOMY TOTAL ABDOMINAL, BILATERAL SALPINGO-OOPHORECTOMY, COMBINED N/A      ORTHOPEDIC SURGERY  2-15    right shoulder     ORTHOPEDIC SURGERY  8/28/15    right knee     ORTHOPEDIC SURGERY Right 2018    hip labrum tear     pionidal cyst excision       TRANSPOSITION ULNAR NERVE (ELBOW)         Social History     Tobacco Use     Smoking status: Former Smoker     Packs/day: 1.00     Years: 30.00     Pack years: 30.00     Types: Cigarettes, Pipe     Smokeless tobacco: Never Used     Tobacco comment: quit in    Substance Use Topics     Alcohol use: No     Family History   Problem Relation Age of Onset     Cancer Mother      Colon Polyps Mother      Heart Failure Mother 87        congestive, cause of death     Myocardial Infarction Mother         myocardial infarction     Myocardial Infarction Father         myocardial infarction - cause of death     C.A.D. Father      Cancer Paternal Uncle         cause of death     C.A.D. Brother      Other - See Comments Other         factor 5 - family  h/o     Asthma No family hx of          Current Outpatient Medications   Medication Sig Dispense Refill     aspirin 81 MG EC tablet Take 81 mg by mouth daily HS       budesonide-formoterol (SYMBICORT) 80-4.5 MCG/ACT Inhaler Inhale 2 puffs into the lungs 2 times daily 1 Inhaler 1     Cholecalciferol (VITAMIN D3 PO) Take 3,000 mg by mouth daily AM       cyclobenzaprine (FLEXERIL) 10 MG tablet Take 10 mg by mouth 3 times daily as needed for muscle spasms       enoxaparin (LOVENOX) 120 MG/0.8ML syringe Inject 0.7 mLs (105 mg) Subcutaneous every 12 hours for 20 doses 20 Syringe 1     escitalopram (LEXAPRO) 20 MG tablet TAKE 1 TABLET BY MOUTH EVERY DAY AND 1 TABLET BY MOUTH EVERY DAY AS NEEDED 180 tablet 4     guaiFENesin-codeine (ROBITUSSIN AC) 100-10 MG/5ML solution Take 5-10 mLs by mouth every 4 hours as needed for cough 118 mL 0     hydrochlorothiazide (HYDRODIURIL) 25 MG tablet TAKE 1 TABLET(25 MG) BY MOUTH DAILY 90 tablet 0     levalbuterol (XOPENEX) 1.25 MG/3ML neb solution NEBULIZE 1 VIAL EVERY 4 HOURS AS NEEDED FOR SHORTNESS OF BREATH, DYSPNEA, OR WHEEZING 1650 mL 0     losartan (COZAAR) 50 MG tablet TAKE 2 TABLETS BY MOUTH EVERY  tablet 1     metoprolol succinate (TOPROL-XL) 50 MG 24 hr tablet TAKE 1 AND ONE-HALF TABLETS BY MOUTH EVERY  tablet 2     order for DME Nebulizer machine and tubing    DX:  Bronchitis 1 Device 0     STATIN NOT PRESCRIBED, INTENTIONAL, Please choose reason not prescribed, below       traZODone (DESYREL) 50 MG tablet TAKE 1 TO 2 TABLETS BY MOUTH AT BEDTIME AS NEEDED 180 tablet 1     UNABLE TO FIND CBD oil       vitamin B complex with vitamin C (VITAMIN  B COMPLEX) TABS tablet Take 1 tablet by mouth daily       warfarin (COUMADIN) 5 MG tablet 7.5mg Mon,Wed,Fri and 5mg all other days or as directed by warfarin clinic 150 tablet 3     Allergies   Allergen Reactions     Amlodipine Besylate Swelling     Norvasc     Amoxicillin      Atorvastatin      myualgia     Cephalexin  Monohydrate Hives     Keflex     Erythromycin Base [Kdc:Yellow Dye+Erythromycin+Brilliant Blue Fcf] Nausea and Vomiting     Meloxicam Other (See Comments)     Mobic - confusion, depression     Adhesive Tape Rash     Prochlorperazine Edisylate Swelling and Rash     Compazine     Prochlorperazine Maleate Swelling and Rash       Reviewed and updated as needed this visit by clinical staff  Allergies  Meds       Reviewed and updated as needed this visit by Provider         ROS:  CONSTITUTIONAL:NEGATIVE for fever, chills, change in weight  INTEGUMENTARY/SKIN: no rash but itching skin hands and feet   RESP:was diagnosed with sinusitis last night, denies any SOB or breathing problems  CV: NEGATIVE for chest pain, palpitations or peripheral edema    OBJECTIVE:     /70 (BP Location: Left arm, Patient Position: Chair, Cuff Size: Adult Large)   Pulse 87   Temp 98.3  F (36.8  C) (Tympanic)   Resp 18   Wt 102.1 kg (225 lb)   SpO2 94%   BMI 37.44 kg/m    Body mass index is 37.44 kg/m .   GENERAL: healthy, alert and no distress  RESP: lungs clear to auscultation - no rales, rhonchi or wheezes  CV: regular rate and rhythm, normal S1 S2, no S3 or S4, no murmur, click or rub, no peripheral edema and peripheral pulses strong  SKIN: no suspicious lesions or rashes and unable to stop rubbing hands and feet    Diagnostic Test Results:  none     ASSESSMENT/PLAN:     1. Adverse effect of drug, initial encounter  Discussed medication reaction vs allergic reaction. Plan to stop the amoxicillin and start prednisone. She can take zyrtec 2-4 times daily and zantac 2 times daily as needed for itchy skin. Discussed when to go to the ER and when to follow up.   - predniSONE (DELTASONE) 20 MG tablet; Take 20 mg by mouth daily for 5 days.  Dispense: 5 tablet; Refill: 0    Cassy should go to the ER if she develops any SOB, difficulty breathing, swallowing problems or any concerns.     Follow up as needed.   See Patient  Instructions    JES[facility] [place] [facility]

## 2019-03-11 ENCOUNTER — ANTICOAGULATION THERAPY VISIT (OUTPATIENT)
Dept: ANTICOAGULATION | Facility: OTHER | Age: 64
End: 2019-03-11

## 2019-03-11 ENCOUNTER — TRANSFERRED RECORDS (OUTPATIENT)
Dept: HEALTH INFORMATION MANAGEMENT | Facility: CLINIC | Age: 64
End: 2019-03-11

## 2019-03-11 DIAGNOSIS — I82.409 DEEP VEIN THROMBOSIS (DVT) (H): ICD-10-CM

## 2019-03-11 DIAGNOSIS — I26.99 PULMONARY EMBOLISM AND INFARCTION (H): ICD-10-CM

## 2019-03-11 LAB — INR PPP: 2.2

## 2019-03-11 NOTE — PROGRESS NOTES
ANTICOAGULATION FOLLOW-UP CLINIC VISIT    Patient Name:  Cassy Avila  Date:  3/11/2019  Contact Type:  Telephone    SUBJECTIVE:     Patient Findings     Positives:   Change in medications    Comments:   PST done and patient called result to warfarin clinic. She states she is on Prednisone 20mg daily x 5 days.  3 days left. She states she had hives on hands and feet from the amoxicillin. She has no bleeding/bruising. Patient states antibiotic discontinued. She is also to stop lovenox at this time. She has no bleeding/bruising and no changes in diet/activity. She verbalized understanding of warfarin dosing/PSt recheck date and has no questions.            OBJECTIVE    INR   Date Value Ref Range Status   2019 2.2  Final       ASSESSMENT / PLAN  INR assessment THER    Recheck INR In: 1 WEEK    INR Location Home INR      Anticoagulation Summary  As of 3/11/2019    INR goal:   2.0-3.0   TTR:   79.3 % (2.6 y)   INR used for dosin.2 (3/11/2019)   Warfarin maintenance plan:   7.5 mg (5 mg x 1.5) every Mon, Fri; 5 mg (5 mg x 1) all other days   Full warfarin instructions:   7.5 mg every Mon, Fri; 5 mg all other days   Weekly warfarin total:   40 mg   No change documented:   Manju Escalera RN   Plan last modified:   Manju Escalera RN (2018)   Next INR check:   3/18/2019   Priority:   INR   Target end date:   Indefinite    Indications    Long-term (current) use of anticoagulants [Z79.01] [Z79.01]  Pulmonary embolism and infarction (H) [I26.99]  Deep vein thrombosis (DVT) (H) [I82.409] [I82.409]             Anticoagulation Episode Summary     INR check location:   Home Draw    Preferred lab:       Send INR reminders to:   [facility] ANTICOAG POOL    Comments:   PST - [facility].        Anticoagulation Care Providers     Provider Role Specialty Phone number    Eliz Hartman NP [facility] [phone]            See the Encounter Report to view Anticoagulation Flowsheet and Dosing  Calendar (Go to Encounters tab in chart review, and find the Anticoagulation Therapy Visit)        Manju Escalera RN

## 2019-03-18 ENCOUNTER — ANTICOAGULATION THERAPY VISIT (OUTPATIENT)
Dept: ANTICOAGULATION | Facility: OTHER | Age: 64
End: 2019-03-18

## 2019-03-18 ENCOUNTER — TRANSFERRED RECORDS (OUTPATIENT)
Dept: HEALTH INFORMATION MANAGEMENT | Facility: CLINIC | Age: 64
End: 2019-03-18

## 2019-03-18 DIAGNOSIS — I26.99 PULMONARY EMBOLISM AND INFARCTION (H): ICD-10-CM

## 2019-03-18 DIAGNOSIS — I82.409 DEEP VEIN THROMBOSIS (DVT) (H): ICD-10-CM

## 2019-03-18 LAB — INR PPP: 2.1

## 2019-03-18 NOTE — PROGRESS NOTES
ANTICOAGULATION FOLLOW-UP CLINIC VISIT    Patient Name:  Cassy Avila  Date:  3/18/2019  Contact Type:  Telephone    SUBJECTIVE:     Patient Findings     Positives:   Change in diet/appetite    Comments:   PST done and result called to and message left on warfarin clinic voicemail by patient. Call returned to patient and spoke to patient re: INR result, warfarin dosing and PST recheck date. She verbalized understanding and has no questions. She has no bleeding/bruising and no new changes in meds/activity. States she is trying a new diet method and she has cut way back on her carb intake.            OBJECTIVE    INR   Date Value Ref Range Status   2019 2.1  Final       ASSESSMENT / PLAN  INR assessment THER    Recheck INR In: 4 WEEKS    INR Location Home INR      Anticoagulation Summary  As of 3/18/2019    INR goal:   2.0-3.0   TTR:   79.5 % (2.6 y)   INR used for dosin.1 (3/18/2019)   Warfarin maintenance plan:   7.5 mg (5 mg x 1.5) every Mon, Fri; 5 mg (5 mg x 1) all other days   Full warfarin instructions:   7.5 mg every Mon, Fri; 5 mg all other days   Weekly warfarin total:   40 mg   No change documented:   Manju Escalera, RN   Plan last modified:   Manju Escalera, RN (2018)   Next INR check:   4/15/2019   Priority:   INR   Target end date:   Indefinite    Indications    Long-term (current) use of anticoagulants [Z79.01] [Z79.01]  Pulmonary embolism and infarction (H) [I26.99]  Deep vein thrombosis (DVT) (H) [I82.409] [I82.409]             Anticoagulation Episode Summary     INR check location:   Home Draw    Preferred lab:       Send INR reminders to:   [facility] ANTICOAG POOL    Comments:   PST - [facility].        Anticoagulation Care Providers     Provider Role Specialty Phone number    Eliz Hartman NP [facility] [phone]            See the Encounter Report to view Anticoagulation Flowsheet and Dosing Calendar (Go to Encounters tab in chart review, and find the  Anticoagulation Therapy Visit)        Manju Escalera RN

## 2019-04-02 DIAGNOSIS — I10 BENIGN ESSENTIAL HYPERTENSION: ICD-10-CM

## 2019-04-02 RX ORDER — LOSARTAN POTASSIUM 50 MG/1
TABLET ORAL
Qty: 180 TABLET | Refills: 1 | Status: SHIPPED | OUTPATIENT
Start: 2019-04-02 | End: 2019-10-08

## 2019-04-15 ENCOUNTER — ANTICOAGULATION THERAPY VISIT (OUTPATIENT)
Dept: ANTICOAGULATION | Facility: OTHER | Age: 64
End: 2019-04-15

## 2019-04-15 ENCOUNTER — TRANSFERRED RECORDS (OUTPATIENT)
Dept: HEALTH INFORMATION MANAGEMENT | Facility: CLINIC | Age: 64
End: 2019-04-15

## 2019-04-15 DIAGNOSIS — I26.99 PULMONARY EMBOLISM AND INFARCTION (H): ICD-10-CM

## 2019-04-15 DIAGNOSIS — I82.409 DEEP VEIN THROMBOSIS (DVT) (H): ICD-10-CM

## 2019-04-15 LAB — INR PPP: 2.3

## 2019-04-15 NOTE — PROGRESS NOTES
ANTICOAGULATION FOLLOW-UP CLINIC VISIT    Patient Name:  Cassy Avila  Date:  4/15/2019  Contact Type:  Telephone/ message left on home phone voicemail    SUBJECTIVE:     Patient Findings     Comments:   PSt done and result faxed to warfarin clinic by Julisa. Call placed to patient and message left re: PSt result, warfarin dosing and PSt recheck date. She is to call warfarin clinic if she has any bleeding/bruising, changes in diet/meds/activity or questions.            OBJECTIVE    INR   Date Value Ref Range Status   04/15/2019 2.3  Final       ASSESSMENT / PLAN  INR assessment THER    Recheck INR In: 4 WEEKS    INR Location Home INR      Anticoagulation Summary  As of 4/15/2019    INR goal:   2.0-3.0   TTR:   80.1 % (2.7 y)   INR used for dosin.3 (4/15/2019)   Warfarin maintenance plan:   7.5 mg (5 mg x 1.5) every Mon, Fri; 5 mg (5 mg x 1) all other days   Full warfarin instructions:   7.5 mg every Mon, Fri; 5 mg all other days   Weekly warfarin total:   40 mg   No change documented:   Manju Escalera RN   Plan last modified:   Manju Escalera RN (2018)   Next INR check:   2019   Priority:   INR   Target end date:   Indefinite    Indications    Long-term (current) use of anticoagulants [Z79.01] [Z79.01]  Pulmonary embolism and infarction (H) [I26.99]  Deep vein thrombosis (DVT) (H) [I82.409] [I82.409]             Anticoagulation Episode Summary     INR check location:   Home Draw    Preferred lab:       Send INR reminders to:    ANTICOAG POOL    Comments:   PST - [facility].        Anticoagulation Care Providers     Provider Role Specialty Phone number    Flmadalyn NORAH [facility] [phone]            See the Encounter Report to view Anticoagulation Flowsheet and Dosing Calendar (Go to Encounters tab in chart review, and find the Anticoagulation Therapy Visit)        Manju Escalera RN

## 2019-05-20 ENCOUNTER — TRANSFERRED RECORDS (OUTPATIENT)
Dept: HEALTH INFORMATION MANAGEMENT | Facility: CLINIC | Age: 64
End: 2019-05-20

## 2019-05-20 ENCOUNTER — ANTICOAGULATION THERAPY VISIT (OUTPATIENT)
Dept: FAMILY MEDICINE | Facility: OTHER | Age: 64
End: 2019-05-20

## 2019-05-20 DIAGNOSIS — I82.409 DEEP VEIN THROMBOSIS (DVT) (H): ICD-10-CM

## 2019-05-20 DIAGNOSIS — I26.99 PULMONARY EMBOLISM AND INFARCTION (H): ICD-10-CM

## 2019-05-20 LAB — INR PPP: 1.8 (ref 0.8–1.1)

## 2019-05-20 NOTE — PROGRESS NOTES
ANTICOAGULATION FOLLOW-UP CLINIC VISIT    Patient Name:  Cassy Avila  Date:  2019  Contact Type:  Telephone    SUBJECTIVE:  Patient Findings     Positives:   Change in diet/appetite (increased vit K intake)    Comments:   PST done and result called to warfarin clinic. She states she is getting some sort of shots in her foot for neuromas weekly. We discussed that those should not interfere with warfarin. We discussed that she is eating increased amounts of vit K and she plans to continue this. We discussed warfarin dosing/PST recheck date. She verbalized understanding and has no questions. She has no unusual bleeding/bruising and other than shot in her foot, has no changes in meds/activity        Clinical Outcomes     Negatives:   Major bleeding event, Thromboembolic event, Anticoagulation-related hospital admission, Anticoagulation-related ED visit, Anticoagulation-related fatality    Comments:   PST done and result called to warfarin clinic. She states she is getting some sort of shots in her foot for neuromas weekly. We discussed that those should not interfere with warfarin. We discussed that she is eating increased amounts of vit K and she plans to continue this. We discussed warfarin dosing/PST recheck date. She verbalized understanding and has no questions. She has no unusual bleeding/bruising and other than shot in her foot, has no changes in meds/activity           OBJECTIVE    INR   Date Value Ref Range Status   2019 1.8 (A) 0.8 - 1.1 Final       ASSESSMENT / PLAN  INR assessment SUB increased vit K intake   Recheck INR In: 4 WEEKS    INR Location Home INR      Anticoagulation Summary  As of 2019    INR goal:   2.0-3.0   TTR:   79.4 % (2.8 y)   INR used for dosin.8! (2019)   Warfarin maintenance plan:   7.5 mg (5 mg x 1.5) every Mon, Wed, Fri; 5 mg (5 mg x 1) all other days   Full warfarin instructions:   : 10 mg; Otherwise 7.5 mg every Mon, Wed, Fri; 5 mg all other days    Weekly warfarin total:   42.5 mg   Plan last modified:   Manju Escalera RN (5/20/2019)   Next INR check:   6/17/2019   Priority:   INR   Target end date:   Indefinite    Indications    Long-term (current) use of anticoagulants [Z79.01] [Z79.01]  Pulmonary embolism and infarction (H) [I26.99]  Deep vein thrombosis (DVT) (H) [I82.409] [I82.409]             Anticoagulation Episode Summary     INR check location:   Home Draw    Preferred lab:       Send INR reminders to:    ANTICOAG POOL    Comments:   PST - [facility].        Anticoagulation Care Providers     Provider Role Specialty Phone number    Minnie Eliz, NORAH [facility] [phone]            See the Encounter Report to view Anticoagulation Flowsheet and Dosing Calendar (Go to Encounters tab in chart review, and find the Anticoagulation Therapy Visit)        Manju Escalera, RN

## 2019-05-26 DIAGNOSIS — I10 ESSENTIAL HYPERTENSION: ICD-10-CM

## 2019-05-28 RX ORDER — HYDROCHLOROTHIAZIDE 25 MG/1
TABLET ORAL
Qty: 90 TABLET | Refills: 0 | Status: SHIPPED | OUTPATIENT
Start: 2019-05-28 | End: 2019-10-09

## 2019-06-07 NOTE — PROGRESS NOTES
Cassy Avila is a 63 year old female who presents to clinic today for the following health issues:      Musculoskeletal problem/pain - Right Shoulder  Prior Right shoulder arthroscopy - Karen years ago    Duration: a few months - progressively getting worse     Description  Location: right shoulder pain     Intensity:  moderate, severe    Accompanying signs and symptoms: weakness of arm and painful    History  Previous similar problem: no   Previous evaluation:  none    Precipitating or alleviating factors:  Trauma or overuse: no   Aggravating factors include: lifting, pulling and digging     Therapies tried and outcome: ice and hydrocodone- no relief         Patient Active Problem List   Diagnosis     Essential hypertension     Pulmonary embolism and infarction (H)     Contact dermatitis and other eczema, due to unspecified cause     Edema     Hyperlipidemia LDL goal <100     Neurofibromatosis, peripheral, NF1 (H)     Advanced care planning/counseling discussion     Moderate episode of recurrent major depressive disorder (H)     Long-term (current) use of anticoagulants [Z79.01]     Deep vein thrombosis (DVT) (H) [I82.409]     Lumbar spondylosis with myelopathy     Degeneration of lumbar or lumbosacral intervertebral disc     Family history of colon cancer     Statin medication not prescribed per physician orders     Vitamin D deficiency     Failed arthroplasty (H)     H/O total shoulder replacement, left     S/P shoulder surgery     Primary insomnia     Past Surgical History:   Procedure Laterality Date     ------------OTHER-------------      shoulder replacement; Provider: Karen     ARTHROPLASTY KNEE  2014    Procedure: ARTHROPLASTY KNEE;  Surgeon: Sean Alexander MD;  Location: [facility] OR     ARTHROSCOPY SHOULDER      right, bone spurs      SECTION      x3     CHOLECYSTECTOMY       COLONOSCOPY  02/15/2018    Mullen,,polyps     elbow ulnar tunnel release       ELECTROTHERMAL  THERAPY INTRADISC  2017    stimulator     ENDOSCOPIC SINUS SURGERY, SEPTOPLASTY, TURBINOPLASTY, MAXILLARY SINUSOTOMY, COMBINED N/A 4/29/2015    Procedure: COMBINED ENDOSCOPIC SINUS SURGERY, SEPTOPLASTY, TURBINOPLASTY, MAXILLARY SINUSOTOMY;  Surgeon: Seema Conn MD;  Location: [facility] OR     esophagastroduodenoscopy  2011    with biopsy and endoscopic U/S     EXCISE NEUROMA LOWER EXTREMITY Left 7/13/2016    Procedure: EXCISE NEUROMA LOWER EXTREMITY;  Surgeon: Edi Reed MD;  Location: [facility] OR     FUSION LUMBAR ANTERIOR WITH MARCY CAGES      L5-S1     HYSTERECTOMY TOTAL ABDOMINAL, BILATERAL SALPINGO-OOPHORECTOMY, COMBINED N/A      ORTHOPEDIC SURGERY  2-15    right shoulder     ORTHOPEDIC SURGERY  8/28/15    right knee     ORTHOPEDIC SURGERY Right 06/11/2018    hip labrum tear     pionidal cyst excision       TRANSPOSITION ULNAR NERVE (ELBOW)         Social History     Tobacco Use     Smoking status: Former Smoker     Packs/day: 1.00     Years: 30.00     Pack years: 30.00     Types: Cigarettes, Pipe     Smokeless tobacco: Never Used     Tobacco comment: quit in 1999   Substance Use Topics     Alcohol use: No     Family History   Problem Relation Age of Onset     Cancer Mother      Colon Polyps Mother      Heart Failure Mother 87        congestive, cause of death     Myocardial Infarction Mother         myocardial infarction     Myocardial Infarction Father         myocardial infarction - cause of death     C.A.D. Father      Cancer Paternal Uncle         cause of death     C.A.D. Brother      Other - See Comments Other         factor 5 - family h/o     Asthma No family hx of          Current Outpatient Medications   Medication Sig Dispense Refill     aspirin 81 MG EC tablet Take 81 mg by mouth daily HS       budesonide-formoterol (SYMBICORT) 80-4.5 MCG/ACT Inhaler Inhale 2 puffs into the lungs 2 times daily 1 Inhaler 1     Cholecalciferol (VITAMIN D3 PO) Take 3,000 mg by mouth daily AM       cyclobenzaprine  (FLEXERIL) 10 MG tablet Take 10 mg by mouth 3 times daily as needed for muscle spasms       escitalopram (LEXAPRO) 20 MG tablet TAKE 1 TABLET BY MOUTH EVERY DAY AND 1 TABLET BY MOUTH EVERY DAY AS NEEDED 180 tablet 4     hydrochlorothiazide (HYDRODIURIL) 25 MG tablet TAKE 1 TABLET(25 MG) BY MOUTH DAILY 90 tablet 0     levalbuterol (XOPENEX) 1.25 MG/3ML neb solution NEBULIZE 1 VIAL EVERY 4 HOURS AS NEEDED FOR SHORTNESS OF BREATH, DYSPNEA, OR WHEEZING 1650 mL 0     losartan (COZAAR) 50 MG tablet TAKE 2 TABLETS BY MOUTH EVERY  tablet 1     metoprolol succinate (TOPROL-XL) 50 MG 24 hr tablet TAKE 1 AND ONE-HALF TABLETS BY MOUTH EVERY  tablet 2     order for DME Nebulizer machine and tubing    DX:  Bronchitis 1 Device 0     STATIN NOT PRESCRIBED, INTENTIONAL, Please choose reason not prescribed, below       traZODone (DESYREL) 50 MG tablet TAKE 1 TO 2 TABLETS BY MOUTH AT BEDTIME AS NEEDED 180 tablet 1     UNABLE TO FIND CBD oil       vitamin B complex with vitamin C (VITAMIN  B COMPLEX) TABS tablet Take 1 tablet by mouth daily       warfarin (COUMADIN) 5 MG tablet 7.5mg Mon,Wed,Fri and 5mg all other days or as directed by warfarin clinic 150 tablet 3     guaiFENesin-codeine (ROBITUSSIN AC) 100-10 MG/5ML solution Take 5-10 mLs by mouth every 4 hours as needed for cough (Patient not taking: Reported on 6/10/2019) 118 mL 0     Allergies   Allergen Reactions     Amlodipine Besylate Swelling     Norvasc     Amoxicillin      Atorvastatin      myualgia     Cephalexin Monohydrate Hives     Keflex     Erythromycin Base [Kdc:Yellow Dye+Erythromycin+Brilliant Blue Fcf] Nausea and Vomiting     Meloxicam Other (See Comments)     Mobic - confusion, depression     Adhesive Tape Rash     Prochlorperazine Edisylate Swelling and Rash     Compazine     Prochlorperazine Maleate Swelling and Rash     Recent Labs   Lab Test 02/05/19  1013 12/31/18  0702 10/31/18  1138  01/17/18  0822  01/11/16  0935  11/05/13  1528   A1C  --   --    --   --   --   --   --   --  5.5   *  --   --   --  137*  --  126*   < >  --    HDL 37*  --   --   --  43*  --  38*   < >  --    TRIG 208*  --   --   --  190*  --  265*   < >  --    ALT 42 35 35  --   --    < >  --    < >  --    CR 0.92 0.89 0.92   < > 0.91   < > 1.02   < >  --    GFRESTIMATED 66 68 61   < > 62   < > 55*   < >  --    GFRESTBLACK 76 79 74   < > 75   < > 67   < >  --    POTASSIUM 3.5 3.6 3.3*   < > 3.2*   < > 3.4   < >  --    TSH 2.71  --  2.32   < > 5.75*   < > 3.62   < >  --     < > = values in this interval not displayed.      BP Readings from Last 3 Encounters:   06/10/19 122/78   03/08/19 124/70   03/07/19 123/63    Wt Readings from Last 3 Encounters:   06/10/19 103 kg (227 lb)   03/08/19 102.1 kg (225 lb)   03/07/19 102.1 kg (225 lb)                  Reviewed and updated as needed this visit by Provider         Review of Systems   ROS COMP: Constitutional, HEENT, cardiovascular, pulmonary, gi and gu systems are negative, except as otherwise noted.      Objective    /78   Pulse 67   Temp 97  F (36.1  C) (Tympanic)   Wt 103 kg (227 lb)   SpO2 99%   BMI 37.77 kg/m    Body mass index is 37.77 kg/m .       Physical Exam   GENERAL: healthy, alert and no distress  RESP: lungs clear to auscultation - no rales, rhonchi or wheezes  CV: regular rate and rhythm, normal S1 S2, no S3 or S4, no murmur, click or rub, no peripheral edema and peripheral pulses strong  MS: Right shoulder - anterior pain, limited posterior and anterior ROM. No AC pain            Assessment & Plan     1. Muscle spasm  - cyclobenzaprine (FLEXERIL) 10 MG tablet; Take 1 tablet (10 mg) by mouth 3 times daily as needed for muscle spasms  Dispense: 90 tablet; Refill: 1    2. Right shoulder pain, unspecified chronicity  - ORTHOPEDICS ADULT REFERRAL         Eliz Hartman NP  [facility] [place] [facility]

## 2019-06-10 ENCOUNTER — OFFICE VISIT (OUTPATIENT)
Dept: FAMILY MEDICINE | Facility: OTHER | Age: 64
End: 2019-06-10
Attending: NURSE PRACTITIONER
Payer: COMMERCIAL

## 2019-06-10 VITALS
WEIGHT: 227 LBS | HEART RATE: 67 BPM | DIASTOLIC BLOOD PRESSURE: 78 MMHG | OXYGEN SATURATION: 99 % | TEMPERATURE: 97 F | SYSTOLIC BLOOD PRESSURE: 122 MMHG | BODY MASS INDEX: 37.77 KG/M2

## 2019-06-10 DIAGNOSIS — M25.511 RIGHT SHOULDER PAIN, UNSPECIFIED CHRONICITY: ICD-10-CM

## 2019-06-10 DIAGNOSIS — M62.838 MUSCLE SPASM: Primary | ICD-10-CM

## 2019-06-10 PROCEDURE — G0463 HOSPITAL OUTPT CLINIC VISIT: HCPCS | Mod: 25

## 2019-06-10 PROCEDURE — 99214 OFFICE O/P EST MOD 30 MIN: CPT | Performed by: NURSE PRACTITIONER

## 2019-06-10 PROCEDURE — G0463 HOSPITAL OUTPT CLINIC VISIT: HCPCS

## 2019-06-10 RX ORDER — CYCLOBENZAPRINE HCL 10 MG
10 TABLET ORAL 3 TIMES DAILY PRN
Qty: 90 TABLET | Refills: 1 | Status: SHIPPED | OUTPATIENT
Start: 2019-06-10 | End: 2019-09-13

## 2019-06-10 ASSESSMENT — PATIENT HEALTH QUESTIONNAIRE - PHQ9: SUM OF ALL RESPONSES TO PHQ QUESTIONS 1-9: 0

## 2019-06-10 ASSESSMENT — ANXIETY QUESTIONNAIRES
4. TROUBLE RELAXING: NOT AT ALL
3. WORRYING TOO MUCH ABOUT DIFFERENT THINGS: NOT AT ALL
5. BEING SO RESTLESS THAT IT IS HARD TO SIT STILL: NOT AT ALL
2. NOT BEING ABLE TO STOP OR CONTROL WORRYING: NOT AT ALL
1. FEELING NERVOUS, ANXIOUS, OR ON EDGE: NOT AT ALL
6. BECOMING EASILY ANNOYED OR IRRITABLE: NOT AT ALL
GAD7 TOTAL SCORE: 0
7. FEELING AFRAID AS IF SOMETHING AWFUL MIGHT HAPPEN: NOT AT ALL

## 2019-06-10 ASSESSMENT — PAIN SCALES - GENERAL: PAINLEVEL: MILD PAIN (3)

## 2019-06-10 NOTE — PATIENT INSTRUCTIONS
Assessment & Plan     1. Muscle spasm  - cyclobenzaprine (FLEXERIL) 10 MG tablet; Take 1 tablet (10 mg) by mouth 3 times daily as needed for muscle spasms  Dispense: 90 tablet; Refill: 1    2. Right shoulder pain, unspecified chronicity  - ORTHOPEDICS ADULT REFERRAL         Eliz Hartman NP  [facility]

## 2019-06-10 NOTE — NURSING NOTE
"Chief Complaint   Patient presents with     Musculoskeletal Problem       Initial /78   Pulse 67   Temp 97  F (36.1  C) (Tympanic)   Wt 103 kg (227 lb)   SpO2 99%   BMI 37.77 kg/m   Estimated body mass index is 37.77 kg/m  as calculated from the following:    Height as of 2/4/19: 1.651 m (5' 5\").    Weight as of this encounter: 103 kg (227 lb).  Medication Reconciliation: complete    Mariama Linares LPN  "

## 2019-06-11 ASSESSMENT — ANXIETY QUESTIONNAIRES: GAD7 TOTAL SCORE: 0

## 2019-06-14 ENCOUNTER — TRANSFERRED RECORDS (OUTPATIENT)
Dept: HEALTH INFORMATION MANAGEMENT | Facility: CLINIC | Age: 64
End: 2019-06-14

## 2019-06-17 ENCOUNTER — TRANSFERRED RECORDS (OUTPATIENT)
Dept: HEALTH INFORMATION MANAGEMENT | Facility: CLINIC | Age: 64
End: 2019-06-17

## 2019-06-17 ENCOUNTER — ANTICOAGULATION THERAPY VISIT (OUTPATIENT)
Dept: ANTICOAGULATION | Facility: OTHER | Age: 64
End: 2019-06-17

## 2019-06-17 DIAGNOSIS — I82.409 DEEP VEIN THROMBOSIS (DVT) (H): ICD-10-CM

## 2019-06-17 DIAGNOSIS — I26.99 PULMONARY EMBOLISM AND INFARCTION (H): ICD-10-CM

## 2019-06-17 LAB — INR PPP: 2.1 (ref 0.8–1.1)

## 2019-06-17 NOTE — PROGRESS NOTES
ANTICOAGULATION FOLLOW-UP CLINIC VISIT    Patient Name:  Cassy Avila  Date:  2019  Contact Type:  Telephone    SUBJECTIVE:  Patient Findings     Comments:   PST done and result faxed to warfarin clinic by [employer]  Call placed to patient and spoke to her re: INR result, warfarin dosing and INR recheck date. She verbalized understanding and has no questions. She denies any bleeding/bruising and has no new changes in diet/meds/activity or questions.         Clinical Outcomes     Negatives:   Major bleeding event, Thromboembolic event, Anticoagulation-related hospital admission, Anticoagulation-related ED visit, Anticoagulation-related fatality    Comments:   PST done and result faxed to warfarin clinic by [employer]  Call placed to patient and spoke to her re: INR result, warfarin dosing and INR recheck date. She verbalized understanding and has no questions. She denies any bleeding/bruising and has no new changes in diet/meds/activity or questions.            OBJECTIVE    INR   Date Value Ref Range Status   2019 2.1 (A) 0.8 - 1.1 Final       ASSESSMENT / PLAN  INR assessment THER    Recheck INR In: 4 WEEKS    INR Location Home INR      Anticoagulation Summary  As of 2019    INR goal:   2.0-3.0   TTR:   78.2 % (2.9 y)   INR used for dosin.1 (2019)   Warfarin maintenance plan:   7.5 mg (5 mg x 1.5) every Mon, Wed, Fri; 5 mg (5 mg x 1) all other days   Full warfarin instructions:   7.5 mg every Mon, Wed, Fri; 5 mg all other days   Weekly warfarin total:   42.5 mg   No change documented:   Manju Escalera RN   Plan last modified:   Manju Escalera RN (2019)   Next INR check:   7/15/2019   Priority:   INR   Target end date:   Indefinite    Indications    Long-term (current) use of anticoagulants [Z79.01] [Z79.01]  Pulmonary embolism and infarction (H) [I26.99]  Deep vein thrombosis (DVT) (H) [I82.409] [I82.409]             Anticoagulation Episode Summary     INR check location:   Home Draw     Preferred lab:       Send INR reminders to:   [facility] ANTICOAG POOL    Comments:   PST - [facility].        Anticoagulation Care Providers     Provider Role Specialty Phone number    Eliz Hartman NP [facility] [phone]            See the Encounter Report to view Anticoagulation Flowsheet and Dosing Calendar (Go to Encounters tab in chart review, and find the Anticoagulation Therapy Visit)        Manju Escalera RN

## 2019-07-03 ENCOUNTER — TRANSFERRED RECORDS (OUTPATIENT)
Dept: HEALTH INFORMATION MANAGEMENT | Facility: CLINIC | Age: 64
End: 2019-07-03

## 2019-07-12 ENCOUNTER — TRANSFERRED RECORDS (OUTPATIENT)
Dept: HEALTH INFORMATION MANAGEMENT | Facility: CLINIC | Age: 64
End: 2019-07-12

## 2019-07-15 ENCOUNTER — TRANSFERRED RECORDS (OUTPATIENT)
Dept: HEALTH INFORMATION MANAGEMENT | Facility: CLINIC | Age: 64
End: 2019-07-15

## 2019-07-15 ENCOUNTER — ANTICOAGULATION THERAPY VISIT (OUTPATIENT)
Dept: ANTICOAGULATION | Facility: OTHER | Age: 64
End: 2019-07-15

## 2019-07-15 DIAGNOSIS — I82.409 DEEP VEIN THROMBOSIS (DVT) (H): ICD-10-CM

## 2019-07-15 DIAGNOSIS — Z79.01 LONG TERM CURRENT USE OF ANTICOAGULANT THERAPY: ICD-10-CM

## 2019-07-15 DIAGNOSIS — I82.409 DVT (DEEP VENOUS THROMBOSIS) (H): ICD-10-CM

## 2019-07-15 DIAGNOSIS — I26.99 PULMONARY EMBOLISM AND INFARCTION (H): ICD-10-CM

## 2019-07-15 LAB
INR POINT OF CARE: 2.5 (ref 0.86–1.14)
INR PPP: 2.5 (ref 0.8–1.2)

## 2019-07-15 RX ORDER — WARFARIN SODIUM 5 MG/1
TABLET ORAL
Qty: 150 TABLET | Refills: 3 | Status: SHIPPED | OUTPATIENT
Start: 2019-07-15 | End: 2020-09-15

## 2019-07-15 NOTE — PROGRESS NOTES
ANTICOAGULATION FOLLOW-UP CLINIC VISIT    Patient Name:  Cassy Avila  Date:  7/15/2019  Contact Type:  Telephone    SUBJECTIVE:  Patient Findings     Comments:   PSt done and result faxed to warfarin clinic.  Call placed to patient and we discussed INR result, warfarin dosing and PST recheck date. She verbalized understanding and has no questions. She has no bleeding/bruising and no new changes in diet/meds/activity. States will be having surgery on her shoulder but date has not been set at this time.         Clinical Outcomes     Negatives:   Major bleeding event, Thromboembolic event, Anticoagulation-related hospital admission, Anticoagulation-related ED visit, Anticoagulation-related fatality    Comments:   PSt done and result faxed to warfarin clinic.  Call placed to patient and we discussed INR result, warfarin dosing and PST recheck date. She verbalized understanding and has no questions. She has no bleeding/bruising and no new changes in diet/meds/activity. States will be having surgery on her shoulder but date has not been set at this time.            OBJECTIVE    INR   Date Value Ref Range Status   07/15/2019 2.5 (A) 0.8 - 1.2 Final     INR Protime   Date Value Ref Range Status   07/15/2019 2.5 (A) 0.86 - 1.14 Final       ASSESSMENT / PLAN  INR assessment THER    Recheck INR In: 4 WEEKS    INR Location Home INR      Anticoagulation Summary  As of 7/15/2019    INR goal:   2.0-3.0   TTR:   78.7 % (3 y)   INR used for dosin.5 (7/15/2019)   Warfarin maintenance plan:   7.5 mg (5 mg x 1.5) every Mon, Wed, Fri; 5 mg (5 mg x 1) all other days   Full warfarin instructions:   7.5 mg every Mon, Wed, Fri; 5 mg all other days   Weekly warfarin total:   42.5 mg   No change documented:   Manju Escalera RN   Plan last modified:   Manju Escalera RN (2019)   Next INR check:   2019   Priority:   INR   Target end date:   Indefinite    Indications    Long-term (current) use of anticoagulants [Z79.01]  [Z79.01]  Pulmonary embolism and infarction (H) [I26.99]  Deep vein thrombosis (DVT) (H) [I82.409] [I82.409]             Anticoagulation Episode Summary     INR check location:   Home Draw    Preferred lab:       Send INR reminders to:   [facility] ANTICOAG POOL    Comments:   PST - [facility].        Anticoagulation Care Providers     Provider Role Specialty Phone number    Eliz Hartman NP [facility] [phone]            See the Encounter Report to view Anticoagulation Flowsheet and Dosing Calendar (Go to Encounters tab in chart review, and find the Anticoagulation Therapy Visit)        Manju Escalera RN

## 2019-07-18 ENCOUNTER — OFFICE VISIT (OUTPATIENT)
Dept: FAMILY MEDICINE | Facility: OTHER | Age: 64
End: 2019-07-18
Attending: NURSE PRACTITIONER
Payer: COMMERCIAL

## 2019-07-18 VITALS
HEART RATE: 84 BPM | SYSTOLIC BLOOD PRESSURE: 118 MMHG | TEMPERATURE: 97.3 F | BODY MASS INDEX: 37.82 KG/M2 | OXYGEN SATURATION: 96 % | HEIGHT: 65 IN | DIASTOLIC BLOOD PRESSURE: 80 MMHG | WEIGHT: 227 LBS | RESPIRATION RATE: 14 BRPM

## 2019-07-18 DIAGNOSIS — H69.91 EUSTACHIAN TUBE DYSFUNCTION, RIGHT: Primary | ICD-10-CM

## 2019-07-18 DIAGNOSIS — Z96.22 HISTORY OF PLACEMENT OF EAR TUBES: ICD-10-CM

## 2019-07-18 PROCEDURE — 99213 OFFICE O/P EST LOW 20 MIN: CPT | Performed by: NURSE PRACTITIONER

## 2019-07-18 PROCEDURE — G0463 HOSPITAL OUTPT CLINIC VISIT: HCPCS

## 2019-07-18 ASSESSMENT — PAIN SCALES - GENERAL: PAINLEVEL: MODERATE PAIN (5)

## 2019-07-18 ASSESSMENT — MIFFLIN-ST. JEOR: SCORE: 1580.55

## 2019-07-18 NOTE — PROGRESS NOTES
Subjective     Cassy Avila is a 63 year old female who presents to clinic today for the following health issues:    HPI   Right ear pain      Duration: 2 days    Description (location/character/radiation): right ear pain    Intensity:  moderate, 5/10    Accompanying signs and symptoms: none    History (similar episodes/previous evaluation): Tube placement 2 years ago for eustachian tube dysfunction.      Precipitating or alleviating factors: None - no drainage noted.      Therapies tried and outcome: None         Patient Active Problem List   Diagnosis     Essential hypertension     Pulmonary embolism and infarction (H)     Contact dermatitis and other eczema, due to unspecified cause     Edema     Hyperlipidemia LDL goal <100     Neurofibromatosis, peripheral, NF1 (H)     Advanced care planning/counseling discussion     Moderate episode of recurrent major depressive disorder (H)     Long-term (current) use of anticoagulants [Z79.01]     Deep vein thrombosis (DVT) (H) [I82.409]     Lumbar spondylosis with myelopathy     Degeneration of lumbar or lumbosacral intervertebral disc     Family history of colon cancer     Statin medication not prescribed per physician orders     Vitamin D deficiency     Failed arthroplasty (H)     H/O total shoulder replacement, left     S/P shoulder surgery     Primary insomnia     Past Surgical History:   Procedure Laterality Date     ------------OTHER-------------      shoulder replacement; Provider: Karen     ARTHROPLASTY KNEE  2014    Procedure: ARTHROPLASTY KNEE;  Surgeon: Sean Alexander MD;  Location: [facility]     ARTHROSCOPY SHOULDER      right, bone spurs      SECTION      x3     CHOLECYSTECTOMY       COLONOSCOPY  02/15/2018    [place],,polyps     elbow ulnar tunnel release  2002     ELECTROTHERMAL THERAPY INTRADISC  2017    stimulator     ENDOSCOPIC SINUS SURGERY, SEPTOPLASTY, TURBINOPLASTY, MAXILLARY SINUSOTOMY, COMBINED N/A 2015    Procedure:  COMBINED ENDOSCOPIC SINUS SURGERY, SEPTOPLASTY, TURBINOPLASTY, MAXILLARY SINUSOTOMY;  Surgeon: Seema Conn MD;  Location: [facility]     esophagastroduodenoscopy  2011    with biopsy and endoscopic U/S     EXCISE NEUROMA LOWER EXTREMITY Left 7/13/2016    Procedure: EXCISE NEUROMA LOWER EXTREMITY;  Surgeon: Edi Reed MD;  Location: [facility]     FUSION LUMBAR ANTERIOR WITH BAK CAGES      L5-S1     HYSTERECTOMY TOTAL ABDOMINAL, BILATERAL SALPINGO-OOPHORECTOMY, COMBINED N/A      ORTHOPEDIC SURGERY  2-15    right shoulder     ORTHOPEDIC SURGERY  8/28/15    right knee     ORTHOPEDIC SURGERY Right 06/11/2018    hip labrum tear     pionidal cyst excision       TRANSPOSITION ULNAR NERVE (ELBOW)         Social History     Tobacco Use     Smoking status: Former Smoker     Packs/day: 1.00     Years: 30.00     Pack years: 30.00     Types: Cigarettes, Pipe     Smokeless tobacco: Never Used     Tobacco comment: quit in 1999   Substance Use Topics     Alcohol use: No     Family History   Problem Relation Age of Onset     Cancer Mother      Colon Polyps Mother      Heart Failure Mother 87        congestive, cause of death     Myocardial Infarction Mother         myocardial infarction     Myocardial Infarction Father         myocardial infarction - cause of death     C.A.D. Father      Cancer Paternal Uncle         cause of death     C.A.D. Brother      Other - See Comments Other         factor 5 - family h/o     Asthma No family hx of          Current Outpatient Medications   Medication Sig Dispense Refill     aspirin 81 MG EC tablet Take 81 mg by mouth daily HS       budesonide-formoterol (SYMBICORT) 80-4.5 MCG/ACT Inhaler Inhale 2 puffs into the lungs 2 times daily 1 Inhaler 1     Cholecalciferol (VITAMIN D3 PO) Take 3,000 mg by mouth daily AM       cyclobenzaprine (FLEXERIL) 10 MG tablet Take 1 tablet (10 mg) by mouth 3 times daily as needed for muscle spasms 90 tablet 1     escitalopram (LEXAPRO) 20 MG tablet TAKE 1  TABLET BY MOUTH EVERY DAY AND 1 TABLET BY MOUTH EVERY DAY AS NEEDED 180 tablet 4     guaiFENesin-codeine (ROBITUSSIN AC) 100-10 MG/5ML solution Take 5-10 mLs by mouth every 4 hours as needed for cough 118 mL 0     hydrochlorothiazide (HYDRODIURIL) 25 MG tablet TAKE 1 TABLET(25 MG) BY MOUTH DAILY 90 tablet 0     levalbuterol (XOPENEX) 1.25 MG/3ML neb solution NEBULIZE 1 VIAL EVERY 4 HOURS AS NEEDED FOR SHORTNESS OF BREATH, DYSPNEA, OR WHEEZING 1650 mL 0     losartan (COZAAR) 50 MG tablet TAKE 2 TABLETS BY MOUTH EVERY  tablet 1     metoprolol succinate (TOPROL-XL) 50 MG 24 hr tablet TAKE 1 AND ONE-HALF TABLETS BY MOUTH EVERY  tablet 2     order for DME Nebulizer machine and tubing    DX:  Bronchitis 1 Device 0     STATIN NOT PRESCRIBED, INTENTIONAL, Please choose reason not prescribed, below       traZODone (DESYREL) 50 MG tablet TAKE 1 TO 2 TABLETS BY MOUTH AT BEDTIME AS NEEDED 180 tablet 1     UNABLE TO FIND CBD oil       vitamin B complex with vitamin C (VITAMIN  B COMPLEX) TABS tablet Take 1 tablet by mouth daily       warfarin (COUMADIN) 5 MG tablet 7.5mg Mon,Wed,Fri and 5mg all other days or as directed by warfarin clinic 150 tablet 3     Allergies   Allergen Reactions     Amlodipine Besylate Swelling     Norvasc     Amoxicillin      Atorvastatin      myualgia     Cephalexin Monohydrate Hives     Keflex     Erythromycin Base [Kdc:Yellow Dye+Erythromycin+Brilliant Blue Fcf] Nausea and Vomiting     Meloxicam Other (See Comments)     Mobic - confusion, depression     Adhesive Tape Rash     Prochlorperazine Edisylate Swelling and Rash     Compazine     Prochlorperazine Maleate Swelling and Rash     Recent Labs   Lab Test 02/05/19  1013 12/31/18  0702 10/31/18  1138  01/17/18  0822  01/11/16  0935  11/05/13  1528   A1C  --   --   --   --   --   --   --   --  5.5   *  --   --   --  137*  --  126*   < >  --    HDL 37*  --   --   --  43*  --  38*   < >  --    TRIG 208*  --   --   --  190*  --  265*   " < >  --    ALT 42 35 35  --   --    < >  --    < >  --    CR 0.92 0.89 0.92   < > 0.91   < > 1.02   < >  --    GFRESTIMATED 66 68 61   < > 62   < > 55*   < >  --    GFRESTBLACK 76 79 74   < > 75   < > 67   < >  --    POTASSIUM 3.5 3.6 3.3*   < > 3.2*   < > 3.4   < >  --    TSH 2.71  --  2.32   < > 5.75*   < > 3.62   < >  --     < > = values in this interval not displayed.      BP Readings from Last 3 Encounters:   07/18/19 118/80   06/10/19 122/78   03/08/19 124/70    Wt Readings from Last 3 Encounters:   07/18/19 103 kg (227 lb)   06/10/19 103 kg (227 lb)   03/08/19 102.1 kg (225 lb)                 Reviewed and updated as needed this visit by Provider         Review of Systems   ROS COMP: Constitutional, HEENT, cardiovascular, pulmonary, gi and gu systems are negative, except as otherwise noted.      Objective    /80 (BP Location: Left arm, Patient Position: Sitting, Cuff Size: Adult Large)   Pulse 84   Temp 97.3  F (36.3  C) (Tympanic)   Resp 14   Ht 1.651 m (5' 5\")   Wt 103 kg (227 lb)   SpO2 96%   BMI 37.77 kg/m    Body mass index is 37.77 kg/m .  Physical Exam   GENERAL: healthy, alert and no distress  HENT: normal cephalic/atraumatic, right ear: PE tube in place with large wax chunk deep in canal next to the tube - it appears to be patent, left ear: normal: no effusions, no erythema, normal landmarks, nose and mouth without ulcers or lesions, oropharynx clear and oral mucous membranes moist  NECK: no adenopathy, no asymmetry, masses, or scars and thyroid normal to palpation  RESP: lungs clear to auscultation - no rales, rhonchi or wheezes  CV: regular rate and rhythm, normal S1 S2, no S3 or S4, no murmur, click or rub, no peripheral edema and peripheral pulses strong  MS: no gross musculoskeletal defects noted, no edema  PSYCH: mentation appears normal, affect normal/bright            Assessment & Plan     1. Eustachian tube dysfunction, right  - OTOLARYNGOLOGY REFERRAL - wax removal via " "suction.     2. History of placement of ear tubes  - OTOLARYNGOLOGY REFERRAL     BMI:   Estimated body mass index is 37.77 kg/m  as calculated from the following:    Height as of this encounter: 1.651 m (5' 5\").    Weight as of this encounter: 103 kg (227 lb).           FUTURE APPOINTMENTS:       - Follow-up visit as needed         Maegan Sharma NP  [facility]      "

## 2019-07-18 NOTE — NURSING NOTE
"Chief Complaint   Patient presents with     Ear Problem       Initial /80 (BP Location: Left arm, Patient Position: Sitting, Cuff Size: Adult Large)   Pulse 84   Temp 97.3  F (36.3  C) (Tympanic)   Resp 14   Ht 1.651 m (5' 5\")   Wt 103 kg (227 lb)   SpO2 96%   BMI 37.77 kg/m   Estimated body mass index is 37.77 kg/m  as calculated from the following:    Height as of this encounter: 1.651 m (5' 5\").    Weight as of this encounter: 103 kg (227 lb).  Medication Reconciliation: complete   Lis Vickers      "

## 2019-07-20 ENCOUNTER — HOSPITAL ENCOUNTER (EMERGENCY)
Facility: HOSPITAL | Age: 64
Discharge: HOME | End: 2019-07-20
Attending: PHYSICIAN ASSISTANT | Admitting: PHYSICIAN ASSISTANT
Payer: MEDICARE

## 2019-07-20 VITALS
RESPIRATION RATE: 16 BRPM | DIASTOLIC BLOOD PRESSURE: 75 MMHG | OXYGEN SATURATION: 97 % | SYSTOLIC BLOOD PRESSURE: 136 MMHG | TEMPERATURE: 98.6 F | HEART RATE: 82 BPM

## 2019-07-20 DIAGNOSIS — H66.001 ACUTE SUPPURATIVE OTITIS MEDIA OF RIGHT EAR WITHOUT SPONTANEOUS RUPTURE OF TYMPANIC MEMBRANE, RECURRENCE NOT SPECIFIED: ICD-10-CM

## 2019-07-20 DIAGNOSIS — T85.618A NON-FUNCTIONING TYMPANOSTOMY TUBE, INITIAL ENCOUNTER: ICD-10-CM

## 2019-07-20 PROCEDURE — G0463 HOSPITAL OUTPT CLINIC VISIT: HCPCS

## 2019-07-20 PROCEDURE — 99213 OFFICE O/P EST LOW 20 MIN: CPT | Mod: Z6 | Performed by: PHYSICIAN ASSISTANT

## 2019-07-20 RX ORDER — CIPROFLOXACIN 0.5 MG/.25ML
0.25 SOLUTION/ DROPS AURICULAR (OTIC) 2 TIMES DAILY
Qty: 1 EACH | Refills: 0 | Status: SHIPPED | OUTPATIENT
Start: 2019-07-20 | End: 2019-08-21

## 2019-07-20 RX ORDER — AZITHROMYCIN 250 MG/1
TABLET, FILM COATED ORAL
Qty: 6 TABLET | Refills: 0 | Status: SHIPPED | OUTPATIENT
Start: 2019-07-20 | End: 2019-07-26

## 2019-07-20 NOTE — DISCHARGE INSTRUCTIONS
-Insert ciprofloxacin drops (4 drops) twice daily into the right ear. Lay on left side and allow it to sit in there for about 5 minutes  -Also take azithromycin as prescribed. This can increase your INR so make sure to check it on Monday and send into clinic. Increase your legumes.   -Please keep your appointment with ENT on 7/24, as they can verify improvement and/or remove build-up if necessary.     Thank you

## 2019-07-20 NOTE — ED AVS SNAPSHOT
[facility]  [address] [address]  Phone:  [phone]                                    Cassy Avila   MRN: [id number]    Department:  [facility]   Date of Visit:  7/20/2019           After Visit Summary Signature Page    I have received my discharge instructions, and my questions have been answered. I have discussed any challenges I see with this plan with the nurse or doctor.    ..........................................................................................................................................  Patient/Patient Representative Signature      ..........................................................................................................................................  Patient Representative Print Name and Relationship to Patient    ..................................................               ................................................  Date                                   Time    ..........................................................................................................................................  Reviewed by Signature/Title    ...................................................              ..............................................  Date                                               Time          22EPIC Rev 08/18

## 2019-07-20 NOTE — ED TRIAGE NOTES
Pt presents today with significant other for c/o wax build-up behind a tube she has in her ear, was in to see Randall Batres and removal was attempted there without success and she was referred to ENT but says she can't wait that long.

## 2019-07-20 NOTE — ED PROVIDER NOTES
History     Chief Complaint   Patient presents with     Otalgia     Right ear pain and feels plugged.     HPI  Cassy Avlia is a 63 year old female with PMH eustachian tube dysfunction who was seen by her PCP for concerns of ear pain. She was subsequently referred to ENT for removal of wax from the tube, causing obstruction. Patient returns to  for evaluation. She reports the ear pain is not improving and in fact has been worsening. It is rated at 6/10 in severity. She denies taking any medications for this such as Tylenol (although that she has prescription for hydrocodone at home). She denies fevers, sinus congestion, sore throat, discharge from ear, outer ear swelling.         Allergies:  Allergies   Allergen Reactions     Amlodipine Besylate Swelling     Norvasc     Amoxicillin      Atorvastatin      myualgia     Cephalexin Monohydrate Hives     Keflex     Erythromycin Base [Kdc:Yellow Dye+Erythromycin+Brilliant Blue Fcf] Nausea and Vomiting     Meloxicam Other (See Comments)     Mobic - confusion, depression     Adhesive Tape Rash     Prochlorperazine Edisylate Swelling and Rash     Compazine     Prochlorperazine Maleate Swelling and Rash       Problem List:    Patient Active Problem List    Diagnosis Date Noted     Primary insomnia 10/31/2018     Priority: Medium     Vitamin D deficiency 07/13/2018     Priority: Medium     Statin medication not prescribed per physician orders 01/17/2018     Priority: Medium     Family history of colon cancer 01/02/2018     Priority: Medium     Lumbar spondylosis with myelopathy 07/12/2017     Priority: Medium     Degeneration of lumbar or lumbosacral intervertebral disc 07/12/2017     Priority: Medium     Long-term (current) use of anticoagulants [Z79.01] 07/20/2016     Priority: Medium     Deep vein thrombosis (DVT) (H) [I82.409] 07/20/2016     Priority: Medium     S/P shoulder surgery 03/16/2015     Priority: Medium     Moderate episode of recurrent major depressive  disorder (H) 08/08/2014     Priority: Medium     H/O total shoulder replacement, left 06/17/2014     Priority: Medium     Failed arthroplasty (H) 01/24/2014     Priority: Medium     Advanced care planning/counseling discussion 03/12/2013     Priority: Medium     Pt has information at home , not completed       Neurofibromatosis, peripheral, NF1 (H) 08/22/2012     Priority: Medium     Problem list name updated by automated process. Provider to review       Contact dermatitis and other eczema, due to unspecified cause 06/01/2004     Priority: Medium     Pulmonary embolism and infarction (H) 04/11/2003     Priority: Medium     Problem list name updated by automated process. Provider to review       Hyperlipidemia LDL goal <100 07/11/2002     Priority: Medium     Problem list name updated by automated process. Provider to review       Edema 01/18/2002     Priority: Medium     Essential hypertension 02/20/2001     Priority: Medium     Problem list name updated by automated process. 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     Contact dermatitis and other eczema, due to unspecified cause 6/1/2004     Coughing      Edema 1/18/2002     Essential hypertension 2/20/2001     Factor V Leiden (H)      Family history of colon cancer 1/2/2018     Gastro-oesophageal reflux disease      Herpes zoster without mention of complication 2003     Hyperlipidemia LDL goal <100 7/11/2002     Long term (current) use of anticoagulants 8/20/2003     Major depression 8/8/2014     Mammographic microcalcification 2003     Migraine, unspecified, without mention of intractable migraine without mention of status migrainosus 3/5/2001     Moderate episode of recurrent major depressive disorder (H) 8/8/2014     Neurofibromatosis, peripheral, NF1  (H) 2012     Neurofibromatosis, unspecified(237.70) 2012     Other and unspecified hyperlipidemia 2002     Other chronic pain      Other pulmonary embolism and infarction 2003     Primary insomnia 10/31/2018     Statin medication not prescribed per physician orders 2018     Tachycardia, unspecified 2001     Thrombosis of leg      Unspecified essential hypertension 2001     Vitamin D deficiency 2018       Past Surgical History:    Past Surgical History:   Procedure Laterality Date     ------------OTHER-------------      shoulder replacement; Provider: Karne     ARTHROPLASTY KNEE  2014    Procedure: ARTHROPLASTY KNEE;  Surgeon: Sean Alexander MD;  Location: [facility]     ARTHROSCOPY SHOULDER      right, bone spurs      SECTION      x3     CHOLECYSTECTOMY       COLONOSCOPY  02/15/2018    [place],,polyps     elbow ulnar tunnel release       ELECTROTHERMAL THERAPY INTRADISC  2017    stimulator     ENDOSCOPIC SINUS SURGERY, SEPTOPLASTY, TURBINOPLASTY, MAXILLARY SINUSOTOMY, COMBINED N/A 2015    Procedure: COMBINED ENDOSCOPIC SINUS SURGERY, SEPTOPLASTY, TURBINOPLASTY, MAXILLARY SINUSOTOMY;  Surgeon: Seema Conn MD;  Location: [facility]     esophagastroduodenoscopy      with biopsy and endoscopic U/S     EXCISE NEUROMA LOWER EXTREMITY Left 2016    Procedure: EXCISE NEUROMA LOWER EXTREMITY;  Surgeon: Edi Reed MD;  Location: [facility]     FUSION LUMBAR ANTERIOR WITH MARCY CAGES      L5-S1     HYSTERECTOMY TOTAL ABDOMINAL, BILATERAL SALPINGO-OOPHORECTOMY, COMBINED N/A      ORTHOPEDIC SURGERY  2-15    right shoulder     ORTHOPEDIC SURGERY  8/28/15    right knee     ORTHOPEDIC SURGERY Right 2018    hip labrum tear     pionidal cyst excision       TRANSPOSITION ULNAR NERVE (ELBOW)         Family History:    Family History   Problem Relation Age of Onset     Cancer Mother      Colon Polyps Mother      Heart Failure Mother 87         congestive, cause of death     Myocardial Infarction Mother         myocardial infarction     Myocardial Infarction Father         myocardial infarction - cause of death     C.A.D. Father      Cancer Paternal Uncle         cause of death     C.A.D. Brother      Other - See Comments Other         factor 5 - family h/o     Asthma No family hx of        Social History:  Marital Status:   [2]  Social History     Tobacco Use     Smoking status: Former Smoker     Packs/day: 1.00     Years: 30.00     Pack years: 30.00     Types: Cigarettes, Pipe     Smokeless tobacco: Never Used     Tobacco comment: quit in 1999   Substance Use Topics     Alcohol use: No     Drug use: No        Medications:      aspirin 81 MG EC tablet   azithromycin (ZITHROMAX Z-GIOVANNA) 250 MG tablet   Cholecalciferol (VITAMIN D3 PO)   ciprofloxacin (CETRAXAL) 0.2 % otic solution   escitalopram (LEXAPRO) 20 MG tablet   hydrochlorothiazide (HYDRODIURIL) 25 MG tablet   losartan (COZAAR) 50 MG tablet   metoprolol succinate (TOPROL-XL) 50 MG 24 hr tablet   traZODone (DESYREL) 50 MG tablet   UNABLE TO FIND   vitamin B complex with vitamin C (VITAMIN  B COMPLEX) TABS tablet   warfarin (COUMADIN) 5 MG tablet   budesonide-formoterol (SYMBICORT) 80-4.5 MCG/ACT Inhaler   cyclobenzaprine (FLEXERIL) 10 MG tablet   guaiFENesin-codeine (ROBITUSSIN AC) 100-10 MG/5ML solution   levalbuterol (XOPENEX) 1.25 MG/3ML neb solution   order for DME         Review of Systems   Constitutional: Negative for appetite change and fever.   HENT: Positive for ear pain. Negative for dental problem, ear discharge, sinus pressure and sore throat.    Eyes: Negative for visual disturbance.   Allergic/Immunologic: Negative for immunocompromised state.       Physical Exam   BP: 136/75  Pulse: 82  Temp: 98.6  F (37  C)  Resp: 16  SpO2: 97 %      Physical Exam   Constitutional: She is oriented to person, place, and time. She appears well-developed and well-nourished. No distress.   HENT:    Head: Normocephalic and atraumatic.   Right Ear: No drainage. No mastoid tenderness. Tympanic membrane is erythematous. Tympanic membrane is not bulging.   Left Ear: Tympanic membrane and ear canal normal.   Ears:    No rash visualized in ear or on face   Eyes: Pupils are equal, round, and reactive to light. EOM are normal.   Neck: Normal range of motion. Neck supple.   Cardiovascular: Normal rate, regular rhythm and normal heart sounds.   Pulmonary/Chest: Effort normal.   Neurological: She is alert and oriented to person, place, and time.   Skin: She is not diaphoretic.   Nursing note and vitals reviewed.      ED Course     ED Course as of Jul 21 0853   Sat Jul 20, 2019   1548 Has appointment 7/24/19        Procedures               Critical Care time:  none               No results found for this or any previous visit (from the past 24 hour(s)).    Medications - No data to display    Assessments & Plan (with Medical Decision Making)     I have reviewed the nursing notes.    I have reviewed the findings, diagnosis, plan and need for follow up with the patient.   Pt presented to  for evaluation of continued right ear pain. She was referred to ENT per primary for concerns of plugged tympanostomy tube (placed for eustachian tube dysfunction). Her pain has worsened. On exam there is what appears to be pustular contents behind the tube, which appears plugged. The TM has bright erythema over a large portion. Discussed that given it is plugged, will treat with both drops and systemic antibiotics. Prescribed ciprofloxacin drops.    Consulted pharmacy about best option for systemic antibiotics and we agreed that a 5-day azithromycin would be the least likely to be harmful for the patient. She has noted PCN allergies, including cephalosporins. She is on Warfarin and therefore doxycycline and azithromycin will both interact; however a short-course azithromycin is reasonable. Patient has a home INR machine and will check in  a few days and send it into her clinic. She is advised to keep her appointment with ENT on 7/24 to verify improvement. She left in agreement and understanding of plan.        Medication List      Started    azithromycin 250 MG tablet  Commonly known as:  ZITHROMAX Z-GIOVANNA  Two tablets on the first day, then one tablet daily for the next 4 days     ciprofloxacin 0.2 % otic solution  Commonly known as:  CETRAXAL  0.25 mLs, Right Ear, 2 TIMES DAILY            Final diagnoses:   Non-functioning tympanostomy tube, initial encounter   Acute suppurative otitis media of right ear without spontaneous rupture of tympanic membrane, recurrence not specified       7/20/2019   [facility]     Lvi Hameed PA-C  07/21/19 0850

## 2019-07-21 ASSESSMENT — ENCOUNTER SYMPTOMS
SINUS PRESSURE: 0
APPETITE CHANGE: 0
FEVER: 0
SORE THROAT: 0

## 2019-07-22 ENCOUNTER — ANTICOAGULATION THERAPY VISIT (OUTPATIENT)
Dept: ANTICOAGULATION | Facility: OTHER | Age: 64
End: 2019-07-22

## 2019-07-22 DIAGNOSIS — Z79.01 LONG TERM CURRENT USE OF ANTICOAGULANT THERAPY: ICD-10-CM

## 2019-07-22 DIAGNOSIS — I26.99 PULMONARY EMBOLISM AND INFARCTION (H): ICD-10-CM

## 2019-07-22 DIAGNOSIS — I82.409 DEEP VEIN THROMBOSIS (DVT) (H): ICD-10-CM

## 2019-07-22 LAB — INR PPP: 2.4

## 2019-07-22 NOTE — PROGRESS NOTES
ANTICOAGULATION FOLLOW-UP CLINIC VISIT    Patient Name:  Cassy Avila  Date:  2019  Contact Type:  Telephone    SUBJECTIVE:  Patient Findings     Positives:   Upcoming invasive procedure (shoulder surgery 19), Change in medications (started zpak 2 days ago)    Comments:   Noted patient on antibiotics (zpak) that she got when she went to ER over the weekend. Call placed to patient and spoke to her. She did not alter any of her warfarin dosing, she got and took antibiotic 2 days ago. She will do INR and call me.  She also states she is having surgery on 19 for shoulder repair. We discussed warfarin hold and I will send PCP a note to see if she needs lovenox bridge. Call back from patient stating her INR is 2.4 today. We discussed warfarin dosing/INR recheck date. She verbalized understanding and has no questions.         Clinical Outcomes     Negatives:   Major bleeding event, Thromboembolic event, Anticoagulation-related hospital admission, Anticoagulation-related ED visit, Anticoagulation-related fatality    Comments:   Noted patient on antibiotics (zpak) that she got when she went to ER over the weekend. Call placed to patient and spoke to her. She did not alter any of her warfarin dosing, she got and took antibiotic 2 days ago. She will do INR and call me.  She also states she is having surgery on 19 for shoulder repair. We discussed warfarin hold and I will send PCP a note to see if she needs lovenox bridge. Call back from patient stating her INR is 2.4 today. We discussed warfarin dosing/INR recheck date. She verbalized understanding and has no questions.            OBJECTIVE    INR   Date Value Ref Range Status   2019 2.4  Final       ASSESSMENT / PLAN  INR assessment THER    Recheck INR In: 4 DAYS    INR Location Home INR      Anticoagulation Summary  As of 2019    INR goal:   2.0-3.0   TTR:   78.9 % (3 y)   INR used for dosin.4 (2019)   Warfarin maintenance plan:    7.5 mg (5 mg x 1.5) every Mon, Wed, Fri; 5 mg (5 mg x 1) all other days   Full warfarin instructions:   7/22: 5 mg; 7/24: 5 mg; 7/28: Hold; 7/29: Hold; 7/30: Hold; 7/31: Hold; 8/1: Hold; Otherwise 7.5 mg every Mon, Wed, Fri; 5 mg all other days   Weekly warfarin total:   42.5 mg   Plan last modified:   Manju Escalera RN (5/20/2019)   Next INR check:   7/26/2019   Priority:   INR   Target end date:   Indefinite    Indications    Long-term (current) use of anticoagulants [Z79.01] [Z79.01]  Pulmonary embolism and infarction (H) [I26.99]  Deep vein thrombosis (DVT) (H) [I82.409] [I82.409]             Anticoagulation Episode Summary     INR check location:   Home Draw    Preferred lab:       Send INR reminders to:    ANTICOAG POOL    Comments:   PST - [facility].  Shoulder surgery 8/2/19, warfarin hold x 5 days. Minnie Morales        Anticoagulation Care Providers     Provider Role Specialty Phone number    Eliz Hartman NP [facility] [phone]            See the Encounter Report to view Anticoagulation Flowsheet and Dosing Calendar (Go to Encounters tab in chart review, and find the Anticoagulation Therapy Visit)        Manju Escalera, RN

## 2019-07-23 ENCOUNTER — ANTICOAGULATION THERAPY VISIT (OUTPATIENT)
Dept: ANTICOAGULATION | Facility: OTHER | Age: 64
End: 2019-07-23

## 2019-07-23 DIAGNOSIS — Z79.01 LONG TERM CURRENT USE OF ANTICOAGULANT THERAPY: ICD-10-CM

## 2019-07-23 DIAGNOSIS — I26.99 PULMONARY EMBOLISM AND INFARCTION (H): ICD-10-CM

## 2019-07-23 DIAGNOSIS — I82.409 DEEP VEIN THROMBOSIS (DVT) (H): ICD-10-CM

## 2019-07-23 NOTE — PROGRESS NOTES
ANTICOAGULATION FOLLOW-UP CLINIC VISIT    Patient Name:  Cassy Avila  Date:  7/23/2019  Contact Type:  Telephone    SUBJECTIVE:  Patient Findings     Positives:   Upcoming invasive procedure (shoulder surgery), Change in medications (lovenox 100mg q12h)    Comments:   Note back from provider. Patient will be bridged with 1mg/kg q12h. Call placed to patient and spoke to her re: lovenox bridge while off warfarin. We discussed what days to bridge and with how much lovenox. She verbalized understanding and has no questions. She has done lovenox bridge numerous times previously so has no questions regarding giving lovenox shots.         Clinical Outcomes     Negatives:   Major bleeding event, Thromboembolic event, Anticoagulation-related hospital admission, Anticoagulation-related ED visit, Anticoagulation-related fatality    Comments:   Note back from provider. Patient will be bridged with 1mg/kg q12h. Call placed to patient and spoke to her re: lovenox bridge while off warfarin. We discussed what days to bridge and with how much lovenox. She verbalized understanding and has no questions. She has done lovenox bridge numerous times previously so has no questions regarding giving lovenox shots.            OBJECTIVE    INR   Date Value Ref Range Status   07/22/2019 2.4  Final       ASSESSMENT / PLAN  No question data found.  Anticoagulation Summary  As of 7/23/2019    INR goal:   2.0-3.0   TTR:   78.9 % (3 y)   INR used for dosing:   No new INR was available at the time of this encounter.   Warfarin maintenance plan:   7.5 mg (5 mg x 1.5) every Mon, Wed, Fri; 5 mg (5 mg x 1) all other days   Full warfarin instructions:   7/24: 5 mg; 7/28: Hold; 7/29: Hold; 7/30: Hold; 7/31: Hold; 8/1: Hold; 8/3: 10 mg; 8/4: 10 mg; 8/5: 10 mg; 8/6: 7.5 mg; Otherwise 7.5 mg every Mon, Wed, Fri; 5 mg all other days   Weekly warfarin total:   42.5 mg   Plan last modified:   Manju Escalera RN (5/20/2019)   Next INR check:   8/9/2019    Priority:   INR   Target end date:   Indefinite    Indications    Long-term (current) use of anticoagulants [Z79.01] [Z79.01]  Pulmonary embolism and infarction (H) [I26.99]  Deep vein thrombosis (DVT) (H) [I82.409] [I82.409]             Anticoagulation Episode Summary     INR check location:   Home Draw    Preferred lab:       Send INR reminders to:   [facility] ANTICOAG POOL    Comments:   PST - [facility].  Shoulder surgery 8/2/19, warfarin hold x 5 days. Minnie Morales        Anticoagulation Care Providers     Provider Role Specialty Phone number    Eliz Hartman NP [facility] [phone]            See the Encounter Report to view Anticoagulation Flowsheet and Dosing Calendar (Go to Encounters tab in chart review, and find the Anticoagulation Therapy Visit)        Manju Escalera RN

## 2019-07-24 ENCOUNTER — OFFICE VISIT (OUTPATIENT)
Dept: OTOLARYNGOLOGY | Facility: OTHER | Age: 64
End: 2019-07-24
Attending: NURSE PRACTITIONER
Payer: COMMERCIAL

## 2019-07-24 VITALS
HEIGHT: 65 IN | HEART RATE: 70 BPM | OXYGEN SATURATION: 95 % | SYSTOLIC BLOOD PRESSURE: 100 MMHG | BODY MASS INDEX: 37.82 KG/M2 | DIASTOLIC BLOOD PRESSURE: 70 MMHG | TEMPERATURE: 97.5 F | WEIGHT: 227 LBS

## 2019-07-24 DIAGNOSIS — H69.91 ETD (EUSTACHIAN TUBE DYSFUNCTION), RIGHT: ICD-10-CM

## 2019-07-24 DIAGNOSIS — Z96.22 S/P TYMPANOSTOMY TUBE PLACEMENT: Primary | ICD-10-CM

## 2019-07-24 DIAGNOSIS — H61.21 CERUMEN DEBRIS ON TYMPANIC MEMBRANE OF RIGHT EAR: ICD-10-CM

## 2019-07-24 DIAGNOSIS — H92.11 OTORRHEA, RIGHT: ICD-10-CM

## 2019-07-24 DIAGNOSIS — H65.491 COME (CHRONIC OTITIS MEDIA WITH EFFUSION), RIGHT: ICD-10-CM

## 2019-07-24 DIAGNOSIS — H93.8X1 SENSATION OF PLUGGED EAR, RIGHT: ICD-10-CM

## 2019-07-24 PROCEDURE — 92504 EAR MICROSCOPY EXAMINATION: CPT | Performed by: NURSE PRACTITIONER

## 2019-07-24 PROCEDURE — 92504 EAR MICROSCOPY EXAMINATION: CPT

## 2019-07-24 PROCEDURE — 99213 OFFICE O/P EST LOW 20 MIN: CPT | Mod: 25 | Performed by: NURSE PRACTITIONER

## 2019-07-24 PROCEDURE — G0463 HOSPITAL OUTPT CLINIC VISIT: HCPCS

## 2019-07-24 ASSESSMENT — MIFFLIN-ST. JEOR: SCORE: 1580.55

## 2019-07-24 ASSESSMENT — PAIN SCALES - GENERAL: PAINLEVEL: MODERATE PAIN (4)

## 2019-07-24 NOTE — LETTER
"    7/24/2019         RE: Cassy Avila  [address]  [place] [address]        Dear Colleague,    Thank you for referring your patient, Cassy Avila, to the [facility]. Please see a copy of my visit note below.    Otolaryngology Progress Note           Chief Complaint:     Chief Complaint   Patient presents with     Follow Up     ETD, right ear          History of Present Illness:     Cassy Avila is a 63 year old female, history of right myringotomy tube placement 2/23/18 by Dr. Cnon. She was last seen in ENT 3/22/18 (note reviewed).    Today Cassy reports she was recently treated for right AOM 7/20/19. She was placed on both oral azithromycin, along with ciprodex otic drops. It was noted that she had purulent effusion in middle ear along with occluded tympanostomy tube. She just started her ciprodex drops yesterday, but is almost complete with the azithromycin. Besides this recent ear infection, she has overall felt improvement since tube had been placed, noting no ear pressure/fullness, chronic otorrhea, fluctuating hearing loss or vertigo. She felt improvement in hearing/ear plugging following tube placement.    Today her ear feels plugged, muffled hearing. Has noted some recent otorrhea. No current otalgia, feels this has improved since taking the azithromycin. Denies facial paresthesias and dysphagia.     Audiogram 3/22/18   Tympanograms could not pressurize.  Right ear thresholds show stable SNHL, SRT=PTA.    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  Alert " and oriented to person and place, interactive.  Head and Face - Normocephalic and atraumatic, with no gross asymmetry noted of the contour of the facial features.  The facial nerve is intact, with strong symmetric movements.  Neck- No palpable lymphadenopathy or thyroid mass.  Trachea is midline.  Eyes - Extraocular movements intact.   Ears- External ears normal. Ears examined under microscope. Left EAC patent, TM intact. Right EAC with purulent debris which was suctioned with #5 and #7. Purulent debris also around base of tube with thin/crusted layer over a large portion of the TM. PE tube is in place and patent with no active otorrhea, healthy middle ear space. This debris was mostly removed with #3 and #5 suction. The hard/thin layer was worked on with suction and cupped forceps, which I was not able to get completely off. She did notice less fullness, but really no significant change in the muffled hearing. TM tissue appears with scattered erythema, but what is viewed of middle ear, no effusion.   Nose - Nasal mucosa is pink and moist with no abnormal mucus.  The septum was grossly midline and non-obstructive, turbinates of normal size and position.  No polyps, masses, or purulence noted on examination.  Mouth - Examination of the oral cavity shows pink, healthy, moist mucosa. No lesions or ulceration noted. The tongue is mobile and midline.    Throat - The walls of the oropharynx were smooth, pink, moist, symmetric, and had no lesions or ulcerations.  The tonsillar pillars and soft palate were symmetric.  The uvula was midline on elevation.           Assessment and Plan:       ICD-10-CM    1. S/P tympanostomy tube placement RIGHT Z96.22    2. ETD (Eustachian tube dysfunction), right H69.81    3. COME (chronic otitis media with effusion), right H65.491    4. Otorrhea, right H92.11    5. Cerumen debris on tympanic membrane of right ear H61.21    6. Sensation of plugged ear, right H93.8X1      Her otalgia has  improved with azithromycin, so recommended to complete as directed.    PE tube is in place and patent, but some thin/crusted debris remains on portion of TM.    Recommended to complete the ciprodex drops for 1 week.  Keep this ear dry.    Follow-up in 1-2 weeks for re-evaluation and further debridement, as it should be loosened up at that time and I would suspect the plugged ear and muffled hearing should resolve then.    Encouraged her to follow-up sooner as needed.     Ely Burns NP  ENT  [facility], [place]  [phone]                Again, thank you for allowing me to participate in the care of your patient.        Sincerely,        JESUS Vides CNP

## 2019-07-24 NOTE — PATIENT INSTRUCTIONS
Keep right ear dry.  Follow up in 1 weeks.    Thank you for allowing Ely Burns CNP and our ENT team to participate in your care.  If your medications are too expensive, please give the nurse a call.  We can possibly change this medication.  If you have a scheduling or an appointment question please contact [facility] Coordinator at their direct line [phone]  ALL nursing questions or concerns can be directed to your ENT nurse at: [phone] - [place]

## 2019-07-24 NOTE — NURSING NOTE
"Chief Complaint   Patient presents with     Follow Up     ETD, right ear       Initial /70   Pulse 70   Temp 97.5  F (36.4  C) (Tympanic)   Ht 1.651 m (5' 5\")   Wt 103 kg (227 lb)   SpO2 95%   BMI 37.77 kg/m   Estimated body mass index is 37.77 kg/m  as calculated from the following:    Height as of this encounter: 1.651 m (5' 5\").    Weight as of this encounter: 103 kg (227 lb).  Medication Reconciliation: complete  "

## 2019-07-24 NOTE — PROGRESS NOTES
"Otolaryngology Progress Note           Chief Complaint:     Chief Complaint   Patient presents with     Follow Up     ETD, right ear          History of Present Illness:     Cassy Avila is a 63 year old female, history of right myringotomy tube placement 2/23/18 by Dr. Conn. She was last seen in ENT 3/22/18 (note reviewed).    Today Cassy reports she was recently treated for right AOM 7/20/19. She was placed on both oral azithromycin, along with ciprodex otic drops. It was noted that she had purulent effusion in middle ear along with occluded tympanostomy tube. She just started her ciprodex drops yesterday, but is almost complete with the azithromycin. Besides this recent ear infection, she has overall felt improvement since tube had been placed, noting no ear pressure/fullness, chronic otorrhea, fluctuating hearing loss or vertigo. She felt improvement in hearing/ear plugging following tube placement.    Today her ear feels plugged, muffled hearing. Has noted some recent otorrhea. No current otalgia, feels this has improved since taking the azithromycin. Denies facial paresthesias and dysphagia.     Audiogram 3/22/18   Tympanograms could not pressurize.  Right ear thresholds show stable SNHL, SRT=PTA.    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  Alert and oriented to person and place, interactive.  Head and Face - Normocephalic and atraumatic, with no gross asymmetry noted of the contour of the facial features.  The facial nerve is intact, with strong symmetric movements.  Neck- No palpable " lymphadenopathy or thyroid mass.  Trachea is midline.  Eyes - Extraocular movements intact.   Ears- External ears normal. Ears examined under microscope. Left EAC patent, TM intact. Right EAC with purulent debris which was suctioned with #5 and #7. Purulent debris also around base of tube with thin/crusted layer over a large portion of the TM. PE tube is in place and patent with no active otorrhea, healthy middle ear space. This debris was mostly removed with #3 and #5 suction. The hard/thin layer was worked on with suction and cupped forceps, which I was not able to get completely off. She did notice less fullness, but really no significant change in the muffled hearing. TM tissue appears with scattered erythema, but what is viewed of middle ear, no effusion.   Nose - Nasal mucosa is pink and moist with no abnormal mucus.  The septum was grossly midline and non-obstructive, turbinates of normal size and position.  No polyps, masses, or purulence noted on examination.  Mouth - Examination of the oral cavity shows pink, healthy, moist mucosa. No lesions or ulceration noted. The tongue is mobile and midline.    Throat - The walls of the oropharynx were smooth, pink, moist, symmetric, and had no lesions or ulcerations.  The tonsillar pillars and soft palate were symmetric.  The uvula was midline on elevation.           Assessment and Plan:       ICD-10-CM    1. S/P tympanostomy tube placement RIGHT Z96.22    2. ETD (Eustachian tube dysfunction), right H69.81    3. COME (chronic otitis media with effusion), right H65.491    4. Otorrhea, right H92.11    5. Cerumen debris on tympanic membrane of right ear H61.21    6. Sensation of plugged ear, right H93.8X1      Her otalgia has improved with azithromycin, so recommended to complete as directed.    PE tube is in place and patent, but some thin/crusted debris remains on portion of TM.    Recommended to complete the ciprodex drops for 1 week.  Keep this ear dry.    Follow-up  in 1-2 weeks for re-evaluation and further debridement, as it should be loosened up at that time and I would suspect the plugged ear and muffled hearing should resolve then.    Encouraged her to follow-up sooner as needed.     Ely Burns NP  ENT  [facility], [place]  [phone]

## 2019-07-26 ENCOUNTER — OFFICE VISIT (OUTPATIENT)
Dept: FAMILY MEDICINE | Facility: OTHER | Age: 64
End: 2019-07-26
Attending: NURSE PRACTITIONER
Payer: COMMERCIAL

## 2019-07-26 VITALS
OXYGEN SATURATION: 97 % | SYSTOLIC BLOOD PRESSURE: 122 MMHG | WEIGHT: 227 LBS | TEMPERATURE: 97.1 F | DIASTOLIC BLOOD PRESSURE: 78 MMHG | HEIGHT: 65 IN | BODY MASS INDEX: 37.82 KG/M2 | HEART RATE: 70 BPM

## 2019-07-26 DIAGNOSIS — M25.511 RIGHT SHOULDER PAIN, UNSPECIFIED CHRONICITY: ICD-10-CM

## 2019-07-26 DIAGNOSIS — Z01.818 PRE-OPERATIVE EXAMINATION: Primary | ICD-10-CM

## 2019-07-26 PROBLEM — D66 CONGENITAL FACTOR VIII DISORDER (H): Status: ACTIVE | Noted: 2019-07-26

## 2019-07-26 PROBLEM — D66 CONGENITAL FACTOR VIII DISORDER (H): Status: RESOLVED | Noted: 2019-07-26 | Resolved: 2019-07-26

## 2019-07-26 PROBLEM — D68.51 FACTOR V LEIDEN MUTATION (H): Status: ACTIVE | Noted: 2019-07-26

## 2019-07-26 LAB
ALBUMIN SERPL-MCNC: 3.7 G/DL (ref 3.4–5)
ALBUMIN UR-MCNC: NEGATIVE MG/DL
ALP SERPL-CCNC: 62 U/L (ref 40–150)
ALT SERPL W P-5'-P-CCNC: 28 U/L (ref 0–50)
ANION GAP SERPL CALCULATED.3IONS-SCNC: 6 MMOL/L (ref 3–14)
APPEARANCE UR: CLEAR
AST SERPL W P-5'-P-CCNC: 12 U/L (ref 0–45)
BACTERIA #/AREA URNS HPF: ABNORMAL /HPF
BASOPHILS # BLD AUTO: 0 10E9/L (ref 0–0.2)
BASOPHILS NFR BLD AUTO: 0.7 %
BILIRUB SERPL-MCNC: 0.4 MG/DL (ref 0.2–1.3)
BILIRUB UR QL STRIP: NEGATIVE
BUN SERPL-MCNC: 13 MG/DL (ref 7–30)
CALCIUM SERPL-MCNC: 9.4 MG/DL (ref 8.5–10.1)
CHLORIDE SERPL-SCNC: 104 MMOL/L (ref 94–109)
CO2 SERPL-SCNC: 29 MMOL/L (ref 20–32)
COLOR UR AUTO: YELLOW
CREAT SERPL-MCNC: 0.89 MG/DL (ref 0.52–1.04)
DIFFERENTIAL METHOD BLD: NORMAL
EOSINOPHIL # BLD AUTO: 0.2 10E9/L (ref 0–0.7)
EOSINOPHIL NFR BLD AUTO: 3 %
ERYTHROCYTE [DISTWIDTH] IN BLOOD BY AUTOMATED COUNT: 14 % (ref 10–15)
GFR SERPL CREATININE-BSD FRML MDRD: 68 ML/MIN/{1.73_M2}
GLUCOSE SERPL-MCNC: 93 MG/DL (ref 70–99)
GLUCOSE UR STRIP-MCNC: NEGATIVE MG/DL
HCT VFR BLD AUTO: 41.3 % (ref 35–47)
HGB BLD-MCNC: 14.8 G/DL (ref 11.7–15.7)
HGB UR QL STRIP: NEGATIVE
KETONES UR STRIP-MCNC: NEGATIVE MG/DL
LEUKOCYTE ESTERASE UR QL STRIP: ABNORMAL
LYMPHOCYTES # BLD AUTO: 2.1 10E9/L (ref 0.8–5.3)
LYMPHOCYTES NFR BLD AUTO: 37.5 %
MCH RBC QN AUTO: 30.8 PG (ref 26.5–33)
MCHC RBC AUTO-ENTMCNC: 35.8 G/DL (ref 31.5–36.5)
MCV RBC AUTO: 86 FL (ref 78–100)
MONOCYTES # BLD AUTO: 0.7 10E9/L (ref 0–1.3)
MONOCYTES NFR BLD AUTO: 13.1 %
NEUTROPHILS # BLD AUTO: 2.6 10E9/L (ref 1.6–8.3)
NEUTROPHILS NFR BLD AUTO: 45.7 %
NITRATE UR QL: NEGATIVE
NON-SQ EPI CELLS #/AREA URNS LPF: ABNORMAL /LPF
PH UR STRIP: 6 PH (ref 5–7)
PLATELET # BLD AUTO: 194 10E9/L (ref 150–450)
POTASSIUM SERPL-SCNC: 3.4 MMOL/L (ref 3.4–5.3)
PROT SERPL-MCNC: 7.4 G/DL (ref 6.8–8.8)
RBC # BLD AUTO: 4.81 10E12/L (ref 3.8–5.2)
RBC #/AREA URNS AUTO: ABNORMAL /HPF
SODIUM SERPL-SCNC: 139 MMOL/L (ref 133–144)
SOURCE: ABNORMAL
SP GR UR STRIP: 1.01 (ref 1–1.03)
UROBILINOGEN UR STRIP-ACNC: 0.2 EU/DL (ref 0.2–1)
WBC # BLD AUTO: 5.7 10E9/L (ref 4–11)
WBC #/AREA URNS AUTO: ABNORMAL /HPF

## 2019-07-26 PROCEDURE — 93010 ELECTROCARDIOGRAM REPORT: CPT | Performed by: INTERNAL MEDICINE

## 2019-07-26 PROCEDURE — 85025 COMPLETE CBC W/AUTO DIFF WBC: CPT | Mod: ZL | Performed by: NURSE PRACTITIONER

## 2019-07-26 PROCEDURE — 80053 COMPREHEN METABOLIC PANEL: CPT | Mod: ZL | Performed by: NURSE PRACTITIONER

## 2019-07-26 PROCEDURE — G0463 HOSPITAL OUTPT CLINIC VISIT: HCPCS

## 2019-07-26 PROCEDURE — 93005 ELECTROCARDIOGRAM TRACING: CPT

## 2019-07-26 PROCEDURE — 36415 COLL VENOUS BLD VENIPUNCTURE: CPT | Mod: ZL | Performed by: NURSE PRACTITIONER

## 2019-07-26 PROCEDURE — 99214 OFFICE O/P EST MOD 30 MIN: CPT | Mod: 25 | Performed by: NURSE PRACTITIONER

## 2019-07-26 PROCEDURE — 81001 URINALYSIS AUTO W/SCOPE: CPT | Mod: ZL | Performed by: NURSE PRACTITIONER

## 2019-07-26 ASSESSMENT — PAIN SCALES - GENERAL: PAINLEVEL: SEVERE PAIN (6)

## 2019-07-26 ASSESSMENT — MIFFLIN-ST. JEOR: SCORE: 1580.55

## 2019-07-26 NOTE — PROGRESS NOTES
[facility]  [address]  [place] [address]  [phone]  Dept: [phone]    PRE-OP EVALUATION:  Today's date: 2019    Cassy Avila (: 1955) presents for pre-operative evaluation assessment as requested by Dr. Alexander.  She requires evaluation and anesthesia risk assessment prior to undergoing surgery/procedure for treatment of  Right shoulder pain - spurs and right bicep tear .      Proposed Surgery/ Procedure: Right shoulder arthroscopic debridement and biceps tenotomy  Date of Surgery/ Procedure: 19  Time of Surgery/ Procedure: [facility]  Hospital/Surgical Facility: [place], [place]  Fax number for surgical facility: On file  Primary Physician: Eliz Hartman  Type of Anesthesia Anticipated: to be determined     Patient has a Health Care Directive or Living Will:  NO      1. NO - Do you have a history of heart attack, stroke, stent, bypass or surgery on an artery in the head, neck, heart or legs?  2. NO - Do you ever have any pain or discomfort in your chest?  3. NO - Do you have a history of  Heart Failure?  4. NO - Are you troubled by shortness of breath when: walking on the level, up a slight hill or at night?  5. NO - Do you currently have a cold, bronchitis or other respiratory infection?  6. NO - Do you have a cough, shortness of breath or wheezing?  7. NO - Do you sometimes get pains in the calves of your legs when you walk?  8. YES - DO YOU OR ANYONE IN YOUR FAMILY HAVE PREVIOUS HISTORY OF BLOOD CLOTS?  History of PE, DVT  9. YES - DO YOU OR DOES ANYONE IN YOUR FAMILY HAVE A SERIOUS BLEEDING PROBLEM SUCH AS PROLONGED BLEEDING FOLLOWING SURGERIES OR CUTS?  FH positive for Factor V, ITP  10. NO - Have you ever had problems with anemia or been told to take iron pills?  11. NO - Have you had any abnormal blood loss such as black, tarry or bloody stools, or abnormal vaginal bleeding?  12. NO - Have you ever had a blood  transfusion?  13. NO - Have you or any of your relatives ever had problems with anesthesia?  14. NO - Do you have sleep apnea, excessive snoring or daytime drowsiness?  15. NO - Do you have any prosthetic heart valves?  16. YES - DO YOU HAVE PROSTHETIC JOINTS? Left shoulder, Left knee        HPI:     HPI related to upcoming procedure:   Progressive Right shoulder pain, spurring, tear of Right Biceps Tendon      See problem list for active medical problems.  Problems all longstanding and stable, except as noted/documented.  See ROS for pertinent symptoms related to these conditions.    History of DVT, History of PE  Positive Factor V Leiden mutation  On chronic anticoagulation, coumadin clinic manages her anti coagulation care - bridged with Lovenox for surgery      She is presently being treated by ENT for eustachian tube dysfunction, otitis externa    MEDICAL HISTORY:     Patient Active Problem List    Diagnosis Date Noted     Factor V Leiden mutation (H) 07/26/2019     Priority: Medium     Primary insomnia 10/31/2018     Priority: Medium     Vitamin D deficiency 07/13/2018     Priority: Medium     Statin medication not prescribed per physician orders 01/17/2018     Priority: Medium     Family history of colon cancer 01/02/2018     Priority: Medium     Lumbar spondylosis with myelopathy 07/12/2017     Priority: Medium     Degeneration of lumbar or lumbosacral intervertebral disc 07/12/2017     Priority: Medium     Long-term (current) use of anticoagulants [Z79.01] 07/20/2016     Priority: Medium     Deep vein thrombosis (DVT) (H) [I82.409] 07/20/2016     Priority: Medium     Moderate episode of recurrent major depressive disorder (H) 08/08/2014     Priority: Medium     H/O total shoulder replacement, left 06/17/2014     Priority: Medium     Failed arthroplasty (H) 01/24/2014     Priority: Medium     Advanced care planning/counseling discussion 03/12/2013     Priority: Medium     Pt has information at home , not  completed       Neurofibromatosis, peripheral, NF1 (H) 08/22/2012     Priority: Medium     Problem list name updated by automated process. Provider to review       Contact dermatitis and other eczema, due to unspecified cause 06/01/2004     Priority: Medium     Pulmonary embolism and infarction (H) 04/11/2003     Priority: Medium     Problem list name updated by automated process. Provider to review       Hyperlipidemia LDL goal <100 07/11/2002     Priority: Medium     Problem list name updated by automated process. Provider to review       Edema 01/18/2002     Priority: Medium     Essential hypertension 02/20/2001     Priority: Medium     Problem list name updated by automated process. 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, congenital     Congenital factor VIII disorder (H) 7/26/2019     Contact dermatitis and other eczema, due to unspecified cause 6/1/2004     Coughing      Edema 1/18/2002     Essential hypertension 2/20/2001     Problem list name updated by automated process. Provider to review     Factor V Leiden (H)      Factor V Leiden mutation (H) 7/26/2019     Family history of colon cancer 1/2/2018     Gastro-oesophageal reflux disease      Herpes zoster without mention of complication 2003    resolved from problem list     Hyperlipidemia LDL goal <100 7/11/2002     Problem list name updated by automated process. Provider to review     Long term (current) use of anticoagulants 8/20/2003     Major depression 8/8/2014     Mammographic microcalcification 2003    resolved from problem list     Migraine, unspecified, without mention of intractable migraine without mention of status migrainosus 3/5/2001     Moderate episode of recurrent major depressive disorder (H) 8/8/2014     Neurofibromatosis,  peripheral, NF1 (H) 2012     Problem list name updated by automated process. Provider to review     Neurofibromatosis, unspecified(237.70) 2012     Other and unspecified hyperlipidemia 2002     Other chronic pain      Other pulmonary embolism and infarction 2003     Primary insomnia 10/31/2018     Statin medication not prescribed per physician orders 2018     Tachycardia, unspecified 2001     Thrombosis of leg      Unspecified essential hypertension 2001     Vitamin D deficiency 2018     Past Surgical History:   Procedure Laterality Date     ------------OTHER-------------      shoulder replacement; Provider: Karen     ARTHROPLASTY KNEE  2014    Procedure: ARTHROPLASTY KNEE;  Surgeon: Sean Alexander MD;  Location: [facility] OR     ARTHROSCOPY SHOULDER      right, bone spurs      SECTION      x3     CHOLECYSTECTOMY       COLONOSCOPY  02/15/2018    [place],,polyps     elbow ulnar tunnel release       ELECTROTHERMAL THERAPY INTRADISC  2017    stimulator     ENDOSCOPIC SINUS SURGERY, SEPTOPLASTY, TURBINOPLASTY, MAXILLARY SINUSOTOMY, COMBINED N/A 2015    Procedure: COMBINED ENDOSCOPIC SINUS SURGERY, SEPTOPLASTY, TURBINOPLASTY, MAXILLARY SINUSOTOMY;  Surgeon: Seema Conn MD;  Location: [facility] OR     esophagastroduodenoscopy      with biopsy and endoscopic U/S     EXCISE NEUROMA LOWER EXTREMITY Left 2016    Procedure: EXCISE NEUROMA LOWER EXTREMITY;  Surgeon: Edi Reed MD;  Location: [facility] OR     FUSION LUMBAR ANTERIOR WITH MARCY CAGES      L5-S1     HYSTERECTOMY TOTAL ABDOMINAL, BILATERAL SALPINGO-OOPHORECTOMY, COMBINED N/A      ORTHOPEDIC SURGERY  2-15    right shoulder     ORTHOPEDIC SURGERY  8/28/15    right knee     ORTHOPEDIC SURGERY Right 2018    hip labrum tear     pionidal cyst excision       TRANSPOSITION ULNAR NERVE (ELBOW)       Current Outpatient Medications   Medication Sig Dispense Refill     aspirin 81 MG EC  tablet Take 81 mg by mouth daily HS       budesonide-formoterol (SYMBICORT) 80-4.5 MCG/ACT Inhaler Inhale 2 puffs into the lungs 2 times daily 1 Inhaler 1     Cholecalciferol (VITAMIN D3 PO) Take 3,000 mg by mouth daily AM       ciprofloxacin (CETRAXAL) 0.2 % otic solution Place 0.25 mLs into the right ear 2 times daily 1 each 0     cyclobenzaprine (FLEXERIL) 10 MG tablet Take 1 tablet (10 mg) by mouth 3 times daily as needed for muscle spasms 90 tablet 1     enoxaparin (LOVENOX) 100 MG/ML syringe One shot every 12 hours 7/30,31.  Shot AM ONLY 8/1. No shot 8/2. Resume every 12 hours on 8/3/19 20 Syringe 1     escitalopram (LEXAPRO) 20 MG tablet TAKE 1 TABLET BY MOUTH EVERY DAY AND 1 TABLET BY MOUTH EVERY DAY AS NEEDED 180 tablet 4     hydrochlorothiazide (HYDRODIURIL) 25 MG tablet TAKE 1 TABLET(25 MG) BY MOUTH DAILY 90 tablet 0     levalbuterol (XOPENEX) 1.25 MG/3ML neb solution NEBULIZE 1 VIAL EVERY 4 HOURS AS NEEDED FOR SHORTNESS OF BREATH, DYSPNEA, OR WHEEZING 1650 mL 0     losartan (COZAAR) 50 MG tablet TAKE 2 TABLETS BY MOUTH EVERY  tablet 1     metoprolol succinate (TOPROL-XL) 50 MG 24 hr tablet TAKE 1 AND ONE-HALF TABLETS BY MOUTH EVERY  tablet 2     order for DME Nebulizer machine and tubing    DX:  Bronchitis 1 Device 0     traZODone (DESYREL) 50 MG tablet TAKE 1 TO 2 TABLETS BY MOUTH AT BEDTIME AS NEEDED 180 tablet 1     UNABLE TO FIND CBD oil       vitamin B complex with vitamin C (VITAMIN  B COMPLEX) TABS tablet Take 1 tablet by mouth daily       warfarin (COUMADIN) 5 MG tablet 7.5mg Mon,Wed,Fri and 5mg all other days or as directed by warfarin clinic 150 tablet 3     OTC products: None, except as noted above    Allergies   Allergen Reactions     Amlodipine Besylate Swelling     Norvasc     Amoxicillin      Atorvastatin      myualgia     Cephalexin Monohydrate Hives     Keflex     Erythromycin Base [Kdc:Yellow Dye+Erythromycin+Brilliant Blue Fcf] Nausea and Vomiting     Meloxicam Other (See  "Comments)     Mobic - confusion, depression     Adhesive Tape Rash     Prochlorperazine Edisylate Swelling and Rash     Compazine     Prochlorperazine Maleate Swelling and Rash      Latex Allergy: NO    Social History     Tobacco Use     Smoking status: Former Smoker     Packs/day: 1.00     Years: 30.00     Pack years: 30.00     Types: Cigarettes, Pipe     Smokeless tobacco: Never Used     Tobacco comment: quit in 1999   Substance Use Topics     Alcohol use: No     History   Drug Use No       REVIEW OF SYSTEMS:   CONSTITUTIONAL: NEGATIVE for fever, chills, change in weight  INTEGUMENTARY/SKIN: NEGATIVE for worrisome rashes, moles or lesions  EYES: NEGATIVE for vision changes or irritation  RESP: NEGATIVE for significant cough or SOB  CV: NEGATIVE for chest pain, palpitations or peripheral edema  GI: NEGATIVE for nausea, abdominal pain, heartburn, or change in bowel habits  : NEGATIVE for frequency, dysuria, or hematuria  MUSCULOSKELETAL:Right shoulder pain  NEURO: NEGATIVE for weakness, dizziness or paresthesias  ENDOCRINE: NEGATIVE for temperature intolerance, skin/hair changes  HEME: NEGATIVE for bleeding problems  PSYCHIATRIC: NEGATIVE for changes in mood or affect    EXAM:   /78 (BP Location: Left arm, Patient Position: Sitting, Cuff Size: Adult Large)   Pulse 70   Temp 97.1  F (36.2  C) (Tympanic)   Ht 1.651 m (5' 5\")   Wt 103 kg (227 lb)   SpO2 97%   BMI 37.77 kg/m           GENERAL APPEARANCE: healthy, alert and no distress     EYES: EOMI, PERRL     NECK: no adenopathy, no asymmetry, masses, or scars and thyroid normal to palpation     RESP: lungs clear to auscultation - no rales, rhonchi or wheezes     CV: regular rates and rhythm, normal S1 S2, no S3 or S4 and no murmur, click or rub     MS: Right shoulder - stiffness, limited ROM     SKIN: no suspicious lesions or rashes     NEURO: Normal strength and tone, sensory exam grossly normal, mentation intact and speech normal     PSYCH: mentation " appears normal. and affect normal/bright     LYMPHATICS: No cervical adenopathy    DIAGNOSTICS:     EKG attached    Results for orders placed or performed in visit on 07/26/19   Comprehensive metabolic panel (BMP + Alb, Alk Phos, ALT, AST, Total. Bili, TP)   Result Value Ref Range    Sodium 139 133 - 144 mmol/L    Potassium 3.4 3.4 - 5.3 mmol/L    Chloride 104 94 - 109 mmol/L    Carbon Dioxide 29 20 - 32 mmol/L    Anion Gap 6 3 - 14 mmol/L    Glucose 93 70 - 99 mg/dL    Urea Nitrogen 13 7 - 30 mg/dL    Creatinine 0.89 0.52 - 1.04 mg/dL    GFR Estimate 68 >60 mL/min/[1.73_m2]    GFR Estimate If Black 79 >60 mL/min/[1.73_m2]    Calcium 9.4 8.5 - 10.1 mg/dL    Bilirubin Total 0.4 0.2 - 1.3 mg/dL    Albumin 3.7 3.4 - 5.0 g/dL    Protein Total 7.4 6.8 - 8.8 g/dL    Alkaline Phosphatase 62 40 - 150 U/L    ALT 28 0 - 50 U/L    AST 12 0 - 45 U/L   CBC with platelets differential   Result Value Ref Range    WBC 5.7 4.0 - 11.0 10e9/L    RBC Count 4.81 3.8 - 5.2 10e12/L    Hemoglobin 14.8 11.7 - 15.7 g/dL    Hematocrit 41.3 35.0 - 47.0 %    MCV 86 78 - 100 fl    MCH 30.8 26.5 - 33.0 pg    MCHC 35.8 31.5 - 36.5 g/dL    RDW 14.0 10.0 - 15.0 %    Platelet Count 194 150 - 450 10e9/L    % Neutrophils 45.7 %    % Lymphocytes 37.5 %    % Monocytes 13.1 %    % Eosinophils 3.0 %    % Basophils 0.7 %    Absolute Neutrophil 2.6 1.6 - 8.3 10e9/L    Absolute Lymphocytes 2.1 0.8 - 5.3 10e9/L    Absolute Monocytes 0.7 0.0 - 1.3 10e9/L    Absolute Eosinophils 0.2 0.0 - 0.7 10e9/L    Absolute Basophils 0.0 0.0 - 0.2 10e9/L    Diff Method Automated Method    *UA reflex to Microscopic and Culture - [facility]   Result Value Ref Range    Color Urine Yellow     Appearance Urine Clear     Glucose Urine Negative NEG^Negative mg/dL    Bilirubin Urine Negative NEG^Negative    Ketones Urine Negative NEG^Negative mg/dL    Specific Gravity Urine 1.010 1.003 - 1.035    Blood Urine Negative NEG^Negative    pH Urine 6.0 5.0 - 7.0 pH    Protein Albumin  Urine Negative NEG^Negative mg/dL    Urobilinogen Urine 0.2 0.2 - 1.0 EU/dL    Nitrite Urine Negative NEG^Negative    Leukocyte Esterase Urine Trace (A) NEG^Negative    Source Midstream Urine    Urine Microscopic   Result Value Ref Range    WBC Urine 0 - 5 OTO5^0 - 5 /HPF    RBC Urine O - 2 OTO2^O - 2 /HPF    Squamous Epithelial /LPF Urine Few FEW^Few /LPF    Bacteria Urine Few (A) NEG^Negative /HPF         Recent Labs   Lab Test 07/22/19 07/15/19  02/05/19  1013  12/31/18  0702  10/31/18  1138  11/05/13  1528   HGB  --   --   --   --   --  14.1  --  14.2   < >  --    PLT  --   --   --   --   --  214  --  212   < >  --    INR 2.4 2.5*  2.5*   < >  --    < > 1.93*   < >  --    < >  --    NA  --   --   --  138  --  140  --  136   < >  --    POTASSIUM  --   --   --  3.5  --  3.6  --  3.3*   < >  --    CR  --   --   --  0.92  --  0.89  --  0.92   < >  --    A1C  --   --   --   --   --   --   --   --   --  5.5    < > = values in this interval not displayed.        IMPRESSION:   Reason for surgery/procedure: Right Shoulder pain    The proposed surgical procedure is considered LOW risk.    REVISED CARDIAC RISK INDEX  The patient has the following serious cardiovascular risks for perioperative complications such as (MI, PE, VFib and 3  AV Block):  No serious cardiac risks  INTERPRETATION: 1 risks: Class II (low risk - 0.9% complication rate)    The patient has the following additional risks for perioperative complications:  History of PE, DVT      ICD-10-CM    1. Pre-operative examination Z01.818 Comprehensive metabolic panel (BMP + Alb, Alk Phos, ALT, AST, Total. Bili, TP)     CBC with platelets differential     *UA reflex to Microscopic and Culture - [facility]     EKG 12-lead complete w/read - (Clinic Performed)     Urine Microscopic   2. Right shoulder pain, unspecified chronicity M25.511        RECOMMENDATIONS:         APPROVAL GIVEN to proceed with proposed procedure, without further diagnostic evaluation        Signed Electronically by: Eliz Hartman NP    Copy of this evaluation report is provided to requesting physician.    [place] Preop Guidelines    Revised Cardiac Risk Index

## 2019-07-26 NOTE — NURSING NOTE
"Chief Complaint   Patient presents with     Pre-Op Exam       Initial /78 (BP Location: Left arm, Patient Position: Sitting, Cuff Size: Adult Large)   Pulse 70   Temp 97.1  F (36.2  C) (Tympanic)   Ht 1.651 m (5' 5\")   Wt 103 kg (227 lb)   SpO2 97%   BMI 37.77 kg/m   Estimated body mass index is 37.77 kg/m  as calculated from the following:    Height as of this encounter: 1.651 m (5' 5\").    Weight as of this encounter: 103 kg (227 lb).  Medication Reconciliation: complete  "

## 2019-08-01 ENCOUNTER — OFFICE VISIT (OUTPATIENT)
Dept: OTOLARYNGOLOGY | Facility: OTHER | Age: 64
End: 2019-08-01
Attending: PHYSICIAN ASSISTANT
Payer: COMMERCIAL

## 2019-08-01 VITALS
TEMPERATURE: 97.2 F | DIASTOLIC BLOOD PRESSURE: 80 MMHG | WEIGHT: 227 LBS | HEIGHT: 65 IN | HEART RATE: 75 BPM | OXYGEN SATURATION: 98 % | BODY MASS INDEX: 37.82 KG/M2 | SYSTOLIC BLOOD PRESSURE: 124 MMHG

## 2019-08-01 DIAGNOSIS — Z96.22 S/P TYMPANOSTOMY TUBE PLACEMENT: ICD-10-CM

## 2019-08-01 DIAGNOSIS — H92.11 OTORRHEA, RIGHT: Primary | ICD-10-CM

## 2019-08-01 DIAGNOSIS — H69.91 ETD (EUSTACHIAN TUBE DYSFUNCTION), RIGHT: ICD-10-CM

## 2019-08-01 LAB
GRAM STN SPEC: ABNORMAL
SPECIMEN SOURCE: ABNORMAL

## 2019-08-01 PROCEDURE — 92504 EAR MICROSCOPY EXAMINATION: CPT | Performed by: PHYSICIAN ASSISTANT

## 2019-08-01 PROCEDURE — 99213 OFFICE O/P EST LOW 20 MIN: CPT | Mod: 25 | Performed by: PHYSICIAN ASSISTANT

## 2019-08-01 PROCEDURE — 87205 SMEAR GRAM STAIN: CPT | Mod: ZL | Performed by: PHYSICIAN ASSISTANT

## 2019-08-01 PROCEDURE — 87070 CULTURE OTHR SPECIMN AEROBIC: CPT | Mod: ZL | Performed by: PHYSICIAN ASSISTANT

## 2019-08-01 PROCEDURE — 99000 SPECIMEN HANDLING OFFICE-LAB: CPT | Performed by: PHYSICIAN ASSISTANT

## 2019-08-01 PROCEDURE — G0463 HOSPITAL OUTPT CLINIC VISIT: HCPCS

## 2019-08-01 PROCEDURE — 87102 FUNGUS ISOLATION CULTURE: CPT | Mod: ZL | Performed by: PHYSICIAN ASSISTANT

## 2019-08-01 RX ORDER — CIPROFLOXACIN AND DEXAMETHASONE 3; 1 MG/ML; MG/ML
4 SUSPENSION/ DROPS AURICULAR (OTIC) 2 TIMES DAILY
Qty: 2 ML | Refills: 0 | Status: SHIPPED | OUTPATIENT
Start: 2019-08-01 | End: 2019-08-21

## 2019-08-01 ASSESSMENT — MIFFLIN-ST. JEOR: SCORE: 1580.55

## 2019-08-01 ASSESSMENT — PAIN SCALES - GENERAL: PAINLEVEL: MILD PAIN (3)

## 2019-08-01 NOTE — LETTER
8/1/2019         RE: Cassy Avila  [address] [place] [address]        Dear Colleague,    Thank you for referring your patient, Cassy Avila, to the [facility]. Please see a copy of my visit note below.    Chief Complaint   Patient presents with     Follow Up     Occluded Right Ear       Cassy Avila is a 63 year old female, history of right myringotomy tube placement 2/23/18 by Dr. Conn.   Cassy was recently seen by Ely Burns on 7/20/19 and noted to have some crusting along her tube. Her tube was patent at that visit, recommended otics and return for recheck.   Cassy has been feeling her ear has improved and feels less plugged. However, she does have continued drainage, and reports it has continued ongoing issues. Reports thick drainage.        Today Cassy reports she was recently treated for right AOM 7/20/19. She was placed on both oral azithromycin, along with ciprodex otic drops. It was noted that she had purulent effusion in middle ear along with occluded tympanostomy tube. She just started her ciprodex drops yesterday, but is almost complete with the azithromycin. Besides this recent ear infection, she has overall felt improvement since tube had been placed, noting no ear pressure/fullness, chronic otorrhea, fluctuating hearing loss or vertigo. She felt improvement in hearing/ear plugging following tube placement.     Today her ear feels plugged, muffled hearing. Has noted some recent otorrhea. No current otalgia, feels this has improved since taking the azithromycin. Denies facial paresthesias and dysphagia.        Past Medical History:   Diagnosis Date     Arthritis      Cervicalgia 1/9/2001     Closed dislocation of shoulder, unspecified site 2000     Congenital deficiency of other clotting factors 9/7/2012    factor V deficiency, congenital     Congenital factor VIII disorder (H) 7/26/2019     Contact dermatitis and other eczema, due to  unspecified cause 6/1/2004     Coughing      Edema 1/18/2002     Essential hypertension 2/20/2001     Problem list name updated by automated process. Provider to review     Factor V Leiden (H)      Factor V Leiden mutation (H) 7/26/2019     Family history of colon cancer 1/2/2018     Gastro-oesophageal reflux disease      Herpes zoster without mention of complication 2003    resolved from problem list     Hyperlipidemia LDL goal <100 7/11/2002     Problem list name updated by automated process. Provider to review     Long term (current) use of anticoagulants 8/20/2003     Major depression 8/8/2014     Mammographic microcalcification 2003    resolved from problem list     Migraine, unspecified, without mention of intractable migraine without mention of status migrainosus 3/5/2001     Moderate episode of recurrent major depressive disorder (H) 8/8/2014     Neurofibromatosis, peripheral, NF1 (H) 8/22/2012     Problem list name updated by automated process. Provider to review     Neurofibromatosis, unspecified(237.70) 8/22/2012     Other and unspecified hyperlipidemia 7/11/2002     Other chronic pain      Other pulmonary embolism and infarction 4/11/2003     Primary insomnia 10/31/2018     Statin medication not prescribed per physician orders 1/17/2018     Tachycardia, unspecified 2/12/2001     Thrombosis of leg      Unspecified essential hypertension 2/20/2001     Vitamin D deficiency 7/13/2018        Allergies   Allergen Reactions     Amlodipine Besylate Swelling     Norvasc     Amoxicillin      Atorvastatin      myualgia     Cephalexin Monohydrate Hives     Keflex     Erythromycin Base [Kdc:Yellow Dye+Erythromycin+Brilliant Blue Fcf] Nausea and Vomiting     Meloxicam Other (See Comments)     Mobic - confusion, depression     Adhesive Tape Rash     Prochlorperazine Edisylate Swelling and Rash     Compazine     Prochlorperazine Maleate Swelling and Rash     Current Outpatient Medications   Medication     aspirin 81 MG  "EC tablet     budesonide-formoterol (SYMBICORT) 80-4.5 MCG/ACT Inhaler     Cholecalciferol (VITAMIN D3 PO)     ciprofloxacin (CETRAXAL) 0.2 % otic solution     cyclobenzaprine (FLEXERIL) 10 MG tablet     enoxaparin (LOVENOX) 100 MG/ML syringe     escitalopram (LEXAPRO) 20 MG tablet     hydrochlorothiazide (HYDRODIURIL) 25 MG tablet     levalbuterol (XOPENEX) 1.25 MG/3ML neb solution     losartan (COZAAR) 50 MG tablet     metoprolol succinate (TOPROL-XL) 50 MG 24 hr tablet     order for DME     traZODone (DESYREL) 50 MG tablet     UNABLE TO FIND     vitamin B complex with vitamin C (VITAMIN  B COMPLEX) TABS tablet     warfarin (COUMADIN) 5 MG tablet     No current facility-administered medications for this visit.      Review Of Systems- See HPI  /80   Pulse 75   Temp 97.2  F (36.2  C) (Tympanic)   Ht 1.651 m (5' 5\")   Wt 103 kg (227 lb)   SpO2 98%   BMI 37.77 kg/m       General - The patient is well nourished and well developed, and appears to have good nutritional status.  Alert and oriented to person and place, interactive.  Head and Face - Normocephalic and atraumatic, with no gross asymmetry noted of the contour of the facial features.  The facial nerve is intact, with strong symmetric movements.  Neck- No palpable lymphadenopathy or thyroid mass.  Trachea is midline.  Eyes - Extraocular movements intact.   Ears- External ears normal. Ears examined under microscope. Left EAC patent, TM intact. Right EAC with  Scant drainage. Culture obtained.   Tube is patent and on good position. ME space viz'd with serous drainage. Suctioned. NO polyps noted.     Nose - Nasal mucosa is pink and moist with no abnormal mucus.  The septum was grossly midline and non-obstructive, turbinates of normal size and position.  No polyps, masses, or purulence noted on examination.  Mouth - Examination of the oral cavity shows pink, healthy, moist mucosa. No lesions or ulceration noted. The tongue is mobile and midline.  "   Throat - The walls of the oropharynx were smooth, pink, moist, symmetric, and had no lesions or ulcerations.  The tonsillar pillars and soft palate were symmetric.  The uvula was midline on elevation.        ASSESSMENT:    ICD-10-CM    1. Otorrhea, right H92.11 ciprofloxacin-dexamethasone (CIPRODEX) 0.3-0.1 % otic suspension     Ear Culture Aerobic Bacterial     Gram stain     Fungus Culture, non-blood   2. S/P tympanostomy tube placement RIGHT Z96.22    3. ETD (Eustachian tube dysfunction), right H69.81      Complete otics as directed  Ear culture is pending.     May need repeat suctioning. Recheck in 3-4 weeks.         Michelle Ly PA-C  ENT  [facility], [place]  [phone]      Again, thank you for allowing me to participate in the care of your patient.        Sincerely,        Michelle Ly PA-C

## 2019-08-01 NOTE — NURSING NOTE
"Chief Complaint   Patient presents with     Follow Up     Occluded Right Ear       Initial /80   Pulse 75   Temp 97.2  F (36.2  C) (Tympanic)   Ht 1.651 m (5' 5\")   Wt 103 kg (227 lb)   SpO2 98%   BMI 37.77 kg/m   Estimated body mass index is 37.77 kg/m  as calculated from the following:    Height as of this encounter: 1.651 m (5' 5\").    Weight as of this encounter: 103 kg (227 lb).  Medication Reconciliation: complete  "

## 2019-08-01 NOTE — PROGRESS NOTES
Chief Complaint   Patient presents with     Follow Up     Occluded Right Ear       Cassy Avila is a 63 year old female, history of right myringotomy tube placement 2/23/18 by Dr. Conn.   Cassy was recently seen by Ely Burns on 7/20/19 and noted to have some crusting along her tube. Her tube was patent at that visit, recommended otics and return for recheck.   Cassy has been feeling her ear has improved and feels less plugged. However, she does have continued drainage, and reports it has continued ongoing issues. Reports thick drainage.        Today Cassy reports she was recently treated for right AOM 7/20/19. She was placed on both oral azithromycin, along with ciprodex otic drops. It was noted that she had purulent effusion in middle ear along with occluded tympanostomy tube. She just started her ciprodex drops yesterday, but is almost complete with the azithromycin. Besides this recent ear infection, she has overall felt improvement since tube had been placed, noting no ear pressure/fullness, chronic otorrhea, fluctuating hearing loss or vertigo. She felt improvement in hearing/ear plugging following tube placement.     Today her ear feels plugged, muffled hearing. Has noted some recent otorrhea. No current otalgia, feels this has improved since taking the azithromycin. Denies facial paresthesias and dysphagia.        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 Provider to review     Factor V Leiden (H)      Factor V Leiden mutation (H) 7/26/2019     Family history of  colon cancer 1/2/2018     Gastro-oesophageal reflux disease      Herpes zoster without mention of complication 2003    resolved from problem list     Hyperlipidemia LDL goal <100 7/11/2002     Problem list name updated by automated process. Provider to review     Long term (current) use of anticoagulants 8/20/2003     Major depression 8/8/2014     Mammographic microcalcification 2003    resolved from problem list     Migraine, unspecified, without mention of intractable migraine without mention of status migrainosus 3/5/2001     Moderate episode of recurrent major depressive disorder (H) 8/8/2014     Neurofibromatosis, peripheral, NF1 (H) 8/22/2012     Problem list name updated by automated process. Provider to review     Neurofibromatosis, unspecified(237.70) 8/22/2012     Other and unspecified hyperlipidemia 7/11/2002     Other chronic pain      Other pulmonary embolism and infarction 4/11/2003     Primary insomnia 10/31/2018     Statin medication not prescribed per physician orders 1/17/2018     Tachycardia, unspecified 2/12/2001     Thrombosis of leg      Unspecified essential hypertension 2/20/2001     Vitamin D deficiency 7/13/2018        Allergies   Allergen Reactions     Amlodipine Besylate Swelling     Norvasc     Amoxicillin      Atorvastatin      myualgia     Cephalexin Monohydrate Hives     Keflex     Erythromycin Base [Kdc:Yellow Dye+Erythromycin+Brilliant Blue Fcf] Nausea and Vomiting     Meloxicam Other (See Comments)     Mobic - confusion, depression     Adhesive Tape Rash     Prochlorperazine Edisylate Swelling and Rash     Compazine     Prochlorperazine Maleate Swelling and Rash     Current Outpatient Medications   Medication     aspirin 81 MG EC tablet     budesonide-formoterol (SYMBICORT) 80-4.5 MCG/ACT Inhaler     Cholecalciferol (VITAMIN D3 PO)     ciprofloxacin (CETRAXAL) 0.2 % otic solution     cyclobenzaprine (FLEXERIL) 10 MG tablet     enoxaparin (LOVENOX) 100 MG/ML syringe     escitalopram  "(LEXAPRO) 20 MG tablet     hydrochlorothiazide (HYDRODIURIL) 25 MG tablet     levalbuterol (XOPENEX) 1.25 MG/3ML neb solution     losartan (COZAAR) 50 MG tablet     metoprolol succinate (TOPROL-XL) 50 MG 24 hr tablet     order for DME     traZODone (DESYREL) 50 MG tablet     UNABLE TO FIND     vitamin B complex with vitamin C (VITAMIN  B COMPLEX) TABS tablet     warfarin (COUMADIN) 5 MG tablet     No current facility-administered medications for this visit.      Review Of Systems- See HPI  /80   Pulse 75   Temp 97.2  F (36.2  C) (Tympanic)   Ht 1.651 m (5' 5\")   Wt 103 kg (227 lb)   SpO2 98%   BMI 37.77 kg/m      General - The patient is well nourished and well developed, and appears to have good nutritional status.  Alert and oriented to person and place, interactive.  Head and Face - Normocephalic and atraumatic, with no gross asymmetry noted of the contour of the facial features.  The facial nerve is intact, with strong symmetric movements.  Neck- No palpable lymphadenopathy or thyroid mass.  Trachea is midline.  Eyes - Extraocular movements intact.   Ears- External ears normal. Ears examined under microscope. Left EAC patent, TM intact. Right EAC with  Scant drainage. Culture obtained.   Tube is patent and on good position. ME space viz'd with serous drainage. Suctioned. NO polyps noted.     Nose - Nasal mucosa is pink and moist with no abnormal mucus.  The septum was grossly midline and non-obstructive, turbinates of normal size and position.  No polyps, masses, or purulence noted on examination.  Mouth - Examination of the oral cavity shows pink, healthy, moist mucosa. No lesions or ulceration noted. The tongue is mobile and midline.    Throat - The walls of the oropharynx were smooth, pink, moist, symmetric, and had no lesions or ulcerations.  The tonsillar pillars and soft palate were symmetric.  The uvula was midline on elevation.        ASSESSMENT:    ICD-10-CM    1. Otorrhea, right H92.11 " ciprofloxacin-dexamethasone (CIPRODEX) 0.3-0.1 % otic suspension     Ear Culture Aerobic Bacterial     Gram stain     Fungus Culture, non-blood   2. S/P tympanostomy tube placement RIGHT Z96.22    3. ETD (Eustachian tube dysfunction), right H69.81      Complete otics as directed  Ear culture is pending.     May need repeat suctioning. Recheck in 3-4 weeks.         Michelle Ly PA-C  ENT  [facility], [place]  [phone]

## 2019-08-01 NOTE — PATIENT INSTRUCTIONS
Continue with Cirpodex for an additional 5 days to right ear.   Ear culture is pending. Will call w/ results.   Tube is in good position and open.   Keep ear dry at this time.   Follow up in 2-4 weeks to recheck ear.       Thank you for allowing Michelle Ly PA-C and our ENT team to participate in your care.  If your medications are too expensive, please give the nurse a call.  We can possibly change this medication.  If you have a scheduling or an appointment question please contact our Health Unit Coordinator at their direct line [phone].   ALL nursing questions or concerns can be directed to your ENT nurse at: [phone] Ira

## 2019-08-05 LAB
BACTERIA SPEC CULT: NORMAL
SPECIMEN SOURCE: NORMAL

## 2019-08-09 ENCOUNTER — ANTICOAGULATION THERAPY VISIT (OUTPATIENT)
Dept: ANTICOAGULATION | Facility: OTHER | Age: 64
End: 2019-08-09

## 2019-08-09 ENCOUNTER — TRANSFERRED RECORDS (OUTPATIENT)
Dept: HEALTH INFORMATION MANAGEMENT | Facility: CLINIC | Age: 64
End: 2019-08-09

## 2019-08-09 DIAGNOSIS — Z79.01 LONG TERM CURRENT USE OF ANTICOAGULANT THERAPY: ICD-10-CM

## 2019-08-09 DIAGNOSIS — I26.99 PULMONARY EMBOLISM AND INFARCTION (H): ICD-10-CM

## 2019-08-09 DIAGNOSIS — I82.409 DEEP VEIN THROMBOSIS (DVT) (H): ICD-10-CM

## 2019-08-09 LAB — INR PPP: 2 (ref 0.8–1.14)

## 2019-08-09 NOTE — PROGRESS NOTES
ANTICOAGULATION FOLLOW-UP CLINIC VISIT    Patient Name:  Cassy Avila  Date:  2019  Contact Type:  Telephone    SUBJECTIVE:  Patient Findings     Positives:   Change in medications (stop lovenox)    Comments:   PST done and result faxed to warfarin clinic by Julisa. Call placed to patient and spoke to her re: INR result, warfarin dosing/INR recheck date. She is also to stop lovenox at this time.  She verbalized understanding and has no questions. She has no unusual bleeding/bruising and no new changes in diet/meds/activity        Clinical Outcomes     Negatives:   Major bleeding event, Thromboembolic event, Anticoagulation-related hospital admission, Anticoagulation-related ED visit, Anticoagulation-related fatality    Comments:   PST done and result faxed to warfarin clinic by Julisa. Call placed to patient and spoke to her re: INR result, warfarin dosing/INR recheck date. She is also to stop lovenox at this time.  She verbalized understanding and has no questions. She has no unusual bleeding/bruising and no new changes in diet/meds/activity           OBJECTIVE    INR   Date Value Ref Range Status   2019 2.0 (A) 0.8 - 1.14 Final       ASSESSMENT / PLAN  INR assessment THER    Recheck INR In: 4 WEEKS    INR Location Home INR      Anticoagulation Summary  As of 2019    INR goal:   2.0-3.0   TTR:   79.2 % (3 y)   INR used for dosin.0 (2019)   Warfarin maintenance plan:   7.5 mg (5 mg x 1.5) every Mon, Wed, Fri; 5 mg (5 mg x 1) all other days   Full warfarin instructions:   7.5 mg every Mon, Wed, Fri; 5 mg all other days   Weekly warfarin total:   42.5 mg   No change documented:   Manju Escalera, RN   Plan last modified:   Manju Escalera RN (2019)   Next INR check:   2019   Priority:   INR   Target end date:   Indefinite    Indications    Long-term (current) use of anticoagulants [Z79.01] [Z79.01]  Pulmonary embolism and infarction (H) [I26.99]  Deep vein thrombosis (DVT) (H)  [I82.409] [I82.409]             Anticoagulation Episode Summary     INR check location:   Home Draw    Preferred lab:       Send INR reminders to:   [facility] ANTICOAG POOL    Comments:   PST - [facility].  Shoulder surgery 8/2/19, warfarin hold x 5 days. Lovenox bridge Minnie Ferrara        Anticoagulation Care Providers     Provider Role Specialty Phone number    Eliz Hartman NP [facility] [phone]            See the Encounter Report to view Anticoagulation Flowsheet and Dosing Calendar (Go to Encounters tab in chart review, and find the Anticoagulation Therapy Visit)        Manju Escalera RN

## 2019-08-21 ENCOUNTER — OFFICE VISIT (OUTPATIENT)
Dept: OTOLARYNGOLOGY | Facility: OTHER | Age: 64
End: 2019-08-21
Attending: PHYSICIAN ASSISTANT
Payer: COMMERCIAL

## 2019-08-21 VITALS
BODY MASS INDEX: 37.82 KG/M2 | OXYGEN SATURATION: 95 % | SYSTOLIC BLOOD PRESSURE: 124 MMHG | HEART RATE: 75 BPM | TEMPERATURE: 98.2 F | HEIGHT: 65 IN | DIASTOLIC BLOOD PRESSURE: 70 MMHG | WEIGHT: 227 LBS

## 2019-08-21 DIAGNOSIS — H92.11 OTORRHEA, RIGHT: Primary | ICD-10-CM

## 2019-08-21 DIAGNOSIS — Z96.22 S/P TYMPANOSTOMY TUBE PLACEMENT: ICD-10-CM

## 2019-08-21 DIAGNOSIS — H61.21 CERUMEN DEBRIS ON TYMPANIC MEMBRANE OF RIGHT EAR: ICD-10-CM

## 2019-08-21 DIAGNOSIS — H69.91 ETD (EUSTACHIAN TUBE DYSFUNCTION), RIGHT: ICD-10-CM

## 2019-08-21 PROCEDURE — G0463 HOSPITAL OUTPT CLINIC VISIT: HCPCS

## 2019-08-21 PROCEDURE — 92504 EAR MICROSCOPY EXAMINATION: CPT | Performed by: PHYSICIAN ASSISTANT

## 2019-08-21 PROCEDURE — 99213 OFFICE O/P EST LOW 20 MIN: CPT | Mod: 25 | Performed by: PHYSICIAN ASSISTANT

## 2019-08-21 ASSESSMENT — PAIN SCALES - GENERAL: PAINLEVEL: MODERATE PAIN (4)

## 2019-08-21 ASSESSMENT — MIFFLIN-ST. JEOR: SCORE: 1580.55

## 2019-08-21 NOTE — PROGRESS NOTES
Chief Complaint   Patient presents with     Follow Up     Right Otorrhea- Ciprodex & Ear Culture, *Powder?     Cassy Avila is a 63 year old female, history of right myringotomy tube placement 2/23/18 by Dr. Conn.   Cassy was recently seen by Ely Burns on 7/20/19 and noted to have some crusting along her tube. Her tube was patent at that visit, recommended otics and return for recheck.   Cassy has been feeling her ear has improved and feels less plugged. However, she does have continued drainage, and reports it has continued ongoing issues. Reports thick drainage.         Today Cassy reports she was recently treated for right AOM 7/20/19. She was placed on both oral azithromycin, along with ciprodex otic drops. It was noted that she had purulent effusion in middle ear along with occluded tympanostomy tube. She just started her ciprodex drops yesterday, but is almost complete with the azithromycin. Besides this recent ear infection, she has overall felt improvement since tube had been placed, noting no ear pressure/fullness, chronic otorrhea, fluctuating hearing loss or vertigo. She felt improvement in hearing/ear plugging following tube placement.     Today her ear feels plugged, muffled hearing. Has noted some recent otorrhea. No current otalgia, feels this has improved since taking the azithromycin. Denies facial paresthesias and dysphagia.      No olamide allergy concerns.   Denies nasal congestion, pressure.   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 Provider to review     Factor V Leiden (H)      Factor V Leiden mutation (H) 7/26/2019     Family history of colon cancer 1/2/2018     Gastro-oesophageal reflux disease      Herpes zoster without mention of complication 2003    resolved from problem list     Hyperlipidemia LDL goal <100 7/11/2002     Problem list name updated by automated process. Provider to review     Long term (current) use of anticoagulants 8/20/2003     Major depression 8/8/2014     Mammographic microcalcification 2003    resolved from problem list     Migraine, unspecified, without mention of intractable migraine without mention of status migrainosus 3/5/2001     Moderate episode of recurrent major depressive disorder (H) 8/8/2014     Neurofibromatosis, peripheral, NF1 (H) 8/22/2012     Problem list name updated by automated process. Provider to review     Neurofibromatosis, unspecified(237.70) 8/22/2012     Other and unspecified hyperlipidemia 7/11/2002     Other chronic pain      Other pulmonary embolism and infarction 4/11/2003     Primary insomnia 10/31/2018     Statin medication not prescribed per physician orders 1/17/2018     Tachycardia, unspecified 2/12/2001     Thrombosis of leg      Unspecified essential hypertension 2/20/2001     Vitamin D deficiency 7/13/2018        Allergies   Allergen Reactions     Amlodipine Besylate Swelling     Norvasc     Amoxicillin      Atorvastatin      myualgia     Cephalexin Monohydrate Hives     Keflex     Erythromycin Base [Kdc:Yellow Dye+Erythromycin+Brilliant Blue Fcf] Nausea and Vomiting     Meloxicam Other (See Comments)     Mobic - confusion, depression     Adhesive Tape Rash     Prochlorperazine Edisylate Swelling and Rash     Compazine     Prochlorperazine Maleate Swelling and Rash     Current Outpatient Medications   Medication     aspirin 81 MG EC tablet     budesonide-formoterol (SYMBICORT) 80-4.5 MCG/ACT  "Inhaler     Cholecalciferol (VITAMIN D3 PO)     ciprofloxacin (CETRAXAL) 0.2 % otic solution     cyclobenzaprine (FLEXERIL) 10 MG tablet     enoxaparin (LOVENOX) 100 MG/ML syringe     escitalopram (LEXAPRO) 20 MG tablet     hydrochlorothiazide (HYDRODIURIL) 25 MG tablet     levalbuterol (XOPENEX) 1.25 MG/3ML neb solution     losartan (COZAAR) 50 MG tablet     metoprolol succinate (TOPROL-XL) 50 MG 24 hr tablet     order for DME     traZODone (DESYREL) 50 MG tablet     UNABLE TO FIND     vitamin B complex with vitamin C (VITAMIN  B COMPLEX) TABS tablet     warfarin (COUMADIN) 5 MG tablet     No current facility-administered medications for this visit.      Review Of Systems- See HPI  /70   Pulse 75   Temp 98.2  F (36.8  C) (Tympanic)   Ht 1.651 m (5' 5\")   Wt 103 kg (227 lb)   SpO2 95%   BMI 37.77 kg/m      General - The patient is well nourished and well developed, and appears to have good nutritional status.  Alert and oriented to person and place, interactive.  Head and Face - Normocephalic and atraumatic, with no gross asymmetry noted of the contour of the facial features.  The facial nerve is intact, with strong symmetric movements.  Neck- No palpable lymphadenopathy or thyroid mass.  Trachea is midline.  Eyes - Extraocular movements intact.   Ears- External ears normal. Ears examined under microscope. Left EAC patent, TM intact. Right EAC was clear. Right TM had a thin film This was removed with cupped forceps, #3 suction and ear pick.   Tube is patent and in good position. ME space viz'd without serous drainage.      Nose - Nasal mucosa is pink and moist with no abnormal mucus.  The septum was grossly midline and non-obstructive, turbinates of normal size and position.  No polyps, masses, or purulence noted on examination.  Mouth - Examination of the oral cavity shows pink, healthy, moist mucosa. No lesions or ulceration noted. The tongue is mobile and midline.    Throat - The walls of the " oropharynx were smooth, pink, moist, symmetric, and had no lesions or ulcerations.  The tonsillar pillars and soft palate were symmetric.  The uvula was midline on elevation.         ASSESSMENT:    ICD-10-CM    1. Otorrhea, right H92.11    2. S/P tympanostomy tube placement RIGHT Z96.22    3. ETD (Eustachian tube dysfunction), right H69.81    4. Cerumen debris on tympanic membrane of right ear H61.21        Start nasonex 2 sprays to each nostril daily  Start Claritin or Zyrtec one tablet daily.     Use as directed for 1 month.   Ear should continue to open/ less pressure.   If no improvement, contact nurse. Consider nasal exam and/ or CT of ME.     Recheck ear in 6 months      Michelle Ly PA-C  ENT  [facility], [place]  [phone]

## 2019-08-21 NOTE — PATIENT INSTRUCTIONS
Start nasonex 2 sprays to each nostril daily  Start Claritin or Zyrtec one tablet daily.     Use as directed for 1 month.   Ear should continue to open/ less pressure.   If no improvement, contact nurse  Recheck ear in 6 months    Thank you for allowing Michelle Ly PA-C and our ENT team to participate in your care.  If your medications are too expensive, please give the nurse a call.  We can possibly change this medication.  If you have a scheduling or an appointment question please contact our Health Unit Coordinator at their direct line [phone].   ALL nursing questions or concerns can be directed to your ENT nurse at: [phone] Ira

## 2019-08-21 NOTE — NURSING NOTE
"Chief Complaint   Patient presents with     Follow Up     Right Otorrhea- Ciprodex & Ear Culture, *Powder?       Initial /70   Pulse 75   Temp 98.2  F (36.8  C) (Tympanic)   Ht 1.651 m (5' 5\")   Wt 103 kg (227 lb)   SpO2 95%   BMI 37.77 kg/m   Estimated body mass index is 37.77 kg/m  as calculated from the following:    Height as of this encounter: 1.651 m (5' 5\").    Weight as of this encounter: 103 kg (227 lb).  Medication Reconciliation: complete  "

## 2019-08-21 NOTE — LETTER
8/21/2019         RE: Cassy Avila  [address] [place] [address]        Dear Colleague,    Thank you for referring your patient, Cassy Avila, to the [facility]. Please see a copy of my visit note below.    Chief Complaint   Patient presents with     Follow Up     Right Otorrhea- Ciprodex & Ear Culture, *Powder?     Cassy Avila is a 63 year old female, history of right myringotomy tube placement 2/23/18 by Dr. Conn.   Cassy was recently seen by Ely Burns on 7/20/19 and noted to have some crusting along her tube. Her tube was patent at that visit, recommended otics and return for recheck.   Cassy has been feeling her ear has improved and feels less plugged. However, she does have continued drainage, and reports it has continued ongoing issues. Reports thick drainage.         Today Cassy reports she was recently treated for right AOM 7/20/19. She was placed on both oral azithromycin, along with ciprodex otic drops. It was noted that she had purulent effusion in middle ear along with occluded tympanostomy tube. She just started her ciprodex drops yesterday, but is almost complete with the azithromycin. Besides this recent ear infection, she has overall felt improvement since tube had been placed, noting no ear pressure/fullness, chronic otorrhea, fluctuating hearing loss or vertigo. She felt improvement in hearing/ear plugging following tube placement.     Today her ear feels plugged, muffled hearing. Has noted some recent otorrhea. No current otalgia, feels this has improved since taking the azithromycin. Denies facial paresthesias and dysphagia.      No olamide allergy concerns.   Denies nasal congestion, pressure.   Culture Micro 08/01/2019  9:05 AM [facility]   Heavy growth   Normal mirian   No Pathogens Isolated          Past Medical History:   Diagnosis Date     Arthritis      Cervicalgia 1/9/2001     Closed dislocation of shoulder, unspecified site 2000      Congenital deficiency of other clotting factors 9/7/2012    factor V deficiency, congenital     Congenital factor VIII disorder (H) 7/26/2019     Contact dermatitis and other eczema, due to unspecified cause 6/1/2004     Coughing      Edema 1/18/2002     Essential hypertension 2/20/2001     Problem list name updated by automated process. Provider to review     Factor V Leiden (H)      Factor V Leiden mutation (H) 7/26/2019     Family history of colon cancer 1/2/2018     Gastro-oesophageal reflux disease      Herpes zoster without mention of complication 2003    resolved from problem list     Hyperlipidemia LDL goal <100 7/11/2002     Problem list name updated by automated process. Provider to review     Long term (current) use of anticoagulants 8/20/2003     Major depression 8/8/2014     Mammographic microcalcification 2003    resolved from problem list     Migraine, unspecified, without mention of intractable migraine without mention of status migrainosus 3/5/2001     Moderate episode of recurrent major depressive disorder (H) 8/8/2014     Neurofibromatosis, peripheral, NF1 (H) 8/22/2012     Problem list name updated by automated process. Provider to review     Neurofibromatosis, unspecified(237.70) 8/22/2012     Other and unspecified hyperlipidemia 7/11/2002     Other chronic pain      Other pulmonary embolism and infarction 4/11/2003     Primary insomnia 10/31/2018     Statin medication not prescribed per physician orders 1/17/2018     Tachycardia, unspecified 2/12/2001     Thrombosis of leg      Unspecified essential hypertension 2/20/2001     Vitamin D deficiency 7/13/2018        Allergies   Allergen Reactions     Amlodipine Besylate Swelling     Norvasc     Amoxicillin      Atorvastatin      myualgia     Cephalexin Monohydrate Hives     Keflex     Erythromycin Base [Kdc:Yellow Dye+Erythromycin+Brilliant Blue Fcf] Nausea and Vomiting     Meloxicam Other (See Comments)     Mobic - confusion, depression      "Adhesive Tape Rash     Prochlorperazine Edisylate Swelling and Rash     Compazine     Prochlorperazine Maleate Swelling and Rash     Current Outpatient Medications   Medication     aspirin 81 MG EC tablet     budesonide-formoterol (SYMBICORT) 80-4.5 MCG/ACT Inhaler     Cholecalciferol (VITAMIN D3 PO)     ciprofloxacin (CETRAXAL) 0.2 % otic solution     cyclobenzaprine (FLEXERIL) 10 MG tablet     enoxaparin (LOVENOX) 100 MG/ML syringe     escitalopram (LEXAPRO) 20 MG tablet     hydrochlorothiazide (HYDRODIURIL) 25 MG tablet     levalbuterol (XOPENEX) 1.25 MG/3ML neb solution     losartan (COZAAR) 50 MG tablet     metoprolol succinate (TOPROL-XL) 50 MG 24 hr tablet     order for DME     traZODone (DESYREL) 50 MG tablet     UNABLE TO FIND     vitamin B complex with vitamin C (VITAMIN  B COMPLEX) TABS tablet     warfarin (COUMADIN) 5 MG tablet     No current facility-administered medications for this visit.      Review Of Systems- See HPI  /70   Pulse 75   Temp 98.2  F (36.8  C) (Tympanic)   Ht 1.651 m (5' 5\")   Wt 103 kg (227 lb)   SpO2 95%   BMI 37.77 kg/m       General - The patient is well nourished and well developed, and appears to have good nutritional status.  Alert and oriented to person and place, interactive.  Head and Face - Normocephalic and atraumatic, with no gross asymmetry noted of the contour of the facial features.  The facial nerve is intact, with strong symmetric movements.  Neck- No palpable lymphadenopathy or thyroid mass.  Trachea is midline.  Eyes - Extraocular movements intact.   Ears- External ears normal. Ears examined under microscope. Left EAC patent, TM intact. Right EAC was clear. Right TM had a thin film This was removed with cupped forceps, #3 suction and ear pick.   Tube is patent and in good position. ME space viz'd without serous drainage.      Nose - Nasal mucosa is pink and moist with no abnormal mucus.  The septum was grossly midline and non-obstructive, turbinates of " normal size and position.  No polyps, masses, or purulence noted on examination.  Mouth - Examination of the oral cavity shows pink, healthy, moist mucosa. No lesions or ulceration noted. The tongue is mobile and midline.    Throat - The walls of the oropharynx were smooth, pink, moist, symmetric, and had no lesions or ulcerations.  The tonsillar pillars and soft palate were symmetric.  The uvula was midline on elevation.         ASSESSMENT:    ICD-10-CM    1. Otorrhea, right H92.11    2. S/P tympanostomy tube placement RIGHT Z96.22    3. ETD (Eustachian tube dysfunction), right H69.81    4. Cerumen debris on tympanic membrane of right ear H61.21        Start nasonex 2 sprays to each nostril daily  Start Claritin or Zyrtec one tablet daily.     Use as directed for 1 month.   Ear should continue to open/ less pressure.   If no improvement, contact nurse. Consider nasal exam and/ or CT of ME.     Recheck ear in 6 months      Michelle Ly PA-C  ENT  [facility]  [phone]      Again, thank you for allowing me to participate in the care of your patient.        Sincerely,        Michelle Ly PA-C

## 2019-08-27 ENCOUNTER — TRANSFERRED RECORDS (OUTPATIENT)
Dept: HEALTH INFORMATION MANAGEMENT | Facility: CLINIC | Age: 64
End: 2019-08-27

## 2019-08-27 ENCOUNTER — ANTICOAGULATION THERAPY VISIT (OUTPATIENT)
Dept: ANTICOAGULATION | Facility: OTHER | Age: 64
End: 2019-08-27

## 2019-08-27 DIAGNOSIS — I26.99 PULMONARY EMBOLISM AND INFARCTION (H): ICD-10-CM

## 2019-08-27 DIAGNOSIS — Z79.01 LONG TERM CURRENT USE OF ANTICOAGULANT THERAPY: ICD-10-CM

## 2019-08-27 DIAGNOSIS — I82.409 DEEP VEIN THROMBOSIS (DVT) (H): ICD-10-CM

## 2019-08-27 LAB — INR PPP: 2.4 (ref 0.8–1.14)

## 2019-08-27 NOTE — PROGRESS NOTES
ANTICOAGULATION FOLLOW-UP CLINIC VISIT    Patient Name:  Cassy Avila  Date:  2019  Contact Type:  Telephone/ message left on voicemail    SUBJECTIVE:  Patient Findings     Comments:   PST done and result faxed to warfarin clinic by Julisa. Call placed to patient and message left re: INR result, warfarin dosing/INR recheck date. She is to call warfarin clinic if she has any bleeding/bruising, new changes in diet/meds/activity or questions.         Clinical Outcomes     Negatives:   Major bleeding event, Thromboembolic event, Anticoagulation-related hospital admission, Anticoagulation-related ED visit, Anticoagulation-related fatality    Comments:   PST done and result faxed to warfarin clinic by Julisa. Call placed to patient and message left re: INR result, warfarin dosing/INR recheck date. She is to call warfarin clinic if she has any bleeding/bruising, new changes in diet/meds/activity or questions.            OBJECTIVE    INR   Date Value Ref Range Status   2019 2.4 (A) 0.8 - 1.14 Final       ASSESSMENT / PLAN  INR assessment THER    Recheck INR In: 4 WEEKS    INR Location Home INR      Anticoagulation Summary  As of 2019    INR goal:   2.0-3.0   TTR:   79.5 % (3.1 y)   INR used for dosin.4 (2019)   Warfarin maintenance plan:   7.5 mg (5 mg x 1.5) every Mon, Wed, Fri; 5 mg (5 mg x 1) all other days   Full warfarin instructions:   7.5 mg every Mon, Wed, Fri; 5 mg all other days   Weekly warfarin total:   42.5 mg   No change documented:   Manju Escalera RN   Plan last modified:   Manju Escalera RN (2019)   Next INR check:   2019   Priority:   INR   Target end date:   Indefinite    Indications    Long-term (current) use of anticoagulants [Z79.01] [Z79.01]  Pulmonary embolism and infarction (H) [I26.99]  Deep vein thrombosis (DVT) (H) [I82.409] [I82.409]             Anticoagulation Episode Summary     INR check location:   Home Draw    Preferred lab:       Send INR reminders  to:   [facility] ANTICOAG POOL    Comments:   PST - [facility].  Shoulder surgery 8/2/19, warfarin hold x 5 days. Lovenox bridge Minnie Ferrara        Anticoagulation Care Providers     Provider Role Specialty Phone number    Eliz Hartman NP [facility] [phone]            See the Encounter Report to view Anticoagulation Flowsheet and Dosing Calendar (Go to Encounters tab in chart review, and find the Anticoagulation Therapy Visit)        Manju Escalera RN

## 2019-08-29 LAB
FUNGUS SPEC CULT: NORMAL
Lab: NORMAL
SPECIMEN SOURCE: NORMAL

## 2019-09-05 ENCOUNTER — ANTICOAGULATION THERAPY VISIT (OUTPATIENT)
Dept: ANTICOAGULATION | Facility: OTHER | Age: 64
End: 2019-09-05

## 2019-09-05 DIAGNOSIS — I82.409 DEEP VEIN THROMBOSIS (DVT) (H): ICD-10-CM

## 2019-09-05 DIAGNOSIS — Z79.01 LONG TERM CURRENT USE OF ANTICOAGULANT THERAPY: ICD-10-CM

## 2019-09-05 DIAGNOSIS — I26.99 PULMONARY EMBOLISM AND INFARCTION (H): ICD-10-CM

## 2019-09-05 LAB — INR PPP: 2.3 (ref 0.8–1.14)

## 2019-09-05 NOTE — PROGRESS NOTES
ANTICOAGULATION FOLLOW-UP CLINIC VISIT    Patient Name:  Cassy Avila  Date:  2019  Contact Type:  Telephone    SUBJECTIVE:  Patient Findings     Positives:   Change in medications (OTC diet pill and also taking Celebrex)    Comments:   PST done and result called to warfarin clinic by patient. She states she is taking a diet pill that she ordered online but is not home and cannot remember the name of it. She states that she did check INR 2 days ago after starting diet pill and her INR had increased to 3.0.  She also started Celebrex yesterday. She has no bleeding, has a few bruised areas, no changes in activity. She will increase vit K intake. We discussed her INR result from today, warfarin dosing/INR recheck date. She verbalized understanding and has no questions.         Clinical Outcomes     Negatives:   Major bleeding event, Thromboembolic event, Anticoagulation-related hospital admission, Anticoagulation-related ED visit, Anticoagulation-related fatality    Comments:   PST done and result called to warfarin clinic by patient. She states she is taking a diet pill that she ordered online but is not home and cannot remember the name of it. She states that she did check INR 2 days ago after starting diet pill and her INR had increased to 3.0.  She also started Celebrex yesterday. She has no bleeding, has a few bruised areas, no changes in activity. She will increase vit K intake. We discussed her INR result from today, warfarin dosing/INR recheck date. She verbalized understanding and has no questions.            OBJECTIVE    INR   Date Value Ref Range Status   2019 2.3 (A) 0.8 - 1.14 Final       ASSESSMENT / PLAN  INR assessment THER    Recheck INR In: 2 WEEKS    INR Location Home INR      Anticoagulation Summary  As of 2019    INR goal:   2.0-3.0   TTR:   79.7 % (3.1 y)   INR used for dosin.3 (2019)   Warfarin maintenance plan:   7.5 mg (5 mg x 1.5) every Mon, Fri; 5 mg (5 mg x 1) all  other days   Full warfarin instructions:   7.5 mg every Mon, Fri; 5 mg all other days   Weekly warfarin total:   40 mg   Plan last modified:   Manju Escalera RN (9/5/2019)   Next INR check:   9/19/2019   Priority:   INR   Target end date:   Indefinite    Indications    Long-term (current) use of anticoagulants [Z79.01] [Z79.01]  Pulmonary embolism and infarction (H) [I26.99]  Deep vein thrombosis (DVT) (H) [I82.409] [I82.409]             Anticoagulation Episode Summary     INR check location:   Home Draw    Preferred lab:       Send INR reminders to:   [facility] ANTICOAG POOL    Comments:   PST - [facility].  Shoulder surgery 8/2/19, warfarin hold x 5 days. Minnie Morales        Anticoagulation Care Providers     Provider Role Specialty Phone number    Eliz Hartman, JANINA [facility] [phone]            See the Encounter Report to view Anticoagulation Flowsheet and Dosing Calendar (Go to Encounters tab in chart review, and find the Anticoagulation Therapy Visit)        Manju Escalera, RN

## 2019-09-13 ENCOUNTER — HOSPITAL ENCOUNTER (EMERGENCY)
Facility: HOSPITAL | Age: 64
Discharge: HOME | End: 2019-09-13
Attending: NURSE PRACTITIONER | Admitting: NURSE PRACTITIONER
Payer: MEDICARE

## 2019-09-13 VITALS
SYSTOLIC BLOOD PRESSURE: 135 MMHG | RESPIRATION RATE: 16 BRPM | TEMPERATURE: 97.6 F | DIASTOLIC BLOOD PRESSURE: 72 MMHG | OXYGEN SATURATION: 95 %

## 2019-09-13 DIAGNOSIS — J20.9 ACUTE BRONCHITIS: Primary | ICD-10-CM

## 2019-09-13 DIAGNOSIS — R05.9 COUGH: ICD-10-CM

## 2019-09-13 DIAGNOSIS — J20.9 ACUTE BRONCHITIS, UNSPECIFIED ORGANISM: ICD-10-CM

## 2019-09-13 PROCEDURE — 99213 OFFICE O/P EST LOW 20 MIN: CPT | Mod: Z6 | Performed by: NURSE PRACTITIONER

## 2019-09-13 PROCEDURE — G0463 HOSPITAL OUTPT CLINIC VISIT: HCPCS

## 2019-09-13 RX ORDER — BENZONATATE 200 MG/1
200 CAPSULE ORAL 3 TIMES DAILY PRN
Qty: 20 CAPSULE | Refills: 0 | Status: SHIPPED | OUTPATIENT
Start: 2019-09-13 | End: 2019-09-27

## 2019-09-13 RX ORDER — PREDNISONE 20 MG/1
TABLET ORAL
Qty: 10 TABLET | Refills: 0 | Status: SHIPPED | OUTPATIENT
Start: 2019-09-13 | End: 2019-09-27

## 2019-09-13 RX ORDER — CODEINE PHOSPHATE AND GUAIFENESIN 10; 100 MG/5ML; MG/5ML
1-2 SOLUTION ORAL EVERY 4 HOURS PRN
Qty: 118 ML | Refills: 0 | Status: SHIPPED | OUTPATIENT
Start: 2019-09-13 | End: 2019-09-27

## 2019-09-13 ASSESSMENT — ENCOUNTER SYMPTOMS
SORE THROAT: 0
DIAPHORESIS: 0
VOMITING: 0
RHINORRHEA: 0
CHILLS: 0
COUGH: 1
NAUSEA: 0
FEVER: 0
APPETITE CHANGE: 0
SHORTNESS OF BREATH: 0

## 2019-09-13 NOTE — DISCHARGE INSTRUCTIONS
Continue drinking fluids to help moisten throat and loosen secretions. Try tea with honey or throat lozenges.  Use tessalon perles 3 times a day as needed for the cough.  Take cough syrup as prescribed. This may make you feel drowsy so recommend taking it at night.   Return to emergency department for worsening or concerning symptoms.   Follow up with your doctor as needed.

## 2019-09-13 NOTE — ED PROVIDER NOTES
History     Chief Complaint   Patient presents with     Cough     c/o cough and fatigue     HPI  Cassy Avila is a 63 year old female who presents to  for a cough. Onset 3 days ago. She reports coughing up phlegm and feeling tired. She reports pain to the right side of her chest. She has been using her nebulizer, OTC allergy meds and pushing fluids. Denies history of lung diseases. Denies fever, rhinorrhea, ear pain, SOB, N/V.     Allergies:  Allergies   Allergen Reactions     Amlodipine Besylate Swelling     Norvasc     Amoxicillin      Atorvastatin      myualgia     Cephalexin Monohydrate Hives     Keflex     Erythromycin Base [Kdc:Yellow Dye+Erythromycin+Brilliant Blue Fcf] Nausea and Vomiting     Meloxicam Other (See Comments)     Mobic - confusion, depression     Adhesive Tape Rash     Prochlorperazine Edisylate Swelling and Rash     Compazine     Prochlorperazine Maleate Swelling and Rash       Problem List:    Patient Active Problem List    Diagnosis Date Noted     Factor V Leiden mutation (H) 07/26/2019     Priority: Medium     Primary insomnia 10/31/2018     Priority: Medium     Vitamin D deficiency 07/13/2018     Priority: Medium     Statin medication not prescribed per physician orders 01/17/2018     Priority: Medium     Family history of colon cancer 01/02/2018     Priority: Medium     Lumbar spondylosis with myelopathy 07/12/2017     Priority: Medium     Degeneration of lumbar or lumbosacral intervertebral disc 07/12/2017     Priority: Medium     Long-term (current) use of anticoagulants [Z79.01] 07/20/2016     Priority: Medium     Deep vein thrombosis (DVT) (H) [I82.409] 07/20/2016     Priority: Medium     Moderate episode of recurrent major depressive disorder (H) 08/08/2014     Priority: Medium     H/O total shoulder replacement, left 06/17/2014     Priority: Medium     Failed arthroplasty (H) 01/24/2014     Priority: Medium     Advanced care planning/counseling discussion 03/12/2013      Priority: Medium     Pt has information at home , not completed       Neurofibromatosis, peripheral, NF1 (H) 08/22/2012     Priority: Medium     Problem list name updated by automated process. Provider to review       Contact dermatitis and other eczema, due to unspecified cause 06/01/2004     Priority: Medium     Pulmonary embolism and infarction (H) 04/11/2003     Priority: Medium     Problem list name updated by automated process. Provider to review       Hyperlipidemia LDL goal <100 07/11/2002     Priority: Medium     Problem list name updated by automated process. Provider to review       Edema 01/18/2002     Priority: Medium     Essential hypertension 02/20/2001     Priority: Medium     Problem list name updated by automated process. 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     Congenital factor VIII disorder (H) 7/26/2019     Contact dermatitis and other eczema, due to unspecified cause 6/1/2004     Coughing      Edema 1/18/2002     Essential hypertension 2/20/2001     Factor V Leiden (H)      Factor V Leiden mutation (H) 7/26/2019     Family history of colon cancer 1/2/2018     Gastro-oesophageal reflux disease      Herpes zoster without mention of complication 2003     Hyperlipidemia LDL goal <100 7/11/2002     Long term (current) use of anticoagulants 8/20/2003     Major depression 8/8/2014     Mammographic microcalcification 2003     Migraine, unspecified, without mention of intractable migraine without mention of status migrainosus 3/5/2001     Moderate episode of recurrent major depressive disorder (H) 8/8/2014     Neurofibromatosis, peripheral, NF1 (H) 8/22/2012     Neurofibromatosis, unspecified(237.70) 8/22/2012     Other and unspecified hyperlipidemia 7/11/2002     Other chronic pain       Other pulmonary embolism and infarction 2003     Primary insomnia 10/31/2018     Statin medication not prescribed per physician orders 2018     Tachycardia, unspecified 2001     Thrombosis of leg      Unspecified essential hypertension 2001     Vitamin D deficiency 2018       Past Surgical History:    Past Surgical History:   Procedure Laterality Date     ------------OTHER-------------      shoulder replacement; Provider: Karen     ARTHROPLASTY KNEE  2014    Procedure: ARTHROPLASTY KNEE;  Surgeon: Sean Alexander MD;  Location: [facility] OR     ARTHROSCOPY SHOULDER      right, bone spurs      SECTION      x3     CHOLECYSTECTOMY       COLONOSCOPY  02/15/2018    [place],,polyps     elbow ulnar tunnel release       ELECTROTHERMAL THERAPY INTRADISC  2017    stimulator     ENDOSCOPIC SINUS SURGERY, SEPTOPLASTY, TURBINOPLASTY, MAXILLARY SINUSOTOMY, COMBINED N/A 2015    Procedure: COMBINED ENDOSCOPIC SINUS SURGERY, SEPTOPLASTY, TURBINOPLASTY, MAXILLARY SINUSOTOMY;  Surgeon: Seema Conn MD;  Location: [facility] OR     esophagastroduodenoscopy      with biopsy and endoscopic U/S     EXCISE NEUROMA LOWER EXTREMITY Left 2016    Procedure: EXCISE NEUROMA LOWER EXTREMITY;  Surgeon: Edi Reed MD;  Location: [facility] OR     FUSION LUMBAR ANTERIOR WITH MARCY CAGES      L5-S1     HYSTERECTOMY TOTAL ABDOMINAL, BILATERAL SALPINGO-OOPHORECTOMY, COMBINED N/A      ORTHOPEDIC SURGERY  2-15    right shoulder     ORTHOPEDIC SURGERY  8/28/15    right knee     ORTHOPEDIC SURGERY Right 2018    hip labrum tear     pionidal cyst excision       TRANSPOSITION ULNAR NERVE (ELBOW)         Family History:    Family History   Problem Relation Age of Onset     Cancer Mother      Colon Polyps Mother      Heart Failure Mother 87        congestive, cause of death     Myocardial Infarction Mother         myocardial infarction     Myocardial Infarction Father         myocardial  infarction - cause of death     C.A.D. Father      Cancer Paternal Uncle         cause of death     C.A.D. Brother      Other - See Comments Other         factor 5 - family h/o     Asthma No family hx of        Social History:  Marital Status:   [2]  Social History     Tobacco Use     Smoking status: Former Smoker     Packs/day: 1.00     Years: 30.00     Pack years: 30.00     Types: Cigarettes, Pipe     Smokeless tobacco: Never Used     Tobacco comment: quit in 1999   Substance Use Topics     Alcohol use: No     Drug use: No        Medications:      benzonatate (TESSALON) 200 MG capsule   guaiFENesin-codeine (ROBITUSSIN AC) 100-10 MG/5ML solution   predniSONE (DELTASONE) 20 MG tablet   aspirin 81 MG EC tablet   Cholecalciferol (VITAMIN D3 PO)   escitalopram (LEXAPRO) 20 MG tablet   hydrochlorothiazide (HYDRODIURIL) 25 MG tablet   losartan (COZAAR) 50 MG tablet   metoprolol succinate (TOPROL-XL) 50 MG 24 hr tablet   order for DME   traZODone (DESYREL) 50 MG tablet   UNABLE TO FIND   vitamin B complex with vitamin C (VITAMIN  B COMPLEX) TABS tablet   warfarin (COUMADIN) 5 MG tablet         Review of Systems   Constitutional: Negative for appetite change, chills, diaphoresis and fever.   HENT: Negative for congestion, ear discharge, ear pain, postnasal drip, rhinorrhea, sneezing and sore throat.    Respiratory: Positive for cough. Negative for shortness of breath.    Cardiovascular: Positive for chest pain.   Gastrointestinal: Negative for nausea and vomiting.   All other systems reviewed and are negative.      Physical Exam   BP: 135/72  Heart Rate: 74  Temp: 97.6  F (36.4  C)  Resp: 16  SpO2: 95 %      Physical Exam   Constitutional: She is oriented to person, place, and time. She appears well-developed.  Non-toxic appearance. She does not appear ill.   HENT:   Right Ear: Tympanic membrane and ear canal normal. No drainage.   Left Ear: Tympanic membrane and ear canal normal. No drainage.   Tympanostomy tube in  place to right ear.   Neck: Normal range of motion.   Cardiovascular: Normal rate, regular rhythm and normal heart sounds.   No murmur heard.  Pulmonary/Chest: Effort normal and breath sounds normal. No respiratory distress. She has no wheezes. She has no rales.   Dry, congested cough heard during assessment.    Lymphadenopathy:     She has no cervical adenopathy.   Neurological: She is alert and oriented to person, place, and time.   Skin: She is not diaphoretic.   Nursing note and vitals reviewed.      ED Course        Procedures               No results found for this or any previous visit (from the past 24 hour(s)).    Medications - No data to display    Assessments & Plan (with Medical Decision Making)     I have reviewed the nursing notes.    I have reviewed the findings, diagnosis, plan and need for follow up with the patient.  Acute bronchitis  Patient has had a cough x 3 days, coughing up clear phlegm. Denies fever, N/V, rhinorrhea or SOB. Lungs CTA, heart rate regular. Discussed findings with patient.   Will prescribe robitussin AC, prednisone and tessalon perles.  Advised patient to continue pushing fluids, doing nebulizer treatments as needed. Advised her to return to emergency department for worsening or concerning symptoms. Questions answered. Patient verbalizes understanding and agreeable with plan of care.     Discharge Medication List as of 9/13/2019 12:47 PM      START taking these medications    Details   benzonatate (TESSALON) 200 MG capsule Take 1 capsule (200 mg) by mouth 3 times daily as needed for cough, Disp-20 capsule, R-0, E-Prescribe      guaiFENesin-codeine (ROBITUSSIN AC) 100-10 MG/5ML solution Take 5-10 mLs by mouth every 4 hours as needed for cough, Disp-118 mL, R-0, Local Print      predniSONE (DELTASONE) 20 MG tablet Take two tablets (= 40mg) each day for 5 (five) days, Disp-10 tablet, R-0, E-Prescribe             Final diagnoses:   Cough   Acute bronchitis       9/13/2019   [facility]  EMERGENCY DEPARTMENT     Mpofu, Prudence, CNP  09/13/19 1512

## 2019-09-13 NOTE — ED AVS SNAPSHOT
[facility] Department  [address] [address]  Phone:  [phone]                                    Cassy Avila   MRN: [id number]    Department:  [facility] Emergency Department   Date of Visit:  9/13/2019           After Visit Summary Signature Page    I have received my discharge instructions, and my questions have been answered. I have discussed any challenges I see with this plan with the nurse or doctor.    ..........................................................................................................................................  Patient/Patient Representative Signature      ..........................................................................................................................................  Patient Representative Print Name and Relationship to Patient    ..................................................               ................................................  Date                                   Time    ..........................................................................................................................................  Reviewed by Signature/Title    ...................................................              ..............................................  Date                                               Time          22EPIC Rev 08/18

## 2019-09-16 ENCOUNTER — ANTICOAGULATION THERAPY VISIT (OUTPATIENT)
Dept: ANTICOAGULATION | Facility: OTHER | Age: 64
End: 2019-09-16

## 2019-09-16 DIAGNOSIS — I82.409 DEEP VEIN THROMBOSIS (DVT) (H): ICD-10-CM

## 2019-09-16 DIAGNOSIS — Z79.01 LONG TERM CURRENT USE OF ANTICOAGULANT THERAPY: ICD-10-CM

## 2019-09-16 DIAGNOSIS — I26.99 PULMONARY EMBOLISM AND INFARCTION (H): ICD-10-CM

## 2019-09-16 NOTE — PROGRESS NOTES
ANTICOAGULATION FOLLOW-UP CLINIC VISIT    Patient Name:  Cassy Avila  Date:  9/16/2019  Contact Type:  Telephone    SUBJECTIVE:  Patient Findings     Positives:   Change in medications (prednisone)    Comments:   Call paced to patient and spoke to her as noted she was seen in ER on 9/13/19 and was given prednisone 40mg x 5 days. Patient states she did not take any warfarin yesterday. We discussed that she should decrease dose to 5 mg today and do INR tomorrow. She verbalized understanding and has no questions.         Clinical Outcomes     Negatives:   Major bleeding event, Thromboembolic event, Anticoagulation-related hospital admission, Anticoagulation-related ED visit, Anticoagulation-related fatality    Comments:   Call paced to patient and spoke to her as noted she was seen in ER on 9/13/19 and was given prednisone 40mg x 5 days. Patient states she did not take any warfarin yesterday. We discussed that she should decrease dose to 5 mg today and do INR tomorrow. She verbalized understanding and has no questions.            OBJECTIVE    INR   Date Value Ref Range Status   09/05/2019 2.3 (A) 0.8 - 1.14 Final       ASSESSMENT / PLAN  No question data found.  Anticoagulation Summary  As of 9/16/2019    INR goal:   2.0-3.0   TTR:   79.7 % (3.1 y)   INR used for dosing:   No new INR was available at the time of this encounter.   Warfarin maintenance plan:   7.5 mg (5 mg x 1.5) every Mon, Fri; 5 mg (5 mg x 1) all other days   Full warfarin instructions:   9/16: 5 mg; Otherwise 7.5 mg every Mon, Fri; 5 mg all other days   Weekly warfarin total:   40 mg   Plan last modified:   Manju Escalera RN (9/5/2019)   Next INR check:   9/17/2019   Priority:   INR   Target end date:   Indefinite    Indications    Long-term (current) use of anticoagulants [Z79.01] [Z79.01]  Pulmonary embolism and infarction (H) [I26.99]  Deep vein thrombosis (DVT) (H) [I82.409] [I82.409]             Anticoagulation Episode Summary     INR check  location:   Home Draw    Preferred lab:       Send INR reminders to:   [facility] ANTICOAG POOL    Comments:   PST - [facility].  Shoulder surgery 8/2/19, warfarin hold x 5 days. Minnie Morales        Anticoagulation Care Providers     Provider Role Specialty Phone number    Eliz Hartman, JANINA [facility] [phone]            See the Encounter Report to view Anticoagulation Flowsheet and Dosing Calendar (Go to Encounters tab in chart review, and find the Anticoagulation Therapy Visit)        Manju Escalera RN

## 2019-09-17 ENCOUNTER — TRANSFERRED RECORDS (OUTPATIENT)
Dept: HEALTH INFORMATION MANAGEMENT | Facility: CLINIC | Age: 64
End: 2019-09-17

## 2019-09-17 ENCOUNTER — ANTICOAGULATION THERAPY VISIT (OUTPATIENT)
Dept: ANTICOAGULATION | Facility: OTHER | Age: 64
End: 2019-09-17

## 2019-09-17 DIAGNOSIS — I26.99 PULMONARY EMBOLISM AND INFARCTION (H): ICD-10-CM

## 2019-09-17 DIAGNOSIS — I82.409 DEEP VEIN THROMBOSIS (DVT) (H): ICD-10-CM

## 2019-09-17 DIAGNOSIS — Z79.01 LONG TERM CURRENT USE OF ANTICOAGULANT THERAPY: ICD-10-CM

## 2019-09-17 LAB — INR PPP: 2.1 (ref 0.8–1.14)

## 2019-09-17 NOTE — PROGRESS NOTES
ANTICOAGULATION FOLLOW-UP CLINIC VISIT    Patient Name:  Cassy Avila  Date:  2019  Contact Type:  Telephone    SUBJECTIVE:  Patient Findings     Positives:   Change in medications (done with prednisone today)    Comments:   PST done and result faxed to warfarin clinic by Julisa. Call placed to patient. She is completing prednisone today. No bleeding/bruising. We discussed warfarin dosing/INR recheck date. She verbalized understanding and has no questions.         Clinical Outcomes     Negatives:   Major bleeding event, Thromboembolic event, Anticoagulation-related hospital admission, Anticoagulation-related ED visit, Anticoagulation-related fatality    Comments:   PST done and result faxed to warfarin clinic by Julisa. Call placed to patient. She is completing prednisone today. No bleeding/bruising. We discussed warfarin dosing/INR recheck date. She verbalized understanding and has no questions.            OBJECTIVE    INR   Date Value Ref Range Status   2019 2.1 (A) 0.8 - 1.14 Final       ASSESSMENT / PLAN  INR assessment THER    Recheck INR In: 4 WEEKS    INR Location Home INR      Anticoagulation Summary  As of 2019    INR goal:   2.0-3.0   TTR:   79.9 % (3.1 y)   INR used for dosin.1 (2019)   Warfarin maintenance plan:   7.5 mg (5 mg x 1.5) every Mon, Fri; 5 mg (5 mg x 1) all other days   Full warfarin instructions:   7.5 mg every Mon, Fri; 5 mg all other days   Weekly warfarin total:   40 mg   No change documented:   Manju Escalera RN   Plan last modified:   Manju Escalera RN (2019)   Next INR check:   10/15/2019   Priority:   INR   Target end date:   Indefinite    Indications    Long-term (current) use of anticoagulants [Z79.01] [Z79.01]  Pulmonary embolism and infarction (H) [I26.99]  Deep vein thrombosis (DVT) (H) [I82.409] [I82.409]             Anticoagulation Episode Summary     INR check location:   Home Draw    Preferred lab:       Send INR reminders to:   [facility]  POOL    Comments:   PST - [facility].  Shoulder surgery 8/2/19, warfarin hold x 5 days. Lovenox bridge Minnie Ferrara        Anticoagulation Care Providers     Provider Role Specialty Phone number    Eliz Hartman CNP [facility] [phone]            See the Encounter Report to view Anticoagulation Flowsheet and Dosing Calendar (Go to Encounters tab in chart review, and find the Anticoagulation Therapy Visit)        Manju Escalera RN

## 2019-09-26 NOTE — PROGRESS NOTES
Cassy Avila is a 63 year old female who presents to clinic today for the following health issues:      GUERRERO, cough - 2 months  Onset: 2 month     Description:   Becomes short of breath with walking  feet    Intensity: moderate    Progression of Symptoms:  worsening    Accompanying Signs & Symptoms: none    Previous history of similar problem:   No    Precipitating factors:   Worsened by: activity    Alleviating factors:  Improved by: No        Hyperlipidemia Follow-Up    Are you having any of the following symptoms? (Select all that apply)  Shortness of breath    Are you regularly taking any medication or supplement to lower your cholesterol?   No    Are you having muscle aches or other side effects that you think could be caused by your cholesterol lowering medication?  No      Hypertension Follow-up    Do you check your blood pressure regularly outside of the clinic? No     Are you following a low salt diet? Yes    Are your blood pressures ever more than 140 on the top number (systolic) OR more   than 90 on the bottom number (diastolic), for example 140/90? No        How many servings of fruits and vegetables do you eat daily?  2-3    On average, how many sweetened beverages do you drink each day (soda, juice, sweet tea, etc)?   1    How many days per week do you miss taking your medication? 0      Depression Followup    How are you doing with your depression since your last visit? No symptoms    Are you having other symptoms that might be associated with depression? No    Have you had a significant life event?  No     Are you feeling anxious or having panic attacks?   No    Do you have any concerns with your use of alcohol or other drugs? No         Social History     Tobacco Use     Smoking status: Former Smoker     Packs/day: 1.00     Years: 30.00     Pack years: 30.00     Types: Cigarettes, Pipe     Smokeless tobacco: Never Used     Tobacco comment: quit in 1999   Substance Use Topics     Alcohol  use: No     Drug use: No       PHQ-9 SCORE 2019 6/10/2019 2019   PHQ-9 Total Score - - -   PHQ-9 Total Score 3 0 0     MARGA-7 SCORE 2019 6/10/2019 2019   Total Score 0 0 0         Suicide Assessment Five-step Evaluation and Treatment (SAFE-T)          Patient Active Problem List   Diagnosis     Essential hypertension     Pulmonary embolism and infarction (H)     Contact dermatitis and other eczema, due to unspecified cause     Edema     Hyperlipidemia LDL goal <100     Neurofibromatosis, peripheral, NF1 (H)     Advanced care planning/counseling discussion     Moderate episode of recurrent major depressive disorder (H)     Long-term (current) use of anticoagulants [Z79.01]     Deep vein thrombosis (DVT) (H) [I82.409]     Lumbar spondylosis with myelopathy     Degeneration of lumbar or lumbosacral intervertebral disc     Family history of colon cancer     Statin medication not prescribed per physician orders     Vitamin D deficiency     Failed arthroplasty (H)     H/O total shoulder replacement, left     Primary insomnia     Factor V Leiden mutation (H)     Past Surgical History:   Procedure Laterality Date     ------------OTHER-------------      shoulder replacement; Provider: Karen     ARTHROPLASTY KNEE  2014    Procedure: ARTHROPLASTY KNEE;  Surgeon: Sean Alexander MD;  Location: [facility] OR     ARTHROSCOPY SHOULDER      right, bone spurs      SECTION      x3     CHOLECYSTECTOMY       COLONOSCOPY  02/15/2018    [place],,polyps     elbow ulnar tunnel release       ELECTROTHERMAL THERAPY INTRADISC  2017    stimulator     ENDOSCOPIC SINUS SURGERY, SEPTOPLASTY, TURBINOPLASTY, MAXILLARY SINUSOTOMY, COMBINED N/A 2015    Procedure: COMBINED ENDOSCOPIC SINUS SURGERY, SEPTOPLASTY, TURBINOPLASTY, MAXILLARY SINUSOTOMY;  Surgeon: Seema Conn MD;  Location: [facility] OR     esophagastroduodenoscopy      with biopsy and endoscopic U/S     EXCISE NEUROMA LOWER EXTREMITY Left  7/13/2016    Procedure: EXCISE NEUROMA LOWER EXTREMITY;  Surgeon: Edi Reed MD;  Location: [facility] OR     FUSION LUMBAR ANTERIOR WITH BAK CAGES      L5-S1     HYSTERECTOMY TOTAL ABDOMINAL, BILATERAL SALPINGO-OOPHORECTOMY, COMBINED N/A      ORTHOPEDIC SURGERY  2-15    right shoulder     ORTHOPEDIC SURGERY  8/28/15    right knee     ORTHOPEDIC SURGERY Right 06/11/2018    hip labrum tear     pionidal cyst excision       TRANSPOSITION ULNAR NERVE (ELBOW)         Social History     Tobacco Use     Smoking status: Former Smoker     Packs/day: 1.00     Years: 30.00     Pack years: 30.00     Types: Cigarettes, Pipe     Smokeless tobacco: Never Used     Tobacco comment: quit in 1999   Substance Use Topics     Alcohol use: No     Family History   Problem Relation Age of Onset     Cancer Mother      Colon Polyps Mother      Heart Failure Mother 87        congestive, cause of death     Myocardial Infarction Mother         myocardial infarction     Myocardial Infarction Father         myocardial infarction - cause of death     C.A.D. Father      Cancer Paternal Uncle         cause of death     C.A.D. Brother      Other - See Comments Other         factor 5 - family h/o     Asthma No family hx of                Current Outpatient Medications   Medication Sig Dispense Refill     aspirin 81 MG EC tablet Take 81 mg by mouth daily HS       Cholecalciferol (VITAMIN D3 PO) Take 3,000 mg by mouth daily AM       escitalopram (LEXAPRO) 20 MG tablet TAKE 1 TABLET BY MOUTH EVERY DAY AND 1 TABLET BY MOUTH EVERY DAY AS NEEDED 180 tablet 4     hydrochlorothiazide (HYDRODIURIL) 25 MG tablet TAKE 1 TABLET(25 MG) BY MOUTH DAILY 90 tablet 0     losartan (COZAAR) 50 MG tablet TAKE 2 TABLETS BY MOUTH EVERY  tablet 1     metoprolol succinate (TOPROL-XL) 50 MG 24 hr tablet TAKE 1 AND ONE-HALF TABLETS BY MOUTH EVERY  tablet 2     order for DME Nebulizer machine and tubing    DX:  Bronchitis 1 Device 0     traZODone (DESYREL) 50 MG  tablet TAKE 1 TO 2 TABLETS BY MOUTH AT BEDTIME AS NEEDED 180 tablet 1     UNABLE TO FIND CBD oil       vitamin B complex with vitamin C (VITAMIN  B COMPLEX) TABS tablet Take 1 tablet by mouth daily       warfarin (COUMADIN) 5 MG tablet 7.5mg Mon,Wed,Fri and 5mg all other days or as directed by warfarin clinic 150 tablet 3         Allergies   Allergen Reactions     Amlodipine Besylate Swelling     Norvasc     Amoxicillin      Atorvastatin      myualgia     Cephalexin Monohydrate Hives     Keflex     Erythromycin Base [Kdc:Yellow Dye+Erythromycin+Brilliant Blue Fcf] Nausea and Vomiting     Meloxicam Other (See Comments)     Mobic - confusion, depression     Adhesive Tape Rash     Prochlorperazine Edisylate Swelling and Rash     Compazine     Prochlorperazine Maleate Swelling and Rash         Recent Labs   Lab Test 07/26/19  0843 02/05/19  1013 12/31/18  0702 10/31/18  1138  01/17/18  0822  01/11/16  0935  11/05/13  1528   A1C  --   --   --   --   --   --   --   --   --  5.5   LDL  --  120*  --   --   --  137*  --  126*   < >  --    HDL  --  37*  --   --   --  43*  --  38*   < >  --    TRIG  --  208*  --   --   --  190*  --  265*   < >  --    ALT 28 42 35 35  --   --    < >  --    < >  --    CR 0.89 0.92 0.89 0.92   < > 0.91   < > 1.02   < >  --    GFRESTIMATED 68 66 68 61   < > 62   < > 55*   < >  --    GFRESTBLACK 79 76 79 74   < > 75   < > 67   < >  --    POTASSIUM 3.4 3.5 3.6 3.3*   < > 3.2*   < > 3.4   < >  --    TSH  --  2.71  --  2.32   < > 5.75*   < > 3.62   < >  --     < > = values in this interval not displayed.      BP Readings from Last 3 Encounters:   09/27/19 123/76   09/13/19 135/72   08/21/19 124/70    Wt Readings from Last 3 Encounters:   09/27/19 103 kg (227 lb)   08/21/19 103 kg (227 lb)   08/01/19 103 kg (227 lb)               Reviewed and updated as needed this visit by Provider         Review of Systems   ROS COMP: Constitutional, HEENT, cardiovascular, pulmonary, GI, , musculoskeletal, neuro,  skin, endocrine and psych systems are negative, except as otherwise noted.      Objective    /76 (BP Location: Left arm, Patient Position: Chair, Cuff Size: Adult Large)   Pulse 78   Temp 97.9  F (36.6  C) (Tympanic)   Wt 103 kg (227 lb)   SpO2 95%   BMI 37.77 kg/m    Body mass index is 37.77 kg/m .       Physical Exam   GENERAL: healthy, alert and no distress  EYES: Eyes grossly normal to inspection, PERRL and conjunctivae and sclerae normal  HENT: ear canals and TM's normal, nose and mouth without ulcers or lesions  NECK: no adenopathy, no asymmetry, masses, or scars and thyroid normal to palpation  RESP: lungs clear to auscultation - no rales, rhonchi or wheezes  CV: regular rate and rhythm, normal S1 S2, no S3 or S4, no murmur, click or rub, no peripheral edema and peripheral pulses strong  SKIN: no suspicious lesions or rashes  PSYCH: mentation appears normal, affect normal/bright          Visit time:     Over 40 minutes  spent with the patient.   Greater than 50% of our visit time was spent face to face and included patient education and counseling regarding current conditions.   Medication management, and plan of care.    Detailed plan of care is provided, AVS printed  Visit Content:   Referrals: IM  Diagnostic testing reviewed  Reviewed appropriate outside records  Lab testing reviewed  Any medication adjustment has been reviewed  Health Maintenance   Updated as appropriate.  Record review completed       Assessment & Plan     1. GUERRERO (dyspnea on exertion)  - INTERNAL MEDICINE REFERRAL  - XR CHEST 2 VW (Clinic Performed); Future  - Comprehensive metabolic panel (BMP + Alb, Alk Phos, ALT, AST, Total. Bili, TP)  - CBC with platelets differential    2. Essential hypertension  - INTERNAL MEDICINE REFERRAL  - XR CHEST 2 VW (Clinic Performed); Future  - Comprehensive metabolic panel (BMP + Alb, Alk Phos, ALT, AST, Total. Bili, TP)  - TSH with free T4 reflex  - UA reflex to Microscopic and Culture - [place]  [place]    3. Hyperlipidemia LDL goal <100  - INTERNAL MEDICINE REFERRAL  - Comprehensive metabolic panel (BMP + Alb, Alk Phos, ALT, AST, Total. Bili, TP)  - Lipid Profile    4. Moderate episode of recurrent major depressive disorder (H)  - Continue plan of care        Return for Internal Med - [place] to establish care.       Eliz Hartman, CNP  [facility] [place] [facility]

## 2019-09-27 ENCOUNTER — ANCILLARY PROCEDURE (OUTPATIENT)
Dept: GENERAL RADIOLOGY | Facility: OTHER | Age: 64
End: 2019-09-27
Attending: NURSE PRACTITIONER
Payer: MEDICARE

## 2019-09-27 ENCOUNTER — OFFICE VISIT (OUTPATIENT)
Dept: FAMILY MEDICINE | Facility: OTHER | Age: 64
End: 2019-09-27
Attending: NURSE PRACTITIONER
Payer: COMMERCIAL

## 2019-09-27 VITALS
TEMPERATURE: 97.9 F | HEART RATE: 78 BPM | OXYGEN SATURATION: 95 % | BODY MASS INDEX: 37.77 KG/M2 | WEIGHT: 227 LBS | DIASTOLIC BLOOD PRESSURE: 76 MMHG | SYSTOLIC BLOOD PRESSURE: 123 MMHG

## 2019-09-27 DIAGNOSIS — R06.09 DOE (DYSPNEA ON EXERTION): ICD-10-CM

## 2019-09-27 DIAGNOSIS — E78.5 HYPERLIPIDEMIA LDL GOAL <100: ICD-10-CM

## 2019-09-27 DIAGNOSIS — I10 ESSENTIAL HYPERTENSION: ICD-10-CM

## 2019-09-27 DIAGNOSIS — R06.09 DOE (DYSPNEA ON EXERTION): Primary | ICD-10-CM

## 2019-09-27 DIAGNOSIS — F33.1 MODERATE EPISODE OF RECURRENT MAJOR DEPRESSIVE DISORDER (H): ICD-10-CM

## 2019-09-27 LAB
ALBUMIN SERPL-MCNC: 3.8 G/DL (ref 3.4–5)
ALBUMIN UR-MCNC: NEGATIVE MG/DL
ALP SERPL-CCNC: 59 U/L (ref 40–150)
ALT SERPL W P-5'-P-CCNC: 34 U/L (ref 0–50)
ANION GAP SERPL CALCULATED.3IONS-SCNC: 8 MMOL/L (ref 3–14)
APPEARANCE UR: CLEAR
AST SERPL W P-5'-P-CCNC: 14 U/L (ref 0–45)
BASOPHILS # BLD AUTO: 0.1 10E9/L (ref 0–0.2)
BASOPHILS NFR BLD AUTO: 1 %
BILIRUB SERPL-MCNC: 0.4 MG/DL (ref 0.2–1.3)
BILIRUB UR QL STRIP: NEGATIVE
BUN SERPL-MCNC: 15 MG/DL (ref 7–30)
CALCIUM SERPL-MCNC: 9.1 MG/DL (ref 8.5–10.1)
CHLORIDE SERPL-SCNC: 105 MMOL/L (ref 94–109)
CHOLEST SERPL-MCNC: 214 MG/DL
CO2 SERPL-SCNC: 26 MMOL/L (ref 20–32)
COLOR UR AUTO: YELLOW
CREAT SERPL-MCNC: 0.91 MG/DL (ref 0.52–1.04)
DIFFERENTIAL METHOD BLD: NORMAL
EOSINOPHIL # BLD AUTO: 0.2 10E9/L (ref 0–0.7)
EOSINOPHIL NFR BLD AUTO: 2.5 %
ERYTHROCYTE [DISTWIDTH] IN BLOOD BY AUTOMATED COUNT: 13.7 % (ref 10–15)
GFR SERPL CREATININE-BSD FRML MDRD: 67 ML/MIN/{1.73_M2}
GLUCOSE SERPL-MCNC: 81 MG/DL (ref 70–99)
GLUCOSE UR STRIP-MCNC: NEGATIVE MG/DL
HCT VFR BLD AUTO: 40.4 % (ref 35–47)
HDLC SERPL-MCNC: 36 MG/DL
HGB BLD-MCNC: 13.9 G/DL (ref 11.7–15.7)
HGB UR QL STRIP: NEGATIVE
KETONES UR STRIP-MCNC: NEGATIVE MG/DL
LDLC SERPL CALC-MCNC: 122 MG/DL
LEUKOCYTE ESTERASE UR QL STRIP: ABNORMAL
LYMPHOCYTES # BLD AUTO: 1.5 10E9/L (ref 0.8–5.3)
LYMPHOCYTES NFR BLD AUTO: 23.3 %
MCH RBC QN AUTO: 30.2 PG (ref 26.5–33)
MCHC RBC AUTO-ENTMCNC: 34.4 G/DL (ref 31.5–36.5)
MCV RBC AUTO: 88 FL (ref 78–100)
MONOCYTES # BLD AUTO: 0.8 10E9/L (ref 0–1.3)
MONOCYTES NFR BLD AUTO: 12.5 %
NEUTROPHILS # BLD AUTO: 3.8 10E9/L (ref 1.6–8.3)
NEUTROPHILS NFR BLD AUTO: 60.7 %
NITRATE UR QL: NEGATIVE
NONHDLC SERPL-MCNC: 178 MG/DL
PH UR STRIP: 6.5 PH (ref 5–7)
PLATELET # BLD AUTO: 200 10E9/L (ref 150–450)
POTASSIUM SERPL-SCNC: 3.8 MMOL/L (ref 3.4–5.3)
PROT SERPL-MCNC: 7.1 G/DL (ref 6.8–8.8)
RBC # BLD AUTO: 4.61 10E12/L (ref 3.8–5.2)
RBC #/AREA URNS AUTO: NORMAL /HPF
SODIUM SERPL-SCNC: 139 MMOL/L (ref 133–144)
SOURCE: ABNORMAL
SP GR UR STRIP: 1.01 (ref 1–1.03)
TRIGL SERPL-MCNC: 280 MG/DL
TSH SERPL DL<=0.005 MIU/L-ACNC: 1.42 MU/L (ref 0.4–4)
UROBILINOGEN UR STRIP-ACNC: 0.2 EU/DL (ref 0.2–1)
WBC # BLD AUTO: 6.3 10E9/L (ref 4–11)
WBC #/AREA URNS AUTO: NORMAL /HPF

## 2019-09-27 PROCEDURE — 36415 COLL VENOUS BLD VENIPUNCTURE: CPT | Mod: ZL | Performed by: NURSE PRACTITIONER

## 2019-09-27 PROCEDURE — 80061 LIPID PANEL: CPT | Mod: ZL | Performed by: NURSE PRACTITIONER

## 2019-09-27 PROCEDURE — 80053 COMPREHEN METABOLIC PANEL: CPT | Mod: ZL | Performed by: NURSE PRACTITIONER

## 2019-09-27 PROCEDURE — G0463 HOSPITAL OUTPT CLINIC VISIT: HCPCS | Mod: 25

## 2019-09-27 PROCEDURE — 99215 OFFICE O/P EST HI 40 MIN: CPT | Performed by: NURSE PRACTITIONER

## 2019-09-27 PROCEDURE — 84443 ASSAY THYROID STIM HORMONE: CPT | Mod: ZL | Performed by: NURSE PRACTITIONER

## 2019-09-27 PROCEDURE — 85025 COMPLETE CBC W/AUTO DIFF WBC: CPT | Mod: ZL | Performed by: NURSE PRACTITIONER

## 2019-09-27 PROCEDURE — G0463 HOSPITAL OUTPT CLINIC VISIT: HCPCS

## 2019-09-27 PROCEDURE — 81001 URINALYSIS AUTO W/SCOPE: CPT | Mod: ZL | Performed by: NURSE PRACTITIONER

## 2019-09-27 PROCEDURE — 71046 X-RAY EXAM CHEST 2 VIEWS: CPT | Mod: TC,FY

## 2019-09-27 ASSESSMENT — ANXIETY QUESTIONNAIRES
2. NOT BEING ABLE TO STOP OR CONTROL WORRYING: NOT AT ALL
6. BECOMING EASILY ANNOYED OR IRRITABLE: NOT AT ALL
7. FEELING AFRAID AS IF SOMETHING AWFUL MIGHT HAPPEN: NOT AT ALL
1. FEELING NERVOUS, ANXIOUS, OR ON EDGE: NOT AT ALL
IF YOU CHECKED OFF ANY PROBLEMS ON THIS QUESTIONNAIRE, HOW DIFFICULT HAVE THESE PROBLEMS MADE IT FOR YOU TO DO YOUR WORK, TAKE CARE OF THINGS AT HOME, OR GET ALONG WITH OTHER PEOPLE: NOT DIFFICULT AT ALL
5. BEING SO RESTLESS THAT IT IS HARD TO SIT STILL: NOT AT ALL
GAD7 TOTAL SCORE: 0
3. WORRYING TOO MUCH ABOUT DIFFERENT THINGS: NOT AT ALL

## 2019-09-27 ASSESSMENT — PATIENT HEALTH QUESTIONNAIRE - PHQ9
SUM OF ALL RESPONSES TO PHQ QUESTIONS 1-9: 0
5. POOR APPETITE OR OVEREATING: NOT AT ALL

## 2019-09-27 ASSESSMENT — PAIN SCALES - GENERAL: PAINLEVEL: NO PAIN (0)

## 2019-09-27 NOTE — RESULT ENCOUNTER NOTE
Stable labs  Lipids continue high, resistant to meds but has multiple CVD risk factors.  Has an upcoming visit with Dr Pittman and can discuss labs more in depth at that time.    The 10-year ASCVD risk score (Lebanonliz MARLEY Jr., et al., 2013) is: 7.9%    Values used to calculate the score:      Age: 63 years      Sex: Female      Is Non- : No      Diabetic: No      Tobacco smoker: No      Systolic Blood Pressure: 123 mmHg      Is BP treated: Yes      HDL Cholesterol: 36 mg/dL      Total Cholesterol: 214 mg/dL  [facility]  [phone]

## 2019-09-27 NOTE — PATIENT INSTRUCTIONS
Assessment & Plan     1. GUERRERO (dyspnea on exertion)  - INTERNAL MEDICINE REFERRAL  - XR CHEST 2 VW (Clinic Performed); Future  - Comprehensive metabolic panel (BMP + Alb, Alk Phos, ALT, AST, Total. Bili, TP)  - CBC with platelets differential    2. Essential hypertension  - INTERNAL MEDICINE REFERRAL  - XR CHEST 2 VW (Clinic Performed); Future  - Comprehensive metabolic panel (BMP + Alb, Alk Phos, ALT, AST, Total. Bili, TP)  - TSH with free T4 reflex  - UA reflex to Microscopic and Culture - [facility]    3. Hyperlipidemia LDL goal <100  - INTERNAL MEDICINE REFERRAL  - Comprehensive metabolic panel (BMP + Alb, Alk Phos, ALT, AST, Total. Bili, TP)  - Lipid Profile    4. Moderate episode of recurrent major depressive disorder (H)  - Continue plan of care        Return for Internal Med - [facility] to establish care.       Eliz Hartman, CNP  [facility] [place] [facility]

## 2019-09-27 NOTE — NURSING NOTE
"Chief Complaint   Patient presents with     Hypertension     Lipids       Initial /76 (BP Location: Left arm, Patient Position: Chair, Cuff Size: Adult Large)   Pulse 78   Temp 97.9  F (36.6  C) (Tympanic)   Wt 103 kg (227 lb)   SpO2 95%   BMI 37.77 kg/m   Estimated body mass index is 37.77 kg/m  as calculated from the following:    Height as of 8/21/19: 1.651 m (5' 5\").    Weight as of this encounter: 103 kg (227 lb).  Medication Reconciliation: complete  GINA DAVIDSON LPN  "

## 2019-09-27 NOTE — LETTER
My Depression Action Plan  Name: Cassy Avila   Date of Birth 1955  Date: 9/27/2019    My doctor: Eliz Hartman   My clinic: [facility]  [address]  [place] [address]  [phone]          GREEN    ZONE   Good Control    What it looks like:     Things are going generally well. You have normal up s and down s. You may even feel depressed from time to time, but bad moods usually last less than a day.   What you need to do:  1. Continue to care for yourself (see self care plan)  2. Check your depression survival kit and update it as needed  3. Follow your physician s recommendations including any medication.  4. Do not stop taking medication unless you consult with your physician first.           YELLOW         ZONE Getting Worse    What it looks like:     Depression is starting to interfere with your life.     It may be hard to get out of bed; you may be starting to isolate yourself from others.    Symptoms of depression are starting to last most all day and this has happened for several days.     You may have suicidal thoughts but they are not constant.   What you need to do:     1. Call your care team, your response to treatment will improve if you keep your care team informed of your progress. Yellow periods are signs an adjustment may need to be made.     2. Continue your self-care, even if you have to fake it!    3. Talk to someone in your support network    4. Open up your depression survival kit           RED    ZONE Medical Alert - Get Help    What it looks like:     Depression is seriously interfering with your life.     You may experience these or other symptoms: You can t get out of bed most days, can t work or engage in other necessary activities, you have trouble taking care of basic hygiene, or basic responsibilities, thoughts of suicide or death that will not go away, self-injurious behavior.     What you need to do:  1. Call your care team  and request a same-day appointment. If they are not available (weekends or after hours) call your local crisis line, emergency room or 911.            Depression Self Care Plan / Survival Kit    Self-Care for Depression  Here s the deal. Your body and mind are really not as separate as most people think.  What you do and think affects how you feel and how you feel influences what you do and think. This means if you do things that people who feel good do, it will help you feel better.  Sometimes this is all it takes.  There is also a place for medication and therapy depending on how severe your depression is, so be sure to consult with your medical provider and/ or Behavioral Health Consultant if your symptoms are worsening or not improving.     In order to better manage my stress, I will:    Exercise  Get some form of exercise, every day. This will help reduce pain and release endorphins, the  feel good  chemicals in your brain. This is almost as good as taking antidepressants!  This is not the same as joining a gym and then never going! (they count on that by the way ) It can be as simple as just going for a walk or doing some gardening, anything that will get you moving.      Hygiene   Maintain good hygiene (Get out of bed in the morning, Make your bed, Brush your teeth, Take a shower, and Get dressed like you were going to work, even if you are unemployed).  If your clothes don't fit try to get ones that do.    Diet  I will strive to eat foods that are good for me, drink plenty of water, and avoid excessive sugar, caffeine, alcohol, and other mood-altering substances.  Some foods that are helpful in depression are: complex carbohydrates, B vitamins, flaxseed, fish or fish oil, fresh fruits and vegetables.    Psychotherapy  I agree to participate in Individual Therapy (if recommended).    Medication  If prescribed medications, I agree to take them.  Missing doses can result in serious side effects.  I understand  that drinking alcohol, or other illicit drug use, may cause potential side effects.  I will not stop my medication abruptly without first discussing it with my provider.    Staying Connected With Others  I will stay in touch with my friends, family members, and my primary care provider/team.    Use your imagination  Be creative.  We all have a creative side; it doesn t matter if it s oil painting, sand castles, or mud pies! This will also kick up the endorphins.    Witness Beauty  (AKA stop and smell the roses) Take a look outside, even in mid-winter. Notice colors, textures. Watch the squirrels and birds.     Service to others  Be of service to others.  There is always someone else in need.  By helping others we can  get out of ourselves  and remember the really important things.  This also provides opportunities for practicing all the other parts of the program.    Humor  Laugh and be silly!  Adjust your TV habits for less news and crime-drama and more comedy.    Control your stress  Try breathing deep, massage therapy, biofeedback, and meditation. Find time to relax each day.     My support system    Clinic Contact:  Phone number:    Contact 1:  Phone number:    Contact 2:  Phone number:    [facility]/:  Phone number:    Therapist:  Phone number:    Local crisis center:    Phone number:    Other community support:  Phone number:

## 2019-09-28 ASSESSMENT — ANXIETY QUESTIONNAIRES: GAD7 TOTAL SCORE: 0

## 2019-10-02 ENCOUNTER — OFFICE VISIT (OUTPATIENT)
Dept: INTERNAL MEDICINE | Facility: OTHER | Age: 64
End: 2019-10-02
Attending: NURSE PRACTITIONER
Payer: COMMERCIAL

## 2019-10-02 VITALS
HEART RATE: 69 BPM | OXYGEN SATURATION: 95 % | DIASTOLIC BLOOD PRESSURE: 77 MMHG | SYSTOLIC BLOOD PRESSURE: 120 MMHG | TEMPERATURE: 98 F | BODY MASS INDEX: 37.77 KG/M2 | WEIGHT: 227 LBS

## 2019-10-02 DIAGNOSIS — I10 ESSENTIAL HYPERTENSION: ICD-10-CM

## 2019-10-02 DIAGNOSIS — E78.5 HYPERLIPIDEMIA LDL GOAL <100: ICD-10-CM

## 2019-10-02 DIAGNOSIS — R06.09 DOE (DYSPNEA ON EXERTION): ICD-10-CM

## 2019-10-02 PROCEDURE — G0463 HOSPITAL OUTPT CLINIC VISIT: HCPCS

## 2019-10-02 PROCEDURE — 93005 ELECTROCARDIOGRAM TRACING: CPT

## 2019-10-02 PROCEDURE — G0463 HOSPITAL OUTPT CLINIC VISIT: HCPCS | Mod: 25

## 2019-10-02 PROCEDURE — 99214 OFFICE O/P EST MOD 30 MIN: CPT | Mod: 25 | Performed by: INTERNAL MEDICINE

## 2019-10-02 PROCEDURE — 93010 ELECTROCARDIOGRAM REPORT: CPT | Performed by: INTERNAL MEDICINE

## 2019-10-02 ASSESSMENT — PAIN SCALES - GENERAL: PAINLEVEL: EXTREME PAIN (8)

## 2019-10-02 NOTE — PROGRESS NOTES
Subjective     Cassy Avila is a 63 year old female who presents to clinic today for the following health issues:    HPI   New Patient/Transfer of Care  Breathing Problem      Duration: 3 months    Description (location/character/radiation): shortness with exertion     Intensity:  moderate    Accompanying signs and symptoms: heart palpations, wheezing and coughing    History (similar episodes/previous evaluation): yes seen harvey espinal     Precipitating or alleviating factors: None    Therapies tried and outcome: nebulizer- not helpful        Cassy presents today for GUERRERO.  She reports vague chest pain but she believes this is related to her recent shoulder surgery. She gets SOB with just walking kiddy corner across the street to her sister's home.  She does not smoke.  She does have a FH of heart disease and personal hx of HTN and hyperlipidemia.   She did have a stress test 4 years ago which was normal.        Patient Active Problem List   Diagnosis     Essential hypertension     Pulmonary embolism and infarction (H)     Contact dermatitis and other eczema, due to unspecified cause     Edema     Hyperlipidemia LDL goal <100     Neurofibromatosis, peripheral, NF1 (H)     Advanced care planning/counseling discussion     Moderate episode of recurrent major depressive disorder (H)     Long-term (current) use of anticoagulants [Z79.01]     Deep vein thrombosis (DVT) (H) [I82.409]     Lumbar spondylosis with myelopathy     Degeneration of lumbar or lumbosacral intervertebral disc     Family history of colon cancer     Statin medication not prescribed per physician orders     Vitamin D deficiency     Failed arthroplasty (H)     H/O total shoulder replacement, left     Primary insomnia     Factor V Leiden mutation (H)     Past Surgical History:   Procedure Laterality Date     ------------OTHER-------------  2012    shoulder replacement; Provider: Karen     ARTHROPLASTY KNEE  6/20/2014    Procedure: ARTHROPLASTY KNEE;   Surgeon: Sean Alexander MD;  Location: [facility]     ARTHROSCOPY SHOULDER  2012    right, bone spurs      SECTION      x3     CHOLECYSTECTOMY       COLONOSCOPY  02/15/2018    Virgie,,polyps     elbow ulnar tunnel release       ELECTROTHERMAL THERAPY INTRADISC  2017    stimulator     ENDOSCOPIC SINUS SURGERY, SEPTOPLASTY, TURBINOPLASTY, MAXILLARY SINUSOTOMY, COMBINED N/A 2015    Procedure: COMBINED ENDOSCOPIC SINUS SURGERY, SEPTOPLASTY, TURBINOPLASTY, MAXILLARY SINUSOTOMY;  Surgeon: Seema Conn MD;  Location: [facility]     esophagastroduodenoscopy      with biopsy and endoscopic U/S     EXCISE NEUROMA LOWER EXTREMITY Left 2016    Procedure: EXCISE NEUROMA LOWER EXTREMITY;  Surgeon: Edi Reed MD;  Location: [facility]     FUSION LUMBAR ANTERIOR WITH MARCY CAGES      L5-S1     HYSTERECTOMY TOTAL ABDOMINAL, BILATERAL SALPINGO-OOPHORECTOMY, COMBINED N/A      ORTHOPEDIC SURGERY  2-15    right shoulder     ORTHOPEDIC SURGERY  8/28/15    right knee     ORTHOPEDIC SURGERY Right 2018    hip labrum tear     pionidal cyst excision       TRANSPOSITION ULNAR NERVE (ELBOW)         Social History     Tobacco Use     Smoking status: Former Smoker     Packs/day: 1.00     Years: 30.00     Pack years: 30.00     Types: Cigarettes, Pipe     Smokeless tobacco: Never Used     Tobacco comment: quit in    Substance Use Topics     Alcohol use: No     Family History   Problem Relation Age of Onset     Cancer Mother      Colon Polyps Mother      Heart Failure Mother 87        congestive, cause of death     Myocardial Infarction Mother         myocardial infarction     Myocardial Infarction Father         myocardial infarction - cause of death     C.A.D. Father      Cancer Paternal Uncle         cause of death     C.A.D. Brother      Other - See Comments Other         factor 5 - family h/o     Asthma No family hx of          Current Outpatient Medications   Medication Sig Dispense Refill     aspirin  81 MG EC tablet Take 81 mg by mouth daily HS       Cholecalciferol (VITAMIN D3 PO) Take 3,000 mg by mouth daily AM       escitalopram (LEXAPRO) 20 MG tablet TAKE 1 TABLET BY MOUTH EVERY DAY AND 1 TABLET BY MOUTH EVERY DAY AS NEEDED 180 tablet 4     hydrochlorothiazide (HYDRODIURIL) 25 MG tablet TAKE 1 TABLET(25 MG) BY MOUTH DAILY 90 tablet 0     losartan (COZAAR) 50 MG tablet TAKE 2 TABLETS BY MOUTH EVERY  tablet 1     metoprolol succinate (TOPROL-XL) 50 MG 24 hr tablet TAKE 1 AND ONE-HALF TABLETS BY MOUTH EVERY  tablet 2     order for DME Nebulizer machine and tubing    DX:  Bronchitis 1 Device 0     traZODone (DESYREL) 50 MG tablet TAKE 1 TO 2 TABLETS BY MOUTH AT BEDTIME AS NEEDED 180 tablet 1     UNABLE TO FIND CBD oil       vitamin B complex with vitamin C (VITAMIN  B COMPLEX) TABS tablet Take 1 tablet by mouth daily       warfarin (COUMADIN) 5 MG tablet 7.5mg Mon,Wed,Fri and 5mg all other days or as directed by warfarin clinic 150 tablet 3     Allergies   Allergen Reactions     Amlodipine Besylate Swelling     Norvasc     Amoxicillin      Atorvastatin      myualgia     Cephalexin Monohydrate Hives     Keflex     Erythromycin Base [Kdc:Yellow Dye+Erythromycin+Brilliant Blue Fcf] Nausea and Vomiting     Meloxicam Other (See Comments)     Mobic - confusion, depression     Adhesive Tape Rash     Prochlorperazine Edisylate Swelling and Rash     Compazine     Prochlorperazine Maleate Swelling and Rash     BP Readings from Last 3 Encounters:   10/02/19 120/77   09/27/19 123/76   09/13/19 135/72    Wt Readings from Last 3 Encounters:   10/02/19 103 kg (227 lb)   09/27/19 103 kg (227 lb)   08/21/19 103 kg (227 lb)                 Reviewed and updated as needed this visit by Provider         Review of Systems   ROS COMP: Constitutional, HEENT, cardiovascular, pulmonary, gi and gu systems are negative, except as otherwise noted.      Objective    /77 (BP Location: Left arm, Patient Position: Chair,  Cuff Size: Adult Large)   Pulse 69   Temp 98  F (36.7  C) (Tympanic)   Wt 103 kg (227 lb)   SpO2 95%   BMI 37.77 kg/m    Body mass index is 37.77 kg/m .  Physical Exam   GENERAL: healthy, alert and no distress  RESP: lungs clear to auscultation - no rales, rhonchi or wheezes  CV: regular rate and rhythm, normal S1 S2, no S3 or S4, no murmur, click or rub, no peripheral edema and peripheral pulses strong  MS: no gross musculoskeletal defects noted, no edema  SKIN: no suspicious lesions or rashes  NEURO: Normal strength and tone, mentation intact and speech normal  PSYCH: mentation appears normal, affect normal/bright    Diagnostic Test Results:  No results found for this or any previous visit (from the past 24 hour(s)).  Results for orders placed or performed in visit on 09/27/19   XR CHEST 2 VW (Clinic Performed)    Narrative    PROCEDURE:  XR CHEST 2 VW    HISTORY:  GUERRERO (dyspnea on exertion); Essential hypertension.     COMPARISON:  May 2018    FINDINGS:   The cardiac silhouette is normal in size. The pulmonary vasculature is  normal.  There is some linear scarring at the lung bases. No pleural  effusion or pneumothorax. A shoulder prosthesis is in place on the  left      Impression    IMPRESSION:  No acute cardiopulmonary disease.      MARCELLE VENEGAS MD           Assessment & Plan   Problem List Items Addressed This Visit        Endocrine    Hyperlipidemia LDL goal <100       Circulatory    Essential hypertension    Relevant Orders    EKG 12-lead complete w/read - (Clinic Performed)    NM Lexiscan stress test      Other Visit Diagnoses     GUERRERO (dyspnea on exertion)        Relevant Orders    EKG 12-lead complete w/read - (Clinic Performed)    NM Lexiscan stress test             Facundo Pittman, [facility]

## 2019-10-02 NOTE — NURSING NOTE
"Chief Complaint   Patient presents with     Establish Care       Initial /77 (BP Location: Left arm, Patient Position: Chair, Cuff Size: Adult Large)   Pulse 69   Temp 98  F (36.7  C) (Tympanic)   Wt 103 kg (227 lb)   SpO2 95%   BMI 37.77 kg/m   Estimated body mass index is 37.77 kg/m  as calculated from the following:    Height as of 8/21/19: 1.651 m (5' 5\").    Weight as of this encounter: 103 kg (227 lb).  Medication Reconciliation: complete  GINA DAVIDSON LPN      "

## 2019-10-10 ENCOUNTER — IMMUNIZATION (OUTPATIENT)
Dept: FAMILY MEDICINE | Facility: OTHER | Age: 64
End: 2019-10-10
Attending: NURSE PRACTITIONER
Payer: MEDICARE

## 2019-10-10 DIAGNOSIS — Z23 NEED FOR PROPHYLACTIC VACCINATION AND INOCULATION AGAINST INFLUENZA: Primary | ICD-10-CM

## 2019-10-10 PROCEDURE — 90682 RIV4 VACC RECOMBINANT DNA IM: CPT

## 2019-10-10 PROCEDURE — G0008 ADMIN INFLUENZA VIRUS VAC: HCPCS

## 2019-10-11 ENCOUNTER — HOSPITAL ENCOUNTER (OUTPATIENT)
Dept: NUCLEAR MEDICINE | Facility: HOSPITAL | Age: 64
Setting detail: NUCLEAR MEDICINE
End: 2019-10-11
Attending: INTERNAL MEDICINE
Payer: MEDICARE

## 2019-10-11 ENCOUNTER — HOSPITAL ENCOUNTER (OUTPATIENT)
Dept: CARDIOLOGY | Facility: HOSPITAL | Age: 64
Setting detail: NUCLEAR MEDICINE
End: 2019-10-11
Attending: INTERNAL MEDICINE
Payer: MEDICARE

## 2019-10-11 ENCOUNTER — HOSPITAL ENCOUNTER (OUTPATIENT)
Dept: NUCLEAR MEDICINE | Facility: HOSPITAL | Age: 64
Setting detail: NUCLEAR MEDICINE
Discharge: HOME | End: 2019-10-11
Attending: INTERNAL MEDICINE | Admitting: INTERNAL MEDICINE
Payer: MEDICARE

## 2019-10-11 DIAGNOSIS — I10 ESSENTIAL HYPERTENSION: ICD-10-CM

## 2019-10-11 DIAGNOSIS — R06.09 DOE (DYSPNEA ON EXERTION): ICD-10-CM

## 2019-10-11 PROCEDURE — 93016 CV STRESS TEST SUPVJ ONLY: CPT | Performed by: INTERNAL MEDICINE

## 2019-10-11 PROCEDURE — 93017 CV STRESS TEST TRACING ONLY: CPT | Performed by: INTERNAL MEDICINE

## 2019-10-11 PROCEDURE — 93018 CV STRESS TEST I&R ONLY: CPT | Performed by: INTERNAL MEDICINE

## 2019-10-11 PROCEDURE — 34300033 ZZH RX 343: Performed by: RADIOLOGY

## 2019-10-11 PROCEDURE — 25000128 H RX IP 250 OP 636: Performed by: INTERNAL MEDICINE

## 2019-10-11 PROCEDURE — A9500 TC99M SESTAMIBI: HCPCS | Performed by: RADIOLOGY

## 2019-10-11 PROCEDURE — 78452 HT MUSCLE IMAGE SPECT MULT: CPT | Mod: TC

## 2019-10-11 RX ORDER — REGADENOSON 0.08 MG/ML
0.4 INJECTION, SOLUTION INTRAVENOUS ONCE
Status: COMPLETED | OUTPATIENT
Start: 2019-10-11 | End: 2019-10-11

## 2019-10-11 RX ORDER — AMINOPHYLLINE 25 MG/ML
INJECTION, SOLUTION INTRAVENOUS
Status: DISCONTINUED
Start: 2019-10-11 | End: 2019-10-11 | Stop reason: WASHOUT

## 2019-10-11 RX ADMIN — Medication 30 MILLICURIE: at 08:32

## 2019-10-11 RX ADMIN — Medication 10 MILLICURIE: at 06:56

## 2019-10-11 RX ADMIN — REGADENOSON 0.4 MG: 0.08 INJECTION, SOLUTION INTRAVENOUS at 08:32

## 2019-10-16 ENCOUNTER — ANTICOAGULATION THERAPY VISIT (OUTPATIENT)
Dept: ANTICOAGULATION | Facility: OTHER | Age: 64
End: 2019-10-16
Payer: COMMERCIAL

## 2019-10-16 ENCOUNTER — TRANSFERRED RECORDS (OUTPATIENT)
Dept: HEALTH INFORMATION MANAGEMENT | Facility: CLINIC | Age: 64
End: 2019-10-16

## 2019-10-16 DIAGNOSIS — I26.99 PULMONARY EMBOLISM AND INFARCTION (H): ICD-10-CM

## 2019-10-16 DIAGNOSIS — I82.409 DEEP VEIN THROMBOSIS (DVT) (H): ICD-10-CM

## 2019-10-16 DIAGNOSIS — Z79.01 LONG TERM CURRENT USE OF ANTICOAGULANT THERAPY: ICD-10-CM

## 2019-10-16 LAB — INR PPP: 1.8 (ref 0.8–1.14)

## 2019-10-22 ENCOUNTER — TRANSFERRED RECORDS (OUTPATIENT)
Dept: HEALTH INFORMATION MANAGEMENT | Facility: CLINIC | Age: 64
End: 2019-10-22

## 2019-11-06 ENCOUNTER — HOSPITAL ENCOUNTER (OUTPATIENT)
Dept: MRI IMAGING | Facility: HOSPITAL | Age: 64
Discharge: HOME | End: 2019-11-06
Attending: ORTHOPAEDIC SURGERY | Admitting: ORTHOPAEDIC SURGERY
Payer: MEDICARE

## 2019-11-06 DIAGNOSIS — Z98.890 S/P ARTHROSCOPY OF RIGHT SHOULDER: ICD-10-CM

## 2019-11-06 DIAGNOSIS — M25.511 RIGHT SHOULDER PAIN: ICD-10-CM

## 2019-11-06 PROCEDURE — 73221 MRI JOINT UPR EXTREM W/O DYE: CPT | Mod: TC,RT

## 2019-11-13 ENCOUNTER — TRANSFERRED RECORDS (OUTPATIENT)
Dept: HEALTH INFORMATION MANAGEMENT | Facility: CLINIC | Age: 64
End: 2019-11-13

## 2019-11-13 ENCOUNTER — ANTICOAGULATION THERAPY VISIT (OUTPATIENT)
Dept: ANTICOAGULATION | Facility: OTHER | Age: 64
End: 2019-11-13

## 2019-11-13 DIAGNOSIS — I26.99 PULMONARY EMBOLISM AND INFARCTION (H): ICD-10-CM

## 2019-11-13 DIAGNOSIS — I82.409 DEEP VEIN THROMBOSIS (DVT) (H): ICD-10-CM

## 2019-11-13 DIAGNOSIS — Z79.01 LONG TERM CURRENT USE OF ANTICOAGULANT THERAPY: ICD-10-CM

## 2019-11-13 LAB — INR PPP: 2.1 (ref 0.8–1.14)

## 2019-11-13 NOTE — PROGRESS NOTES
ANTICOAGULATION FOLLOW-UP CLINIC VISIT    Patient Name:  Cassy Avila  Date:  2019  Contact Type:  Telephone    SUBJECTIVE:  Patient Findings     Comments:   Received INR results from patient home test . Call received from patient re: INR results, Patient advised of warfarin dosing/INR recheck date. Patient to return call to warfarin clinic with changes in bruising/bleeding, new changes in diet/meds/activity or questions.           Clinical Outcomes     Negatives:   Major bleeding event, Thromboembolic event, Anticoagulation-related hospital admission, Anticoagulation-related ED visit, Anticoagulation-related fatality    Comments:   Received INR results from patient home test . Call received from patient re: INR results, Patient advised of warfarin dosing/INR recheck date. Patient to return call to warfarin clinic with changes in bruising/bleeding, new changes in diet/meds/activity or questions.              OBJECTIVE    INR   Date Value Ref Range Status   2019 2.1 (A) 0.8 - 1.14 Final       ASSESSMENT / PLAN  INR assessment THER    Recheck INR In: 4 WEEKS    INR Location Home INR      Anticoagulation Summary  As of 2019    INR goal:   2.0-3.0   TTR:   77.7 % (3.3 y)   INR used for dosin.1 (2019)   Warfarin maintenance plan:   7.5 mg (5 mg x 1.5) every Mon, Fri; 5 mg (5 mg x 1) all other days   Full warfarin instructions:   7.5 mg every Mon, Fri; 5 mg all other days   Weekly warfarin total:   40 mg   No change documented:   Loan, Olivia, RN   Plan last modified:   Manju Escalera RN (2019)   Next INR check:   2019   Priority:   Maintenance   Target end date:   Indefinite    Indications    Long-term (current) use of anticoagulants [Z79.01] [Z79.01]  Pulmonary embolism and infarction (H) [I26.99]  Deep vein thrombosis (DVT) (H) [I82.409] [I82.409]             Anticoagulation Episode Summary     INR check location:   Home Draw    Preferred lab:       Send INR reminders to:     ANTICOAG POOL    Comments:   PST - [facility].  Shoulder surgery 8/2/19, warfarin hold x 5 days. Lovenox Minnie Saavedra        Anticoagulation Care Providers     Provider Role Specialty Phone number    Minnie JANINA [facility] [phone]            See the Encounter Report to view Anticoagulation Flowsheet and Dosing Calendar (Go to Encounters tab in chart review, and find the Anticoagulation Therapy Visit)        Olivia Cates RN

## 2019-11-20 ENCOUNTER — TELEPHONE (OUTPATIENT)
Dept: FAMILY MEDICINE | Facility: OTHER | Age: 64
End: 2019-11-20

## 2019-11-20 DIAGNOSIS — I10 ESSENTIAL HYPERTENSION: ICD-10-CM

## 2019-11-20 RX ORDER — METOPROLOL SUCCINATE 50 MG/1
TABLET, EXTENDED RELEASE ORAL
Qty: 135 TABLET | Refills: 2 | Status: SHIPPED | OUTPATIENT
Start: 2019-11-20 | End: 2020-12-02

## 2019-11-22 NOTE — MR AVS SNAPSHOT
Cassy CHIU Austin   3/16/2017   Anticoagulation Therapy Visit    Description:  61 year old female   Provider:  Eliz Hartman NP   Department:  [facility] Anti Coagulation           INR as of 3/16/2017     Today's INR No new INR was available at the time of this encounter.      Anticoagulation Summary as of 3/16/2017     INR goal 2.0-3.0   Today's INR No new INR was available at the time of this encounter.   Full instructions 3/17: 5 mg; 3/18: 2.5 mg; Otherwise 7.5 mg on Mon, Wed, Fri; 5 mg all other days   Next INR check 3/21/2017    Indications   Long-term (current) use of anticoagulants [Z79.01] [Z79.01]  Pulmonary embolism and infarction (H) [I26.99]  Deep vein thrombosis (DVT) (H) [I82.409] [I82.409]         March 2017 Details    Sun Mon Tue Wed Thu Fri Sat        1               2               3               4                 5               6               7               8               9               10               11                 12               13               14               15               16      5 mg   See details      17      5 mg         18      2.5 mg           19      5 mg         20      7.5 mg         21            22               23               24               25                 26               27               28               29               30               31                 Date Details   03/16 This INR check       Date of next INR:  3/21/2017         How to take your warfarin dose     To take:  2.5 mg Take 0.5 of a 5 mg tablet.    To take:  5 mg Take 1 of the 5 mg tablets.    To take:  7.5 mg Take 1.5 of the 5 mg tablets.            clears

## 2019-12-11 ENCOUNTER — TRANSFERRED RECORDS (OUTPATIENT)
Dept: HEALTH INFORMATION MANAGEMENT | Facility: CLINIC | Age: 64
End: 2019-12-11

## 2019-12-11 ENCOUNTER — ANTICOAGULATION THERAPY VISIT (OUTPATIENT)
Dept: ANTICOAGULATION | Facility: OTHER | Age: 64
End: 2019-12-11

## 2019-12-11 DIAGNOSIS — I26.99 PULMONARY EMBOLISM AND INFARCTION (H): ICD-10-CM

## 2019-12-11 DIAGNOSIS — I82.409 DEEP VEIN THROMBOSIS (DVT) (H): ICD-10-CM

## 2019-12-11 DIAGNOSIS — Z79.01 LONG TERM CURRENT USE OF ANTICOAGULANT THERAPY: ICD-10-CM

## 2019-12-11 LAB — INR PPP: 1.7 (ref 0.8–1.14)

## 2019-12-11 NOTE — PROGRESS NOTES
ANTICOAGULATION FOLLOW-UP CLINIC VISIT    Patient Name:  Cassy Avila  Date:  2019  Contact Type:  Telephone    SUBJECTIVE:  Patient Findings     Positives:   Change in health (patient has lost 14 lbs)    Comments:   Received INR results from patient and patient advised re: INR results, warfarin dosing/INR recheck date. Patient to return call to warfarin clinic with changes in bruising/bleeding, new changes in diet/meds/activity or questions. Patient verbalized understanding.         Clinical Outcomes     Negatives:   Major bleeding event, Thromboembolic event, Anticoagulation-related hospital admission, Anticoagulation-related ED visit, Anticoagulation-related fatality    Comments:   Received INR results from patient and patient advised re: INR results, warfarin dosing/INR recheck date. Patient to return call to warfarin clinic with changes in bruising/bleeding, new changes in diet/meds/activity or questions. Patient verbalized understanding.            OBJECTIVE    INR   Date Value Ref Range Status   2019 1.7 (A) 0.8 - 1.14 Final       ASSESSMENT / PLAN  INR assessment SUB weight loss   Recheck INR In: 4 WEEKS    INR Location Home INR      Anticoagulation Summary  As of 2019    INR goal:   2.0-3.0   TTR:   70.0 % (1 y)   INR used for dosin.7! (2019)   Warfarin maintenance plan:   7.5 mg (5 mg x 1.5) every Mon, Fri; 5 mg (5 mg x 1) all other days   Full warfarin instructions:   : 10 mg; Otherwise 7.5 mg every Mon, Fri; 5 mg all other days   Weekly warfarin total:   40 mg   Plan last modified:   Manju Escalera RN (2019)   Next INR check:   2020   Priority:   Maintenance   Target end date:   Indefinite    Indications    Long-term (current) use of anticoagulants [Z79.01] [Z79.01]  Pulmonary embolism and infarction (H) [I26.99]  Deep vein thrombosis (DVT) (H) [I82.409] [I82.409]             Anticoagulation Episode Summary     INR check location:   Home Draw    Preferred  lab:       Send INR reminders to:   [facility] ANTICOAG POOL    Comments:   PST - [facility].  Shoulder surgery 8/2/19, warfarin hold x 5 days. Minnie Morales        Anticoagulation Care Providers     Provider Role Specialty Phone number    Eliz Hartman CNP [facility] [phone]            See the Encounter Report to view Anticoagulation Flowsheet and Dosing Calendar (Go to Encounters tab in chart review, and find the Anticoagulation Therapy Visit)        Olivia Cates RN

## 2020-01-08 ENCOUNTER — TRANSFERRED RECORDS (OUTPATIENT)
Dept: HEALTH INFORMATION MANAGEMENT | Facility: CLINIC | Age: 65
End: 2020-01-08

## 2020-01-08 ENCOUNTER — ANTICOAGULATION THERAPY VISIT (OUTPATIENT)
Dept: ANTICOAGULATION | Facility: OTHER | Age: 65
End: 2020-01-08

## 2020-01-08 DIAGNOSIS — Z79.01 LONG TERM CURRENT USE OF ANTICOAGULANT THERAPY: ICD-10-CM

## 2020-01-08 DIAGNOSIS — I82.409 DEEP VEIN THROMBOSIS (DVT) (H): ICD-10-CM

## 2020-01-08 DIAGNOSIS — I26.99 PULMONARY EMBOLISM AND INFARCTION (H): ICD-10-CM

## 2020-01-08 LAB — INR PPP: 1.8 (ref 0.9–1.1)

## 2020-01-08 NOTE — PROGRESS NOTES
ANTICOAGULATION FOLLOW-UP CLINIC VISIT    Patient Name:  Cassy Avila  Date:  2020  Contact Type:  Telephone    SUBJECTIVE:  Patient Findings     Positives:   Change in activity (increased physical activity)    Comments:   Received INR results from [facility]. Call placed to patient and spoke to patient re: INR results, warfarin dosing/INR recheck date. Patient to return call to warfarin clinic with changes in bruising/bleeding, new changes in diet/meds/activity or questions.  Patient verbalized understanding.         Clinical Outcomes     Negatives:   Major bleeding event, Thromboembolic event, Anticoagulation-related hospital admission, Anticoagulation-related ED visit, Anticoagulation-related fatality    Comments:   Received INR results from [facility]. Call placed to patient and spoke to patient re: INR results, warfarin dosing/INR recheck date. Patient to return call to warfarin clinic with changes in bruising/bleeding, new changes in diet/meds/activity or questions.  Patient verbalized understanding.            OBJECTIVE    INR   Date Value Ref Range Status   2020 1.8 (A) 0.90 - 1.10 Final       ASSESSMENT / PLAN  INR assessment SUB    Recheck INR In: 2 WEEKS    INR Location Home INR      Anticoagulation Summary  As of 2020    INR goal:   2.0-3.0   TTR:   63.2 % (1 y)   INR used for dosin.8! (2020)   Warfarin maintenance plan:   7.5 mg (5 mg x 1.5) every Mon, Wed, Fri; 5 mg (5 mg x 1) all other days   Full warfarin instructions:   7.5 mg every Mon, Wed, Fri; 5 mg all other days   Weekly warfarin total:   42.5 mg   Plan last modified:   Olivia Cates RN (2020)   Next INR check:   2020   Priority:   Maintenance   Target end date:   Indefinite    Indications    Long-term (current) use of anticoagulants [Z79.01] [Z79.01]  Pulmonary embolism and infarction (H) [I26.99]  Deep vein thrombosis (DVT) (H) [I82.409] [I82.409]             Anticoagulation Episode Summary     INR check location:    Home Draw    Preferred lab:       Send INR reminders to:   [facility] ANTICOAG POOL    Comments:   PST - [facility].  Shoulder surgery 8/2/19, warfarin hold x 5 days. Minnie Morales        Anticoagulation Care Providers     Provider Role Specialty Phone number    Eliz Hartman, JANINA [facility] [phone]            See the Encounter Report to view Anticoagulation Flowsheet and Dosing Calendar (Go to Encounters tab in chart review, and find the Anticoagulation Therapy Visit)        Olivia Cates RN

## 2020-01-13 ENCOUNTER — OFFICE VISIT (OUTPATIENT)
Dept: FAMILY MEDICINE | Facility: OTHER | Age: 65
End: 2020-01-13
Attending: NURSE PRACTITIONER
Payer: MEDICARE

## 2020-01-13 ENCOUNTER — NURSE TRIAGE (OUTPATIENT)
Dept: FAMILY MEDICINE | Facility: OTHER | Age: 65
End: 2020-01-13

## 2020-01-13 VITALS
WEIGHT: 222 LBS | DIASTOLIC BLOOD PRESSURE: 76 MMHG | OXYGEN SATURATION: 94 % | TEMPERATURE: 97.3 F | SYSTOLIC BLOOD PRESSURE: 134 MMHG | HEIGHT: 65 IN | BODY MASS INDEX: 36.99 KG/M2 | HEART RATE: 81 BPM

## 2020-01-13 DIAGNOSIS — R30.0 DYSURIA: Primary | ICD-10-CM

## 2020-01-13 DIAGNOSIS — Z12.31 ENCOUNTER FOR SCREENING MAMMOGRAM FOR BREAST CANCER: ICD-10-CM

## 2020-01-13 DIAGNOSIS — R82.79 ABNORMAL FINDINGS ON MICROBIOLOGICAL EXAMINATION OF URINE: ICD-10-CM

## 2020-01-13 LAB
ALBUMIN UR-MCNC: NEGATIVE MG/DL
APPEARANCE UR: CLEAR
BACTERIA #/AREA URNS HPF: ABNORMAL /HPF
BILIRUB UR QL STRIP: NEGATIVE
COLOR UR AUTO: YELLOW
GLUCOSE UR STRIP-MCNC: NEGATIVE MG/DL
HGB UR QL STRIP: NEGATIVE
KETONES UR STRIP-MCNC: NEGATIVE MG/DL
LEUKOCYTE ESTERASE UR QL STRIP: ABNORMAL
NITRATE UR QL: NEGATIVE
NON-SQ EPI CELLS #/AREA URNS LPF: ABNORMAL /LPF
PH UR STRIP: 6.5 PH (ref 5–7)
RBC #/AREA URNS AUTO: ABNORMAL /HPF
SOURCE: ABNORMAL
SP GR UR STRIP: 1.01 (ref 1–1.03)
UROBILINOGEN UR STRIP-ACNC: 0.2 EU/DL (ref 0.2–1)
WBC #/AREA URNS AUTO: ABNORMAL /HPF

## 2020-01-13 PROCEDURE — 81001 URINALYSIS AUTO W/SCOPE: CPT | Mod: ZL | Performed by: NURSE PRACTITIONER

## 2020-01-13 PROCEDURE — 99213 OFFICE O/P EST LOW 20 MIN: CPT | Performed by: NURSE PRACTITIONER

## 2020-01-13 PROCEDURE — G0463 HOSPITAL OUTPT CLINIC VISIT: HCPCS

## 2020-01-13 PROCEDURE — 87086 URINE CULTURE/COLONY COUNT: CPT | Mod: ZL | Performed by: NURSE PRACTITIONER

## 2020-01-13 RX ORDER — NITROFURANTOIN 25; 75 MG/1; MG/1
100 CAPSULE ORAL 2 TIMES DAILY
Qty: 14 CAPSULE | Refills: 0 | Status: SHIPPED | OUTPATIENT
Start: 2020-01-13 | End: 2020-01-28

## 2020-01-13 ASSESSMENT — PAIN SCALES - GENERAL: PAINLEVEL: NO PAIN (0)

## 2020-01-13 ASSESSMENT — MIFFLIN-ST. JEOR: SCORE: 1552.87

## 2020-01-13 NOTE — Clinical Note
Follows with coumadin clinic - has a UTI, I am starting her on UvaldobidAbryn Hartman [phone]

## 2020-01-13 NOTE — TELEPHONE ENCOUNTER
"Patient scheduled for today at 130  Additional Information    Negative: Shock suspected (e.g., cold/pale/clammy skin, too weak to stand, low BP, rapid pulse)    Negative: Sounds like a life-threatening emergency to the triager    [1] Discomfort (pain, burning or stinging) when passing urine AND [2] female    Answer Assessment - Initial Assessment Questions  1. SYMPTOM: \"What's the main symptom you're concerned about?\" (e.g., frequency, incontinence)      Frequency, urgency, dysuria, decreased flow   2. ONSET: \"When did the  Dysuria   start?\"      A couple days ago   3. PAIN: \"Is there any pain?\" If so, ask: \"How bad is it?\" (Scale: 1-10; mild, moderate, severe)      Pain and burning with urination   4. CAUSE: \"What do you think is causing the symptoms?\"      UTI   5. OTHER SYMPTOMS: \"Do you have any other symptoms?\" (e.g., fever, flank pain, blood in urine, pain with urination)      Burning with urination   6. PREGNANCY: \"Is there any chance you are pregnant?\" \"When was your last menstrual period?\"      NO Menopause    Answer Assessment - Initial Assessment Questions  1. SEVERITY: \"How bad is the pain?\"  (e.g., Scale 1-10; mild, moderate, or severe)    - MILD (1-3): complains slightly about urination hurting    - MODERATE (4-7): interferes with normal activities      - SEVERE (8-10): excruciating, unwilling or unable to urinate because of the pain       1-3   2. FREQUENCY: \"How many times have you had painful urination today?\"       With each urination   3. PATTERN: \"Is pain present every time you urinate or just sometimes?\"       Every time   4. ONSET: \"When did the painful urination start?\"        A couple of days ago   5. FEVER: \"Do you have a fever?\" If so, ask: \"What is your temperature, how was it measured, and when did it start?\"      NO   6. PAST UTI: \"Have you had a urine infection before?\" If so, ask: \"When was the last time?\" and \"What happened that time?\"       Yes but it has been over a year   7. CAUSE: " "\"What do you think is causing the painful urination?\"  (e.g., UTI, scratch, Herpes sore)      UTI   8. OTHER SYMPTOMS: \"Do you have any other symptoms?\" (e.g., flank pain, vaginal discharge, genital sores, urgency, blood in urine)      Urgency and frequency   9. PREGNANCY: \"Is there any chance you are pregnant?\" \"When was your last menstrual period?\"      NO    Protocols used: URINARY SYMPTOMS-A-AH, URINATION PAIN - FEMALE-A-AH    "

## 2020-01-13 NOTE — NURSING NOTE
"Chief Complaint   Patient presents with     UTI       Initial BP (!) 146/82 (BP Location: Right arm, Patient Position: Chair, Cuff Size: Adult Large)   Pulse 81   Temp 97.3  F (36.3  C) (Tympanic)   Ht 1.651 m (5' 5\")   Wt 100.7 kg (222 lb)   SpO2 94%   BMI 36.94 kg/m   Estimated body mass index is 36.94 kg/m  as calculated from the following:    Height as of this encounter: 1.651 m (5' 5\").    Weight as of this encounter: 100.7 kg (222 lb).  Medication Reconciliation: complete  Jennifer Chambers LPN    "

## 2020-01-13 NOTE — PROGRESS NOTES
Cassy Avila is a 64 year old female who presents to clinic today for the following health issues:        URINARY TRACT SYMPTOMS  Onset: 2 weeks    Description:   Painful urination (Dysuria): YES           Frequency: YES  Blood in urine (Hematuria): no   Delay in urine (Hesitency): YES    Intensity: moderate    Progression of Symptoms:  worsening    Accompanying Signs & Symptoms:  Fever/chills: no   Flank pain YES  Nausea and vomiting: no   Any vaginal symptoms: none and vaginal itching  Abdominal/Pelvic Pain: YES    History:   History of frequent UTI's: no   History of kidney stones: no   Sexually Active: no   Possibility of pregnancy: No    Precipitating factors:   none  Therapies Tried and outcome: none          Patient Active Problem List   Diagnosis     Essential hypertension     Pulmonary embolism and infarction (H)     Contact dermatitis and other eczema, due to unspecified cause     Edema     Hyperlipidemia LDL goal <100     Neurofibromatosis, peripheral, NF1 (H)     Advanced care planning/counseling discussion     Moderate episode of recurrent major depressive disorder (H)     Long-term (current) use of anticoagulants [Z79.01]     Deep vein thrombosis (DVT) (H) [I82.409]     Lumbar spondylosis with myelopathy     Degeneration of lumbar or lumbosacral intervertebral disc     Family history of colon cancer     Statin medication not prescribed per physician orders     Vitamin D deficiency     Failed arthroplasty (H)     H/O total shoulder replacement, left     Primary insomnia     Factor V Leiden mutation (H)     Past Surgical History:   Procedure Laterality Date     ------------OTHER-------------      shoulder replacement; Provider: Karen     ARTHROPLASTY KNEE  2014    Procedure: ARTHROPLASTY KNEE;  Surgeon: Sean Alexander MD;  Location: [facility]     ARTHROSCOPY SHOULDER      right, bone spurs      SECTION      x3     CHOLECYSTECTOMY       COLONOSCOPY  02/15/2018     [place],,polyps     elbow ulnar tunnel release  2002     ELECTROTHERMAL THERAPY INTRADISC  2017    stimulator     ENDOSCOPIC SINUS SURGERY, SEPTOPLASTY, TURBINOPLASTY, MAXILLARY SINUSOTOMY, COMBINED N/A 4/29/2015    Procedure: COMBINED ENDOSCOPIC SINUS SURGERY, SEPTOPLASTY, TURBINOPLASTY, MAXILLARY SINUSOTOMY;  Surgeon: Seema Conn MD;  Location: [facility]     ESOPHAGOSCOPY, GASTROSCOPY, DUODENOSCOPY (EGD), COMBINED  2011    with biopsy and endoscopic U/S     EXCISE NEUROMA LOWER EXTREMITY Left 7/13/2016    Procedure: EXCISE NEUROMA LOWER EXTREMITY;  Surgeon: Edi Reed MD;  [facility]     FUSION LUMBAR ANTERIOR WITH MARCY CAGES      L5-S1     HYSTERECTOMY TOTAL ABDOMINAL, BILATERAL SALPINGO-OOPHORECTOMY, COMBINED N/A      ORTHOPEDIC SURGERY  2-15    right shoulder     ORTHOPEDIC SURGERY  8/28/15    right knee     ORTHOPEDIC SURGERY Right 06/11/2018    hip labrum tear     pionidal cyst excision       TRANSPOSITION ULNAR NERVE (ELBOW)         Social History     Tobacco Use     Smoking status: Former Smoker     Packs/day: 1.00     Years: 30.00     Pack years: 30.00     Types: Cigarettes, Pipe     Smokeless tobacco: Never Used     Tobacco comment: quit in 1999   Substance Use Topics     Alcohol use: No     Family History   Problem Relation Age of Onset     Cancer Mother      Colon Polyps Mother      Heart Failure Mother 87        congestive, cause of death     Myocardial Infarction Mother         myocardial infarction     Myocardial Infarction Father         myocardial infarction - cause of death     C.A.D. Father      Cancer Paternal Uncle         cause of death     C.A.D. Brother      Other - See Comments Other         factor 5 - family h/o     Asthma No family hx of              Current Outpatient Medications   Medication Sig Dispense Refill     aspirin 81 MG EC tablet Take 81 mg by mouth daily HS       Cholecalciferol (VITAMIN D3 PO) Take 3,000 mg by mouth daily AM       escitalopram (LEXAPRO)  20 MG tablet TAKE 1 TABLET BY MOUTH EVERY DAY AND 1 TABLET BY MOUTH EVERY DAY AS NEEDED 180 tablet 4     hydrochlorothiazide (HYDRODIURIL) 25 MG tablet Take 1 tablet (25 mg) by mouth daily 90 tablet 1     losartan (COZAAR) 50 MG tablet TAKE 2 TABLETS BY MOUTH ONCE DAILY 180 tablet 1     metoprolol succinate ER (TOPROL-XL) 50 MG 24 hr tablet TAKE 1 AND ONE-HALF TABLETS BY MOUTH EVERY  tablet 2     order for DME Nebulizer machine and tubing    DX:  Bronchitis 1 Device 0     traZODone (DESYREL) 50 MG tablet TAKE 1 TO 2 TABLETS BY MOUTH AT BEDTIME AS NEEDED 180 tablet 1     UNABLE TO FIND CBD oil       warfarin (COUMADIN) 5 MG tablet 7.5mg Mon,Wed,Fri and 5mg all other days or as directed by warfarin clinic 150 tablet 3     vitamin B complex with vitamin C (VITAMIN  B COMPLEX) TABS tablet Take 1 tablet by mouth daily           Allergies   Allergen Reactions     Amlodipine Besylate Swelling     Norvasc     Amoxicillin      Atorvastatin      myualgia     Cephalexin Monohydrate Hives     Keflex     Erythromycin Base [Kdc:Yellow Dye+Erythromycin+Brilliant Blue Fcf] Nausea and Vomiting     Meloxicam Other (See Comments)     Mobic - confusion, depression     Adhesive Tape Rash     Prochlorperazine Edisylate Swelling and Rash     Compazine     Prochlorperazine Maleate Swelling and Rash         Recent Labs   Lab Test 09/27/19  1137 07/26/19  0843 02/05/19  1013  01/17/18  0822  11/05/13  1528   A1C  --   --   --   --   --   --  5.5   *  --  120*  --  137*   < >  --    HDL 36*  --  37*  --  43*   < >  --    TRIG 280*  --  208*  --  190*   < >  --    ALT 34 28 42   < >  --    < >  --    CR 0.91 0.89 0.92   < > 0.91   < >  --    GFRESTIMATED 67 68 66   < > 62   < >  --    GFRESTBLACK 77 79 76   < > 75   < >  --    POTASSIUM 3.8 3.4 3.5   < > 3.2*   < >  --    TSH 1.42  --  2.71   < > 5.75*   < >  --     < > = values in this interval not displayed.      BP Readings from Last 3 Encounters:   01/13/20 134/76   10/02/19  "120/77   09/27/19 123/76    Wt Readings from Last 3 Encounters:   01/13/20 100.7 kg (222 lb)   10/02/19 103 kg (227 lb)   09/27/19 103 kg (227 lb)                 Reviewed and updated as needed this visit by Provider  Tobacco         Review of Systems   ROS COMP: Constitutional, HEENT, cardiovascular, pulmonary, gi and gu systems are negative, except as otherwise noted.        Objective    /76 (BP Location: Right arm, Patient Position: Chair, Cuff Size: Adult Large)   Pulse 81   Temp 97.3  F (36.3  C) (Tympanic)   Ht 1.651 m (5' 5\")   Wt 100.7 kg (222 lb)   SpO2 94%   BMI 36.94 kg/m    Body mass index is 36.94 kg/m .         Physical Exam   GENERAL: healthy, alert and no distress  EYES: Eyes grossly normal to inspection, PERRL and conjunctivae and sclerae normal  HENT: ear canals and TM's normal, nose and mouth without ulcers or lesions  NECK: no adenopathy, no asymmetry, masses, or scars and thyroid normal to palpation  RESP: lungs clear to auscultation - no rales, rhonchi or wheezes  CV: regular rate and rhythm, normal S1 S2, no S3 or S4, no murmur, click or rub, no peripheral edema and peripheral pulses strong  MS: no gross musculoskeletal defects noted, no edema  SKIN: no suspicious lesions or rashes  PSYCH: mentation appears normal, affect normal/bright      Diagnostic Test Results:  Results for orders placed or performed in visit on 01/13/20 (from the past 24 hour(s))   *UA reflex to Microscopic and Culture - [facility]/[place]   Result Value Ref Range    Color Urine Yellow     Appearance Urine Clear     Glucose Urine Negative NEG^Negative mg/dL    Bilirubin Urine Negative NEG^Negative    Ketones Urine Negative NEG^Negative mg/dL    Specific Gravity Urine 1.015 1.003 - 1.035    Blood Urine Negative NEG^Negative    pH Urine 6.5 5.0 - 7.0 pH    Protein Albumin Urine Negative NEG^Negative mg/dL    Urobilinogen Urine 0.2 0.2 - 1.0 EU/dL    Nitrite Urine Negative NEG^Negative    Leukocyte Esterase Urine " Moderate (A) NEG^Negative    Source Midstream Urine    Urine Microscopic   Result Value Ref Range    WBC Urine 5-10 (A) OTO5^0 - 5 /HPF    RBC Urine O - 2 OTO2^O - 2 /HPF    Squamous Epithelial /LPF Urine Few FEW^Few /LPF    Bacteria Urine Few (A) NEG^Negative /HPF           Assessment & Plan     1. Dysuria  - UA reflex to Microscopic and Culture - [facility]/[place]  - Urine Microscopic    2. Encounter for screening mammogram for breast cancer  - [facility] SCREENING DIGITAL BILATERAL ([place]); Future    3. Abnormal findings on microbiological examination of urine  - Urine Culture Aerobic Bacterial  - nitroFURantoin macrocrystal-monohydrate (MACROBID) 100 MG capsule; Take 1 capsule (100 mg) by mouth 2 times daily for 7 days  Dispense: 14 capsule; Refill: 0         Fluids  Rest  Follow-up as needed        Eliz Hartman CNP  [facility] - [place] [facility]

## 2020-01-13 NOTE — PATIENT INSTRUCTIONS
Assessment & Plan     1. Dysuria  - UA reflex to Microscopic and Culture - [facility]/[place]  - Urine Microscopic    2. Encounter for screening mammogram for breast cancer  - MA SCREENING DIGITAL BILATERAL ([place]); Future    3. Abnormal findings on microbiological examination of urine  - Urine Culture Aerobic Bacterial  - nitroFURantoin macrocrystal-monohydrate (MACROBID) 100 MG capsule; Take 1 capsule (100 mg) by mouth 2 times daily for 7 days  Dispense: 14 capsule; Refill: 0         Fluids  Rest  Follow-up as needed      Eliz Hartman CNP  [facility] [place] [facility]

## 2020-01-15 LAB
BACTERIA SPEC CULT: NORMAL
SPECIMEN SOURCE: NORMAL

## 2020-01-22 ENCOUNTER — ANTICOAGULATION THERAPY VISIT (OUTPATIENT)
Dept: ANTICOAGULATION | Facility: OTHER | Age: 65
End: 2020-01-22

## 2020-01-22 ENCOUNTER — TRANSFERRED RECORDS (OUTPATIENT)
Dept: HEALTH INFORMATION MANAGEMENT | Facility: CLINIC | Age: 65
End: 2020-01-22

## 2020-01-22 DIAGNOSIS — I82.409 DEEP VEIN THROMBOSIS (DVT) (H): ICD-10-CM

## 2020-01-22 DIAGNOSIS — I26.99 PULMONARY EMBOLISM AND INFARCTION (H): ICD-10-CM

## 2020-01-22 DIAGNOSIS — Z79.01 LONG TERM CURRENT USE OF ANTICOAGULANT THERAPY: ICD-10-CM

## 2020-01-22 LAB — INR PPP: 2.3 (ref 0.9–1.1)

## 2020-01-22 NOTE — PROGRESS NOTES
Subjective     Cassy Avila is a 64 year old female who presents to clinic today for the following health issues:    HPI   Musculoskeletal problem/pain      Duration: 1 month    Description  Location: back radiating into the side     Intensity:  moderate, severe    Accompanying signs and symptoms: none    History  Previous similar problem: no   Previous evaluation:  none    Precipitating or alleviating factors:  Trauma or overuse: no   Aggravating factors include: constant pain, turning    Therapies tried and outcome: shamika Madera presents today with about 1 month.  She describes the pain a catching and severe reproducible with palpation along base of right 12th rib with CVA tenderness.  She does report some urinary frequency and dysuria.  No hematuria.  No trauma.  She was treated for a possible UTI a couple weeks ago which did not result in any growth on urine culture.  However she does report feeling better for a brief time after completion of her Macrobid. No fevers reported.       Patient Active Problem List   Diagnosis     Essential hypertension     Pulmonary embolism and infarction (H)     Contact dermatitis and other eczema, due to unspecified cause     Edema     Hyperlipidemia LDL goal <100     Neurofibromatosis, peripheral, NF1 (H)     Advanced care planning/counseling discussion     Moderate episode of recurrent major depressive disorder (H)     Long-term (current) use of anticoagulants [Z79.01]     Deep vein thrombosis (DVT) (H) [I82.409]     Lumbar spondylosis with myelopathy     Degeneration of lumbar or lumbosacral intervertebral disc     Family history of colon cancer     Statin medication not prescribed per physician orders     Vitamin D deficiency     Failed arthroplasty (H)     H/O total shoulder replacement, left     Primary insomnia     Factor V Leiden mutation (H)     Obesity (BMI 35.0-39.9) with comorbidity (H)     Past Surgical History:   Procedure Laterality Date      ------------OTHER-------------      shoulder replacement; Provider: Karen     ARTHROPLASTY KNEE  2014    Procedure: ARTHROPLASTY KNEE;  Surgeon: Sean Alexander MD;  [facility]: [facility]     ARTHROSCOPY SHOULDER      right, bone spurs      SECTION      x3     CHOLECYSTECTOMY       COLONOSCOPY  02/15/2018    [place],,polyps     elbow ulnar tunnel release  2002     ELECTROTHERMAL THERAPY INTRADISC  2017    stimulator     ENDOSCOPIC SINUS SURGERY, SEPTOPLASTY, TURBINOPLASTY, MAXILLARY SINUSOTOMY, COMBINED N/A 2015    Procedure: COMBINED ENDOSCOPIC SINUS SURGERY, SEPTOPLASTY, TURBINOPLASTY, MAXILLARY SINUSOTOMY;  Surgeon: Seema Conn MD;  Location: [facility]     ESOPHAGOSCOPY, GASTROSCOPY, DUODENOSCOPY (EGD), COMBINED      with biopsy and endoscopic U/S     EXCISE NEUROMA LOWER EXTREMITY Left 2016    Procedure: EXCISE NEUROMA LOWER EXTREMITY;  Surgeon: Edi Reed MD;  [facility]     FUSION LUMBAR ANTERIOR WITH MARCY CAGES      L5-S1     HYSTERECTOMY TOTAL ABDOMINAL, BILATERAL SALPINGO-OOPHORECTOMY, COMBINED N/A      ORTHOPEDIC SURGERY  2-15    right shoulder     ORTHOPEDIC SURGERY  8/28/15    right knee     ORTHOPEDIC SURGERY Right 2018    hip labrum tear     pionidal cyst excision       TRANSPOSITION ULNAR NERVE (ELBOW)         Social History     Tobacco Use     Smoking status: Former Smoker     Packs/day: 1.00     Years: 30.00     Pack years: 30.00     Types: Cigarettes, Pipe     Smokeless tobacco: Never Used     Tobacco comment: quit in    Substance Use Topics     Alcohol use: No     Family History   Problem Relation Age of Onset     Cancer Mother      Colon Polyps Mother      Heart Failure Mother 87        congestive, cause of death     Myocardial Infarction Mother         myocardial infarction     Myocardial Infarction Father         myocardial infarction - cause of death     C.A.D. Father      Cancer Paternal Uncle         cause of death     C.A.D.  Brother      Other - See Comments Other         factor 5 - family h/o     Asthma No family hx of          Current Outpatient Medications   Medication Sig Dispense Refill     aspirin 81 MG EC tablet Take 81 mg by mouth daily HS       Cholecalciferol (VITAMIN D3 PO) Take 3,000 mg by mouth daily AM       escitalopram (LEXAPRO) 20 MG tablet TAKE 1 TABLET BY MOUTH EVERY DAY AND 1 TABLET BY MOUTH EVERY DAY AS NEEDED 180 tablet 4     hydrochlorothiazide (HYDRODIURIL) 25 MG tablet Take 1 tablet (25 mg) by mouth daily 90 tablet 1     losartan (COZAAR) 50 MG tablet TAKE 2 TABLETS BY MOUTH ONCE DAILY 180 tablet 1     metoprolol succinate ER (TOPROL-XL) 50 MG 24 hr tablet TAKE 1 AND ONE-HALF TABLETS BY MOUTH EVERY  tablet 2     order for DME Nebulizer machine and tubing    DX:  Bronchitis 1 Device 0     traZODone (DESYREL) 50 MG tablet Take 1-2 tablets ( mg) by mouth nightly as needed 180 tablet 1     UNABLE TO FIND CBD oil       warfarin (COUMADIN) 5 MG tablet 7.5mg Mon,Wed,Fri and 5mg all other days or as directed by warfarin clinic 150 tablet 3     Allergies   Allergen Reactions     Amlodipine Besylate Swelling     Norvasc     Amoxicillin      Atorvastatin      myualgia     Cephalexin Monohydrate Hives     Keflex     Erythromycin Base [Kdc:Yellow Dye+Erythromycin+Brilliant Blue Fcf] Nausea and Vomiting     Meloxicam Other (See Comments)     Mobic - confusion, depression     Adhesive Tape Rash     Prochlorperazine Edisylate Swelling and Rash     Compazine     Prochlorperazine Maleate Swelling and Rash     BP Readings from Last 3 Encounters:   01/28/20 131/82   01/13/20 134/76   10/02/19 120/77    Wt Readings from Last 3 Encounters:   01/28/20 101.6 kg (224 lb)   01/13/20 100.7 kg (222 lb)   10/02/19 103 kg (227 lb)               Reviewed and updated as needed this visit by Provider         Review of Systems   ROS COMP: Constitutional, HEENT, cardiovascular, pulmonary, gi and gu systems are negative, except as  otherwise noted.      Objective    /82 (BP Location: Left arm, Patient Position: Chair, Cuff Size: Adult Large)   Pulse 66   Temp 97.6  F (36.4  C) (Tympanic)   Wt 101.6 kg (224 lb)   SpO2 97%   BMI 37.28 kg/m    Body mass index is 37.28 kg/m .  Physical Exam   GENERAL: Alert and no distress  RESP: lungs clear to auscultation - no rales, rhonchi or wheezes  CV: regular rate and rhythm, normal S1 S2, no S3 or S4, no murmur, click or rub, no peripheral edema and peripheral pulses strong  ABDOMEN: Tenderness in right flank along base of right 12th rib.    SKIN: no suspicious lesions or rashes  PSYCH: mentation appears normal, affect normal/bright    Diagnostic Test Results:  Results for orders placed or performed in visit on 01/28/20 (from the past 24 hour(s))   UA with Microscopic reflex to Culture - [facility]/[place]   Result Value Ref Range    Color Urine Yellow     Appearance Urine Clear     Glucose Urine Negative NEG^Negative mg/dL    Bilirubin Urine Negative NEG^Negative    Ketones Urine Negative NEG^Negative mg/dL    Specific Gravity Urine 1.010 1.003 - 1.035    pH Urine 6.5 5.0 - 7.0 pH    Protein Albumin Urine Negative NEG^Negative mg/dL    Urobilinogen Urine 0.2 0.2 - 1.0 EU/dL    Nitrite Urine Negative NEG^Negative    Blood Urine Negative NEG^Negative    Leukocyte Esterase Urine Moderate (A) NEG^Negative    Source Midstream Urine     WBC Urine 5-10 (A) OTO5^0 - 5 /HPF    RBC Urine O - 2 OTO2^O - 2 /HPF     Results for orders placed or performed in visit on 01/28/20   UA with Microscopic reflex to Culture - [facility]/[place]     Status: Abnormal   Result Value Ref Range    Color Urine Yellow     Appearance Urine Clear     Glucose Urine Negative NEG^Negative mg/dL    Bilirubin Urine Negative NEG^Negative    Ketones Urine Negative NEG^Negative mg/dL    Specific Gravity Urine 1.010 1.003 - 1.035    pH Urine 6.5 5.0 - 7.0 pH    Protein Albumin Urine Negative NEG^Negative mg/dL    Urobilinogen Urine 0.2  0.2 - 1.0 EU/dL    Nitrite Urine Negative NEG^Negative    Blood Urine Negative NEG^Negative    Leukocyte Esterase Urine Moderate (A) NEG^Negative    Source Midstream Urine     WBC Urine 5-10 (A) OTO5^0 - 5 /HPF    RBC Urine O - 2 OTO2^O - 2 /HPF           Assessment & Plan   Problem List Items Addressed This Visit        Digestive    Obesity (BMI 35.0-39.9) with comorbidity (H)       Other    Primary insomnia    Relevant Medications    traZODone (DESYREL) 50 MG tablet      Other Visit Diagnoses     Dysuria    -  Primary    Relevant Orders    UA with Microscopic reflex to Culture - [facility]/[place] [facility] (Completed)    Abnormal urine findings        Relevant Orders    Urine Culture Aerobic Bacterial (Completed)    Right flank pain        Relevant Orders    CT Abdomen Pelvis w/o Contrast    Urine Culture Aerobic Bacterial           25 minutes spent on this patient's care today.  Greater than 50% of the time was spent with the patient face to face.  History of symptoms discussed.  Previous colonoscopy, imaging and labs reviewed today.  All questions answered and care plan discussed.    Facundo Pittman,   [facility] - [place] [facility]

## 2020-01-22 NOTE — PROGRESS NOTES
ANTICOAGULATION FOLLOW-UP CLINIC VISIT    Patient Name:  Cassy Avila  Date:  2020  Contact Type:  Telephone    SUBJECTIVE:  Patient Findings     Comments:   Received INR results from [facility]. Call placed to patient and spoke to patient re: INR results, warfarin dosing/INR recheck date. Patient to return call to warfarin clinic with changes in bruising/bleeding, new changes in diet/meds/activity or questions.  Patient verbalized understanding.           Clinical Outcomes     Negatives:   Major bleeding event, Thromboembolic event, Anticoagulation-related hospital admission, Anticoagulation-related ED visit, Anticoagulation-related fatality    Comments:   Received INR results from [facility]. Call placed to patient and spoke to patient re: INR results, warfarin dosing/INR recheck date. Patient to return call to warfarin clinic with changes in bruising/bleeding, new changes in diet/meds/activity or questions.  Patient verbalized understanding.              OBJECTIVE    INR   Date Value Ref Range Status   2020 2.3 (A) 0.90 - 1.10 Final       ASSESSMENT / PLAN  INR assessment THER    Recheck INR In: 6 WEEKS    INR Location Home INR      Anticoagulation Summary  As of 2020    INR goal:   2.0-3.0   TTR:   61.6 % (1 y)   INR used for dosin.3 (2020)   Warfarin maintenance plan:   7.5 mg (5 mg x 1.5) every Mon, Wed, Fri; 5 mg (5 mg x 1) all other days   Full warfarin instructions:   7.5 mg every Mon, Wed, Fri; 5 mg all other days   Weekly warfarin total:   42.5 mg   No change documented:   Loan, Olivia, RN   Plan last modified:   Olivia Cates RN (2020)   Next INR check:   3/4/2020   Priority:   Maintenance   Target end date:   Indefinite    Indications    Long-term (current) use of anticoagulants [Z79.01] [Z79.01]  Pulmonary embolism and infarction (H) [I26.99]  Deep vein thrombosis (DVT) (H) [I82.409] [I82.409]             Anticoagulation Episode Summary     INR check location:   Home Draw     Preferred lab:       Send INR reminders to:   [facility] ANTICOAG POOL    Comments:   PST - [facility].  Shoulder surgery 8/2/19, warfarin hold x 5 days. Minnie Morales        Anticoagulation Care Providers     Provider Role Specialty Phone number    Eliz Hartman, JANINA [facility] [phone]            See the Encounter Report to view Anticoagulation Flowsheet and Dosing Calendar (Go to Encounters tab in chart review, and find the Anticoagulation Therapy Visit)        Olivia Cates RN

## 2020-01-28 ENCOUNTER — OFFICE VISIT (OUTPATIENT)
Dept: INTERNAL MEDICINE | Facility: OTHER | Age: 65
End: 2020-01-28
Attending: INTERNAL MEDICINE
Payer: COMMERCIAL

## 2020-01-28 VITALS
BODY MASS INDEX: 37.28 KG/M2 | HEART RATE: 66 BPM | TEMPERATURE: 97.6 F | SYSTOLIC BLOOD PRESSURE: 131 MMHG | WEIGHT: 224 LBS | OXYGEN SATURATION: 97 % | DIASTOLIC BLOOD PRESSURE: 82 MMHG

## 2020-01-28 DIAGNOSIS — R30.0 DYSURIA: Primary | ICD-10-CM

## 2020-01-28 DIAGNOSIS — F51.01 PRIMARY INSOMNIA: ICD-10-CM

## 2020-01-28 DIAGNOSIS — R10.9 RIGHT FLANK PAIN: ICD-10-CM

## 2020-01-28 DIAGNOSIS — R82.90 ABNORMAL URINE FINDINGS: ICD-10-CM

## 2020-01-28 DIAGNOSIS — E66.01 MORBID OBESITY (H): ICD-10-CM

## 2020-01-28 LAB
ALBUMIN UR-MCNC: NEGATIVE MG/DL
APPEARANCE UR: CLEAR
BILIRUB UR QL STRIP: NEGATIVE
COLOR UR AUTO: YELLOW
GLUCOSE UR STRIP-MCNC: NEGATIVE MG/DL
HGB UR QL STRIP: NEGATIVE
KETONES UR STRIP-MCNC: NEGATIVE MG/DL
LEUKOCYTE ESTERASE UR QL STRIP: ABNORMAL
NITRATE UR QL: NEGATIVE
PH UR STRIP: 6.5 PH (ref 5–7)
RBC #/AREA URNS AUTO: ABNORMAL /HPF
SOURCE: ABNORMAL
SP GR UR STRIP: 1.01 (ref 1–1.03)
UROBILINOGEN UR STRIP-ACNC: 0.2 EU/DL (ref 0.2–1)
WBC #/AREA URNS AUTO: ABNORMAL /HPF

## 2020-01-28 PROCEDURE — 87086 URINE CULTURE/COLONY COUNT: CPT | Mod: ZL | Performed by: INTERNAL MEDICINE

## 2020-01-28 PROCEDURE — G0463 HOSPITAL OUTPT CLINIC VISIT: HCPCS

## 2020-01-28 PROCEDURE — 81001 URINALYSIS AUTO W/SCOPE: CPT | Mod: ZL | Performed by: INTERNAL MEDICINE

## 2020-01-28 PROCEDURE — 99214 OFFICE O/P EST MOD 30 MIN: CPT | Performed by: INTERNAL MEDICINE

## 2020-01-28 RX ORDER — TRAZODONE HYDROCHLORIDE 50 MG/1
50-100 TABLET, FILM COATED ORAL
Qty: 180 TABLET | Refills: 1 | Status: SHIPPED | OUTPATIENT
Start: 2020-01-28 | End: 2021-01-28

## 2020-01-28 ASSESSMENT — PAIN SCALES - GENERAL: PAINLEVEL: MODERATE PAIN (5)

## 2020-01-28 NOTE — NURSING NOTE
"Chief Complaint   Patient presents with     Musculoskeletal Problem       Initial /82 (BP Location: Left arm, Patient Position: Chair, Cuff Size: Adult Large)   Pulse 66   Temp 97.6  F (36.4  C) (Tympanic)   Wt 101.6 kg (224 lb)   SpO2 97%   BMI 37.28 kg/m   Estimated body mass index is 37.28 kg/m  as calculated from the following:    Height as of 1/13/20: 1.651 m (5' 5\").    Weight as of this encounter: 101.6 kg (224 lb).  Medication Reconciliation: complete  GINA DAVIDSON LPN  "

## 2020-01-30 LAB
BACTERIA SPEC CULT: NORMAL
SPECIMEN SOURCE: NORMAL

## 2020-02-03 ENCOUNTER — APPOINTMENT (OUTPATIENT)
Dept: CT IMAGING | Facility: HOSPITAL | Age: 65
End: 2020-02-03
Attending: PHYSICIAN ASSISTANT
Payer: MEDICARE

## 2020-02-03 ENCOUNTER — HOSPITAL ENCOUNTER (EMERGENCY)
Facility: HOSPITAL | Age: 65
Discharge: HOME | End: 2020-02-03
Attending: PHYSICIAN ASSISTANT | Admitting: PHYSICIAN ASSISTANT
Payer: MEDICARE

## 2020-02-03 VITALS
OXYGEN SATURATION: 96 % | BODY MASS INDEX: 36.94 KG/M2 | TEMPERATURE: 97.7 F | RESPIRATION RATE: 16 BRPM | WEIGHT: 222 LBS | HEART RATE: 59 BPM | DIASTOLIC BLOOD PRESSURE: 94 MMHG | SYSTOLIC BLOOD PRESSURE: 150 MMHG

## 2020-02-03 DIAGNOSIS — M62.830 BACK MUSCLE SPASM: ICD-10-CM

## 2020-02-03 DIAGNOSIS — M54.50 ACUTE RIGHT-SIDED LOW BACK PAIN WITHOUT SCIATICA: ICD-10-CM

## 2020-02-03 LAB
ALBUMIN SERPL-MCNC: 3.7 G/DL (ref 3.4–5)
ALBUMIN UR-MCNC: NEGATIVE MG/DL
ALP SERPL-CCNC: 58 U/L (ref 40–150)
ALT SERPL W P-5'-P-CCNC: 28 U/L (ref 0–50)
AMYLASE SERPL-CCNC: 50 U/L (ref 30–110)
ANION GAP SERPL CALCULATED.3IONS-SCNC: 5 MMOL/L (ref 3–14)
APPEARANCE UR: CLEAR
AST SERPL W P-5'-P-CCNC: 12 U/L (ref 0–45)
BASOPHILS # BLD AUTO: 0.1 10E9/L (ref 0–0.2)
BASOPHILS NFR BLD AUTO: 0.9 %
BILIRUB SERPL-MCNC: 0.5 MG/DL (ref 0.2–1.3)
BILIRUB UR QL STRIP: NEGATIVE
BUN SERPL-MCNC: 11 MG/DL (ref 7–30)
CALCIUM SERPL-MCNC: 8.8 MG/DL (ref 8.5–10.1)
CHLORIDE SERPL-SCNC: 106 MMOL/L (ref 94–109)
CO2 SERPL-SCNC: 29 MMOL/L (ref 20–32)
COLOR UR AUTO: NORMAL
CREAT SERPL-MCNC: 0.92 MG/DL (ref 0.52–1.04)
DIFFERENTIAL METHOD BLD: NORMAL
EOSINOPHIL # BLD AUTO: 0.1 10E9/L (ref 0–0.7)
EOSINOPHIL NFR BLD AUTO: 2.1 %
ERYTHROCYTE [DISTWIDTH] IN BLOOD BY AUTOMATED COUNT: 13.7 % (ref 10–15)
GFR SERPL CREATININE-BSD FRML MDRD: 66 ML/MIN/{1.73_M2}
GLUCOSE SERPL-MCNC: 81 MG/DL (ref 70–99)
GLUCOSE UR STRIP-MCNC: NEGATIVE MG/DL
HCT VFR BLD AUTO: 40.3 % (ref 35–47)
HGB BLD-MCNC: 14.1 G/DL (ref 11.7–15.7)
HGB UR QL STRIP: NEGATIVE
IMM GRANULOCYTES # BLD: 0 10E9/L (ref 0–0.4)
IMM GRANULOCYTES NFR BLD: 0.3 %
KETONES UR STRIP-MCNC: NEGATIVE MG/DL
LEUKOCYTE ESTERASE UR QL STRIP: NEGATIVE
LIPASE SERPL-CCNC: 112 U/L (ref 73–393)
LYMPHOCYTES # BLD AUTO: 1.8 10E9/L (ref 0.8–5.3)
LYMPHOCYTES NFR BLD AUTO: 30.4 %
MCH RBC QN AUTO: 29.6 PG (ref 26.5–33)
MCHC RBC AUTO-ENTMCNC: 35 G/DL (ref 31.5–36.5)
MCV RBC AUTO: 85 FL (ref 78–100)
MONOCYTES # BLD AUTO: 0.6 10E9/L (ref 0–1.3)
MONOCYTES NFR BLD AUTO: 10.3 %
NEUTROPHILS # BLD AUTO: 3.3 10E9/L (ref 1.6–8.3)
NEUTROPHILS NFR BLD AUTO: 56 %
NITRATE UR QL: NEGATIVE
NRBC # BLD AUTO: 0 10*3/UL
NRBC BLD AUTO-RTO: 0 /100
PH UR STRIP: 7 PH (ref 4.7–8)
PLATELET # BLD AUTO: 207 10E9/L (ref 150–450)
POTASSIUM SERPL-SCNC: 3.6 MMOL/L (ref 3.4–5.3)
PROT SERPL-MCNC: 7 G/DL (ref 6.8–8.8)
RBC # BLD AUTO: 4.77 10E12/L (ref 3.8–5.2)
SODIUM SERPL-SCNC: 140 MMOL/L (ref 133–144)
SOURCE: NORMAL
SP GR UR STRIP: 1.01 (ref 1–1.03)
UROBILINOGEN UR STRIP-MCNC: NORMAL MG/DL (ref 0–2)
WBC # BLD AUTO: 5.8 10E9/L (ref 4–11)

## 2020-02-03 PROCEDURE — 83690 ASSAY OF LIPASE: CPT | Performed by: EMERGENCY MEDICINE

## 2020-02-03 PROCEDURE — 36415 COLL VENOUS BLD VENIPUNCTURE: CPT | Performed by: EMERGENCY MEDICINE

## 2020-02-03 PROCEDURE — 85025 COMPLETE CBC W/AUTO DIFF WBC: CPT | Performed by: EMERGENCY MEDICINE

## 2020-02-03 PROCEDURE — 99285 EMERGENCY DEPT VISIT HI MDM: CPT | Mod: Z6 | Performed by: PHYSICIAN ASSISTANT

## 2020-02-03 PROCEDURE — 99284 EMERGENCY DEPT VISIT MOD MDM: CPT | Mod: 25

## 2020-02-03 PROCEDURE — 80053 COMPREHEN METABOLIC PANEL: CPT | Performed by: EMERGENCY MEDICINE

## 2020-02-03 PROCEDURE — 74176 CT ABD & PELVIS W/O CONTRAST: CPT | Mod: TC

## 2020-02-03 PROCEDURE — 82150 ASSAY OF AMYLASE: CPT | Performed by: EMERGENCY MEDICINE

## 2020-02-03 PROCEDURE — 25000132 ZZH RX MED GY IP 250 OP 250 PS 637: Performed by: PHYSICIAN ASSISTANT

## 2020-02-03 PROCEDURE — 81003 URINALYSIS AUTO W/O SCOPE: CPT | Performed by: EMERGENCY MEDICINE

## 2020-02-03 RX ORDER — OXYCODONE HYDROCHLORIDE 5 MG/1
5 TABLET ORAL ONCE
Status: COMPLETED | OUTPATIENT
Start: 2020-02-03 | End: 2020-02-03

## 2020-02-03 RX ORDER — TRAMADOL HYDROCHLORIDE 50 MG/1
50 TABLET ORAL EVERY 6 HOURS PRN
Qty: 20 TABLET | Refills: 0 | Status: SHIPPED | OUTPATIENT
Start: 2020-02-03 | End: 2020-02-06

## 2020-02-03 RX ORDER — METHOCARBAMOL 750 MG/1
750-1500 TABLET, FILM COATED ORAL 4 TIMES DAILY PRN
Qty: 60 TABLET | Refills: 0 | Status: SHIPPED | OUTPATIENT
Start: 2020-02-03 | End: 2022-03-08

## 2020-02-03 RX ADMIN — OXYCODONE HYDROCHLORIDE 5 MG: 5 TABLET ORAL at 19:40

## 2020-02-03 NOTE — ED AVS SNAPSHOT
[facility]  [address] [address]  Phone:  [phone]                                    Cassy Avila   MRN: [id number]    Department:  [facility]   Date of Visit:  2/3/2020           After Visit Summary Signature Page    I have received my discharge instructions, and my questions have been answered. I have discussed any challenges I see with this plan with the nurse or doctor.    ..........................................................................................................................................  Patient/Patient Representative Signature      ..........................................................................................................................................  Patient Representative Print Name and Relationship to Patient    ..................................................               ................................................  Date                                   Time    ..........................................................................................................................................  Reviewed by Signature/Title    ...................................................              ..............................................  Date                                               Time          22EPIC Rev 08/18

## 2020-02-03 NOTE — ED TRIAGE NOTES
"Pt states she has been having back pain and right sided flank pain. Ot states she is scheduled for a CT on Thursday \"but I can't wait- the pain has become worse\". Pt states she felt chilled last night 'I couldn't even get out of bed\".  "

## 2020-02-04 NOTE — ED NOTES
"Pt in room 1 of the ED with . Pt reports right lower back pain that radiates to the right lower abdomen for over 2 weeks. Pt has \"llittle\" nausea. Normal BM. Chronic back pain. No new injury. Was seen by PCP and has CT ordered for Thur but can not wait, the pain is too bad. Call light in reach.  "

## 2020-02-04 NOTE — DISCHARGE INSTRUCTIONS
Tramadol for pain.  Tylenol for pain.  Robaxin as a muscle relaxant.  Follow-up in clinic for further pain management possible physical therapy

## 2020-02-04 NOTE — ED PROVIDER NOTES
History     Chief Complaint   Patient presents with     Flank Pain     HPI  Cassy Avila is a 64 year old female who presents here with right flank pain.  Is been ongoing about 3 weeks.  She describes it does feel like a spasm.  It is worse with palpation.  It is worse with certain movements as well.  Not worse with inspiration.  No chest pain or shortness of breath.  No headache or lightheadedness.  No sore throat no fevers or chills no cough no abdominal pain no nausea vomiting no diarrhea.  She does have issues with constipation.  No dysuria she is not grossly hematuria    Allergies:  Allergies   Allergen Reactions     Amlodipine Besylate Swelling     Norvasc     Amoxicillin      Atorvastatin      myualgia     Cephalexin Monohydrate Hives     Keflex     Erythromycin Base [Kdc:Yellow Dye+Erythromycin+Brilliant Blue Fcf] Nausea and Vomiting     Meloxicam Other (See Comments)     Mobic - confusion, depression     Adhesive Tape Rash     Prochlorperazine Edisylate Swelling and Rash     Compazine     Prochlorperazine Maleate Swelling and Rash       Problem List:    Patient Active Problem List    Diagnosis Date Noted     Obesity (BMI 35.0-39.9) with comorbidity (H) 01/28/2020     Priority: Medium     Factor V Leiden mutation (H) 07/26/2019     Priority: Medium     Primary insomnia 10/31/2018     Priority: Medium     Vitamin D deficiency 07/13/2018     Priority: Medium     Statin medication not prescribed per physician orders 01/17/2018     Priority: Medium     Family history of colon cancer 01/02/2018     Priority: Medium     Lumbar spondylosis with myelopathy 07/12/2017     Priority: Medium     Degeneration of lumbar or lumbosacral intervertebral disc 07/12/2017     Priority: Medium     Long-term (current) use of anticoagulants [Z79.01] 07/20/2016     Priority: Medium     Deep vein thrombosis (DVT) (H) [I82.409] 07/20/2016     Priority: Medium     Moderate episode of recurrent major depressive disorder (H)  08/08/2014     Priority: Medium     H/O total shoulder replacement, left 06/17/2014     Priority: Medium     Failed arthroplasty (H) 01/24/2014     Priority: Medium     Advanced care planning/counseling discussion 03/12/2013     Priority: Medium     Pt has information at home , not completed       Neurofibromatosis, peripheral, NF1 (H) 08/22/2012     Priority: Medium     Problem list name updated by automated process. Provider to review       Contact dermatitis and other eczema, due to unspecified cause 06/01/2004     Priority: Medium     Pulmonary embolism and infarction (H) 04/11/2003     Priority: Medium     Problem list name updated by automated process. Provider to review       Hyperlipidemia LDL goal <100 07/11/2002     Priority: Medium     Problem list name updated by automated process. Provider to review       Edema 01/18/2002     Priority: Medium     Essential hypertension 02/20/2001     Priority: Medium     Problem list name updated by automated process. 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     Congenital factor VIII disorder (H) 7/26/2019     Contact dermatitis and other eczema, due to unspecified cause 6/1/2004     Coughing      Edema 1/18/2002     Essential hypertension 2/20/2001     Factor V Leiden (H)      Factor V Leiden mutation (H) 7/26/2019     Family history of colon cancer 1/2/2018     Gastro-oesophageal reflux disease      Herpes zoster without mention of complication 2003     Hyperlipidemia LDL goal <100 7/11/2002     Long term (current) use of anticoagulants 8/20/2003     Major depression 8/8/2014     Mammographic microcalcification 2003     Migraine, unspecified, without mention of intractable migraine without mention of status migrainosus 3/5/2001     Moderate episode of recurrent  major depressive disorder (H) 2014     Neurofibromatosis, peripheral, NF1 (H) 2012     Neurofibromatosis, unspecified(237.70) 2012     Other and unspecified hyperlipidemia 2002     Other chronic pain      Other pulmonary embolism and infarction 2003     Primary insomnia 10/31/2018     Statin medication not prescribed per physician orders 2018     Tachycardia, unspecified 2001     Thrombosis of leg      Unspecified essential hypertension 2001     Vitamin D deficiency 2018       Past Surgical History:    Past Surgical History:   Procedure Laterality Date     ------------OTHER-------------      shoulder replacement; Provider: Karen     ARTHROPLASTY KNEE  2014    Procedure: ARTHROPLASTY KNEE;  Surgeon: Sean Alexander MD;  Location: [facility]     ARTHROSCOPY SHOULDER      right, bone spurs      SECTION      x3     CHOLECYSTECTOMY       COLONOSCOPY  02/15/2018    [place],,polyps     elbow ulnar tunnel release       ELECTROTHERMAL THERAPY INTRADISC  2017    stimulator     ENDOSCOPIC SINUS SURGERY, SEPTOPLASTY, TURBINOPLASTY, MAXILLARY SINUSOTOMY, COMBINED N/A 2015    Procedure: COMBINED ENDOSCOPIC SINUS SURGERY, SEPTOPLASTY, TURBINOPLASTY, MAXILLARY SINUSOTOMY;  Surgeon: Seema Conn MD;  Location: [facility]     ESOPHAGOSCOPY, GASTROSCOPY, DUODENOSCOPY (EGD), COMBINED      with biopsy and endoscopic U/S     EXCISE NEUROMA LOWER EXTREMITY Left 2016    Procedure: EXCISE NEUROMA LOWER EXTREMITY;  Surgeon: Edi Reed MD;  Location: [facility]     FUSION LUMBAR ANTERIOR WITH MARCY CAGES      L5-S1     HYSTERECTOMY TOTAL ABDOMINAL, BILATERAL SALPINGO-OOPHORECTOMY, COMBINED N/A      ORTHOPEDIC SURGERY  2-15    right shoulder     ORTHOPEDIC SURGERY  8/28/15    right knee     ORTHOPEDIC SURGERY Right 2018    hip labrum tear     pionidal cyst excision       TRANSPOSITION ULNAR NERVE (ELBOW)         Family History:    Family History    Problem Relation Age of Onset     Cancer Mother      Colon Polyps Mother      Heart Failure Mother 87        congestive, cause of death     Myocardial Infarction Mother         myocardial infarction     Myocardial Infarction Father         myocardial infarction - cause of death     C.A.D. Father      Cancer Paternal Uncle         cause of death     C.A.D. Brother      Other - See Comments Other         factor 5 - family h/o     Asthma No family hx of        Social History:  Marital Status:   [2]  Social History     Tobacco Use     Smoking status: Former Smoker     Packs/day: 1.00     Years: 30.00     Pack years: 30.00     Types: Cigarettes, Pipe     Smokeless tobacco: Never Used     Tobacco comment: quit in 1999   Substance Use Topics     Alcohol use: No     Drug use: No        Medications:    aspirin 81 MG EC tablet  Cholecalciferol (VITAMIN D3 PO)  escitalopram (LEXAPRO) 20 MG tablet  hydrochlorothiazide (HYDRODIURIL) 25 MG tablet  losartan (COZAAR) 50 MG tablet  methocarbamol (ROBAXIN) 750 MG tablet  metoprolol succinate ER (TOPROL-XL) 50 MG 24 hr tablet  traMADol (ULTRAM) 50 MG tablet  traZODone (DESYREL) 50 MG tablet  UNABLE TO FIND  warfarin (COUMADIN) 5 MG tablet  order for DME          Review of Systems  I did do a complete 10 point review of systems. Pertinent positives and negatives listed per HPI. All other systems have been reviewed and are negative.      Physical Exam   BP: (!) 178/106  Heart Rate: 67  Temp: 97.8  F (36.6  C)  Resp: 16  Weight: 100.7 kg (222 lb)  SpO2: 96 %      Physical Exam  Vitals signs and nursing note reviewed.   Constitutional:       General: She is not in acute distress.     Appearance: Normal appearance. She is not ill-appearing, toxic-appearing or diaphoretic.   HENT:      Head: Normocephalic and atraumatic.      Nose: Nose normal.      Mouth/Throat:      Mouth: Mucous membranes are moist.      Pharynx: Oropharynx is clear.   Eyes:      General: No scleral icterus.      Conjunctiva/sclera: Conjunctivae normal.   Neck:      Musculoskeletal: Neck supple. No neck rigidity.   Cardiovascular:      Rate and Rhythm: Normal rate.      Pulses: Normal pulses.   Pulmonary:      Effort: Pulmonary effort is normal. No respiratory distress.      Breath sounds: Normal breath sounds.   Abdominal:      Palpations: Abdomen is soft.      Tenderness: There is no abdominal tenderness.   Musculoskeletal:      Comments: Right low back and leg right flank tenderness.  No rashes noted.   Skin:     General: Skin is warm and dry.      Findings: No rash.   Neurological:      General: No focal deficit present.      Mental Status: She is alert and oriented to person, place, and time.   Psychiatric:         Mood and Affect: Mood normal.         Behavior: Behavior normal.         Thought Content: Thought content normal.         Judgment: Judgment normal.         ED Course   Laboratories have been reviewed and are negative.  CT stone study is negative.  She is given 2 Percocet which helped with the pain.  At this point time it appears it is mechanical low back pain              Results for orders placed or performed during the hospital encounter of 02/03/20 (from the past 24 hour(s))   CBC with platelets differential   Result Value Ref Range    WBC 5.8 4.0 - 11.0 10e9/L    RBC Count 4.77 3.8 - 5.2 10e12/L    Hemoglobin 14.1 11.7 - 15.7 g/dL    Hematocrit 40.3 35.0 - 47.0 %    MCV 85 78 - 100 fl    MCH 29.6 26.5 - 33.0 pg    MCHC 35.0 31.5 - 36.5 g/dL    RDW 13.7 10.0 - 15.0 %    Platelet Count 207 150 - 450 10e9/L    Diff Method Automated Method     % Neutrophils 56.0 %    % Lymphocytes 30.4 %    % Monocytes 10.3 %    % Eosinophils 2.1 %    % Basophils 0.9 %    % Immature Granulocytes 0.3 %    Nucleated RBCs 0 0 /100    Absolute Neutrophil 3.3 1.6 - 8.3 10e9/L    Absolute Lymphocytes 1.8 0.8 - 5.3 10e9/L    Absolute Monocytes 0.6 0.0 - 1.3 10e9/L    Absolute Eosinophils 0.1 0.0 - 0.7 10e9/L    Absolute Basophils  0.1 0.0 - 0.2 10e9/L    Abs Immature Granulocytes 0.0 0 - 0.4 10e9/L    Absolute Nucleated RBC 0.0    Comprehensive metabolic panel   Result Value Ref Range    Sodium 140 133 - 144 mmol/L    Potassium 3.6 3.4 - 5.3 mmol/L    Chloride 106 94 - 109 mmol/L    Carbon Dioxide 29 20 - 32 mmol/L    Anion Gap 5 3 - 14 mmol/L    Glucose 81 70 - 99 mg/dL    Urea Nitrogen 11 7 - 30 mg/dL    Creatinine 0.92 0.52 - 1.04 mg/dL    GFR Estimate 66 >60 mL/min/[1.73_m2]    GFR Estimate If Black 76 >60 mL/min/[1.73_m2]    Calcium 8.8 8.5 - 10.1 mg/dL    Bilirubin Total 0.5 0.2 - 1.3 mg/dL    Albumin 3.7 3.4 - 5.0 g/dL    Protein Total 7.0 6.8 - 8.8 g/dL    Alkaline Phosphatase 58 40 - 150 U/L    ALT 28 0 - 50 U/L    AST 12 0 - 45 U/L   Lipase   Result Value Ref Range    Lipase 112 73 - 393 U/L   Amylase   Result Value Ref Range    Amylase 50 30 - 110 U/L   UA reflex to Microscopic and Culture   Result Value Ref Range    Color Urine Light Yellow     Appearance Urine Clear     Glucose Urine Negative NEG^Negative mg/dL    Bilirubin Urine Negative NEG^Negative    Ketones Urine Negative NEG^Negative mg/dL    Specific Gravity Urine 1.013 1.003 - 1.035    Blood Urine Negative NEG^Negative    pH Urine 7.0 4.7 - 8.0 pH    Protein Albumin Urine Negative NEG^Negative mg/dL    Urobilinogen mg/dL Normal 0.0 - 2.0 mg/dL    Nitrite Urine Negative NEG^Negative    Leukocyte Esterase Urine Negative NEG^Negative    Source Midstream Urine    Abd/pelvis CT - no contrast - Stone Protocol    Narrative    CT ABDOMEN PELVIS W/O CONTRAST    CLINICAL HISTORY: Female, age 64 years,  right flank pain;    Comparison:  CT scan abdomen and pelvis 12/31/2018    TECHNIQUE:  CT was performed of the abdomen and pelvis without   contrast. Sagittal, coronal and axial reconstructions were reviewed.     FINDINGS:    Abdomen/Pelvis CT:  Lung Bases:  Mild air trapping and peripheral areas of atelectasis.    Esophagus/stomach: Normal.    Liver:  Normal.    Gallbladder:  Surgically absent.    Spleen: Normal.    Pancreas: Normal.    Adrenal Glands: Normal.    Kidneys: Normal.  Ureters: Normal.  Urinary bladder: Normal.    Abdominal Aorta: Scattered atherosclerotic calcifications.  IVC: Normal.    Lymph Nodes: Normal.    Large and Small Bowel: Diverticulosis of the distal colon. No  diverticulitis.  Appendix: Normal.    Pelvic Organs:  The uterus is surgically absent.  Peritoneum: Normal.  Bony structures: No acute abnormality. Postoperative changes are seen  at the lumbosacral junction. Degenerative changes are seen throughout  the lumbar spine as well as at the symphysis pubis. Sclerotic changes  are again seen about the left SI joint.    Other Findings:  Inguinal lymph nodes are normal.      Impression    IMPRESSION:   No evidence of acute abnormality that would account for the patient's  right flank pain. No evidence of radiodense calculus of the kidneys,  ureters or bladder. The appendix is normal.    Diverticulosis of the distal colon. No diverticulitis.    DIANA GUZMAN MD       Medications   oxyCODONE (ROXICODONE) tablet 5 mg (5 mg Oral Given 2/3/20 1940)       Assessments & Plan (with Medical Decision Making)     I have reviewed the nursing notes.    I have reviewed the findings, diagnosis, plan and need for follow up with the patient.  Pain control muscle relaxants and follow-up in clinic.    New Prescriptions    METHOCARBAMOL (ROBAXIN) 750 MG TABLET    Take 1-2 tablets (750-1,500 mg) by mouth 4 times daily as needed for muscle spasms    TRAMADOL (ULTRAM) 50 MG TABLET    Take 1 tablet (50 mg) by mouth every 6 hours as needed for severe pain       Final diagnoses:   Acute right-sided low back pain without sciatica   Back muscle spasm       2/3/2020   [facility]

## 2020-02-05 ENCOUNTER — OFFICE VISIT (OUTPATIENT)
Dept: INTERNAL MEDICINE | Facility: OTHER | Age: 65
End: 2020-02-05
Attending: INTERNAL MEDICINE
Payer: COMMERCIAL

## 2020-02-05 VITALS
BODY MASS INDEX: 37.77 KG/M2 | DIASTOLIC BLOOD PRESSURE: 78 MMHG | OXYGEN SATURATION: 94 % | SYSTOLIC BLOOD PRESSURE: 128 MMHG | TEMPERATURE: 97.8 F | HEART RATE: 69 BPM | WEIGHT: 227 LBS

## 2020-02-05 DIAGNOSIS — R10.9 CHRONIC RIGHT FLANK PAIN: Primary | ICD-10-CM

## 2020-02-05 DIAGNOSIS — G89.29 CHRONIC RIGHT FLANK PAIN: Primary | ICD-10-CM

## 2020-02-05 PROCEDURE — 99213 OFFICE O/P EST LOW 20 MIN: CPT | Performed by: INTERNAL MEDICINE

## 2020-02-05 PROCEDURE — G0463 HOSPITAL OUTPT CLINIC VISIT: HCPCS

## 2020-02-05 ASSESSMENT — PAIN SCALES - GENERAL: PAINLEVEL: WORST PAIN (10)

## 2020-02-05 NOTE — NURSING NOTE
"Chief Complaint   Patient presents with     Musculoskeletal Problem       Initial /78 (BP Location: Right arm, Patient Position: Chair, Cuff Size: Adult Large)   Pulse 69   Temp 97.8  F (36.6  C) (Tympanic)   Wt 103 kg (227 lb)   SpO2 94%   BMI 37.77 kg/m   Estimated body mass index is 37.77 kg/m  as calculated from the following:    Height as of 1/13/20: 1.651 m (5' 5\").    Weight as of this encounter: 103 kg (227 lb).  Medication Reconciliation: complete  GINA DAVIDSON LPN  "

## 2020-02-05 NOTE — PROGRESS NOTES
Subjective     Cassy Avila is a 64 year old female who presents to clinic today for the following health issues:    HPI   Musculoskeletal problem/pain      Duration: 1 month    Description  Location: lower back radiating into the right side    Intensity:  severe    Accompanying signs and symptoms: spasms     History  Previous similar problem: YES  Previous evaluation:  CT    Precipitating or alleviating factors:  Trauma or overuse: no   Aggravating factors include: constant pain    Therapies tried and outcome: Tramadol and Robaxin- Havent filled, Oxy and Flexeril       Cassy presents today for follow up.  I did see her on 1/28/20 for right flank pain.  CT was ordered and she could not tolerate the pain so she was seen in the ER where CT abd/pelvis was completed and was without findings to explain her pain.  She reports 10/10 pain currently   Her labs were reviewed and also normal.      Patient Active Problem List   Diagnosis     Essential hypertension     Pulmonary embolism and infarction (H)     Contact dermatitis and other eczema, due to unspecified cause     Edema     Hyperlipidemia LDL goal <100     Neurofibromatosis, peripheral, NF1 (H)     Advanced care planning/counseling discussion     Moderate episode of recurrent major depressive disorder (H)     Long-term (current) use of anticoagulants [Z79.01]     Deep vein thrombosis (DVT) (H) [I82.409]     Lumbar spondylosis with myelopathy     Degeneration of lumbar or lumbosacral intervertebral disc     Family history of colon cancer     Statin medication not prescribed per physician orders     Vitamin D deficiency     Failed arthroplasty (H)     H/O total shoulder replacement, left     Primary insomnia     Factor V Leiden mutation (H)     Obesity (BMI 35.0-39.9) with comorbidity (H)     Past Surgical History:   Procedure Laterality Date     ------------OTHER-------------  2012    shoulder replacement; Provider: Karen     ARTHROPLASTY KNEE  6/20/2014     Procedure: ARTHROPLASTY KNEE;  Surgeon: Sean Alexander MD;  Location: [facility]     ARTHROSCOPY SHOULDER  2012    right, bone spurs      SECTION      x3     CHOLECYSTECTOMY       COLONOSCOPY  02/15/2018    [place],,polyps     elbow ulnar tunnel release       ELECTROTHERMAL THERAPY INTRADISC  2017    stimulator     ENDOSCOPIC SINUS SURGERY, SEPTOPLASTY, TURBINOPLASTY, MAXILLARY SINUSOTOMY, COMBINED N/A 2015    Procedure: COMBINED ENDOSCOPIC SINUS SURGERY, SEPTOPLASTY, TURBINOPLASTY, MAXILLARY SINUSOTOMY;  Surgeon: Seema Conn MD;  Location: [facility]     ESOPHAGOSCOPY, GASTROSCOPY, DUODENOSCOPY (EGD), COMBINED      with biopsy and endoscopic U/S     EXCISE NEUROMA LOWER EXTREMITY Left 2016    Procedure: EXCISE NEUROMA LOWER EXTREMITY;  Surgeon: Edi Reed MD;  Location: [facility]     FUSION LUMBAR ANTERIOR WITH MARCY CAGES      L5-S1     HYSTERECTOMY TOTAL ABDOMINAL, BILATERAL SALPINGO-OOPHORECTOMY, COMBINED N/A      ORTHOPEDIC SURGERY  2-15    right shoulder     ORTHOPEDIC SURGERY  8/28/15    right knee     ORTHOPEDIC SURGERY Right 2018    hip labrum tear     pionidal cyst excision       TRANSPOSITION ULNAR NERVE (ELBOW)         Social History     Tobacco Use     Smoking status: Former Smoker     Packs/day: 1.00     Years: 30.00     Pack years: 30.00     Types: Cigarettes, Pipe     Smokeless tobacco: Never Used     Tobacco comment: quit in    Substance Use Topics     Alcohol use: No     Family History   Problem Relation Age of Onset     Cancer Mother      Colon Polyps Mother      Heart Failure Mother 87        congestive, cause of death     Myocardial Infarction Mother         myocardial infarction     Myocardial Infarction Father         myocardial infarction - cause of death     C.A.D. Father      Cancer Paternal Uncle         cause of death     C.A.D. Brother      Other - See Comments Other         factor 5 - family h/o     Asthma No family hx of          Current  Outpatient Medications   Medication Sig Dispense Refill     aspirin 81 MG EC tablet Take 81 mg by mouth daily HS       Cholecalciferol (VITAMIN D3 PO) Take 3,000 mg by mouth daily AM       escitalopram (LEXAPRO) 20 MG tablet TAKE 1 TABLET BY MOUTH EVERY DAY AND 1 TABLET BY MOUTH EVERY DAY AS NEEDED 180 tablet 4     hydrochlorothiazide (HYDRODIURIL) 25 MG tablet Take 1 tablet (25 mg) by mouth daily 90 tablet 1     losartan (COZAAR) 50 MG tablet TAKE 2 TABLETS BY MOUTH ONCE DAILY 180 tablet 1     methocarbamol (ROBAXIN) 750 MG tablet Take 1-2 tablets (750-1,500 mg) by mouth 4 times daily as needed for muscle spasms 60 tablet 0     metoprolol succinate ER (TOPROL-XL) 50 MG 24 hr tablet TAKE 1 AND ONE-HALF TABLETS BY MOUTH EVERY  tablet 2     order for DME Nebulizer machine and tubing    DX:  Bronchitis 1 Device 0     traMADol (ULTRAM) 50 MG tablet Take 1 tablet (50 mg) by mouth every 6 hours as needed for severe pain 20 tablet 0     traZODone (DESYREL) 50 MG tablet Take 1-2 tablets ( mg) by mouth nightly as needed 180 tablet 1     UNABLE TO FIND CBD oil       warfarin (COUMADIN) 5 MG tablet 7.5mg Mon,Wed,Fri and 5mg all other days or as directed by warfarin clinic 150 tablet 3     Allergies   Allergen Reactions     Amlodipine Besylate Swelling     Norvasc     Amoxicillin      Atorvastatin      myualgia     Cephalexin Monohydrate Hives     Keflex     Erythromycin Base [Kdc:Yellow Dye+Erythromycin+Brilliant Blue Fcf] Nausea and Vomiting     Meloxicam Other (See Comments)     Mobic - confusion, depression     Adhesive Tape Rash     Prochlorperazine Edisylate Swelling and Rash     Compazine     Prochlorperazine Maleate Swelling and Rash     BP Readings from Last 3 Encounters:   02/05/20 128/78   02/03/20 150/94   01/28/20 131/82    Wt Readings from Last 3 Encounters:   02/05/20 103 kg (227 lb)   02/03/20 100.7 kg (222 lb)   01/28/20 101.6 kg (224 lb)                      Reviewed and updated as needed this  visit by Provider         Review of Systems   ROS COMP: Constitutional, HEENT, cardiovascular, pulmonary, gi and gu systems are negative, except as otherwise noted.      Objective    /78 (BP Location: Right arm, Patient Position: Chair, Cuff Size: Adult Large)   Pulse 69   Temp 97.8  F (36.6  C) (Tympanic)   Wt 103 kg (227 lb)   SpO2 94%   BMI 37.77 kg/m    Body mass index is 37.77 kg/m .  Physical Exam   GENERAL: Alert and no distress  ABDOMEN: Reproducible pain in right flank just below 12th rib with palpation  MS: no gross musculoskeletal defects noted, no edema  SKIN: no suspicious lesions or rashes  PSYCH: mentation appears normal, affect normal/bright    Diagnostic Test Results:    CT ABDOMEN PELVIS W/O CONTRAST     CLINICAL HISTORY: Female, age 64 years,  right flank pain;     Comparison:  CT scan abdomen and pelvis 12/31/2018     TECHNIQUE:  CT was performed of the abdomen and pelvis without   contrast. Sagittal, coronal and axial reconstructions were reviewed.      FINDINGS:     Abdomen/Pelvis CT:  Lung Bases:  Mild air trapping and peripheral areas of atelectasis.     Esophagus/stomach: Normal.     Liver:  Normal.     Gallbladder: Surgically absent.     Spleen: Normal.     Pancreas: Normal.     Adrenal Glands: Normal.     Kidneys: Normal.  Ureters: Normal.  Urinary bladder: Normal.     Abdominal Aorta: Scattered atherosclerotic calcifications.  IVC: Normal.     Lymph Nodes: Normal.     Large and Small Bowel: Diverticulosis of the distal colon. No  diverticulitis.  Appendix: Normal.     Pelvic Organs:  The uterus is surgically absent.  Peritoneum: Normal.  Bony structures: No acute abnormality. Postoperative changes are seen  at the lumbosacral junction. Degenerative changes are seen throughout  the lumbar spine as well as at the symphysis pubis. Sclerotic changes  are again seen about the left SI joint.     Other Findings:  Inguinal lymph nodes are normal.                                                                       IMPRESSION:   No evidence of acute abnormality that would account for the patient's  right flank pain. No evidence of radiodense calculus of the kidneys,  ureters or bladder. The appendix is normal.     Diverticulosis of the distal colon. No diverticulitis.     DIANA GUZMAN MD     Assessment & Plan   Problem List Items Addressed This Visit     None      Visit Diagnoses     Chronic right flank pain    -  Primary    Relevant Orders    MR Abdomen w/o Contrast               Facundo Pittman,   [facility]

## 2020-02-06 ENCOUNTER — ANCILLARY PROCEDURE (OUTPATIENT)
Dept: MAMMOGRAPHY | Facility: OTHER | Age: 65
End: 2020-02-06
Attending: NURSE PRACTITIONER
Payer: MEDICARE

## 2020-02-06 DIAGNOSIS — Z12.31 ENCOUNTER FOR SCREENING MAMMOGRAM FOR BREAST CANCER: ICD-10-CM

## 2020-02-06 PROCEDURE — 77067 SCR MAMMO BI INCL CAD: CPT | Mod: TC

## 2020-02-17 ENCOUNTER — TELEPHONE (OUTPATIENT)
Dept: FAMILY MEDICINE | Facility: OTHER | Age: 65
End: 2020-02-17

## 2020-03-04 ENCOUNTER — ANTICOAGULATION THERAPY VISIT (OUTPATIENT)
Dept: ANTICOAGULATION | Facility: OTHER | Age: 65
End: 2020-03-04

## 2020-03-04 ENCOUNTER — TRANSFERRED RECORDS (OUTPATIENT)
Dept: HEALTH INFORMATION MANAGEMENT | Facility: CLINIC | Age: 65
End: 2020-03-04

## 2020-03-04 DIAGNOSIS — Z79.01 LONG TERM CURRENT USE OF ANTICOAGULANT THERAPY: ICD-10-CM

## 2020-03-04 DIAGNOSIS — I26.99 PULMONARY EMBOLISM AND INFARCTION (H): ICD-10-CM

## 2020-03-04 DIAGNOSIS — I82.409 DEEP VEIN THROMBOSIS (DVT) (H): ICD-10-CM

## 2020-03-04 LAB — INR PPP: 2.5 (ref 0.9–1.1)

## 2020-03-04 NOTE — PROGRESS NOTES
ANTICOAGULATION FOLLOW-UP CLINIC VISIT    Patient Name:  Cassy Avila  Date:  3/4/2020  Contact Type:  Telephone    SUBJECTIVE:  Patient Findings     Comments:   Received INR results from [employer]. Call placed to patient and spoke to patient re: INR results, warfarin dosing/INR recheck date. Patient to return call to warfarin clinic with changes in bruising/bleeding, new changes in diet/meds/activity or questions.  Patient verbalized understanding.         Clinical Outcomes     Negatives:   Major bleeding event, Thromboembolic event, Anticoagulation-related hospital admission, Anticoagulation-related ED visit, Anticoagulation-related fatality    Comments:   Received INR results from [employer]. Call placed to patient and spoke to patient re: INR results, warfarin dosing/INR recheck date. Patient to return call to warfarin clinic with changes in bruising/bleeding, new changes in diet/meds/activity or questions.  Patient verbalized understanding.            OBJECTIVE    INR   Date Value Ref Range Status   2020 2.5 (A) 0.90 - 1.10 Final       ASSESSMENT / PLAN  INR assessment THER    Recheck INR In: 6 WEEKS    INR Location Home INR      Anticoagulation Summary  As of 3/4/2020    INR goal:   2.0-3.0   TTR:   64.4 % (1 y)   INR used for dosin.5 (3/4/2020)   Warfarin maintenance plan:   7.5 mg (5 mg x 1.5) every Mon, Wed, Fri; 5 mg (5 mg x 1) all other days   Full warfarin instructions:   7.5 mg every Mon, Wed, Fri; 5 mg all other days   Weekly warfarin total:   42.5 mg   Plan last modified:   Olivia Cates RN (2020)   Next INR check:   4/15/2020   Priority:   Maintenance   Target end date:   Indefinite    Indications    Long-term (current) use of anticoagulants [Z79.01] [Z79.01]  Pulmonary embolism and infarction (H) [I26.99]  Deep vein thrombosis (DVT) (H) [I82.409] [I82.409]             Anticoagulation Episode Summary     INR check location:   Home Draw    Preferred lab:       Send INR reminders to:     ANTICOAG POOL    Comments:   PST - [facility].  Shoulder surgery 8/2/19, warfarin hold x 5 days. Lovenox Minnie Saavedra        Anticoagulation Care Providers     Provider Role Specialty Phone number    Minnie JANINA [facility] [phone]            See the Encounter Report to view Anticoagulation Flowsheet and Dosing Calendar (Go to Encounters tab in chart review, and find the Anticoagulation Therapy Visit)        Olivia Cates RN

## 2020-04-06 ENCOUNTER — TRANSFERRED RECORDS (OUTPATIENT)
Dept: HEALTH INFORMATION MANAGEMENT | Facility: CLINIC | Age: 65
End: 2020-04-06

## 2020-05-04 ENCOUNTER — ANTICOAGULATION THERAPY VISIT (OUTPATIENT)
Dept: ANTICOAGULATION | Facility: OTHER | Age: 65
End: 2020-05-04

## 2020-05-04 DIAGNOSIS — I26.99 PULMONARY EMBOLISM AND INFARCTION (H): ICD-10-CM

## 2020-05-04 DIAGNOSIS — Z79.01 LONG TERM CURRENT USE OF ANTICOAGULANT THERAPY: ICD-10-CM

## 2020-05-04 DIAGNOSIS — I82.409 DEEP VEIN THROMBOSIS (DVT) (H): ICD-10-CM

## 2020-05-04 LAB — INR PPP: 2.4 (ref 0.9–1.1)

## 2020-05-04 NOTE — PROGRESS NOTES
ANTICOAGULATION FOLLOW-UP CLINIC VISIT    Patient Name:  Cassy Avila  Date:  2020  Contact Type:  received a fax from [employer] and called patient, left voicemail on mobile phone RE: warfarin dosing/INR recheck date    SUBJECTIVE:  Patient Findings     Positives:   Bruising    Comments:   Received INR results from [employer]. Call placed to patient and left a message on mobile voicemail re: INR results, warfarin dosing/INR recheck date. Patient to return call to warfarin clinic with changes in bruising/bleeding, new changes in diet/meds/activity or questions.          Clinical Outcomes     Negatives:   Major bleeding event, Thromboembolic event, Anticoagulation-related hospital admission, Anticoagulation-related ED visit, Anticoagulation-related fatality    Comments:   Received INR results from [employer]. Call placed to patient and left a message on mobile voicemail re: INR results, warfarin dosing/INR recheck date. Patient to return call to warfarin clinic with changes in bruising/bleeding, new changes in diet/meds/activity or questions.             OBJECTIVE    INR   Date Value Ref Range Status   2020 2.4 (A) 0.90 - 1.10 Final       ASSESSMENT / PLAN  INR assessment THER    Recheck INR In: 6 WEEKS    INR Location Home INR      Anticoagulation Summary  As of 2020    INR goal:   2.0-3.0   TTR:   65.5 % (1 y)   INR used for dosin.4 (2020)   Warfarin maintenance plan:   7.5 mg (5 mg x 1.5) every Mon, Wed, Fri; 5 mg (5 mg x 1) all other days   Full warfarin instructions:   7.5 mg every Mon, Wed, Fri; 5 mg all other days   Weekly warfarin total:   42.5 mg   No change documented:   Loan, Olivia, RN   Plan last modified:   Olivia Cates RN (2020)   Next INR check:   6/15/2020   Priority:   Maintenance   Target end date:   Indefinite    Indications    Long-term (current) use of anticoagulants [Z79.01] [Z79.01]  Pulmonary embolism and infarction (H) [I26.99]  Deep vein thrombosis (DVT) (H) [I82.409]  [I82.409]             Anticoagulation Episode Summary     INR check location:   Home Draw    Preferred lab:       Send INR reminders to:    ANTICOAG POOL    Comments:   PST - [facility].  Shoulder surgery 8/2/19, warfarin hold x 5 days. Lovenox bridge Minnie Ferrara        Anticoagulation Care Providers     Provider Role Specialty Phone number    Eliz Hartman, CNP Referring [facility] [phone]            See the Encounter Report to view Anticoagulation Flowsheet and Dosing Calendar (Go to Encounters tab in chart review, and find the Anticoagulation Therapy Visit)        Olivia Cates RN

## 2020-06-12 NOTE — PROGRESS NOTES
[facility] [place]  [address]  [place] [address]  [phone]  Dept: [phone]    PRE-OP EVALUATION:  Today's date: 2020    Cassy Avila (: 1955) presents for pre-operative evaluation assessment as requested by Dr. Alexander.  She requires evaluation and anesthesia risk assessment prior to undergoing surgery/procedure for treatment of Right shoulder arthritis.    Proposed Surgery/ Procedure: Right shoulder total arthroplasty  Date of Surgery/ Procedure: 2020  Time of Surgery/ Procedure: [facility]  Hospital/Surgical Facility: [facility]  Primary Physician: Eliz Hartman  Type of Anesthesia Anticipated: to be determined    Patient has a Health Care Directive or Living Will:  NO    1. NO - Do you have a history of heart attack, stroke, stent, bypass or surgery on an artery in the head, neck, heart or legs?  2. NO - Do you ever have any pain or discomfort in your chest?  3. NO - Do you have a history of  Heart Failure?  4. NO - Are you troubled by shortness of breath when: walking on the level, up a slight hill or at night?  5. NO - Do you currently have a cold, bronchitis or other respiratory infection?  6. YES - Do you have a cough, shortness of breath or wheezing? Dry cough for 3 years  7. NO - Do you sometimes get pains in the calves of your legs when you walk?  8. YES - Do you or anyone in your family have previous history of blood clots? Pulmonary embolism in  and   9. YES - Do you or does anyone in your family have a serious bleeding problem such as prolonged bleeding following surgeries or cuts?  Mother had ITP; patient anticoagulation since  - Factor V Leiden  10. NO - Have you ever had problems with anemia or been told to take iron pills?  11. NO - Have you had any abnormal blood loss such as black, tarry or bloody stools, or abnormal vaginal bleeding?  12. NO - Have you ever had a blood transfusion?  13. NO - Have you or any of your relatives ever had problems  with anesthesia?  14. NO - Do you have sleep apnea, excessive snoring or daytime drowsiness?  15. NO - Do you have any prosthetic heart valves?  16. YES - Do you have prosthetic joints? Left knee and left shoulder  17. NO - Is there any chance that you may be pregnant?       Needs to schedule COVID test after visit     HPI:     HPI related to upcoming procedure: Patient with shoulder arthritis, pain, planning replacement.      See problem list for active medical problems.  Problems all longstanding and stable, except as noted/documented.  See ROS for pertinent symptoms related to these conditions.    DEPRESSION - Patient has a long history of Depression of moderate severity requiring medication for control with recent symptoms being stable.    HYPERLIPIDEMIA - Patient has a long history of significant Hyperlipidemia requiring medication for treatment with recent good control. Patient reports no problems or side effects with the medication.     HYPERTENSION - Patient has longstanding history of HTN , currently denies any symptoms referable to elevated blood pressure. Specifically denies chest pain, palpitations, dyspnea, orthopnea, PND or peripheral edema. Blood pressure readings have not been in normal range. Current medication regimen is as listed below. Patient denies any side effects of medication.     Weaned off hydrochlorothiazide on her own.  Had read bad things about it.  Now has some swelling.  120s/60s when on medication.  Off it now x 1-2 months.    Chronic anticoagulation - starts Lovenox Sunday bridging from Coumadin.    CBD oil - 1-2 years - helps with pain.    Left hand/thumb pain - requesting xray - pain progressive over past couple months.  Does plan to see Dr Chavez after shoulder surgery.      MEDICAL HISTORY:     Patient Active Problem List    Diagnosis Date Noted     Obesity (BMI 35.0-39.9) with comorbidity (H) 01/28/2020     Priority: Medium     Factor V Leiden mutation (H) 07/26/2019      Priority: Medium     Primary insomnia 10/31/2018     Priority: Medium     Vitamin D deficiency 07/13/2018     Priority: Medium     Statin medication not prescribed per physician orders 01/17/2018     Priority: Medium     Family history of colon cancer 01/02/2018     Priority: Medium     Lumbar spondylosis with myelopathy 07/12/2017     Priority: Medium     Degeneration of lumbar or lumbosacral intervertebral disc 07/12/2017     Priority: Medium     Long-term (current) use of anticoagulants [Z79.01] 07/20/2016     Priority: Medium     Deep vein thrombosis (DVT) (H) [I82.409] 07/20/2016     Priority: Medium     Moderate episode of recurrent major depressive disorder (H) 08/08/2014     Priority: Medium     H/O total shoulder replacement, left 06/17/2014     Priority: Medium     Failed arthroplasty (H) 01/24/2014     Priority: Medium     Advanced care planning/counseling discussion 03/12/2013     Priority: Medium     Pt has information at home , not completed       Neurofibromatosis, peripheral, NF1 (H) 08/22/2012     Priority: Medium     Problem list name updated by automated process. Provider to review       Contact dermatitis and other eczema, due to unspecified cause 06/01/2004     Priority: Medium     Pulmonary embolism and infarction (H) 04/11/2003     Priority: Medium     Problem list name updated by automated process. Provider to review       Hyperlipidemia LDL goal <100 07/11/2002     Priority: Medium     Problem list name updated by automated process. Provider to review       Edema 01/18/2002     Priority: Medium     Essential hypertension 02/20/2001     Priority: Medium     Problem list name updated by automated process. 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, congenital     Congenital factor VIII  disorder (H) 2019     Contact dermatitis and other eczema, due to unspecified cause 2004     Coughing      Edema 2002     Essential hypertension 2001     Problem list name updated by automated process. Provider to review     Factor V Leiden (H)      Factor V Leiden mutation (H) 2019     Family history of colon cancer 2018     Gastro-oesophageal reflux disease      Herpes zoster without mention of complication     resolved from problem list     Hyperlipidemia LDL goal <100 2002     Problem list name updated by automated process. Provider to review     Long term (current) use of anticoagulants 2003     Major depression 2014     Mammographic microcalcification     resolved from problem list     Migraine, unspecified, without mention of intractable migraine without mention of status migrainosus 3/5/2001     Moderate episode of recurrent major depressive disorder (H) 2014     Neurofibromatosis, peripheral, NF1 (H) 2012     Problem list name updated by automated process. Provider to review     Neurofibromatosis, unspecified(237.70) 2012     Other and unspecified hyperlipidemia 2002     Other chronic pain      Other pulmonary embolism and infarction 2003     Primary insomnia 10/31/2018     Statin medication not prescribed per physician orders 2018     Tachycardia, unspecified 2001     Thrombosis of leg      Unspecified essential hypertension 2001     Vitamin D deficiency 2018     Past Surgical History:   Procedure Laterality Date     ------------OTHER-------------      shoulder replacement; Provider: Karen     ARTHROPLASTY KNEE  2014    Procedure: ARTHROPLASTY KNEE;  Surgeon: Sean Alexander MD;  Location: [facility] OR     ARTHROSCOPY SHOULDER      right, bone spurs      SECTION      x3     CHOLECYSTECTOMY       COLONOSCOPY  02/15/2018    [place],,polyps     elbow ulnar tunnel release       ELECTROTHERMAL  THERAPY INTRADISC  2017    stimulator     ENDOSCOPIC SINUS SURGERY, SEPTOPLASTY, TURBINOPLASTY, MAXILLARY SINUSOTOMY, COMBINED N/A 4/29/2015    Procedure: COMBINED ENDOSCOPIC SINUS SURGERY, SEPTOPLASTY, TURBINOPLASTY, MAXILLARY SINUSOTOMY;  Surgeon: Seema Conn MD;  Location: [facility] OR     ESOPHAGOSCOPY, GASTROSCOPY, DUODENOSCOPY (EGD), COMBINED  2011    with biopsy and endoscopic U/S     EXCISE NEUROMA LOWER EXTREMITY Left 7/13/2016    Procedure: EXCISE NEUROMA LOWER EXTREMITY;  Surgeon: Edi Reed MD;  Location: [facility]     FUSION LUMBAR ANTERIOR WITH BAK CAGES      L5-S1     HYSTERECTOMY TOTAL ABDOMINAL, BILATERAL SALPINGO-OOPHORECTOMY, COMBINED N/A      ORTHOPEDIC SURGERY  2-15    right shoulder     ORTHOPEDIC SURGERY  8/28/15    right knee     ORTHOPEDIC SURGERY Right 06/11/2018    hip labrum tear     pionidal cyst excision       TRANSPOSITION ULNAR NERVE (ELBOW)       Current Outpatient Medications   Medication Sig Dispense Refill     aspirin 81 MG EC tablet Take 81 mg by mouth daily HS       Cholecalciferol (VITAMIN D3 PO) Take 3,000 mg by mouth daily AM       escitalopram (LEXAPRO) 20 MG tablet TAKE 1 TABLET BY MOUTH EVERY DAY AND 1 TABLET BY MOUTH EVERY DAY AS NEEDED 180 tablet 4     HEMP OIL OR EXTRACT OR OTHER CBD CANNABINOID, NOT MEDICAL CANNABIS,        hydrochlorothiazide (HYDRODIURIL) 25 MG tablet Take 1 tablet (25 mg) by mouth daily 90 tablet 1     losartan (COZAAR) 50 MG tablet Take 2 tablets by mouth once daily 180 tablet 0     methocarbamol (ROBAXIN) 750 MG tablet Take 1-2 tablets (750-1,500 mg) by mouth 4 times daily as needed for muscle spasms 60 tablet 0     metoprolol succinate ER (TOPROL-XL) 50 MG 24 hr tablet TAKE 1 AND ONE-HALF TABLETS BY MOUTH EVERY  tablet 2     order for DME Nebulizer machine and tubing    DX:  Bronchitis 1 Device 0     oxyCODONE (ROXICODONE) 5 MG tablet        traMADol (ULTRAM) 50 MG tablet        traZODone (DESYREL) 50 MG tablet Take 1-2 tablets  ( mg) by mouth nightly as needed 180 tablet 1     UNABLE TO FIND CBD oil       vitamin B complex with vitamin C (VITAMIN  B COMPLEX) tablet Take 1 tablet by mouth daily       warfarin (COUMADIN) 5 MG tablet 7.5mg Mon,Wed,Fri and 5mg all other days or as directed by warfarin clinic 150 tablet 3     [START ON 6/21/2020] enoxaparin ANTICOAGULANT (LOVENOX) 40 MG/0.4ML syringe Inject 0.4 mLs (40 mg) Subcutaneous daily (with breakfast) for 10 doses (Patient not taking: Reported on 6/17/2020) 10 Syringe 1     OTC products: None, except as noted above    Allergies   Allergen Reactions     Amlodipine Besylate Swelling     Norvasc     Amoxicillin      Atorvastatin      myualgia     Cephalexin Monohydrate Hives     Keflex     Erythromycin Base [Kdc:Yellow Dye+Erythromycin+Brilliant Blue Fcf] Nausea and Vomiting     Meloxicam Other (See Comments)     Mobic - confusion, depression     Adhesive Tape Rash     Prochlorperazine Edisylate Swelling and Rash     Compazine     Prochlorperazine Maleate Swelling and Rash      Latex Allergy: NO    Social History     Tobacco Use     Smoking status: Former Smoker     Packs/day: 1.00     Years: 30.00     Pack years: 30.00     Types: Cigarettes, Pipe     Smokeless tobacco: Never Used     Tobacco comment: quit in 1999   Substance Use Topics     Alcohol use: No     History   Drug Use No       REVIEW OF SYSTEMS:   Constitutional, neuro, ENT, endocrine, pulmonary, cardiac, gastrointestinal, genitourinary, musculoskeletal, integument and psychiatric systems are negative, except as otherwise noted.    EXAM:   BP (!) 140/82 (BP Location: Right arm, Patient Position: Sitting, Cuff Size: Adult Regular)   Pulse 70   Temp 98.2  F (36.8  C) (Tympanic)   Resp 20   Wt 103 kg (227 lb)   SpO2 95%   BMI 37.77 kg/m      GENERAL APPEARANCE: alert, no distress, cooperative and over weight     EYES: EOMI, PERRL     HENT: ear canals and TM's normal and nose and mouth without ulcers or lesions; blue PE  tube in place, left ear     NECK: no adenopathy, no asymmetry, masses, or scars and thyroid normal to palpation     RESP: lungs clear to auscultation - no rales, rhonchi or wheezes     CV: regular rates and rhythm, normal S1 S2, no S3 or S4 and no murmur, click or rub     ABDOMEN:  soft, nontender, no HSM or masses and bowel sounds normal     MS: normal muscle tone, peripheral pulses normal and trace ankle edema; tender base of left thumb base     SKIN: no suspicious lesions or rashes     NEURO: Normal strength and tone, sensory exam grossly normal, mentation intact and speech normal     PSYCH: mentation appears normal. and affect normal/bright     LYMPHATICS: No cervical adenopathy    DIAGNOSTICS:   EKG: appears normal, NSR, normal axis, normal intervals, no acute ST/T changes c/w ischemia, no LVH by voltage criteria, unchanged from previous tracings  Results for orders placed or performed in visit on 06/17/20 (from the past 24 hour(s))   CBC with platelets and differential   Result Value Ref Range    WBC 6.2 4.0 - 11.0 10e9/L    RBC Count 4.58 3.8 - 5.2 10e12/L    Hemoglobin 13.4 11.7 - 15.7 g/dL    Hematocrit 38.7 35.0 - 47.0 %    MCV 85 78 - 100 fl    MCH 29.3 26.5 - 33.0 pg    MCHC 34.6 31.5 - 36.5 g/dL    RDW 14.0 10.0 - 15.0 %    Platelet Count 218 150 - 450 10e9/L    Diff Method Automated Method     % Neutrophils 55.8 %    % Lymphocytes 31.1 %    % Monocytes 8.7 %    % Eosinophils 3.2 %    % Basophils 1.0 %    % Immature Granulocytes 0.2 %    Nucleated RBCs 0 0 /100    Absolute Neutrophil 3.5 1.6 - 8.3 10e9/L    Absolute Lymphocytes 1.9 0.8 - 5.3 10e9/L    Absolute Monocytes 0.5 0.0 - 1.3 10e9/L    Absolute Eosinophils 0.2 0.0 - 0.7 10e9/L    Absolute Basophils 0.1 0.0 - 0.2 10e9/L    Abs Immature Granulocytes 0.0 0 - 0.4 10e9/L    Absolute Nucleated RBC 0.0    Comprehensive metabolic panel (BMP + Alb, Alk Phos, ALT, AST, Total. Bili, TP)   Result Value Ref Range    Sodium 142 133 - 144 mmol/L    Potassium  4.2 3.4 - 5.3 mmol/L    Chloride 109 94 - 109 mmol/L    Carbon Dioxide 29 20 - 32 mmol/L    Anion Gap 4 3 - 14 mmol/L    Glucose 135 (H) 70 - 99 mg/dL    Urea Nitrogen 11 7 - 30 mg/dL    Creatinine 0.95 0.52 - 1.04 mg/dL    GFR Estimate 63 >60 mL/min/[1.73_m2]    GFR Estimate If Black 73 >60 mL/min/[1.73_m2]    Calcium 8.8 8.5 - 10.1 mg/dL    Bilirubin Total 0.4 0.2 - 1.3 mg/dL    Albumin 3.7 3.4 - 5.0 g/dL    Protein Total 7.3 6.8 - 8.8 g/dL    Alkaline Phosphatase 72 40 - 150 U/L    ALT 32 0 - 50 U/L    AST 17 0 - 45 U/L   ESR: Erythrocyte sedimentation rate   Result Value Ref Range    Sed Rate 9 0 - 30 mm/h   INR   Result Value Ref Range    INR 1.72 (H) 0.86 - 1.14   UA reflex to Microscopic and Culture    Specimen: Midstream Urine   Result Value Ref Range    Color Urine Yellow     Appearance Urine Clear     Glucose Urine Negative NEG^Negative mg/dL    Bilirubin Urine Negative NEG^Negative    Ketones Urine Negative NEG^Negative mg/dL    Specific Gravity Urine 1.019 1.003 - 1.035    Blood Urine Negative NEG^Negative    pH Urine 7.0 4.7 - 8.0 pH    Protein Albumin Urine Negative NEG^Negative mg/dL    Urobilinogen mg/dL Normal 0.0 - 2.0 mg/dL    Nitrite Urine Negative NEG^Negative    Leukocyte Esterase Urine Moderate (A) NEG^Negative    Source Midstream Urine     RBC Urine 2 0 - 2 /HPF    WBC Urine 14 (H) 0 - 5 /HPF    Bacteria Urine Few (A) NEG^Negative /HPF    Squamous Epithelial /HPF Urine 4 (H) 0 - 1 /HPF    Mucous Urine Present (A) NEG^Negative /LPF         Recent Labs   Lab Test 05/02/20 03/04/20 02/03/20  1451  09/27/19  1137  11/05/13  1528   HGB  --   --  14.1  --  13.9   < >  --    PLT  --   --  207  --  200   < >  --    INR 2.4* 2.5*  --    < >  --    < >  --    NA  --   --  140  --  139   < >  --    POTASSIUM  --   --  3.6  --  3.8   < >  --    CR  --   --  0.92  --  0.91   < >  --    A1C  --   --   --   --   --   --  5.5    < > = values in this interval not displayed.      INR 2.4 Sunday  6/14/2020.  Stress test negative 10/2019.  IMPRESSION:   Reason for surgery/procedure: right total shoulder arthroplasty  Diagnosis/reason for consult: cardiopulmonary clearance    The proposed surgical procedure is considered INTERMEDIATE risk.    REVISED CARDIAC RISK INDEX  The patient has the following serious cardiovascular risks for perioperative complications such as (MI, PE, VFib and 3  AV Block):  No serious cardiac risks  INTERPRETATION: 0 risks: Class I (very low risk - 0.4% complication rate)    The patient has the following additional risks for perioperative complications:  Morbid obesity  Chronic anticoagulation      ICD-10-CM    1. Preop general physical exam  Z01.818 EKG 12-lead complete w/read - (Clinic Performed)     CBC with platelets and differential     Comprehensive metabolic panel (BMP + Alb, Alk Phos, ALT, AST, Total. Bili, TP)     UA reflex to Microscopic and Culture     ESR: Erythrocyte sedimentation rate     INR   2. Shoulder arthritis  M19.019 EKG 12-lead complete w/read - (Clinic Performed)     CBC with platelets and differential     Comprehensive metabolic panel (BMP + Alb, Alk Phos, ALT, AST, Total. Bili, TP)     UA reflex to Microscopic and Culture   3. Obesity (BMI 35.0-39.9) with comorbidity (H)  E66.01    4. Hyperlipidemia LDL goal <100  E78.5    5. Essential hypertension  I10 hydrochlorothiazide (HYDRODIURIL) 25 MG tablet   6. Moderate episode of recurrent major depressive disorder (H)  F33.1    7. Long-term (current) use of anticoagulants [Z79.01]  Z79.01 INR   8. Pain of left hand  M79.642 XR Hand Left G/E 3 Views       RECOMMENDATIONS:       Cardiovascular Risk  Performs 4 METs exercise without symptoms (Light housework (dusting, washing dishes) and Climb a flight of stairs) .       --Patient is to take all scheduled medications on the day of surgery EXCEPT for modifications listed below.    Anticoagulant or Antiplatelet Medication Use  ASPIRIN: Discontinue ASA 7-10 days prior  to procedure to reduce bleeding risk.  It should be resumed post-operatively.  NSAIDS: on hold        APPROVAL GIVEN to proceed with proposed procedure, without further diagnostic evaluation     Restart hydrochlorothiazide - but do not take morning of surgery.  Bridging with Lovenox per coumadin clinic.  Aspirin/NSAIDs on hold.  Will call with notable lab and xray results.    Signed Electronically by: Maranda Zurita MD    Copy of this evaluation report is provided to requesting physician.    [place] Preop Guidelines    Revised Cardiac Risk Index

## 2020-06-12 NOTE — PATIENT INSTRUCTIONS
Restart hydrochlorothiazide - but do not take morning of surgery.  Bridging with Lovenox per coumadin clinic.  Aspirin/NSAIDs on hold.  Will call with notable lab and xray results.      Before Your Surgery      Call your surgeon if there is any change in your health. This includes signs of a cold or flu (such as a sore throat, runny nose, cough, rash or fever).    Do not smoke, drink alcohol or take over the counter medicine (unless your surgeon or primary care doctor tells you to) for the 24 hours before and after surgery.    If you take prescribed drugs: Follow your doctor s orders about which medicines to take and which to stop until after surgery.    Eating and drinking prior to surgery: follow the instructions from your surgeon    Take a shower or bath the night before surgery. Use the soap your surgeon gave you to gently clean your skin. If you do not have soap from your surgeon, use your regular soap. Do not shave or scrub the surgery site.  Wear clean pajamas and have clean sheets on your bed.

## 2020-06-15 ENCOUNTER — ANTICOAGULATION THERAPY VISIT (OUTPATIENT)
Dept: ANTICOAGULATION | Facility: OTHER | Age: 65
End: 2020-06-15

## 2020-06-15 DIAGNOSIS — I82.409 DEEP VEIN THROMBOSIS (DVT) (H): ICD-10-CM

## 2020-06-15 DIAGNOSIS — I26.99 PULMONARY EMBOLISM AND INFARCTION (H): ICD-10-CM

## 2020-06-15 DIAGNOSIS — Z79.01 LONG TERM CURRENT USE OF ANTICOAGULANT THERAPY: ICD-10-CM

## 2020-06-15 NOTE — PROGRESS NOTES
ANTICOAGULATION FOLLOW-UP CLINIC VISIT    Patient Name:  Cassy Avila  Date:  6/15/2020  Contact Type:  Telephone    SUBJECTIVE:  Patient Findings     Positives:   Upcoming invasive procedure (shoulder surgery 6/24/2020)    Comments:   Call from patient stating she is having shoulder surgery on 6/24/2020. We discussed warfarin  Hold for the surgery. I will send a  Note to PCP for lovenox bridge. Patient has always been bridged with lovenox for surgeries. Patient verbalized understanding of warfarin hold dates and will wait for lovenox bridge dosing.         Clinical Outcomes     Negatives:   Major bleeding event, Thromboembolic event, Anticoagulation-related hospital admission, Anticoagulation-related ED visit, Anticoagulation-related fatality    Comments:   Call from patient stating she is having shoulder surgery on 6/24/2020. We discussed warfarin  Hold for the surgery. I will send a  Note to PCP for lovenox bridge. Patient has always been bridged with lovenox for surgeries. Patient verbalized understanding of warfarin hold dates and will wait for lovenox bridge dosing.            OBJECTIVE    No results for input(s): INR, [id number], [id number], 2AGN, F2 in the last 168 hours.    ASSESSMENT / PLAN  No question data found.  Anticoagulation Summary  As of 6/15/2020    INR goal:   2.0-3.0   TTR:   71.2 % (10.7 mo)   INR used for dosing:   No new INR was available at the time of this encounter.   Warfarin maintenance plan:   7.5 mg (5 mg x 1.5) every Mon, Wed, Fri; 5 mg (5 mg x 1) all other days   Full warfarin instructions:   6/19: Hold; 6/20: Hold; 6/21: Hold; 6/22: Hold; 6/23: Hold; Otherwise 7.5 mg every Mon, Wed, Fri; 5 mg all other days   Weekly warfarin total:   42.5 mg   Plan last modified:   Olivia Cates RN (1/8/2020)   Next INR check:   6/24/2020   Priority:   Maintenance   Target end date:   Indefinite    Indications    Long-term (current) use of anticoagulants [Z79.01] [Z79.01]  Pulmonary embolism and  infarction (H) [I26.99]  Deep vein thrombosis (DVT) (H) [I82.409] [I82.409]             Anticoagulation Episode Summary     INR check location:   Home Draw    Preferred lab:       Send INR reminders to:   DAREN CAMEJO    Comments:   PST - [facility].  Shoulder surgery 6/24/2020, warfarin hold x 5 days. Lovenox bridge per A.,Minnie  Call cell phone with any dosing. Home phone no longer correct number        Anticoagulation Care Providers     Provider Role Specialty Phone number    Eliz Hartman, CNP Referring [facility] [phone]            See the Encounter Report to view Anticoagulation Flowsheet and Dosing Calendar (Go to Encounters tab in chart review, and find the Anticoagulation Therapy Visit)        Manju Escalera RN

## 2020-06-16 ENCOUNTER — ANTICOAGULATION THERAPY VISIT (OUTPATIENT)
Dept: ANTICOAGULATION | Facility: OTHER | Age: 65
End: 2020-06-16

## 2020-06-16 DIAGNOSIS — Z79.01 LONG TERM CURRENT USE OF ANTICOAGULANT THERAPY: ICD-10-CM

## 2020-06-16 DIAGNOSIS — I82.409 DEEP VEIN THROMBOSIS (DVT) (H): ICD-10-CM

## 2020-06-16 DIAGNOSIS — I26.99 PULMONARY EMBOLISM AND INFARCTION (H): ICD-10-CM

## 2020-06-16 NOTE — PROGRESS NOTES
ANTICOAGULATION FOLLOW-UP CLINIC VISIT    Patient Name:  Cassy Avila  Date:  6/16/2020  Contact Type:  Telephone    SUBJECTIVE:  Patient Findings     Positives:   Upcoming invasive procedure (shoulder surgery 6/24/2020)    Comments:   Received note back from PCP stating that she would like patient bridged with lovenox 40mg daily while off warfarin for surgery. Call placed to patient and we discussed pre surgery warfarin hold/lovenox bridge and post surgery warfarin/lovenox dosing and INR recheck date. She verbalized understanding of all things discussed and has no questions. She has done lovenox shots many times before so has  No questions regarding giving herself a shot, needle disposal or any other questions.         Clinical Outcomes     Negatives:   Major bleeding event, Thromboembolic event, Anticoagulation-related hospital admission, Anticoagulation-related ED visit, Anticoagulation-related fatality    Comments:   Received note back from PCP stating that she would like patient bridged with lovenox 40mg daily while off warfarin for surgery. Call placed to patient and we discussed pre surgery warfarin hold/lovenox bridge and post surgery warfarin/lovenox dosing and INR recheck date. She verbalized understanding of all things discussed and has no questions. She has done lovenox shots many times before so has  No questions regarding giving herself a shot, needle disposal or any other questions.            OBJECTIVE    No results for input(s): INR, [id number], [id number], 2AGN, F2 in the last 168 hours.    ASSESSMENT / PLAN  No question data found.  Anticoagulation Summary  As of 6/16/2020    INR goal:   2.0-3.0   TTR:   71.1 % (10.7 mo)   INR used for dosing:   No new INR was available at the time of this encounter.   Warfarin maintenance plan:   7.5 mg (5 mg x 1.5) every Mon, Wed, Fri; 5 mg (5 mg x 1) all other days   Full warfarin instructions:   6/19: Hold; 6/20: Hold; 6/21: Hold; 6/22: Hold; 6/23: Hold;  6/25: 10 mg; 6/26: 10 mg; 6/27: 10 mg; Otherwise 7.5 mg every Mon, Wed, Fri; 5 mg all other days   Weekly warfarin total:   42.5 mg   Plan last modified:   Olivia Cates RN (1/8/2020)   Next INR check:   6/30/2020   Priority:   Maintenance   Target end date:   Indefinite    Indications    Long-term (current) use of anticoagulants [Z79.01] [Z79.01]  Pulmonary embolism and infarction (H) [I26.99]  Deep vein thrombosis (DVT) (H) [I82.409] [I82.409]             Anticoagulation Episode Summary     INR check location:   Home Draw    Preferred lab:       Send INR reminders to:   DAREN CAMEJO    Comments:   PST - [facility].  Shoulder surgery 6/24/2020, warfarin hold x 5 days. Lovenox bridge per A.Minnie  Call cell phone with any dosing. Home phone no longer correct number        Anticoagulation Care Providers     Provider Role Specialty Phone number    Eliz Hartman CNP Referring [facility] [phone]            See the Encounter Report to view Anticoagulation Flowsheet and Dosing Calendar (Go to Encounters tab in chart review, and find the Anticoagulation Therapy Visit)        Manju Escalera RN

## 2020-06-17 ENCOUNTER — TELEPHONE (OUTPATIENT)
Dept: FAMILY MEDICINE | Facility: OTHER | Age: 65
End: 2020-06-17

## 2020-06-17 ENCOUNTER — ANCILLARY PROCEDURE (OUTPATIENT)
Dept: GENERAL RADIOLOGY | Facility: OTHER | Age: 65
End: 2020-06-17
Attending: FAMILY MEDICINE
Payer: MEDICARE

## 2020-06-17 ENCOUNTER — OFFICE VISIT (OUTPATIENT)
Dept: FAMILY MEDICINE | Facility: OTHER | Age: 65
End: 2020-06-17
Attending: FAMILY MEDICINE
Payer: MEDICARE

## 2020-06-17 VITALS
OXYGEN SATURATION: 95 % | TEMPERATURE: 98.2 F | SYSTOLIC BLOOD PRESSURE: 140 MMHG | DIASTOLIC BLOOD PRESSURE: 82 MMHG | BODY MASS INDEX: 37.77 KG/M2 | HEART RATE: 70 BPM | WEIGHT: 227 LBS | RESPIRATION RATE: 20 BRPM

## 2020-06-17 DIAGNOSIS — R82.90 ABNORMAL URINE FINDINGS: ICD-10-CM

## 2020-06-17 DIAGNOSIS — Z79.01 LONG TERM CURRENT USE OF ANTICOAGULANT THERAPY: ICD-10-CM

## 2020-06-17 DIAGNOSIS — Z01.818 PREOP GENERAL PHYSICAL EXAM: Primary | ICD-10-CM

## 2020-06-17 DIAGNOSIS — M19.019 SHOULDER ARTHRITIS: ICD-10-CM

## 2020-06-17 DIAGNOSIS — I10 ESSENTIAL HYPERTENSION: ICD-10-CM

## 2020-06-17 DIAGNOSIS — E78.5 HYPERLIPIDEMIA LDL GOAL <100: ICD-10-CM

## 2020-06-17 DIAGNOSIS — M79.642 PAIN OF LEFT HAND: ICD-10-CM

## 2020-06-17 DIAGNOSIS — F33.1 MODERATE EPISODE OF RECURRENT MAJOR DEPRESSIVE DISORDER (H): ICD-10-CM

## 2020-06-17 DIAGNOSIS — E66.01 MORBID OBESITY (H): ICD-10-CM

## 2020-06-17 LAB
ALBUMIN SERPL-MCNC: 3.7 G/DL (ref 3.4–5)
ALBUMIN UR-MCNC: NEGATIVE MG/DL
ALP SERPL-CCNC: 72 U/L (ref 40–150)
ALT SERPL W P-5'-P-CCNC: 32 U/L (ref 0–50)
ANION GAP SERPL CALCULATED.3IONS-SCNC: 4 MMOL/L (ref 3–14)
APPEARANCE UR: CLEAR
AST SERPL W P-5'-P-CCNC: 17 U/L (ref 0–45)
BACTERIA #/AREA URNS HPF: ABNORMAL /HPF
BASOPHILS # BLD AUTO: 0.1 10E9/L (ref 0–0.2)
BASOPHILS NFR BLD AUTO: 1 %
BILIRUB SERPL-MCNC: 0.4 MG/DL (ref 0.2–1.3)
BILIRUB UR QL STRIP: NEGATIVE
BUN SERPL-MCNC: 11 MG/DL (ref 7–30)
CALCIUM SERPL-MCNC: 8.8 MG/DL (ref 8.5–10.1)
CHLORIDE SERPL-SCNC: 109 MMOL/L (ref 94–109)
CO2 SERPL-SCNC: 29 MMOL/L (ref 20–32)
COLOR UR AUTO: YELLOW
CREAT SERPL-MCNC: 0.95 MG/DL (ref 0.52–1.04)
DIFFERENTIAL METHOD BLD: NORMAL
EOSINOPHIL # BLD AUTO: 0.2 10E9/L (ref 0–0.7)
EOSINOPHIL NFR BLD AUTO: 3.2 %
ERYTHROCYTE [DISTWIDTH] IN BLOOD BY AUTOMATED COUNT: 14 % (ref 10–15)
ERYTHROCYTE [SEDIMENTATION RATE] IN BLOOD BY WESTERGREN METHOD: 9 MM/H (ref 0–30)
GFR SERPL CREATININE-BSD FRML MDRD: 63 ML/MIN/{1.73_M2}
GLUCOSE SERPL-MCNC: 135 MG/DL (ref 70–99)
GLUCOSE UR STRIP-MCNC: NEGATIVE MG/DL
HCT VFR BLD AUTO: 38.7 % (ref 35–47)
HGB BLD-MCNC: 13.4 G/DL (ref 11.7–15.7)
HGB UR QL STRIP: NEGATIVE
IMM GRANULOCYTES # BLD: 0 10E9/L (ref 0–0.4)
IMM GRANULOCYTES NFR BLD: 0.2 %
INR PPP: 1.72 (ref 0.86–1.14)
KETONES UR STRIP-MCNC: NEGATIVE MG/DL
LEUKOCYTE ESTERASE UR QL STRIP: ABNORMAL
LYMPHOCYTES # BLD AUTO: 1.9 10E9/L (ref 0.8–5.3)
LYMPHOCYTES NFR BLD AUTO: 31.1 %
MCH RBC QN AUTO: 29.3 PG (ref 26.5–33)
MCHC RBC AUTO-ENTMCNC: 34.6 G/DL (ref 31.5–36.5)
MCV RBC AUTO: 85 FL (ref 78–100)
MONOCYTES # BLD AUTO: 0.5 10E9/L (ref 0–1.3)
MONOCYTES NFR BLD AUTO: 8.7 %
MUCOUS THREADS #/AREA URNS LPF: PRESENT /LPF
NEUTROPHILS # BLD AUTO: 3.5 10E9/L (ref 1.6–8.3)
NEUTROPHILS NFR BLD AUTO: 55.8 %
NITRATE UR QL: NEGATIVE
NRBC # BLD AUTO: 0 10*3/UL
NRBC BLD AUTO-RTO: 0 /100
PH UR STRIP: 7 PH (ref 4.7–8)
PLATELET # BLD AUTO: 218 10E9/L (ref 150–450)
POTASSIUM SERPL-SCNC: 4.2 MMOL/L (ref 3.4–5.3)
PROT SERPL-MCNC: 7.3 G/DL (ref 6.8–8.8)
RBC # BLD AUTO: 4.58 10E12/L (ref 3.8–5.2)
RBC #/AREA URNS AUTO: 2 /HPF (ref 0–2)
SODIUM SERPL-SCNC: 142 MMOL/L (ref 133–144)
SOURCE: ABNORMAL
SP GR UR STRIP: 1.02 (ref 1–1.03)
SQUAMOUS #/AREA URNS AUTO: 4 /HPF (ref 0–1)
UROBILINOGEN UR STRIP-MCNC: NORMAL MG/DL (ref 0–2)
WBC # BLD AUTO: 6.2 10E9/L (ref 4–11)
WBC #/AREA URNS AUTO: 14 /HPF (ref 0–5)

## 2020-06-17 PROCEDURE — 36415 COLL VENOUS BLD VENIPUNCTURE: CPT | Mod: ZL | Performed by: FAMILY MEDICINE

## 2020-06-17 PROCEDURE — G0463 HOSPITAL OUTPT CLINIC VISIT: HCPCS | Mod: 25

## 2020-06-17 PROCEDURE — 85025 COMPLETE CBC W/AUTO DIFF WBC: CPT | Mod: ZL | Performed by: FAMILY MEDICINE

## 2020-06-17 PROCEDURE — 87086 URINE CULTURE/COLONY COUNT: CPT | Mod: ZL | Performed by: FAMILY MEDICINE

## 2020-06-17 PROCEDURE — 80053 COMPREHEN METABOLIC PANEL: CPT | Mod: ZL | Performed by: FAMILY MEDICINE

## 2020-06-17 PROCEDURE — 93005 ELECTROCARDIOGRAM TRACING: CPT

## 2020-06-17 PROCEDURE — 85652 RBC SED RATE AUTOMATED: CPT | Mod: ZL | Performed by: FAMILY MEDICINE

## 2020-06-17 PROCEDURE — 93010 ELECTROCARDIOGRAM REPORT: CPT | Performed by: INTERNAL MEDICINE

## 2020-06-17 PROCEDURE — 99214 OFFICE O/P EST MOD 30 MIN: CPT | Mod: 25 | Performed by: FAMILY MEDICINE

## 2020-06-17 PROCEDURE — 85610 PROTHROMBIN TIME: CPT | Mod: ZL | Performed by: FAMILY MEDICINE

## 2020-06-17 PROCEDURE — G0463 HOSPITAL OUTPT CLINIC VISIT: HCPCS

## 2020-06-17 PROCEDURE — 73130 X-RAY EXAM OF HAND: CPT | Mod: TC,LT

## 2020-06-17 PROCEDURE — 81001 URINALYSIS AUTO W/SCOPE: CPT | Mod: ZL | Performed by: FAMILY MEDICINE

## 2020-06-17 RX ORDER — HYDROCHLOROTHIAZIDE 25 MG/1
25 TABLET ORAL DAILY
Qty: 90 TABLET | Refills: 1 | Status: SHIPPED | OUTPATIENT
Start: 2020-06-17 | End: 2020-12-02

## 2020-06-17 RX ORDER — OXYCODONE HYDROCHLORIDE 5 MG/1
TABLET ORAL
COMMUNITY
Start: 2020-04-07 | End: 2020-12-02

## 2020-06-17 RX ORDER — TRAMADOL HYDROCHLORIDE 50 MG/1
TABLET ORAL
COMMUNITY
Start: 2020-04-07 | End: 2020-12-02

## 2020-06-17 ASSESSMENT — ANXIETY QUESTIONNAIRES
5. BEING SO RESTLESS THAT IT IS HARD TO SIT STILL: NOT AT ALL
IF YOU CHECKED OFF ANY PROBLEMS ON THIS QUESTIONNAIRE, HOW DIFFICULT HAVE THESE PROBLEMS MADE IT FOR YOU TO DO YOUR WORK, TAKE CARE OF THINGS AT HOME, OR GET ALONG WITH OTHER PEOPLE: NOT DIFFICULT AT ALL
6. BECOMING EASILY ANNOYED OR IRRITABLE: NOT AT ALL
GAD7 TOTAL SCORE: 2
7. FEELING AFRAID AS IF SOMETHING AWFUL MIGHT HAPPEN: SEVERAL DAYS
4. TROUBLE RELAXING: NOT AT ALL
1. FEELING NERVOUS, ANXIOUS, OR ON EDGE: NOT AT ALL
2. NOT BEING ABLE TO STOP OR CONTROL WORRYING: SEVERAL DAYS
3. WORRYING TOO MUCH ABOUT DIFFERENT THINGS: NOT AT ALL

## 2020-06-17 ASSESSMENT — PAIN SCALES - GENERAL: PAINLEVEL: SEVERE PAIN (7)

## 2020-06-17 ASSESSMENT — PATIENT HEALTH QUESTIONNAIRE - PHQ9: SUM OF ALL RESPONSES TO PHQ QUESTIONS 1-9: 2

## 2020-06-17 NOTE — NURSING NOTE
"Chief Complaint   Patient presents with     Pre-Op Exam       Initial BP (!) 140/82 (BP Location: Right arm, Patient Position: Sitting, Cuff Size: Adult Regular)   Pulse 70   Temp 98.2  F (36.8  C) (Tympanic)   Resp 20   Wt 103 kg (227 lb)   SpO2 95%   BMI 37.77 kg/m   Estimated body mass index is 37.77 kg/m  as calculated from the following:    Height as of 1/13/20: 1.651 m (5' 5\").    Weight as of this encounter: 103 kg (227 lb).  Medication Reconciliation: complete  Chan Cortes LPN  "

## 2020-06-18 LAB
BACTERIA SPEC CULT: NORMAL
SPECIMEN SOURCE: NORMAL

## 2020-06-18 ASSESSMENT — ANXIETY QUESTIONNAIRES: GAD7 TOTAL SCORE: 2

## 2020-06-21 ENCOUNTER — OFFICE VISIT (OUTPATIENT)
Dept: FAMILY MEDICINE | Facility: OTHER | Age: 65
End: 2020-06-21
Attending: FAMILY MEDICINE
Payer: MEDICARE

## 2020-06-21 DIAGNOSIS — Z13.9 SCREENING FOR CONDITION: Primary | ICD-10-CM

## 2020-06-21 PROCEDURE — 99000 SPECIMEN HANDLING OFFICE-LAB: CPT | Performed by: FAMILY MEDICINE

## 2020-06-21 PROCEDURE — U0003 INFECTIOUS AGENT DETECTION BY NUCLEIC ACID (DNA OR RNA); SEVERE ACUTE RESPIRATORY SYNDROME CORONAVIRUS 2 (SARS-COV-2) (CORONAVIRUS DISEASE [COVID-19]), AMPLIFIED PROBE TECHNIQUE, MAKING USE OF HIGH THROUGHPUT TECHNOLOGIES AS DESCRIBED BY CMS-2020-01-R: HCPCS | Mod: ZL | Performed by: FAMILY MEDICINE

## 2020-06-22 LAB
SARS-COV-2 RNA SPEC QL NAA+PROBE: NOT DETECTED
SPECIMEN SOURCE: NORMAL

## 2020-06-24 ENCOUNTER — TRANSFERRED RECORDS (OUTPATIENT)
Dept: HEALTH INFORMATION MANAGEMENT | Facility: CLINIC | Age: 65
End: 2020-06-24

## 2020-06-30 ENCOUNTER — ANTICOAGULATION THERAPY VISIT (OUTPATIENT)
Dept: FAMILY MEDICINE | Facility: OTHER | Age: 65
End: 2020-06-30

## 2020-06-30 DIAGNOSIS — Z79.01 LONG TERM CURRENT USE OF ANTICOAGULANT THERAPY: ICD-10-CM

## 2020-06-30 DIAGNOSIS — I82.409 DEEP VEIN THROMBOSIS (DVT) (H): ICD-10-CM

## 2020-06-30 DIAGNOSIS — I26.99 PULMONARY EMBOLISM AND INFARCTION (H): ICD-10-CM

## 2020-06-30 LAB — INR PPP: 1.5 (ref 0.9–1.1)

## 2020-06-30 NOTE — PROGRESS NOTES
ANTICOAGULATION FOLLOW-UP CLINIC VISIT    Patient Name:  Cassy Avila  Date:  2020  Contact Type:  Telephone    SUBJECTIVE:  Patient Findings     Comments:   Received INR results from patient. Call placed to patient and spoke to patient re: INR results, warfarin dosing/INR recheck date. Patient to return call to warfarin clinic with changes in bruising/bleeding, new changes in diet/meds/activity or questions. Patient verbalized understanding.           Clinical Outcomes     Negatives:   Major bleeding event, Thromboembolic event, Anticoagulation-related hospital admission, Anticoagulation-related ED visit, Anticoagulation-related fatality    Comments:   Received INR results from patient. Call placed to patient and spoke to patient re: INR results, warfarin dosing/INR recheck date. Patient to return call to warfarin clinic with changes in bruising/bleeding, new changes in diet/meds/activity or questions. Patient verbalized understanding.              OBJECTIVE    Recent labs: (last 7 days)     20   INR 1.5*       ASSESSMENT / PLAN  INR assessment SUB post procedure   Recheck INR In: 3 DAYS    INR Location Home INR      Anticoagulation Summary  As of 2020    INR goal:   2.0-3.0   TTR:   66.0 % (1 y)   INR used for dosin.5! (2020)   Warfarin maintenance plan:   7.5 mg (5 mg x 1.5) every Mon, Wed, Fri; 5 mg (5 mg x 1) all other days   Full warfarin instructions:   : 15 mg; : 10 mg; Otherwise 7.5 mg every Mon, Wed, Fri; 5 mg all other days   Weekly warfarin total:   42.5 mg   Plan last modified:   Olivia Cates RN (2020)   Next INR check:   2020   Priority:   Maintenance   Target end date:   Indefinite    Indications    Long-term (current) use of anticoagulants [Z79.01] [Z79.01]  Pulmonary embolism and infarction (H) [I26.99]  Deep vein thrombosis (DVT) (H) [I82.409] [I82.409]             Anticoagulation Episode Summary     INR check location:   Home Draw    Preferred lab:        Send INR reminders to:   DAREN CAMEJO    Comments:   PST - [facility].  Shoulder surgery 6/24/2020, warfarin hold x 5 days. Lovenox bridge Minnie Ferrara  Call cell phone with any dosing. Home phone no longer correct number        Anticoagulation Care Providers     Provider Role Specialty Phone number    Eliz Hartman, CNP Referring [facility] [phone]            See the Encounter Report to view Anticoagulation Flowsheet and Dosing Calendar (Go to Encounters tab in chart review, and find the Anticoagulation Therapy Visit)        Olivia Cates RN

## 2020-07-02 ENCOUNTER — ANTICOAGULATION THERAPY VISIT (OUTPATIENT)
Dept: ANTICOAGULATION | Facility: OTHER | Age: 65
End: 2020-07-02

## 2020-07-02 DIAGNOSIS — I82.409 DEEP VEIN THROMBOSIS (DVT) (H): ICD-10-CM

## 2020-07-02 DIAGNOSIS — Z79.01 LONG TERM CURRENT USE OF ANTICOAGULANT THERAPY: ICD-10-CM

## 2020-07-02 DIAGNOSIS — I26.99 PULMONARY EMBOLISM AND INFARCTION (H): ICD-10-CM

## 2020-07-02 LAB — INR PPP: 2 (ref 0.9–1.1)

## 2020-07-02 NOTE — PROGRESS NOTES
ANTICOAGULATION FOLLOW-UP CLINIC VISIT    Patient Name:  Cassy Avila  Date:  2020  Contact Type:  Telephone    SUBJECTIVE:  Patient Findings     Comments:   Received INR results from [employer]. Call placed to patient and spoke to patient re: INR results, warfarin dosing/INR recheck date. Patient to return call to warfarin clinic with changes in bruising/bleeding, new changes in diet/meds/activity or questions. Patient verbalized understanding.           Clinical Outcomes     Negatives:   Major bleeding event, Thromboembolic event, Anticoagulation-related hospital admission, Anticoagulation-related ED visit, Anticoagulation-related fatality    Comments:   Received INR results from [employer]. Call placed to patient and spoke to patient re: INR results, warfarin dosing/INR recheck date. Patient to return call to warfarin clinic with changes in bruising/bleeding, new changes in diet/meds/activity or questions. Patient verbalized understanding.              OBJECTIVE    Recent labs: (last 7 days)     20   INR 2.0*       ASSESSMENT / PLAN  INR assessment THER    Recheck INR In: 6 WEEKS    INR Location Home INR      Anticoagulation Summary  As of 2020    INR goal:   2.0-3.0   TTR:   65.4 % (1 y)   INR used for dosin.0 (2020)   Warfarin maintenance plan:   7.5 mg (5 mg x 1.5) every Mon, Wed, Fri; 5 mg (5 mg x 1) all other days   Full warfarin instructions:   7.5 mg every Mon, Wed, Fri; 5 mg all other days   Weekly warfarin total:   42.5 mg   No change documented:   Loan, Olivia, RN   Plan last modified:   Olivia Cates RN (2020)   Next INR check:   2020   Priority:   Maintenance   Target end date:   Indefinite    Indications    Long-term (current) use of anticoagulants [Z79.01] [Z79.01]  Pulmonary embolism and infarction (H) [I26.99]  Deep vein thrombosis (DVT) (H) [I82.409] [I82.409]             Anticoagulation Episode Summary     INR check location:   Home Draw    Preferred lab:       Send INR  reminders to:   DAREN CAMEJO    Comments:   PST - [facility].  Shoulder surgery 6/24/2020, warfarin hold x 5 days. Lovenox bridge per A.,Minnie  Call cell phone with any dosing. Home phone no longer correct number        Anticoagulation Care Providers     Provider Role Specialty Phone number    Eliz Hartman, CNP Referring [facility] [phone]            See the Encounter Report to view Anticoagulation Flowsheet and Dosing Calendar (Go to Encounters tab in chart review, and find the Anticoagulation Therapy Visit)        Olivia Cates RN

## 2020-07-06 ENCOUNTER — TRANSFERRED RECORDS (OUTPATIENT)
Dept: HEALTH INFORMATION MANAGEMENT | Facility: CLINIC | Age: 65
End: 2020-07-06

## 2020-08-27 DIAGNOSIS — I10 BENIGN ESSENTIAL HYPERTENSION: ICD-10-CM

## 2020-08-27 RX ORDER — LOSARTAN POTASSIUM 50 MG/1
TABLET ORAL
Qty: 180 TABLET | Refills: 0 | Status: SHIPPED | OUTPATIENT
Start: 2020-08-27 | End: 2020-11-25

## 2020-08-27 NOTE — TELEPHONE ENCOUNTER
losartan (COZAAR) 50 MG tablet      Last Written Prescription Date:  5/26/20  Last Fill Quantity: 180,   # refills: 0  Last Office Visit: 6/17/2020    Future Office visit:

## 2020-09-04 ENCOUNTER — ANTICOAGULATION THERAPY VISIT (OUTPATIENT)
Dept: FAMILY MEDICINE | Facility: OTHER | Age: 65
End: 2020-09-04

## 2020-09-04 DIAGNOSIS — I82.409 DEEP VEIN THROMBOSIS (DVT) (H): ICD-10-CM

## 2020-09-04 DIAGNOSIS — Z79.01 LONG TERM CURRENT USE OF ANTICOAGULANT THERAPY: ICD-10-CM

## 2020-09-04 DIAGNOSIS — I26.99 PULMONARY EMBOLISM AND INFARCTION (H): ICD-10-CM

## 2020-09-04 LAB — INR PPP: 1.9 (ref 0.9–1.1)

## 2020-09-04 NOTE — PROGRESS NOTES
ANTICOAGULATION FOLLOW-UP CLINIC VISIT    Patient Name:  Cassy Avila  Date:  2020  Contact Type:  Telephone    SUBJECTIVE:  Patient Findings     Comments:   Call and message left by patient re: PSt recheck date. Call returned to patient and spoke to her re: INR result, warfarin dosing/INR recheck date. She verbalized understanding and has no questions. She has no bleeding/bruising and no new changes in diet/meds/activity. She will call warfarin clinic if any changes/issues/illness        Clinical Outcomes     Negatives:   Major bleeding event, Thromboembolic event, Anticoagulation-related hospital admission, Anticoagulation-related ED visit, Anticoagulation-related fatality    Comments:   Call and message left by patient re: PSt recheck date. Call returned to patient and spoke to her re: INR result, warfarin dosing/INR recheck date. She verbalized understanding and has no questions. She has no bleeding/bruising and no new changes in diet/meds/activity. She will call warfarin clinic if any changes/issues/illness           OBJECTIVE    Recent labs: (last 7 days)     20   INR 1.9*       ASSESSMENT / PLAN  INR assessment SUB    Recheck INR In: 6 WEEKS    INR Location Home INR      Anticoagulation Summary  As of 2020    INR goal:   2.0-3.0   TTR:   47.9 % (1 y)   INR used for dosin.9! (2020)   Warfarin maintenance plan:   7.5 mg (5 mg x 1.5) every Mon, Wed, Fri; 5 mg (5 mg x 1) all other days   Full warfarin instructions:   : 10 mg; Otherwise 7.5 mg every Mon, Wed, Fri; 5 mg all other days   Weekly warfarin total:   42.5 mg   Plan last modified:   Olivia Cates RN (2020)   Next INR check:   10/16/2020   Priority:   High   Target end date:   Indefinite    Indications    Long-term (current) use of anticoagulants [Z79.01] [Z79.01]  Pulmonary embolism and infarction (H) [I26.99]  Deep vein thrombosis (DVT) (H) [I82.409] [I82.409]             Anticoagulation Episode Summary     INR check  location:   Home Draw    Preferred lab:       Send INR reminders to:   DAREN CAMEJO    Comments:   PST - [facility].  Shoulder surgery 6/24/2020, warfarin hold x 5 days. Lovenox bridge per A.Minnie  Call cell phone with any dosing. Home phone no longer correct number        Anticoagulation Care Providers     Provider Role Specialty Phone number    Eliz Hartman, CNP Referring [facility] [phone]            See the Encounter Report to view Anticoagulation Flowsheet and Dosing Calendar (Go to Encounters tab in chart review, and find the Anticoagulation Therapy Visit)        Manju Escalera RN

## 2020-09-15 DIAGNOSIS — Z79.01 LONG TERM CURRENT USE OF ANTICOAGULANT THERAPY: Primary | ICD-10-CM

## 2020-09-15 RX ORDER — WARFARIN SODIUM 5 MG/1
TABLET ORAL
Qty: 150 TABLET | Refills: 3 | Status: SHIPPED | OUTPATIENT
Start: 2020-09-15 | End: 2021-09-28

## 2020-09-15 NOTE — TELEPHONE ENCOUNTER
Warfarin  Last Written Prescription Date: 7/15/19  Last Fill Quantity: 150 # of Refills: 3  Last Office Visit: 6/17/20

## 2020-09-16 ENCOUNTER — TRANSFERRED RECORDS (OUTPATIENT)
Dept: HEALTH INFORMATION MANAGEMENT | Facility: CLINIC | Age: 65
End: 2020-09-16

## 2020-10-19 ENCOUNTER — ANTICOAGULATION THERAPY VISIT (OUTPATIENT)
Dept: ANTICOAGULATION | Facility: OTHER | Age: 65
End: 2020-10-19

## 2020-10-19 DIAGNOSIS — I26.99 PULMONARY EMBOLISM AND INFARCTION (H): ICD-10-CM

## 2020-10-19 DIAGNOSIS — I82.409 DEEP VEIN THROMBOSIS (DVT) (H): ICD-10-CM

## 2020-10-19 DIAGNOSIS — Z79.01 LONG TERM CURRENT USE OF ANTICOAGULANT THERAPY: ICD-10-CM

## 2020-10-19 LAB — INR PPP: 2.4 (ref 0.9–1.1)

## 2020-10-19 NOTE — PROGRESS NOTES
ANTICOAGULATION FOLLOW-UP CLINIC VISIT    Patient Name:  Cassy Avila  Date:  10/19/2020  Contact Type:  Telephone    SUBJECTIVE:  Patient Findings     Comments:  PST done and result faxed to warfarin clinic by Julisa. Call placed to patient and spoke to her re: INR result, warfarin dosing/INR recheck date. She verbalized understanding and has no questions. She states she has no bleeding/bruising and no new changes in diet/meds/activity. She will call warfarin clinic if any changes/issue/illness        Clinical Outcomes     Negatives:  Major bleeding event, Thromboembolic event, Anticoagulation-related hospital admission, Anticoagulation-related ED visit, Anticoagulation-related fatality    Comments:  PST done and result faxed to warfarin clinic by Julisa. Call placed to patient and spoke to her re: INR result, warfarin dosing/INR recheck date. She verbalized understanding and has no questions. She states she has no bleeding/bruising and no new changes in diet/meds/activity. She will call warfarin clinic if any changes/issue/illness           OBJECTIVE    Recent labs: (last 7 days)     10/19/20   INR 2.4*       ASSESSMENT / PLAN  INR assessment THER    Recheck INR In: 4 WEEKS    INR Location Home INR      Anticoagulation Summary  As of 10/19/2020    INR goal:  2.0-3.0   TTR:  51.8 % (1 y)   INR used for dosin.4 (10/19/2020)   Warfarin maintenance plan:  7.5 mg (5 mg x 1.5) every Mon, Wed, Fri; 5 mg (5 mg x 1) all other days   Full warfarin instructions:  7.5 mg every Mon, Wed, Fri; 5 mg all other days   Weekly warfarin total:  42.5 mg   Plan last modified:  Olivia Cates RN (2020)   Next INR check:  2020   Priority:  High   Target end date:  Indefinite    Indications    Long-term (current) use of anticoagulants [Z79.01] [Z79.01]  Pulmonary embolism and infarction (H) [I26.99]  Deep vein thrombosis (DVT) (H) [I82.409] [I82.409]             Anticoagulation Episode Summary     INR check location:  Home  Draw    Preferred lab:      Send INR reminders to:  DAREN CAMEJO    Comments:  PST - [facility].  Shoulder surgery 6/24/2020, warfarin hold x 5 days. Lovenox bridge Minnie Ferrara  Call cell phone with any dosing. Home phone no longer correct number        Anticoagulation Care Providers     Provider Role Specialty Phone number    Eliz Hartman, CNP Referring [facility] [phone]            See the Encounter Report to view Anticoagulation Flowsheet and Dosing Calendar (Go to Encounters tab in chart review, and find the Anticoagulation Therapy Visit)        Manju Escalera RN

## 2020-10-26 ENCOUNTER — OFFICE VISIT (OUTPATIENT)
Dept: INTERNAL MEDICINE | Facility: OTHER | Age: 65
End: 2020-10-26
Attending: INTERNAL MEDICINE
Payer: MEDICARE

## 2020-10-26 VITALS
OXYGEN SATURATION: 97 % | TEMPERATURE: 97.4 F | BODY MASS INDEX: 40.6 KG/M2 | SYSTOLIC BLOOD PRESSURE: 134 MMHG | WEIGHT: 244 LBS | DIASTOLIC BLOOD PRESSURE: 78 MMHG | HEART RATE: 83 BPM

## 2020-10-26 DIAGNOSIS — I26.99 PULMONARY EMBOLISM AND INFARCTION (H): ICD-10-CM

## 2020-10-26 DIAGNOSIS — R00.2 PALPITATIONS: Primary | ICD-10-CM

## 2020-10-26 DIAGNOSIS — R61 DIAPHORESIS: ICD-10-CM

## 2020-10-26 LAB
ANION GAP SERPL CALCULATED.3IONS-SCNC: 6 MMOL/L (ref 3–14)
BUN SERPL-MCNC: 10 MG/DL (ref 7–30)
CALCIUM SERPL-MCNC: 8.9 MG/DL (ref 8.5–10.1)
CHLORIDE SERPL-SCNC: 105 MMOL/L (ref 94–109)
CO2 SERPL-SCNC: 28 MMOL/L (ref 20–32)
CREAT SERPL-MCNC: 0.94 MG/DL (ref 0.52–1.04)
GFR SERPL CREATININE-BSD FRML MDRD: 63 ML/MIN/{1.73_M2}
GLUCOSE SERPL-MCNC: 141 MG/DL (ref 70–99)
MAGNESIUM SERPL-MCNC: 2 MG/DL (ref 1.6–2.3)
POTASSIUM SERPL-SCNC: 3.3 MMOL/L (ref 3.4–5.3)
SODIUM SERPL-SCNC: 139 MMOL/L (ref 133–144)
TSH SERPL DL<=0.005 MIU/L-ACNC: 2.08 MU/L (ref 0.4–4)

## 2020-10-26 PROCEDURE — 84443 ASSAY THYROID STIM HORMONE: CPT | Mod: ZL | Performed by: INTERNAL MEDICINE

## 2020-10-26 PROCEDURE — 36415 COLL VENOUS BLD VENIPUNCTURE: CPT | Mod: ZL | Performed by: INTERNAL MEDICINE

## 2020-10-26 PROCEDURE — G0463 HOSPITAL OUTPT CLINIC VISIT: HCPCS

## 2020-10-26 PROCEDURE — 99213 OFFICE O/P EST LOW 20 MIN: CPT | Performed by: INTERNAL MEDICINE

## 2020-10-26 PROCEDURE — 80048 BASIC METABOLIC PNL TOTAL CA: CPT | Mod: ZL | Performed by: INTERNAL MEDICINE

## 2020-10-26 PROCEDURE — 83735 ASSAY OF MAGNESIUM: CPT | Mod: ZL | Performed by: INTERNAL MEDICINE

## 2020-10-26 ASSESSMENT — PAIN SCALES - GENERAL: PAINLEVEL: MILD PAIN (2)

## 2020-10-26 NOTE — NURSING NOTE
"Chief Complaint   Patient presents with     Thyroid Disease       Initial /78 (BP Location: Left arm, Patient Position: Chair, Cuff Size: Adult Large)   Pulse 83   Temp 97.4  F (36.3  C) (Tympanic)   Wt 110.7 kg (244 lb)   SpO2 97%   BMI 40.60 kg/m   Estimated body mass index is 40.6 kg/m  as calculated from the following:    Height as of 1/13/20: 1.651 m (5' 5\").    Weight as of this encounter: 110.7 kg (244 lb).  Medication Reconciliation: complete  GINA DAVIDSON LPN  "

## 2020-10-26 NOTE — PROGRESS NOTES
Subjective     Cassy Avila is a 65 year old female who presents to clinic today for the following health issues:    HPI          Cassy presents today for concerns of low blood sugars.  She reports feeling faint and shakey at times.  She also reports feeling cold.  She is concerned this is all related to a thyroid problem.      Concern - Possible Thyroid Problem  Onset: 1 month  Description: low body temp, dizziness, low blood sugars  Intensity: moderate  Progression of Symptoms:  worsening  Accompanying Signs & Symptoms: none  Previous history of similar problem: yes history of thyroid problem in the 70's  Precipitating factors:        Worsened by: none  Alleviating factors:        Improved by: none  Therapies tried and outcome: None        Review of Systems   Constitutional, HEENT, cardiovascular, pulmonary, gi and gu systems are negative, except as otherwise noted.      Objective    /78 (BP Location: Left arm, Patient Position: Chair, Cuff Size: Adult Large)   Pulse 83   Temp 97.4  F (36.3  C) (Tympanic)   Wt 110.7 kg (244 lb)   SpO2 97%   BMI 40.60 kg/m    Body mass index is 40.6 kg/m .  Physical Exam   GENERAL: healthy, alert and no distress  RESP: lungs clear to auscultation - no rales, rhonchi or wheezes  CV: regular rate and rhythm, normal S1 S2, no S3 or S4, no murmur, click or rub, no peripheral edema and peripheral pulses strong  ABDOMEN: soft, nontender, no hepatosplenomegaly, no masses and bowel sounds normal  MS: no gross musculoskeletal defects noted, no edema  SKIN: no suspicious lesions or rashes  PSYCH: mentation appears normal, affect normal/bright    No results found for this or any previous visit (from the past 24 hour(s)).  No results found for any visits on 10/26/20.  Anticoagulation Therapy Visit on 10/19/2020   Component Date Value Ref Range Status     INR 10/19/2020 2.4* 0.90 - 1.10 Final           Assessment & Plan   Problem List Items Addressed This Visit        Respiratory     Pulmonary embolism and infarction (H)      Other Visit Diagnoses     Palpitations    -  Primary    Relevant Orders    Basic metabolic panel (Completed)    TSH with free T4 reflex (Completed)    Zio Patch (RANGE) Adult Pediatric > 48 hours    Magnesium (Completed)    Diaphoresis        Relevant Orders    Basic metabolic panel (Completed)    TSH with free T4 reflex (Completed)    Zio Patch (RANGE) Adult Pediatric > 48 hours    Magnesium (Completed)               Facundo Pittman,   [facility]

## 2020-10-27 ENCOUNTER — HOSPITAL ENCOUNTER (OUTPATIENT)
Dept: CARDIOLOGY | Facility: HOSPITAL | Age: 65
Discharge: HOME | End: 2020-10-27
Attending: INTERNAL MEDICINE | Admitting: INTERNAL MEDICINE
Payer: MEDICARE

## 2020-10-27 ENCOUNTER — TELEPHONE (OUTPATIENT)
Dept: FAMILY MEDICINE | Facility: OTHER | Age: 65
End: 2020-10-27

## 2020-10-27 DIAGNOSIS — E87.6 LOW POTASSIUM SYNDROME: Primary | ICD-10-CM

## 2020-10-27 DIAGNOSIS — E87.6 LOW BLOOD POTASSIUM: Primary | ICD-10-CM

## 2020-10-27 DIAGNOSIS — R61 DIAPHORESIS: ICD-10-CM

## 2020-10-27 DIAGNOSIS — E87.6 HYPOKALEMIA: ICD-10-CM

## 2020-10-27 DIAGNOSIS — R00.2 PALPITATIONS: ICD-10-CM

## 2020-10-27 PROCEDURE — 0298T LEADLESS EKG MONITOR 3 TO 14 DAYS: CPT | Performed by: INTERNAL MEDICINE

## 2020-10-27 PROCEDURE — 0296T LEADLESS EKG MONITOR 3 TO 14 DAYS: CPT

## 2020-11-02 DIAGNOSIS — E87.6 HYPOKALEMIA: ICD-10-CM

## 2020-11-02 DIAGNOSIS — E87.6 HYPOKALEMIA: Primary | ICD-10-CM

## 2020-11-02 LAB — POTASSIUM SERPL-SCNC: 3.2 MMOL/L (ref 3.4–5.3)

## 2020-11-02 PROCEDURE — 84132 ASSAY OF SERUM POTASSIUM: CPT | Mod: ZL | Performed by: INTERNAL MEDICINE

## 2020-11-02 PROCEDURE — 36415 COLL VENOUS BLD VENIPUNCTURE: CPT | Mod: ZL | Performed by: INTERNAL MEDICINE

## 2020-11-02 RX ORDER — POTASSIUM CHLORIDE 750 MG/1
10 TABLET, EXTENDED RELEASE ORAL DAILY
Qty: 30 TABLET | Refills: 3 | Status: SHIPPED | OUTPATIENT
Start: 2020-11-02 | End: 2021-11-22

## 2020-11-02 NOTE — PROGRESS NOTES
Patient notified. Lab apt schedule.   Patient is wondering if you want her to stay on the hydrochlorothiazide?

## 2020-11-11 ENCOUNTER — ANTICOAGULATION THERAPY VISIT (OUTPATIENT)
Dept: ANTICOAGULATION | Facility: OTHER | Age: 65
End: 2020-11-11

## 2020-11-11 ENCOUNTER — TELEPHONE (OUTPATIENT)
Dept: FAMILY MEDICINE | Facility: OTHER | Age: 65
End: 2020-11-11

## 2020-11-11 DIAGNOSIS — I26.99 PULMONARY EMBOLISM AND INFARCTION (H): ICD-10-CM

## 2020-11-11 DIAGNOSIS — M25.561 RIGHT KNEE PAIN, UNSPECIFIED CHRONICITY: Primary | ICD-10-CM

## 2020-11-11 DIAGNOSIS — I82.409 DEEP VEIN THROMBOSIS (DVT) (H): ICD-10-CM

## 2020-11-11 DIAGNOSIS — Z79.01 LONG TERM CURRENT USE OF ANTICOAGULANT THERAPY: ICD-10-CM

## 2020-11-11 LAB — INR PPP: 2.9 (ref 0.9–1.1)

## 2020-11-11 NOTE — TELEPHONE ENCOUNTER
2:09 PM    Reason for Call: Phone Call    Description: Having R Knee pain and would like to get an MRI- please call    Was an appointment offered for this call? No  If yes : Appointment type              Date    Preferred method for responding to this message: Telephone Call  What is your phone number ? [phone]    If we cannot reach you directly, may we leave a detailed response at the number you provided? Yes    Can this message wait until your PCP/provider returns, if available today? YES, No, provider is in    Cassie Bhandari

## 2020-11-11 NOTE — PROGRESS NOTES
ANTICOAGULATION FOLLOW-UP CLINIC VISIT    Patient Name:  Cassy Avila  Date:  2020  Contact Type:  Telephone    SUBJECTIVE:  Patient Findings     Positives:  Change in medications (Stopped Hydrochlorothiazide and anti-depressant), Bruising (Bruising more frequently)    Comments:  PST faxed to Warfarin clinic from [facility]. Call placed to pt and spoke to her re: INR results/Warfarin dosing/INR recheck date. She states that she has quit taking her anti-depressant and Dr. Pittman has held her Hydrochlorothiazide due to low potassium levels. She also reports an increase in frequency of bruising, but states that they aren't large bruises. She verbalized understanding of instructions. She denies any other new changes in diet/meds/activity. She is to call Warfarin clinic if any changes/questions/illness.         Clinical Outcomes     Negatives:  Major bleeding event, Thromboembolic event, Anticoagulation-related hospital admission, Anticoagulation-related ED visit, Anticoagulation-related fatality    Comments:  PST faxed to Warfarin clinic from [facility]. Call placed to pt and spoke to her re: INR results/Warfarin dosing/INR recheck date. She states that she has quit taking her anti-depressant and Dr. Pittman has held her Hydrochlorothiazide due to low potassium levels. She also reports an increase in frequency of bruising, but states that they aren't large bruises. She verbalized understanding of instructions. She denies any other new changes in diet/meds/activity. She is to call Warfarin clinic if any changes/questions/illness.            OBJECTIVE    Recent labs: (last 7 days)     20   INR 2.9*       ASSESSMENT / PLAN  INR assessment THER    Recheck INR In: 5 WEEKS    INR Location Clinic      Anticoagulation Summary  As of 2020    INR goal:  2.0-3.0   TTR:  55.8 % (1 y)   INR used for dosin.9 (2020)   Warfarin maintenance plan:  7.5 mg (5 mg x 1.5) every Mon, Wed, Fri; 5 mg (5 mg x 1)  all other days   Full warfarin instructions:  7.5 mg every Mon, Wed, Fri; 5 mg all other days   Weekly warfarin total:  42.5 mg   No change documented:  Melanie Farris RN   Plan last modified:  Olivia Cates RN (1/8/2020)   Next INR check:  12/16/2020   Priority:  High   Target end date:  Indefinite    Indications    Long-term (current) use of anticoagulants [Z79.01] [Z79.01]  Pulmonary embolism and infarction (H) [I26.99]  Deep vein thrombosis (DVT) (H) [I82.409] [I82.409]             Anticoagulation Episode Summary     INR check location:  Home Draw    Preferred lab:      Send INR reminders to:  DAREN CAMEJO    Comments:  PST - [facility].  Shoulder surgery 6/24/2020, warfarin hold x 5 days. Lovenox bridge per A.,Minnie  Call cell phone with any dosing. Home phone no longer correct number        Anticoagulation Care Providers     Provider Role Specialty Phone number    Eliz Hartman CNP Referring Family Medicine [phone]            See the Encounter Report to view Anticoagulation Flowsheet and Dosing Calendar (Go to Encounters tab in chart review, and find the Anticoagulation Therapy Visit)        Melanie Farris, RN

## 2020-11-14 ENCOUNTER — HEALTH MAINTENANCE LETTER (OUTPATIENT)
Age: 65
End: 2020-11-14

## 2020-11-16 DIAGNOSIS — E87.6 HYPOKALEMIA: ICD-10-CM

## 2020-11-16 LAB — POTASSIUM SERPL-SCNC: 4.1 MMOL/L (ref 3.4–5.3)

## 2020-11-16 PROCEDURE — 36415 COLL VENOUS BLD VENIPUNCTURE: CPT | Mod: ZL | Performed by: INTERNAL MEDICINE

## 2020-11-16 PROCEDURE — 84132 ASSAY OF SERUM POTASSIUM: CPT | Mod: ZL | Performed by: INTERNAL MEDICINE

## 2020-11-18 ENCOUNTER — MYC MEDICAL ADVICE (OUTPATIENT)
Dept: INTERNAL MEDICINE | Facility: OTHER | Age: 65
End: 2020-11-18

## 2020-11-18 DIAGNOSIS — R42 LIGHTHEADEDNESS: Primary | ICD-10-CM

## 2020-11-20 ENCOUNTER — HOSPITAL ENCOUNTER (OUTPATIENT)
Dept: MRI IMAGING | Facility: HOSPITAL | Age: 65
Discharge: HOME | End: 2020-11-20
Attending: NURSE PRACTITIONER | Admitting: NURSE PRACTITIONER
Payer: MEDICARE

## 2020-11-20 DIAGNOSIS — M25.561 RIGHT KNEE PAIN, UNSPECIFIED CHRONICITY: ICD-10-CM

## 2020-11-20 PROCEDURE — 73721 MRI JNT OF LWR EXTRE W/O DYE: CPT | Mod: RT

## 2020-11-20 NOTE — RESULT ENCOUNTER NOTE
Does she have an ortho appt set up?    Impression:   1. Probable small recurrent tear posterior horn medial meniscus with  blunting of the free edge of the body which is probably postsurgical.  2. Cartilage loss over the medial tibial plateau. Linear fissuring and  cartilage over the lateral patellar facet.     KAT DACOSTA MD

## 2020-11-23 ENCOUNTER — TELEPHONE (OUTPATIENT)
Dept: FAMILY MEDICINE | Facility: OTHER | Age: 65
End: 2020-11-23

## 2020-11-23 DIAGNOSIS — S83.241A ACUTE MEDIAL MENISCUS TEAR OF RIGHT KNEE, INITIAL ENCOUNTER: Primary | ICD-10-CM

## 2020-11-23 NOTE — TELEPHONE ENCOUNTER
Left message for patient, the next step would be seeing an Ortho dr for further evaluation/treatment plan.

## 2020-11-23 NOTE — TELEPHONE ENCOUNTER
Pt updated on MRI of right knee and she is wondering what is the next step?    She does have some pain.Right knee locks up on her, more so when getting up from a sitting position or standing then will lock.      Please call pt back [phone]      Laurie Hughes RN

## 2020-11-24 DIAGNOSIS — I10 BENIGN ESSENTIAL HYPERTENSION: ICD-10-CM

## 2020-11-25 RX ORDER — LOSARTAN POTASSIUM 50 MG/1
TABLET ORAL
Qty: 180 TABLET | Refills: 0 | Status: SHIPPED | OUTPATIENT
Start: 2020-11-25 | End: 2021-02-26

## 2020-12-02 ENCOUNTER — OFFICE VISIT (OUTPATIENT)
Dept: FAMILY MEDICINE | Facility: OTHER | Age: 65
End: 2020-12-02
Attending: NURSE PRACTITIONER
Payer: COMMERCIAL

## 2020-12-02 ENCOUNTER — TELEPHONE (OUTPATIENT)
Dept: FAMILY MEDICINE | Facility: OTHER | Age: 65
End: 2020-12-02

## 2020-12-02 VITALS
HEIGHT: 65 IN | OXYGEN SATURATION: 98 % | WEIGHT: 229 LBS | TEMPERATURE: 97.7 F | HEART RATE: 70 BPM | DIASTOLIC BLOOD PRESSURE: 84 MMHG | SYSTOLIC BLOOD PRESSURE: 144 MMHG | BODY MASS INDEX: 38.15 KG/M2

## 2020-12-02 DIAGNOSIS — K59.01 SLOW TRANSIT CONSTIPATION: ICD-10-CM

## 2020-12-02 DIAGNOSIS — I10 ESSENTIAL HYPERTENSION: Primary | ICD-10-CM

## 2020-12-02 DIAGNOSIS — F33.1 MODERATE EPISODE OF RECURRENT MAJOR DEPRESSIVE DISORDER (H): Primary | ICD-10-CM

## 2020-12-02 DIAGNOSIS — I10 ESSENTIAL HYPERTENSION: ICD-10-CM

## 2020-12-02 DIAGNOSIS — Z23 NEED FOR VACCINATION: ICD-10-CM

## 2020-12-02 DIAGNOSIS — E87.6 LOW BLOOD POTASSIUM: ICD-10-CM

## 2020-12-02 DIAGNOSIS — R14.0 ABDOMINAL BLOATING: ICD-10-CM

## 2020-12-02 LAB — POTASSIUM SERPL-SCNC: 3.9 MMOL/L (ref 3.4–5.3)

## 2020-12-02 PROCEDURE — G0009 ADMIN PNEUMOCOCCAL VACCINE: HCPCS | Performed by: NURSE PRACTITIONER

## 2020-12-02 PROCEDURE — G0463 HOSPITAL OUTPT CLINIC VISIT: HCPCS | Mod: 25

## 2020-12-02 PROCEDURE — G0463 HOSPITAL OUTPT CLINIC VISIT: HCPCS

## 2020-12-02 PROCEDURE — 36415 COLL VENOUS BLD VENIPUNCTURE: CPT | Mod: ZL | Performed by: NURSE PRACTITIONER

## 2020-12-02 PROCEDURE — 84132 ASSAY OF SERUM POTASSIUM: CPT | Mod: ZL | Performed by: NURSE PRACTITIONER

## 2020-12-02 PROCEDURE — 90732 PPSV23 VACC 2 YRS+ SUBQ/IM: CPT

## 2020-12-02 PROCEDURE — 99214 OFFICE O/P EST MOD 30 MIN: CPT | Performed by: NURSE PRACTITIONER

## 2020-12-02 RX ORDER — ESCITALOPRAM OXALATE 10 MG/1
10 TABLET ORAL DAILY
Qty: 90 TABLET | Refills: 1 | Status: SHIPPED | OUTPATIENT
Start: 2020-12-02 | End: 2021-05-28

## 2020-12-02 RX ORDER — METOPROLOL SUCCINATE 50 MG/1
TABLET, EXTENDED RELEASE ORAL
Qty: 90 TABLET | Refills: 3 | Status: SHIPPED | OUTPATIENT
Start: 2020-12-02 | End: 2021-10-15

## 2020-12-02 RX ORDER — HYDROCHLOROTHIAZIDE 25 MG/1
25 TABLET ORAL DAILY
Qty: 90 TABLET | Refills: 1 | Status: SHIPPED | OUTPATIENT
Start: 2020-12-02 | End: 2021-07-08

## 2020-12-02 ASSESSMENT — ANXIETY QUESTIONNAIRES
5. BEING SO RESTLESS THAT IT IS HARD TO SIT STILL: NOT AT ALL
2. NOT BEING ABLE TO STOP OR CONTROL WORRYING: MORE THAN HALF THE DAYS
7. FEELING AFRAID AS IF SOMETHING AWFUL MIGHT HAPPEN: SEVERAL DAYS
1. FEELING NERVOUS, ANXIOUS, OR ON EDGE: SEVERAL DAYS
GAD7 TOTAL SCORE: 10
IF YOU CHECKED OFF ANY PROBLEMS ON THIS QUESTIONNAIRE, HOW DIFFICULT HAVE THESE PROBLEMS MADE IT FOR YOU TO DO YOUR WORK, TAKE CARE OF THINGS AT HOME, OR GET ALONG WITH OTHER PEOPLE: NOT DIFFICULT AT ALL
6. BECOMING EASILY ANNOYED OR IRRITABLE: NEARLY EVERY DAY
3. WORRYING TOO MUCH ABOUT DIFFERENT THINGS: MORE THAN HALF THE DAYS

## 2020-12-02 ASSESSMENT — MIFFLIN-ST. JEOR: SCORE: 1579.62

## 2020-12-02 ASSESSMENT — PATIENT HEALTH QUESTIONNAIRE - PHQ9
SUM OF ALL RESPONSES TO PHQ QUESTIONS 1-9: 9
5. POOR APPETITE OR OVEREATING: SEVERAL DAYS

## 2020-12-02 ASSESSMENT — PAIN SCALES - GENERAL: PAINLEVEL: MILD PAIN (3)

## 2020-12-02 NOTE — PROGRESS NOTES
Cassy Avila is a 65 year old female who presents to clinic today for the following health issues:        Depression Followup    How are you doing with your depression since your last visit? Worsened     Are you having other symptoms that might be associated with depression? No    Have you had a significant life event?  No     Are you feeling anxious or having panic attacks?   No    Do you have any concerns with your use of alcohol or other drugs? No       Social History     Tobacco Use     Smoking status: Former Smoker     Packs/day: 1.00     Years: 30.00     Pack years: 30.00     Types: Cigarettes, Pipe     Smokeless tobacco: Never Used     Tobacco comment: quit in 1999   Substance Use Topics     Alcohol use: No     Drug use: No       PHQ 9/27/2019 6/17/2020 12/2/2020   PHQ-9 Total Score 0 2 9   Q9: Thoughts of better off dead/self-harm past 2 weeks Not at all Not at all Not at all   F/U: Thoughts of suicide or self-harm - - -   F/U: Safety concerns - - -       MARGA-7 SCORE 9/27/2019 6/17/2020 12/2/2020   Total Score 0 2 10       Suicide Assessment Five-step Evaluation and Treatment (SAFE-T)      Hypertension Follow-up    Do you check your blood pressure regularly outside of the clinic? Yes     Are you following a low salt diet? No    Are your blood pressures ever more than 140 on the top number (systolic) OR more   than 90 on the bottom number (diastolic), for example 140/90? No      Bloating  Onset/Duration: 3 weeks   Description: Gas/Bloating  Intensity: 3/10  Progression of Symptoms: worsening  Accompanying Signs & Symptoms:  Does it feel like food gets stuck or trouble swallowing: no  Nausea: no  Vomiting (bloody?): no  Abdominal Pain: YES  Black-Tarry stools: no  Bloody stools: no          How many servings of fruits and vegetables do you eat daily?  2-3    On average, how many sweetened beverages do you drink each day (Examples: soda, juice, sweet tea, etc.  Do NOT count diet or artificially sweetened  "beverages)?   0    How many days per week do you exercise enough to make your heart beat faster? 3 or less    How many minutes a day do you exercise enough to make your heart beat faster? 20 - 29    How many days per week do you miss taking your medication? 0        Review of Systems   CONSTITUTIONAL: NEGATIVE for fever, chills, change in weight  INTEGUMENTARY/SKIN: NEGATIVE for worrisome rashes, moles or lesions  RESP: NEGATIVE for significant cough or SOB  CV: NEGATIVE for chest pain, palpitations or peripheral edema  GI: NEGATIVE for nausea, abdominal pain, heartburn, or change in bowel habits  : NEGATIVE for frequency, dysuria, or hematuria  MUSCULOSKELETAL: NEGATIVE for significant arthralgias or myalgia  NEURO: NEGATIVE for weakness, dizziness or paresthesias  PSYCHIATRIC: POSITIVE for anxiety and depressed mood          Objective    BP (!) 144/84   Pulse 70   Temp 97.7  F (36.5  C) (Tympanic)   Ht 1.651 m (5' 5\")   Wt 103.9 kg (229 lb)   SpO2 98%   BMI 38.11 kg/m    Body mass index is 38.11 kg/m .       Physical Exam   GENERAL: healthy, alert and no distress  NECK: no adenopathy, no asymmetry, masses, or scars and thyroid normal to palpation  RESP: lungs clear to auscultation - no rales, rhonchi or wheezes  CV: regular rate and rhythm, normal S1 S2, no S3 or S4, no murmur, click or rub, no peripheral edema and peripheral pulses strong  ABDOMEN: tenderness epigastric, bowel sounds normal and no palpable or pulsatile masses  MS: no gross musculoskeletal defects noted, no edema  PSYCH: mentation appears normal, affect normal/bright          1. Moderate episode of recurrent major depressive disorder (H)  - escitalopram (LEXAPRO) 10 MG tablet; Take 1 tablet (10 mg) by mouth daily  Dispense: 90 tablet; Refill: 1    2. Low blood potassium  - Potassium    3. Slow transit constipation  - Benefiber as directed    4. Essential hypertension  - metoprolol succinate ER (TOPROL-XL) 50 MG 24 hr tablet; TAKE 1 AND " ONE-HALF TABLETS BY MOUTH EVERY DAY  Dispense: 90 tablet; Refill: 3    5. Abdominal bloating  - Benefiber daily  - Magnesium daily      STACY Vasques Student    Eliz ENCARNACION  [phone]

## 2020-12-02 NOTE — NURSING NOTE
"Chief Complaint   Patient presents with     Depression       Initial BP (!) 154/86   Pulse 70   Temp 97.7  F (36.5  C) (Tympanic)   Ht 1.651 m (5' 5\")   Wt 103.9 kg (229 lb)   SpO2 98%   BMI 38.11 kg/m   Estimated body mass index is 38.11 kg/m  as calculated from the following:    Height as of this encounter: 1.651 m (5' 5\").    Weight as of this encounter: 103.9 kg (229 lb).  Medication Reconciliation: complete  Carly Bray LPN  "

## 2020-12-02 NOTE — TELEPHONE ENCOUNTER
About Dr. Pittman?  E if she wants to start there.      Eliz Hartman [facility]  [phone]

## 2020-12-02 NOTE — PATIENT INSTRUCTIONS
1. Moderate episode of recurrent major depressive disorder (H)  - escitalopram (LEXAPRO) 10 MG tablet; Take 1 tablet (10 mg) by mouth daily  Dispense: 90 tablet; Refill: 1    2. Low blood potassium  - Potassium    3. Slow transit constipation  - Benefiber as directed    4. Essential hypertension  - metoprolol succinate ER (TOPROL-XL) 50 MG 24 hr tablet; TAKE 1 AND ONE-HALF TABLETS BY MOUTH EVERY DAY  Dispense: 90 tablet; Refill: 3    5. Abdominal bloating  - Benefiber daily  - Magnesium daily      STACY Vasques Student    [facility]  [phone]

## 2020-12-02 NOTE — TELEPHONE ENCOUNTER
Dr Pittman has placed referral for pt to see cardiology, pt was transferred to [facility] to see about scheduling an appointment with Dr. Duran.

## 2020-12-02 NOTE — RESULT ENCOUNTER NOTE
hydrochlorothiazide sent to pharmacy  Potassium level is normal    [facility]  [phone]

## 2020-12-02 NOTE — LETTER
My Depression Action Plan  Name: Cassy Avila   Date of Birth 1955  Date: 12/2/2020    My doctor: Eliz Hartman   My clinic: [facility]  [address]  [place] [address]  [phone]          GREEN    ZONE   Good Control    What it looks like:     Things are going generally well. You have normal ups and downs. You may even feel depressed from time to time, but bad moods usually last less than a day.   What you need to do:  1. Continue to care for yourself (see self care plan)  2. Check your depression survival kit and update it as needed  3. Follow your physician s recommendations including any medication.  4. Do not stop taking medication unless you consult with your physician first.           YELLOW         ZONE Getting Worse    What it looks like:     Depression is starting to interfere with your life.     It may be hard to get out of bed; you may be starting to isolate yourself from others.    Symptoms of depression are starting to last most all day and this has happened for several days.     You may have suicidal thoughts but they are not constant.   What you need to do:     1. Call your care team. Your response to treatment will improve if you keep your care team informed of your progress. Yellow periods are signs an adjustment may need to be made.     2. Continue your self-care.  Just get dressed and ready for the day.  Don't give yourself time to talk yourself out of it.    3. Talk to someone in your support network.    4. Open up your Depression Self-Care Plan/Wellness Kit.           RED    ZONE Medical Alert - Get Help    What it looks like:     Depression is seriously interfering with your life.     You may experience these or other symptoms: You can t get out of bed most days, can t work or engage in other necessary activities, you have trouble taking care of basic hygiene, or basic responsibilities, thoughts of suicide or death that will not go  away, self-injurious behavior.     What you need to do:  1. Call your care team and request a same-day appointment. If they are not available (weekends or after hours) call your local crisis line, emergency room or 911.            Depression Self-Care Plan / Wellness Kit    Self-Care for Depression  Here s the deal. Your body and mind are really not as separate as most people think.  What you do and think affects how you feel and how you feel influences what you do and think. This means if you do things that people who feel good do, it will help you feel better.  Sometimes this is all it takes.  There is also a place for medication and therapy depending on how severe your depression is, so be sure to consult with your medical provider and/ or Behavioral Health Consultant if your symptoms are worsening or not improving.     In order to better manage my stress, I will:    Exercise  Get some form of exercise, every day. This will help reduce pain and release endorphins, the  feel good  chemicals in your brain. This is almost as good as taking antidepressants!  This is not the same as joining a gym and then never going! (they count on that by the way ) It can be as simple as just going for a walk or doing some gardening, anything that will get you moving.      Hygiene   Maintain good hygiene (get out of bed in the morning, make your bed, brush your teeth, take a shower, and get dressed like you were going to work, even if you are unemployed).  If your clothes don't fit try to get ones that do.    Diet  Strive to eat foods that are good for me, drink plenty of water, and avoid excessive sugar, caffeine, alcohol, and other mood-altering substances.  Some foods that are helpful in depression are: complex carbohydrates, B vitamins, flaxseed, fish or fish oil, fresh fruits and vegetables.    Psychotherapy  Agree to participate in Individual Therapy (if recommended).    Medication  If prescribed medications, I agree to take  them.  Missing doses can result in serious side effects.  I understand that drinking alcohol, or other illicit drug use, may cause potential side effects.  I will not stop my medication abruptly without first discussing it with my provider.    Staying Connected With Others  Stay in touch with my friends, family members, and my primary care provider/team.    Use your imagination  Be creative.  We all have a creative side; it doesn t matter if it s oil painting, sand castles, or mud pies! This will also kick up the endorphins.    Witness Beauty  (AKA stop and smell the roses) Take a look outside, even in mid-winter. Notice colors, textures. Watch the squirrels and birds.     Service to others  Be of service to others.  There is always someone else in need.  By helping others we can  get out of ourselves  and remember the really important things.  This also provides opportunities for practicing all the other parts of the program.    Humor  Laugh and be silly!  Adjust your TV habits for less news and crime-drama and more comedy.    Control your stress  Try breathing deep, massage therapy, biofeedback, and meditation. Find time to relax each day.     Crisis Text Line  http://www.crisistextline.org    The Crisis Text Line serves anyone, in any type of crisis, providing access to free, 24/7 support and information via the medium people already use and trust:    Here's how it works:  1.  Text 377-681 from anywhere in the [place], anytime, about any type of crisis.  2.  A live, trained Crisis Counselor receives the text and responds quickly.  3.  The volunteer Crisis Counselor will help you move from a 'hot moment to a cool moment'.    My support system    Clinic Contact:  Phone number:    Contact 1:  Phone number:    Contact 2:  Phone number:    Religion/:  Phone number:    Therapist:  Phone number:    Local crisis center:    Phone number:    Other community support:  Phone number:

## 2020-12-02 NOTE — TELEPHONE ENCOUNTER
11:34 AM    Reason for Call: Phone Call    Description: pt states she failed to mention at her appt today with Eliz Hartman that she has been having shortness of breath all the time, even at rest. Please call her back. She is requesting a referral to see Cardiology    Was an appointment offered for this call? No  If yes : Appointment type              Date    Preferred method for responding to this message: Telephone Call  What is your phone number ?[phone]    If we cannot reach you directly, may we leave a detailed response at the number you provided? Yes    Can this message wait until your PCP/provider returns, if available today? Not applicable, Provider is in today    Ashli Schuster

## 2020-12-03 ENCOUNTER — ANTICOAGULATION THERAPY VISIT (OUTPATIENT)
Dept: ANTICOAGULATION | Facility: OTHER | Age: 65
End: 2020-12-03

## 2020-12-03 ENCOUNTER — TELEPHONE (OUTPATIENT)
Dept: FAMILY MEDICINE | Facility: OTHER | Age: 65
End: 2020-12-03

## 2020-12-03 DIAGNOSIS — Z79.01 LONG TERM CURRENT USE OF ANTICOAGULANT THERAPY: ICD-10-CM

## 2020-12-03 DIAGNOSIS — I10 ESSENTIAL HYPERTENSION: Primary | ICD-10-CM

## 2020-12-03 DIAGNOSIS — I26.99 PULMONARY EMBOLISM AND INFARCTION (H): ICD-10-CM

## 2020-12-03 DIAGNOSIS — I82.409 DEEP VEIN THROMBOSIS (DVT) (H): ICD-10-CM

## 2020-12-03 ASSESSMENT — ANXIETY QUESTIONNAIRES: GAD7 TOTAL SCORE: 10

## 2020-12-03 NOTE — PROGRESS NOTES
ANTICOAGULATION FOLLOW-UP CLINIC VISIT    Patient Name:  Cassy Avila  Date:  12/3/2020  Contact Type:  Telephone    SUBJECTIVE:  Patient Findings     Positives:  Change in medications (Started taking Tumeric on 12/1)    Comments:  Call placed to Warfarin clinic re: starting Tumeric and questioning if it affects her INR. She was informed that yes it will affect INR and that if she would like to continue it we could decrease her dose. Adjusted a dose and informed her to keep taking Tumeric and recheck her INR on Monday with her self khalida. She denies any other new changes in diet/meds/activity and is to call Warfarin clinic with any other questions/issues/illness.         Clinical Outcomes     Negatives:  Major bleeding event, Thromboembolic event, Anticoagulation-related hospital admission, Anticoagulation-related ED visit, Anticoagulation-related fatality    Comments:  Call placed to Warfarin clinic re: starting Tumeric and questioning if it affects her INR. She was informed that yes it will affect INR and that if she would like to continue it we could decrease her dose. Adjusted a dose and informed her to keep taking Tumeric and recheck her INR on Monday with her self khalida. She denies any other new changes in diet/meds/activity and is to call Warfarin clinic with any other questions/issues/illness.            OBJECTIVE    No results for input(s): INR, [id number], [id number], 2AGN, F2 in the last 168 hours.    ASSESSMENT / PLAN  No question data found.  Anticoagulation Summary  As of 12/3/2020    INR goal:  2.0-3.0   TTR:  57.1 % (11.4 mo)   INR used for dosing:  No new INR was available at the time of this encounter.   Warfarin maintenance plan:  7.5 mg (5 mg x 1.5) every Mon, Wed, Fri; 5 mg (5 mg x 1) all other days   Full warfarin instructions:  12/4: 5 mg; Otherwise 7.5 mg every Mon, Wed, Fri; 5 mg all other days   Weekly warfarin total:  42.5 mg   Plan last modified:  Olivia Cates RN (1/8/2020)   Next INR  check:  12/7/2020   Priority:  High   Target end date:  Indefinite    Indications    Long-term (current) use of anticoagulants [Z79.01] [Z79.01]  Pulmonary embolism and infarction (H) [I26.99]  Deep vein thrombosis (DVT) (H) [I82.409] [I82.409]             Anticoagulation Episode Summary     INR check location:  Home Draw    Preferred lab:      Send INR reminders to:  DAREN CAMEJO    Comments:  PST - [facility].  Shoulder surgery 6/24/2020, warfarin hold x 5 days. Lovenox bridge per A.,Minnie  Call cell phone with any dosing. Home phone no longer correct number        Anticoagulation Care Providers     Provider Role Specialty Phone number    Eliz Hartman, CNP Referring Family Medicine [phone]            See the Encounter Report to view Anticoagulation Flowsheet and Dosing Calendar (Go to Encounters tab in chart review, and find the Anticoagulation Therapy Visit)        Melanie Farris RN

## 2020-12-03 NOTE — TELEPHONE ENCOUNTER
3:11 PM    Reason for Call: Phone Call    Description: Patient called and is requesting a call back from the nurse regarding a referral she is needing to cardiology. Please give her a call back    Was an appointment offered for this call? No  If yes : Appointment type              Date    Preferred method for responding to this message: Telephone Call  What is your phone number ?[phone]    If we cannot reach you directly, may we leave a detailed response at the number you provided? Yes    Can this message wait until your PCP/provider returns, if available today? Yes    Ilda De La Cruz

## 2020-12-07 ENCOUNTER — ANTICOAGULATION THERAPY VISIT (OUTPATIENT)
Dept: ANTICOAGULATION | Facility: OTHER | Age: 65
End: 2020-12-07

## 2020-12-07 DIAGNOSIS — I26.99 PULMONARY EMBOLISM AND INFARCTION (H): ICD-10-CM

## 2020-12-07 DIAGNOSIS — I82.409 DEEP VEIN THROMBOSIS (DVT) (H): ICD-10-CM

## 2020-12-07 DIAGNOSIS — Z79.01 LONG TERM CURRENT USE OF ANTICOAGULANT THERAPY: ICD-10-CM

## 2020-12-07 LAB — INR PPP: 1.9 (ref 0.9–1.1)

## 2020-12-07 NOTE — PROGRESS NOTES
ANTICOAGULATION FOLLOW-UP CLINIC VISIT    Patient Name:  Cassy Avila  Date:  2020  Contact Type:  Telephone    SUBJECTIVE:  Patient Findings     Positives:  Change in medications (Added Tumeric)    Comments:  Call placed to Warfarin clinic with INR results. Pt has recently added Tumeric into her daily medications. We discussed INR results/Warfarin dosing/INR recheck date. She denied any bleeding/bruising or any other new changes to her diet/meds/activity. She verbalized understanding of all instructions. She is to call Warfarin clinic if any questions/issues/illeness.         Clinical Outcomes     Negatives:  Major bleeding event, Thromboembolic event, Anticoagulation-related hospital admission, Anticoagulation-related ED visit, Anticoagulation-related fatality    Comments:  Call placed to Warfarin clinic with INR results. Pt has recently added Tumeric into her daily medications. We discussed INR results/Warfarin dosing/INR recheck date. She denied any bleeding/bruising or any other new changes to her diet/meds/activity. She verbalized understanding of all instructions. She is to call Warfarin clinic if any questions/issues/illeness.            OBJECTIVE    Recent labs: (last 7 days)     20   INR 1.9*       ASSESSMENT / PLAN  INR assessment SUB Added Tumeric to medications   Recheck INR In: 2 WEEKS    INR Location Clinic      Anticoagulation Summary  As of 2020    INR goal:  2.0-3.0   TTR:  60.1 % (1 y)   INR used for dosin.9 (2020)   Warfarin maintenance plan:  7.5 mg (5 mg x 1.5) every Mon, Wed, Fri; 5 mg (5 mg x 1) all other days   Full warfarin instructions:  : 5 mg; Otherwise 7.5 mg every Mon, Wed, Fri; 5 mg all other days   Weekly warfarin total:  42.5 mg   Plan last modified:  Olivia Cates RN (2020)   Next INR check:  2020   Priority:  High   Target end date:  Indefinite    Indications    Long-term (current) use of anticoagulants [Z79.01] [Z79.01]  Pulmonary  embolism and infarction (H) [I26.99]  Deep vein thrombosis (DVT) (H) [I82.409] [I82.409]             Anticoagulation Episode Summary     INR check location:  Home Draw    Preferred lab:      Send INR reminders to:  DAREN CAMEJO    Comments:  PST - [employer] [facility].  Shoulder surgery 6/24/2020, warfarin hold x 5 days. Lovenox bridge per A.,Minnie  Call cell phone with any dosing. Home phone no longer correct number        Anticoagulation Care Providers     Provider Role Specialty Phone number    Eliz Hartman CNP Referring Family Medicine [phone]            See the Encounter Report to view Anticoagulation Flowsheet and Dosing Calendar (Go to Encounters tab in chart review, and find the Anticoagulation Therapy Visit)        Melanie Farris RN

## 2020-12-11 ENCOUNTER — TRANSFERRED RECORDS (OUTPATIENT)
Dept: HEALTH INFORMATION MANAGEMENT | Facility: CLINIC | Age: 65
End: 2020-12-11

## 2020-12-11 ENCOUNTER — MEDICAL CORRESPONDENCE (OUTPATIENT)
Dept: HEALTH INFORMATION MANAGEMENT | Facility: CLINIC | Age: 65
End: 2020-12-11

## 2020-12-14 ENCOUNTER — HOSPITAL ENCOUNTER (EMERGENCY)
Facility: HOSPITAL | Age: 65
Discharge: HOME | End: 2020-12-14
Attending: NURSE PRACTITIONER | Admitting: NURSE PRACTITIONER
Payer: MEDICARE

## 2020-12-14 ENCOUNTER — NURSE TRIAGE (OUTPATIENT)
Dept: FAMILY MEDICINE | Facility: OTHER | Age: 65
End: 2020-12-14

## 2020-12-14 ENCOUNTER — APPOINTMENT (OUTPATIENT)
Dept: GENERAL RADIOLOGY | Facility: HOSPITAL | Age: 65
End: 2020-12-14
Attending: NURSE PRACTITIONER
Payer: MEDICARE

## 2020-12-14 ENCOUNTER — ANTICOAGULATION THERAPY VISIT (OUTPATIENT)
Dept: ANTICOAGULATION | Facility: OTHER | Age: 65
End: 2020-12-14

## 2020-12-14 VITALS
TEMPERATURE: 98.4 F | OXYGEN SATURATION: 94 % | HEART RATE: 71 BPM | RESPIRATION RATE: 18 BRPM | SYSTOLIC BLOOD PRESSURE: 132 MMHG | DIASTOLIC BLOOD PRESSURE: 78 MMHG

## 2020-12-14 DIAGNOSIS — I26.99 PULMONARY EMBOLISM AND INFARCTION (H): ICD-10-CM

## 2020-12-14 DIAGNOSIS — I82.409 DEEP VEIN THROMBOSIS (DVT) (H): ICD-10-CM

## 2020-12-14 DIAGNOSIS — U07.1 COVID-19: Primary | ICD-10-CM

## 2020-12-14 DIAGNOSIS — R06.02 SOB (SHORTNESS OF BREATH): ICD-10-CM

## 2020-12-14 DIAGNOSIS — R53.83 FATIGUE, UNSPECIFIED TYPE: Primary | ICD-10-CM

## 2020-12-14 DIAGNOSIS — Z79.01 LONG TERM CURRENT USE OF ANTICOAGULANT THERAPY: ICD-10-CM

## 2020-12-14 LAB
FLUAV+FLUBV RNA SPEC QL NAA+PROBE: NEGATIVE
FLUAV+FLUBV RNA SPEC QL NAA+PROBE: NEGATIVE
INR PPP: 1.9 (ref 0.9–1.1)
LABORATORY COMMENT REPORT: ABNORMAL
RSV RNA SPEC QL NAA+PROBE: NEGATIVE
SARS-COV-2 RNA SPEC QL NAA+PROBE: POSITIVE
SPECIMEN SOURCE: ABNORMAL

## 2020-12-14 PROCEDURE — G0463 HOSPITAL OUTPT CLINIC VISIT: HCPCS | Mod: 25

## 2020-12-14 PROCEDURE — 87636 SARSCOV2 & INF A&B AMP PRB: CPT | Performed by: NURSE PRACTITIONER

## 2020-12-14 PROCEDURE — C9803 HOPD COVID-19 SPEC COLLECT: HCPCS

## 2020-12-14 PROCEDURE — 99214 OFFICE O/P EST MOD 30 MIN: CPT | Performed by: NURSE PRACTITIONER

## 2020-12-14 PROCEDURE — 71045 X-RAY EXAM CHEST 1 VIEW: CPT

## 2020-12-14 ASSESSMENT — ENCOUNTER SYMPTOMS
CHILLS: 1
FATIGUE: 1
NAUSEA: 0
TROUBLE SWALLOWING: 0
SORE THROAT: 0
VOMITING: 0
RHINORRHEA: 0
SINUS PAIN: 0
PSYCHIATRIC NEGATIVE: 1
FEVER: 1
SHORTNESS OF BREATH: 1
HEADACHES: 1
WEAKNESS: 0
SINUS PRESSURE: 0
DIARRHEA: 0
LIGHT-HEADEDNESS: 0
MYALGIAS: 1
NUMBNESS: 0
PALPITATIONS: 0
COUGH: 1
DIZZINESS: 0

## 2020-12-14 NOTE — TELEPHONE ENCOUNTER
Gave pt directions below per PCP.Pt has HA,fatigue,fever today is 99.0 and little bit of cough. Moderate cough-not keeping her up at night.No SOB or wheezing. She is having pressure in her chest-2/10.This has been going on for a month.This has not worsened at all.Spoke with PCP and pt should go to ED d/t her medical history. Pt advised to go to closest ED.She said she will go to [place].Advised to go now. She states she will go with spouse first to his Covid test. Advised she should go now.Canceled covid test.    Please note.    Laurie Hughes RN

## 2020-12-14 NOTE — ED AVS SNAPSHOT
[facility]  [address] [address]  Phone: [phone]                                    Cassy Avila   MRN: [id number]    Department: [facility]   Date of Visit: 12/14/2020           After Visit Summary Signature Page    I have received my discharge instructions, and my questions have been answered. I have discussed any challenges I see with this plan with the nurse or doctor.    ..........................................................................................................................................  Patient/Patient Representative Signature      ..........................................................................................................................................  Patient Representative Print Name and Relationship to Patient    ..................................................               ................................................  Date                                   Time    ..........................................................................................................................................  Reviewed by Signature/Title    ...................................................              ..............................................  Date                                               Time          22EPIC Rev 08/18

## 2020-12-14 NOTE — ED PROVIDER NOTES
History     Chief Complaint   Patient presents with     Shortness of Breath     x1 month     Fever     up to 100.9 at home     Covid 19 Testing     Eliz Hartman sent patient here to have COVID test     HPI  Cassy Avila is a 65 year old female who presents to urgent care today for complaints of shortness of breath for over a month.  Patient also states has had fever, chills, fatigue, muscle aches, cough, headache, loss of taste. Staying hydrated.  No chest pain.  Has been doing neb treatments nightly which helps with the shortness of breath.  Denies needing refills states has plenty at home.  Declines albuterol inhaler.  Has had COVID + Contact.  Patient states I can manage at home, I just wanted to go through the drive thru for a COVID test and my doctor worries about me and sent me here.  No other concerns.      Allergies:  Allergies   Allergen Reactions     Amlodipine Besylate Swelling     Norvasc     Amoxicillin      Atorvastatin      myualgia     Cephalexin Monohydrate Hives     Keflex     Erythromycin Base [Kdc:Yellow Dye+Erythromycin+Brilliant Blue Fcf] Nausea and Vomiting     Meloxicam Other (See Comments)     Mobic - confusion, depression     Adhesive Tape Rash     Prochlorperazine Edisylate Swelling and Rash     Compazine     Prochlorperazine Maleate Swelling and Rash       Problem List:    Patient Active Problem List    Diagnosis Date Noted     Obesity (BMI 35.0-39.9) with comorbidity (H) 01/28/2020     Priority: Medium     Factor V Leiden mutation (H) 07/26/2019     Priority: Medium     Primary insomnia 10/31/2018     Priority: Medium     Vitamin D deficiency 07/13/2018     Priority: Medium     Statin medication not prescribed per physician orders 01/17/2018     Priority: Medium     Family history of colon cancer 01/02/2018     Priority: Medium     Lumbar spondylosis with myelopathy 07/12/2017     Priority: Medium     Degeneration of lumbar or lumbosacral intervertebral disc 07/12/2017     Priority:  Medium     Long-term (current) use of anticoagulants [Z79.01] 07/20/2016     Priority: Medium     Deep vein thrombosis (DVT) (H) [I82.409] 07/20/2016     Priority: Medium     Moderate episode of recurrent major depressive disorder (H) 08/08/2014     Priority: Medium     H/O total shoulder replacement, left 06/17/2014     Priority: Medium     Failed arthroplasty (H) 01/24/2014     Priority: Medium     Advanced care planning/counseling discussion 03/12/2013     Priority: Medium     Pt has information at home , not completed       Neurofibromatosis, peripheral, NF1 (H) 08/22/2012     Priority: Medium     Problem list name updated by automated process. Provider to review       Contact dermatitis and other eczema, due to unspecified cause 06/01/2004     Priority: Medium     Pulmonary embolism and infarction (H) 04/11/2003     Priority: Medium     Problem list name updated by automated process. Provider to review       Hyperlipidemia LDL goal <100 07/11/2002     Priority: Medium     Problem list name updated by automated process. Provider to review       Edema 01/18/2002     Priority: Medium     Essential hypertension 02/20/2001     Priority: Medium     Problem list name updated by automated process. 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     Congenital factor VIII disorder (H) 7/26/2019     Contact dermatitis and other eczema, due to unspecified cause 6/1/2004     Coughing      Edema 1/18/2002     Essential hypertension 2/20/2001     Factor V Leiden (H)      Factor V Leiden mutation (H) 7/26/2019     Family history of colon cancer 1/2/2018     Gastro-oesophageal reflux disease      Herpes zoster without mention of complication 2003     Hyperlipidemia LDL goal <100 7/11/2002     Long term (current) use of  anticoagulants 2003     Major depression 2014     Mammographic microcalcification      Migraine, unspecified, without mention of intractable migraine without mention of status migrainosus 3/5/2001     Moderate episode of recurrent major depressive disorder (H) 2014     Neurofibromatosis, peripheral, NF1 (H) 2012     Neurofibromatosis, unspecified(237.70) 2012     Other and unspecified hyperlipidemia 2002     Other chronic pain      Other pulmonary embolism and infarction 2003     Primary insomnia 10/31/2018     Statin medication not prescribed per physician orders 2018     Tachycardia, unspecified 2001     Thrombosis of leg      Unspecified essential hypertension 2001     Vitamin D deficiency 2018       Past Surgical History:    Past Surgical History:   Procedure Laterality Date     ------------OTHER-------------      shoulder replacement; Provider: Karen     ARTHROPLASTY KNEE  2014    Procedure: ARTHROPLASTY KNEE;  Surgeon: Sean Alexander MD;  Location: [facility]     ARTHROSCOPY SHOULDER      right, bone spurs     CA ANESTH,SHOULDER REPLACEMENT Right 2020      SECTION      x3     CHOLECYSTECTOMY       COLONOSCOPY  02/15/2018    [place],,polyps     elbow ulnar tunnel release       ELECTROTHERMAL THERAPY INTRADISC  2017    stimulator     ENDOSCOPIC SINUS SURGERY, SEPTOPLASTY, TURBINOPLASTY, MAXILLARY SINUSOTOMY, COMBINED N/A 2015    Procedure: COMBINED ENDOSCOPIC SINUS SURGERY, SEPTOPLASTY, TURBINOPLASTY, MAXILLARY SINUSOTOMY;  Surgeon: Seema Conn MD;  Location: [facility]     ESOPHAGOSCOPY, GASTROSCOPY, DUODENOSCOPY (EGD), COMBINED      with biopsy and endoscopic U/S     EXCISE NEUROMA LOWER EXTREMITY Left 2016    Procedure: EXCISE NEUROMA LOWER EXTREMITY;  Surgeon: Edi Reed MD;  Location: [facility] OR     EYE SURGERY Right 2020     FUSION LUMBAR ANTERIOR WITH MARCY CAGES      L5-S1     HYSTERECTOMY  TOTAL ABDOMINAL, BILATERAL SALPINGO-OOPHORECTOMY, COMBINED N/A      ORTHOPEDIC SURGERY  02/2015    right shoulder     ORTHOPEDIC SURGERY  08/28/2015    right knee     ORTHOPEDIC SURGERY Right 06/11/2018    hip labrum tear     pionidal cyst excision       TRANSPOSITION ULNAR NERVE (ELBOW)         Family History:    Family History   Problem Relation Age of Onset     Cancer Mother      Colon Polyps Mother      Heart Failure Mother 87        congestive, cause of death     Myocardial Infarction Mother         myocardial infarction     Myocardial Infarction Father         myocardial infarction - cause of death     C.A.D. Father      Cancer Paternal Uncle         cause of death     C.A.D. Brother      Other - See Comments Other         factor 5 - family h/o     Asthma No family hx of        Social History:  Marital Status:   [2]  Social History     Tobacco Use     Smoking status: Former Smoker     Packs/day: 1.00     Years: 30.00     Pack years: 30.00     Types: Cigarettes, Pipe     Smokeless tobacco: Never Used     Tobacco comment: quit in 1999   Substance Use Topics     Alcohol use: No     Drug use: No        Medications:         aspirin 81 MG EC tablet       Cholecalciferol (VITAMIN D3 PO)       escitalopram (LEXAPRO) 10 MG tablet       HEMP OIL OR EXTRACT OR OTHER CBD CANNABINOID, NOT MEDICAL CANNABIS,       hydrochlorothiazide (HYDRODIURIL) 25 MG tablet       losartan (COZAAR) 50 MG tablet       methocarbamol (ROBAXIN) 750 MG tablet       metoprolol succinate ER (TOPROL-XL) 50 MG 24 hr tablet       order for DME       potassium chloride ER (KLOR-CON M) 10 MEQ CR tablet       traZODone (DESYREL) 50 MG tablet       UNABLE TO FIND       vitamin B complex with vitamin C (VITAMIN  B COMPLEX) tablet       warfarin ANTICOAGULANT (COUMADIN) 5 MG tablet      Review of Systems   Constitutional: Positive for chills, fatigue and fever.   HENT: Negative for congestion, ear pain, rhinorrhea, sinus pressure, sinus pain,  sneezing, sore throat and trouble swallowing.    Respiratory: Positive for cough and shortness of breath.    Cardiovascular: Negative for chest pain and palpitations.   Gastrointestinal: Negative for diarrhea, nausea and vomiting.   Musculoskeletal: Positive for myalgias.   Skin: Negative.    Neurological: Positive for headaches. Negative for dizziness, weakness, light-headedness and numbness.   Psychiatric/Behavioral: Negative.      Physical Exam   BP: 132/78  Pulse: 71  Temp: 98.4  F (36.9  C)  Resp: 18  SpO2: 95 %    Physical Exam  Vitals signs and nursing note reviewed.   HENT:      Head: Normocephalic.      Right Ear: Tympanic membrane, ear canal and external ear normal.      Left Ear: Tympanic membrane, ear canal and external ear normal.      Nose: Nose normal.      Mouth/Throat:      Mouth: Mucous membranes are moist.      Pharynx: Oropharynx is clear.   Eyes:      Conjunctiva/sclera: Conjunctivae normal.      Pupils: Pupils are equal, round, and reactive to light.   Neck:      Musculoskeletal: Normal range of motion and neck supple. No neck rigidity or muscular tenderness.   Cardiovascular:      Rate and Rhythm: Normal rate and regular rhythm.      Pulses: Normal pulses.      Heart sounds: Normal heart sounds.   Pulmonary:      Effort: Pulmonary effort is normal.      Breath sounds: Normal breath sounds. No wheezing or rales.   Abdominal:      General: Bowel sounds are normal.      Palpations: Abdomen is soft.      Tenderness: There is no abdominal tenderness.   Lymphadenopathy:      Cervical: Cervical adenopathy present.   Skin:     General: Skin is warm and dry.      Capillary Refill: Capillary refill takes less than 2 seconds.   Neurological:      Mental Status: She is alert.   Psychiatric:         Mood and Affect: Mood normal.       ED Course     Results for orders placed or performed during the hospital encounter of 12/14/20 (from the past 24 hour(s))   Symptomatic Influenza A/B & SARS-CoV2 (COVID-19)  Virus PCR Multiplex    Specimen: Nasopharyngeal   Result Value Ref Range    Flu A/B & SARS-COV-2 PCR Source Nasopharyngeal     SARS-CoV-2 PCR Result POSITIVE (AA)     Influenza A PCR Negative NEG^Negative    Influenza B PCR Negative NEG^Negative    Respiratory Syncytial Virus PCR Negative NEG^Negative    Flu A/B & SARS-CoV-2 PCR Comment       Testing was performed using the Xpert Xpress SARS-CoV2/Flu/RSV Assay on the [employer]   GeneXpert Instrument. Additional information about the Emergency Use Authorization (EUA)   assay can be found via the Lab Guide.     XR Chest Port 1 View    Narrative    PROCEDURE: XR CHEST PORT 1 VW 12/14/2020 11:46 AM    HISTORY: SOB x 1 month    COMPARISONS: 9/27/2019.    TECHNIQUE: Single view.    FINDINGS: Heart is stable in size. There is mild linear density at the  left lung base. There is no confluent infiltrate. No pleural effusion  is seen.    Bilateral shoulder arthroplasties are seen.         Impression    IMPRESSION: Chronic appearing linear scarring at the lung bases. No  acute infiltrate.    KAT DACOSTA MD       Medications - No data to display    Assessments & Plan (with Medical Decision Making)     I have reviewed the nursing notes.    I have reviewed the findings, diagnosis, plan and need for follow up with the patient.  (U07.1) COVID-19  (primary encounter diagnosis)  Plan: COVID-19 GetWell Loop Referral  COVID TEST POSITIVE, NEGATIVE FOR Influenza A and B.  Symptomatic treatments recommended.  -Discussed that antibiotics would not help symptoms of viral URI. Education provided on symptoms of secondary bacterial infection such as new fever, chills, rigors, shortness of breath, increased work of breathing, that can occur with viral URI and need for further evaluation, if they occur.   - Ensure you are staying hydrated by drinking plenty of fluids or eating foods such as popsicles, jello, pudding.  - Honey can be soothing for sore throat  - Warm salt water gurgles can  help soothe sore throat  - Rest  - Humidifier can help with congestion and help keep mucus membranes such as throat and nose from drying out.  - Sleeping slightly propped up can help with congestion and postnasal drainage that can worsen cough at bedtime.  - As long as you have never been told to take Tylenol and/or Ibuprofen you can use them to manage fever and body aches per package instructions  Make sure you eat when you take ibuprofen to avoid stomach upset.  - OTC cough medications per package instructions to help with cough. Check to see if the cough/cold medication already has acetaminophen (Tylenol) in it. If it does avoid taking additional Tylenol.  - If sudden onset of new fever, worsening symptoms return for further evaluation.  - OTC nasal steroid such as Flonase can help decrease sinus inflammation to help with congestion.  - Education provided on symptoms of post-viral bacterial infections including ear infection and pneumonia. This would require re-evaluation for treatment.    Continue NEB treatments as needed.    Follow up with primary care provider or return to urgent care/ED with any worsening in condition or additional concerns.      (R06.02) SOB (shortness of breath)  Plan: Patient states has SOB but is tolerable and able to manage at home.  Has neb treatments, declined needing refill.  Denies albuterol inhaler.  Portable Chest XR completed, no pneumonia noted.  O2 Sat 95% RA.  Patient states I am fine and if it gets worse where I can't manage at home I will come back in.         Patient in agreement with treatment plan and will follow up with PCP or return to urgent care/ED as needed.    Discharge Medication List as of 12/14/2020 12:09 PM        Final diagnoses:   COVID-19   SOB (shortness of breath)     12/14/2020   [facility]      Adri Rodriguez NP  12/14/20 1951

## 2020-12-14 NOTE — PROGRESS NOTES
ANTICOAGULATION FOLLOW-UP CLINIC VISIT    Patient Name:  Cassy Avila  Date:  2020  Contact Type:  Telephone/ Left voicemail on pt home phone    SUBJECTIVE:  Patient Findings     Comments:  INR results faxed to Warfarin clinic from [facility]. Call placed to pt and left voicemail on pt home phone re: INR results/Warfarin dosing/INR recheck date. She is to call Warfarin clinic if any bleeding/brusing or any other new changes in her diet/meds/activity or any questions/issues/illness.         Clinical Outcomes     Negatives:  Major bleeding event, Thromboembolic event, Anticoagulation-related hospital admission, Anticoagulation-related ED visit, Anticoagulation-related fatality    Comments:  INR results faxed to Warfarin clinic from [facility]. Call placed to pt and left voicemail on pt home phone re: INR results/Warfarin dosing/INR recheck date. She is to call Warfarin clinic if any bleeding/brusing or any other new changes in her diet/meds/activity or any questions/issues/illness.            OBJECTIVE    Recent labs: (last 7 days)     20   INR 1.9*       ASSESSMENT / PLAN  INR assessment SUB Added Tumeric- doesn't seem to affect INR   Recheck INR In: 2 WEEKS    INR Location Home INR      Anticoagulation Summary  As of 2020    INR goal:  2.0-3.0   TTR:  60.1 % (1 y)   INR used for dosin.9 (2020)   Warfarin maintenance plan:  7.5 mg (5 mg x 1.5) every Mon, Wed, Fri; 5 mg (5 mg x 1) all other days   Full warfarin instructions:  : 10 mg; Otherwise 7.5 mg every Mon, Wed, Fri; 5 mg all other days   Weekly warfarin total:  42.5 mg   Plan last modified:  Olivia Cates RN (2020)   Next INR check:  2020   Priority:  High   Target end date:  Indefinite    Indications    Long-term (current) use of anticoagulants [Z79.01] [Z79.01]  Pulmonary embolism and infarction (H) [I26.99]  Deep vein thrombosis (DVT) (H) [I82.409] [I82.409]             Anticoagulation Episode Summary     INR check  location:  Home Draw    Preferred lab:      Send INR reminders to:  DAREN CAMEJO    Comments:  PST - [facility].  Shoulder surgery 6/24/2020, warfarin hold x 5 days. Lovenox bridge per A.,Minnie  Call cell phone with any dosing. Home phone no longer correct number        Anticoagulation Care Providers     Provider Role Specialty Phone number    Eliz Hartman, CNP Referring Family Medicine [phone]            See the Encounter Report to view Anticoagulation Flowsheet and Dosing Calendar (Go to Encounters tab in chart review, and find the Anticoagulation Therapy Visit)        Mleanie Farris RN

## 2020-12-14 NOTE — TELEPHONE ENCOUNTER
Patient has alof of co-morbidities that would put her at higher risk for covid complications.  Sounds like she has a COVID test today - result will not be back for 2-3 days.  Continue to manage INR's through coumadin clinic.  If she is symptomatic, I recommend ER due to her complex medical history.      General COVID instructions are as follows:    Self quarantine, avoid contact with family members, stay within your home  OTC Zinc per package instruction  Vitamin D3 5000 U daily for 2 weeks  Multiple Vitamin Daily  Insure adequate fluid intake  Get plenty of rest  Monitor often for temp at home, treat with OTC Tylenol, do not take Ibuprofen   Humidity at home   To UC or ER with persistent, worsening, or concerning symptoms  Please reach out to us if you have any questions or concerns regarding your care    Eliz ENCARNACION  [phone]

## 2020-12-14 NOTE — TELEPHONE ENCOUNTER
"Pt has Factor 5 Leiden and she checks INR frequently. Her numbers have been lower than normal for two weeks. She sent her INR result to [facility] today. Pt scheduled for Covid test today. Positive exposure and s/s compatible with Covid.    Any other recommendations?     Call back [phone]      Laurie Hughes RN          Discharge Instructions for COVID-19 Patients  You have--or may have--COVID-19. Please follow the instructions listed below.   If you have a weakened immune system, discuss with your doctor any other actions you need to take.  How can I protect others?  If you have symptoms (fever, cough, body aches or trouble breathing):    Stay home and away from others (self-isolate) until:  ? At least 10 days have passed since your symptoms started, And   ? You've had no fever--and no medicine that reduces fever--for 1 full day (24 hours), And    ? Your other symptoms have resolved (gotten better).  If you don't show symptoms, but testing showed that you have COVID-19:    Stay home and away from others (self-isolate). Follow the tips under \"How do I self-isolate?\" below for 10 days (20 days if you have a weak immune system).    You don't need to be retested for COVID-19 before going back to school or work. As long as you're fever-free and feeling better, you can go back to school, work and other activities after waiting the 10 or 20 days.   How do I self-isolate?    Stay in your own room, even for meals. Use your own bathroom if you can.    Stay away from others in your home. No hugging, kissing or shaking hands. No visitors.    Don't go to work, school or anywhere else.    Clean \"high touch\" surfaces often (doorknobs, counters, handles). Use household cleaning spray or wipes. You'll find a full list of  on the EPA website: www.epa.gov/pesticide-registration/list-n-disinfectants-use-against-sars-cov-2.    Cover your mouth and nose with a mask or other face covering to avoid spreading germs.    Wash your hands " and face often. Use soap and water.    Caregivers in these groups are at risk for severe illness due to COVID-19:  ? People 65 years and older  ? People who live in a nursing home or long-term care facility  ? People with chronic disease (lung, heart, cancer, diabetes, kidney, liver, immunologic)  ? People who have a weakened immune system, including those who:    Are in cancer treatment    Take medicine that weakens the immune system, such as corticosteroids    Had a bone marrow or organ transplant    Have an immune deficiency    Have poorly controlled HIV or AIDS    Are obese (body mass index of 40 or higher)    Smoke regularly    Caregivers should wear gloves while washing dishes, handling laundry and cleaning bedrooms and bathrooms.    Use caution when washing and drying laundry: Don't shake dirty laundry and use the warmest water setting that you can.    For more tips on managing your health at home, go to www.cdc.gov/coronavirus/2019-ncov/downloads/10Things.pdf.  How can I take care of myself at home?  1. Get lots of rest. Drink extra fluids (unless a doctor has told you not to).    2. Take Tylenol (acetaminophen) for fever or pain. If you have liver or kidney problems, ask your family doctor if it's okay to take Tylenol.     Adults can take either:  ? 650 mg (two 325 mg pills) every 4 to 6 hours, or   ? 1,000 mg (two 500 mg pills) every 8 hours as needed.  ? Note: Don't take more than 3,000 mg in one day. Acetaminophen is found in many medicines (both prescribed and over-the-counter medicines). Read all labels to be sure you don't take too much.   For children, check the Tylenol bottle for the right dose. The dose is based on the child's age or weight.  3. If you have other health problems (like cancer, heart failure, an organ transplant or severe kidney disease): Call your specialty clinic if you don't feel better in the next 2 days.    4. Know when to call 911. Emergency warning signs include:  ? Trouble  breathing or shortness of breath  ? Pain or pressure in the chest that doesn't go away  ? Feeling confused like you haven't felt before, or not being able to wake up  ? Bluish-colored lips or face    5. Your doctor may have prescribed a blood thinner medicine. Follow their instructions.  Where can I get more information?    [facility] - About COVID-19: [email or web address]    CDC - What to Do If You're Sick: www.cdc.gov/coronavirus/2019-ncov/about/steps-when-sick.html    CDC - Ending Home Isolation: www.cdc.gov/coronavirus/2019-ncov/hcp/disposition-in-home-patients.html    [facility] - Caring for Someone: www.cdc.gov/coronavirus/2019-ncov/if-you-are-sick/care-for-someone.html    [facility] - Interim Guidance for Hospital Discharge to Home: www.health.[facility].[place]./diseases/coronavirus/hcp/hospdischarge.pdf    [facility] clinical trials (COVID-19 research studies): [email or web address].[facility]-clinical-trials    Below are the COVID-19 hotlines at the [place] Department of Health ([facility]). Interpreters are available.  ? For health questions: Call 194-527-4853 or 1-650.460.2728 (7 a.m. to 7 p.m.)  ? For questions about schools and childcare: Call 465-467-6701 or 1-692.732.4967 (7 a.m. to 7 p.m.)    For informational purposes only. Not to replace the advice of your health care provider. Clinically reviewed by the Infection Prevention Team. Copyright   2020 Health system. All rights reserved. [employer] 498516 - REV 08/04/20.      Instructions for Patients  It is recommended that you have a test for coronavirus (COVID-19). This illness can cause fever, cough and trouble breathing. Many people get a mild case and get better on their own. Some people can get very sick.     Please follow these steps:    1. We will call to schedule your test.  2. A member of our care team will ask you some questions. Then, they will use a swab to collect samples from your nose and throat.     Our testing team will send  you your test results.    How can I protect others?    Stay home and away from others (self-isolate) until:    You ve had no fever--and no medicine that reduces fever--for 1 full day (24 hours). And      Your other symptoms have resolved (gotten better). For example, your cough or breathing has improved. And     At least 10 days have passed since your symptoms started.    Stay at least 6 feet away from others. (If someone will drive you to your test, stay in the backseat, as far away from the  as you can.)     Don t go to work, school or anywhere else. When it s time for your test, go straight to the testing site. Don t make any stops on the way there or back.     Wash your hands and face often. Use soap and water.     Cover your mouth and nose with a mask, tissue or washcloth.     Don t touch anyone. No hugging, kissing or handshakes.    How can I take care of myself?    1. Get lots of rest. Drink extra fluids (unless a doctor has told you not to).     2. Take Tylenol (acetaminophen) for fever or pain. If you have liver or kidney problems, ask your family doctor if it's okay to take Tylenol.     Adults can take either:     650 mg (two 325 mg pills) every 4 to 6 hours, or     1,000 mg (two 500 mg pills) every 8 hours as needed.     Note: Don't take more than 3,000 mg in one day.   Acetaminophen is found in many medicines (both prescribed and over-the-counter medicines). Read all labels to be sure you don't take too much.   For children, check the Tylenol bottle for the right dose. The dose is based on  the child's age or weight.    3. If you have other health problems (like cancer, heart failure, an organ transplant or severe kidney disease): Call your specialty clinic if you don't feel better in the next 2 days.    4. Know when to call 911: If your breathing is so bad that it keeps you from doing normal activities, call 911 or go to the emergency room. Tell them that you've been staying home and may have  COVID-19.      Thank you for taking steps to prevent the spread of this virus.  o Limit your contact with others.  o Wear a simple mask to cover your cough.  o Wash your hands well and often.  o If you need medical care, go to [email or web address] or contact your health care provider.     For more about COVID-19 and caring for yourself at home, visit the CDC website at https://www.cdc.gov/coronavirus/2019-ncov/about/steps-when-sick.html.     To learn about care at [facility], please go to [email or web address].     [facility] clinical trials (COVID-19 research studies): [email or web address]-clinical-trials.    Below are the COVID-19 hotlines at the [place] Department of [facility] ([facility]). Interpreters are available.     For health questions: Call 269-536-7378 or 1-873.262.2906 (7 a.m. to 7 p.m.)    For questions about schools and childcare: Call 606-238-1746 or 1-467.895.6860 (7 a.m. to 7 p.m.)      COVID 19 Nurse Triage Plan/Patient Instructions    Please be aware that novel coronavirus (COVID-19) may be circulating in the community. If you develop symptoms such as fever, cough, or SOB or if you have concerns about the presence of another infection including coronavirus (COVID-19), please contact your health care provider or visit [email or web address].     Disposition/Instructions    Home care recommended. Follow home care protocol based instructions.    Thank you for taking steps to prevent the spread of this virus.  o Limit your contact with others.  o Wear a simple mask to cover your cough.  o Wash your hands well and often.    Resources    [facility]: About COVID-19: [email or web address]    CDC: What to Do If You're Sick: www.cdc.gov/coronavirus/2019-ncov/about/steps-when-sick.html    CDC: Ending Home Isolation: www.cdc.gov/coronavirus/2019-ncov/hcp/disposition-in-home-patients.html     CDC: Caring for Someone:  www.cdc.gov/coronavirus/2019-ncov/if-you-are-sick/care-for-someone.html     [facility]: Interim Guidance for Hospital Discharge to Home: www.health.[facility].[place]/diseases/coronavirus/hcp/hospdischarge.pdf    [facility] clinical trials (COVID-19 research studies): [email or web address]-clinical-trials     Below are the COVID-19 hotlines at the [place] Department [facility] ([facility]). Interpreters are available.   o For health questions: Call 718-832-7683 or 1-460.268.8275 (7 a.m. to 7 p.m.)  o For questions about schools and childcare: Call 566-492-3064 or 1-201.944.1238 (7 a.m. to 7 p.m.)                     Reason for Disposition    [1] Symptoms of COVID-19 (e.g., cough, fever, SOB, or others) AND [2] within 14 days of EXPOSURE (close contact) with diagnosed or suspected COVID-19 patient    [1] COVID-19 infection suspected by caller or triager AND [2] mild symptoms (cough, fever, or others) AND [3] no complications or SOB    Additional Information    Negative: COVID-19 lab test positive    Negative: [1] Lives with someone known to have influenza (flu test positive) AND [2] flu-like symptoms (e.g., cough, runny nose, sore throat, SOB; with or without fever)    Negative: [1] Symptoms of COVID-19 (e.g., cough, fever, SOB, or others) AND [2] HCP diagnosed COVID-19 based on symptoms    Negative: [1] Symptoms of COVID-19 (e.g., cough, fever, SOB, or others) AND [2] lives in an area with community spread    Negative: [1] Symptoms of COVID-19 (e.g., cough, fever, SOB, or others) AND [2] within 14 days of travel from high-risk area for COVID-19 community spread (identified by CDC)    Negative: [1] Difficulty breathing (shortness of breath) occurs AND [2] onset > 14 days after COVID-19 EXPOSURE (Close Contact)    Negative: [1] Dry cough occurs AND [2] onset > 14 days after COVID-19 EXPOSURE    Negative: [1] Wet cough (i.e., white-yellow, yellow, green, or coco colored sputum) AND [2] onset > 14 days after COVID-19  EXPOSURE    Negative: [1] Common cold symptoms AND [2] onset > 14 days after COVID-19 EXPOSURE    Negative: SEVERE difficulty breathing (e.g., struggling for each breath, speaks in single words)    Negative: Difficult to awaken or acting confused (e.g., disoriented, slurred speech)    Negative: Bluish (or gray) lips or face now    Negative: Shock suspected (e.g., cold/pale/clammy skin, too weak to stand, low BP, rapid pulse)    Negative: Sounds like a life-threatening emergency to the triager    Negative: [1] COVID-19 exposure AND [2] no symptoms    Negative: [1] Lives with someone known to have influenza (flu test positive) AND [2] flu-like symptoms (e.g., cough, runny nose, sore throat, SOB; with or without fever)    Negative: [1] Adult with possible COVID-19 symptoms AND [2] triager concerned about severity of symptoms or other causes    Negative: Immunization reaction suspected (e.g., fever, headache, muscle aches occurring during days 1-3 days after immunization)    Negative: COVID-19 and breastfeeding, questions about    Negative: SEVERE or constant chest pain or pressure (Exception: mild central chest pain, present only when coughing)    Negative: MODERATE difficulty breathing (e.g., speaks in phrases, SOB even at rest, pulse 100-120)    Negative: [1] Headache AND [2] stiff neck (can't touch chin to chest)    Negative: MILD difficulty breathing (e.g., minimal/no SOB at rest, SOB with walking, pulse <100)    Negative: Chest pain or pressure    Negative: Patient sounds very sick or weak to the triager    Negative: Fever > 103 F (39.4 C)    Negative: [1] Fever > 101 F (38.3 C) AND [2] age > 60    Negative: [1] Fever > 100.0 F (37.8 C) AND [2] bedridden (e.g., nursing home patient, CVA, chronic illness, recovering from surgery)    Negative: [1] HIGH RISK patient (e.g., age > 64 years, diabetes, heart or lung disease, weak immune system) AND [2] new or worsening symptoms    Negative: [1] HIGH RISK patient AND [2]  "influenza is widespread in the community AND [3] ONE OR MORE respiratory symptoms: cough, sore throat, runny or stuffy nose    Negative: [1] HIGH RISK patient AND [2] influenza exposure within the last 7 days AND [3] ONE OR MORE respiratory symptoms: cough, sore throat, runny or stuffy nose    Negative: Fever present > 3 days (72 hours)    Negative: [1] Fever returns after gone for over 24 hours AND [2] symptoms worse or not improved    Negative: [1] Continuous (nonstop) coughing interferes with work or school AND [2] no improvement using cough treatment per protocol    Answer Assessment - Initial Assessment Questions  1. COVID-19 CLOSE CONTACT: \"Who is the person with the confirmed or suspected COVID-19 infection that you were exposed to?\"      friend  2. PLACE of CONTACT: \"Where were you when you were exposed to COVID-19?\" (e.g., home, school, medical waiting room; which city?)     [facility]  3. TYPE of CONTACT: \"How much contact was there?\" (e.g., sitting next to, live in same house, work in same office, same building)     Same building  4. DURATION of CONTACT: \"How long were you in contact with the COVID-19 patient?\" (e.g., a few seconds, passed by person, a few minutes, 15 minutes or longer, live with the patient)      hours  5. MASK: \"Were you wearing a mask?\" \"Was the other person wearing a mask?\" Note: wearing a mask reduces the risk of an   otherwise close contact.      yes  6. DATE of CONTACT: \"When did you have contact with a COVID-19 patient?\" (e.g., how many days ago)     12.9.2020  7. COMMUNITY SPREAD: \"Are there lots of cases of COVID-19 (community spread) where you live?\" (See public health department website, if unsure)        Yes  8. SYMPTOMS: \"Do you have any symptoms?\" (e.g., fever, cough, breathing difficulty, loss of taste or smell)     Fever,HA,coughin.body aches and fatigue  9. PREGNANCY OR POSTPARTUM: \"Is there any chance you are pregnant?\" \"When was your last menstrual period?\" \"Did you " "deliver in the last 2 weeks?\"     no  10. HIGH RISK: \"Do you have any heart or lung problems? Do you have a weak immune system?\" (e.g., heart failure, COPD, asthma, HIV positive, chemotherapy, renal failure, diabetes mellitus, sickle cell anemia, obesity)     no  11.  TRAVEL: \"Have you traveled out of the country recently?\" If so, \"When and where?\"  Also ask about out-of-state travel, since the CDC has identified some high-risk cities for community spread in the .  Note: Travel becomes less relevant if there is widespread community transmission where the patient lives.        no    Answer Assessment - Initial Assessment Questions  1. COVID-19 DIAGNOSIS: \"Who made your Coronavirus (COVID-19) diagnosis?\" \"Was it confirmed by a positive lab test?\" If not diagnosed by a HCP, ask \"Are there lots of cases (community spread) where you live?\" (See public health department website, if unsure)        2. COVID-19 EXPOSURE: \"Was there any known exposure to COVID before the symptoms began?\" CDC Definition of close contact: within 6 feet (2 meters) for a total of 15 minutes or more over a 24-hour period.       isai  3. ONSET: \"When did the COVID-19 symptoms start?\"      12.11.2020  4. WORST SYMPTOM: \"What is your worst symptom?\" (e.g., cough, fever, shortness of breath, muscle aches)      Fever 99.4 today, all weekend over a 100.0,fatigue  5. COUGH: \"Do you have a cough?\" If so, ask: \"How bad is the cough?\"       Yes-not severe  6. FEVER: \"Do you have a fever?\" If so, ask: \"What is your temperature, how was it measured, and when did it start?\"      99.4 today  7. RESPIRATORY STATUS: \"Describe your breathing?\" (e.g., shortness of breath, wheezing, unable to speak)      no  8. BETTER-SAME-WORSE: \"Are you getting better, staying the same or getting worse compared to yesterday?\"  If getting worse, ask, \"In what way?\"     same  9. HIGH RISK DISEASE: \"Do you have any chronic medical problems?\" (e.g., asthma, heart or lung disease, " "weak immune system, obesity, etc.)     Lieden Factor 5-blood clots real easy  10. PREGNANCY: \"Is there any chance you are pregnant?\" \"When was your last menstrual period?\"         11. OTHER SYMPTOMS: \"Do you have any other symptoms?\"  (e.g., chills, fatigue, headache, loss of smell or taste, muscle pain, sore throat; new loss of smell or taste especially support the diagnosis of COVID-19)      Fatigue,chills,loss of taste,muscle pain    Protocols used: CORONAVIRUS (COVID-19) EXPOSURE-A- 12.1, CORONAVIRUS (COVID-19) DIAGNOSED OR [id number] 12.1      "

## 2020-12-14 NOTE — DISCHARGE INSTRUCTIONS
COVID TEST POSITIVE    Symptomatic treatments recommended.  -Discussed that antibiotics would not help symptoms of viral URI. Education provided on symptoms of secondary bacterial infection such as new fever, chills, rigors, shortness of breath, increased work of breathing, that can occur with viral URI and need for further evaluation, if they occur.   - Ensure you are staying hydrated by drinking plenty of fluids or eating foods such as popsicles, jello, pudding.  - Honey can be soothing for sore throat  - Warm salt water gurgles can help soothe sore throat  - Rest  - Humidifier can help with congestion and help keep mucus membranes such as throat and nose from drying out.  - Sleeping slightly propped up can help with congestion and postnasal drainage that can worsen cough at bedtime.  - As long as you have never been told to take Tylenol and/or Ibuprofen you can use them to manage fever and body aches per package instructions  Make sure you eat when you take ibuprofen to avoid stomach upset.  - OTC cough medications per package instructions to help with cough. Check to see if the cough/cold medication already has acetaminophen (Tylenol) in it. If it does avoid taking additional Tylenol.  - If sudden onset of new fever, worsening symptoms return for further evaluation.  - OTC nasal steroid such as Flonase can help decrease sinus inflammation to help with congestion.  - Education provided on symptoms of post-viral bacterial infections including ear infection and pneumonia. This would require re-evaluation for treatment.    Continue NEB treatments as needed.    Follow up with primary care provider or return to urgent care/ED with any worsening in condition or additional concerns.       Results for orders placed or performed during the hospital encounter of 12/14/20   XR Chest Port 1 View     Status: None    Narrative    PROCEDURE: XR CHEST PORT 1 VW 12/14/2020 11:46 AM    HISTORY: SOB x 1 month    COMPARISONS:  9/27/2019.    TECHNIQUE: Single view.    FINDINGS: Heart is stable in size. There is mild linear density at the  left lung base. There is no confluent infiltrate. No pleural effusion  is seen.    Bilateral shoulder arthroplasties are seen.         Impression    IMPRESSION: Chronic appearing linear scarring at the lung bases. No  acute infiltrate.    KAT DACOSTA MD   Symptomatic Influenza A/B & SARS-CoV2 (COVID-19) Virus PCR Multiplex     Status: Abnormal    Specimen: Nasopharyngeal   Result Value Ref Range    Flu A/B & SARS-COV-2 PCR Source Nasopharyngeal     SARS-CoV-2 PCR Result POSITIVE (AA)     Influenza A PCR Negative NEG^Negative    Influenza B PCR Negative NEG^Negative    Respiratory Syncytial Virus PCR Negative NEG^Negative    Flu A/B & SARS-CoV-2 PCR Comment       Testing was performed using the Xpert Xpress SARS-CoV2/Flu/RSV Assay on the [employer]   GeneXpert Instrument. Additional information about the Emergency Use Authorization (EUA)   assay can be found via the Lab Guide.     Results for orders placed or performed in visit on 12/14/20   INR     Status: Abnormal   Result Value Ref Range    INR 1.9 (A) 0.90 - 1.10

## 2020-12-14 NOTE — ED TRIAGE NOTES
"PT presents today with c/o requesting covid test. \"my PCP wanted me to come to the ER to get checked out and get a covid swab\". Pt states hx of PE. Pt does c/o SOB today, cough, headache, body aches, loss of taste.   "

## 2020-12-15 ENCOUNTER — ANTICOAGULATION THERAPY VISIT (OUTPATIENT)
Dept: ANTICOAGULATION | Facility: OTHER | Age: 65
End: 2020-12-15

## 2020-12-15 DIAGNOSIS — I26.99 PULMONARY EMBOLISM AND INFARCTION (H): ICD-10-CM

## 2020-12-15 DIAGNOSIS — Z79.01 LONG TERM CURRENT USE OF ANTICOAGULANT THERAPY: ICD-10-CM

## 2020-12-15 DIAGNOSIS — I82.409 DEEP VEIN THROMBOSIS (DVT) (H): ICD-10-CM

## 2020-12-15 NOTE — PROGRESS NOTES
ANTICOAGULATION FOLLOW-UP CLINIC VISIT    Patient Name:  Cassy Avila  Date:  12/15/2020  Contact Type:  Telephone/ Left voicemail on pt home phone    SUBJECTIVE:  Patient Findings     Positives:  Change in health (COVID + 12/14/2020)    Comments:  Per ED notes pt has tested positive for COVID on 12/14/2020. Call placed to pt and left voicemail on pt phone re: the concern that her INR will raise due to COVID. Warfarin clinic discuessed dosing instructions on voicemail for her and an INR recheck date. She is to call Warfarin clinic with any bleeding/bruising or any other new changes to her diet/meds/activity or any questions/issues/illness.         Clinical Outcomes     Negatives:  Major bleeding event, Thromboembolic event, Anticoagulation-related hospital admission, Anticoagulation-related ED visit, Anticoagulation-related fatality    Comments:  Per ED notes pt has tested positive for COVID on 12/14/2020. Call placed to pt and left voicemail on pt phone re: the concern that her INR will raise due to COVID. Warfarin clinic discuessed dosing instructions on voicemail for her and an INR recheck date. She is to call Warfarin clinic with any bleeding/bruising or any other new changes to her diet/meds/activity or any questions/issues/illness.            OBJECTIVE    Recent labs: (last 7 days)     12/14/20   INR 1.9*       ASSESSMENT / PLAN  No question data found.  Anticoagulation Summary  As of 12/15/2020    INR goal:  2.0-3.0   TTR:  60.2 % (1 y)   INR used for dosing:  No new INR was available at the time of this encounter.   Warfarin maintenance plan:  7.5 mg (5 mg x 1.5) every Mon, Wed, Fri; 5 mg (5 mg x 1) all other days   Full warfarin instructions:  12/16: 5 mg; 12/18: 5 mg; Otherwise 7.5 mg every Mon, Wed, Fri; 5 mg all other days   Weekly warfarin total:  42.5 mg   Plan last modified:  Olivia Cates RN (1/8/2020)   Next INR check:  12/21/2020   Priority:  High   Target end date:  Indefinite    Indications     Long-term (current) use of anticoagulants [Z79.01] [Z79.01]  Pulmonary embolism and infarction (H) [I26.99]  Deep vein thrombosis (DVT) (H) [I82.409] [I82.409]             Anticoagulation Episode Summary     INR check location:  Home Draw    Preferred lab:      Send INR reminders to:  DAREN CAMEJO    Comments:  PST - [facility].  Shoulder surgery 6/24/2020, warfarin hold x 5 days. Lovenox bridge per A.,Minnie  Call cell phone with any dosing. Home phone no longer correct number        Anticoagulation Care Providers     Provider Role Specialty Phone number    Eliz Hartman, CNP Referring Family Medicine [phone]            See the Encounter Report to view Anticoagulation Flowsheet and Dosing Calendar (Go to Encounters tab in chart review, and find the Anticoagulation Therapy Visit)        Melanie Farris RN

## 2020-12-21 ENCOUNTER — ANTICOAGULATION THERAPY VISIT (OUTPATIENT)
Dept: ANTICOAGULATION | Facility: OTHER | Age: 65
End: 2020-12-21

## 2020-12-21 DIAGNOSIS — I26.99 PULMONARY EMBOLISM AND INFARCTION (H): ICD-10-CM

## 2020-12-21 DIAGNOSIS — I82.409 DEEP VEIN THROMBOSIS (DVT) (H): ICD-10-CM

## 2020-12-21 DIAGNOSIS — Z79.01 LONG TERM CURRENT USE OF ANTICOAGULANT THERAPY: ICD-10-CM

## 2020-12-21 LAB — INR PPP: 3.3 (ref 0.9–1.1)

## 2020-12-21 NOTE — PROGRESS NOTES
ANTICOAGULATION FOLLOW-UP CLINIC VISIT    Patient Name:  Cassy Avila  Date:  12/21/2020  Contact Type:  Telephone    SUBJECTIVE:  Patient Findings     Comments:  INR results faxed to Warfarin clinic from [facility]. Call placed to pt and spoke to pt re: INR results/Warfarin dosing/INR recheck date. She stated that she had forgotten and took her Warfarin as prescribed last week. We discussed dosing for this week and she verbalized understanding. She stated that she is still slightly SOB, but this was present prior to COVID. She denied any bleeding/bruising or any other changes in her diet/meds/activity. She verbalized understanding of all instructions. She is to call Warfarin clinic if any questions/issues/illness.         Clinical Outcomes     Negatives:  Major bleeding event, Thromboembolic event, Anticoagulation-related hospital admission, Anticoagulation-related ED visit, Anticoagulation-related fatality    Comments:  INR results faxed to Warfarin clinic from [facility]. Call placed to pt and spoke to pt re: INR results/Warfarin dosing/INR recheck date. She stated that she had forgotten and took her Warfarin as prescribed last week. We discussed dosing for this week and she verbalized understanding. She stated that she is still slightly SOB, but this was present prior to COVID. She denied any bleeding/bruising or any other changes in her diet/meds/activity. She verbalized understanding of all instructions. She is to call Warfarin clinic if any questions/issues/illness.            OBJECTIVE    Recent labs: (last 7 days)     12/21/20   INR 3.3*       ASSESSMENT / PLAN  INR assessment SUPRA COVID +   Recheck INR In: 1 WEEK    INR Location Home INR      Anticoagulation Summary  As of 12/21/2020    INR goal:  2.0-3.0   TTR:  61.4 % (1 y)   INR used for dosing:  3.3 (12/21/2020)   Warfarin maintenance plan:  7.5 mg (5 mg x 1.5) every Mon, Wed, Fri; 5 mg (5 mg x 1) all other days   Full warfarin instructions:  12/21: 5 mg;  12/25: 5 mg; Otherwise 7.5 mg every Mon, Wed, Fri; 5 mg all other days   Weekly warfarin total:  42.5 mg   Plan last modified:  Olivia Cates RN (1/8/2020)   Next INR check:  12/28/2020   Priority:  High   Target end date:  Indefinite    Indications    Long-term (current) use of anticoagulants [Z79.01] [Z79.01]  Pulmonary embolism and infarction (H) [I26.99]  Deep vein thrombosis (DVT) (H) [I82.409] [I82.409]             Anticoagulation Episode Summary     INR check location:  Home Draw    Preferred lab:      Send INR reminders to:  DAREN CAMEJO    Comments:  PST - [facility].  Shoulder surgery 6/24/2020, warfarin hold x 5 days. Lovenox bridge per A.Minnie  Call cell phone with any dosing. Home phone no longer correct number        Anticoagulation Care Providers     Provider Role Specialty Phone number    Eliz Hartman, CNP Referring Family Medicine [phone]            See the Encounter Report to view Anticoagulation Flowsheet and Dosing Calendar (Go to Encounters tab in chart review, and find the Anticoagulation Therapy Visit)        Melanie Farris, MAKAYLA

## 2020-12-28 ENCOUNTER — ANTICOAGULATION THERAPY VISIT (OUTPATIENT)
Dept: ANTICOAGULATION | Facility: OTHER | Age: 65
End: 2020-12-28

## 2020-12-28 DIAGNOSIS — I26.99 PULMONARY EMBOLISM AND INFARCTION (H): ICD-10-CM

## 2020-12-28 DIAGNOSIS — I82.409 DEEP VEIN THROMBOSIS (DVT) (H): ICD-10-CM

## 2020-12-28 DIAGNOSIS — Z79.01 LONG TERM CURRENT USE OF ANTICOAGULANT THERAPY: ICD-10-CM

## 2020-12-28 LAB — INR PPP: 2.5 (ref 0.9–1.1)

## 2020-12-28 NOTE — PROGRESS NOTES
ANTICOAGULATION FOLLOW-UP CLINIC VISIT    Patient Name:  Cassy Avila  Date:  2020  Contact Type:  Telephone/ Left voicemail on pt home phone    SUBJECTIVE:  Patient Findings     Comments:  [facility]. Call placed to pt and left voicemail on pt home phone re: INR results/Warfarin dosing/INR recheck date. It has been 14 days since pt has tested positive of COVID. Will recheck INR in 2 weeks to make sure INR remains therapeutic. Pt is to call Warfarin clinic if any bleeding/bruising or any other new changes to her diet/meds/actiivty or any questions/issues/illness.         Clinical Outcomes     Negatives:  Major bleeding event, Thromboembolic event, Anticoagulation-related hospital admission, Anticoagulation-related ED visit, Anticoagulation-related fatality    Comments:  INR results faxed to Warfarin clinic from [facility]. Call placed to pt and left voicemail on pt home phone re: INR results/Warfarin dosing/INR recheck date. It has been 14 days since pt has tested positive of COVID. Will recheck INR in 2 weeks to make sure INR remains therapeutic. Pt is to call Warfarin clinic if any bleeding/bruising or any other new changes to her diet/meds/actiivty or any questions/issues/illness.            OBJECTIVE    Recent labs: (last 7 days)     20   INR 2.5*       ASSESSMENT / PLAN  INR assessment THER    Recheck INR In: 2 WEEKS    INR Location Home INR      Anticoagulation Summary  As of 2020    INR goal:  2.0-3.0   TTR:  62.6 % (1 y)   INR used for dosin.5 (2020)   Warfarin maintenance plan:  7.5 mg (5 mg x 1.5) every Mon, Wed, Fri; 5 mg (5 mg x 1) all other days   Full warfarin instructions:  7.5 mg every Mon, Wed, Fri; 5 mg all other days   Weekly warfarin total:  42.5 mg   No change documented:  Melanie Farris RN   Plan last modified:  Olivia Cates, MAKAYLA (2020)   Next INR check:  2021   Priority:  High   Target end date:  Indefinite     Indications    Long-term (current) use of anticoagulants [Z79.01] [Z79.01]  Pulmonary embolism and infarction (H) [I26.99]  Deep vein thrombosis (DVT) (H) [I82.409] [I82.409]             Anticoagulation Episode Summary     INR check location:  Home Draw    Preferred lab:      Send INR reminders to:  DAREN CAMEJO    Comments:  PST - [facility].  Shoulder surgery 6/24/2020, warfarin hold x 5 days. Lovenox bridge per A.,Minnie  Call cell phone with any dosing. Home phone no longer correct number        Anticoagulation Care Providers     Provider Role Specialty Phone number    Eliz Hartman, CNP Referring Family Medicine [phone]            See the Encounter Report to view Anticoagulation Flowsheet and Dosing Calendar (Go to Encounters tab in chart review, and find the Anticoagulation Therapy Visit)        Melanie Farris RN

## 2021-01-11 ENCOUNTER — ANTICOAGULATION THERAPY VISIT (OUTPATIENT)
Dept: ANTICOAGULATION | Facility: OTHER | Age: 66
End: 2021-01-11

## 2021-01-11 DIAGNOSIS — Z79.01 LONG TERM CURRENT USE OF ANTICOAGULANT THERAPY: ICD-10-CM

## 2021-01-11 DIAGNOSIS — I26.99 PULMONARY EMBOLISM AND INFARCTION (H): ICD-10-CM

## 2021-01-11 DIAGNOSIS — I82.409 DEEP VEIN THROMBOSIS (DVT) (H): ICD-10-CM

## 2021-01-11 LAB — INR PPP: 2.1 (ref 0.9–1.1)

## 2021-01-11 NOTE — PROGRESS NOTES
ANTICOAGULATION FOLLOW-UP CLINIC VISIT    Patient Name:  Cassy Avila  Date:  2021  Contact Type:  Telephone/ Left voicemail on pt home phone    SUBJECTIVE:  Patient Findings     Comments:  PST. INR results faxed to Warfarin Clinic from [facility]. Call placed to pt and left voicemail on pt home phone re: INR results/Warfarin dosing/INR recheck date. Pt denied any bleeding/bruising or any other new changes to their diet/meds/activity. Pt verbalized understanding of all instructions. Pt is to call Warfarin clinic with any questions/issues/illness.               Clinical Outcomes     Negatives:  Major bleeding event, Thromboembolic event, Anticoagulation-related hospital admission, Anticoagulation-related ED visit, Anticoagulation-related fatality    Comments:  PST. INR results faxed to Warfarin Clinic from [facility]. Call placed to pt and left voicemail on pt home phone re: INR results/Warfarin dosing/INR recheck date. Pt denied any bleeding/bruising or any other new changes to their diet/meds/activity. Pt verbalized understanding of all instructions. Pt is to call Warfarin clinic with any questions/issues/illness.                  OBJECTIVE    Recent labs: (last 7 days)     21   INR 2.1*       ASSESSMENT / PLAN  INR assessment THER    Recheck INR In: 6 WEEKS    INR Location Home INR      Anticoagulation Summary  As of 2021    INR goal:  2.0-3.0   TTR:  66.5 % (1 y)   INR used for dosin.1 (2021)   Warfarin maintenance plan:  7.5 mg (5 mg x 1.5) every Mon, Wed, Fri; 5 mg (5 mg x 1) all other days   Full warfarin instructions:  7.5 mg every Mon, Wed, Fri; 5 mg all other days   Weekly warfarin total:  42.5 mg   No change documented:  Melanie Farris RN   Plan last modified:  Olivia Cates RN (2020)   Next INR check:  2021   Priority:  High   Target end date:  Indefinite    Indications    Long-term (current) use of anticoagulants [Z79.01] [Z79.01]  Pulmonary embolism and infarction (H)  [I26.99]  Deep vein thrombosis (DVT) (H) [I82.409] [I82.409]             Anticoagulation Episode Summary     INR check location:  Home Draw    Preferred lab:      Send INR reminders to:  DAREN CAMEJO    Comments:  PST - [facility].  Shoulder surgery 6/24/2020, warfarin hold x 5 days. Lovenox bridge per A.,Minnie  Call cell phone with any dosing. Home phone no longer correct number        Anticoagulation Care Providers     Provider Role Specialty Phone number    Eliz Hartman, CNP Referring [facility] Medicine [phone]            See the Encounter Report to view Anticoagulation Flowsheet and Dosing Calendar (Go to Encounters tab in chart review, and find the Anticoagulation Therapy Visit)        Melanie Farris RN

## 2021-01-28 DIAGNOSIS — F51.01 PRIMARY INSOMNIA: ICD-10-CM

## 2021-01-28 RX ORDER — TRAZODONE HYDROCHLORIDE 50 MG/1
TABLET, FILM COATED ORAL
Qty: 180 TABLET | Refills: 0 | Status: SHIPPED | OUTPATIENT
Start: 2021-01-28 | End: 2021-08-16

## 2021-01-28 NOTE — TELEPHONE ENCOUNTER
Desyrel 50mg      Last Written Prescription Date:  1/28/20  Last Fill Quantity: 180,   # refills: 1  Last Office Visit: 12/2/20  Future Office visit:       Routing refill request to provider for review/approval because:

## 2021-02-03 NOTE — PROGRESS NOTES
Assessment & Plan     Encounter for screening mammogram for malignant neoplasm of breast  - MA SCREENING DIGITAL BILATERAL ([place]); Future    Diarrhea, unspecified type  - Comprehensive metabolic panel  - Helicobacter pylori Antigen Stool; Future  - omeprazole (PRILOSEC) 40 MG DR capsule; Take 1 capsule (40 mg) by mouth daily  - Tissue transglutaminase bhakti IgA and IgG  - saccharomyces boulardii (FLORASTOR) 250 MG capsule; Take 1 capsule (250 mg) by mouth 2 times daily  - GASTROENTEROLOGY ADULT REF CONSULT ONLY; Future    Abdominal pain, generalized  - Comprehensive metabolic panel  - Helicobacter pylori Antigen Stool; Future  - omeprazole (PRILOSEC) 40 MG DR capsule; Take 1 capsule (40 mg) by mouth daily  - Tissue transglutaminase bhakti IgA and IgG  - saccharomyces boulardii (FLORASTOR) 250 MG capsule; Take 1 capsule (250 mg) by mouth 2 times daily  - GASTROENTEROLOGY ADULT REF CONSULT ONLY; Future    Nausea  - Comprehensive metabolic panel  - Helicobacter pylori Antigen Stool; Future  - omeprazole (PRILOSEC) 40 MG DR capsule; Take 1 capsule (40 mg) by mouth daily  - Tissue transglutaminase bhakti IgA and IgG  - saccharomyces boulardii (FLORASTOR) 250 MG capsule; Take 1 capsule (250 mg) by mouth 2 times daily  - GASTROENTEROLOGY ADULT REF CONSULT ONLY; Future      FODMAP diet  Diet journal    ER if you are feeling worse      Eliz Hartman CNP  [facility] - [place] MARLENE Mad[place] is a 65 year old who presents to clinic today for the following health issues         Diarrhea  Daily diarrhea - 5-7 times daily  Abdominal pain - upper abdomen and right sided abdominal pain.   Nausea off and on, no vomiting  Colonoscopy UTD 2018  She has a history of diverticulosis, and IBS      Onset/Duration: 1   1/2  months  Description:       Consistency of stool: watery, runny and loose       Blood in stool: no       Number of loose stools past 24 hours: 6  Progression of Symptoms: worsening  Accompanying signs and symptoms:        Fever: no       Nausea/Vomiting: YES- nausea       Abdominal pain: YES       Weight loss: no       Episodes of constipation: no  History   Ill contacts: no  Recent use of antibiotics: no  Recent travels: no  Recent medication-new or changes(Rx or OTC): no  Precipitating or alleviating factors: None  Therapies tried and outcome: fiber diet, increased fluid intake         Patient Active Problem List   Diagnosis     Essential hypertension     Pulmonary embolism and infarction (H)     Contact dermatitis and other eczema, due to unspecified cause     Edema     Hyperlipidemia LDL goal <100     Neurofibromatosis, peripheral, NF1 (H)     Advanced care planning/counseling discussion     Moderate episode of recurrent major depressive disorder (H)     Long-term (current) use of anticoagulants [Z79.01]     Deep vein thrombosis (DVT) (H) [I82.409]     Lumbar spondylosis with myelopathy     Degeneration of lumbar or lumbosacral intervertebral disc     Family history of colon cancer     Statin medication not prescribed per physician orders     Vitamin D deficiency     Failed arthroplasty (H)     H/O total shoulder replacement, left     Primary insomnia     Factor V Leiden mutation (H)     Obesity (BMI 35.0-39.9) with comorbidity (H)     Diarrhea     Abdominal pain, generalized     Past Surgical History:   Procedure Laterality Date     ------------OTHER-------------      shoulder replacement; Provider: Karen     ARTHROPLASTY KNEE  2014    Procedure: ARTHROPLASTY KNEE;  Surgeon: Sean Alexander MD;  Location: [facility] OR     ARTHROSCOPY SHOULDER      right, bone spurs     CA ANESTH,SHOULDER REPLACEMENT Right 2020      SECTION      x3     CHOLECYSTECTOMY       COLONOSCOPY  02/15/2018    [place],,polyps     elbow ulnar tunnel release       ELECTROTHERMAL THERAPY INTRADISC  2017    stimulator     ENDOSCOPIC SINUS SURGERY, SEPTOPLASTY, TURBINOPLASTY, MAXILLARY SINUSOTOMY, COMBINED N/A 2015     Procedure: COMBINED ENDOSCOPIC SINUS SURGERY, SEPTOPLASTY, TURBINOPLASTY, MAXILLARY SINUSOTOMY;  Surgeon: Seema Conn MD;  Location: [facility] OR     ESOPHAGOSCOPY, GASTROSCOPY, DUODENOSCOPY (EGD), COMBINED  2011    with biopsy and endoscopic U/S     EXCISE NEUROMA LOWER EXTREMITY Left 07/13/2016    Procedure: EXCISE NEUROMA LOWER EXTREMITY;  Surgeon: Edi Reed MD;  Location: [facility] OR     EYE SURGERY Right 09/2020     FUSION LUMBAR ANTERIOR WITH MARCY CAGES      L5-S1     HYSTERECTOMY TOTAL ABDOMINAL, BILATERAL SALPINGO-OOPHORECTOMY, COMBINED N/A      ORTHOPEDIC SURGERY  02/2015    right shoulder     ORTHOPEDIC SURGERY  08/28/2015    right knee     ORTHOPEDIC SURGERY Right 06/11/2018    hip labrum tear     pionidal cyst excision       TRANSPOSITION ULNAR NERVE (ELBOW)         Social History     Tobacco Use     Smoking status: Former Smoker     Packs/day: 1.00     Years: 30.00     Pack years: 30.00     Types: Cigarettes, Pipe     Smokeless tobacco: Never Used     Tobacco comment: quit in 1999   Substance Use Topics     Alcohol use: No     Family History   Problem Relation Age of Onset     Cancer Mother      Colon Polyps Mother      Heart Failure Mother 87        congestive, cause of death     Myocardial Infarction Mother         myocardial infarction     Myocardial Infarction Father         myocardial infarction - cause of death     C.A.D. Father      Cancer Paternal Uncle         cause of death     C.A.D. Brother      Other - See Comments Other         factor 5 - family h/o     Asthma No family hx of            Current Outpatient Medications   Medication Sig Dispense Refill     aspirin 81 MG EC tablet Take 81 mg by mouth daily HS       Cholecalciferol (VITAMIN D3 PO) Take 3,000 mg by mouth daily AM       escitalopram (LEXAPRO) 10 MG tablet Take 1 tablet (10 mg) by mouth daily 90 tablet 1     HEMP OIL OR EXTRACT OR OTHER CBD CANNABINOID, NOT MEDICAL CANNABIS,        hydrochlorothiazide (HYDRODIURIL) 25 MG  tablet Take 1 tablet (25 mg) by mouth daily 90 tablet 1     losartan (COZAAR) 50 MG tablet Take 2 tablets by mouth once daily 180 tablet 0     methocarbamol (ROBAXIN) 750 MG tablet Take 1-2 tablets (750-1,500 mg) by mouth 4 times daily as needed for muscle spasms 60 tablet 0     metoprolol succinate ER (TOPROL-XL) 50 MG 24 hr tablet TAKE 1 AND ONE-HALF TABLETS BY MOUTH EVERY DAY 90 tablet 3     omeprazole (PRILOSEC) 40 MG DR capsule Take 1 capsule (40 mg) by mouth daily 90 capsule 1     order for DME Nebulizer machine and tubing    DX:  Bronchitis 1 Device 0     potassium chloride ER (KLOR-CON M) 10 MEQ CR tablet Take 1 tablet (10 mEq) by mouth daily 30 tablet 3     saccharomyces boulardii (FLORASTOR) 250 MG capsule Take 1 capsule (250 mg) by mouth 2 times daily 60 capsule 1     traZODone (DESYREL) 50 MG tablet TAKE 1 TO 2 TABLETS BY MOUTH NIGHTLY AS NEEDED 180 tablet 0     vitamin B complex with vitamin C (VITAMIN  B COMPLEX) tablet Take 1 tablet by mouth daily       warfarin ANTICOAGULANT (COUMADIN) 5 MG tablet 7.5mg Mon,Wed,Fri and 5mg all other days or as directed by warfarin clinic 150 tablet 3       Allergies   Allergen Reactions     Amlodipine Besylate Swelling     Norvasc     Amoxicillin      Atorvastatin      myualgia     Cephalexin Monohydrate Hives     Keflex     Erythromycin Base [Kdc:Yellow Dye+Erythromycin+Brilliant Blue Fcf] Nausea and Vomiting     Meloxicam Other (See Comments)     Mobic - confusion, depression     Adhesive Tape Rash     Prochlorperazine Edisylate Swelling and Rash     Compazine     Prochlorperazine Maleate Swelling and Rash       Recent Labs   Lab Test 12/02/20  0933 11/16/20  0947 10/26/20  1123 10/26/20  1123 06/17/20  1337 02/03/20  1451 09/27/19  1137 02/05/19  1013 02/05/19  1013 01/17/18  0822 01/17/18  0822 11/05/13  1528 11/05/13  1528   A1C  --   --   --   --   --   --   --   --   --   --   --   --  5.5   LDL  --   --   --   --   --   --  122*  --  120*  --  137*   < >  --   "  HDL  --   --   --   --   --   --  36*  --  37*  --  43*   < >  --    TRIG  --   --   --   --   --   --  280*  --  208*  --  190*   < >  --    ALT  --   --   --   --  32 28 34   < > 42   < >  --    < >  --    CR  --   --   --  0.94 0.95 0.92 0.91   < > 0.92   < > 0.91   < >  --    GFRESTIMATED  --   --   --  63 63 66 67   < > 66   < > 62   < >  --    GFRESTBLACK  --   --   --  73 73 76 77   < > 76   < > 75   < >  --    POTASSIUM 3.9 4.1   < > 3.3* 4.2 3.6 3.8   < > 3.5   < > 3.2*   < >  --    TSH  --   --   --  2.08  --   --  1.42  --  2.71   < > 5.75*   < >  --     < > = values in this interval not displayed.        BP Readings from Last 3 Encounters:   02/08/21 118/68   12/14/20 132/78   12/02/20 (!) 144/84    Wt Readings from Last 3 Encounters:   02/08/21 106.6 kg (235 lb)   12/02/20 103.9 kg (229 lb)   10/26/20 110.7 kg (244 lb)              Review of Systems   Constitutional, HEENT, cardiovascular, pulmonary, GI, , musculoskeletal, neuro, skin, endocrine and psych systems are negative, except as otherwise noted.           Objective    /68   Pulse 63   Temp 98.6  F (37  C) (Tympanic)   Ht 1.651 m (5' 5\")   Wt 106.6 kg (235 lb)   SpO2 98%   BMI 39.11 kg/m    Body mass index is 39.11 kg/m .         Physical Exam   GENERAL: healthy, alert and no distress  HENT: ear canals and TM's normal, nose and mouth without ulcers or lesions  NECK: no adenopathy, no asymmetry, masses, or scars and thyroid normal to palpation  RESP: lungs clear to auscultation - no rales, rhonchi or wheezes  CV: regular rate and rhythm, normal S1 S2, no S3 or S4, no murmur, click or rub, no peripheral edema and peripheral pulses strong  ABDOMEN: Diffuse tenderness, bloating, active bowel sounds  SKIN: no suspicious lesions or rashes  PSYCH: mentation appears normal, affect normal/bright                "

## 2021-02-08 ENCOUNTER — OFFICE VISIT (OUTPATIENT)
Dept: FAMILY MEDICINE | Facility: OTHER | Age: 66
End: 2021-02-08
Attending: NURSE PRACTITIONER
Payer: COMMERCIAL

## 2021-02-08 VITALS
HEART RATE: 63 BPM | DIASTOLIC BLOOD PRESSURE: 68 MMHG | BODY MASS INDEX: 39.15 KG/M2 | HEIGHT: 65 IN | OXYGEN SATURATION: 98 % | SYSTOLIC BLOOD PRESSURE: 118 MMHG | TEMPERATURE: 98.6 F | WEIGHT: 235 LBS

## 2021-02-08 DIAGNOSIS — R19.7 DIARRHEA, UNSPECIFIED TYPE: ICD-10-CM

## 2021-02-08 DIAGNOSIS — R10.84 ABDOMINAL PAIN, GENERALIZED: ICD-10-CM

## 2021-02-08 DIAGNOSIS — Z12.31 ENCOUNTER FOR SCREENING MAMMOGRAM FOR MALIGNANT NEOPLASM OF BREAST: Primary | ICD-10-CM

## 2021-02-08 DIAGNOSIS — R11.0 NAUSEA: ICD-10-CM

## 2021-02-08 LAB
ALBUMIN SERPL-MCNC: 3.5 G/DL (ref 3.4–5)
ALP SERPL-CCNC: 61 U/L (ref 40–150)
ALT SERPL W P-5'-P-CCNC: 36 U/L (ref 0–50)
ANION GAP SERPL CALCULATED.3IONS-SCNC: 9 MMOL/L (ref 3–14)
AST SERPL W P-5'-P-CCNC: 13 U/L (ref 0–45)
BILIRUB SERPL-MCNC: 0.4 MG/DL (ref 0.2–1.3)
BUN SERPL-MCNC: 16 MG/DL (ref 7–30)
CALCIUM SERPL-MCNC: 8.8 MG/DL (ref 8.5–10.1)
CHLORIDE SERPL-SCNC: 102 MMOL/L (ref 94–109)
CO2 SERPL-SCNC: 27 MMOL/L (ref 20–32)
CREAT SERPL-MCNC: 0.92 MG/DL (ref 0.52–1.04)
GFR SERPL CREATININE-BSD FRML MDRD: 65 ML/MIN/{1.73_M2}
GLUCOSE SERPL-MCNC: 96 MG/DL (ref 70–99)
POTASSIUM SERPL-SCNC: 3.4 MMOL/L (ref 3.4–5.3)
PROT SERPL-MCNC: 7.3 G/DL (ref 6.8–8.8)
SODIUM SERPL-SCNC: 138 MMOL/L (ref 133–144)

## 2021-02-08 PROCEDURE — 99214 OFFICE O/P EST MOD 30 MIN: CPT | Performed by: NURSE PRACTITIONER

## 2021-02-08 PROCEDURE — 36415 COLL VENOUS BLD VENIPUNCTURE: CPT | Mod: ZL | Performed by: NURSE PRACTITIONER

## 2021-02-08 PROCEDURE — 83516 IMMUNOASSAY NONANTIBODY: CPT | Mod: ZL,59 | Performed by: NURSE PRACTITIONER

## 2021-02-08 PROCEDURE — G0463 HOSPITAL OUTPT CLINIC VISIT: HCPCS

## 2021-02-08 PROCEDURE — 83516 IMMUNOASSAY NONANTIBODY: CPT | Mod: ZL | Performed by: NURSE PRACTITIONER

## 2021-02-08 PROCEDURE — 80053 COMPREHEN METABOLIC PANEL: CPT | Mod: ZL | Performed by: NURSE PRACTITIONER

## 2021-02-08 RX ORDER — OMEPRAZOLE 40 MG/1
40 CAPSULE, DELAYED RELEASE ORAL DAILY
Qty: 90 CAPSULE | Refills: 1 | Status: SHIPPED | OUTPATIENT
Start: 2021-02-08 | End: 2021-04-16

## 2021-02-08 RX ORDER — SACCHAROMYCES BOULARDII 250 MG
250 CAPSULE ORAL 2 TIMES DAILY
Qty: 60 CAPSULE | Refills: 1 | Status: SHIPPED | OUTPATIENT
Start: 2021-02-08 | End: 2021-08-04

## 2021-02-08 ASSESSMENT — MIFFLIN-ST. JEOR: SCORE: 1606.83

## 2021-02-08 ASSESSMENT — PAIN SCALES - GENERAL: PAINLEVEL: MODERATE PAIN (4)

## 2021-02-08 NOTE — PATIENT INSTRUCTIONS
Assessment & Plan     Encounter for screening mammogram for malignant neoplasm of breast  - MA SCREENING DIGITAL BILATERAL ([place]); Future    Diarrhea, unspecified type  - Comprehensive metabolic panel  - Helicobacter pylori Antigen Stool; Future  - omeprazole (PRILOSEC) 40 MG DR capsule; Take 1 capsule (40 mg) by mouth daily  - Tissue transglutaminase bhakti IgA and IgG  - saccharomyces boulardii (FLORASTOR) 250 MG capsule; Take 1 capsule (250 mg) by mouth 2 times daily  - GASTROENTEROLOGY ADULT REF CONSULT ONLY; Future    Abdominal pain, generalized  - Comprehensive metabolic panel  - Helicobacter pylori Antigen Stool; Future  - omeprazole (PRILOSEC) 40 MG DR capsule; Take 1 capsule (40 mg) by mouth daily  - Tissue transglutaminase bhakti IgA and IgG  - saccharomyces boulardii (FLORASTOR) 250 MG capsule; Take 1 capsule (250 mg) by mouth 2 times daily  - GASTROENTEROLOGY ADULT REF CONSULT ONLY; Future    Nausea  - Comprehensive metabolic panel  - Helicobacter pylori Antigen Stool; Future  - omeprazole (PRILOSEC) 40 MG DR capsule; Take 1 capsule (40 mg) by mouth daily  - Tissue transglutaminase bhakti IgA and IgG  - saccharomyces boulardii (FLORASTOR) 250 MG capsule; Take 1 capsule (250 mg) by mouth 2 times daily  - GASTROENTEROLOGY ADULT REF CONSULT ONLY; Future      FODMAP diet  Diet journal    ER if you are feeling worse      Eliz Hartman CNP  [facility]

## 2021-02-08 NOTE — NURSING NOTE
"Chief Complaint   Patient presents with     Diarrhea       Initial /68   Pulse 63   Temp 98.6  F (37  C) (Tympanic)   Ht 1.651 m (5' 5\")   Wt 106.6 kg (235 lb)   SpO2 98%   BMI 39.11 kg/m   Estimated body mass index is 39.11 kg/m  as calculated from the following:    Height as of this encounter: 1.651 m (5' 5\").    Weight as of this encounter: 106.6 kg (235 lb).  Medication Reconciliation: complete  Carly Bray LPN  "

## 2021-02-09 ENCOUNTER — ANCILLARY PROCEDURE (OUTPATIENT)
Dept: MAMMOGRAPHY | Facility: OTHER | Age: 66
End: 2021-02-09
Attending: NURSE PRACTITIONER
Payer: MEDICARE

## 2021-02-09 DIAGNOSIS — Z12.31 ENCOUNTER FOR SCREENING MAMMOGRAM FOR MALIGNANT NEOPLASM OF BREAST: ICD-10-CM

## 2021-02-09 DIAGNOSIS — R19.7 DIARRHEA, UNSPECIFIED TYPE: ICD-10-CM

## 2021-02-09 DIAGNOSIS — R10.84 ABDOMINAL PAIN, GENERALIZED: ICD-10-CM

## 2021-02-09 DIAGNOSIS — R11.0 NAUSEA: ICD-10-CM

## 2021-02-09 PROCEDURE — 77063 BREAST TOMOSYNTHESIS BI: CPT | Mod: TC

## 2021-02-09 PROCEDURE — 87338 HPYLORI STOOL AG IA: CPT | Mod: ZL | Performed by: NURSE PRACTITIONER

## 2021-02-10 LAB
H PYLORI AG STL QL IA: NEGATIVE
TTG IGA SER-ACNC: <1 U/ML
TTG IGG SER-ACNC: <1 U/ML

## 2021-02-22 ENCOUNTER — ANTICOAGULATION THERAPY VISIT (OUTPATIENT)
Dept: ANTICOAGULATION | Facility: OTHER | Age: 66
End: 2021-02-22

## 2021-02-22 DIAGNOSIS — I26.99 PULMONARY EMBOLISM AND INFARCTION (H): ICD-10-CM

## 2021-02-22 DIAGNOSIS — I82.409 DEEP VEIN THROMBOSIS (DVT) (H): ICD-10-CM

## 2021-02-22 DIAGNOSIS — Z79.01 LONG TERM CURRENT USE OF ANTICOAGULANT THERAPY: ICD-10-CM

## 2021-02-22 LAB — INR PPP: 3.3 (ref 0.9–1.1)

## 2021-02-22 NOTE — PROGRESS NOTES
ANTICOAGULATION FOLLOW-UP CLINIC VISIT    Patient Name:  Cassy Avila  Date:  2/22/2021  Contact Type:  Telephone    SUBJECTIVE:  Patient Findings     Positives:  Change in health (Constipation), Missed doses (Cut back dose on 2/15), Change in medications (Omeprazole)    Comments:  PST. INR results faxed to Warfarin clinic from [facility]. Call placed to patient and spoke with her regarding INR results/Warfarin dosing/INR recheck date. She stated that she took her INR last week and it was 3.0 so she only took 5 mg on 2/15 instead of 7.5 mg. She stated that the only thing that has changed is she started on Omeprazole. She also stated that her IBS has been bothering her too- she has had diarrhea for a long period of time and is now constipated. She denied any bleeding/bruising or any other new changes to her diet/meds/activity. She verbalized understanding of all instructions. She is to call Warfarin clinic if any questions/issues/illness.         Clinical Outcomes     Negatives:  Major bleeding event, Thromboembolic event, Anticoagulation-related hospital admission, Anticoagulation-related ED visit, Anticoagulation-related fatality    Comments:  PST. INR results faxed to Warfarin clinic from [facility]. Call placed to patient and spoke with her regarding INR results/Warfarin dosing/INR recheck date. She stated that she took her INR last week and it was 3.0 so she only took 5 mg on 2/15 instead of 7.5 mg. She stated that the only thing that has changed is she started on Omeprazole. She also stated that her IBS has been bothering her too- she has had diarrhea for a long period of time and is now constipated. She denied any bleeding/bruising or any other new changes to her diet/meds/activity. She verbalized understanding of all instructions. She is to call Warfarin clinic if any questions/issues/illness.            OBJECTIVE    Recent labs: (last 7 days)     02/22/21   INR 3.3*       ASSESSMENT / PLAN  INR assessment SUPRA  Started Omeprazole   Recheck INR In: 2 WEEKS    INR Location Home INR      Anticoagulation Summary  As of 2/22/2021    INR goal:  2.0-3.0   TTR:  64.0 % (1 y)   INR used for dosing:  3.3 (2/22/2021)   Warfarin maintenance plan:  7.5 mg (5 mg x 1.5) every Wed, Fri; 5 mg (5 mg x 1) all other days   Full warfarin instructions:  2/22: 2.5 mg; Otherwise 7.5 mg every Wed, Fri; 5 mg all other days   Weekly warfarin total:  40 mg   Plan last modified:  Melanie Farris RN (2/22/2021)   Next INR check:  3/8/2021   Priority:  High   Target end date:  Indefinite    Indications    Long-term (current) use of anticoagulants [Z79.01] [Z79.01]  Pulmonary embolism and infarction (H) [I26.99]  Deep vein thrombosis (DVT) (H) [I82.409] [I82.409]             Anticoagulation Episode Summary     INR check location:  Home Draw    Preferred lab:      Send INR reminders to:  DAREN CAMEJO    Comments:  PST - [facility].  Shoulder surgery 6/24/2020, warfarin hold x 5 days. Lovenox bridge per A.,Minnie  Call cell phone with any dosing. Home phone no longer correct number        Anticoagulation Care Providers     Provider Role Specialty Phone number    Eilz Hartman CNP Referring Family Medicine [phone]            See the Encounter Report to view Anticoagulation Flowsheet and Dosing Calendar (Go to Encounters tab in chart review, and find the Anticoagulation Therapy Visit)        Melanie Farris, MAKAYLA

## 2021-02-23 ENCOUNTER — OFFICE VISIT (OUTPATIENT)
Dept: CARE COORDINATION | Facility: OTHER | Age: 66
End: 2021-02-23
Attending: NURSE PRACTITIONER
Payer: MEDICARE

## 2021-02-23 VITALS
HEIGHT: 65 IN | TEMPERATURE: 97.8 F | BODY MASS INDEX: 38.51 KG/M2 | OXYGEN SATURATION: 95 % | HEART RATE: 69 BPM | DIASTOLIC BLOOD PRESSURE: 80 MMHG | SYSTOLIC BLOOD PRESSURE: 112 MMHG | WEIGHT: 231.1 LBS

## 2021-02-23 DIAGNOSIS — R19.7 DIARRHEA, UNSPECIFIED TYPE: ICD-10-CM

## 2021-02-23 DIAGNOSIS — R11.0 NAUSEA: ICD-10-CM

## 2021-02-23 DIAGNOSIS — R10.84 ABDOMINAL PAIN, GENERALIZED: ICD-10-CM

## 2021-02-23 DIAGNOSIS — Z20.822 COVID-19 RULED OUT: Primary | ICD-10-CM

## 2021-02-23 LAB — CRP SERPL-MCNC: <2.9 MG/L (ref 0–8)

## 2021-02-23 PROCEDURE — 36415 COLL VENOUS BLD VENIPUNCTURE: CPT | Mod: ZL | Performed by: INTERNAL MEDICINE

## 2021-02-23 PROCEDURE — G0463 HOSPITAL OUTPT CLINIC VISIT: HCPCS

## 2021-02-23 PROCEDURE — 86140 C-REACTIVE PROTEIN: CPT | Mod: ZL | Performed by: INTERNAL MEDICINE

## 2021-02-23 PROCEDURE — G0463 HOSPITAL OUTPT CLINIC VISIT: HCPCS | Mod: 25

## 2021-02-23 RX ORDER — OXYCODONE HYDROCHLORIDE 5 MG/1
TABLET ORAL
COMMUNITY
Start: 2020-06-25 | End: 2021-11-22

## 2021-02-23 ASSESSMENT — MIFFLIN-ST. JEOR: SCORE: 1589.14

## 2021-02-23 ASSESSMENT — PAIN SCALES - GENERAL: PAINLEVEL: MODERATE PAIN (4)

## 2021-02-23 NOTE — NURSING NOTE
"Chief Complaint   Patient presents with     Diarrhea     abdominal pain        Initial /80   Pulse 69   Temp 97.8  F (36.6  C) (Tympanic)   Ht 1.651 m (5' 5\")   Wt 104.8 kg (231 lb 1.6 oz)   SpO2 95%   BMI 38.46 kg/m   Estimated body mass index is 38.46 kg/m  as calculated from the following:    Height as of this encounter: 1.651 m (5' 5\").    Weight as of this encounter: 104.8 kg (231 lb 1.6 oz).  Medication Reconciliation: complete  Cait Whitley LPN    "

## 2021-02-23 NOTE — PROGRESS NOTES
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 Provider to review     Factor V Leiden (H)      Factor V Leiden mutation (H) 7/26/2019     Family history of colon cancer 1/2/2018     Gastro-oesophageal reflux disease      Herpes zoster without mention of complication 2003    resolved from problem list     Hyperlipidemia LDL goal <100 7/11/2002     Problem list name updated by automated process. Provider to review     Long term (current) use of anticoagulants 8/20/2003     Major depression 8/8/2014     Mammographic microcalcification 2003    resolved from problem list     Migraine, unspecified, without mention of intractable migraine without mention of status migrainosus 3/5/2001     Moderate episode of recurrent major depressive disorder (H) 8/8/2014     Neurofibromatosis, peripheral, NF1 (H) 8/22/2012     Problem list name updated by automated process. Provider to review     Neurofibromatosis, unspecified(237.70) 8/22/2012     Other and unspecified hyperlipidemia 7/11/2002     Other chronic pain      Other pulmonary embolism and infarction 4/11/2003     Primary insomnia 10/31/2018     Statin medication not prescribed per physician orders 1/17/2018     Tachycardia, unspecified 2/12/2001     Thrombosis of leg      Unspecified essential hypertension 2/20/2001      Vitamin D deficiency 2018         HPI:  Genie has a variety of gastrointestinal complaints I saw her many years ago and at that time she was diagnosed with IBS.  Beginning in early 2020 she developed diarrhea consisting of 6 watery bowel movements per day.  That lasted for 2 months.  Chemistry profile CBC and TTG were normal.  The diarrhea spontaneously resolved and for the last 3 weeks she has had constipation with a bowel movement every third day which is hard and difficult to pass.  There is been no gross bleeding.  In the last 2 weeks she has lost 11 pounds without intentional dieting.  Of note her mother had colon cancer at age 76 and her brother had colon cancer at age 59 her last colonoscopy was 3 years ago and reportedly was normal aside from diverticulosis.  She also complains of diffuse abdominal pain made worse with eating this is epigastric and right upper quadrant pain.  She is status post cholecystectomy.  There is no radiation to the back no jaundice no icterus.  She has had no fevers or rigors.  Pain is present on empty stomach and there is diffuse abdominal bloating.    PREVIOUS SURGERIES:  Past Surgical History:   Procedure Laterality Date     ------------OTHER-------------      shoulder replacement; Provider: Karen     ARTHROPLASTY KNEE  2014    Procedure: ARTHROPLASTY KNEE;  Surgeon: Sean Alexander MD;  Location: [facility] OR     ARTHROSCOPY SHOULDER      right, bone spurs     CA ANESTH,SHOULDER REPLACEMENT Right 2020      SECTION      x3     CHOLECYSTECTOMY       COLONOSCOPY  02/15/2018    [place],,polyps     elbow ulnar tunnel release       ELECTROTHERMAL THERAPY INTRADISC  2017    stimulator     ENDOSCOPIC SINUS SURGERY, SEPTOPLASTY, TURBINOPLASTY, MAXILLARY SINUSOTOMY, COMBINED N/A 2015    Procedure: COMBINED ENDOSCOPIC SINUS SURGERY, SEPTOPLASTY, TURBINOPLASTY, MAXILLARY SINUSOTOMY;  Surgeon: Seema Conn MD;  Location: [facility] OR      ESOPHAGOSCOPY, GASTROSCOPY, DUODENOSCOPY (EGD), COMBINED  2011    with biopsy and endoscopic U/S     EXCISE NEUROMA LOWER EXTREMITY Left 07/13/2016    Procedure: EXCISE NEUROMA LOWER EXTREMITY;  Surgeon: Edi Reed MD;  Location: [facility] OR     EYE SURGERY Right 09/2020     FUSION LUMBAR ANTERIOR WITH BAK CAGES      L5-S1     HYSTERECTOMY TOTAL ABDOMINAL, BILATERAL SALPINGO-OOPHORECTOMY, COMBINED N/A      ORTHOPEDIC SURGERY  02/2015    right shoulder     ORTHOPEDIC SURGERY  08/28/2015    right knee     ORTHOPEDIC SURGERY Right 06/11/2018    hip labrum tear     pionidal cyst excision       TRANSPOSITION ULNAR NERVE (ELBOW)         MEDICATIONS:    Current Outpatient Medications:      aspirin 81 MG EC tablet, Take 81 mg by mouth daily HS, Disp: , Rfl:      Cholecalciferol (VITAMIN D3 PO), Take 3,000 mg by mouth daily AM, Disp: , Rfl:      escitalopram (LEXAPRO) 10 MG tablet, Take 1 tablet (10 mg) by mouth daily, Disp: 90 tablet, Rfl: 1     HEMP OIL OR EXTRACT OR OTHER CBD CANNABINOID, NOT MEDICAL CANNABIS,, , Disp: , Rfl:      hydrochlorothiazide (HYDRODIURIL) 25 MG tablet, Take 1 tablet (25 mg) by mouth daily, Disp: 90 tablet, Rfl: 1     losartan (COZAAR) 50 MG tablet, Take 2 tablets by mouth once daily, Disp: 180 tablet, Rfl: 0     methocarbamol (ROBAXIN) 750 MG tablet, Take 1-2 tablets (750-1,500 mg) by mouth 4 times daily as needed for muscle spasms, Disp: 60 tablet, Rfl: 0     metoprolol succinate ER (TOPROL-XL) 50 MG 24 hr tablet, TAKE 1 AND ONE-HALF TABLETS BY MOUTH EVERY DAY, Disp: 90 tablet, Rfl: 3     omeprazole (PRILOSEC) 40 MG DR capsule, Take 1 capsule (40 mg) by mouth daily, Disp: 90 capsule, Rfl: 1     order for DME, Nebulizer machine and tubing  DX:  Bronchitis, Disp: 1 Device, Rfl: 0     oxyCODONE (ROXICODONE) 5 MG tablet, TK 1 TO 2 TS PO Q 4 H PRF MODERATE TO SEVERE PAIN, Disp: , Rfl:      potassium chloride ER (KLOR-CON M) 10 MEQ CR tablet, Take 1 tablet (10 mEq) by mouth daily, Disp: 30 tablet,  Rfl: 3     traZODone (DESYREL) 50 MG tablet, TAKE 1 TO 2 TABLETS BY MOUTH NIGHTLY AS NEEDED, Disp: 180 tablet, Rfl: 0     vitamin B complex with vitamin C (VITAMIN  B COMPLEX) tablet, Take 1 tablet by mouth daily, Disp: , Rfl:      warfarin ANTICOAGULANT (COUMADIN) 5 MG tablet, 7.5mg Mon,Wed,Fri and 5mg all other days or as directed by warfarin clinic, Disp: 150 tablet, Rfl: 3     saccharomyces boulardii (FLORASTOR) 250 MG capsule, Take 1 capsule (250 mg) by mouth 2 times daily (Patient not taking: Reported on 2/23/2021), Disp: 60 capsule, Rfl: 1    ALLERGIES:     Allergies   Allergen Reactions     Amlodipine Besylate Swelling     Norvasc     Amoxicillin      Atorvastatin      myualgia     Cephalexin Monohydrate Hives     Keflex     Erythromycin Base [Kdc:Yellow Dye+Erythromycin+Brilliant Blue Fcf] Nausea and Vomiting     Meloxicam Other (See Comments)     Mobic - confusion, depression     Adhesive Tape Rash     Prochlorperazine Edisylate Swelling and Rash     Compazine     Prochlorperazine Maleate Swelling and Rash       FAMILY HISTORY:  Family History   Problem Relation Age of Onset     Cancer Mother      Colon Polyps Mother      Heart Failure Mother 87        congestive, cause of death     Myocardial Infarction Mother         myocardial infarction     Myocardial Infarction Father         myocardial infarction - cause of death     C.A.D. Father      Cancer Paternal Uncle         cause of death     C.A.D. Brother      Other - See Comments Other         factor 5 - family h/o     Asthma No family hx of        SOCIAL HISTORY:  Social History     Socioeconomic History     Marital status:      Spouse name: Not on file     Number of children: Not on file     Years of education: Not on file     Highest education level: Not on file   Occupational History     Not on file   Social Needs     Financial resource strain: Not on file     Food insecurity     Worry: Not on file     Inability: Not on file     Transportation  "needs     Medical: Not on file     Non-medical: Not on file   Tobacco Use     Smoking status: Former Smoker     Packs/day: 1.00     Years: 30.00     Pack years: 30.00     Types: Cigarettes, Pipe     Smokeless tobacco: Never Used     Tobacco comment: quit in 1999   Substance and Sexual Activity     Alcohol use: No     Drug use: No     Sexual activity: Yes     Partners: Male   Lifestyle     Physical activity     Days per week: Not on file     Minutes per session: Not on file     Stress: Not on file   Relationships     Social connections     Talks on phone: Not on file     Gets together: Not on file     Attends Nondenominational service: Not on file     Active member of club or organization: Not on file     Attends meetings of clubs or organizations: Not on file     Relationship status: Not on file     Intimate partner violence     Fear of current or ex partner: Not on file     Emotionally abused: Not on file     Physically abused: Not on file     Forced sexual activity: Not on file   Other Topics Concern      Service Not Asked     Blood Transfusions Yes     Caffeine Concern Yes     Comment: 2 cups coffee daily     Occupational Exposure Not Asked     Hobby Hazards Not Asked     Sleep Concern Not Asked     Stress Concern Not Asked     Weight Concern Not Asked     Special Diet Not Asked     Back Care Not Asked     Exercise Yes     Comment: walking, aerobic daily (5-10 hrs/week)     Bike Helmet Not Asked     Seat Belt Not Asked     Self-Exams Not Asked     Parent/sibling w/ CABG, MI or angioplasty before 65F 55M? Yes   Social History Narrative     Not on file         PHYSICAL EXAM:  Constitutional: aaox3, cooperative, pleasant, not dyspneic/diaphoretic, no acute distress  Vitals reviewed: /80   Pulse 69   Temp 97.8  F (36.6  C) (Tympanic)   Ht 1.651 m (5' 5\")   Wt 104.8 kg (231 lb 1.6 oz)   SpO2 95%   BMI 38.46 kg/m    Wt:   Wt Readings from Last 2 Encounters:   02/23/21 104.8 kg (231 lb 1.6 oz)   02/08/21 " 106.6 kg (235 lb)      Eyes: Sclera anicteric/injected  Ears/nose/mouth/throat: Normal oropharynx without ulcers or exudate, mucus membranes moist, hearing intact  Neck: supple, thyroid normal size  CV: No edema  Respiratory: Unlabored breathing  Lymph: No axillary, submandibular, supraclavicular or inguinal lymphadenopathy  Abd: Diffusely tender in the epigastrium and right upper quadrant without guarding or masses distended, +bs, no hepatosplenomegaly,, no peritoneal signs  Skin: warm, perfused, no jaundice  Psych: Normal affect  MSK: Normal gait      ASSESSMENT/PLAN:  Change in bowel habits as above with weight loss and diffuse upper abdominal pain a C-reactive protein will be checked and she will be scheduled for EGD and colonoscopy is possible that this is functional but with weight loss certainly an organic cause should be sought.      Domenic Kang MD

## 2021-02-23 NOTE — PATIENT INSTRUCTIONS
Thank you for allowing Dr Domenic Patricia  to participate in your care. Please call our office at [phone] with scheduling questions or with any other questions or concerns.    Patient scheduled for Colonoscopy and EGD with Dr Domenic Patricia at [facility], on March 8th Monday Information will be mailed to patient from [facility].  Check in time 10:30 Procedure 11:00     COVID-19 test is needed 5-7 days before procedure in the morning. This testing is done in the [facility] (weekdays and weekends) [facility] or in the [facility] at the [facility] (weekdays only).  Follow the signage for parking and bring your mobile phone if you have one to call the phone number on the sign outside the testing site for check-in. If you do not have a cell phone, please call the nurse for instructions on checking in. This has been scheduled for March 3rd at 9:30 at the [facility] site.

## 2021-02-24 NOTE — PROGRESS NOTES
"ANTICOAGULATION FOLLOW-UP CLINIC VISIT    Patient Name:  Cassy Avila  Date:  10/16/2019  Contact Type:  Telephone    SUBJECTIVE:  Patient Findings     Comments:   PSt done and result faxed to warfarin clinic by jean. Call placed to patient and spoke to her re: INR result, warfarin dosing/INR recheck date. She verbalized understanding and has no questions. She has no changes in diet/meds/activity. States she did have \"a couple\" of nosebleeds but states has to humidify house now that heat is on.         Clinical Outcomes     Negatives:   Major bleeding event, Thromboembolic event, Anticoagulation-related hospital admission, Anticoagulation-related ED visit, Anticoagulation-related fatality    Comments:   PSt done and result faxed to warfarin clinic by jean. Call placed to patient and spoke to her re: INR result, warfarin dosing/INR recheck date. She verbalized understanding and has no questions. She has no changes in diet/meds/activity. States she did have \"a couple\" of nosebleeds but states has to humidify house now that heat is on.            OBJECTIVE    INR   Date Value Ref Range Status   10/16/2019 1.8 (A) 0.8 - 1.14 Final       ASSESSMENT / PLAN  INR assessment SUB    Recheck INR In: 4 WEEKS    INR Location Home INR      Anticoagulation Summary  As of 10/16/2019    INR goal:   2.0-3.0   TTR:   78.8 % (3.2 y)   INR used for dosin.8! (10/16/2019)   Warfarin maintenance plan:   7.5 mg (5 mg x 1.5) every Mon, Fri; 5 mg (5 mg x 1) all other days   Full warfarin instructions:   10/16: 7.5 mg; Otherwise 7.5 mg every Mon, Fri; 5 mg all other days   Weekly warfarin total:   40 mg   Plan last modified:   Manju Escalera RN (2019)   Next INR check:   2019   Priority:   INR   Target end date:   Indefinite    Indications    Long-term (current) use of anticoagulants [Z79.01] [Z79.01]  Pulmonary embolism and infarction (H) [I26.99]  Deep vein thrombosis (DVT) (H) [I82.409] [I82.409]           " February 24, 2021       Luke Hall MD  [address] [place] [id number]  Via Fax: [phone]      Patient: Shruti Nguyen   YOB: 1945   Date of Visit: 2/24/2021       Dear Dr. Hall:    Thank you for referring Shruti Nguyen to me for evaluation. Below are my notes for this visit with her.    If you have questions, please do not hesitate to call me. I look forward to following your patient along with you.      Sincerely,        Ebony Owen MD        CC: No Recipients  Ebony Owen MD  2/24/2021 10:39 AM  Signed  Advanced Heart Failure Progress Note    Subjective:  As usual it is somewhat difficult to understand what the patient is trying to say and this is made even more challenging by the 2 masks.  After it first appeared that the patient was not taking her metoprolol it turned out afterwards that she indeed takes it but she forgot to bring it in as she leaves it on her night table.  She and her family are adamantly against the COVID-19 vaccination.    Wt Readings from Last 4 Encounters:   02/24/21 70.3 kg (155 lb)   08/21/20 72.6 kg (160 lb)   02/26/20 71.8 kg (158 lb 3.2 oz)   10/11/19 70.8 kg (156 lb)       Allergies:  ALLERGIES:   Allergen Reactions   • Flu Virus Vaccine Other (See Comments)   • Penicillins NAUSEA      CLASS        Objective:  New York Heart Association Class 2.    Review of Systems:  Review of Systems   Constitution: Positive for weight gain (5 lbs). Negative for chills, fever and weight loss.   HENT: Negative for hearing loss.    Eyes:        Patient denies significant visual changes   Cardiovascular: Negative for chest pain and claudication.        Negative except for what's indicated in HPI   Respiratory: Negative for cough and hemoptysis.    Hematologic/Lymphatic: Does not bruise/bleed easily.   Skin: Negative for rash and suspicious lesions.   Musculoskeletal: Negative for arthritis.   Gastrointestinal: Negative for hematochezia and    Anticoagulation Episode Summary     INR check location:   Home Draw    Preferred lab:       Send INR reminders to:   [facility] ANTICOAG POOL    Comments:   PST - [facility].  Shoulder surgery 8/2/19, warfarin hold x 5 days. Minnie Morales        Anticoagulation Care Providers     Provider Role Specialty Phone number    Eliz Hartman, JANINA [facility] [phone]            See the Encounter Report to view Anticoagulation Flowsheet and Dosing Calendar (Go to Encounters tab in chart review, and find the Anticoagulation Therapy Visit)        Manju Escalera RN                  melena.   Genitourinary: Negative for hematuria.   Neurological:        No localized deficits   Allergic/Immunologic: Negative for environmental allergies.        No new food allergies        Physical Exam:  Blood pressure (!) 166/80, pulse 70, height 5' 5\" (1.651 m), weight 70.3 kg (155 lb).   General: Alert and oriented in no apparent distress.  HEENT: No focal deficits.  PERRLA  Neck: No JVD, carotids 2+ no bruits.  Cardiac: Regular rate and rhythm, S1, S2 2/6 systolic ejection murmur; loud S4; grade 3/6 systolic murmur heard throughout the precordium which increases in the squat to stand maneuver.  Lungs: Clear without wheezes, rales, rhonchi or dullness.  Normal excursions and effort.  Abdomen: Soft, non-tender.  No hepatosplenomegaly.  Active bowel sounds.  Extremities: Without clubbing, cyanosis or edema.  Peripheral pulses are 2+.  Neurologic: Alert and oriented, normal affect.  Skin: Warm and dry.         Medications:  Current Outpatient Medications   Medication Sig Dispense Refill   • atorvastatin (LIPITOR) 40 MG tablet TAKE 1 TABLET BY MOUTH AT  BEDTIME 90 tablet 0   • verapamil (CALAN SR) 120 MG CR tablet TAKE 3 TABLETS BY MOUTH  DAILY 270 tablet 0   • pantoprazole (PROTONIX) 40 MG tablet 40 mg 2 times daily.      • ergocalciferol (DRISDOL) 1.25 mg (50,000 units) capsule Take 1.25 mg by mouth 1 day a week.     • naproxen sodium (ALEVE) 220 MG tablet Take 220 mg by mouth 2 times daily (with meals).     • furosemide (LASIX) 20 MG tablet TAKE 1 TABLET BY MOUTH  DAILY AS NEEDED FOR  SWELLING 90 tablet 3   • Cyanocobalamin (VITAMIN B-12 PO) Take 2,250 mcg by mouth daily.      • aspirin 81 MG tablet 1 tablet daily     • cilostazol (PLETAL) 100 MG tablet Take 1 tablet by mouth 2 times daily.     • metoPROLOL succinate (TOPROL-XL) 50 MG 24 hr tablet TAKE 1 TABLET BY MOUTH AT  BEDTIME 90 tablet 3     No current facility-administered medications for this visit.             Past Medical History:   Diagnosis Date    • Arthritis    • Atherosclerosis of native arteries of extremities with rest pain, bilateral legs (CMS/[facility])    • Congestive heart failure (CHF) (CMS/[facility])    • Dilated cardiomyopathy (CMS/[facility])    • ICD (implantable cardioverter-defibrillator) in place    • Myocardial infarction (CMS/[facility])    • PVD (peripheral vascular disease) (CMS/[facility])    • Renal artery stenosis (CMS/[facility])    • Shingles 10/2015   • VT (ventricular tachycardia) (CMS/[facility])         Past Surgical History:   Procedure Laterality Date   • Angioplasty Left 02/2014    tibial/peroneal angioplasty    • Icd implant  01/2010    Medtronic   • Pacer generator change  11/2016    ICD generator change ([employer]tr[employer])    • Stent implant Right     SFA        Family History   Problem Relation Age of Onset   • Stroke Mother    • Stroke Brother    • Coronary Artery Disease Neg Hx         Negative for premature CAD   • Aneurysm Neg Hx         Negative for AAA        Social History     Socioeconomic History   • Marital status: Single     Spouse name: Not on file   • Number of children: Not on file   • Years of education: Not on file   • Highest education level: Not on file   Occupational History   • Not on file   Social Needs   • Financial resource strain: Not on file   • Food insecurity     Worry: Not on file     Inability: Not on file   • Transportation needs     Medical: Not on file     Non-medical: Not on file   Tobacco Use   • Smoking status: Never Smoker   • Smokeless tobacco: Never Used   Substance and Sexual Activity   • Alcohol use: Yes     Frequency: 2-3 times a week     Drinks per session: 1 or 2   • Drug use: Never   • Sexual activity: Not on file   Lifestyle   • Physical activity     Days per week: 2 days     Minutes per session: 20 min   • Stress: Not on file   Relationships   • Social connections     Talks on phone: Not on file     Gets together: Not on file     Attends Caodaism service: Not on file     Active member of club or organization: Not on file      Attends meetings of clubs or organizations: Not on file     Relationship status: Not on file   • Intimate partner violence     Fear of current or ex partner: Not on file     Emotionally abused: Not on file     Physically abused: Not on file     Forced sexual activity: Not on file   Other Topics Concern   • Not on file   Social History Narrative   • Not on file        Assessment and Plan:  Patient Active Problem List   Diagnosis   • Chronic diastolic heart failure (CMS/HCC)   • Atherosclerosis of native arteries of extremities with rest pain, bilateral legs (CMS/HCC)   • Essential hypertension, benign   • Hypertrophic obstructive cardiomyopathy (CMS/HCC)   • ICD (implantable cardioverter-defibrillator) in place   • Renal artery stenosis (CMS/HCC)   • Ventricular tachycardia (CMS/HCC)   • VT (ventricular tachycardia) (CMS/HCC)   • Shingles   • PVD (peripheral vascular disease) (CMS/HCC)   • Myocardial infarction (CMS/HCC)   • Dilated cardiomyopathy (CMS/HCC)   • Congestive heart failure (CHF) (CMS/HCC)   • Arthritis       Decision Making/Plan:  During the 30-minute visit I inquired about the patient's symptoms examined her and I shared the results of the echocardiogram which continue to reveal the typical findings of hypertrophic cardiomyopathy with a dynamic LV outflow obstruction in which the gradient rises from 92 to 118 mmHg.  Pulmonary artery systolic pressure is estimated to be 60 to 65 mmHg.  I will continue to monitor the latter as I am not sure that the patient will tolerate pulmonary vasodilators if needed.  I also reminded the patient's not to take furosemide unless she first contact us and explained that the furosemide may increase her LVOT obstruction.  I also tried my best to educate the patient regarding the vaccine and encouraged her to discuss that issue with her family members.  I will see her in 1 year with labs and echo prior to her appointment.    Ebony Owen M.D., F.A.C.C., F.A.H.A.,  PATRICIA  Medical Director, Heart Failure Research, [facility] [place]  Medical Director, [facility]  [facility] 4th Floor  [address] [place] [place] [address]  Phone: [phone]  Fax: [phone]  E-mail: [email or web address] [place]  2/24/2021  9:35 AM

## 2021-02-25 DIAGNOSIS — I10 BENIGN ESSENTIAL HYPERTENSION: ICD-10-CM

## 2021-02-25 NOTE — TELEPHONE ENCOUNTER
losartan (COZAAR) 50 MG tablet    Last Written Prescription Date:  11/25/20  Last Fill Quantity: 180,   # refills: 0  Last Office Visit: 2/8/21  Future Office visit:    Next 5 appointments (look out 90 days)    Mar 03, 2021  9:30 AM  (Arrive by 9:15 AM)  SHORT with [facility]  [facility] ([facility] ) [address]  [facility] [address]  [phone]           Routing refill request to provider for review/approval

## 2021-02-26 RX ORDER — LOSARTAN POTASSIUM 50 MG/1
TABLET ORAL
Qty: 180 TABLET | Refills: 0 | Status: SHIPPED | OUTPATIENT
Start: 2021-02-26 | End: 2021-05-28

## 2021-03-02 ENCOUNTER — ANTICOAGULATION THERAPY VISIT (OUTPATIENT)
Dept: ANTICOAGULATION | Facility: OTHER | Age: 66
End: 2021-03-02

## 2021-03-02 DIAGNOSIS — Z79.01 LONG TERM CURRENT USE OF ANTICOAGULANT THERAPY: ICD-10-CM

## 2021-03-02 DIAGNOSIS — I26.99 PULMONARY EMBOLISM AND INFARCTION (H): ICD-10-CM

## 2021-03-02 DIAGNOSIS — I82.409 DEEP VEIN THROMBOSIS (DVT) (H): ICD-10-CM

## 2021-03-02 NOTE — PROGRESS NOTES
ANTICOAGULATION FOLLOW-UP CLINIC VISIT    Patient Name:  Cassy Avila  Date:  3/2/2021  Contact Type:  Telephone    SUBJECTIVE:  Patient Findings     Positives:  Upcoming invasive procedure (Endoscopy and Colonoscopy on 3/8)    Comments:  Call placed to Warfarin clinic from patient stating that she is going to have an endoscopy and colonoscopy on 3/9/2021. Patient has been bridged with Lovenox in the past- Lovenox 40 mg daily prophylaxis dose. InBasket message to PCP, lEiz Hartman, regarding Lovenox bridge. PCP returned message stating that she would like patient to be bridged with Lovenox 1mg/kg q12h fully anticoagulated dose. Call placed to patient and we discussed pre procedure Warfarin hold/Lovenox injections and post procedure Warfarin dosing/Lovenox injections. She verbalized understanding of all directions. She had no questions at this time as she has done Lovenox injections many times in the past. She is to call Warfarin clinic with any bleeding/bruising or any other new changes to her diet/meds/activity or any questions/issues/illness.         Clinical Outcomes     Negatives:  Major bleeding event, Thromboembolic event, Anticoagulation-related hospital admission, Anticoagulation-related ED visit, Anticoagulation-related fatality    Comments:  Call placed to Warfarin clinic from patient stating that she is going to have an endoscopy and colonoscopy on 3/9/2021. Patient has been bridged with Lovenox in the past- Lovenox 40 mg daily prophylaxis dose. InBasket message to PCP, Eliz Hartman, regarding Lovenox bridge. PCP returned message stating that she would like patient to be bridged with Lovenox 1mg/kg q12h fully anticoagulated dose. Call placed to patient and we discussed pre procedure Warfarin hold/Lovenox injections and post procedure Warfarin dosing/Lovenox injections. She verbalized understanding of all directions. She had no questions at this time as she has done Lovenox injections many times in the  past. She is to call Warfarin clinic with any bleeding/bruising or any other new changes to her diet/meds/activity or any questions/issues/illness.            OBJECTIVE    No results for input(s): INR, [id number], [id number], 2AGN, F2 in the last 168 hours.    ASSESSMENT / PLAN  No question data found.  Anticoagulation Summary  As of 3/2/2021    INR goal:  2.0-3.0   TTR:  63.2 % (11.9 mo)   INR used for dosing:  No new INR was available at the time of this encounter.   Warfarin maintenance plan:  7.5 mg (5 mg x 1.5) every Wed, Fri; 5 mg (5 mg x 1) all other days   Full warfarin instructions:  3/3: Hold; 3/4: Hold; 3/5: Hold; 3/6: Hold; 3/7: Hold; 3/9: 10 mg; 3/10: 10 mg; 3/11: 10 mg; Otherwise 7.5 mg every Wed, Fri; 5 mg all other days   Weekly warfarin total:  40 mg   Plan last modified:  Melanie Farris RN (2/22/2021)   Next INR check:  3/15/2021   Priority:  High   Target end date:  Indefinite    Indications    Long-term (current) use of anticoagulants [Z79.01] [Z79.01]  Pulmonary embolism and infarction (H) [I26.99]  Deep vein thrombosis (DVT) (H) [I82.409] [I82.409]             Anticoagulation Episode Summary     INR check location:  Home Draw    Preferred lab:      Send INR reminders to:  DAREN CAMEJO    Comments:  PST - [facility].  Shoulder surgery 6/24/2020, warfarin hold x 5 days. Lovenox bridge per A.Minnie  Call cell phone with any dosing. Home phone no longer correct number        Anticoagulation Care Providers     Provider Role Specialty Phone number    Eliz Hartman CNP Referring Family Medicine [phone]            See the Encounter Report to view Anticoagulation Flowsheet and Dosing Calendar (Go to Encounters tab in chart review, and find the Anticoagulation Therapy Visit)        Melanie Farris, MAKAYLA

## 2021-03-03 ENCOUNTER — OFFICE VISIT (OUTPATIENT)
Dept: FAMILY MEDICINE | Facility: OTHER | Age: 66
End: 2021-03-03
Attending: NURSE PRACTITIONER
Payer: MEDICARE

## 2021-03-03 DIAGNOSIS — Z20.822 COVID-19 RULED OUT: ICD-10-CM

## 2021-03-03 LAB
SARS-COV-2 RNA RESP QL NAA+PROBE: NORMAL
SPECIMEN SOURCE: NORMAL

## 2021-03-03 PROCEDURE — U0005 INFEC AGEN DETEC AMPLI PROBE: HCPCS | Mod: ZL | Performed by: INTERNAL MEDICINE

## 2021-03-03 PROCEDURE — U0003 INFECTIOUS AGENT DETECTION BY NUCLEIC ACID (DNA OR RNA); SEVERE ACUTE RESPIRATORY SYNDROME CORONAVIRUS 2 (SARS-COV-2) (CORONAVIRUS DISEASE [COVID-19]), AMPLIFIED PROBE TECHNIQUE, MAKING USE OF HIGH THROUGHPUT TECHNOLOGIES AS DESCRIBED BY CMS-2020-01-R: HCPCS | Mod: ZL | Performed by: INTERNAL MEDICINE

## 2021-03-08 ENCOUNTER — TRANSFERRED RECORDS (OUTPATIENT)
Dept: HEALTH INFORMATION MANAGEMENT | Facility: CLINIC | Age: 66
End: 2021-03-08

## 2021-03-10 LAB
LABORATORY COMMENT REPORT: NORMAL
SARS-COV-2 RNA RESP QL NAA+PROBE: NEGATIVE
SPECIMEN SOURCE: NORMAL

## 2021-03-15 ENCOUNTER — ANTICOAGULATION THERAPY VISIT (OUTPATIENT)
Dept: ANTICOAGULATION | Facility: OTHER | Age: 66
End: 2021-03-15

## 2021-03-15 DIAGNOSIS — I82.409 DEEP VEIN THROMBOSIS (DVT) (H): ICD-10-CM

## 2021-03-15 DIAGNOSIS — Z79.01 LONG TERM CURRENT USE OF ANTICOAGULANT THERAPY: ICD-10-CM

## 2021-03-15 DIAGNOSIS — I26.99 PULMONARY EMBOLISM AND INFARCTION (H): ICD-10-CM

## 2021-03-15 LAB — INR PPP: 1.7 (ref 0.9–1.1)

## 2021-03-15 NOTE — PROGRESS NOTES
ANTICOAGULATION FOLLOW-UP CLINIC VISIT    Patient Name:  Cassy Avila  Date:  3/15/2021  Contact Type:  Telephone    SUBJECTIVE:  Patient Findings     Positives:  Change in medications (Lovenox, 2nd Moderna Covid vaccination)    Comments:  PST. INR results faxed to Warfarin Clinic from [facility]. Call placed to pt and spoke re: INR results/Warfarin dosing/INR recheck date. Patient stated that she is not going to continue with the Lovenox injections as her abd is very sore and bruised. INR sub-therapeutic today-1.7. A 1x dose was given for today and after today she is to continue on her maintenance dose. 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 If abnormal Warfarin clinic will call patient for dosing instructions. Pt denied any bleeding or any other new changes to their diet/meds/activity. Pt verbalized understanding of all instructions. Pt is to call Warfarin clinic with any questions/issues/illness.           Clinical Outcomes     Negatives:  Major bleeding event, Thromboembolic event, Anticoagulation-related hospital admission, Anticoagulation-related ED visit, Anticoagulation-related fatality    Comments:  PST. INR results faxed to Warfarin Clinic from [facility]. Call placed to pt and spoke re: INR results/Warfarin dosing/INR recheck date. Patient stated that she is not going to continue with the Lovenox injections as her abd is very sore and bruised. INR sub-therapeutic today-1.7. A 1x dose was given for today and after today she is to continue on her maintenance dose. 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 If abnormal Warfarin clinic will call patient for dosing instructions. Pt denied any bleeding or any other new changes to their diet/meds/activity. Pt verbalized understanding of all instructions. Pt is to call Warfarin clinic with any questions/issues/illness.              OBJECTIVE    Recent labs: (last 7 days)     03/15/21   INR 1.7*       ASSESSMENT /  PLAN  INR assessment SUB    Recheck INR In: 6 WEEKS    INR Location Home INR      Anticoagulation Summary  As of 3/15/2021    INR goal:  2.0-3.0   TTR:  61.9 % (1 y)   INR used for dosin.7 (3/15/2021)   Warfarin maintenance plan:  7.5 mg (5 mg x 1.5) every Wed, Fri; 5 mg (5 mg x 1) all other days   Full warfarin instructions:  3/15: 12.5 mg; Otherwise 7.5 mg every Wed, Fri; 5 mg all other days   Weekly warfarin total:  40 mg   Plan last modified:  Melanie Farris RN (2021)   Next INR check:  3/29/2021   Priority:  High   Target end date:  Indefinite    Indications    Long-term (current) use of anticoagulants [Z79.01] [Z79.01]  Pulmonary embolism and infarction (H) [I26.99]  Deep vein thrombosis (DVT) (H) [I82.409] [I82.409]             Anticoagulation Episode Summary     INR check location:  Home Draw    Preferred lab:      Send INR reminders to:  DAREN CAMEJO    Comments:  PST - [facility].  Shoulder surgery 2020, warfarin hold x 5 days. Lovenox bridge per A.,Minnie  Call cell phone with any dosing. Home phone no longer correct number        Anticoagulation Care Providers     Provider Role Specialty Phone number    Eliz Hartman CNP Referring Family Medicine [phone]            See the Encounter Report to view Anticoagulation Flowsheet and Dosing Calendar (Go to Encounters tab in chart review, and find the Anticoagulation Therapy Visit)        Melanie Farris, MAKAYLA

## 2021-04-05 ENCOUNTER — ANTICOAGULATION THERAPY VISIT (OUTPATIENT)
Dept: ANTICOAGULATION | Facility: OTHER | Age: 66
End: 2021-04-05

## 2021-04-05 DIAGNOSIS — I26.99 PULMONARY EMBOLISM AND INFARCTION (H): ICD-10-CM

## 2021-04-05 DIAGNOSIS — I82.409 DEEP VEIN THROMBOSIS (DVT) (H): ICD-10-CM

## 2021-04-05 DIAGNOSIS — Z79.01 LONG TERM CURRENT USE OF ANTICOAGULANT THERAPY: ICD-10-CM

## 2021-04-05 LAB — INR PPP: 2.2 (ref 0.9–1.1)

## 2021-04-05 NOTE — PROGRESS NOTES
ANTICOAGULATION FOLLOW-UP CLINIC VISIT    Patient Name:  Cassy Avila  Date:  2021  Contact Type:  Telephone    SUBJECTIVE:  Patient Findings     Comments:  PST. INR results faxed to Warfarin Clinic from [facility]. Call placed to pt and spoke to her re: INR results/Warfarin dosing/INR recheck date. Pt denied any bleeding/bruising or any other new changes to their diet/meds/activity. INR therapeutic today- 2.2. She is to continue on her maintenance dose. INR recheck in 6 weeks. 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 Pt verbalized understanding of all instructions. Pt is to call Warfarin clinic with any questions/issues/illness.           Clinical Outcomes     Negatives:  Major bleeding event, Thromboembolic event, Anticoagulation-related hospital admission, Anticoagulation-related ED visit, Anticoagulation-related fatality    Comments:  PST. INR results faxed to Warfarin Clinic from [facility]. Call placed to pt and spoke to her re: INR results/Warfarin dosing/INR recheck date. Pt denied any bleeding/bruising or any other new changes to their diet/meds/activity. INR therapeutic today- 2.2. She is to continue on her maintenance dose. INR recheck in 6 weeks. 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 Pt verbalized understanding of all instructions. Pt is to call Warfarin clinic with any questions/issues/illness.              OBJECTIVE    Recent labs: (last 7 days)     21   INR 2.2*       ASSESSMENT / PLAN  INR assessment THER    Recheck INR In: 6 WEEKS    INR Location Home INR      Anticoagulation Summary  As of 2021    INR goal:  2.0-3.0   TTR:  58.4 % (1 y)   INR used for dosin.2 (2021)   Warfarin maintenance plan:  7.5 mg (5 mg x 1.5) every Wed, Fri; 5 mg (5 mg x 1) all other days   Full warfarin instructions:  7.5 mg every Wed, Fri; 5 mg all other days   Weekly warfarin total:  40  mg   No change documented:  Melanie Farris, RN   Plan last modified:  Melanie Farris RN (2/22/2021)   Next INR check:  5/17/2021   Priority:  High   Target end date:  Indefinite    Indications    Long-term (current) use of anticoagulants [Z79.01] [Z79.01]  Pulmonary embolism and infarction (H) [I26.99]  Deep vein thrombosis (DVT) (H) [I82.409] [I82.409]             Anticoagulation Episode Summary     INR check location:  Home Draw    Preferred lab:      Send INR reminders to:  DAREN CAMEJO    Comments:  PST - [facility].  Shoulder surgery 6/24/2020, warfarin hold x 5 days. Lovenox bridge per A.,Minnie  Call cell phone with any dosing. Home phone no longer correct number        Anticoagulation Care Providers     Provider Role Specialty Phone number    Eliz Hartman, CNP Referring Family Medicine [phone]            See the Encounter Report to view Anticoagulation Flowsheet and Dosing Calendar (Go to Encounters tab in chart review, and find the Anticoagulation Therapy Visit)        Melanie Farris, RN

## 2021-04-09 ENCOUNTER — TRANSFERRED RECORDS (OUTPATIENT)
Dept: HEALTH INFORMATION MANAGEMENT | Facility: CLINIC | Age: 66
End: 2021-04-09

## 2021-04-14 NOTE — PATIENT INSTRUCTIONS
Assessment & Plan     The proposed surgical procedure is considered LOW risk.    Preop general physical exam  - Comprehensive metabolic panel (BMP + Alb, Alk Phos, ALT, AST, Total. Bili, TP)  - CBC with platelets and differential  - EKG 12-lead complete w/read - (Clinic Performed)  - Asymptomatic COVID-19 Virus (Coronavirus) by PCR; Future    Tear of meniscus of right knee as current injury, unspecified meniscus, unspecified tear type, subsequent encounter  - As above    Factor V Leiden mutation (H)  - Anticoagulation managed by coumadin lcinic        Preparing for Your Surgery  Getting started  A nurse will call you to review your health history and instructions. They will give you an arrival time based on your scheduled surgery time.  Please be ready to share the following:    Your doctor's clinic name and phone number    Your medical, surgical and anesthesia history    A list of allergies and sensitivities    A list of medicines, including herbal treatments and over-the-counter drugs    Whether the patient has a legal guardian (ask how to send us the papers in advance)  If you have a child who's having surgery, please ask for a copy of Preparing for Your Child's Surgery.    Preparing for surgery    Within 30 days of surgery: Have a pre-op exam (sometimes called an H&P, or History and Physical). This can be done at a clinic or pre-operative center.  ? If you're having a , you may not need this exam. Talk to your care team    At your pre-op exam, talk to your care team about all medicines you take. If you need to stop any medicines before surgery, ask when to start taking them again.  ? We do this for your safety. Many medicines can make you bleed too much during surgery. Some change how well surgery (anesthesia) drugs work.    Call your insurance company to let them know you're having surgery. (If you don't have insurance, call [phone].)    Call your clinic if there's any change in your health.  This includes signs of a cold or flu (sore throat, runny nose, cough, rash, fever). It also includes a scrape or scratch near the surgery site.    If you have questions on the day of surgery, call your hospital or surgery center.  Eating and drinking guidelines  For your safety: Unless your surgeon tells you otherwise, follow the guidelines below.    Eat and drink as usual until 8 hours before surgery. After that, no food or milk.    Drink clear liquids until 2 hours before surgery. These are liquids you can see through, like water, Gatorade and Propel Water. You may also have black coffee and tea (no cream or milk).    Nothing by mouth within 2 hours of surgery. This includes gum, candy and breath mints.    If you drink, stop drinking alcohol the night before surgery.    If your care team tells you to take medicine on the morning of surgery, it's okay to take it with a sip of water.  Preventing infection    Shower or bathe the night before and morning of your surgery. Follow the instructions your clinic gave you. (If no instructions, use regular soap.)    Don't shave or clip hair near your surgery site. We'll remove the hair if needed.    Don't smoke or vape the morning of surgery. You may chew nicotine gum up to 2 hours before surgery. A nicotine patch is okay.  ? Note: Some surgeries require you to completely quit smoking and nicotine. Check with your surgeon.    Your care team will make every effort to keep you safe from infection. We will:  ? Clean our hands often with soap and water (or an alcohol-based hand rub).  ? Clean the skin at your surgery site with a special soap that kills germs.  ? Give you a special gown to keep you warm. (Cold raises the risk of infection.)  ? Wear special hair covers, masks, gowns and gloves during surgery.  ? Give antibiotic medicine, if prescribed. Not all surgeries need antibiotics.  What to bring on the day of surgery    Photo ID and insurance card    Copy of your health care  directive, if you have one    Glasses and hearing aides (bring cases)  ? You can't wear contacts during surgery    Inhaler and eye drops, if you use them (tell us about these when you arrive)    CPAP machine or breathing device, if you use them    A few personal items, if spending the night    If you have . . .  ? A pacemaker or ICD (cardiac defibrillator): Bring the ID card.  ? An implanted stimulator: Bring the remote control.  ? A legal guardian: Bring a copy of the certified (court-stamped) guardianship papers.  Please remove any jewelry, including body piercings. Leave jewelry and other valuables at home.  If you're going home the day of surgery  Important: If you don't follow the rules below, we must cancel your surgery.     Arrange for someone to drive you home after surgery. You may not drive, take a taxi or take public transportation by yourself (unless you'll have local anesthesia only).    Arrange for a responsible adult to stay with you overnight. If you don't, we may keep you in the hospital overnight, and you may need to pay the costs yourself.  Questions?   If you have any questions for your care team, list them here: _________________________________________________________________________________________________________________________________________________________________________________________________________________________________________________________________________________________________________________________  For informational purposes only. Not to replace the advice of your health care provider. Copyright   2003, 2019 [facility]. All rights reserved. Clinically reviewed by Jamaica Vincent MD. [employer] [id number] - REV 4/20.

## 2021-04-14 NOTE — PROGRESS NOTES
[facility]  [address]  [facility] [address]  Phone: [phone]  Primary Provider: Eliz Sanon  Pre-op Performing Provider: ELIZ SANON      PREOPERATIVE EVALUATION:  Today's date: 4/16/2021    Cassy Avila is a 65 year old female who presents for a preoperative evaluation.      Surgical Information:  Surgery/Procedure: Rt Knee Scope  Surgery Location: [facility]  Surgeon: Dr. Alexander  Surgery Date: 04/29/21  Time of Surgery: TBD  Where patient plans to recover: At home with family  Fax number for surgical facility: On File  Type of Anesthesia Anticipated: to be determined      HPI related to upcoming procedure:  Right Knee Meniscal Tear      Exam: MR KNEE RIGHT W/O CONTRAST     HISTORY:  Right knee pain, unspecified chronicity. History of  arthroscopic surgery x3.     Technique: Axial, coronal and sagittal images were obtained with  combination of T1, proton density and T2-weighted images.     Findings: Anterior and posterior cruciate ligaments are intact as are  medial and lateral collateral ligaments and the extensor mechanism.     The medial meniscus is abnormal. There is blunting of the free edge of  the body of the meniscus with an appearance which suggests prior  partial meniscectomy. There is some mild obliquely oriented signal  alteration within the posterior horn which suggests a small recurrent  tear.     Mild to moderate thinning of medial cartilage is seen with mild  subchondral marrow changes in the anterior medial tibial plateau.     No lateral meniscal tear is seen. Lateral cartilage is fairly well  preserved.     Posterior patellar articular cartilage is fairly normal in thickness  but there is some linear fissuring over the lateral patellar facet.  This is not associated with subchondral marrow change. Cartilage in  the trochlear groove is well preserved.                                                                        Impression:   1.  Probable small recurrent tear posterior horn medial meniscus with  blunting of the free edge of the body which is probably postsurgical.  2. Cartilage loss over the medial tibial plateau. Linear fissuring and  cartilage over the lateral patellar facet.     KAT DACOSTA MD        Preop Questions 4/14/2021   1. Have you ever had a heart attack or stroke? No   2. Have you ever had surgery on your heart or blood vessels, such as a stent placement, a coronary artery bypass, or surgery on an artery in your head, neck, heart, or legs? No   3. Do you have chest pain with activity? No   4. Do you have a history of  heart failure? No   5. Do you currently have a cold, bronchitis or symptoms of other infection? No   6. Do you have a cough, shortness of breath, or wheezing? No   7. Do you or anyone in your family have previous history of blood clots? YES    8. Do you or does anyone in your family have a serious bleeding problem such as prolonged bleeding following surgeries or cuts? No   9. Have you ever had problems with anemia or been told to take iron pills? No   10. Have you had any abnormal blood loss such as black, tarry or bloody stools, or abnormal vaginal bleeding? No   11. Have you ever had a blood transfusion? No   12. Are you willing to have a blood transfusion if it is medically needed before, during, or after your surgery? Yes   13. Have you or any of your relatives ever had problems with anesthesia? No   14. Do you have sleep apnea, excessive snoring or daytime drowsiness? No   15. Do you have any artifical heart valves or other implanted medical devices like a pacemaker, defibrillator, or continuous glucose monitor? No   16. Do you have artificial joints? YES    17. Are you allergic to latex? No        Health Care Directive:  Patient does not have a Health Care Directive or Living Will: Discussed advance care planning with patient; information given to patient to review.          Status of Chronic  Conditions:  See problem list for active medical problems.  Problems all longstanding and stable, except as noted/documented.  See ROS for pertinent symptoms related to these conditions.        Review of Systems  CONSTITUTIONAL: NEGATIVE for fever, chills, change in weight  INTEGUMENTARY/SKIN: NEGATIVE for worrisome rashes, moles or lesions  EYES: NEGATIVE for vision changes or irritation  ENT/MOUTH: NEGATIVE for ear, mouth and throat problems. Wears top denture.   RESP: NEGATIVE for significant cough or SOB  BREAST: NEGATIVE for masses, tenderness or discharge  CV: NEGATIVE for chest pain, palpitations or peripheral edema  GI: NEGATIVE for nausea, abdominal pain, heartburn, or change in bowel habits  : NEGATIVE for frequency, dysuria, or hematuria  MUSCULOSKELETAL: NEGATIVE for significant arthralgias or myalgia  NEURO: NEGATIVE for weakness, dizziness or paresthesias  ENDOCRINE: NEGATIVE for temperature intolerance, skin/hair changes  HEME/ALLERGY/IMMUNE: bleeding disorder  PSYCHIATRIC: NEGATIVE for changes in mood or affect      Patient Active Problem List    Diagnosis Date Noted     Diarrhea 02/08/2021     Priority: Medium     Abdominal pain, generalized 02/08/2021     Priority: Medium     Obesity (BMI 35.0-39.9) with comorbidity (H) 01/28/2020     Priority: Medium     Factor V Leiden mutation (H) 07/26/2019     Priority: Medium     Primary insomnia 10/31/2018     Priority: Medium     Vitamin D deficiency 07/13/2018     Priority: Medium     Statin medication not prescribed per physician orders 01/17/2018     Priority: Medium     Family history of colon cancer 01/02/2018     Priority: Medium     Lumbar spondylosis with myelopathy 07/12/2017     Priority: Medium     Degeneration of lumbar or lumbosacral intervertebral disc 07/12/2017     Priority: Medium     Long-term (current) use of anticoagulants [Z79.01] 07/20/2016     Priority: Medium     Deep vein thrombosis (DVT) (H) [I82.409] 07/20/2016     Priority:  Medium     Moderate episode of recurrent major depressive disorder (H) 08/08/2014     Priority: Medium     H/O total shoulder replacement, left 06/17/2014     Priority: Medium     Failed arthroplasty (H) 01/24/2014     Priority: Medium     Advanced care planning/counseling discussion 03/12/2013     Priority: Medium     Pt has information at home , not completed       Neurofibromatosis, peripheral, NF1 (H) 08/22/2012     Priority: Medium     Problem list name updated by automated process. Provider to review       Contact dermatitis and other eczema, due to unspecified cause 06/01/2004     Priority: Medium     Pulmonary embolism and infarction (H) 04/11/2003     Priority: Medium     Problem list name updated by automated process. Provider to review       Hyperlipidemia LDL goal <100 07/11/2002     Priority: Medium     Problem list name updated by automated process. Provider to review       Edema 01/18/2002     Priority: Medium     Essential hypertension 02/20/2001     Priority: Medium     Problem list name updated by automated process. 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 Provider to review     Factor V Leiden (H)      Factor V Leiden mutation (H) 7/26/2019     Family history of colon cancer 1/2/2018     Gastro-oesophageal reflux disease      Herpes zoster without mention of complication 2003    resolved from problem list     Hyperlipidemia LDL goal <100 7/11/2002     Problem list name  updated by automated process. Provider to review     Long term (current) use of anticoagulants 2003     Major depression 2014     Mammographic microcalcification     resolved from problem list     Migraine, unspecified, without mention of intractable migraine without mention of status migrainosus 3/5/2001     Moderate episode of recurrent major depressive disorder (H) 2014     Neurofibromatosis, peripheral, NF1 (H) 2012     Problem list name updated by automated process. Provider to review     Neurofibromatosis, unspecified(237.70) 2012     Other and unspecified hyperlipidemia 2002     Other chronic pain      Other pulmonary embolism and infarction 2003     Primary insomnia 10/31/2018     Statin medication not prescribed per physician orders 2018     Tachycardia, unspecified 2001     Thrombosis of leg      Unspecified essential hypertension 2001     Vitamin D deficiency 2018         Past Surgical History:   Procedure Laterality Date     ------------OTHER-------------      shoulder replacement; Provider: Karen     ARTHROPLASTY KNEE  2014    Procedure: ARTHROPLASTY KNEE;  Surgeon: Sean Alexander MD;  Location: [facility] OR     ARTHROSCOPY SHOULDER  2012    right, bone spurs     CA ANESTH,SHOULDER REPLACEMENT Right 2020      SECTION      x3     CHOLECYSTECTOMY       COLONOSCOPY  02/15/2018    [place],,polyps     elbow ulnar tunnel release       ELECTROTHERMAL THERAPY INTRADISC  2017    stimulator     ENDOSCOPIC SINUS SURGERY, SEPTOPLASTY, TURBINOPLASTY, MAXILLARY SINUSOTOMY, COMBINED N/A 2015    Procedure: COMBINED ENDOSCOPIC SINUS SURGERY, SEPTOPLASTY, TURBINOPLASTY, MAXILLARY SINUSOTOMY;  Surgeon: Seema Conn MD;  Location: [facility] OR     ESOPHAGOSCOPY, GASTROSCOPY, DUODENOSCOPY (EGD), COMBINED      with biopsy and endoscopic U/S     EXCISE NEUROMA LOWER EXTREMITY Left 2016    Procedure: EXCISE NEUROMA LOWER  EXTREMITY;  Surgeon: Edi Reed MD;  Location: [facility] OR     EYE SURGERY Right 09/2020     FUSION LUMBAR ANTERIOR WITH BAK CAGES      L5-S1     HYSTERECTOMY TOTAL ABDOMINAL, BILATERAL SALPINGO-OOPHORECTOMY, COMBINED N/A      ORTHOPEDIC SURGERY  02/2015    right shoulder     ORTHOPEDIC SURGERY  08/28/2015    right knee     ORTHOPEDIC SURGERY Right 06/11/2018    hip labrum tear     pionidal cyst excision       TRANSPOSITION ULNAR NERVE (ELBOW)         Current Outpatient Medications   Medication Sig Dispense Refill     aspirin 81 MG EC tablet Take 81 mg by mouth daily HS       Cholecalciferol (VITAMIN D3 PO) Take 3,000 mg by mouth daily AM       escitalopram (LEXAPRO) 10 MG tablet Take 1 tablet (10 mg) by mouth daily 90 tablet 1     HEMP OIL OR EXTRACT OR OTHER CBD CANNABINOID, NOT MEDICAL CANNABIS,        hydrochlorothiazide (HYDRODIURIL) 25 MG tablet Take 1 tablet (25 mg) by mouth daily 90 tablet 1     losartan (COZAAR) 50 MG tablet Take 2 tablets by mouth once daily 180 tablet 0     methocarbamol (ROBAXIN) 750 MG tablet Take 1-2 tablets (750-1,500 mg) by mouth 4 times daily as needed for muscle spasms 60 tablet 0     metoprolol succinate ER (TOPROL-XL) 50 MG 24 hr tablet TAKE 1 AND ONE-HALF TABLETS BY MOUTH EVERY DAY 90 tablet 3     order for DME Nebulizer machine and tubing    DX:  Bronchitis 1 Device 0     oxyCODONE (ROXICODONE) 5 MG tablet TK 1 TO 2 TS PO Q 4 H PRF MODERATE TO SEVERE PAIN       potassium chloride ER (KLOR-CON M) 10 MEQ CR tablet Take 1 tablet (10 mEq) by mouth daily 30 tablet 3     saccharomyces boulardii (FLORASTOR) 250 MG capsule Take 1 capsule (250 mg) by mouth 2 times daily 60 capsule 1     traZODone (DESYREL) 50 MG tablet TAKE 1 TO 2 TABLETS BY MOUTH NIGHTLY AS NEEDED 180 tablet 0     vitamin B complex with vitamin C (VITAMIN  B COMPLEX) tablet Take 1 tablet by mouth daily       warfarin ANTICOAGULANT (COUMADIN) 5 MG tablet 7.5mg Mon,Wed,Fri and 5mg all other days or as directed by  "warfarin clinic 150 tablet 3       Allergies   Allergen Reactions     Amlodipine Besylate Swelling     Norvasc     Amoxicillin      Atorvastatin      myualgia     Cephalexin Monohydrate Hives     Keflex     Erythromycin Base [Kdc:Yellow Dye+Erythromycin+Brilliant Blue Fcf] Nausea and Vomiting     Meloxicam Other (See Comments)     Mobic - confusion, depression     Adhesive Tape Rash     Prochlorperazine Edisylate Swelling and Rash     Compazine     Prochlorperazine Maleate Swelling and Rash        Social History     Tobacco Use     Smoking status: Former Smoker     Packs/day: 1.00     Years: 30.00     Pack years: 30.00     Types: Cigarettes, Pipe     Smokeless tobacco: Never Used     Tobacco comment: quit in 1999   Substance Use Topics     Alcohol use: No       Family History   Problem Relation Age of Onset     Cancer Mother      Colon Polyps Mother      Heart Failure Mother 87        congestive, cause of death     Myocardial Infarction Mother         myocardial infarction     Myocardial Infarction Father         myocardial infarction - cause of death     C.A.D. Father      Cancer Paternal Uncle         cause of death     C.A.D. Brother      Other - See Comments Other         factor 5 - family h/o     Asthma No family hx of          History   Drug Use No         Objective     /74   Pulse 64   Temp 97.8  F (36.6  C) (Tympanic)   Resp 16   Ht 1.651 m (5' 5\")   Wt 105.8 kg (233 lb 4.8 oz)   SpO2 97%   BMI 38.82 kg/m        Physical Exam    GENERAL APPEARANCE: healthy, alert and no distress     EYES: EOMI, PERRL     HENT: nose and mouth without ulcers or lesions. Ear canals normal, left TM with tube. Upper dental partial that is       removable.      NECK: no adenopathy, no asymmetry, masses, or scars and thyroid normal to palpation     RESP: lungs clear to auscultation - no rales, rhonchi or wheezes     CV: regular rates and rhythm, normal S1 S2, no S3 or S4 and no murmur, click or rub     ABDOMEN:  soft, " nontender, no HSM or masses and bowel sounds normal     MS: extremities normal- no gross deformities noted, no evidence of inflammation in joints, FROM in all extremities.     SKIN: no suspicious lesions or rashes     NEURO: Normal strength and tone, sensory exam grossly normal, mentation intact and speech normal     PSYCH: mentation appears normal. and affect normal/bright     LYMPHATICS: No cervical adenopathy    Recent Labs   Lab Test 04/05/21 03/15/21 02/08/21  1432 02/08/21  1432 12/02/20  0933 12/02/20  0933 10/26/20  1123 10/26/20  1123 06/17/20  1337 06/17/20  1337 02/03/20  1451 02/03/20  1451   HGB  --   --   --   --   --   --   --   --   --  13.4  --  14.1   PLT  --   --   --   --   --   --   --   --   --  218  --  207   INR 2.2* 1.7*   < >  --    < >  --    < >  --    < > 1.72*   < >  --    NA  --   --   --  138  --   --   --  139  --  142  --  140   POTASSIUM  --   --   --  3.4  --  3.9   < > 3.3*  --  4.2  --  3.6   CR  --   --   --  0.92  --   --   --  0.94  --  0.95  --  0.92    < > = values in this interval not displayed.        Diagnostics:  Recent Results (from the past 24 hour(s))   Comprehensive metabolic panel (BMP + Alb, Alk Phos, ALT, AST, Total. Bili, TP)    Collection Time: 04/16/21 10:14 AM   Result Value Ref Range    Sodium 137 133 - 144 mmol/L    Potassium 3.3 (L) 3.4 - 5.3 mmol/L    Chloride 103 94 - 109 mmol/L    Carbon Dioxide 27 20 - 32 mmol/L    Anion Gap 7 3 - 14 mmol/L    Glucose 98 70 - 99 mg/dL    Urea Nitrogen 9 7 - 30 mg/dL    Creatinine 0.95 0.52 - 1.04 mg/dL    GFR Estimate 62 >60 mL/min/[1.73_m2]    GFR Estimate If Black 72 >60 mL/min/[1.73_m2]    Calcium 8.8 8.5 - 10.1 mg/dL    Bilirubin Total 0.4 0.2 - 1.3 mg/dL    Albumin 3.6 3.4 - 5.0 g/dL    Protein Total 7.2 6.8 - 8.8 g/dL    Alkaline Phosphatase 56 40 - 150 U/L    ALT 29 0 - 50 U/L    AST 14 0 - 45 U/L   CBC with platelets and differential    Collection Time: 04/16/21 10:14 AM   Result Value Ref Range    WBC 5.8 4.0  - 11.0 10e9/L    RBC Count 4.66 3.8 - 5.2 10e12/L    Hemoglobin 14.1 11.7 - 15.7 g/dL    Hematocrit 40.2 35.0 - 47.0 %    MCV 86 78 - 100 fl    MCH 30.3 26.5 - 33.0 pg    MCHC 35.1 31.5 - 36.5 g/dL    RDW 14.6 10.0 - 15.0 %    Platelet Count 194 150 - 450 10e9/L    % Neutrophils 56.5 %    % Lymphocytes 29.3 %    % Monocytes 10.9 %    % Eosinophils 2.6 %    % Basophils 0.7 %    Absolute Neutrophil 3.3 1.6 - 8.3 10e9/L    Absolute Lymphocytes 1.7 0.8 - 5.3 10e9/L    Absolute Monocytes 0.6 0.0 - 1.3 10e9/L    Absolute Eosinophils 0.2 0.0 - 0.7 10e9/L    Absolute Basophils 0.0 0.0 - 0.2 10e9/L    Diff Method Automated Method           EKG - Normal Sinus Rhythm      Revised Cardiac Risk Index (RCRI):  The patient has the following serious cardiovascular risks for perioperative complications:   - No serious cardiac risks = 0 points     RCRI Interpretation: 0 points: Class I (very low risk - 0.4% complication rate)      Assessment & Plan     The proposed surgical procedure is considered LOW risk.    Preop general physical exam  - Comprehensive metabolic panel (BMP + Alb, Alk Phos, ALT, AST, Total. Bili, TP)  - CBC with platelets and differential  - EKG 12-lead complete w/read - (Clinic Performed)  - Asymptomatic COVID-19 Virus (Coronavirus) by PCR; Future    Tear of meniscus of right knee as current injury, unspecified meniscus, unspecified tear type, subsequent encounter  - As above    Factor V Leiden mutation (H)  - Anticoagulation managed by coumadin lcinic        Risks and Recommendations:  The patient has the following additional risks and recommendations for perioperative complications:   - Factor V        RECOMMENDATION:  APPROVAL GIVEN to proceed with proposed procedure, without further diagnostic evaluation.         Signed Electronically by: Eliz Hartman CNP  Copy of this evaluation report is provided to requesting physician.

## 2021-04-16 ENCOUNTER — OFFICE VISIT (OUTPATIENT)
Dept: FAMILY MEDICINE | Facility: OTHER | Age: 66
End: 2021-04-16
Attending: NURSE PRACTITIONER
Payer: COMMERCIAL

## 2021-04-16 VITALS
TEMPERATURE: 97.8 F | HEART RATE: 64 BPM | HEIGHT: 65 IN | SYSTOLIC BLOOD PRESSURE: 122 MMHG | DIASTOLIC BLOOD PRESSURE: 74 MMHG | WEIGHT: 233.3 LBS | BODY MASS INDEX: 38.87 KG/M2 | RESPIRATION RATE: 16 BRPM | OXYGEN SATURATION: 97 %

## 2021-04-16 DIAGNOSIS — S83.206D TEAR OF MENISCUS OF RIGHT KNEE AS CURRENT INJURY, UNSPECIFIED MENISCUS, UNSPECIFIED TEAR TYPE, SUBSEQUENT ENCOUNTER: ICD-10-CM

## 2021-04-16 DIAGNOSIS — D68.51 FACTOR V LEIDEN MUTATION (H): ICD-10-CM

## 2021-04-16 DIAGNOSIS — Z01.818 PREOP GENERAL PHYSICAL EXAM: Primary | ICD-10-CM

## 2021-04-16 LAB
ALBUMIN SERPL-MCNC: 3.6 G/DL (ref 3.4–5)
ALP SERPL-CCNC: 56 U/L (ref 40–150)
ALT SERPL W P-5'-P-CCNC: 29 U/L (ref 0–50)
ANION GAP SERPL CALCULATED.3IONS-SCNC: 7 MMOL/L (ref 3–14)
AST SERPL W P-5'-P-CCNC: 14 U/L (ref 0–45)
BASOPHILS # BLD AUTO: 0 10E9/L (ref 0–0.2)
BASOPHILS NFR BLD AUTO: 0.7 %
BILIRUB SERPL-MCNC: 0.4 MG/DL (ref 0.2–1.3)
BUN SERPL-MCNC: 9 MG/DL (ref 7–30)
CALCIUM SERPL-MCNC: 8.8 MG/DL (ref 8.5–10.1)
CHLORIDE SERPL-SCNC: 103 MMOL/L (ref 94–109)
CO2 SERPL-SCNC: 27 MMOL/L (ref 20–32)
CREAT SERPL-MCNC: 0.95 MG/DL (ref 0.52–1.04)
DIFFERENTIAL METHOD BLD: NORMAL
EOSINOPHIL # BLD AUTO: 0.2 10E9/L (ref 0–0.7)
EOSINOPHIL NFR BLD AUTO: 2.6 %
ERYTHROCYTE [DISTWIDTH] IN BLOOD BY AUTOMATED COUNT: 14.6 % (ref 10–15)
GFR SERPL CREATININE-BSD FRML MDRD: 62 ML/MIN/{1.73_M2}
GLUCOSE SERPL-MCNC: 98 MG/DL (ref 70–99)
HCT VFR BLD AUTO: 40.2 % (ref 35–47)
HGB BLD-MCNC: 14.1 G/DL (ref 11.7–15.7)
LYMPHOCYTES # BLD AUTO: 1.7 10E9/L (ref 0.8–5.3)
LYMPHOCYTES NFR BLD AUTO: 29.3 %
MCH RBC QN AUTO: 30.3 PG (ref 26.5–33)
MCHC RBC AUTO-ENTMCNC: 35.1 G/DL (ref 31.5–36.5)
MCV RBC AUTO: 86 FL (ref 78–100)
MONOCYTES # BLD AUTO: 0.6 10E9/L (ref 0–1.3)
MONOCYTES NFR BLD AUTO: 10.9 %
NEUTROPHILS # BLD AUTO: 3.3 10E9/L (ref 1.6–8.3)
NEUTROPHILS NFR BLD AUTO: 56.5 %
PLATELET # BLD AUTO: 194 10E9/L (ref 150–450)
POTASSIUM SERPL-SCNC: 3.3 MMOL/L (ref 3.4–5.3)
PROT SERPL-MCNC: 7.2 G/DL (ref 6.8–8.8)
RBC # BLD AUTO: 4.66 10E12/L (ref 3.8–5.2)
SODIUM SERPL-SCNC: 137 MMOL/L (ref 133–144)
WBC # BLD AUTO: 5.8 10E9/L (ref 4–11)

## 2021-04-16 PROCEDURE — 93010 ELECTROCARDIOGRAM REPORT: CPT | Performed by: INTERNAL MEDICINE

## 2021-04-16 PROCEDURE — 36415 COLL VENOUS BLD VENIPUNCTURE: CPT | Mod: ZL | Performed by: NURSE PRACTITIONER

## 2021-04-16 PROCEDURE — 80053 COMPREHEN METABOLIC PANEL: CPT | Mod: ZL | Performed by: NURSE PRACTITIONER

## 2021-04-16 PROCEDURE — G0463 HOSPITAL OUTPT CLINIC VISIT: HCPCS

## 2021-04-16 PROCEDURE — 99214 OFFICE O/P EST MOD 30 MIN: CPT | Performed by: NURSE PRACTITIONER

## 2021-04-16 PROCEDURE — 85025 COMPLETE CBC W/AUTO DIFF WBC: CPT | Mod: ZL | Performed by: NURSE PRACTITIONER

## 2021-04-16 PROCEDURE — 93005 ELECTROCARDIOGRAM TRACING: CPT

## 2021-04-16 ASSESSMENT — MIFFLIN-ST. JEOR: SCORE: 1599.12

## 2021-04-16 ASSESSMENT — ANXIETY QUESTIONNAIRES
IF YOU CHECKED OFF ANY PROBLEMS ON THIS QUESTIONNAIRE, HOW DIFFICULT HAVE THESE PROBLEMS MADE IT FOR YOU TO DO YOUR WORK, TAKE CARE OF THINGS AT HOME, OR GET ALONG WITH OTHER PEOPLE: NOT DIFFICULT AT ALL
5. BEING SO RESTLESS THAT IT IS HARD TO SIT STILL: NOT AT ALL
GAD7 TOTAL SCORE: 0
1. FEELING NERVOUS, ANXIOUS, OR ON EDGE: NOT AT ALL
7. FEELING AFRAID AS IF SOMETHING AWFUL MIGHT HAPPEN: NOT AT ALL
3. WORRYING TOO MUCH ABOUT DIFFERENT THINGS: NOT AT ALL
6. BECOMING EASILY ANNOYED OR IRRITABLE: NOT AT ALL
2. NOT BEING ABLE TO STOP OR CONTROL WORRYING: NOT AT ALL

## 2021-04-16 ASSESSMENT — PATIENT HEALTH QUESTIONNAIRE - PHQ9
SUM OF ALL RESPONSES TO PHQ QUESTIONS 1-9: 0
5. POOR APPETITE OR OVEREATING: NOT AT ALL

## 2021-04-16 ASSESSMENT — PAIN SCALES - GENERAL: PAINLEVEL: MILD PAIN (2)

## 2021-04-16 NOTE — PROGRESS NOTES
Evaluation dictation, lab results (COVID Test scheduled 4/26/21) and EKG tracing faxed to [facility] for procedure with Dr. Alexander on 04/29/21 at [phone]

## 2021-04-16 NOTE — NURSING NOTE
"Chief Complaint   Patient presents with     Pre-Op Exam       Initial /74   Pulse 64   Temp 97.8  F (36.6  C) (Tympanic)   Resp 16   Ht 1.651 m (5' 5\")   Wt 105.8 kg (233 lb 4.8 oz)   SpO2 97%   BMI 38.82 kg/m   Estimated body mass index is 38.82 kg/m  as calculated from the following:    Height as of this encounter: 1.651 m (5' 5\").    Weight as of this encounter: 105.8 kg (233 lb 4.8 oz).  Medication Reconciliation: complete  Carly Bray LPN  "

## 2021-04-17 ASSESSMENT — ANXIETY QUESTIONNAIRES: GAD7 TOTAL SCORE: 0

## 2021-04-20 ENCOUNTER — ANTICOAGULATION THERAPY VISIT (OUTPATIENT)
Dept: ANTICOAGULATION | Facility: OTHER | Age: 66
End: 2021-04-20

## 2021-04-20 DIAGNOSIS — I82.409 DEEP VEIN THROMBOSIS (DVT) (H): ICD-10-CM

## 2021-04-20 DIAGNOSIS — Z79.01 LONG TERM CURRENT USE OF ANTICOAGULANT THERAPY: ICD-10-CM

## 2021-04-20 DIAGNOSIS — I26.99 PULMONARY EMBOLISM AND INFARCTION (H): ICD-10-CM

## 2021-04-20 NOTE — PROGRESS NOTES
ANTICOAGULATION FOLLOW-UP CLINIC VISIT    Patient Name:  Cassy Avila  Date:  4/20/2021  Contact Type:  Telephone    SUBJECTIVE:  Patient Findings     Positives:  Upcoming invasive procedure (Scope on 4/29 )    Comments:  Call placed to [facility] from patient stating that she has to have a scope done on her knee on 4/29. She stated they want her Warfarin held 4 days prior. She questioned if she will have to be bridged with Lovenox again while off Warfarin. Informed her that [facility]in Clinic will message PCPDelia, regarding bridging. Informed her once Warfarin Clinic hears back from PCP we will follow through with her regarding pre-procedure/post-procedure instructions.   She also reported that she has 5 syringes of Lovenox 120 mg and 3 syringes of Lovenox 100 mg left over.     InBasket message received from PCP stating that she would like patient to be bridged with Lovenox 1 mg/kg q12h fully anticoagulated dose. Call placed to patient and spoke to her re: pre-procedure Warfarin hold/Lovenox injections and post procedure Lovenox injections/Warfarin dosing/INR recheck date. She verbalized understanding of all instructions. She is to contact Warfarin Clinic with any unusual bleeding/bruising or any other new changes to her diet/meds/activity or any questions/issues/illness. Lovenox injections prescription sent to patient's preferred pharmacy.         Clinical Outcomes     Negatives:  Major bleeding event, Thromboembolic event, Anticoagulation-related hospital admission, Anticoagulation-related ED visit, Anticoagulation-related fatality    Comments:  Call placed to [facility] from patient stating that she has to have a scope done on her knee on 4/29. She stated they want her Warfarin held 4 days prior. She questioned if she will have to be bridged with Lovenox again while off Warfarin. Informed her that Warfarin Clinic will message PCPDelia, regarding bridging. Informed her once [facility]in Clinic  hears back from PCP we will follow through with her regarding pre-procedure/post-procedure instructions.   She also reported that she has 5 syringes of Lovenox 120 mg and 3 syringes of Lovenox 100 mg left over.     InBasket message received from PCP stating that she would like patient to be bridged with Lovenox 1 mg/kg q12h fully anticoagulated dose. Call placed to patient and spoke to her re: pre-procedure Warfarin hold/Lovenox injections and post procedure Lovenox injections/Warfarin dosing/INR recheck date. She verbalized understanding of all instructions. She is to contact Warfarin Clinic with any unusual bleeding/bruising or any other new changes to her diet/meds/activity or any questions/issues/illness. Lovenox injections prescription sent to patient's preferred pharmacy.            OBJECTIVE    No results for input(s): INR, [id number], [id number], 2AGN, F2 in the last 168 hours.    ASSESSMENT / PLAN  No question data found.  Anticoagulation Summary  As of 4/20/2021    INR goal:  2.0-3.0   TTR:  56.5 % (11.6 mo)   INR used for dosing:  No new INR was available at the time of this encounter.   Warfarin maintenance plan:  7.5 mg (5 mg x 1.5) every Wed, Fri; 5 mg (5 mg x 1) all other days   Full warfarin instructions:  4/24: Hold; 4/25: Hold; 4/26: Hold; 4/27: Hold; 4/28: Hold; 4/30: 12.5 mg; 5/1: 10 mg; 5/2: 10 mg; Otherwise 7.5 mg every Wed, Fri; 5 mg all other days   Weekly warfarin total:  40 mg   Plan last modified:  Melanie Farris RN (2/22/2021)   Next INR check:  5/5/2021   Priority:  High   Target end date:  Indefinite    Indications    Long-term (current) use of anticoagulants [Z79.01] [Z79.01]  Pulmonary embolism and infarction (H) [I26.99]  Deep vein thrombosis (DVT) (H) [I82.409] [I82.409]             Anticoagulation Episode Summary     INR check location:  Home Draw    Preferred lab:      Send INR reminders to:  DAREN CAMEJO    Comments:  PST - [facility].  Shoulder surgery  6/24/2020, warfarin hold x 5 days. Lovenox bridge per A.,Minnie  Call cell phone with any dosing. Home phone no longer correct number        Anticoagulation Care Providers     Provider Role Specialty Phone number    Eliz Hartman CNP Referring Family Medicine [phone]            See the Encounter Report to view Anticoagulation Flowsheet and Dosing Calendar (Go to Encounters tab in chart review, and find the Anticoagulation Therapy Visit)        Melanie Farris RN

## 2021-04-26 ENCOUNTER — OFFICE VISIT (OUTPATIENT)
Dept: FAMILY MEDICINE | Facility: OTHER | Age: 66
End: 2021-04-26
Attending: NURSE PRACTITIONER
Payer: MEDICARE

## 2021-04-26 DIAGNOSIS — Z01.818 PREOP GENERAL PHYSICAL EXAM: ICD-10-CM

## 2021-04-26 LAB
SARS-COV-2 RNA RESP QL NAA+PROBE: NORMAL
SPECIMEN SOURCE: NORMAL

## 2021-04-26 PROCEDURE — U0003 INFECTIOUS AGENT DETECTION BY NUCLEIC ACID (DNA OR RNA); SEVERE ACUTE RESPIRATORY SYNDROME CORONAVIRUS 2 (SARS-COV-2) (CORONAVIRUS DISEASE [COVID-19]), AMPLIFIED PROBE TECHNIQUE, MAKING USE OF HIGH THROUGHPUT TECHNOLOGIES AS DESCRIBED BY CMS-2020-01-R: HCPCS | Mod: ZL | Performed by: NURSE PRACTITIONER

## 2021-04-26 PROCEDURE — U0005 INFEC AGEN DETEC AMPLI PROBE: HCPCS | Mod: ZL | Performed by: NURSE PRACTITIONER

## 2021-04-29 ENCOUNTER — TRANSFERRED RECORDS (OUTPATIENT)
Dept: HEALTH INFORMATION MANAGEMENT | Facility: CLINIC | Age: 66
End: 2021-04-29

## 2021-05-05 ENCOUNTER — ANTICOAGULATION THERAPY VISIT (OUTPATIENT)
Dept: ANTICOAGULATION | Facility: OTHER | Age: 66
End: 2021-05-05

## 2021-05-05 DIAGNOSIS — I82.409 DEEP VEIN THROMBOSIS (DVT) (H): ICD-10-CM

## 2021-05-05 DIAGNOSIS — Z79.01 LONG TERM CURRENT USE OF ANTICOAGULANT THERAPY: ICD-10-CM

## 2021-05-05 DIAGNOSIS — I26.99 PULMONARY EMBOLISM AND INFARCTION (H): ICD-10-CM

## 2021-05-05 LAB — INR PPP: 2.1 (ref 0.9–1.1)

## 2021-05-05 NOTE — PROGRESS NOTES
ANTICOAGULATION FOLLOW-UP CLINIC VISIT    Patient Name:  Cassy Avila  Date:  2021  Contact Type:  Telephone    SUBJECTIVE:  Patient Findings     Positives:  Change in medications (Discontinue Lovenox)    Comments:  PST. INR results faxed to Warfarin Clinic from [facility]. Call placed to pt and spoke to her re: INR results/Warfarin dosing/INR recheck date. Pt denied any bleeding/bruising or any other new changes to their diet/meds/activity. INR therapeutic today - 2.1. She is to continue on her maintenance dose. She is to stop the Lovenox injections as well. Pt verbalized understanding of all instructions. Pt is to call Warfarin clinic with any questions/issues/illness.           Clinical Outcomes     Negatives:  Major bleeding event, Thromboembolic event, Anticoagulation-related hospital admission, Anticoagulation-related ED visit, Anticoagulation-related fatality    Comments:  PST. INR results faxed to Warfarin Clinic from [facility]. Call placed to pt and spoke to her re: INR results/Warfarin dosing/INR recheck date. Pt denied any bleeding/bruising or any other new changes to their diet/meds/activity. INR therapeutic today - 2.1. She is to continue on her maintenance dose. She is to stop the Lovenox injections as well. Pt verbalized understanding of all instructions. Pt is to call Warfarin clinic with any questions/issues/illness.              OBJECTIVE    Recent labs: (last 7 days)     21   INR 2.1*       ASSESSMENT / PLAN  INR assessment THER    Recheck INR In: 6 WEEKS    INR Location Home INR      Anticoagulation Summary  As of 2021    INR goal:  2.0-3.0   TTR:  58.4 % (1 y)   INR used for dosin.1 (2021)   Warfarin maintenance plan:  7.5 mg (5 mg x 1.5) every Wed, Fri; 5 mg (5 mg x 1) all other days   Full warfarin instructions:  7.5 mg every Wed, Fri; 5 mg all other days   Weekly warfarin total:  40 mg   No change documented:  Melanie Farris RN   Plan last modified:  Melanie Farris  NIC RN (2/22/2021)   Next INR check:  6/16/2021   Priority:  High   Target end date:  Indefinite    Indications    Long-term (current) use of anticoagulants [Z79.01] [Z79.01]  Pulmonary embolism and infarction (H) [I26.99]  Deep vein thrombosis (DVT) (H) [I82.409] [I82.409]             Anticoagulation Episode Summary     INR check location:  Home Draw    Preferred lab:      Send INR reminders to:  DAREN CAMEJO    Comments:  PST - [facility].  Shoulder surgery 6/24/2020, warfarin hold x 5 days. Lovenox bridge per A.,Minnie  Call cell phone with any dosing. Home phone no longer correct number        Anticoagulation Care Providers     Provider Role Specialty Phone number    Eliz Hartman, CNP Referring Family Medicine [phone]            See the Encounter Report to view Anticoagulation Flowsheet and Dosing Calendar (Go to Encounters tab in chart review, and find the Anticoagulation Therapy Visit)        Melanie Farris RN

## 2021-05-14 ENCOUNTER — TRANSFERRED RECORDS (OUTPATIENT)
Dept: HEALTH INFORMATION MANAGEMENT | Facility: CLINIC | Age: 66
End: 2021-05-14

## 2021-05-28 DIAGNOSIS — I10 BENIGN ESSENTIAL HYPERTENSION: ICD-10-CM

## 2021-05-28 DIAGNOSIS — F33.1 MODERATE EPISODE OF RECURRENT MAJOR DEPRESSIVE DISORDER (H): ICD-10-CM

## 2021-05-28 RX ORDER — LOSARTAN POTASSIUM 50 MG/1
TABLET ORAL
Qty: 180 TABLET | Refills: 0 | Status: SHIPPED | OUTPATIENT
Start: 2021-05-28 | End: 2021-09-06

## 2021-05-28 RX ORDER — ESCITALOPRAM OXALATE 10 MG/1
TABLET ORAL
Qty: 90 TABLET | Refills: 0 | Status: SHIPPED | OUTPATIENT
Start: 2021-05-28 | End: 2021-08-29

## 2021-05-28 NOTE — TELEPHONE ENCOUNTER
escitalopram 10 mg      Last Written Prescription Date:  12/02/20  Last Fill Quantity: 90,   # refills: 1  Last Office Visit: 4/26/21  Future Office visit:       Routing refill request to provider for review/approval because:  Drug not on the [facility], [facility] or [facility] refill protocol or controlled substance  Losartan 50 mg      Last Written Prescription Date:  2/26/21  Last Fill Quantity: 180,   # refills: 0  Last Office Visit: 4/26/21  Future Office visit:       Routing refill request to provider for review/approval because:  Drug not on the [facility], [facility] or [facility] refill protocol or controlled substance

## 2021-06-07 ENCOUNTER — ANTICOAGULATION THERAPY VISIT (OUTPATIENT)
Dept: ANTICOAGULATION | Facility: OTHER | Age: 66
End: 2021-06-07

## 2021-06-07 DIAGNOSIS — Z79.01 LONG TERM CURRENT USE OF ANTICOAGULANT THERAPY: ICD-10-CM

## 2021-06-07 DIAGNOSIS — I26.99 PULMONARY EMBOLISM AND INFARCTION (H): ICD-10-CM

## 2021-06-07 DIAGNOSIS — I82.409 DEEP VEIN THROMBOSIS (DVT) (H): ICD-10-CM

## 2021-06-07 LAB — INR PPP: 1.8 (ref 0.9–1.1)

## 2021-06-07 NOTE — PROGRESS NOTES
ANTICOAGULATION FOLLOW-UP CLINIC VISIT    Patient Name:  Cassy Avila  Date:  2021  Contact Type:  Telephone    SUBJECTIVE:  Patient Findings     Comments:  INR done by lab. Call placed to pt and left voicemail on home phone re: INR results/Warfarin dosing/INR recheck date. INR sub-theapeutic today- 1.8. A 1x dose increase was made to today's dose and after today she is to continue on his maintenance dose Pt is to call Warfarin clinic with any bleeding/bruising or any new changes to their diet/meds/activity or any questions/issues/illness.           Clinical Outcomes     Negatives:  Major bleeding event, Thromboembolic event, Anticoagulation-related hospital admission, Anticoagulation-related ED visit, Anticoagulation-related fatality    Comments:  INR done by lab. Call placed to pt and left voicemail on home phone re: INR results/Warfarin dosing/INR recheck date. INR sub-theapeutic today- 1.8. A 1x dose increase was made to today's dose and after today she is to continue on his maintenance dose Pt is to call Warfarin clinic with any bleeding/bruising or any new changes to their diet/meds/activity or any questions/issues/illness.              OBJECTIVE    Recent labs: (last 7 days)     21   INR 1.8*       ASSESSMENT / PLAN  INR assessment SUB    Recheck INR In: 6 WEEKS    INR Location Home INR      Anticoagulation Summary  As of 2021    INR goal:  2.0-3.0   TTR:  54.7 % (1 y)   INR used for dosin.8 (2021)   Warfarin maintenance plan:  7.5 mg (5 mg x 1.5) every Wed, Fri; 5 mg (5 mg x 1) all other days   Full warfarin instructions:  : 7.5 mg; Otherwise 7.5 mg every Wed, Fri; 5 mg all other days   Weekly warfarin total:  40 mg   Plan last modified:  Melanie Farris RN (2021)   Next INR check:  2021   Priority:  Maintenance   Target end date:  Indefinite    Indications    Long-term (current) use of anticoagulants [Z79.01] [Z79.01]  Pulmonary embolism and infarction (H)  [I26.99]  Deep vein thrombosis (DVT) (H) [I82.409] [I82.409]             Anticoagulation Episode Summary     INR check location:  Home Draw    Preferred lab:      Send INR reminders to:  DAREN CAMEJO    Comments:  PST - [facility].  Shoulder surgery 6/24/2020, warfarin hold x 5 days. Lovenox bridge per A.,Minnie  Call cell phone with any dosing. Home phone no longer correct number        Anticoagulation Care Providers     Provider Role Specialty Phone number    Eliz Hartman, CNP Referring [facility] [phone]            See the Encounter Report to view Anticoagulation Flowsheet and Dosing Calendar (Go to Encounters tab in chart review, and find the Anticoagulation Therapy Visit)        Melanie Farris RN

## 2021-06-21 PROBLEM — M25.611 STIFFNESS OF RIGHT SHOULDER JOINT: Status: ACTIVE | Noted: 2021-04-15

## 2021-06-21 PROBLEM — M62.81 MUSCLE WEAKNESS OF RIGHT UPPER EXTREMITY: Status: ACTIVE | Noted: 2021-04-15

## 2021-06-21 PROBLEM — M79.621 PAIN IN RIGHT UPPER ARM: Status: ACTIVE | Noted: 2021-04-15

## 2021-06-25 ENCOUNTER — OFFICE VISIT (OUTPATIENT)
Dept: OTOLARYNGOLOGY | Facility: OTHER | Age: 66
End: 2021-06-25
Attending: PHYSICIAN ASSISTANT
Payer: COMMERCIAL

## 2021-06-25 VITALS
OXYGEN SATURATION: 98 % | DIASTOLIC BLOOD PRESSURE: 75 MMHG | HEIGHT: 65 IN | BODY MASS INDEX: 38.32 KG/M2 | TEMPERATURE: 97.8 F | WEIGHT: 230 LBS | RESPIRATION RATE: 18 BRPM | SYSTOLIC BLOOD PRESSURE: 123 MMHG | HEART RATE: 60 BPM

## 2021-06-25 DIAGNOSIS — H61.21 CERUMEN DEBRIS ON TYMPANIC MEMBRANE OF RIGHT EAR: ICD-10-CM

## 2021-06-25 DIAGNOSIS — H93.8X1 SENSATION OF PLUGGED EAR, RIGHT: ICD-10-CM

## 2021-06-25 DIAGNOSIS — H92.01 OTALGIA, RIGHT: ICD-10-CM

## 2021-06-25 DIAGNOSIS — M26.609 TMJ (TEMPOROMANDIBULAR JOINT SYNDROME): ICD-10-CM

## 2021-06-25 DIAGNOSIS — Z96.22 S/P TYMPANOSTOMY TUBE PLACEMENT: Primary | ICD-10-CM

## 2021-06-25 PROCEDURE — G0463 HOSPITAL OUTPT CLINIC VISIT: HCPCS | Mod: 25

## 2021-06-25 PROCEDURE — 92504 EAR MICROSCOPY EXAMINATION: CPT | Performed by: PHYSICIAN ASSISTANT

## 2021-06-25 PROCEDURE — 99213 OFFICE O/P EST LOW 20 MIN: CPT | Mod: 25 | Performed by: PHYSICIAN ASSISTANT

## 2021-06-25 RX ORDER — OFLOXACIN 3 MG/ML
5 SOLUTION AURICULAR (OTIC) 2 TIMES DAILY
Qty: 4 ML | Refills: 0 | Status: SHIPPED | OUTPATIENT
Start: 2021-06-25 | End: 2021-07-02

## 2021-06-25 ASSESSMENT — PAIN SCALES - GENERAL: PAINLEVEL: MODERATE PAIN (5)

## 2021-06-25 ASSESSMENT — MIFFLIN-ST. JEOR: SCORE: 1584.15

## 2021-06-25 NOTE — LETTER
6/25/2021         RE: Cassy Avila  [address] [place] [address]        Dear Colleague,    Thank you for referring your patient, Cassy Avila, to the [facility]. Please see a copy of my visit note below.      Chief Complaint   Patient presents with     RECHECK PE TUBES     Pt is here for a tube check.  Pt has a plugged tube.  Right tube was placed 2/23/18.     Kaitlin returns to ENT for recheck of tube.   She was last seen on 8/21/19 for ear drainage.   At her last visit, her ear was cleaned and tube patent. ME was dry. Instructed her to return for 6 month tube check  history of right myringotomy tube placement 2/23/18 by Dr. Conn.     Today her ear feels with pressure/ fullness. She can always feel that tube in her right ear.   +Otalgia.  +Tunnel like hearing  +Decreased hearing on Right.   +intermittent dizziness- passing within a few seconds.     Denies otorrhea.   Denies facial paresthesias and dysphagia.   No fevers, n/v.             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 Provider to review     Factor V Leiden (H)      Factor V Leiden mutation (H) 7/26/2019     Family history of colon cancer 1/2/2018     Gastro-oesophageal reflux disease      Herpes zoster without mention of complication 2003    resolved from problem list     Hyperlipidemia LDL goal <100 7/11/2002     Problem list name updated by automated process. Provider to review     Long term (current) use of  anticoagulants 8/20/2003     Major depression 8/8/2014     Mammographic microcalcification 2003    resolved from problem list     Migraine, unspecified, without mention of intractable migraine without mention of status migrainosus 3/5/2001     Moderate episode of recurrent major depressive disorder (H) 8/8/2014     Neurofibromatosis, peripheral, NF1 (H) 8/22/2012     Problem list name updated by automated process. Provider to review     Neurofibromatosis, unspecified(237.70) 8/22/2012     Other and unspecified hyperlipidemia 7/11/2002     Other chronic pain      Other pulmonary embolism and infarction 4/11/2003     Primary insomnia 10/31/2018     Statin medication not prescribed per physician orders 1/17/2018     Tachycardia, unspecified 2/12/2001     Thrombosis of leg      Unspecified essential hypertension 2/20/2001     Vitamin D deficiency 7/13/2018        Allergies   Allergen Reactions     Amlodipine Besylate Swelling     Norvasc     Amoxicillin      Atorvastatin      myualgia     Cephalexin Monohydrate Hives     Keflex     Erythromycin Base [Kdc:Yellow Dye+Erythromycin+Brilliant Blue Fcf] Nausea and Vomiting     Meloxicam Other (See Comments)     Mobic - confusion, depression     Adhesive Tape Rash     Prochlorperazine Edisylate Swelling and Rash     Compazine     Prochlorperazine Maleate Swelling and Rash     Current Outpatient Medications   Medication     aspirin 81 MG EC tablet     Cholecalciferol (VITAMIN D3 PO)     escitalopram (LEXAPRO) 10 MG tablet     HEMP OIL OR EXTRACT OR OTHER CBD CANNABINOID, NOT MEDICAL CANNABIS,     hydrochlorothiazide (HYDRODIURIL) 25 MG tablet     losartan (COZAAR) 50 MG tablet     methocarbamol (ROBAXIN) 750 MG tablet     metoprolol succinate ER (TOPROL-XL) 50 MG 24 hr tablet     order for DME     oxyCODONE (ROXICODONE) 5 MG tablet     potassium chloride ER (KLOR-CON M) 10 MEQ CR tablet     saccharomyces boulardii (FLORASTOR) 250 MG capsule     traZODone (DESYREL) 50 MG tablet  "    vitamin B complex with vitamin C (VITAMIN  B COMPLEX) tablet     warfarin ANTICOAGULANT (COUMADIN) 5 MG tablet     No current facility-administered medications for this visit.       ROS: 10 point ROS neg other than the symptoms noted above in the HPI.  /75 (BP Location: Right arm, Patient Position: Chair, Cuff Size: Adult Large)   Pulse 60   Temp 97.8  F (36.6  C) (Tympanic)   Resp 18   Ht 1.651 m (5' 5\")   Wt 104.3 kg (230 lb)   SpO2 98%   BMI 38.27 kg/m      General - The patient is well nourished and well developed, and appears to have good nutritional status.  Alert and oriented to person and place, interactive.  Head and Face - Normocephalic and atraumatic, with no gross asymmetry noted of the contour of the facial features.  The facial nerve is intact, with strong symmetric movements.  Neck- No palpable lymphadenopathy or thyroid mass.  Trachea is midline.  Eyes - Extraocular movements intact.   Ears- External ears normal. Ears examined under microscope. Left EAC patent, TM intact. Right EAC was clear. Right TM had a thin dried debris which was not able to be removed.   Tube is patent and in good position. ME space viz'd without serous drainage.      Nose - Nasal mucosa is pink and moist with no abnormal mucus.  The septum was grossly midline and non-obstructive, turbinates of normal size and position.  No polyps, masses, or purulence noted on examination.  Mouth - Examination of the oral cavity shows pink, healthy, moist mucosa. No lesions or ulceration noted. The tongue is mobile and midline.  Upper plate. No lesions or sores.   Throat - The walls of the oropharynx were smooth, pink, moist, symmetric, and had no lesions or ulcerations.  The tonsillar pillars and soft palate were symmetric.  The uvula was midline on elevation.     +TMJ tenderness oral on Right. There is tenderness to TMJ area on right with palpation.       ASSESSMENT:    ICD-10-CM    1. S/P tympanostomy tube placement RIGHT  " Z96.22 ofloxacin (FLOXIN) 0.3 % otic solution   2. Cerumen debris on tympanic membrane of right ear  H61.21    3. Sensation of plugged ear, right  H93.8X1    4. TMJ (temporomandibular joint syndrome)  M26.609    5. Otalgia, right  H92.01            Ear overall appears well. There is dried debris on TM which was not able to be removed. Recommended otics and recheck in 1 month.   Tube appears in good position and patent. Will observe tube at this time vs. Removal.     Cautioned on TMJ. Her ear fullness, otalgia likely related to Right TMJ.   Recommended warm compresses, TMJ precautions.       Return in 1 month with audiogram.   May consider imaging if there are significant changes seen on audiogram.             Michelle Ly PA-C  ENT  [facility], [place]          Again, thank you for allowing me to participate in the care of your patient.        Sincerely,        Michelle Ly PA-C

## 2021-06-25 NOTE — NURSING NOTE
"Chief Complaint   Patient presents with     RECHECK PE TUBES     Pt is here for a tube check.  Pt has a plugged tube.  Right tube was placed 2/23/18.       Initial /75 (BP Location: Right arm, Patient Position: Chair, Cuff Size: Adult Large)   Pulse 60   Temp 97.8  F (36.6  C) (Tympanic)   Resp 18   Ht 1.651 m (5' 5\")   Wt 104.3 kg (230 lb)   SpO2 98%   BMI 38.27 kg/m   Estimated body mass index is 38.27 kg/m  as calculated from the following:    Height as of this encounter: 1.651 m (5' 5\").    Weight as of this encounter: 104.3 kg (230 lb).  Medication Reconciliation: complete  Mary Okeefe LPN    "

## 2021-06-25 NOTE — PATIENT INSTRUCTIONS
Start TMJ precautions.   Warm compresses to right face/ jaw.  Ear overall looks well. Tube is in position, there is a small build up of debris. Use ear drops for 1 week to right ear.   Follow up in 4 weeks with audiogram.     Thank you for allowing Michelle Ly PA-C and our ENT team to participate in your care.  If your medications are too expensive, please give the nurse a call.  We can possibly change this medication.  If you have a scheduling or an appointment question please contact our Health Unit Coordinator at [phone], Ext. [phone].    ALL nursing questions or concerns can be directed to your ENT nurse at: [phone] [place]

## 2021-06-25 NOTE — PROGRESS NOTES
Chief Complaint   Patient presents with     RECHECK PE TUBES     Pt is here for a tube check.  Pt has a plugged tube.  Right tube was placed 2/23/18.     Kaitlin returns to ENT for recheck of tube.   She was last seen on 8/21/19 for ear drainage.   At her last visit, her ear was cleaned and tube patent. ME was dry. Instructed her to return for 6 month tube check  history of right myringotomy tube placement 2/23/18 by Dr. Conn.     Today her ear feels with pressure/ fullness. She can always feel that tube in her right ear.   +Otalgia.  +Tunnel like hearing  +Decreased hearing on Right.   +intermittent dizziness- passing within a few seconds.     Denies otorrhea.   Denies facial paresthesias and dysphagia.   No fevers, n/v.             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 Provider to review     Factor V Leiden (H)      Factor V Leiden mutation (H) 7/26/2019     Family history of colon cancer 1/2/2018     Gastro-oesophageal reflux disease      Herpes zoster without mention of complication 2003    resolved from problem list     Hyperlipidemia LDL goal <100 7/11/2002     Problem list name updated by automated process. Provider to review     Long term (current) use of anticoagulants 8/20/2003     Major depression 8/8/2014     Mammographic microcalcification 2003    resolved from problem list     Migraine, unspecified, without mention of intractable migraine without mention of status migrainosus 3/5/2001     Moderate  episode of recurrent major depressive disorder (H) 8/8/2014     Neurofibromatosis, peripheral, NF1 (H) 8/22/2012     Problem list name updated by automated process. Provider to review     Neurofibromatosis, unspecified(237.70) 8/22/2012     Other and unspecified hyperlipidemia 7/11/2002     Other chronic pain      Other pulmonary embolism and infarction 4/11/2003     Primary insomnia 10/31/2018     Statin medication not prescribed per physician orders 1/17/2018     Tachycardia, unspecified 2/12/2001     Thrombosis of leg      Unspecified essential hypertension 2/20/2001     Vitamin D deficiency 7/13/2018        Allergies   Allergen Reactions     Amlodipine Besylate Swelling     Norvasc     Amoxicillin      Atorvastatin      myualgia     Cephalexin Monohydrate Hives     Keflex     Erythromycin Base [Kdc:Yellow Dye+Erythromycin+Brilliant Blue Fcf] Nausea and Vomiting     Meloxicam Other (See Comments)     Mobic - confusion, depression     Adhesive Tape Rash     Prochlorperazine Edisylate Swelling and Rash     Compazine     Prochlorperazine Maleate Swelling and Rash     Current Outpatient Medications   Medication     aspirin 81 MG EC tablet     Cholecalciferol (VITAMIN D3 PO)     escitalopram (LEXAPRO) 10 MG tablet     HEMP OIL OR EXTRACT OR OTHER CBD CANNABINOID, NOT MEDICAL CANNABIS,     hydrochlorothiazide (HYDRODIURIL) 25 MG tablet     losartan (COZAAR) 50 MG tablet     methocarbamol (ROBAXIN) 750 MG tablet     metoprolol succinate ER (TOPROL-XL) 50 MG 24 hr tablet     order for DME     oxyCODONE (ROXICODONE) 5 MG tablet     potassium chloride ER (KLOR-CON M) 10 MEQ CR tablet     saccharomyces boulardii (FLORASTOR) 250 MG capsule     traZODone (DESYREL) 50 MG tablet     vitamin B complex with vitamin C (VITAMIN  B COMPLEX) tablet     warfarin ANTICOAGULANT (COUMADIN) 5 MG tablet     No current facility-administered medications for this visit.       ROS: 10 point ROS neg other than the symptoms noted above in the  "HPI.  /75 (BP Location: Right arm, Patient Position: Chair, Cuff Size: Adult Large)   Pulse 60   Temp 97.8  F (36.6  C) (Tympanic)   Resp 18   Ht 1.651 m (5' 5\")   Wt 104.3 kg (230 lb)   SpO2 98%   BMI 38.27 kg/m      General - The patient is well nourished and well developed, and appears to have good nutritional status.  Alert and oriented to person and place, interactive.  Head and Face - Normocephalic and atraumatic, with no gross asymmetry noted of the contour of the facial features.  The facial nerve is intact, with strong symmetric movements.  Neck- No palpable lymphadenopathy or thyroid mass.  Trachea is midline.  Eyes - Extraocular movements intact.   Ears- External ears normal. Ears examined under microscope. Left EAC patent, TM intact. Right EAC was clear. Right TM had a thin dried debris which was not able to be removed.   Tube is patent and in good position. ME space viz'd without serous drainage.      Nose - Nasal mucosa is pink and moist with no abnormal mucus.  The septum was grossly midline and non-obstructive, turbinates of normal size and position.  No polyps, masses, or purulence noted on examination.  Mouth - Examination of the oral cavity shows pink, healthy, moist mucosa. No lesions or ulceration noted. The tongue is mobile and midline.  Upper plate. No lesions or sores.   Throat - The walls of the oropharynx were smooth, pink, moist, symmetric, and had no lesions or ulcerations.  The tonsillar pillars and soft palate were symmetric.  The uvula was midline on elevation.     +TMJ tenderness oral on Right. There is tenderness to TMJ area on right with palpation.       ASSESSMENT:    ICD-10-CM    1. S/P tympanostomy tube placement RIGHT  Z96.22 ofloxacin (FLOXIN) 0.3 % otic solution   2. Cerumen debris on tympanic membrane of right ear  H61.21    3. Sensation of plugged ear, right  H93.8X1    4. TMJ (temporomandibular joint syndrome)  M26.609    5. Otalgia, right  H92.01  "           Ear overall appears well. There is dried debris on TM which was not able to be removed. Recommended otics and recheck in 1 month.   Tube appears in good position and patent. Will observe tube at this time vs. Removal.     Cautioned on TMJ. Her ear fullness, otalgia likely related to Right TMJ.   Recommended warm compresses, TMJ precautions.       Return in 1 month with audiogram.   May consider imaging if there are significant changes seen on audiogram.             Michelle Ly PA-C  ENT  [facility], [place]

## 2021-07-07 DIAGNOSIS — I10 ESSENTIAL HYPERTENSION: ICD-10-CM

## 2021-07-08 RX ORDER — HYDROCHLOROTHIAZIDE 25 MG/1
TABLET ORAL
Qty: 90 TABLET | Refills: 0 | Status: SHIPPED | OUTPATIENT
Start: 2021-07-08 | End: 2021-10-18

## 2021-07-08 NOTE — TELEPHONE ENCOUNTER
hydrochlorothiazide (HYDRODIURIL) 25 MG tablet     Last Written Prescription Date:  12/2/20  Last Fill Quantity: 90,   # refills: 1  Last Office Visit: 4/16/21  Future Office visit:    Next 5 appointments (look out 90 days)    Jul 28, 2021 11:30 AM  (Arrive by 11:15 AM)  Return Visit with Ania Klein NP  [facility] [place] ([facility] [place] ) [address]  [place] [address]  [phone]           Routing refill request to provider for review/approval

## 2021-07-22 DIAGNOSIS — H91.93 DECREASED HEARING OF BOTH EARS: Primary | ICD-10-CM

## 2021-07-27 NOTE — PROGRESS NOTES
Assessment & Plan     Menopause  - DX Hip/Pelvis/Spine; Future    Right shoulder pain, unspecified chronicity  - MR Shoulder Right w/o Contrast; Future    Stiffness of right shoulder joint  - MR Shoulder Right w/o Contrast; Future    H/O total shoulder replacement, left  - MR Shoulder Right w/o Contrast; Future      Eliz Hartman CNP  [facility] Madrigal is a 65 year old who presents for the following health issues       Right Shoulder Pain  Severe pain, radiates from Bicep to shoulder  S/P shoulder replacement  She sees Dr. Alexander  She is in pain, describes poor sleep  She feels her quality of life is affected        Social History     Tobacco Use     Smoking status: Former Smoker     Packs/day: 1.00     Years: 30.00     Pack years: 30.00     Types: Cigarettes, Pipe     Smokeless tobacco: Never Used     Tobacco comment: quit in 1999   Substance Use Topics     Alcohol use: No     Drug use: No       Patient Active Problem List   Diagnosis     Essential hypertension     Pulmonary embolism and infarction (H)     Contact dermatitis and other eczema, due to unspecified cause     Edema     Hyperlipidemia LDL goal <100     Neurofibromatosis, peripheral, NF1 (H)     Advanced care planning/counseling discussion     Moderate episode of recurrent major depressive disorder (H)     Long-term (current) use of anticoagulants [Z79.01]     Deep vein thrombosis (DVT) (H) [I82.409]     Lumbar spondylosis with myelopathy     Degeneration of lumbar or lumbosacral intervertebral disc     Family history of colon cancer     Statin medication not prescribed per physician orders     Vitamin D deficiency     Failed arthroplasty (H)     H/O total shoulder replacement, left     Primary insomnia     Factor V Leiden mutation (H)     Obesity (BMI 35.0-39.9) with comorbidity (H)     Diarrhea     Abdominal pain, generalized     Chest pain, unspecified     Muscle weakness of right upper extremity     Pain in right upper arm      Stiffness of right shoulder joint     Past Surgical History:   Procedure Laterality Date     ------------OTHER-------------      shoulder replacement; Provider: Karen     ARTHROPLASTY KNEE  2014    Procedure: ARTHROPLASTY KNEE;  Surgeon: Sean Alexander MD;  Location: [facility] OR     ARTHROSCOPY SHOULDER      right, bone spurs     CA ANESTH,SHOULDER REPLACEMENT Right 2020      SECTION      x3     CHOLECYSTECTOMY       COLONOSCOPY  02/15/2018    [place],,polyps     elbow ulnar tunnel release       ELECTROTHERMAL THERAPY INTRADISC  2017    stimulator     ENDOSCOPIC SINUS SURGERY, SEPTOPLASTY, TURBINOPLASTY, MAXILLARY SINUSOTOMY, COMBINED N/A 2015    Procedure: COMBINED ENDOSCOPIC SINUS SURGERY, SEPTOPLASTY, TURBINOPLASTY, MAXILLARY SINUSOTOMY;  Surgeon: Seema Conn MD;  Location: [facility] OR     ESOPHAGOSCOPY, GASTROSCOPY, DUODENOSCOPY (EGD), COMBINED      with biopsy and endoscopic U/S     EXCISE NEUROMA LOWER EXTREMITY Left 2016    Procedure: EXCISE NEUROMA LOWER EXTREMITY;  Surgeon: Edi Reed MD;  Location: [facility] OR     EYE SURGERY Right 2020     FUSION LUMBAR ANTERIOR WITH MARCY CAGES      L5-S1     HYSTERECTOMY TOTAL ABDOMINAL, BILATERAL SALPINGO-OOPHORECTOMY, COMBINED N/A      ORTHOPEDIC SURGERY  2015    right shoulder     ORTHOPEDIC SURGERY  2015    right knee     ORTHOPEDIC SURGERY Right 2018    hip labrum tear     pionidal cyst excision       TRANSPOSITION ULNAR NERVE (ELBOW)         Social History     Tobacco Use     Smoking status: Former Smoker     Packs/day: 1.00     Years: 30.00     Pack years: 30.00     Types: Cigarettes, Pipe     Smokeless tobacco: Never Used     Tobacco comment: quit in    Substance Use Topics     Alcohol use: No     Family History   Problem Relation Age of Onset     Cancer Mother      Colon Polyps Mother      Heart Failure Mother 87        congestive, cause of death     Myocardial Infarction Mother          myocardial infarction     Myocardial Infarction Father         myocardial infarction - cause of death     C.A.D. Father      Cancer Paternal Uncle         cause of death     C.A.D. Brother      Other - See Comments Other         factor 5 - family h/o     Asthma No family hx of            Current Outpatient Medications   Medication Sig Dispense Refill     aspirin 81 MG EC tablet Take 81 mg by mouth daily HS       Cholecalciferol (VITAMIN D3 PO) Take 3,000 mg by mouth daily AM       escitalopram (LEXAPRO) 10 MG tablet Take 1 tablet by mouth once daily 90 tablet 0     HEMP OIL OR EXTRACT OR OTHER CBD CANNABINOID, NOT MEDICAL CANNABIS,        hydrochlorothiazide (HYDRODIURIL) 25 MG tablet Take 1 tablet by mouth once daily 90 tablet 0     losartan (COZAAR) 50 MG tablet Take 2 tablets by mouth once daily 180 tablet 0     methocarbamol (ROBAXIN) 750 MG tablet Take 1-2 tablets (750-1,500 mg) by mouth 4 times daily as needed for muscle spasms 60 tablet 0     metoprolol succinate ER (TOPROL-XL) 50 MG 24 hr tablet TAKE 1 AND ONE-HALF TABLETS BY MOUTH EVERY DAY 90 tablet 3     order for DME Nebulizer machine and tubing    DX:  Bronchitis 1 Device 0     oxyCODONE (ROXICODONE) 5 MG tablet TK 1 TO 2 TS PO Q 4 H PRF MODERATE TO SEVERE PAIN       potassium chloride ER (KLOR-CON M) 10 MEQ CR tablet Take 1 tablet (10 mEq) by mouth daily 30 tablet 3     traZODone (DESYREL) 50 MG tablet TAKE 1 TO 2 TABLETS BY MOUTH NIGHTLY AS NEEDED 180 tablet 0     vitamin B complex with vitamin C (VITAMIN  B COMPLEX) tablet Take 1 tablet by mouth daily       warfarin ANTICOAGULANT (COUMADIN) 5 MG tablet 7.5mg Mon,Wed,Fri and 5mg all other days or as directed by warfarin clinic 150 tablet 3     Allergies   Allergen Reactions     Amlodipine Besylate Swelling     Norvasc     Amoxicillin      Atorvastatin      myualgia     Cephalexin Monohydrate Hives     Keflex     Erythromycin Base [Kdc:Yellow Dye+Erythromycin+Brilliant Blue Fcf] Nausea and Vomiting      Meloxicam Other (See Comments)     Mobic - confusion, depression     Adhesive Tape Rash     Prochlorperazine Edisylate Swelling and Rash     Compazine     Prochlorperazine Maleate Swelling and Rash     Recent Labs   Lab Test 04/16/21  1014 02/08/21  1432 10/26/20  1123 06/17/20  1337 09/27/19  1137 02/05/19  1013 01/24/18  1336 01/17/18  0822 02/05/14  1130 11/05/13  1528   A1C  --   --   --   --   --   --   --   --   --  5.5   LDL  --   --   --   --  122* 120*  --  137*   < >  --    HDL  --   --   --   --  36* 37*  --  43*   < >  --    TRIG  --   --   --   --  280* 208*  --  190*   < >  --    ALT 29 36  --  32 34 42   < >  --   --   --    CR 0.95 0.92 0.94 0.95 0.91 0.92   < > 0.91  --   --    GFRESTIMATED 62 65 63 63 67 66   < > 62  --   --    GFRESTBLACK 72 75 73 73 77 76   < > 75  --   --    POTASSIUM 3.3* 3.4 3.3* 4.2 3.8 3.5   < > 3.2*  --   --    TSH  --   --  2.08  --  1.42 2.71  --  5.75*   < >  --     < > = values in this interval not displayed.        BP Readings from Last 3 Encounters:   08/04/21 124/80   07/28/21 126/74   06/25/21 123/75    Wt Readings from Last 3 Encounters:   08/04/21 108.4 kg (239 lb)   07/28/21 104.3 kg (230 lb)   06/25/21 104.3 kg (230 lb)                Review of Systems   Constitutional, HEENT, cardiovascular, pulmonary, GI, , musculoskeletal, neuro, skin, endocrine and psych systems are negative, except as otherwise noted.        Objective    /80 (BP Location: Left arm, Patient Position: Sitting, Cuff Size: Adult Large)   Pulse 72   Temp 98  F (36.7  C) (Tympanic)   Resp 20   Wt 108.4 kg (239 lb)   SpO2 94%   BMI 39.77 kg/m    Body mass index is 39.77 kg/m .       Physical Exam   GENERAL: healthy, alert and no distress  NECK: no adenopathy, no asymmetry, masses, or scars and thyroid normal to palpation  RESP: lungs clear to auscultation - no rales, rhonchi or wheezes  CV: regular rate and rhythm, normal S1 S2, no S3 or S4, no murmur, click or rub, no peripheral  edema and peripheral pulses strong  MS: Right bicep tenderness - stiffness of right shoulder joint.  Pain with all ROM  PSYCH: mentation appears normal, affect normal/bright

## 2021-07-28 ENCOUNTER — OFFICE VISIT (OUTPATIENT)
Dept: AUDIOLOGY | Facility: OTHER | Age: 66
End: 2021-07-28
Attending: AUDIOLOGIST
Payer: MEDICARE

## 2021-07-28 ENCOUNTER — OFFICE VISIT (OUTPATIENT)
Dept: OTOLARYNGOLOGY | Facility: OTHER | Age: 66
End: 2021-07-28
Attending: PHYSICIAN ASSISTANT
Payer: MEDICARE

## 2021-07-28 VITALS
SYSTOLIC BLOOD PRESSURE: 126 MMHG | HEART RATE: 64 BPM | OXYGEN SATURATION: 94 % | DIASTOLIC BLOOD PRESSURE: 74 MMHG | TEMPERATURE: 96.9 F | WEIGHT: 230 LBS | HEIGHT: 65 IN | BODY MASS INDEX: 38.32 KG/M2

## 2021-07-28 DIAGNOSIS — H93.A1 PULSATILE TINNITUS OF RIGHT EAR: Primary | ICD-10-CM

## 2021-07-28 DIAGNOSIS — Q85.01 NEUROFIBROMATOSIS, TYPE 1 (VON RECKLINGHAUSEN'S DISEASE) (H): ICD-10-CM

## 2021-07-28 DIAGNOSIS — R09.89 OTHER SPECIFIED SYMPTOMS AND SIGNS INVOLVING THE CIRCULATORY AND RESPIRATORY SYSTEMS: ICD-10-CM

## 2021-07-28 DIAGNOSIS — M26.609 TMJ (TEMPOROMANDIBULAR JOINT SYNDROME): ICD-10-CM

## 2021-07-28 DIAGNOSIS — H91.93 DECREASED HEARING OF BOTH EARS: ICD-10-CM

## 2021-07-28 PROCEDURE — 99213 OFFICE O/P EST LOW 20 MIN: CPT | Mod: 25 | Performed by: NURSE PRACTITIONER

## 2021-07-28 PROCEDURE — 92504 EAR MICROSCOPY EXAMINATION: CPT | Performed by: NURSE PRACTITIONER

## 2021-07-28 PROCEDURE — G0463 HOSPITAL OUTPT CLINIC VISIT: HCPCS

## 2021-07-28 PROCEDURE — 92557 COMPREHENSIVE HEARING TEST: CPT | Performed by: AUDIOLOGIST

## 2021-07-28 PROCEDURE — 92567 TYMPANOMETRY: CPT | Performed by: AUDIOLOGIST

## 2021-07-28 ASSESSMENT — MIFFLIN-ST. JEOR: SCORE: 1584.15

## 2021-07-28 ASSESSMENT — PAIN SCALES - GENERAL: PAINLEVEL: MILD PAIN (3)

## 2021-07-28 NOTE — NURSING NOTE
"Chief Complaint   Patient presents with     Follow Up     Ear and tube check        Initial /74 (Cuff Size: Adult Large)   Pulse 64   Temp 96.9  F (36.1  C) (Tympanic)   Ht 1.651 m (5' 5\")   Wt 104.3 kg (230 lb)   SpO2 94%   BMI 38.27 kg/m   Estimated body mass index is 38.27 kg/m  as calculated from the following:    Height as of this encounter: 1.651 m (5' 5\").    Weight as of this encounter: 104.3 kg (230 lb).  Medication Reconciliation: complete  Madison Okeefe LPN    "

## 2021-07-28 NOTE — PROGRESS NOTES
"Otolaryngology Note         Chief Complaint:     Patient presents with:  Follow Up: Ear and tube check            History of Present Illness:     Cassy Avila is a 65 year old female seen today for follow up right ear.  She reports she continues to have some pain and fullness in her right ear and into the jaw. It is worse when she is laying down.  She has pulsatile tinnitus in the right ear when laying down, this has been present for about 1 month.  She only hears the pulsatile tinnitus when she is laying down.      She is currently on Coumadin due to factor 5 leiden defiency.  She has neurofibromatosis type 1.  She has had tumors in both legs in the past.  She was diagnosed with NF around 2005.      She has occasional feeling of off kilter, no rotary vertigo.  She has this approximately daily and it lasts 2-3 seconds.  She feels this happens when she turns to fast.      She denies any other tinnitus.   No facial numbness or tingling but feels \"full\" on the right side around the ear.    No otorrhea.  She feels like her hearing has decreased on both sides, right > left.    She denies any recent infections  She denies facial pain or pressure, no sinusitis symptoms.  She can breath through nose well.     She is a former smoker, quit in 1999, 30 pack year history    Audiogram (post operative) 7/28/21:  Tympanograms are Type B large volume right (patent pe tube suggested) and Type AD left (high admittance)  Thresholds are stable normal to moderate sensorineural hearing loss-binaural hearing aid candidate.  Speech reception thresholds are in good agreement with pure tone average.  Word discrimination scores are excellent at supra-thresholds level/MCL; UCL at 105dBHL.         Medications:     Current Outpatient Rx   Medication Sig Dispense Refill     aspirin 81 MG EC tablet Take 81 mg by mouth daily HS       Cholecalciferol (VITAMIN D3 PO) Take 3,000 mg by mouth daily AM       escitalopram (LEXAPRO) 10 MG tablet Take 1 " tablet by mouth once daily 90 tablet 0     HEMP OIL OR EXTRACT OR OTHER CBD CANNABINOID, NOT MEDICAL CANNABIS,        hydrochlorothiazide (HYDRODIURIL) 25 MG tablet Take 1 tablet by mouth once daily 90 tablet 0     losartan (COZAAR) 50 MG tablet Take 2 tablets by mouth once daily 180 tablet 0     methocarbamol (ROBAXIN) 750 MG tablet Take 1-2 tablets (750-1,500 mg) by mouth 4 times daily as needed for muscle spasms 60 tablet 0     metoprolol succinate ER (TOPROL-XL) 50 MG 24 hr tablet TAKE 1 AND ONE-HALF TABLETS BY MOUTH EVERY DAY 90 tablet 3     order for DME Nebulizer machine and tubing    DX:  Bronchitis 1 Device 0     oxyCODONE (ROXICODONE) 5 MG tablet TK 1 TO 2 TS PO Q 4 H PRF MODERATE TO SEVERE PAIN       potassium chloride ER (KLOR-CON M) 10 MEQ CR tablet Take 1 tablet (10 mEq) by mouth daily 30 tablet 3     traZODone (DESYREL) 50 MG tablet TAKE 1 TO 2 TABLETS BY MOUTH NIGHTLY AS NEEDED 180 tablet 0     vitamin B complex with vitamin C (VITAMIN  B COMPLEX) tablet Take 1 tablet by mouth daily       warfarin ANTICOAGULANT (COUMADIN) 5 MG tablet 7.5mg Mon,Wed,Fri and 5mg all other days or as directed by warfarin clinic 150 tablet 3            Allergies:     Allergies: Amlodipine besylate, Amoxicillin, Atorvastatin, Cephalexin monohydrate, Erythromycin base [kdc:yellow dye+erythromycin+brilliant blue fcf], Meloxicam, Adhesive tape, Prochlorperazine edisylate, and Prochlorperazine maleate          Past Medical History:     Past Medical History:   Diagnosis Date     Abdominal pain, generalized 2/8/2021     Arthritis      Cervicalgia 1/9/2001     Closed dislocation of shoulder, unspecified site 2000     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 Provider to review     Factor V Leiden (H)      Factor V Leiden mutation (H) 2019     Family history of colon cancer 2018     Gastro-oesophageal reflux disease      Herpes zoster without mention of complication     resolved from problem list     Hyperlipidemia LDL goal <100 2002     Problem list name updated by automated process. Provider to review     Long term (current) use of anticoagulants 2003     Major depression 2014     Mammographic microcalcification     resolved from problem list     Migraine, unspecified, without mention of intractable migraine without mention of status migrainosus 3/5/2001     Moderate episode of recurrent major depressive disorder (H) 2014     Neurofibromatosis, peripheral, NF1 (H) 2012     Problem list name updated by automated process. Provider to review     Neurofibromatosis, unspecified(237.70) 2012     Other and unspecified hyperlipidemia 2002     Other chronic pain      Other pulmonary embolism and infarction 2003     Primary insomnia 10/31/2018     Statin medication not prescribed per physician orders 2018     Tachycardia, unspecified 2001     Thrombosis of leg      Unspecified essential hypertension 2001     Vitamin D deficiency 2018            Past Surgical History:     Past Surgical History:   Procedure Laterality Date     ------------OTHER-------------      shoulder replacement; Provider: Karen     ARTHROPLASTY KNEE  2014    Procedure: ARTHROPLASTY KNEE;  Surgeon: Sean Alexander MD;  Location: [facility]     ARTHROSCOPY SHOULDER      right, bone spurs     CA ANESTH,SHOULDER REPLACEMENT Right 2020      SECTION      x3     CHOLECYSTECTOMY       COLONOSCOPY  02/15/2018    [place],,polyps     elbow ulnar tunnel release  2002     ELECTROTHERMAL THERAPY INTRADISC  2017    stimulator     ENDOSCOPIC SINUS SURGERY, SEPTOPLASTY,  "TURBINOPLASTY, MAXILLARY SINUSOTOMY, COMBINED N/A 04/29/2015    Procedure: COMBINED ENDOSCOPIC SINUS SURGERY, SEPTOPLASTY, TURBINOPLASTY, MAXILLARY SINUSOTOMY;  Surgeon: Seema Conn MD;  Location: [facility]     ESOPHAGOSCOPY, GASTROSCOPY, DUODENOSCOPY (EGD), COMBINED  2011    with biopsy and endoscopic U/S     EXCISE NEUROMA LOWER EXTREMITY Left 07/13/2016    Procedure: EXCISE NEUROMA LOWER EXTREMITY;  Surgeon: Edi Reed MD;  Location: [facility]     EYE SURGERY Right 09/2020     FUSION LUMBAR ANTERIOR WITH MARCY CAGES      L5-S1     HYSTERECTOMY TOTAL ABDOMINAL, BILATERAL SALPINGO-OOPHORECTOMY, COMBINED N/A      ORTHOPEDIC SURGERY  02/2015    right shoulder     ORTHOPEDIC SURGERY  08/28/2015    right knee     ORTHOPEDIC SURGERY Right 06/11/2018    hip labrum tear     pionidal cyst excision       TRANSPOSITION ULNAR NERVE (ELBOW)         ENT family history reviewed         Social History:     Social History     Tobacco Use     Smoking status: Former Smoker     Packs/day: 1.00     Years: 30.00     Pack years: 30.00     Types: Cigarettes, Pipe     Smokeless tobacco: Never Used     Tobacco comment: quit in 1999   Substance Use Topics     Alcohol use: No     Drug use: No            Review of Systems:     ROS: See HPI         Physical Exam:     /74 (Cuff Size: Adult Large)   Pulse 64   Temp 96.9  F (36.1  C) (Tympanic)   Ht 1.651 m (5' 5\")   Wt 104.3 kg (230 lb)   SpO2 94%   BMI 38.27 kg/m      General - The patient is well nourished and well developed, and appears to have good nutritional status.  Alert and oriented to person and place, answers questions and cooperates with examination appropriately.   Head and Face - Normocephalic and atraumatic, with no gross asymmetry noted.  The facial nerve is intact, with strong symmetric movements.  HB 1/6  Voice and Breathing - The patient was breathing comfortably without the use of accessory muscles. There was no wheezing, stridor. The patients voice was " clear and strong, and had appropriate pitch and quality.  Ears - External ear normal. The right ear is examined under binocular microscopy and with otoscope.  Canals are patent. Right tympanic membrane has patent PE tube, ME space appears dry.  No effusion or retraction noted.  Left tympanic membrane is intact without effusion, retraction or mass.   Eyes - Extraocular movements intact, sclera were not icteric or injected, conjunctiva were pink and moist.  Mouth - Examination of the oral cavity showed pink, healthy oral mucosa. Dentition in good condition. No lesions or ulcerations noted. The tongue was mobile and midline. Right TMJ tenderness noted.    Throat - The walls of the oropharynx were smooth, pink, moist, symmetric, and had no lesions or ulcerations.  The tonsillar pillars and soft palate were symmetric. The uvula was midline on elevation.    Neck - Normal midline excursion of the laryngotracheal complex during swallowing.  Full range of motion on passive movement.  Palpation of the occipital, submental, submandibular, internal jugular chain, and supraclavicular nodes did not demonstrate any abnormal lymph nodes or masses.  Palpation of the thyroid was soft and smooth, with no nodules or goiter appreciated.  The trachea was mobile and midline.           Assessment and Plan:       ICD-10-CM    1. Pulsatile tinnitus of right ear  H93.A1 MR Internal Auditory Canal wo&w Contrast     US Carotid Bilateral   2. Neurofibromatosis, type 1 (von Recklinghausen's disease) (H)  Q85.01 MR Internal Auditory Canal wo&w Contrast     US Carotid Bilateral   3. Other specified symptoms and signs involving the circulatory and respiratory systems   R09.89 US Carotid Bilateral       Ordering Physical Therapy to be done at [facility], we will send that referral to them, someone will call you to schedule that.      Ordering an MRI of the Internal Auditory Canal, someone will call you to schedule    Ordering  a Carotid Ultrasound, someone will call you to schedule   We will call you with results.  Follow up in 2-3 months for recheck    Ania COHEN  [facility] ENT

## 2021-07-28 NOTE — LETTER
"    7/28/2021         RE: Cassy Avila  [address]  [place] [address]        Dear Colleague,    Thank you for referring your patient, Cassy Avila, to the [facility]. Please see a copy of my visit note below.    Otolaryngology Note         Chief Complaint:     Patient presents with:  Follow Up: Ear and tube check            History of Present Illness:     Cassy Avila is a 65 year old female seen today for follow up right ear.  She reports she continues to have some pain and fullness in her right ear and into the jaw. It is worse when she is laying down.  She has pulsatile tinnitus in the right ear when laying down, this has been present for about 1 month.  She only hears the pulsatile tinnitus when she is laying down.      She is currently on Coumadin due to factor 5 leiden defiency.  She has neurofibromatosis type 1.  She has had tumors in both legs in the past.  She was diagnosed with NF around 2005.      She has occasional feeling of off kilter, no rotary vertigo.  She has this approximately daily and it lasts 2-3 seconds.  She feels this happens when she turns to fast.      She denies any other tinnitus.   No facial numbness or tingling but feels \"full\" on the right side around the ear.    No otorrhea.  She feels like her hearing has decreased on both sides, right > left.    She denies any recent infections  She denies facial pain or pressure, no sinusitis symptoms.  She can breath through nose well.     She is a former smoker, quit in 1999, 30 pack year history    Audiogram (post operative) 7/28/21:  Tympanograms are Type B large volume right (patent pe tube suggested) and Type AD left (high admittance)  Thresholds are stable normal to moderate sensorineural hearing loss-binaural hearing aid candidate.  Speech reception thresholds are in good agreement with pure tone average.  Word discrimination scores are excellent at supra-thresholds level/MCL; UCL at 105dBHL.     "     Medications:     Current Outpatient Rx   Medication Sig Dispense Refill     aspirin 81 MG EC tablet Take 81 mg by mouth daily HS       Cholecalciferol (VITAMIN D3 PO) Take 3,000 mg by mouth daily AM       escitalopram (LEXAPRO) 10 MG tablet Take 1 tablet by mouth once daily 90 tablet 0     HEMP OIL OR EXTRACT OR OTHER CBD CANNABINOID, NOT MEDICAL CANNABIS,        hydrochlorothiazide (HYDRODIURIL) 25 MG tablet Take 1 tablet by mouth once daily 90 tablet 0     losartan (COZAAR) 50 MG tablet Take 2 tablets by mouth once daily 180 tablet 0     methocarbamol (ROBAXIN) 750 MG tablet Take 1-2 tablets (750-1,500 mg) by mouth 4 times daily as needed for muscle spasms 60 tablet 0     metoprolol succinate ER (TOPROL-XL) 50 MG 24 hr tablet TAKE 1 AND ONE-HALF TABLETS BY MOUTH EVERY DAY 90 tablet 3     order for DME Nebulizer machine and tubing    DX:  Bronchitis 1 Device 0     oxyCODONE (ROXICODONE) 5 MG tablet TK 1 TO 2 TS PO Q 4 H PRF MODERATE TO SEVERE PAIN       potassium chloride ER (KLOR-CON M) 10 MEQ CR tablet Take 1 tablet (10 mEq) by mouth daily 30 tablet 3     traZODone (DESYREL) 50 MG tablet TAKE 1 TO 2 TABLETS BY MOUTH NIGHTLY AS NEEDED 180 tablet 0     vitamin B complex with vitamin C (VITAMIN  B COMPLEX) tablet Take 1 tablet by mouth daily       warfarin ANTICOAGULANT (COUMADIN) 5 MG tablet 7.5mg Mon,Wed,Fri and 5mg all other days or as directed by warfarin clinic 150 tablet 3            Allergies:     Allergies: Amlodipine besylate, Amoxicillin, Atorvastatin, Cephalexin monohydrate, Erythromycin base [kdc:yellow dye+erythromycin+brilliant blue fcf], Meloxicam, Adhesive tape, Prochlorperazine edisylate, and Prochlorperazine maleate          Past Medical History:     Past Medical History:   Diagnosis Date     Abdominal pain, generalized 2/8/2021     Arthritis      Cervicalgia 1/9/2001     Closed dislocation of shoulder, unspecified site 2000     Congenital deficiency of other clotting factors 9/7/2012    factor  V deficiency, congenital     Congenital factor VIII disorder (H) 7/26/2019     Contact dermatitis and other eczema, due to unspecified cause 6/1/2004     Coughing      Diarrhea 2/8/2021     Edema 1/18/2002     Essential hypertension 2/20/2001     Problem list name updated by automated process. Provider to review     Factor V Leiden (H)      Factor V Leiden mutation (H) 7/26/2019     Family history of colon cancer 1/2/2018     Gastro-oesophageal reflux disease      Herpes zoster without mention of complication 2003    resolved from problem list     Hyperlipidemia LDL goal <100 7/11/2002     Problem list name updated by automated process. Provider to review     Long term (current) use of anticoagulants 8/20/2003     Major depression 8/8/2014     Mammographic microcalcification 2003    resolved from problem list     Migraine, unspecified, without mention of intractable migraine without mention of status migrainosus 3/5/2001     Moderate episode of recurrent major depressive disorder (H) 8/8/2014     Neurofibromatosis, peripheral, NF1 (H) 8/22/2012     Problem list name updated by automated process. Provider to review     Neurofibromatosis, unspecified(237.70) 8/22/2012     Other and unspecified hyperlipidemia 7/11/2002     Other chronic pain      Other pulmonary embolism and infarction 4/11/2003     Primary insomnia 10/31/2018     Statin medication not prescribed per physician orders 1/17/2018     Tachycardia, unspecified 2/12/2001     Thrombosis of leg      Unspecified essential hypertension 2/20/2001     Vitamin D deficiency 7/13/2018            Past Surgical History:     Past Surgical History:   Procedure Laterality Date     ------------OTHER-------------  2012    shoulder replacement; Provider: Karen     ARTHROPLASTY KNEE  06/20/2014    Procedure: ARTHROPLASTY KNEE;  Surgeon: Sean Alexander MD;  Location: [facility]     ARTHROSCOPY SHOULDER  2012    right, bone spurs     CA ANESTH,SHOULDER REPLACEMENT Right 06/2020  "     SECTION      x3     CHOLECYSTECTOMY       COLONOSCOPY  02/15/2018    [place],,polyps     elbow ulnar tunnel release  2002     ELECTROTHERMAL THERAPY INTRADISC  2017    stimulator     ENDOSCOPIC SINUS SURGERY, SEPTOPLASTY, TURBINOPLASTY, MAXILLARY SINUSOTOMY, COMBINED N/A 2015    Procedure: COMBINED ENDOSCOPIC SINUS SURGERY, SEPTOPLASTY, TURBINOPLASTY, MAXILLARY SINUSOTOMY;  Surgeon: Seema Conn MD;  Location: [facility]     ESOPHAGOSCOPY, GASTROSCOPY, DUODENOSCOPY (EGD), COMBINED      with biopsy and endoscopic U/S     EXCISE NEUROMA LOWER EXTREMITY Left 2016    Procedure: EXCISE NEUROMA LOWER EXTREMITY;  Surgeon: Edi Reed MD;  Location: [facility]     EYE SURGERY Right 2020     FUSION LUMBAR ANTERIOR WITH MARCY CAGES      L5-S1     HYSTERECTOMY TOTAL ABDOMINAL, BILATERAL SALPINGO-OOPHORECTOMY, COMBINED N/A      ORTHOPEDIC SURGERY  2015    right shoulder     ORTHOPEDIC SURGERY  2015    right knee     ORTHOPEDIC SURGERY Right 2018    hip labrum tear     pionidal cyst excision       TRANSPOSITION ULNAR NERVE (ELBOW)         ENT family history reviewed         Social History:     Social History     Tobacco Use     Smoking status: Former Smoker     Packs/day: 1.00     Years: 30.00     Pack years: 30.00     Types: Cigarettes, Pipe     Smokeless tobacco: Never Used     Tobacco comment: quit in    Substance Use Topics     Alcohol use: No     Drug use: No            Review of Systems:     ROS: See HPI         Physical Exam:     /74 (Cuff Size: Adult Large)   Pulse 64   Temp 96.9  F (36.1  C) (Tympanic)   Ht 1.651 m (5' 5\")   Wt 104.3 kg (230 lb)   SpO2 94%   BMI 38.27 kg/m      General - The patient is well nourished and well developed, and appears to have good nutritional status.  Alert and oriented to person and place, answers questions and cooperates with examination appropriately.   Head and Face - Normocephalic and atraumatic, with no gross " asymmetry noted.  The facial nerve is intact, with strong symmetric movements.  HB 1/6  Voice and Breathing - The patient was breathing comfortably without the use of accessory muscles. There was no wheezing, stridor. The patients voice was clear and strong, and had appropriate pitch and quality.  Ears - External ear normal. The right ear is examined under binocular microscopy and with otoscope.  Canals are patent. Right tympanic membrane has patent PE tube, ME space appears dry.  No effusion or retraction noted.  Left tympanic membrane is intact without effusion, retraction or mass.   Eyes - Extraocular movements intact, sclera were not icteric or injected, conjunctiva were pink and moist.  Mouth - Examination of the oral cavity showed pink, healthy oral mucosa. Dentition in good condition. No lesions or ulcerations noted. The tongue was mobile and midline. Right TMJ tenderness noted.    Throat - The walls of the oropharynx were smooth, pink, moist, symmetric, and had no lesions or ulcerations.  The tonsillar pillars and soft palate were symmetric. The uvula was midline on elevation.    Neck - Normal midline excursion of the laryngotracheal complex during swallowing.  Full range of motion on passive movement.  Palpation of the occipital, submental, submandibular, internal jugular chain, and supraclavicular nodes did not demonstrate any abnormal lymph nodes or masses.  Palpation of the thyroid was soft and smooth, with no nodules or goiter appreciated.  The trachea was mobile and midline.           Assessment and Plan:       ICD-10-CM    1. Pulsatile tinnitus of right ear  H93.A1 MR Internal Auditory Canal wo&w Contrast     US Carotid Bilateral   2. Neurofibromatosis, type 1 (von Recklinghausen's disease) (H)  Q85.01 MR Internal Auditory Canal wo&w Contrast     US Carotid Bilateral   3. Other specified symptoms and signs involving the circulatory and respiratory systems   R09.89 US Carotid Bilateral       Ordering  Physical Therapy to be done at [facility], we will send that referral to them, someone will call you to schedule that.      Ordering an MRI of the Internal Auditory Canal, someone will call you to schedule    Ordering a Carotid Ultrasound, someone will call you to schedule   We will call you with results.  Follow up in 2-3 months for recheck    Ania COHEN  [facility] ENT        Again, thank you for allowing me to participate in the care of your patient.        Sincerely,        Ania Klein NP

## 2021-07-28 NOTE — PATIENT INSTRUCTIONS
Thank you for allowing Ania Klein, NP and our ENT team to participate in your care.  If your medications are too expensive, please give the nurse a call.  We can possibly change this medication.  If you have a scheduling or an appointment question please contact our Health Unit Coordinator at their direct line [phone] ext: [phone].   ALL nursing questions or concerns can be directed to your ENT Nurse: [phone]--Delia      Ordering Physical Therapy to be done at [facility], we will send that referral to them, someone will call you to schedule that.      Ordering an MRI of the Internal Auditory Canal, someone will call you to schedule    Ordering a Carotid Ultrasound, someone will call you to schedule

## 2021-07-28 NOTE — PROGRESS NOTES
Audiology Evaluation Completed. Please refer SCANNED AUDIOGRAM and/or TYMPANOGRAM for BACKGROUND, RESULTS, RECOMMENDATIONS.      Reyna GALE, [facility]  Audiologist #[id number]

## 2021-08-04 ENCOUNTER — OFFICE VISIT (OUTPATIENT)
Dept: FAMILY MEDICINE | Facility: OTHER | Age: 66
End: 2021-08-04
Attending: NURSE PRACTITIONER
Payer: COMMERCIAL

## 2021-08-04 VITALS
WEIGHT: 239 LBS | TEMPERATURE: 98 F | HEART RATE: 72 BPM | OXYGEN SATURATION: 94 % | DIASTOLIC BLOOD PRESSURE: 80 MMHG | SYSTOLIC BLOOD PRESSURE: 124 MMHG | RESPIRATION RATE: 20 BRPM | BODY MASS INDEX: 39.77 KG/M2

## 2021-08-04 DIAGNOSIS — M25.511 RIGHT SHOULDER PAIN, UNSPECIFIED CHRONICITY: ICD-10-CM

## 2021-08-04 DIAGNOSIS — M25.611 STIFFNESS OF RIGHT SHOULDER JOINT: ICD-10-CM

## 2021-08-04 DIAGNOSIS — Z78.0 MENOPAUSE: Primary | ICD-10-CM

## 2021-08-04 DIAGNOSIS — Z96.612 H/O TOTAL SHOULDER REPLACEMENT, LEFT: ICD-10-CM

## 2021-08-04 PROCEDURE — 99214 OFFICE O/P EST MOD 30 MIN: CPT | Performed by: NURSE PRACTITIONER

## 2021-08-04 ASSESSMENT — ANXIETY QUESTIONNAIRES
2. NOT BEING ABLE TO STOP OR CONTROL WORRYING: NOT AT ALL
3. WORRYING TOO MUCH ABOUT DIFFERENT THINGS: NOT AT ALL
6. BECOMING EASILY ANNOYED OR IRRITABLE: SEVERAL DAYS
1. FEELING NERVOUS, ANXIOUS, OR ON EDGE: NOT AT ALL
5. BEING SO RESTLESS THAT IT IS HARD TO SIT STILL: NOT AT ALL
IF YOU CHECKED OFF ANY PROBLEMS ON THIS QUESTIONNAIRE, HOW DIFFICULT HAVE THESE PROBLEMS MADE IT FOR YOU TO DO YOUR WORK, TAKE CARE OF THINGS AT HOME, OR GET ALONG WITH OTHER PEOPLE: NOT DIFFICULT AT ALL

## 2021-08-04 ASSESSMENT — PATIENT HEALTH QUESTIONNAIRE - PHQ9
SUM OF ALL RESPONSES TO PHQ QUESTIONS 1-9: 4
5. POOR APPETITE OR OVEREATING: SEVERAL DAYS

## 2021-08-04 ASSESSMENT — PAIN SCALES - GENERAL: PAINLEVEL: MODERATE PAIN (4)

## 2021-08-04 NOTE — NURSING NOTE
"Chief Complaint   Patient presents with     Surgical Followup       Initial /80 (BP Location: Left arm, Patient Position: Sitting, Cuff Size: Adult Large)   Pulse 72   Temp 98  F (36.7  C) (Tympanic)   Resp 20   Wt 108.4 kg (239 lb)   SpO2 94%   BMI 39.77 kg/m   Estimated body mass index is 39.77 kg/m  as calculated from the following:    Height as of 7/28/21: 1.651 m (5' 5\").    Weight as of this encounter: 108.4 kg (239 lb).  Medication Reconciliation: complete  Arlene Ortiz LPN  "

## 2021-08-04 NOTE — PATIENT INSTRUCTIONS
Assessment & Plan     Menopause  - DX Hip/Pelvis/Spine; Future    Right shoulder pain, unspecified chronicity  - MR Shoulder Right w/o Contrast; Future    Stiffness of right shoulder joint  - MR Shoulder Right w/o Contrast; Future    H/O total shoulder replacement, left  - MR Shoulder Right w/o Contrast; Future        Eliz Hartman, CNP  [facility]

## 2021-08-05 ENCOUNTER — ANTICOAGULATION THERAPY VISIT (OUTPATIENT)
Dept: ANTICOAGULATION | Facility: OTHER | Age: 66
End: 2021-08-05

## 2021-08-05 DIAGNOSIS — Z79.01 LONG TERM CURRENT USE OF ANTICOAGULANT THERAPY: Primary | ICD-10-CM

## 2021-08-05 DIAGNOSIS — I26.99 PULMONARY EMBOLISM AND INFARCTION (H): ICD-10-CM

## 2021-08-05 DIAGNOSIS — I82.409 DEEP VEIN THROMBOSIS (DVT) (H): ICD-10-CM

## 2021-08-05 LAB — INR (EXTERNAL): 2.5 (ref 2–3)

## 2021-08-05 NOTE — PROGRESS NOTES
ANTICOAGULATION MANAGEMENT     Cassy Avila 65 year old female is on warfarin with therapeutic INR result. (Goal INR 2.0-3.0)    Recent labs: (last 7 days)     08/05/21  0944   INR 2.5       ASSESSMENT     Source(s): Patient/Caregiver Call       Warfarin doses taken: Warfarin taken as instructed    Diet: No new diet changes identified    New illness, injury, or hospitalization: No    Medication/supplement changes: None noted    Signs or symptoms of bleeding or clotting: No    Previous INR: Therapeutic last visit; previously outside of goal range    Additional findings: None     PLAN     Recommended plan for no diet, medication or health factor changes affecting INR     Dosing Instructions: Continue your current warfarin dose with next INR in 6 weeks       Summary  As of 8/5/2021    Full warfarin instructions:  7.5 mg every Wed, Fri; 5 mg all other days   Next INR check:               Detailed voice message left for Cassy with dosing instructions and follow up date.     Patient to recheck with home meter    Education provided: Please call back if any changes to your diet, medications or how you've been taking warfarin, Monitoring for bleeding signs and symptoms and Monitoring for clotting signs and symptoms    Plan made per [facility] anticoagulation protocol    Melanie Farris RN  Anticoagulation Clinic  8/5/2021    _______________________________________________________________________     Anticoagulation Episode Summary     Current INR goal:  2.0-3.0   TTR:  66.3 % (1 y)   Target end date:  Indefinite   Send INR reminders to:  [facility] [place]    Indications    Long-term (current) use of anticoagulants [Z79.01] [Z79.01]  Pulmonary embolism and infarction (H) [I26.99]  Deep vein thrombosis (DVT) (H) [I82.409] [I82.409]           Comments:  PST - [facility].  Shoulder surgery 6/24/2020, warfarin hold x 5 days. Lovenox bridge per A.,Flaim  Call cell phone with any dosing. Home phone no longer correct  number           Anticoagulation Care Providers     Provider Role Specialty Phone number    Eliz Hartman, CNP Referring Family Medicine [phone]

## 2021-08-16 DIAGNOSIS — F51.01 PRIMARY INSOMNIA: ICD-10-CM

## 2021-08-16 RX ORDER — TRAZODONE HYDROCHLORIDE 50 MG/1
TABLET, FILM COATED ORAL
Qty: 180 TABLET | Refills: 0 | Status: SHIPPED | OUTPATIENT
Start: 2021-08-16 | End: 2022-02-21

## 2021-08-25 ENCOUNTER — HOSPITAL ENCOUNTER (OUTPATIENT)
Dept: BONE DENSITY | Facility: HOSPITAL | Age: 66
End: 2021-08-25
Attending: NURSE PRACTITIONER
Payer: MEDICARE

## 2021-08-25 ENCOUNTER — HOSPITAL ENCOUNTER (OUTPATIENT)
Dept: ULTRASOUND IMAGING | Facility: HOSPITAL | Age: 66
End: 2021-08-25
Attending: NURSE PRACTITIONER
Payer: MEDICARE

## 2021-08-25 ENCOUNTER — HOSPITAL ENCOUNTER (OUTPATIENT)
Dept: MRI IMAGING | Facility: HOSPITAL | Age: 66
End: 2021-08-25
Attending: NURSE PRACTITIONER
Payer: MEDICARE

## 2021-08-25 DIAGNOSIS — Z96.612 H/O TOTAL SHOULDER REPLACEMENT, LEFT: ICD-10-CM

## 2021-08-25 DIAGNOSIS — Q85.01 NEUROFIBROMATOSIS, TYPE 1 (VON RECKLINGHAUSEN'S DISEASE) (H): ICD-10-CM

## 2021-08-25 DIAGNOSIS — M25.511 RIGHT SHOULDER PAIN, UNSPECIFIED CHRONICITY: ICD-10-CM

## 2021-08-25 DIAGNOSIS — Z78.0 MENOPAUSE: ICD-10-CM

## 2021-08-25 DIAGNOSIS — H93.A1 PULSATILE TINNITUS OF RIGHT EAR: ICD-10-CM

## 2021-08-25 DIAGNOSIS — R09.89 OTHER SPECIFIED SYMPTOMS AND SIGNS INVOLVING THE CIRCULATORY AND RESPIRATORY SYSTEMS: ICD-10-CM

## 2021-08-25 DIAGNOSIS — M25.611 STIFFNESS OF RIGHT SHOULDER JOINT: ICD-10-CM

## 2021-08-25 PROCEDURE — 93880 EXTRACRANIAL BILAT STUDY: CPT

## 2021-08-25 PROCEDURE — 73221 MRI JOINT UPR EXTREM W/O DYE: CPT | Mod: RT

## 2021-08-25 PROCEDURE — 70543 MRI ORBT/FAC/NCK W/O &W/DYE: CPT

## 2021-08-25 PROCEDURE — A9585 GADOBUTROL INJECTION: HCPCS | Performed by: RADIOLOGY

## 2021-08-25 PROCEDURE — 255N000002 HC RX 255 OP 636: Performed by: RADIOLOGY

## 2021-08-25 PROCEDURE — 77080 DXA BONE DENSITY AXIAL: CPT

## 2021-08-25 RX ORDER — GADOBUTROL 604.72 MG/ML
10 INJECTION INTRAVENOUS ONCE
Status: COMPLETED | OUTPATIENT
Start: 2021-08-25 | End: 2021-08-25

## 2021-08-25 RX ADMIN — GADOBUTROL 10 ML: 604.72 INJECTION INTRAVENOUS at 16:09

## 2021-08-26 NOTE — PROGRESS NOTES
Assessment & Plan     Essential hypertension  - TSH with free T4 reflex; Future  - Comprehensive metabolic panel; Future    Hyperlipidemia LDL goal <100  - TSH with free T4 reflex; Future  - Comprehensive metabolic panel; Future    Moderate episode of recurrent major depressive disorder (H)  - Continue plan of care    Pain in right upper arm  - MRI reviewed  - Follow-up with Ortho as needed    Stiffness of right shoulder joint  - MRI reviewed  - Follow-up with Ortho as needed      Return in about 6 months (around 2/27/2022) for Chronic disease management, am fasting.      Eliz Hartman, CNP  [facility] - [place] Madrigal is a 65 year old who presents for the following health issues        Depression and Anxiety Follow-Up    How are you doing with your depression since your last visit? Improved     How are you doing with your anxiety since your last visit?  Improved     Are you having other symptoms that might be associated with depression or anxiety? No    Have you had a significant life event? No     Do you have any concerns with your use of alcohol or other drugs? No      Social History     Tobacco Use     Smoking status: Former Smoker     Packs/day: 1.00     Years: 30.00     Pack years: 30.00     Types: Cigarettes, Pipe     Smokeless tobacco: Never Used     Tobacco comment: quit in 1999   Vaping Use     Vaping Use: Never used   Substance Use Topics     Alcohol use: No     Drug use: No       PHQ 12/2/2020 4/16/2021 8/4/2021   PHQ-9 Total Score 9 0 4   Q9: Thoughts of better off dead/self-harm past 2 weeks Not at all Not at all Not at all   F/U: Thoughts of suicide or self-harm - - -   F/U: Safety concerns - - -       MARGA-7 SCORE 12/2/2020 4/16/2021 8/27/2021   Total Score 10 0 0         Hyperlipidemia Follow-Up    Are you regularly taking any medication or supplement to lower your cholesterol?   Yes- statin    Are you having muscle aches or other side effects that you think could be caused by your  cholesterol lowering medication?  No      Hypertension Follow-up    Do you check your blood pressure regularly outside of the clinic? No     Are you following a low salt diet? Yes    Are your blood pressures ever more than 140 on the top number (systolic) OR more   than 90 on the bottom number (diastolic), for example 140/90? No        Follow-up Right Shoulder Pain  MRI was completed after 8/4/21 visit      MR SHOULDER RIGHT W/O CONTRAST       HISTORY: 65 years Female right shoulder pain, weakness and limited  range of motion.     COMPARISON: 11/6/2019     TECHNIQUE: Coronal PD fat sat, Coronal T1, coronal T2, sagittal T2,  sagittal PD fat-sat, axial PD imaging of the right  shoulder was  performed     FINDINGS: Since the prior study, patient has had right shoulder  arthroplasty. Artifact from the arthroplasty obscures anatomic detail  tendons/elements of the rotator cuff and anatomic detail of the  glenohumeral articulation.     Rotator cuff:  Elements of the rotator cuff, rotator cuff insertion integrity is not  visible on this examination. There is atrophic change of the  subscapularis muscle but the visualized portion of the tendon appears  intact. Atrophy of this muscle has developed since the prior study of  11/6/2019. There is no significant atrophic change of the  supraspinatus infraspinatus or teres minor.     Biceps tendon: The biceps tendon is largely obscured by susceptibility  artifact induced by the metallic components of the arthroplasty. The  lower extra-articular biceps tendon is visible and appears intact.                                                                             IMPRESSION:   Anatomic detail of portions of the shoulder directly about  the glenohumeral joint is largely obscured by metallic susceptibility  artifact from arthroplasty components. The lower extra-articular  aspect of the long head biceps tendon is intact. There is no apparent  buckling or laxity to suggest it is torn  "and inferiorly displaced.     There is atrophic change of the subscapularis muscle without clear  evidence of a subscapularis tendon tear. Atrophic change of this  muscle has developed since the 2019 comparison exam.       ANGELIA BRANDT MD          Review of Systems   Constitutional, HEENT, cardiovascular, pulmonary, GI, , musculoskeletal, neuro, skin, endocrine and psych systems are negative, except as otherwise noted.        Objective    /68 (BP Location: Right arm, Patient Position: Sitting, Cuff Size: Adult Large)   Pulse 79   Temp 98.2  F (36.8  C) (Tympanic)   Ht 1.651 m (5' 5\")   Wt 105.4 kg (232 lb 4.8 oz)   SpO2 97%   BMI 38.66 kg/m    Body mass index is 38.66 kg/m .         Physical Exam   GENERAL: healthy, alert and no distress  EYES: Eyes grossly normal to inspection, PERRL and conjunctivae and sclerae normal  NECK: no adenopathy, no asymmetry, masses, or scars and thyroid normal to palpation  RESP: lungs clear to auscultation - no rales, rhonchi or wheezes  CV: regular rate and rhythm, normal S1 S2, no S3 or S4, no murmur, click or rub, no peripheral edema and peripheral pulses strong  MS: Right shoulder stiffness, bicep discomfort  SKIN: no suspicious lesions or rashes  PSYCH: mentation appears normal, affect normal/bright            "

## 2021-08-27 ENCOUNTER — OFFICE VISIT (OUTPATIENT)
Dept: FAMILY MEDICINE | Facility: OTHER | Age: 66
End: 2021-08-27
Attending: NURSE PRACTITIONER
Payer: COMMERCIAL

## 2021-08-27 ENCOUNTER — LAB (OUTPATIENT)
Dept: LAB | Facility: OTHER | Age: 66
End: 2021-08-27
Attending: NURSE PRACTITIONER
Payer: MEDICARE

## 2021-08-27 VITALS
WEIGHT: 232.3 LBS | HEIGHT: 65 IN | SYSTOLIC BLOOD PRESSURE: 110 MMHG | TEMPERATURE: 98.2 F | DIASTOLIC BLOOD PRESSURE: 68 MMHG | OXYGEN SATURATION: 97 % | HEART RATE: 79 BPM | BODY MASS INDEX: 38.7 KG/M2

## 2021-08-27 DIAGNOSIS — E78.5 HYPERLIPIDEMIA LDL GOAL <100: ICD-10-CM

## 2021-08-27 DIAGNOSIS — I10 ESSENTIAL HYPERTENSION: ICD-10-CM

## 2021-08-27 DIAGNOSIS — M25.611 STIFFNESS OF RIGHT SHOULDER JOINT: ICD-10-CM

## 2021-08-27 DIAGNOSIS — I10 ESSENTIAL HYPERTENSION: Primary | ICD-10-CM

## 2021-08-27 DIAGNOSIS — F33.1 MODERATE EPISODE OF RECURRENT MAJOR DEPRESSIVE DISORDER (H): ICD-10-CM

## 2021-08-27 DIAGNOSIS — M79.621 PAIN IN RIGHT UPPER ARM: ICD-10-CM

## 2021-08-27 DIAGNOSIS — Z13.220 SCREENING FOR HYPERLIPIDEMIA: Primary | ICD-10-CM

## 2021-08-27 LAB
ALBUMIN SERPL-MCNC: 3.5 G/DL (ref 3.4–5)
ALP SERPL-CCNC: 64 U/L (ref 40–150)
ALT SERPL W P-5'-P-CCNC: 33 U/L (ref 0–50)
ANION GAP SERPL CALCULATED.3IONS-SCNC: 11 MMOL/L (ref 3–14)
AST SERPL W P-5'-P-CCNC: 14 U/L (ref 0–45)
BILIRUB SERPL-MCNC: 0.4 MG/DL (ref 0.2–1.3)
BUN SERPL-MCNC: 15 MG/DL (ref 7–30)
CALCIUM SERPL-MCNC: 9 MG/DL (ref 8.5–10.1)
CHLORIDE BLD-SCNC: 104 MMOL/L (ref 94–109)
CHOLEST SERPL-MCNC: 212 MG/DL
CO2 SERPL-SCNC: 23 MMOL/L (ref 20–32)
CREAT SERPL-MCNC: 1.04 MG/DL (ref 0.52–1.04)
FASTING STATUS PATIENT QL REPORTED: ABNORMAL
GFR SERPL CREATININE-BSD FRML MDRD: 56 ML/MIN/1.73M2
GLUCOSE BLD-MCNC: 127 MG/DL (ref 70–99)
HDLC SERPL-MCNC: 34 MG/DL
HOLD SPECIMEN: NORMAL
LDLC SERPL CALC-MCNC: 127 MG/DL
NONHDLC SERPL-MCNC: 178 MG/DL
POTASSIUM BLD-SCNC: 3.5 MMOL/L (ref 3.4–5.3)
PROT SERPL-MCNC: 7.3 G/DL (ref 6.8–8.8)
SODIUM SERPL-SCNC: 138 MMOL/L (ref 133–144)
TRIGL SERPL-MCNC: 255 MG/DL
TSH SERPL DL<=0.005 MIU/L-ACNC: 2.86 MU/L (ref 0.4–4)

## 2021-08-27 PROCEDURE — 80053 COMPREHEN METABOLIC PANEL: CPT | Mod: ZL

## 2021-08-27 PROCEDURE — 82465 ASSAY BLD/SERUM CHOLESTEROL: CPT | Mod: ZL

## 2021-08-27 PROCEDURE — 36415 COLL VENOUS BLD VENIPUNCTURE: CPT | Mod: ZL

## 2021-08-27 PROCEDURE — G0463 HOSPITAL OUTPT CLINIC VISIT: HCPCS

## 2021-08-27 PROCEDURE — 84443 ASSAY THYROID STIM HORMONE: CPT | Mod: ZL

## 2021-08-27 PROCEDURE — 99214 OFFICE O/P EST MOD 30 MIN: CPT | Performed by: NURSE PRACTITIONER

## 2021-08-27 ASSESSMENT — PATIENT HEALTH QUESTIONNAIRE - PHQ9: 5. POOR APPETITE OR OVEREATING: NOT AT ALL

## 2021-08-27 ASSESSMENT — PAIN SCALES - GENERAL: PAINLEVEL: NO PAIN (0)

## 2021-08-27 ASSESSMENT — ANXIETY QUESTIONNAIRES
1. FEELING NERVOUS, ANXIOUS, OR ON EDGE: NOT AT ALL
6. BECOMING EASILY ANNOYED OR IRRITABLE: NOT AT ALL
GAD7 TOTAL SCORE: 0
7. FEELING AFRAID AS IF SOMETHING AWFUL MIGHT HAPPEN: NOT AT ALL
2. NOT BEING ABLE TO STOP OR CONTROL WORRYING: NOT AT ALL
5. BEING SO RESTLESS THAT IT IS HARD TO SIT STILL: NOT AT ALL
IF YOU CHECKED OFF ANY PROBLEMS ON THIS QUESTIONNAIRE, HOW DIFFICULT HAVE THESE PROBLEMS MADE IT FOR YOU TO DO YOUR WORK, TAKE CARE OF THINGS AT HOME, OR GET ALONG WITH OTHER PEOPLE: NOT DIFFICULT AT ALL
3. WORRYING TOO MUCH ABOUT DIFFERENT THINGS: NOT AT ALL

## 2021-08-27 ASSESSMENT — MIFFLIN-ST. JEOR: SCORE: 1594.59

## 2021-08-27 NOTE — PATIENT INSTRUCTIONS
Assessment & Plan       Essential hypertension  - TSH with free T4 reflex; Future  - Comprehensive metabolic panel; Future    Hyperlipidemia LDL goal <100  - TSH with free T4 reflex; Future  - Comprehensive metabolic panel; Future    Moderate episode of recurrent major depressive disorder (H)  - Continue plan of care    Pain in right upper arm  - MRI reviewed  - Follow-up with Ortho as needed    Stiffness of right shoulder joint  - MRI reviewed  - Follow-up with Ortho as needed      Return in about 6 months (around 2/27/2022) for Chronic disease management, am fasting.      Eliz Hartman, CNP  [facility]          MR SHOULDER RIGHT W/O CONTRAST       HISTORY: 65 years Female right shoulder pain, weakness and limited  range of motion.     COMPARISON: 11/6/2019     TECHNIQUE: Coronal PD fat sat, Coronal T1, coronal T2, sagittal T2,  sagittal PD fat-sat, axial PD imaging of the right  shoulder was  performed     FINDINGS: Since the prior study, patient has had right shoulder  arthroplasty. Artifact from the arthroplasty obscures anatomic detail  tendons/elements of the rotator cuff and anatomic detail of the  glenohumeral articulation.     Rotator cuff:  Elements of the rotator cuff, rotator cuff insertion integrity is not  visible on this examination. There is atrophic change of the  subscapularis muscle but the visualized portion of the tendon appears  intact. Atrophy of this muscle has developed since the prior study of  11/6/2019. There is no significant atrophic change of the  supraspinatus infraspinatus or teres minor.     Biceps tendon: The biceps tendon is largely obscured by susceptibility  artifact induced by the metallic components of the arthroplasty. The  lower extra-articular biceps tendon is visible and appears intact.                                                                             IMPRESSION:   Anatomic detail of portions of the shoulder directly about  the  glenohumeral joint is largely obscured by metallic susceptibility  artifact from arthroplasty components. The lower extra-articular  aspect of the long head biceps tendon is intact. There is no apparent  buckling or laxity to suggest it is torn and inferiorly displaced.     There is atrophic change of the subscapularis muscle without clear  evidence of a subscapularis tendon tear. Atrophic change of this  muscle has developed since the 2019 comparison exam.       ANGELIA BRANDT MD

## 2021-08-27 NOTE — NURSING NOTE
"Chief Complaint   Patient presents with     Results       Initial /68 (BP Location: Right arm, Patient Position: Sitting, Cuff Size: Adult Large)   Pulse 79   Temp 98.2  F (36.8  C) (Tympanic)   Ht 1.651 m (5' 5\")   Wt 105.4 kg (232 lb 4.8 oz)   SpO2 97%   BMI 38.66 kg/m   Estimated body mass index is 38.66 kg/m  as calculated from the following:    Height as of this encounter: 1.651 m (5' 5\").    Weight as of this encounter: 105.4 kg (232 lb 4.8 oz).  Medication Reconciliation: complete  Johana Washington LPN  "

## 2021-08-28 ASSESSMENT — ANXIETY QUESTIONNAIRES: GAD7 TOTAL SCORE: 0

## 2021-08-29 RX ORDER — ESCITALOPRAM OXALATE 10 MG/1
TABLET ORAL
Qty: 90 TABLET | Refills: 0 | Status: SHIPPED | OUTPATIENT
Start: 2021-08-29 | End: 2021-12-02

## 2021-09-03 DIAGNOSIS — I10 BENIGN ESSENTIAL HYPERTENSION: ICD-10-CM

## 2021-09-06 RX ORDER — LOSARTAN POTASSIUM 50 MG/1
TABLET ORAL
Qty: 180 TABLET | Refills: 0 | Status: SHIPPED | OUTPATIENT
Start: 2021-09-06 | End: 2021-12-11

## 2021-09-07 ENCOUNTER — ANTICOAGULATION THERAPY VISIT (OUTPATIENT)
Dept: ANTICOAGULATION | Facility: OTHER | Age: 66
End: 2021-09-07

## 2021-09-07 DIAGNOSIS — Z79.01 LONG TERM CURRENT USE OF ANTICOAGULANT THERAPY: Primary | ICD-10-CM

## 2021-09-07 DIAGNOSIS — I82.409 DEEP VEIN THROMBOSIS (DVT) (H): ICD-10-CM

## 2021-09-07 DIAGNOSIS — I26.99 PULMONARY EMBOLISM AND INFARCTION (H): ICD-10-CM

## 2021-09-07 LAB — INR (EXTERNAL): 2.1 (ref 2–3)

## 2021-09-07 NOTE — PROGRESS NOTES
ANTICOAGULATION MANAGEMENT     Cassy Avila 65 year old female is on warfarin with therapeutic INR result. (Goal INR 2.0-3.0)    Recent labs: (last 7 days)     09/07/21  1022   INR 2.1       ASSESSMENT     Source(s): Chart Review and Patient/Caregiver Call       Warfarin doses taken: Warfarin taken as instructed    Diet: No new diet changes identified    New illness, injury, or hospitalization: No    Medication/supplement changes: None noted    Signs or symptoms of bleeding or clotting: No    Previous INR: Therapeutic last 2(+) visits    Additional findings: None     PLAN     Recommended plan for no diet, medication or health factor changes affecting INR     Dosing Instructions: Continue your current warfarin dose with next INR in 6 weeks       Summary  As of 9/7/2021    Full warfarin instructions:  7.5 mg every Wed, Fri; 5 mg all other days   Next INR check:  10/22/2021             Telephone call with Cassy who verbalizes understanding and agrees to plan    Patient to recheck with home meter    Education provided: Please call back if any changes to your diet, medications or how you've been taking warfarin, Monitoring for bleeding signs and symptoms and Monitoring for clotting signs and symptoms    Plan made per [facility] anticoagulation protocol    Melanie Farris RN  Anticoagulation Clinic  9/7/2021    _______________________________________________________________________     Anticoagulation Episode Summary     Current INR goal:  2.0-3.0   TTR:  75.3 % (1 y)   Target end date:  Indefinite   Send INR reminders to:  [facility] [place]    Indications    Long-term (current) use of anticoagulants [Z79.01] [Z79.01]  Pulmonary embolism and infarction (H) [I26.99]  Deep vein thrombosis (DVT) (H) [I82.409] [I82.409]           Comments:  PST - [employer] [facility].  Shoulder surgery 6/24/2020, warfarin hold x 5 days. Lovenox bridge per A.,Flaim  Call cell phone with any dosing. Home phone no longer correct number            Anticoagulation Care Providers     Provider Role Specialty Phone number    Eliz Hartman, CNP Referring Family Medicine [phone]

## 2021-09-12 ENCOUNTER — HEALTH MAINTENANCE LETTER (OUTPATIENT)
Age: 66
End: 2021-09-12

## 2021-09-14 NOTE — PROGRESS NOTES
Assessment & Plan       Advanced care planning/counseling discussion  - Paperwork given      Middle ear effusion, left  - Flonase nasal spray  - Zyrtec OTC  - Follow-up as needed      Eliz Hartman, JANINA  [facility] - [place] Madrigal is a 65 year old who presents for the following health issues       Concern - Ear problem  Onset: 3-4 days   Description: feel like water is in ear can feel it moving around   Intensity: moderate  Progression of Symptoms:  same  Accompanying Signs & Symptoms: none  Previous history of similar problem: no hx  Precipitating factors:        Worsened by: nothing in particular   Alleviating factors:        Improved by: nothing in particulat  Therapies tried and outcome:  none         Patient Active Problem List   Diagnosis     Essential hypertension     Pulmonary embolism and infarction (H)     Contact dermatitis and other eczema, due to unspecified cause     Edema     Hyperlipidemia LDL goal <100     Neurofibromatosis, peripheral, NF1 (H)     Advanced care planning/counseling discussion     Moderate episode of recurrent major depressive disorder (H)     Long-term (current) use of anticoagulants [Z79.01]     Deep vein thrombosis (DVT) (H) [I82.409]     Lumbar spondylosis with myelopathy     Degeneration of lumbar or lumbosacral intervertebral disc     Family history of colon cancer     Statin medication not prescribed per physician orders     Vitamin D deficiency     Failed arthroplasty (H)     H/O total shoulder replacement, left     Primary insomnia     Factor V Leiden mutation (H)     Obesity (BMI 35.0-39.9) with comorbidity (H)     Diarrhea     Abdominal pain, generalized     Chest pain, unspecified     Muscle weakness of right upper extremity     Pain in right upper arm     Stiffness of right shoulder joint     Past Surgical History:   Procedure Laterality Date     ------------OTHER-------------  2012    shoulder replacement; Provider: Karen HOUSE KNEE   2014    Procedure: ARTHROPLASTY KNEE;  Surgeon: Sean Alexander MD;  Location: [facility] OR     ARTHROSCOPY SHOULDER  2012    right, bone spurs     CA ANESTH,SHOULDER REPLACEMENT Right 2020      SECTION      x3     CHOLECYSTECTOMY       COLONOSCOPY  02/15/2018    [place],,polyps     elbow ulnar tunnel release  2002     ELECTROTHERMAL THERAPY INTRADISC  2017    stimulator     ENDOSCOPIC SINUS SURGERY, SEPTOPLASTY, TURBINOPLASTY, MAXILLARY SINUSOTOMY, COMBINED N/A 2015    Procedure: COMBINED ENDOSCOPIC SINUS SURGERY, SEPTOPLASTY, TURBINOPLASTY, MAXILLARY SINUSOTOMY;  Surgeon: Seema Conn MD;  Location: [facility] OR     ESOPHAGOSCOPY, GASTROSCOPY, DUODENOSCOPY (EGD), COMBINED      with biopsy and endoscopic U/S     EXCISE NEUROMA LOWER EXTREMITY Left 2016    Procedure: EXCISE NEUROMA LOWER EXTREMITY;  Surgeon: Edi Reed MD;  Location: [facility] OR     EYE SURGERY Right 2020     FUSION LUMBAR ANTERIOR WITH MARCY CAGES      L5-S1     HYSTERECTOMY TOTAL ABDOMINAL, BILATERAL SALPINGO-OOPHORECTOMY, COMBINED N/A      ORTHOPEDIC SURGERY  2015    right shoulder     ORTHOPEDIC SURGERY  2015    right knee     ORTHOPEDIC SURGERY Right 2018    hip labrum tear     pionidal cyst excision       TRANSPOSITION ULNAR NERVE (ELBOW)         Social History     Tobacco Use     Smoking status: Former Smoker     Packs/day: 1.00     Years: 30.00     Pack years: 30.00     Types: Cigarettes, Pipe     Smokeless tobacco: Never Used     Tobacco comment: quit in    Substance Use Topics     Alcohol use: No     Family History   Problem Relation Age of Onset     Cancer Mother      Colon Polyps Mother      Heart Failure Mother 87        congestive, cause of death     Myocardial Infarction Mother         myocardial infarction     Myocardial Infarction Father         myocardial infarction - cause of death     C.A.D. Father      Cancer Paternal Uncle         cause of death     C.A.D. Brother      Other  - See Comments Other         factor 5 - family h/o     Asthma No family hx of            Current Outpatient Medications   Medication Sig Dispense Refill     Cholecalciferol (VITAMIN D3 PO) Take 3,000 mg by mouth daily AM       escitalopram (LEXAPRO) 10 MG tablet Take 1 tablet by mouth once daily 90 tablet 0     HEMP OIL OR EXTRACT OR OTHER CBD CANNABINOID, NOT MEDICAL CANNABIS,        hydrochlorothiazide (HYDRODIURIL) 25 MG tablet Take 1 tablet by mouth once daily 90 tablet 0     losartan (COZAAR) 50 MG tablet Take 2 tablets by mouth once daily 180 tablet 0     methocarbamol (ROBAXIN) 750 MG tablet Take 1-2 tablets (750-1,500 mg) by mouth 4 times daily as needed for muscle spasms 60 tablet 0     metoprolol succinate ER (TOPROL-XL) 50 MG 24 hr tablet TAKE 1 AND ONE-HALF TABLETS BY MOUTH EVERY DAY 90 tablet 3     order for DME Nebulizer machine and tubing    DX:  Bronchitis 1 Device 0     oxyCODONE (ROXICODONE) 5 MG tablet TK 1 TO 2 TS PO Q 4 H PRF MODERATE TO SEVERE PAIN       potassium chloride ER (KLOR-CON M) 10 MEQ CR tablet Take 1 tablet (10 mEq) by mouth daily 30 tablet 3     traZODone (DESYREL) 50 MG tablet TAKE 1 TO 2 TABLETS BY MOUTH NIGHTLY AS NEEDED 180 tablet 0     vitamin B complex with vitamin C (VITAMIN  B COMPLEX) tablet Take 1 tablet by mouth daily       warfarin ANTICOAGULANT (COUMADIN) 5 MG tablet 7.5mg Mon,Wed,Fri and 5mg all other days or as directed by warfarin clinic 150 tablet 3     aspirin 81 MG EC tablet Take 81 mg by mouth daily HS (Patient not taking: Reported on 9/15/2021)         Allergies   Allergen Reactions     Amlodipine Besylate Swelling     Norvasc     Amoxicillin      Atorvastatin      myualgia     Cephalexin Monohydrate Hives     Keflex     Erythromycin Base [Kdc:Yellow Dye+Erythromycin+Brilliant Blue Fcf] Nausea and Vomiting     Meloxicam Other (See Comments)     Mobic - confusion, depression     Adhesive Tape Rash     Prochlorperazine Edisylate Swelling and Rash     Compazine  "    Prochlorperazine Maleate Swelling and Rash       Recent Labs   Lab Test 08/27/21  1221 08/27/21  0917 04/16/21  1014 02/08/21  1432 02/08/21  1432 11/02/20  1018 10/26/20  1123 02/03/20  1451 09/27/19  1137 07/26/19  0843 02/05/19  1013 02/05/14  1130 11/05/13  1528   A1C  --   --   --   --   --   --   --   --   --   --   --   --  5.5   LDL  --  127*  --   --   --   --   --   --  122*  --  120*   < >  --    HDL  --  34*  --   --   --   --   --   --  36*  --  37*   < >  --    TRIG  --  255*  --   --   --   --   --   --  280*  --  208*   < >  --    ALT 33  --  29  --  36  --   --    < > 34   < > 42   < >  --    CR 1.04  --  0.95   < > 0.92  --  0.94   < > 0.91   < > 0.92   < >  --    GFRESTIMATED 56*  --  62   < > 65  --  63   < > 67   < > 66   < >  --    GFRESTBLACK  --   --  72  --  75  --  73   < > 77   < > 76   < >  --    POTASSIUM 3.5  --  3.3*   < > 3.4   < > 3.3*   < > 3.8   < > 3.5   < >  --    TSH 2.86  --   --   --   --   --  2.08  --  1.42  --  2.71   < >  --     < > = values in this interval not displayed.        BP Readings from Last 3 Encounters:   09/15/21 104/62   08/27/21 110/68   08/04/21 124/80    Wt Readings from Last 3 Encounters:   09/15/21 104.8 kg (231 lb)   08/27/21 105.4 kg (232 lb 4.8 oz)   08/04/21 108.4 kg (239 lb)               Review of Systems   Constitutional, HEENT, cardiovascular, pulmonary, gi and gu systems are negative, except as otherwise noted.          Objective    /62 (BP Location: Right arm, Patient Position: Chair, Cuff Size: Adult Large)   Pulse 67   Temp 97.1  F (36.2  C) (Tympanic)   Ht 1.651 m (5' 5\")   Wt 104.8 kg (231 lb)   SpO2 95%   BMI 38.44 kg/m    Body mass index is 38.44 kg/m .       Physical Exam   GENERAL: healthy, alert and no distress  EYES: Eyes grossly normal to inspection, PERRL and conjunctivae and sclerae normal  HENT: left ear: clear effusion  NECK: no adenopathy, no asymmetry, masses, or scars and thyroid normal to palpation  RESP: " lungs clear to auscultation - no rales, rhonchi or wheezes  CV: regular rate and rhythm, normal S1 S2, no S3 or S4, no murmur, click or rub, no peripheral edema and peripheral pulses strong  PSYCH: mentation appears normal, affect normal/bright

## 2021-09-15 ENCOUNTER — OFFICE VISIT (OUTPATIENT)
Dept: FAMILY MEDICINE | Facility: OTHER | Age: 66
End: 2021-09-15
Attending: NURSE PRACTITIONER
Payer: MEDICARE

## 2021-09-15 VITALS
OXYGEN SATURATION: 95 % | WEIGHT: 231 LBS | BODY MASS INDEX: 38.49 KG/M2 | SYSTOLIC BLOOD PRESSURE: 104 MMHG | HEART RATE: 67 BPM | DIASTOLIC BLOOD PRESSURE: 62 MMHG | HEIGHT: 65 IN | TEMPERATURE: 97.1 F

## 2021-09-15 DIAGNOSIS — H65.92 MIDDLE EAR EFFUSION, LEFT: ICD-10-CM

## 2021-09-15 DIAGNOSIS — Z23 IMMUNIZATION DUE: ICD-10-CM

## 2021-09-15 DIAGNOSIS — Z71.89 ADVANCED CARE PLANNING/COUNSELING DISCUSSION: Primary | ICD-10-CM

## 2021-09-15 PROCEDURE — G0463 HOSPITAL OUTPT CLINIC VISIT: HCPCS | Mod: GY

## 2021-09-15 PROCEDURE — 90471 IMMUNIZATION ADMIN: CPT

## 2021-09-15 PROCEDURE — 90471 IMMUNIZATION ADMIN: CPT | Performed by: NURSE PRACTITIONER

## 2021-09-15 PROCEDURE — 99213 OFFICE O/P EST LOW 20 MIN: CPT | Performed by: NURSE PRACTITIONER

## 2021-09-15 PROCEDURE — G0463 HOSPITAL OUTPT CLINIC VISIT: HCPCS | Mod: 25

## 2021-09-15 ASSESSMENT — MIFFLIN-ST. JEOR: SCORE: 1588.69

## 2021-09-15 ASSESSMENT — PAIN SCALES - GENERAL: PAINLEVEL: NO PAIN (0)

## 2021-09-15 NOTE — PATIENT INSTRUCTIONS
Assessment & Plan       Advanced care planning/counseling discussion  - Paperwork given      Middle ear effusion, left  - Flonase nasal spray  - Zyrtec OTC  - Follow-up as needed      Eliz Hartman CNP  [facility]

## 2021-09-28 DIAGNOSIS — Z79.01 LONG TERM CURRENT USE OF ANTICOAGULANT THERAPY: ICD-10-CM

## 2021-09-28 RX ORDER — WARFARIN SODIUM 5 MG/1
TABLET ORAL
Qty: 109 TABLET | Refills: 0 | Status: SHIPPED | OUTPATIENT
Start: 2021-09-28 | End: 2022-01-05

## 2021-09-28 NOTE — TELEPHONE ENCOUNTER
Coumadin      Last Written Prescription Date:  09/15/20  Last Fill Quantity: 150,   # refills: 3  Last Office Visit: 09/15/21  Future Office visit:

## 2021-10-07 ENCOUNTER — ANTICOAGULATION THERAPY VISIT (OUTPATIENT)
Dept: ANTICOAGULATION | Facility: OTHER | Age: 66
End: 2021-10-07

## 2021-10-07 DIAGNOSIS — Z79.01 LONG TERM CURRENT USE OF ANTICOAGULANT THERAPY: Primary | ICD-10-CM

## 2021-10-07 DIAGNOSIS — I82.409 DEEP VEIN THROMBOSIS (DVT) (H): ICD-10-CM

## 2021-10-07 DIAGNOSIS — I26.99 PULMONARY EMBOLISM AND INFARCTION (H): ICD-10-CM

## 2021-10-07 LAB — INR (EXTERNAL): 2.4 (ref 2–3)

## 2021-10-15 DIAGNOSIS — I10 ESSENTIAL HYPERTENSION: ICD-10-CM

## 2021-10-15 RX ORDER — METOPROLOL SUCCINATE 50 MG/1
TABLET, EXTENDED RELEASE ORAL
Qty: 90 TABLET | Refills: 0 | Status: SHIPPED | OUTPATIENT
Start: 2021-10-15 | End: 2022-01-14

## 2021-10-18 RX ORDER — HYDROCHLOROTHIAZIDE 25 MG/1
TABLET ORAL
Qty: 90 TABLET | Refills: 0 | Status: SHIPPED | OUTPATIENT
Start: 2021-10-18 | End: 2022-01-14

## 2021-10-18 NOTE — TELEPHONE ENCOUNTER
HCTZ      Last Written Prescription Date:  7/8/21  Last Fill Quantity: 90,   # refills: 0  Last Office Visit: 9/15/21  Future Office visit:       Routing refill request to provider for review/approval because:

## 2021-10-27 ENCOUNTER — MYC MEDICAL ADVICE (OUTPATIENT)
Dept: FAMILY MEDICINE | Facility: OTHER | Age: 66
End: 2021-10-27

## 2021-11-02 ENCOUNTER — ALLIED HEALTH/NURSE VISIT (OUTPATIENT)
Dept: FAMILY MEDICINE | Facility: OTHER | Age: 66
End: 2021-11-02
Attending: NURSE PRACTITIONER
Payer: MEDICARE

## 2021-11-02 DIAGNOSIS — Z23 NEED FOR PROPHYLACTIC VACCINATION AND INOCULATION AGAINST INFLUENZA: Primary | ICD-10-CM

## 2021-11-02 PROCEDURE — 90662 IIV NO PRSV INCREASED AG IM: CPT

## 2021-11-02 PROCEDURE — G0008 ADMIN INFLUENZA VIRUS VAC: HCPCS

## 2021-11-10 ENCOUNTER — ANTICOAGULATION THERAPY VISIT (OUTPATIENT)
Dept: ANTICOAGULATION | Facility: OTHER | Age: 66
End: 2021-11-10
Payer: COMMERCIAL

## 2021-11-10 DIAGNOSIS — I26.99 PULMONARY EMBOLISM AND INFARCTION (H): ICD-10-CM

## 2021-11-10 DIAGNOSIS — I82.409 DEEP VEIN THROMBOSIS (DVT) (H): ICD-10-CM

## 2021-11-10 DIAGNOSIS — Z79.01 LONG TERM CURRENT USE OF ANTICOAGULANT THERAPY: Primary | ICD-10-CM

## 2021-11-10 LAB — INR (EXTERNAL): 2.7 (ref 2–3)

## 2021-11-10 NOTE — PROGRESS NOTES
ANTICOAGULATION MANAGEMENT     Cassy Avila 66 year old female is on warfarin with therapeutic INR result. (Goal INR 2.0-3.0)    Recent labs: (last 7 days)     11/10/21  0822   INR 2.7       ASSESSMENT     Source(s): Chart Review and Patient/Caregiver Call       Warfarin doses taken: Warfarin taken as instructed    Diet: No new diet changes identified    New illness, injury, or hospitalization: No    Medication/supplement changes: None noted    Signs or symptoms of bleeding or clotting: No    Previous INR: Therapeutic last 2(+) visits    Additional findings: None     PLAN     Recommended plan for no diet, medication or health factor changes affecting INR     Dosing Instructions: Continue your current warfarin dose with next INR in 6 weeks       Summary  As of 11/10/2021    Full warfarin instructions:  7.5 mg every Wed, Fri; 5 mg all other days   Next INR check:  12/24/2021             Telephone call with Cassy who verbalizes understanding and agrees to plan    Patient to recheck with home meter    Education provided: Please call back if any changes to your diet, medications or how you've been taking warfarin, Monitoring for bleeding signs and symptoms and Monitoring for clotting signs and symptoms    Plan made per [facility] anticoagulation protocol    Melanie Farris RN  Anticoagulation Clinic  11/10/2021    _______________________________________________________________________     Anticoagulation Episode Summary     Current INR goal:  2.0-3.0   TTR:  77.0 % (1 y)   Target end date:  Indefinite   Send INR reminders to:  [facility] [place]    Indications    Long-term (current) use of anticoagulants [Z79.01] [Z79.01]  Pulmonary embolism and infarction (H) [I26.99]  Deep vein thrombosis (DVT) (H) [I82.409] [I82.409]           Comments:  PST - [employer] [facility].  Shoulder surgery 6/24/2020, warfarin hold x 5 days. Lovenox bridge per A.,Flaim  Call cell phone with any dosing. Home phone no longer correct number            Anticoagulation Care Providers     Provider Role Specialty Phone number    Eliz Hartman, CNP Referring Family Medicine [phone]

## 2021-11-22 ENCOUNTER — OFFICE VISIT (OUTPATIENT)
Dept: FAMILY MEDICINE | Facility: OTHER | Age: 66
End: 2021-11-22
Attending: NURSE PRACTITIONER
Payer: COMMERCIAL

## 2021-11-22 VITALS
WEIGHT: 231.8 LBS | BODY MASS INDEX: 38.57 KG/M2 | HEART RATE: 71 BPM | SYSTOLIC BLOOD PRESSURE: 128 MMHG | RESPIRATION RATE: 20 BRPM | TEMPERATURE: 97.9 F | OXYGEN SATURATION: 93 % | DIASTOLIC BLOOD PRESSURE: 80 MMHG

## 2021-11-22 DIAGNOSIS — E87.6 HYPOKALEMIA: ICD-10-CM

## 2021-11-22 DIAGNOSIS — R22.41 LOCALIZED SWELLING OF RIGHT FOOT: ICD-10-CM

## 2021-11-22 DIAGNOSIS — M79.671 RIGHT FOOT PAIN: Primary | ICD-10-CM

## 2021-11-22 PROCEDURE — G0463 HOSPITAL OUTPT CLINIC VISIT: HCPCS

## 2021-11-22 PROCEDURE — 99214 OFFICE O/P EST MOD 30 MIN: CPT | Performed by: NURSE PRACTITIONER

## 2021-11-22 RX ORDER — POTASSIUM CHLORIDE 750 MG/1
10 TABLET, EXTENDED RELEASE ORAL DAILY
Qty: 90 TABLET | Refills: 3 | Status: SHIPPED | OUTPATIENT
Start: 2021-11-22 | End: 2022-03-08

## 2021-11-22 ASSESSMENT — PAIN SCALES - GENERAL: PAINLEVEL: SEVERE PAIN (6)

## 2021-11-22 NOTE — PROGRESS NOTES
Assessment & Plan       Right foot pain  - acetaminophen-codeine (TYLENOL #3) 300-30 MG tablet; Take 1-2 tablets by mouth every 6 hours as needed for severe pain    Localized swelling of right foot  - acetaminophen-codeine (TYLENOL #3) 300-30 MG tablet; Take 1-2 tablets by mouth every 6 hours as needed for severe pain    Hypokalemia  - potassium chloride ER (KLOR-CON M) 10 MEQ CR tablet; Take 1 tablet (10 mEq) by mouth daily      Elevate foot  Low salt diet  To UC or ER with increased symptoms      Eliz Hartman, JANINA  [facility] [place] MARLENE Madrigal is a 66 year old who presents for the following health issues       Concern - Right Foot pain/edema  Onset: 11/21/2021  Description: Pain, redness, warmth and swelling  Intensity: 6/10  Progression of Symptoms:  worsening  Previous history of similar problem: none  Precipitating factors:        Worsened by: walking/standing  Alleviating factors:        Improved by: nothing  Therapies tried and outcome:  none       Patient Active Problem List   Diagnosis     Essential hypertension     Pulmonary embolism and infarction (H)     Contact dermatitis and other eczema, due to unspecified cause     Edema     Hyperlipidemia LDL goal <100     Neurofibromatosis, peripheral, NF1 (H)     Advanced care planning/counseling discussion     Moderate episode of recurrent major depressive disorder (H)     Long-term (current) use of anticoagulants [Z79.01]     Deep vein thrombosis (DVT) (H) [I82.409]     Lumbar spondylosis with myelopathy     Degeneration of lumbar or lumbosacral intervertebral disc     Family history of colon cancer     Statin medication not prescribed per physician orders     Vitamin D deficiency     Failed arthroplasty (H)     H/O total shoulder replacement, left     Primary insomnia     Factor V Leiden mutation (H)     Obesity (BMI 35.0-39.9) with comorbidity (H)     Diarrhea     Abdominal pain, generalized     Chest pain, unspecified     Muscle weakness of  right upper extremity     Pain in right upper arm     Stiffness of right shoulder joint     Past Surgical History:   Procedure Laterality Date     ------------OTHER-------------      shoulder replacement; Provider: Karen     ARTHROPLASTY KNEE  2014    Procedure: ARTHROPLASTY KNEE;  Surgeon: Sean Alexander MD;  Location: [facility]     ARTHROSCOPY SHOULDER  2012    right, bone spurs     CA ANESTH,SHOULDER REPLACEMENT Right 2020      SECTION      x3     CHOLECYSTECTOMY       COLONOSCOPY  02/15/2018    [place],,polyps     elbow ulnar tunnel release       ELECTROTHERMAL THERAPY INTRADISC  2017    stimulator     ENDOSCOPIC SINUS SURGERY, SEPTOPLASTY, TURBINOPLASTY, MAXILLARY SINUSOTOMY, COMBINED N/A 2015    Procedure: COMBINED ENDOSCOPIC SINUS SURGERY, SEPTOPLASTY, TURBINOPLASTY, MAXILLARY SINUSOTOMY;  Surgeon: Seema Conn MD;  Location: [facility]     ESOPHAGOSCOPY, GASTROSCOPY, DUODENOSCOPY (EGD), COMBINED      with biopsy and endoscopic U/S     EXCISE NEUROMA LOWER EXTREMITY Left 2016    Procedure: EXCISE NEUROMA LOWER EXTREMITY;  Surgeon: Edi Reed MD;  [facility]     EYE SURGERY Right 2020     FUSION LUMBAR ANTERIOR WITH MARCY CAGES      L5-S1     HYSTERECTOMY TOTAL ABDOMINAL, BILATERAL SALPINGO-OOPHORECTOMY, COMBINED N/A      ORTHOPEDIC SURGERY  2015    right shoulder     ORTHOPEDIC SURGERY  2015    right knee     ORTHOPEDIC SURGERY Right 2018    hip labrum tear     pionidal cyst excision       TRANSPOSITION ULNAR NERVE (ELBOW)         Social History     Tobacco Use     Smoking status: Former Smoker     Packs/day: 1.00     Years: 30.00     Pack years: 30.00     Types: Cigarettes, Pipe     Smokeless tobacco: Never Used     Tobacco comment: quit in    Substance Use Topics     Alcohol use: No     Family History   Problem Relation Age of Onset     Cancer Mother      Colon Polyps Mother      Heart Failure Mother 87        congestive,  cause of death     Myocardial Infarction Mother         myocardial infarction     Myocardial Infarction Father         myocardial infarction - cause of death     C.A.D. Father      Cancer Paternal Uncle         cause of death     C.A.D. Brother      Other - See Comments Other         factor 5 - family h/o     Asthma No family hx of            Current Outpatient Medications   Medication Sig Dispense Refill     aspirin 81 MG EC tablet Take 81 mg by mouth daily HS       Cholecalciferol (VITAMIN D3 PO) Take 3,000 mg by mouth daily AM       escitalopram (LEXAPRO) 10 MG tablet Take 1 tablet by mouth once daily 90 tablet 0     HEMP OIL OR EXTRACT OR OTHER CBD CANNABINOID, NOT MEDICAL CANNABIS,        hydrochlorothiazide (HYDRODIURIL) 25 MG tablet Take 1 tablet by mouth once daily 90 tablet 0     losartan (COZAAR) 50 MG tablet Take 2 tablets by mouth once daily 180 tablet 0     methocarbamol (ROBAXIN) 750 MG tablet Take 1-2 tablets (750-1,500 mg) by mouth 4 times daily as needed for muscle spasms 60 tablet 0     metoprolol succinate ER (TOPROL-XL) 50 MG 24 hr tablet TAKE 1 & 1/2 (ONE & ONE-HALF) TABLETS BY MOUTH ONCE DAILY 90 tablet 0     order for DME Nebulizer machine and tubing    DX:  Bronchitis 1 Device 0     potassium chloride ER (KLOR-CON M) 10 MEQ CR tablet Take 1 tablet (10 mEq) by mouth daily 30 tablet 3     traZODone (DESYREL) 50 MG tablet TAKE 1 TO 2 TABLETS BY MOUTH NIGHTLY AS NEEDED 180 tablet 0     vitamin B complex with vitamin C (VITAMIN  B COMPLEX) tablet Take 1 tablet by mouth daily       warfarin ANTICOAGULANT (COUMADIN) 5 MG tablet TAKE 1 & 1/2 (ONE & ONE-HALF) TABLETS BY MOUTH  MONDAY, WEDNESDAY, FRIDAY AND 1 TABLET ALL OTHER DAYS OR AS DIRECTED BY WARFARIN CLINIC 109 tablet 0         Allergies   Allergen Reactions     Amlodipine Besylate Swelling     Norvasc     Amoxicillin      Atorvastatin      myualgia     Cephalexin Monohydrate Hives     Keflex     Erythromycin Base [Kdc:Yellow  Dye+Erythromycin+Brilliant Blue Fcf] Nausea and Vomiting     Meloxicam Other (See Comments)     Mobic - confusion, depression     Adhesive Tape Rash     Prochlorperazine Edisylate Swelling and Rash     Compazine     Prochlorperazine Maleate Swelling and Rash       Recent Labs   Lab Test 08/27/21  1221 08/27/21  0917 04/16/21  1014 02/08/21  1432 11/02/20  1018 10/26/20  1123 02/03/20  1451 09/27/19  1137 07/26/19  0843 02/05/19  1013 02/05/14  1130 11/05/13  1528   A1C  --   --   --   --   --   --   --   --   --   --   --  5.5   LDL  --  127*  --   --   --   --   --  122*  --  120*   < >  --    HDL  --  34*  --   --   --   --   --  36*  --  37*   < >  --    TRIG  --  255*  --   --   --   --   --  280*  --  208*   < >  --    ALT 33  --  29 36  --   --    < > 34   < > 42   < >  --    CR 1.04  --  0.95 0.92  --  0.94   < > 0.91   < > 0.92   < >  --    GFRESTIMATED 56*  --  62 65  --  63   < > 67   < > 66   < >  --    GFRESTBLACK  --   --  72 75  --  73   < > 77   < > 76   < >  --    POTASSIUM 3.5  --  3.3* 3.4   < > 3.3*   < > 3.8   < > 3.5   < >  --    TSH 2.86  --   --   --   --  2.08  --  1.42  --  2.71   < >  --     < > = values in this interval not displayed.        BP Readings from Last 3 Encounters:   11/22/21 128/80   09/15/21 104/62   08/27/21 110/68    Wt Readings from Last 3 Encounters:   11/22/21 105.1 kg (231 lb 12.8 oz)   09/15/21 104.8 kg (231 lb)   08/27/21 105.4 kg (232 lb 4.8 oz)              Review of Systems   Constitutional, HEENT, cardiovascular, pulmonary, gi and gu systems are negative, except as otherwise noted.        Objective    /80 (BP Location: Left arm, Patient Position: Sitting, Cuff Size: Adult Large)   Pulse 71   Temp 97.9  F (36.6  C) (Tympanic)   Resp 20   Wt 105.1 kg (231 lb 12.8 oz)   SpO2 93%   BMI 38.57 kg/m    Body mass index is 38.57 kg/m .       Physical Exam   GENERAL: healthy, alert and no distress  EYES: Eyes grossly normal to inspection, PERRL and conjunctivae  and sclerae normal  NECK: no adenopathy, no asymmetry, masses, or scars and thyroid normal to palpation  RESP: lungs clear to auscultation - no rales, rhonchi or wheezes  CV: regular rate and rhythm, normal S1 S2, no S3 or S4, no murmur, click or rub, no peripheral edema and peripheral pulses strong  MS: Right foot - swelling at the base of the toes, no ecchymosis, no redness.  Pain with movement of toes.  Distal CMS otherwise intact  SKIN: no suspicious lesions or rashes  PSYCH: mentation appears normal, affect normal/bright

## 2021-11-22 NOTE — NURSING NOTE
"Chief Complaint   Patient presents with     Musculoskeletal Problem       Initial /80 (BP Location: Left arm, Patient Position: Sitting, Cuff Size: Adult Large)   Pulse 71   Temp 97.9  F (36.6  C) (Tympanic)   Resp 20   Wt 105.1 kg (231 lb 12.8 oz)   SpO2 93%   BMI 38.57 kg/m   Estimated body mass index is 38.57 kg/m  as calculated from the following:    Height as of 9/15/21: 1.651 m (5' 5\").    Weight as of this encounter: 105.1 kg (231 lb 12.8 oz).  Medication Reconciliation: complete  Arlene Ortiz LPN  "

## 2021-11-22 NOTE — PATIENT INSTRUCTIONS
Assessment & Plan       Right foot pain  - acetaminophen-codeine (TYLENOL #3) 300-30 MG tablet; Take 1-2 tablets by mouth every 6 hours as needed for severe pain    Localized swelling of right foot  - acetaminophen-codeine (TYLENOL #3) 300-30 MG tablet; Take 1-2 tablets by mouth every 6 hours as needed for severe pain    Hypokalemia  - potassium chloride ER (KLOR-CON M) 10 MEQ CR tablet; Take 1 tablet (10 mEq) by mouth daily      Elevate foot  Low salt diet  To UC or ER with increased symptoms      Eliz Hartman, JANINA  [facility]

## 2021-11-23 ENCOUNTER — TELEPHONE (OUTPATIENT)
Dept: ANTICOAGULATION | Facility: OTHER | Age: 66
End: 2021-11-23

## 2021-11-23 ENCOUNTER — HOSPITAL ENCOUNTER (EMERGENCY)
Facility: HOSPITAL | Age: 66
Discharge: HOME | End: 2021-11-23
Attending: NURSE PRACTITIONER | Admitting: NURSE PRACTITIONER
Payer: MEDICARE

## 2021-11-23 ENCOUNTER — APPOINTMENT (OUTPATIENT)
Dept: GENERAL RADIOLOGY | Facility: HOSPITAL | Age: 66
End: 2021-11-23
Attending: NURSE PRACTITIONER
Payer: MEDICARE

## 2021-11-23 VITALS
HEART RATE: 75 BPM | TEMPERATURE: 97.3 F | SYSTOLIC BLOOD PRESSURE: 128 MMHG | OXYGEN SATURATION: 96 % | HEIGHT: 65 IN | WEIGHT: 231 LBS | DIASTOLIC BLOOD PRESSURE: 74 MMHG | BODY MASS INDEX: 38.49 KG/M2 | RESPIRATION RATE: 16 BRPM

## 2021-11-23 DIAGNOSIS — I82.409 DEEP VEIN THROMBOSIS (DVT) (H): ICD-10-CM

## 2021-11-23 DIAGNOSIS — R22.41 LOCALIZED SWELLING OF RIGHT FOOT: ICD-10-CM

## 2021-11-23 DIAGNOSIS — I26.99 PULMONARY EMBOLISM AND INFARCTION (H): ICD-10-CM

## 2021-11-23 DIAGNOSIS — M10.9 GOUT: ICD-10-CM

## 2021-11-23 DIAGNOSIS — Z79.01 LONG TERM CURRENT USE OF ANTICOAGULANT THERAPY: Primary | ICD-10-CM

## 2021-11-23 LAB
BASOPHILS # BLD AUTO: 0 10E3/UL (ref 0–0.2)
BASOPHILS NFR BLD AUTO: 1 %
CRP SERPL-MCNC: <2.9 MG/L (ref 0–8)
EOSINOPHIL # BLD AUTO: 0.1 10E3/UL (ref 0–0.7)
EOSINOPHIL NFR BLD AUTO: 1 %
ERYTHROCYTE [DISTWIDTH] IN BLOOD BY AUTOMATED COUNT: 14 % (ref 10–15)
HCT VFR BLD AUTO: 39.1 % (ref 35–47)
HGB BLD-MCNC: 13.7 G/DL (ref 11.7–15.7)
IMM GRANULOCYTES # BLD: 0 10E3/UL
IMM GRANULOCYTES NFR BLD: 1 %
LYMPHOCYTES # BLD AUTO: 1.6 10E3/UL (ref 0.8–5.3)
LYMPHOCYTES NFR BLD AUTO: 25 %
MCH RBC QN AUTO: 29.8 PG (ref 26.5–33)
MCHC RBC AUTO-ENTMCNC: 35 G/DL (ref 31.5–36.5)
MCV RBC AUTO: 85 FL (ref 78–100)
MONOCYTES # BLD AUTO: 0.6 10E3/UL (ref 0–1.3)
MONOCYTES NFR BLD AUTO: 9 %
NEUTROPHILS # BLD AUTO: 4.3 10E3/UL (ref 1.6–8.3)
NEUTROPHILS NFR BLD AUTO: 63 %
NRBC # BLD AUTO: 0 10E3/UL
NRBC BLD AUTO-RTO: 0 /100
PLATELET # BLD AUTO: 194 10E3/UL (ref 150–450)
RBC # BLD AUTO: 4.6 10E6/UL (ref 3.8–5.2)
URATE SERPL-MCNC: 8.4 MG/DL (ref 2.6–6)
WBC # BLD AUTO: 6.6 10E3/UL (ref 4–11)

## 2021-11-23 PROCEDURE — 86140 C-REACTIVE PROTEIN: CPT | Performed by: NURSE PRACTITIONER

## 2021-11-23 PROCEDURE — 99213 OFFICE O/P EST LOW 20 MIN: CPT | Performed by: NURSE PRACTITIONER

## 2021-11-23 PROCEDURE — 73630 X-RAY EXAM OF FOOT: CPT | Mod: RT

## 2021-11-23 PROCEDURE — 84550 ASSAY OF BLOOD/URIC ACID: CPT | Performed by: NURSE PRACTITIONER

## 2021-11-23 PROCEDURE — 85025 COMPLETE CBC W/AUTO DIFF WBC: CPT | Performed by: NURSE PRACTITIONER

## 2021-11-23 PROCEDURE — 36415 COLL VENOUS BLD VENIPUNCTURE: CPT | Performed by: NURSE PRACTITIONER

## 2021-11-23 PROCEDURE — 250N000011 HC RX IP 250 OP 636: Performed by: NURSE PRACTITIONER

## 2021-11-23 PROCEDURE — G0463 HOSPITAL OUTPT CLINIC VISIT: HCPCS | Mod: 25

## 2021-11-23 PROCEDURE — 96372 THER/PROPH/DIAG INJ SC/IM: CPT | Performed by: NURSE PRACTITIONER

## 2021-11-23 RX ORDER — KETOROLAC TROMETHAMINE 30 MG/ML
30 INJECTION, SOLUTION INTRAMUSCULAR; INTRAVENOUS ONCE
Status: COMPLETED | OUTPATIENT
Start: 2021-11-23 | End: 2021-11-23

## 2021-11-23 RX ORDER — PREDNISONE 20 MG/1
TABLET ORAL
Qty: 5 TABLET | Refills: 0 | Status: SHIPPED | OUTPATIENT
Start: 2021-11-23 | End: 2021-11-30

## 2021-11-23 RX ADMIN — KETOROLAC TROMETHAMINE 30 MG: 30 INJECTION, SOLUTION INTRAMUSCULAR at 11:07

## 2021-11-23 ASSESSMENT — ENCOUNTER SYMPTOMS
NECK STIFFNESS: 0
CARDIOVASCULAR NEGATIVE: 1
RESPIRATORY NEGATIVE: 1
CONSTITUTIONAL NEGATIVE: 1

## 2021-11-23 ASSESSMENT — MIFFLIN-ST. JEOR: SCORE: 1583.69

## 2021-11-23 NOTE — ED PROVIDER NOTES
History     Chief Complaint   Patient presents with     Foot Swelling     HPI  Cassy Avila is a 66 year old female who presents with right foot pain/swelling x 3 days.   Denies any isolated injury.  Current pain at 8/10. Constant, worsens with movement/weight bearing.     Denies history of DM, gout.  Distant neuroma removal from between multiples toes on that foot, chronic paresthesias to those little toes.    Home treatment: none    Allergies:  Allergies   Allergen Reactions     Amlodipine Besylate Swelling     Norvasc     Amoxicillin      Atorvastatin      myualgia     Cephalexin Monohydrate Hives     Keflex     Erythromycin Base [Kdc:Yellow Dye+Erythromycin+Brilliant Blue Fcf] Nausea and Vomiting     Meloxicam Other (See Comments)     Mobic - confusion, depression     Adhesive Tape Rash     Prochlorperazine Edisylate Swelling and Rash     Compazine     Prochlorperazine Maleate Swelling and Rash       Problem List:    Patient Active Problem List    Diagnosis Date Noted     Muscle weakness of right upper extremity 04/15/2021     Priority: Medium     Pain in right upper arm 04/15/2021     Priority: Medium     Stiffness of right shoulder joint 04/15/2021     Priority: Medium     Diarrhea 02/08/2021     Priority: Medium     Abdominal pain, generalized 02/08/2021     Priority: Medium     Obesity (BMI 35.0-39.9) with comorbidity (H) 01/28/2020     Priority: Medium     Factor V Leiden mutation (H) 07/26/2019     Priority: Medium     Primary insomnia 10/31/2018     Priority: Medium     Vitamin D deficiency 07/13/2018     Priority: Medium     Statin medication not prescribed per physician orders 01/17/2018     Priority: Medium     Family history of colon cancer 01/02/2018     Priority: Medium     Lumbar spondylosis with myelopathy 07/12/2017     Priority: Medium     Degeneration of lumbar or lumbosacral intervertebral disc 07/12/2017     Priority: Medium     Long-term (current) use of anticoagulants [Z79.01]  07/20/2016     Priority: Medium     Deep vein thrombosis (DVT) (H) [I82.409] 07/20/2016     Priority: Medium     Moderate episode of recurrent major depressive disorder (H) 08/08/2014     Priority: Medium     H/O total shoulder replacement, left 06/17/2014     Priority: Medium     Failed arthroplasty (H) 01/24/2014     Priority: Medium     Advanced care planning/counseling discussion 03/12/2013     Priority: Medium     Pt has information at home , not completed       Neurofibromatosis, peripheral, NF1 (H) 08/22/2012     Priority: Medium     Problem list name updated by automated process. Provider to review       Chest pain, unspecified 02/11/2010     Priority: Medium     Formatting of this note might be different from the original.  IMO Update 10/11       Contact dermatitis and other eczema, due to unspecified cause 06/01/2004     Priority: Medium     Pulmonary embolism and infarction (H) 04/11/2003     Priority: Medium     Problem list name updated by automated process. Provider to review       Hyperlipidemia LDL goal <100 07/11/2002     Priority: Medium     Problem list name updated by automated process. Provider to review       Edema 01/18/2002     Priority: Medium     Essential hypertension 02/20/2001     Priority: Medium     Problem list name updated by automated process. Provider to review          Past Medical History:    Past Medical History:   Diagnosis Date     Abdominal pain, generalized 2/8/2021     Arthritis      Cervicalgia 1/9/2001     Closed dislocation of shoulder, unspecified site 2000     Congenital deficiency of other clotting factors 9/7/2012     Congenital factor VIII disorder (H) 7/26/2019     Contact dermatitis and other eczema, due to unspecified cause 6/1/2004     Coughing      Diarrhea 2/8/2021     Edema 1/18/2002     Essential hypertension 2/20/2001     Factor V Leiden (H)      Factor V Leiden mutation (H) 7/26/2019     Family history of colon cancer 1/2/2018     Gastro-oesophageal reflux  disease      Herpes zoster without mention of complication      Hyperlipidemia LDL goal <100 2002     Long term (current) use of anticoagulants 2003     Major depression 2014     Mammographic microcalcification      Migraine, unspecified, without mention of intractable migraine without mention of status migrainosus 3/5/2001     Moderate episode of recurrent major depressive disorder (H) 2014     Neurofibromatosis, peripheral, NF1 (H) 2012     Neurofibromatosis, unspecified(237.70) 2012     Other and unspecified hyperlipidemia 2002     Other chronic pain      Other pulmonary embolism and infarction 2003     Primary insomnia 10/31/2018     Statin medication not prescribed per physician orders 2018     Tachycardia, unspecified 2001     Thrombosis of leg      Unspecified essential hypertension 2001     Vitamin D deficiency 2018       Past Surgical History:    Past Surgical History:   Procedure Laterality Date     ------------OTHER-------------      shoulder replacement; Provider: Karen     ARTHROPLASTY KNEE  2014    Procedure: ARTHROPLASTY KNEE;  Surgeon: Sean Alexander MD;  Location: [facility]     ARTHROSCOPY SHOULDER  2012    right, bone spurs     CA ANESTH,SHOULDER REPLACEMENT Right 2020      SECTION      x3     CHOLECYSTECTOMY       COLONOSCOPY  02/15/2018    [place],,polyps     elbow ulnar tunnel release       ELECTROTHERMAL THERAPY INTRADISC  2017    stimulator     ENDOSCOPIC SINUS SURGERY, SEPTOPLASTY, TURBINOPLASTY, MAXILLARY SINUSOTOMY, COMBINED N/A 2015    Procedure: COMBINED ENDOSCOPIC SINUS SURGERY, SEPTOPLASTY, TURBINOPLASTY, MAXILLARY SINUSOTOMY;  Surgeon: Seema Conn MD;  Location: [facility]     ESOPHAGOSCOPY, GASTROSCOPY, DUODENOSCOPY (EGD), COMBINED      with biopsy and endoscopic U/S     EXCISE NEUROMA LOWER EXTREMITY Left 2016    Procedure: EXCISE NEUROMA LOWER EXTREMITY;  Surgeon: Derek  Edi Jara MD;  [facility]     EYE SURGERY Right 09/2020     FUSION LUMBAR ANTERIOR WITH MARCY CAGES      L5-S1     HYSTERECTOMY TOTAL ABDOMINAL, BILATERAL SALPINGO-OOPHORECTOMY, COMBINED N/A      ORTHOPEDIC SURGERY  02/2015    right shoulder     ORTHOPEDIC SURGERY  08/28/2015    right knee     ORTHOPEDIC SURGERY Right 06/11/2018    hip labrum tear     pionidal cyst excision       TRANSPOSITION ULNAR NERVE (ELBOW)         Family History:    Family History   Problem Relation Age of Onset     Cancer Mother      Colon Polyps Mother      Heart Failure Mother 87        congestive, cause of death     Myocardial Infarction Mother         myocardial infarction     Myocardial Infarction Father         myocardial infarction - cause of death     C.A.D. Father      Cancer Paternal Uncle         cause of death     C.A.D. Brother      Other - See Comments Other         factor 5 - family h/o     Asthma No family hx of        Social History:  Marital Status:   [2]  Social History     Tobacco Use     Smoking status: Former Smoker     Packs/day: 1.00     Years: 30.00     Pack years: 30.00     Types: Cigarettes, Pipe     Smokeless tobacco: Never Used     Tobacco comment: quit in 1999   Vaping Use     Vaping Use: Never used   Substance Use Topics     Alcohol use: No     Drug use: No        Medications:    acetaminophen-codeine (TYLENOL #3) 300-30 MG tablet  aspirin 81 MG EC tablet  Cholecalciferol (VITAMIN D3 PO)  escitalopram (LEXAPRO) 10 MG tablet  HEMP OIL OR EXTRACT OR OTHER CBD CANNABINOID, NOT MEDICAL CANNABIS,  hydrochlorothiazide (HYDRODIURIL) 25 MG tablet  losartan (COZAAR) 50 MG tablet  methocarbamol (ROBAXIN) 750 MG tablet  metoprolol succinate ER (TOPROL-XL) 50 MG 24 hr tablet  order for DME  potassium chloride ER (KLOR-CON M) 10 MEQ CR tablet  predniSONE (DELTASONE) 20 MG tablet  traZODone (DESYREL) 50 MG tablet  warfarin ANTICOAGULANT (COUMADIN) 5 MG tablet          Review of Systems   Constitutional:  "Negative.    Respiratory: Negative.    Cardiovascular: Negative.    Musculoskeletal: Negative for neck stiffness.        Left foot pain, swelling (top of foot into great toe and bottom of foot)       Physical Exam   BP: 149/89  Pulse: 80  Temp: 97.3  F (36.3  C)  Resp: 16  Height: 165.1 cm (5' 5\")  Weight: 104.8 kg (231 lb)  SpO2: 96 %      Physical Exam  Constitutional:       Appearance: Normal appearance.   Cardiovascular:      Rate and Rhythm: Normal rate and regular rhythm.   Pulmonary:      Effort: Pulmonary effort is normal.      Breath sounds: Normal breath sounds.   Musculoskeletal:      Comments: Right foot swelling. CMS intact. Pain with palpation to anterior ankle joint, dorsal foot (more so mid and medial), great toe, plantar aspect of mid/medial foot. Warm to touch. No significant erythema.   Neurological:      Mental Status: She is alert.         ED Course         Results for orders placed or performed during the hospital encounter of 11/23/21 (from the past 24 hour(s))   Foot  XR, G/E 3 views, right    Narrative    PROCEDURE:  XR FOOT RIGHT G/E 3 VIEWS    HISTORY: foot pain and swelling.    COMPARISON:  4/20/2014, 4/24/2018    TECHNIQUE:  3 views right foot.    FINDINGS:  Anterior dorsal foot swelling. No evidence of acute  fracture or focal erosion. Consider follow-up.    There is a hallux valgus deformity. There is focal progressive  osteoarthritis at the first MTP joint when compared to priors.  Calcaneal spurring is noted.    LUDIN YATES MD         SYSTEM ID:  [id number]   CBC with platelets differential    Narrative    The following orders were created for panel order CBC with platelets differential.  Procedure                               Abnormality         Status                     ---------                               -----------         ------                     CBC with platelets and d...[[id number]]                      Final result                 Please view results for these " tests on the individual orders.   CRP inflammation   Result Value Ref Range    CRP Inflammation <2.9 0.0 - 8.0 mg/L   Uric acid   Result Value Ref Range    Uric Acid 8.4 (H) 2.6 - 6.0 mg/dL   CBC with platelets and differential   Result Value Ref Range    WBC Count 6.6 4.0 - 11.0 10e3/uL    RBC Count 4.60 3.80 - 5.20 10e6/uL    Hemoglobin 13.7 11.7 - 15.7 g/dL    Hematocrit 39.1 35.0 - 47.0 %    MCV 85 78 - 100 fL    MCH 29.8 26.5 - 33.0 pg    MCHC 35.0 31.5 - 36.5 g/dL    RDW 14.0 10.0 - 15.0 %    Platelet Count 194 150 - 450 10e3/uL    % Neutrophils 63 %    % Lymphocytes 25 %    % Monocytes 9 %    % Eosinophils 1 %    % Basophils 1 %    % Immature Granulocytes 1 %    NRBCs per 100 WBC 0 <1 /100    Absolute Neutrophils 4.3 1.6 - 8.3 10e3/uL    Absolute Lymphocytes 1.6 0.8 - 5.3 10e3/uL    Absolute Monocytes 0.6 0.0 - 1.3 10e3/uL    Absolute Eosinophils 0.1 0.0 - 0.7 10e3/uL    Absolute Basophils 0.0 0.0 - 0.2 10e3/uL    Absolute Immature Granulocytes 0.0 <=0.0 10e3/uL    Absolute NRBCs 0.0 10e3/uL       Medications   ketorolac (TORADOL) injection 30 mg (30 mg Intramuscular Given 11/23/21 1107)       Assessments & Plan (with Medical Decision Making)     I have reviewed the nursing notes.  I have reviewed the findings, diagnosis, plan and need for follow up with the patient.    ICD-10-CM    1. Localized swelling of right foot  R22.41    2. Gout  M10.9     right foot/great toe     3 days of right foot swelling and pain, not improving.  No isolated injury, denies history of diabetes.  No prior history of gout.  Imaging shows swelling with no acute fracture or erosion.  Noted progressive osteoarthritis at the first MTP joint.  White blood cell and CRP within normal limits.  Uric acid is elevated 8.4.    Toradol IM given with relief of symptoms while in urgent care.  Potential risk of increased bleeding was discussed in which she verbalized understanding.    We will treat with the 7-day taper of oral prednisone.  Given  that she is on Coumadin, instructed her to call her Coumadin nurse, likely for more routine INR checks.  Discussed the possibility of increased risk of bleeding, discussing her to seek immediate medical care if there is any abnormal signs of bleeding such as dark and/or red stools, coffee-ground emesis, or uncontrolled bleeding elsewhere.    Education given on diagnoses and ongoing management.  Instructed follow-up with primary care provider within the next 5 to 7 days, sooner with any new concerning or worsening symptoms.    She agrees with plan of care and verbalizes understanding.    Discharge Medication List as of 11/23/2021 12:32 PM      START taking these medications    Details   predniSONE (DELTASONE) 20 MG tablet 1 tab daily for 3 days, then 1/2 tab daily for 4 days, Disp-5 tablet, R-0, E-Prescribe             Final diagnoses:   Localized swelling of right foot   Gout - right foot/great toe       11/23/2021   [facility]     Ely Burns APRN CNP  11/23/21 [id number]

## 2021-11-23 NOTE — ED TRIAGE NOTES
"Patient presents with complaints of right foot pain/swelling.  States that the swelling started Sunday \"for no reason\" patient did see her primary yesterday but they did not do an xray and/or testing.    "

## 2021-11-23 NOTE — ED TRIAGE NOTES
Patient presents to urgent care for RT foot pain and swelling x3 days. Patient denies any injury to the foot or leg. Patient rates the pain 8/10.   Patient saw her PCP yesterday but didn't get any answers of what it could be. BP rechecked.

## 2021-11-23 NOTE — TELEPHONE ENCOUNTER
ANTICOAGULATION  MANAGEMENT     Interacting Medication Review    Interacting medication(s): Prednisone 20 mg for 3 days, Prednisone 10 mg for 4 days with warfarin.    Duration: 7 days (11/23/2021 to 11/30/2021)    Indication: Foot inflammation/Possible gout    New medication?: Yes, interaction may increase INR and risk of bleeding       PLAN     5 mg 11/23, 5 mg 11/24, 5 mg 11/25, 5 mg 11/26, 5 mg 11/27, 2.5 mg 11/28, 5 mg 11/29    Spoke with Cassy    Anticoagulation Calendar updated    Melanie Farris RN

## 2021-11-23 NOTE — DISCHARGE INSTRUCTIONS
Start prednisone today as directed.  Call coumadin nurse to alert them you are taking this so your INR can be monitored more frequently.  Elevate extremity throughout the day.  No ibuprofen, but may take tylenol for pain.     Seek immediate medical care with any new or worsening symptoms.

## 2021-11-30 ENCOUNTER — ANTICOAGULATION THERAPY VISIT (OUTPATIENT)
Dept: ANTICOAGULATION | Facility: OTHER | Age: 66
End: 2021-11-30
Payer: COMMERCIAL

## 2021-11-30 DIAGNOSIS — I26.99 PULMONARY EMBOLISM AND INFARCTION (H): ICD-10-CM

## 2021-11-30 DIAGNOSIS — I82.409 DEEP VEIN THROMBOSIS (DVT) (H): ICD-10-CM

## 2021-11-30 DIAGNOSIS — Z79.01 LONG TERM CURRENT USE OF ANTICOAGULANT THERAPY: Primary | ICD-10-CM

## 2021-11-30 LAB — INR (EXTERNAL): 1.9 (ref 2–3)

## 2021-11-30 NOTE — PROGRESS NOTES
ANTICOAGULATION MANAGEMENT     Cassy Avila 66 year old female is on warfarin with subtherapeutic INR result. (Goal INR 2.0-3.0)    Recent labs: (last 7 days)     11/30/21  1327   INR 1.9*       ASSESSMENT     Source(s): Chart Review and Patient/Caregiver Call       Warfarin doses taken: Less warfarin taken than planned which may be affecting INR    Diet: No new diet changes identified    New illness, injury, or hospitalization: Yes: Gout    Medication/supplement changes: Last dose of Prednisone today    Signs or symptoms of bleeding or clotting: No    Previous INR: Therapeutic last 2(+) visits    Additional findings: None     PLAN     Recommended plan for temporary change(s) affecting INR     Dosing Instructions: Continue your current warfarin dose with next INR in 2 weeks       Summary  As of 11/30/2021    Full warfarin instructions:  7.5 mg every Wed, Fri; 5 mg all other days   Next INR check:  12/17/2021             Telephone call with Cassy who verbalizes understanding and agrees to plan    Patient to recheck with home meter    Education provided: Please call back if any changes to your diet, medications or how you've been taking warfarin, Monitoring for bleeding signs and symptoms, Monitoring for clotting signs and symptoms, Importance of notifying clinic for changes in medications; a sooner lab recheck maybe needed., Importance of notifying clinic for diarrhea, nausea/vomiting, reduced intake, and/or illness; a sooner lab recheck maybe needed. and Importance of notifying clinic of upcoming surgeries and procedures 2 weeks in advance    Plan made per [facility] anticoagulation protocol    Melanie Farris RN  Anticoagulation Clinic  11/30/2021    _______________________________________________________________________     Anticoagulation Episode Summary     Current INR goal:  2.0-3.0   TTR:  76.3 % (1 y)   Target end date:  Indefinite   Send INR reminders to:  [facility]    Indications    Long-term  (current) use of anticoagulants [Z79.01] [Z79.01]  Pulmonary embolism and infarction (H) [I26.99]  Deep vein thrombosis (DVT) (H) [I82.409] [I82.409]           Comments:  PST - [employer] [facility].  Shoulder surgery 6/24/2020, warfarin hold x 5 days. Lovenox bridge per A.,Minnie  Call cell phone with any dosing. Home phone no longer correct number           Anticoagulation Care Providers     Provider Role Specialty Phone number    Eliz Hartman, CNP Referring Family Medicine [phone]

## 2021-12-01 DIAGNOSIS — F33.1 MODERATE EPISODE OF RECURRENT MAJOR DEPRESSIVE DISORDER (H): ICD-10-CM

## 2021-12-02 RX ORDER — ESCITALOPRAM OXALATE 10 MG/1
TABLET ORAL
Qty: 90 TABLET | Refills: 0 | Status: SHIPPED | OUTPATIENT
Start: 2021-12-02 | End: 2022-03-04

## 2021-12-08 ENCOUNTER — OFFICE VISIT (OUTPATIENT)
Dept: FAMILY MEDICINE | Facility: OTHER | Age: 66
End: 2021-12-08
Attending: NURSE PRACTITIONER
Payer: MEDICARE

## 2021-12-08 ENCOUNTER — ANCILLARY PROCEDURE (OUTPATIENT)
Dept: GENERAL RADIOLOGY | Facility: OTHER | Age: 66
End: 2021-12-08
Attending: NURSE PRACTITIONER
Payer: MEDICARE

## 2021-12-08 VITALS
TEMPERATURE: 97.5 F | OXYGEN SATURATION: 93 % | WEIGHT: 234.5 LBS | SYSTOLIC BLOOD PRESSURE: 130 MMHG | RESPIRATION RATE: 20 BRPM | HEART RATE: 67 BPM | DIASTOLIC BLOOD PRESSURE: 70 MMHG | BODY MASS INDEX: 39.02 KG/M2

## 2021-12-08 DIAGNOSIS — R20.2 NUMBNESS AND TINGLING OF RIGHT HAND: ICD-10-CM

## 2021-12-08 DIAGNOSIS — M25.511 RIGHT SHOULDER PAIN, UNSPECIFIED CHRONICITY: Primary | ICD-10-CM

## 2021-12-08 DIAGNOSIS — R20.0 NUMBNESS AND TINGLING OF RIGHT HAND: ICD-10-CM

## 2021-12-08 DIAGNOSIS — M25.511 SHOULDER PAIN, RIGHT: ICD-10-CM

## 2021-12-08 PROCEDURE — 73030 X-RAY EXAM OF SHOULDER: CPT | Mod: TC,RT,FY

## 2021-12-08 PROCEDURE — 99213 OFFICE O/P EST LOW 20 MIN: CPT | Performed by: NURSE PRACTITIONER

## 2021-12-08 PROCEDURE — G0463 HOSPITAL OUTPT CLINIC VISIT: HCPCS

## 2021-12-08 ASSESSMENT — PAIN SCALES - GENERAL: PAINLEVEL: SEVERE PAIN (6)

## 2021-12-08 NOTE — PROGRESS NOTES
Assessment & Plan       Right shoulder pain, unspecified chronicity  - Orthopedic  Referral; Future      Numbness and tingling of right hand  - Orthopedic  Referral; Future      Eliz Hartman CNP  [facility] - CURRY Madrigal is a 66 year old who presents for the following health issues       Pain History:  When did you first notice your pain? - More than 6 weeks   Have you seen this provider for your pain in the past?   Yes   Where in your body do you have pain? Right shoulder pain  Are you seeing anyone else for your pain? Yes - Benjamin      Shoulder replacement - Dr Alexander, 2 years ago  Prior to that, she had bicep tendon repair - about 6-7 months before the replacement      Since her Shoulder Replacement, she has gone through rehab 3 times, and does home exercises  Pain is severe      Pain radiates from shoulder to bicep, forearm, and to hand  Hand becomes numb, with loss of strength.      PHQ-9 SCORE 12/2/2020 4/16/2021 8/4/2021   PHQ-9 Total Score - - -   PHQ-9 Total Score 9 0 4       MARGA-7 SCORE 12/2/2020 4/16/2021 8/27/2021   Total Score 10 0 0         Chronic Pain Follow Up:  Location of pain: right shoulder   Analgesia/pain control:    - Recent changes:  worsening    - Overall control: Tolerable with discomfort    - Current treatments: none at this time- has appointment with  [facility] for possible surgery   Adherence:     - Do you ever take more pain medicine than prescribed? No    - When did you take your last dose of pain medicine?  N/A   Adverse effects: No         Review of Systems   Constitutional, HEENT, cardiovascular, pulmonary, GI, , musculoskeletal, neuro, skin, endocrine and psych systems are negative, except as otherwise noted.          Objective    /70 (BP Location: Left arm, Patient Position: Sitting, Cuff Size: Adult Large)   Pulse 67   Temp 97.5  F (36.4  C) (Tympanic)   Resp 20   Wt 106.4 kg (234 lb 8 oz)   SpO2 93%   BMI 39.02 kg/m    Body mass  index is 39.02 kg/m .         Physical Exam   GENERAL: healthy, alert and no distress  RESP: lungs clear to auscultation - no rales, rhonchi or wheezes  CV: regular rate and rhythm, normal S1 S2, no S3 or S4, no murmur, click or rub, no peripheral edema and peripheral pulses strong  MS: Right shoulder stiffness - limited ROM throughout.  Radiculopathy to hand.  SKIN: no suspicious lesions or rashes  PSYCH: mentation appears normal, affect normal/bright          MR SHOULDER RIGHT W/O CONTRAST       HISTORY: 65 years Female right shoulder pain, weakness and limited  range of motion.     COMPARISON: 11/6/2019     TECHNIQUE: Coronal PD fat sat, Coronal T1, coronal T2, sagittal T2,  sagittal PD fat-sat, axial PD imaging of the right  shoulder was  performed     FINDINGS: Since the prior study, patient has had right shoulder  arthroplasty. Artifact from the arthroplasty obscures anatomic detail  tendons/elements of the rotator cuff and anatomic detail of the  glenohumeral articulation.     Rotator cuff:  Elements of the rotator cuff, rotator cuff insertion integrity is not  visible on this examination. There is atrophic change of the  subscapularis muscle but the visualized portion of the tendon appears  intact. Atrophy of this muscle has developed since the prior study of  11/6/2019. There is no significant atrophic change of the  supraspinatus infraspinatus or teres minor.     Biceps tendon: The biceps tendon is largely obscured by susceptibility  artifact induced by the metallic components of the arthroplasty. The  lower extra-articular biceps tendon is visible and appears intact.                                                                            IMPRESSION: Anatomic detail of portions of the shoulder directly about  the glenohumeral joint is largely obscured by metallic susceptibility  artifact from arthroplasty components. The lower extra-articular  aspect of the long head biceps tendon is intact. There is no  apparent  buckling or laxity to suggest it is torn and inferiorly displaced.     There is atrophic change of the subscapularis muscle without clear  evidence of a subscapularis tendon tear. Atrophic change of this  muscle has developed since the 2019 comparison exam.     ANGELIA BRANDT MD          Results for orders placed or performed in visit on 12/08/21   XR Shoulder Right G/E 3 Views     Status: None    Narrative    PROCEDURE:  XR SHOULDER RIGHT G/E 3 VIEWS    HISTORY: Shoulder pain, right.    COMPARISON:  8/25/2021    TECHNIQUE:  4 views right shoulder.    FINDINGS:  Postoperative changes of prior right shoulder  hemiarthroplasty are seen. There is subchondral sclerosis and  osteophytosis of the glenoid. No acute fracture or periprosthetic  loosening is identified. Slight undersurface regularity of the  acromion is questioned.      Impression    IMPRESSION: Prior right shoulder hemiarthroplasty. Glenoid predominant  degenerative changes.      LUDIN YATES MD         SYSTEM ID:  [id number]

## 2021-12-11 DIAGNOSIS — I10 BENIGN ESSENTIAL HYPERTENSION: ICD-10-CM

## 2021-12-11 RX ORDER — LOSARTAN POTASSIUM 50 MG/1
TABLET ORAL
Qty: 180 TABLET | Refills: 0 | Status: SHIPPED | OUTPATIENT
Start: 2021-12-11 | End: 2022-03-08

## 2021-12-27 ENCOUNTER — ANTICOAGULATION THERAPY VISIT (OUTPATIENT)
Dept: ANTICOAGULATION | Facility: OTHER | Age: 66
End: 2021-12-27
Payer: COMMERCIAL

## 2021-12-27 DIAGNOSIS — I82.409 DEEP VEIN THROMBOSIS (DVT) (H): ICD-10-CM

## 2021-12-27 DIAGNOSIS — I26.99 PULMONARY EMBOLISM AND INFARCTION (H): ICD-10-CM

## 2021-12-27 DIAGNOSIS — Z79.01 LONG TERM CURRENT USE OF ANTICOAGULANT THERAPY: Primary | ICD-10-CM

## 2021-12-27 LAB — INR HOME MONITORING: 2.4 (ref 2–3)

## 2021-12-27 NOTE — PROGRESS NOTES
ANTICOAGULATION MANAGEMENT     Cassy Avila 66 year old female is on warfarin with therapeutic INR result. (Goal INR 2.0-3.0)    Recent labs: (last 7 days)     12/27/21  0000   INR 2.40       ASSESSMENT     Source(s): Chart Review and Patient/Caregiver Call left message.  Patient to call back if any questions.        Warfarin doses taken: Warfarin taken as instructed    Diet: No new diet changes identified    New illness, injury, or hospitalization: No    Medication/supplement changes: None noted    Signs or symptoms of bleeding or clotting: No    Previous INR: sub and then therapeutic.    Additional findings: None     PLAN     Recommended plan for no diet, medication or health factor changes affecting INR     Dosing Instructions: Continue your current warfarin dose with next INR in 6 weeks       Summary  As of 12/27/2021    Full warfarin instructions:  7.5 mg every Wed, Fri; 5 mg all other days   Next INR check:               Detailed voice message left for Cassy with dosing instructions and follow up date.     Patient to recheck with home meter    Education provided: Please call back if any changes to your diet, medications or how you've been taking warfarin    Plan made per [facility] anticoagulation protocol    Magali Kirkpatrick RN  Anticoagulation Clinic  12/27/2021    _______________________________________________________________________     Anticoagulation Episode Summary     Current INR goal:  2.0-3.0   TTR:  78.7 % (1 y)   Target end date:  Indefinite   Send INR reminders to:  [facility]    Indications    Long-term (current) use of anticoagulants [Z79.01] [Z79.01]  Pulmonary embolism and infarction (H) [I26.99]  Deep vein thrombosis (DVT) (H) [I82.409] [I82.409]           Comments:  PST - [employer] [facility].  Shoulder surgery 6/24/2020, warfarin hold x 5 days. Lovenox bridge per A.,Flaim  Call cell phone with any dosing. Home phone no longer correct number           Anticoagulation Care Providers      Provider Role Specialty Phone number    Eliz Hartman, CNP Referring Family Medicine [phone]

## 2022-01-02 ENCOUNTER — HEALTH MAINTENANCE LETTER (OUTPATIENT)
Age: 67
End: 2022-01-02

## 2022-01-05 DIAGNOSIS — Z79.01 LONG TERM CURRENT USE OF ANTICOAGULANT THERAPY: ICD-10-CM

## 2022-01-05 RX ORDER — WARFARIN SODIUM 5 MG/1
TABLET ORAL
Qty: 109 TABLET | Refills: 0 | Status: SHIPPED | OUTPATIENT
Start: 2022-01-05 | End: 2022-04-21

## 2022-01-13 ENCOUNTER — TELEPHONE (OUTPATIENT)
Dept: FAMILY MEDICINE | Facility: OTHER | Age: 67
End: 2022-01-13
Payer: COMMERCIAL

## 2022-01-13 DIAGNOSIS — M10.9 ACUTE GOUTY ARTHRITIS: Primary | ICD-10-CM

## 2022-01-13 RX ORDER — COLCHICINE 0.6 MG/1
TABLET ORAL
Qty: 3 TABLET | Refills: 1 | Status: SHIPPED | OUTPATIENT
Start: 2022-01-13 | End: 2022-03-08

## 2022-01-13 NOTE — TELEPHONE ENCOUNTER
Patient is calling and requesting mediation for a gout flare in her right foot that started yesterday. Patient is unsure of which medication she had taken in the passed. Unsure of which medication to pend.   PCP out. Please advise, thank you.     [facility] for pharmacy.

## 2022-01-14 DIAGNOSIS — I10 ESSENTIAL HYPERTENSION: ICD-10-CM

## 2022-01-14 RX ORDER — HYDROCHLOROTHIAZIDE 25 MG/1
TABLET ORAL
Qty: 90 TABLET | Refills: 0 | Status: SHIPPED | OUTPATIENT
Start: 2022-01-14 | End: 2022-04-21

## 2022-01-14 RX ORDER — METOPROLOL SUCCINATE 50 MG/1
TABLET, EXTENDED RELEASE ORAL
Qty: 90 TABLET | Refills: 0 | Status: SHIPPED | OUTPATIENT
Start: 2022-01-14 | End: 2022-04-21

## 2022-01-18 ENCOUNTER — DOCUMENTATION ONLY (OUTPATIENT)
Dept: ANTICOAGULATION | Facility: OTHER | Age: 67
End: 2022-01-18
Payer: COMMERCIAL

## 2022-01-18 ENCOUNTER — TELEPHONE (OUTPATIENT)
Dept: FAMILY MEDICINE | Facility: OTHER | Age: 67
End: 2022-01-18
Payer: COMMERCIAL

## 2022-01-18 DIAGNOSIS — Z79.01 LONG TERM CURRENT USE OF ANTICOAGULANT THERAPY: Primary | ICD-10-CM

## 2022-01-18 DIAGNOSIS — E78.5 HYPERLIPIDEMIA LDL GOAL <100: ICD-10-CM

## 2022-01-18 DIAGNOSIS — I10 ESSENTIAL HYPERTENSION: Primary | ICD-10-CM

## 2022-01-18 DIAGNOSIS — I26.99 PULMONARY EMBOLISM AND INFARCTION (H): ICD-10-CM

## 2022-01-18 DIAGNOSIS — I82.409 DEEP VEIN THROMBOSIS (DVT) (H): ICD-10-CM

## 2022-01-18 LAB — INR HOME MONITORING: 2.1 (ref 2–3)

## 2022-01-18 NOTE — TELEPHONE ENCOUNTER
3:20 PM    Reason for Call: Phone Call    Description: Coming in for her appointment to see Eliz Hartman on 3/8/22 at 10:30 and she wants to come in early for her fasting lab draw.   She needs to schedule the lab appointment for this day.      Was an appointment offered for this call? No  If yes : Appointment type              Date    Preferred method for responding to this message: Telephone Call  What is your phone number ? [phone]    If we cannot reach you directly, may we leave a detailed response at the number you provided? Yes    Can this message wait until your PCP/provider returns, if available today? YES, No    Fide Alberto

## 2022-01-18 NOTE — PROGRESS NOTES
ANTICOAGULATION MANAGEMENT     Cassy Avila 66 year old female is on warfarin with therapeutic INR result. (Goal INR 2.0-3.0)    Patient stated that she has been eating a lot more salads during the week and questioning since her INR decreased from the last time if she should take 7.5 mg 3x weekly. She stated that she is eating vegetables with her salads as well. Agreed to try 7.5 mg M,W,F; 5 mg all other days and check INR in two weeks to make sure she doesn't go too high.     Recent labs: (last 7 days)     01/18/22  0000   INR 2.10       ASSESSMENT     Source(s): Chart Review and Patient/Caregiver Call       Warfarin doses taken: Warfarin taken as instructed    Diet: Increased greens/vitamin K in diet; ongoing change    New illness, injury, or hospitalization: No    Medication/supplement changes: None noted    Signs or symptoms of bleeding or clotting: Yes: Increased bruising on arms and thighs    Previous INR: Therapeutic last 2(+) visits    Additional findings: None     PLAN     Recommended plan for ongoing change(s) affecting INR     Dosing Instructions:  Increase your warfarin dose (6% change) with next INR in 2 weeks       Summary  As of 1/18/2022    Full warfarin instructions:  7.5 mg every Mon, Wed, Fri; 5 mg all other days   Next INR check:  2/4/2022             Telephone call with Cassy who verbalizes understanding and agrees to plan    Patient to recheck with home meter    Education provided: Please call back if any changes to your diet, medications or how you've been taking warfarin, Importance of consistent vitamin K intake, Impact of vitamin K foods on INR, Vitamin K content of foods, Monitoring for bleeding signs and symptoms and Monitoring for clotting signs and symptoms    Plan made per [facility] anticoagulation protocol    Melanie Farris, RN  Anticoagulation Clinic  1/19/2022    _______________________________________________________________________     Anticoagulation Episode Summary     Current  INR goal:  2.0-3.0   TTR:  78.6 % (1 y)   Target end date:  Indefinite   Send INR reminders to:  [facility]    Indications    Long-term (current) use of anticoagulants [Z79.01] [Z79.01]  Pulmonary embolism and infarction (H) [I26.99]  Deep vein thrombosis (DVT) (H) [I82.409] [I82.409]           Comments:  PST - [employer] [facility].  Shoulder surgery 6/24/2020, warfarin hold x 5 days. Lovenox bridge Minnie Ferrara  Call cell phone with any dosing. Home phone no longer correct number           Anticoagulation Care Providers     Provider Role Specialty Phone number    Eliz Hartman CNP Referring Family Medicine [phone]

## 2022-02-04 ENCOUNTER — TELEPHONE (OUTPATIENT)
Dept: ANTICOAGULATION | Facility: CLINIC | Age: 67
End: 2022-02-04
Payer: COMMERCIAL

## 2022-02-04 ENCOUNTER — ANTICOAGULATION THERAPY VISIT (OUTPATIENT)
Dept: ANTICOAGULATION | Facility: OTHER | Age: 67
End: 2022-02-04
Payer: COMMERCIAL

## 2022-02-04 DIAGNOSIS — I26.99 PULMONARY EMBOLISM AND INFARCTION (H): ICD-10-CM

## 2022-02-04 DIAGNOSIS — I82.409 DEEP VEIN THROMBOSIS (DVT) (H): ICD-10-CM

## 2022-02-04 DIAGNOSIS — Z79.01 LONG TERM CURRENT USE OF ANTICOAGULANT THERAPY: Primary | ICD-10-CM

## 2022-02-04 LAB — INR HOME MONITORING: 2.4 (ref 2–3)

## 2022-02-04 NOTE — TELEPHONE ENCOUNTER
[facility]A-PROCEDURAL ANTICOAGULATION  MANAGEMENT    ASSESSMENT     Warfarin interruption plan for shoulder surgery on 2022.    Indication for Anticoagulation: PE & DVT and Factor V Leiden      PE  provoked with surgery     2009 DVT while on warfarin    Unclear per chart review if homozygous or heterozygous Factor V Leiden (if homozygous, moves to high risk)    [facility]-Procedure Risk stratification for thromboembolism: moderate/high (2018 American Society of Hematology guideline)     VTE: 2018 American Society of Hematology recommends against bridging in low and moderate risk VTE patients holding warfarin     VTE: 2016 Anticoagulation Forum clinical guidance recommends no bridge therapy for most VTE patients interrupting warfarin with possible exceptions for VTE within previous month, prior hx recurrent VTE during anticoagulation therapy interruption, or undergoing a procedure with high for VTE  (joint replacement surgery or major abdominal cancer resection)     RECOMMENDATION       Pre-Procedure:  o Hold warfarin for 5 days, until after procedure startin2022   o Enoxaparin (Lovenox) 100 mg subq Q 12 hrs (1 mg/kg Q 12 hrs for CrCl >= 60 ml/min and BMI <= 40 kg/m2)   - Start enoxaparin: 2022  - Last dose of enoxaparin prior to procedure: 02/15/2022 AM  (~24 hours prior to procedures      Post-Procedure:  o Resume warfarin dose if okay with provider doing procedure on night of procedure, 2022 PM: 10mg  o Resume  enoxaparin (Lovenox) ~ 24-48 hrs post procedure when okay with provider doing procedure. Continue until INR >= 2.3 or INR >= 2.0 x 2 ([facility] protocol goal INR 2-3)   o Ortho may restart with prophylactic dose Lovenox to assist with wound healing     o Recheck INR ~5 days after resuming warfarin   ?         Plan routed to referring provider for approval  ?   Ashli Joya [facility]    SUBJECTIVE/OBJECTIVE     Cassy APPLE Avila, a 66 year old female    Goal INR Range: 2.0-3.0     Patient  "bridged in past: Yes: both full therapeutic and prophylactic doses used in past    Pertinent History: N/A    Wt Readings from Last 3 Encounters:   12/08/21 106.4 kg (234 lb 8 oz)   11/23/21 104.8 kg (231 lb)   11/22/21 105.1 kg (231 lb 12.8 oz)      Ideal body weight: 57 kg (125 lb 10.6 oz)  Adjusted ideal body weight: 76.7 kg (169 lb 3.2 oz)     Estimated body mass index is 39.02 kg/m  as calculated from the following:    Height as of 11/23/21: 1.651 m (5' 5\").    Weight as of 12/8/21: 106.4 kg (234 lb 8 oz).    Lab Results   Component Value Date    INR 2.40 02/04/2022    INR 2.10 01/18/2022    INR 2.40 12/27/2021     Lab Results   Component Value Date    HGB 13.7 11/23/2021    HGB 14.1 04/16/2021    HCT 39.1 11/23/2021    HCT 40.2 04/16/2021     11/23/2021     04/16/2021     Lab Results   Component Value Date    CR 1.04 08/27/2021    CR 0.95 04/16/2021    CR 0.92 02/08/2021     CrCl cannot be calculated (Patient's most recent lab result is older than the maximum 30 days allowed.).     Calc 64ml/min from most recent SCr  "

## 2022-02-04 NOTE — PROGRESS NOTES
ANTICOAGULATION MANAGEMENT     Cassy Avila 66 year old female is on warfarin with therapeutic INR result. (Goal INR 2.0-3.0)    Recent labs: (last 7 days)     02/04/22  0000   INR 2.40       ASSESSMENT     Source(s): Chart Review and Patient/Caregiver Call       Warfarin doses taken: Warfarin taken as instructed    Diet: has continued to eat more greens and previous dose adjustment was helpful.    New illness, injury, or hospitalization: No    Medication/supplement changes: None noted    Signs or symptoms of bleeding or clotting: No    Previous INR: Therapeutic last 2(+) visits    Additional findings: None     PLAN     Recommended plan for no diet, medication or health factor changes affecting INR     Dosing Instructions: Continue your current warfarin dose with next INR in 6 days set for as patient has 2/16 shoulder revision surgery at [place] Dr. Bustillo will be doing the procedure. Message sent to Ashli Joya, Pharmacist for pre/post dosing recommendations.      Summary  As of 2/4/2022    Full warfarin instructions:  7.5 mg every Mon, Wed, Fri; 5 mg all other days   Next INR check:  2/10/2022             Telephone call with Cassy who verbalizes understanding and agrees to plan    Lab visit scheduled    Education provided: Please call back if any changes to your diet, medications or how you've been taking warfarin and Patient waiting on pre/post dosing fro 2/16 upcoming procedure.  Uses [facility] if recommendation is Lovenox.    Plan made per [facility] anticoagulation protocol    Magali Kirkpatrick RN  Anticoagulation Clinic  2/4/2022    _______________________________________________________________________     Anticoagulation Episode Summary     Current INR goal:  2.0-3.0   TTR:  78.7 % (1 y)   Target end date:  Indefinite   Send INR reminders to:  [facility]    Indications    Long-term (current) use of anticoagulants [Z79.01] [Z79.01]  Pulmonary embolism and infarction (H) [I26.99]  Deep vein thrombosis  (DVT) (H) [I82.409] [I82.409]           Comments:  PST - [employer] [facility].  Shoulder surgery 6/24/2020, warfarin hold x 5 days. Lovenox bridge per A.Minnie  Call cell phone with any dosing. Home phone no longer correct number           Anticoagulation Care Providers     Provider Role Specialty Phone number    Eliz Hartman, CNP Referring Family Medicine [phone]

## 2022-02-07 ENCOUNTER — ANTICOAGULATION THERAPY VISIT (OUTPATIENT)
Dept: ANTICOAGULATION | Facility: OTHER | Age: 67
End: 2022-02-07
Payer: COMMERCIAL

## 2022-02-07 DIAGNOSIS — I26.99 PULMONARY EMBOLISM AND INFARCTION (H): ICD-10-CM

## 2022-02-07 DIAGNOSIS — I82.409 DEEP VEIN THROMBOSIS (DVT) (H): ICD-10-CM

## 2022-02-07 DIAGNOSIS — Z79.01 LONG TERM CURRENT USE OF ANTICOAGULANT THERAPY: Primary | ICD-10-CM

## 2022-02-07 NOTE — PROGRESS NOTES
Coumadin Clinic called patient and went over pre/post dosing from recommendations from Ashli Joya, Pharmacist and prescribed by GABBY Hartman NP telephone encounter  stating:      RECOMMENDATION        Pre-Procedure:  ? Hold warfarin for 5 days, until after procedure startin2022   ? Enoxaparin (Lovenox) 100 mg subq Q 12 hrs (1 mg/kg Q 12 hrs for CrCl >= 60 ml/min and BMI <= 40 kg/m2)     Start enoxaparin: 2022    Last dose of enoxaparin prior to procedure: 02/15/2022 AM  (~24 hours prior to procedures       Post-Procedure:  ? Resume warfarin dose if okay with provider doing procedure on night of procedure, 2022 PM: 10mg  ? Resume  enoxaparin (Lovenox) ~ 24-48 hrs post procedure when okay with provider doing procedure. Continue until INR >= 2.3 or INR >= 2.0 x 2 ([facility] protocol goal INR 2-3)   ? Ortho may restart with prophylactic dose Lovenox to assist with wound healing     ? Recheck INR ~5 days after resuming warfarin   ?    Patient verbalized understanding.  Lovenox sent to pharmacy.  Patient will be sure to run plan by Ortho for post procedure.  Patient scheduled for recheck on  and is a home khalida.    Magali Kirkpatrick RN  Coumadin Clinic

## 2022-02-07 NOTE — TELEPHONE ENCOUNTER
Please review and reply to recommendations below from Ashli Joya, Pharmacist for per/post dosing.  Coumadin Clinic has received a message from patient his morning waiting on dosing instructions for upcoming procedure.  Thank you.  Magali Kirkpatrick RN  Coumadin Clinic

## 2022-02-09 ENCOUNTER — TRANSFERRED RECORDS (OUTPATIENT)
Dept: HEALTH INFORMATION MANAGEMENT | Facility: CLINIC | Age: 67
End: 2022-02-09
Payer: COMMERCIAL

## 2022-02-14 NOTE — PROGRESS NOTES
Coumadin RN received call from patient and she states that surgery was canceled for 2/16 and they are looking to reschedule for either 2/22 or 2/24.  Consult with Ashli Joya, Pharmacist who states no coumadin and continue with Lovenox.  Patient states she has enough refills and will continue to take Lovenox.  She understands not enough time to start and then stop coumadin between reschedule.  She will look to call on 2/21 to let us know date of procedure.  She states understanding what to do for pre/post dosing when procedure is on either dates as has been on for quite sometime and has done this many times before.   Magali Kirkpatrick, MAKAYLA  Coumadin Clinic

## 2022-02-19 DIAGNOSIS — F51.01 PRIMARY INSOMNIA: ICD-10-CM

## 2022-02-20 NOTE — TELEPHONE ENCOUNTER
traZODone (DESYREL) 50 MG tablet      Last Written Prescription Date:  8/16/2021  Last Fill Quantity: 180,   # refills: 0  Last Office Visit: 12/8/2021  Future Office visit:    Next 5 appointments (look out 90 days)    Mar 08, 2022 10:30 AM  (Arrive by 10:15 AM)  SHORT with Eliz Hartman CNP  [facility] ([facility] [address]  [facility] [address]  [phone]           }

## 2022-02-21 RX ORDER — TRAZODONE HYDROCHLORIDE 50 MG/1
TABLET, FILM COATED ORAL
Qty: 180 TABLET | Refills: 0 | Status: SHIPPED | OUTPATIENT
Start: 2022-02-21 | End: 2022-08-22

## 2022-03-03 ENCOUNTER — LAB (OUTPATIENT)
Dept: LAB | Facility: OTHER | Age: 67
End: 2022-03-03
Attending: NURSE PRACTITIONER
Payer: MEDICARE

## 2022-03-03 ENCOUNTER — TELEPHONE (OUTPATIENT)
Dept: CARE COORDINATION | Facility: OTHER | Age: 67
End: 2022-03-03
Payer: COMMERCIAL

## 2022-03-03 ENCOUNTER — ANTICOAGULATION THERAPY VISIT (OUTPATIENT)
Dept: ANTICOAGULATION | Facility: OTHER | Age: 67
End: 2022-03-03
Attending: NURSE PRACTITIONER
Payer: MEDICARE

## 2022-03-03 DIAGNOSIS — Z79.01 LONG TERM CURRENT USE OF ANTICOAGULANT THERAPY: ICD-10-CM

## 2022-03-03 DIAGNOSIS — I82.409 DEEP VEIN THROMBOSIS (DVT) (H): ICD-10-CM

## 2022-03-03 DIAGNOSIS — D68.51 FACTOR V LEIDEN MUTATION (H): ICD-10-CM

## 2022-03-03 DIAGNOSIS — I26.99 PULMONARY EMBOLISM AND INFARCTION (H): Primary | ICD-10-CM

## 2022-03-03 DIAGNOSIS — I82.409 DEEP VEIN THROMBOSIS (DVT) (H): Primary | ICD-10-CM

## 2022-03-03 DIAGNOSIS — F33.1 MODERATE EPISODE OF RECURRENT MAJOR DEPRESSIVE DISORDER (H): ICD-10-CM

## 2022-03-03 LAB — INR BLD: 1.8 (ref 0.9–1.1)

## 2022-03-03 PROCEDURE — 85610 PROTHROMBIN TIME: CPT | Mod: ZL

## 2022-03-03 PROCEDURE — 36415 COLL VENOUS BLD VENIPUNCTURE: CPT | Mod: ZL

## 2022-03-03 NOTE — TELEPHONE ENCOUNTER
Sophy from [facility] lab called and patient out of strips for home meter and needing point of care INR signed. Order signed.  Magali Kirkpatrick RN

## 2022-03-03 NOTE — PROGRESS NOTES
ANTICOAGULATION FOLLOW-UP CLINIC VISIT    Patient Name:  Cassy Avila  Date:  3/3/2022  Contact Type:  Telephone/ spoke with patient reviewed dosing and when to recheck INR    SUBJECTIVE:  Patient Findings     Positives:  Missed doses (had a procedure last week held warfarin), Change in medications (says she is out of Lovenox and does not want to continue she took a double dose of warfarin already today)        Clinical Outcomes     Negatives:  Major bleeding event, Thromboembolic event, Anticoagulation-related hospital admission, Anticoagulation-related ED visit, Anticoagulation-related fatality           OBJECTIVE    Recent labs: (last 7 days)     22  1140   INR 1.8*       ASSESSMENT / PLAN  INR assessment SUB    Recheck INR In: 5 DAYS    INR Location Clinic      Anticoagulation Summary  As of 3/3/2022    INR goal:  2.0-3.0   TTR:  80.2 % (1 y)   INR used for dosin.8 (3/3/2022)   Warfarin maintenance plan:  7.5 mg (5 mg x 1.5) every Mon, Wed, Fri; 5 mg (5 mg x 1) all other days   Full warfarin instructions:  3/3: 10 mg; Otherwise 7.5 mg every Mon, Wed, Fri; 5 mg all other days   Weekly warfarin total:  42.5 mg   Plan last modified:  Melanie Farris RN (2022)   Next INR check:  3/8/2022   Priority:  Maintenance   Target end date:  Indefinite    Indications    Long-term (current) use of anticoagulants [Z79.01] [Z79.01]  Pulmonary embolism and infarction (H) [I26.99]  Deep vein thrombosis (DVT) (H) [I82.409] [I82.409]             Anticoagulation Episode Summary     INR check location:  Home Draw    Preferred lab:      Send INR reminders to:  DAREN CAMEJO    Comments:  [employer] [facility] [employer].  Call cell phone with any dosing.      Anticoagulation Care Providers     Provider Role Specialty Phone number    Eliz Hartman CNP Referring Family Medicine [phone]            See the Encounter Report to view Anticoagulation Flowsheet and Dosing Calendar (Go to Encounters tab in chart review, and  find the Anticoagulation Therapy Visit)        Corry Galvan, RN

## 2022-03-04 RX ORDER — ESCITALOPRAM OXALATE 10 MG/1
TABLET ORAL
Qty: 90 TABLET | Refills: 0 | Status: SHIPPED | OUTPATIENT
Start: 2022-03-04 | End: 2022-06-02

## 2022-03-04 NOTE — TELEPHONE ENCOUNTER
escitalopram (LEXAPRO) 10 MG tablet      Last Written Prescription Date:  12-2-21  Last Fill Quantity: 90,   # refills: 0  Last Office Visit: 12-8-21  Future Office visit:    Next 5 appointments (look out 90 days)    Mar 08, 2022 10:30 AM  (Arrive by 10:15 AM)  SHORT with Eliz Hartman CNP  [facility] ([facility] ) [address]  [facility] [address]  [phone]           Routing refill request to provider for review/approval because:   PHQ-9 score less than 5 in past 6 months

## 2022-03-08 ENCOUNTER — ANTICOAGULATION THERAPY VISIT (OUTPATIENT)
Dept: ANTICOAGULATION | Facility: OTHER | Age: 67
End: 2022-03-08
Attending: NURSE PRACTITIONER
Payer: MEDICARE

## 2022-03-08 ENCOUNTER — OFFICE VISIT (OUTPATIENT)
Dept: FAMILY MEDICINE | Facility: OTHER | Age: 67
End: 2022-03-08
Attending: NURSE PRACTITIONER
Payer: COMMERCIAL

## 2022-03-08 VITALS
TEMPERATURE: 96.9 F | HEART RATE: 76 BPM | DIASTOLIC BLOOD PRESSURE: 60 MMHG | RESPIRATION RATE: 20 BRPM | SYSTOLIC BLOOD PRESSURE: 128 MMHG | BODY MASS INDEX: 38.29 KG/M2 | WEIGHT: 230.1 LBS | OXYGEN SATURATION: 94 %

## 2022-03-08 DIAGNOSIS — I26.99 PULMONARY EMBOLISM AND INFARCTION (H): ICD-10-CM

## 2022-03-08 DIAGNOSIS — Z12.31 ENCOUNTER FOR SCREENING MAMMOGRAM FOR MALIGNANT NEOPLASM OF BREAST: Primary | ICD-10-CM

## 2022-03-08 DIAGNOSIS — I82.409 DEEP VEIN THROMBOSIS (DVT) (H): ICD-10-CM

## 2022-03-08 DIAGNOSIS — D68.51 FACTOR V LEIDEN MUTATION (H): ICD-10-CM

## 2022-03-08 DIAGNOSIS — E87.6 HYPOKALEMIA: ICD-10-CM

## 2022-03-08 DIAGNOSIS — I10 BENIGN ESSENTIAL HYPERTENSION: ICD-10-CM

## 2022-03-08 DIAGNOSIS — Z79.01 LONG TERM CURRENT USE OF ANTICOAGULANT THERAPY: Primary | ICD-10-CM

## 2022-03-08 DIAGNOSIS — E78.5 HYPERLIPIDEMIA LDL GOAL <100: ICD-10-CM

## 2022-03-08 DIAGNOSIS — I10 ESSENTIAL HYPERTENSION: ICD-10-CM

## 2022-03-08 DIAGNOSIS — Z79.01 LONG TERM CURRENT USE OF ANTICOAGULANT THERAPY: ICD-10-CM

## 2022-03-08 DIAGNOSIS — I82.401 DEEP VEIN THROMBOSIS (DVT) OF RIGHT LOWER EXTREMITY, UNSPECIFIED CHRONICITY, UNSPECIFIED VEIN (H): ICD-10-CM

## 2022-03-08 PROBLEM — M25.519 SHOULDER PAIN: Status: ACTIVE | Noted: 2022-01-31

## 2022-03-08 LAB
ALBUMIN SERPL-MCNC: 3.4 G/DL (ref 3.4–5)
ALBUMIN UR-MCNC: NEGATIVE MG/DL
ALP SERPL-CCNC: 63 U/L (ref 40–150)
ALT SERPL W P-5'-P-CCNC: 28 U/L (ref 0–50)
ANION GAP SERPL CALCULATED.3IONS-SCNC: 6 MMOL/L (ref 3–14)
APPEARANCE UR: ABNORMAL
AST SERPL W P-5'-P-CCNC: 12 U/L (ref 0–45)
BACTERIA #/AREA URNS HPF: ABNORMAL /HPF
BILIRUB SERPL-MCNC: 0.4 MG/DL (ref 0.2–1.3)
BILIRUB UR QL STRIP: NEGATIVE
BUN SERPL-MCNC: 16 MG/DL (ref 7–30)
CALCIUM SERPL-MCNC: 9.2 MG/DL (ref 8.5–10.1)
CHLORIDE BLD-SCNC: 105 MMOL/L (ref 94–109)
CHOLEST SERPL-MCNC: 166 MG/DL
CO2 SERPL-SCNC: 25 MMOL/L (ref 20–32)
COLOR UR AUTO: YELLOW
CREAT SERPL-MCNC: 0.94 MG/DL (ref 0.52–1.04)
FASTING STATUS PATIENT QL REPORTED: NO
GFR SERPL CREATININE-BSD FRML MDRD: 66 ML/MIN/1.73M2
GLUCOSE BLD-MCNC: 94 MG/DL (ref 70–99)
GLUCOSE UR STRIP-MCNC: NEGATIVE MG/DL
HDLC SERPL-MCNC: 34 MG/DL
HGB UR QL STRIP: NEGATIVE
HOLD SPECIMEN: NORMAL
INR BLD: 3 (ref 0.9–1.1)
KETONES UR STRIP-MCNC: NEGATIVE MG/DL
LDLC SERPL CALC-MCNC: 95 MG/DL
LEUKOCYTE ESTERASE UR QL STRIP: ABNORMAL
NITRATE UR QL: NEGATIVE
NONHDLC SERPL-MCNC: 132 MG/DL
PH UR STRIP: 6.5 [PH] (ref 5–7)
POTASSIUM BLD-SCNC: 3.6 MMOL/L (ref 3.4–5.3)
PROT SERPL-MCNC: 7.2 G/DL (ref 6.8–8.8)
RBC #/AREA URNS AUTO: ABNORMAL /HPF
SODIUM SERPL-SCNC: 136 MMOL/L (ref 133–144)
SP GR UR STRIP: 1.01 (ref 1–1.03)
SQUAMOUS #/AREA URNS AUTO: ABNORMAL /LPF
TRIGL SERPL-MCNC: 184 MG/DL
UROBILINOGEN UR STRIP-ACNC: 1 E.U./DL
WBC #/AREA URNS AUTO: ABNORMAL /HPF

## 2022-03-08 PROCEDURE — 99214 OFFICE O/P EST MOD 30 MIN: CPT | Performed by: NURSE PRACTITIONER

## 2022-03-08 PROCEDURE — 85610 PROTHROMBIN TIME: CPT | Mod: ZL | Performed by: NURSE PRACTITIONER

## 2022-03-08 PROCEDURE — 80053 COMPREHEN METABOLIC PANEL: CPT | Mod: ZL | Performed by: NURSE PRACTITIONER

## 2022-03-08 PROCEDURE — 80061 LIPID PANEL: CPT | Mod: ZL | Performed by: NURSE PRACTITIONER

## 2022-03-08 PROCEDURE — 81001 URINALYSIS AUTO W/SCOPE: CPT | Mod: ZL | Performed by: NURSE PRACTITIONER

## 2022-03-08 PROCEDURE — 36415 COLL VENOUS BLD VENIPUNCTURE: CPT | Mod: ZL | Performed by: NURSE PRACTITIONER

## 2022-03-08 PROCEDURE — G0463 HOSPITAL OUTPT CLINIC VISIT: HCPCS

## 2022-03-08 RX ORDER — OXYCODONE HYDROCHLORIDE 5 MG/1
5-10 TABLET ORAL
COMMUNITY
Start: 2022-02-25 | End: 2022-04-27

## 2022-03-08 RX ORDER — POTASSIUM CHLORIDE 750 MG/1
10 TABLET, EXTENDED RELEASE ORAL DAILY
Qty: 90 TABLET | Refills: 3 | Status: SHIPPED | OUTPATIENT
Start: 2022-03-08 | End: 2023-03-17

## 2022-03-08 RX ORDER — CLINDAMYCIN HCL 150 MG
150 CAPSULE ORAL
COMMUNITY
Start: 2022-02-25 | End: 2022-03-11

## 2022-03-08 RX ORDER — LOSARTAN POTASSIUM 100 MG/1
TABLET ORAL
Qty: 90 TABLET | Refills: 1 | Status: SHIPPED | OUTPATIENT
Start: 2022-03-08 | End: 2022-08-26

## 2022-03-08 ASSESSMENT — ANXIETY QUESTIONNAIRES
5. BEING SO RESTLESS THAT IT IS HARD TO SIT STILL: NOT AT ALL
1. FEELING NERVOUS, ANXIOUS, OR ON EDGE: NOT AT ALL
4. TROUBLE RELAXING: NOT AT ALL
GAD7 TOTAL SCORE: 0
7. FEELING AFRAID AS IF SOMETHING AWFUL MIGHT HAPPEN: NOT AT ALL
2. NOT BEING ABLE TO STOP OR CONTROL WORRYING: NOT AT ALL
3. WORRYING TOO MUCH ABOUT DIFFERENT THINGS: NOT AT ALL
6. BECOMING EASILY ANNOYED OR IRRITABLE: NOT AT ALL

## 2022-03-08 ASSESSMENT — PAIN SCALES - GENERAL: PAINLEVEL: MODERATE PAIN (5)

## 2022-03-08 ASSESSMENT — PATIENT HEALTH QUESTIONNAIRE - PHQ9: SUM OF ALL RESPONSES TO PHQ QUESTIONS 1-9: 0

## 2022-03-08 NOTE — PATIENT INSTRUCTIONS
Assessment & Plan          Benign essential hypertension  - losartan (COZAAR) 100 MG tablet; 1 po every day  - UA reflex to Microscopic and Culture - [facility]  - Comprehensive metabolic panel      Hypokalemia  - potassium chloride ER (KLOR-CON M) 10 MEQ CR tablet; Take 1 tablet (10 mEq) by mouth daily      Deep vein thrombosis (DVT) of right lower extremity, unspecified chronicity, unspecified vein (H)  - INR point of care ( [facility] ONLY)      Factor V Leiden mutation (H)  - INR point of care ( [facility] ONLY)      Long-term (current) use of anticoagulants [Z79.01]  - INR point of care ( [facility] clinic ONLY)      Hyperlipidemia LDL goal <100  - Comprehensive metabolic panel  - Lipid Profile (Chol, Trig, HDL, LDL calc)      Encounter for screening mammogram for malignant neoplasm of breast  - MA SCREENING DIGITAL BILATERAL ([place]); Future        Return in about 6 months (around 9/8/2022).      Eliz Hartman, JANINA  [facility]

## 2022-03-08 NOTE — PROGRESS NOTES
Assessment & Plan          Benign essential hypertension  - losartan (COZAAR) 100 MG tablet; 1 po every day  - UA reflex to Microscopic and Culture - [facility]  - Comprehensive metabolic panel      Hypokalemia  - potassium chloride ER (KLOR-CON M) 10 MEQ CR tablet; Take 1 tablet (10 mEq) by mouth daily      Deep vein thrombosis (DVT) of right lower extremity, unspecified chronicity, unspecified vein (H)  - INR point of care (  clinic ONLY)      Factor V Leiden mutation (H)  - INR point of care ( [facility] ONLY)      Long-term (current) use of anticoagulants [Z79.01]  - INR point of care ( [facility] ONLY)      Hyperlipidemia LDL goal <100  - Comprehensive metabolic panel  - Lipid Profile (Chol, Trig, HDL, LDL calc)      Encounter for screening mammogram for malignant neoplasm of breast  - MA SCREENING DIGITAL BILATERAL ([place]); Future        Return in about 6 months (around 9/8/2022).      Eliz Hartman, JANINA  [facility] [place] [facility]          Kaitlin is a 66 year old who presents for the following health issues       Hyperlipidemia Follow-Up    Are you regularly taking any medication or supplement to lower your cholesterol?   No    Are you having muscle aches or other side effects that you think could be caused by your cholesterol lowering medication?  No      Hypertension Follow-up    Do you check your blood pressure regularly outside of the clinic? No    Are you following a low salt diet? Yes    Are your blood pressures ever more than 140 on the top number (systolic) OR more   than 90 on the bottom number (diastolic), for example 140/90? No      Depression and Anxiety Follow-Up    How are you doing with your depression since your last visit? Improved     How are you doing with your anxiety since your last visit?  Improved     Are you having other symptoms that might be associated with depression or anxiety? No    Have you had a significant life event? No     Do you have any concerns with your use of  alcohol or other drugs? No         Social History     Tobacco Use     Smoking status: Former Smoker     Packs/day: 1.00     Years: 30.00     Pack years: 30.00     Types: Cigarettes, Pipe     Start date: 1969     Quit date: 1999     Years since quittin.1     Smokeless tobacco: Never Used     Tobacco comment: quit in    Vaping Use     Vaping Use: Never used   Substance Use Topics     Alcohol use: No     Drug use: No         PHQ 2021 2021 3/8/2022   PHQ-9 Total Score 0 4 0   Q9: Thoughts of better off dead/self-harm past 2 weeks Not at all Not at all Not at all   F/U: Thoughts of suicide or self-harm - - -   F/U: Safety concerns - - -         MARGA-7 SCORE 2021 2021 3/8/2022   Total Score 0 0 0         MARGA-7  3/8/2022   1. Feeling nervous, anxious, or on edge 0   2. Not being able to stop or control worrying 0   3. Worrying too much about different things 0   4. Trouble relaxing 0   5. Being so restless that it is hard to sit still 0   6. Becoming easily annoyed or irritable 0   7. Feeling afraid, as if something awful might happen 0   MARGA-7 Total Score 0   If you checked any problems, how difficult have they made it for you to do your work, take care of things at home, or get along with other people? -         Review of Systems   Constitutional, HEENT, cardiovascular, pulmonary, GI, , musculoskeletal, neuro, skin, endocrine and psych systems are negative, except as otherwise noted.          Objective    /60 (BP Location: Left arm, Patient Position: Sitting, Cuff Size: Adult Large)   Pulse 76   Temp 96.9  F (36.1  C) (Tympanic)   Resp 20   Wt 104.4 kg (230 lb 1.6 oz)   SpO2 94%   BMI 38.29 kg/m    Body mass index is 38.29 kg/m .         Physical Exam   GENERAL: healthy, alert and no distress  EYES: Eyes grossly normal to inspection, PERRL and conjunctivae and sclerae normal  HENT: ear canals and TM's normal, nose and mouth without ulcers or lesions  NECK: no adenopathy,  no asymmetry, masses, or scars and thyroid normal to palpation  RESP: lungs clear to auscultation - no rales, rhonchi or wheezes  CV: regular rate and rhythm, normal S1 S2, no S3 or S4, no murmur, click or rub, no peripheral edema and peripheral pulses strong  MS: Right arm sling in place, right shoulder is coming along well.   SKIN: no suspicious lesions or rashes  PSYCH: mentation appears normal, affect normal/bright

## 2022-03-08 NOTE — PROGRESS NOTES
ANTICOAGULATION FOLLOW-UP CLINIC VISIT    Patient Name:  Cassy Avila  Date:  3/8/2022  Contact Type:  Telephone/ spoke with patient reviewed dosing and when to recheck INR    SUBJECTIVE:  Patient Findings         Clinical Outcomes     Negatives:  Major bleeding event, Thromboembolic event, Anticoagulation-related hospital admission, Anticoagulation-related ED visit, Anticoagulation-related fatality           OBJECTIVE    Recent labs: (last 7 days)     03/08/22  1046   INR 3.0*       ASSESSMENT / PLAN  INR assessment THER    Recheck INR In: 1 WEEK    INR Location Clinic      Anticoagulation Summary  As of 3/8/2022    INR goal:  2.0-3.0   TTR:  79.9 % (1 y)   INR used for dosing:  3.0 (3/8/2022)   Warfarin maintenance plan:  7.5 mg (5 mg x 1.5) every Mon, Wed, Fri; 5 mg (5 mg x 1) all other days   Full warfarin instructions:  7.5 mg every Mon, Wed, Fri; 5 mg all other days   Weekly warfarin total:  42.5 mg   No change documented:  Corry Galvan RN   Plan last modified:  Melanie Farris RN (1/19/2022)   Next INR check:  3/15/2022   Priority:  Maintenance   Target end date:  Indefinite    Indications    Long-term (current) use of anticoagulants [Z79.01] [Z79.01]  Pulmonary embolism and infarction (H) [I26.99]  Deep vein thrombosis (DVT) (H) [I82.409] [I82.409]             Anticoagulation Episode Summary     INR check location:  Home Draw    Preferred lab:      Send INR reminders to:  DAREN CAMEJO    Comments:  [employer].  Call cell phone with any dosing.      Anticoagulation Care Providers     Provider Role Specialty Phone number    Eliz Hartman, CNP Referring Family Medicine [phone]            See the Encounter Report to view Anticoagulation Flowsheet and Dosing Calendar (Go to Encounters tab in chart review, and find the Anticoagulation Therapy Visit)    States she is out of tests strips and waiting for them to come    Corry Galvan, MAKAYLA

## 2022-03-09 ASSESSMENT — ANXIETY QUESTIONNAIRES: GAD7 TOTAL SCORE: 0

## 2022-03-09 NOTE — RESULT ENCOUNTER NOTE
Lipids are high - risk score high  Is she willing to re-try a statin?  Fish oil 3 per day, low fat diet     Remaining labs are stable.     The 10-year ASCVD risk score (Len MARLEY Jr., et al., 2013) is: 9.9%    Values used to calculate the score:      Age: 66 years      Sex: Female      Is Non- : No      Diabetic: No      Tobacco smoker: No      Systolic Blood Pressure: 128 mmHg      Is BP treated: Yes      HDL Cholesterol: 34 mg/dL      Total Cholesterol: 166 mg/dL

## 2022-03-15 ENCOUNTER — ANTICOAGULATION THERAPY VISIT (OUTPATIENT)
Dept: ANTICOAGULATION | Facility: OTHER | Age: 67
End: 2022-03-15
Attending: NURSE PRACTITIONER
Payer: MEDICARE

## 2022-03-15 DIAGNOSIS — Z79.01 LONG TERM CURRENT USE OF ANTICOAGULANT THERAPY: Primary | ICD-10-CM

## 2022-03-15 DIAGNOSIS — I26.99 PULMONARY EMBOLISM AND INFARCTION (H): ICD-10-CM

## 2022-03-15 DIAGNOSIS — I82.409 DEEP VEIN THROMBOSIS (DVT) (H): ICD-10-CM

## 2022-03-15 LAB — INR HOME MONITORING: 3.8 (ref 2–3)

## 2022-03-15 NOTE — PROGRESS NOTES
ANTICOAGULATION FOLLOW-UP CLINIC VISIT    Patient Name:  Cassy Avila  Date:  3/15/2022  Contact Type:  Telephone/ spoke with patient reviewed dosing and when to recheck INR    SUBJECTIVE:  Patient Findings     Positives:  Change in medications (d/c duricef started on doxycycline  says it could be up to 6 months on antibiotic)        Clinical Outcomes     Negatives:  Major bleeding event, Thromboembolic event, Anticoagulation-related hospital admission, Anticoagulation-related ED visit, Anticoagulation-related fatality           OBJECTIVE    Recent labs: (last 7 days)     03/15/22  0000   INR 3.80*       ASSESSMENT / PLAN  INR assessment SUPRA    Recheck INR In: 1 WEEK    INR Location Home INR      Anticoagulation Summary  As of 3/15/2022    INR goal:  2.0-3.0   TTR:  79.2 % (1 y)   INR used for dosing:  3.80 (3/15/2022)   Warfarin maintenance plan:  7.5 mg (5 mg x 1.5) every Mon, Fri; 5 mg (5 mg x 1) all other days   Full warfarin instructions:  3/15: Hold; Otherwise 7.5 mg every Mon, Fri; 5 mg all other days   Weekly warfarin total:  40 mg   Plan last modified:  Corry Galvan, RN (3/15/2022)   Next INR check:  3/22/2022   Priority:  High   Target end date:  Indefinite    Indications    Long-term (current) use of anticoagulants [Z79.01] [Z79.01]  Pulmonary embolism and infarction (H) [I26.99]  Deep vein thrombosis (DVT) (H) [I82.409] [I82.409]             Anticoagulation Episode Summary     INR check location:  Home Draw    Preferred lab:      Send INR reminders to:  DAREN CAMEJO    Comments:  [employer].  Call cell phone with any dosing.      Anticoagulation Care Providers     Provider Role Specialty Phone number    Flmadalyn Eliz, CNP Referring Family Medicine [phone]            See the Encounter Report to view Anticoagulation Flowsheet and Dosing Calendar (Go to Encounters tab in chart review, and find the Anticoagulation Therapy Visit)        Corry Galvan, RN

## 2022-03-22 ENCOUNTER — ANTICOAGULATION THERAPY VISIT (OUTPATIENT)
Dept: ANTICOAGULATION | Facility: OTHER | Age: 67
End: 2022-03-22
Attending: NURSE PRACTITIONER
Payer: COMMERCIAL

## 2022-03-22 DIAGNOSIS — I26.99 PULMONARY EMBOLISM AND INFARCTION (H): ICD-10-CM

## 2022-03-22 DIAGNOSIS — I82.409 DEEP VEIN THROMBOSIS (DVT) (H): ICD-10-CM

## 2022-03-22 DIAGNOSIS — Z79.01 LONG TERM CURRENT USE OF ANTICOAGULANT THERAPY: Primary | ICD-10-CM

## 2022-03-22 LAB — INR HOME MONITORING: 3 (ref 2–3)

## 2022-03-22 NOTE — PROGRESS NOTES
ANTICOAGULATION FOLLOW-UP CLINIC VISIT    Patient Name:  Cassy Avila  Date:  3/22/2022  Contact Type:  Telephone/ spoke with patient reviewed dosing and when to recheck INR    SUBJECTIVE:  Patient Findings         Clinical Outcomes     Negatives:  Major bleeding event, Thromboembolic event, Anticoagulation-related hospital admission, Anticoagulation-related ED visit, Anticoagulation-related fatality           OBJECTIVE    Recent labs: (last 7 days)     03/22/22  0000   INR 3.00       ASSESSMENT / PLAN  INR assessment THER    Recheck INR In: 2 WEEKS    INR Location Home INR      Anticoagulation Summary  As of 3/22/2022    INR goal:  2.0-3.0   TTR:  79.2 % (1 y)   INR used for dosing:  3.00 (3/22/2022)   Warfarin maintenance plan:  7.5 mg (5 mg x 1.5) every Mon, Fri; 5 mg (5 mg x 1) all other days   Full warfarin instructions:  7.5 mg every Mon, Fri; 5 mg all other days   Weekly warfarin total:  40 mg   No change documented:  Nikki Deshpande RN   Plan last modified:  Corry Galvan RN (3/15/2022)   Next INR check:  4/5/2022   Priority:  High   Target end date:  Indefinite    Indications    Long-term (current) use of anticoagulants [Z79.01] [Z79.01]  Pulmonary embolism and infarction (H) [I26.99]  Deep vein thrombosis (DVT) (H) [I82.409] [I82.409]             Anticoagulation Episode Summary     INR check location:  Home Draw    Preferred lab:      Send INR reminders to:  DAREN CAMEJO    Comments:  [employer].  Call cell phone with any dosing.      Anticoagulation Care Providers     Provider Role Specialty Phone number    Eliz Hartman CNP Referring Family Medicine [phone]            See the Encounter Report to view Anticoagulation Flowsheet and Dosing Calendar (Go to Encounters tab in chart review, and find the Anticoagulation Therapy Visit)      Nikki Deshpande, MAKAYLA

## 2022-04-05 ENCOUNTER — ANTICOAGULATION THERAPY VISIT (OUTPATIENT)
Dept: ANTICOAGULATION | Facility: OTHER | Age: 67
End: 2022-04-05
Attending: NURSE PRACTITIONER
Payer: MEDICARE

## 2022-04-05 DIAGNOSIS — I26.99 PULMONARY EMBOLISM AND INFARCTION (H): ICD-10-CM

## 2022-04-05 DIAGNOSIS — I82.409 DEEP VEIN THROMBOSIS (DVT) (H): ICD-10-CM

## 2022-04-05 DIAGNOSIS — Z79.01 LONG TERM CURRENT USE OF ANTICOAGULANT THERAPY: Primary | ICD-10-CM

## 2022-04-05 LAB — INR HOME MONITORING: 2.5 (ref 2–3)

## 2022-04-05 NOTE — PROGRESS NOTES
ANTICOAGULATION FOLLOW-UP CLINIC VISIT    Patient Name:  Cassy Avila  Date:  2022  Contact Type:  no call needed patient to continue same dose    SUBJECTIVE:  Patient Findings         Clinical Outcomes     Negatives:  Major bleeding event, Thromboembolic event, Anticoagulation-related hospital admission, Anticoagulation-related ED visit, Anticoagulation-related fatality           OBJECTIVE    Recent labs: (last 7 days)     22  0000   INR 2.50       ASSESSMENT / PLAN  INR assessment THER    Recheck INR In: 2 WEEKS    INR Location Home INR      Anticoagulation Summary  As of 2022    INR goal:  2.0-3.0   TTR:  80.7 % (1 y)   INR used for dosin.50 (2022)   Warfarin maintenance plan:  7.5 mg (5 mg x 1.5) every Mon, Fri; 5 mg (5 mg x 1) all other days   Full warfarin instructions:  7.5 mg every Mon, Fri; 5 mg all other days   Weekly warfarin total:  40 mg   No change documented:  Corry Galvan RN   Plan last modified:  Corry Galvan RN (3/15/2022)   Next INR check:  2022   Priority:  High   Target end date:  Indefinite    Indications    Long-term (current) use of anticoagulants [Z79.01] [Z79.01]  Pulmonary embolism and infarction (H) [I26.99]  Deep vein thrombosis (DVT) (H) [I82.409] [I82.409]             Anticoagulation Episode Summary     INR check location:  Home Draw    Preferred lab:      Send INR reminders to:  DAREN CAMEJO    Comments:  [employer].  Call cell phone with any dosing.      Anticoagulation Care Providers     Provider Role Specialty Phone number    Eliz Hartman CNP [facility] [phone]            See the Encounter Report to view Anticoagulation Flowsheet and Dosing Calendar (Go to Encounters tab in chart review, and find the Anticoagulation Therapy Visit)        Corry Galvan RN

## 2022-04-11 NOTE — NURSING NOTE
"Chief Complaint   Patient presents with     Musculoskeletal Problem       Initial /70 (BP Location: Left arm, Patient Position: Sitting, Cuff Size: Adult Large)   Pulse 67   Temp 97.5  F (36.4  C) (Tympanic)   Resp 20   Wt 106.4 kg (234 lb 8 oz)   SpO2 93%   BMI 39.02 kg/m   Estimated body mass index is 39.02 kg/m  as calculated from the following:    Height as of 11/23/21: 1.651 m (5' 5\").    Weight as of this encounter: 106.4 kg (234 lb 8 oz).  Medication Reconciliation: complete  Arlene Ortiz LPN    " "Hi there - you are seeing this patient for a pre-op tomorrow 4/12 and may be wondering \"why didn't pam do the pre-op yesterday?\" The answer is because I didn't know she needed a pre-op until I sat down to do her routine preventative and didn't have time to switch everything over to a pre-op! I am hopeful it will be a pretty easy visit but let me know if I can be of help. -HB"

## 2022-04-19 DIAGNOSIS — I10 ESSENTIAL HYPERTENSION: ICD-10-CM

## 2022-04-19 DIAGNOSIS — Z79.01 LONG TERM CURRENT USE OF ANTICOAGULANT THERAPY: ICD-10-CM

## 2022-04-21 ENCOUNTER — ANTICOAGULATION THERAPY VISIT (OUTPATIENT)
Dept: ANTICOAGULATION | Facility: OTHER | Age: 67
End: 2022-04-21
Attending: NURSE PRACTITIONER
Payer: MEDICARE

## 2022-04-21 DIAGNOSIS — I26.99 PULMONARY EMBOLISM AND INFARCTION (H): ICD-10-CM

## 2022-04-21 DIAGNOSIS — Z79.01 LONG TERM CURRENT USE OF ANTICOAGULANT THERAPY: Primary | ICD-10-CM

## 2022-04-21 DIAGNOSIS — I82.409 DEEP VEIN THROMBOSIS (DVT) (H): ICD-10-CM

## 2022-04-21 LAB — INR POINT OF CARE: 1.8 (ref 0.9–1.1)

## 2022-04-21 PROCEDURE — 85610 PROTHROMBIN TIME: CPT | Mod: ZL,QW

## 2022-04-21 RX ORDER — HYDROCHLOROTHIAZIDE 25 MG/1
TABLET ORAL
Qty: 90 TABLET | Refills: 1 | Status: SHIPPED | OUTPATIENT
Start: 2022-04-21 | End: 2022-09-16

## 2022-04-21 RX ORDER — METOPROLOL SUCCINATE 50 MG/1
TABLET, EXTENDED RELEASE ORAL
Qty: 90 TABLET | Refills: 1 | Status: SHIPPED | OUTPATIENT
Start: 2022-04-21 | End: 2022-09-16

## 2022-04-21 RX ORDER — WARFARIN SODIUM 5 MG/1
TABLET ORAL
Qty: 109 TABLET | Refills: 0 | Status: SHIPPED | OUTPATIENT
Start: 2022-04-21 | End: 2022-07-22

## 2022-04-21 NOTE — PROGRESS NOTES
ANTICOAGULATION FOLLOW-UP CLINIC VISIT    Patient Name:  Cassy Avila  Date:  2022  Contact Type:  Telephone/ called and spoke with patient, dosing reviewed, no further questions    SUBJECTIVE:  Patient Findings     Positives:  Change in medications (starting doxycycline for one year)        Clinical Outcomes     Negatives:  Major bleeding event, Thromboembolic event, Anticoagulation-related hospital admission, Anticoagulation-related ED visit, Anticoagulation-related fatality           OBJECTIVE    Recent labs: (last 7 days)     22  1346   INR 1.8*       ASSESSMENT / PLAN  INR assessment SUB    Recheck INR In: 2 WEEKS    INR Location Home INR      Anticoagulation Summary  As of 2022    INR goal:  2.0-3.0   TTR:  79.5 % (1 y)   INR used for dosin.8 (2022)   Warfarin maintenance plan:  7.5 mg (5 mg x 1.5) every Mon, Wed, Fri; 5 mg (5 mg x 1) all other days   Full warfarin instructions:  7.5 mg every Mon, Wed, Fri; 5 mg all other days   Weekly warfarin total:  42.5 mg   Plan last modified:  Mary Galo RN (2022)   Next INR check:  2022   Priority:  High   Target end date:  Indefinite    Indications    Long-term (current) use of anticoagulants [Z79.01] [Z79.01]  Pulmonary embolism and infarction (H) [I26.99]  Deep vein thrombosis (DVT) (H) [I82.409] [I82.409]             Anticoagulation Episode Summary     INR check location:  Home Draw    Preferred lab:      Send INR reminders to:  DAREN CAMEJO    Comments:  [employer].  Call cell phone with any dosing.      Anticoagulation Care Providers     Provider Role Specialty Phone number    Eliz Hartman JANINA Responsible Family Medicine [phone]            See the Encounter Report to view Anticoagulation Flowsheet and Dosing Calendar (Go to Encounters tab in chart review, and find the Anticoagulation Therapy Visit)        Mary Galo RN

## 2022-04-21 NOTE — TELEPHONE ENCOUNTER
Warfarin       Last Written Prescription Date:  1/5/22  Last Fill Quantity: 109,   # refills: 0  Last Office Visit: 3/8/22  Future Office visit:       Routing refill request to provider for review/approval because:  INR   Date Value Ref Range Status   06/07/2021 1.8 (A) 0.90 - 1.10 Final     INR HOME MONITORING   Date Value Ref Range Status   04/05/2022 2.50 2.000 - 3.000 Final

## 2022-04-24 ENCOUNTER — HEALTH MAINTENANCE LETTER (OUTPATIENT)
Age: 67
End: 2022-04-24

## 2022-04-25 ENCOUNTER — MYC MEDICAL ADVICE (OUTPATIENT)
Dept: FAMILY MEDICINE | Facility: OTHER | Age: 67
End: 2022-04-25
Payer: COMMERCIAL

## 2022-04-26 NOTE — PROGRESS NOTES
Assessment & Plan       Chest congestion  - Pulmonary Function Test; Future  - General PFT Lab (Please always keep checked)    SOB (shortness of breath)  - General PFT Lab (Please always keep checked)  - albuterol (PROAIR HFA/PROVENTIL HFA/VENTOLIN HFA) 108 (90 Base) MCG/ACT inhaler; Inhale 2 puffs into the lungs every 6 hours    Pulmonary embolism and infarction (H)  - Pulmonary Function Test; Future  - General PFT Lab (Please always keep checked)      To ER with increased symptoms        Eliz Hartman CNP  [facility] - [place] Madrigal is a 66 year old who presents for the following health issues         Acute Illness  Acute illness concerns: shortness of breath and wheezing  Onset/Duration: 2 weeks  Symptoms:  Fever: no  Chills/Sweats: no  Headache (location?): no  Sinus Pressure: no  Conjunctivitis:  no  Ear Pain: no  Rhinorrhea: YES  Congestion: no  Sore Throat: no  Cough: YES-productive of clear sputum, productive of yellow sputum  Wheeze: YES  Decreased Appetite: no  Nausea: no  Vomiting: no  Diarrhea: no  Dysuria/Freq.: no  Dysuria or Hematuria: no  Fatigue/Achiness: no  Sick/Strep Exposure: no  Therapies tried and outcome: none        Patient Active Problem List   Diagnosis     Primary hypertension     Pulmonary embolism and infarction (H)     Contact dermatitis and other eczema, due to unspecified cause     Edema     Hyperlipidemia LDL goal <100     Neurofibromatosis, type 1 (H)     Advanced care planning/counseling discussion     Moderate episode of recurrent major depressive disorder (H)     Long-term (current) use of anticoagulants [Z79.01]     Deep vein thrombosis (DVT) (H) [I82.409]     Lumbar spondylosis with myelopathy     Degeneration of lumbar or lumbosacral intervertebral disc     Family history of colon cancer     Statin medication not prescribed per physician orders     Vitamin D deficiency     Failed arthroplasty (H)     H/O total shoulder replacement, left     Primary insomnia      Factor V Leiden mutation (H)     Obesity (BMI 35.0-39.9) with comorbidity (H)     Diarrhea     Abdominal pain, generalized     Chest pain, unspecified     Muscle weakness of right upper extremity     Pain in right upper arm     Stiffness of right shoulder joint     Activated protein C resistance (H)     Shoulder pain     Past Surgical History:   Procedure Laterality Date     ------------OTHER-------------      shoulder replacement; Provider: Karen     ARTHROPLASTY KNEE  2014    Procedure: ARTHROPLASTY KNEE;  Surgeon: Sean Alexander MD;  Location: [facility]     ARTHROSCOPY SHOULDER      right, bone spurs     CA ANESTH,SHOULDER REPLACEMENT Right 2020      SECTION      x3     CHOLECYSTECTOMY       COLONOSCOPY  02/15/2018    [place],,polyps     elbow ulnar tunnel release       ELECTROTHERMAL THERAPY INTRADISC  2017    stimulator     ENDOSCOPIC SINUS SURGERY, SEPTOPLASTY, TURBINOPLASTY, MAXILLARY SINUSOTOMY, COMBINED N/A 2015    Procedure: COMBINED ENDOSCOPIC SINUS SURGERY, SEPTOPLASTY, TURBINOPLASTY, MAXILLARY SINUSOTOMY;  Surgeon: Seema Conn MD;  Location: [facility]     ESOPHAGOSCOPY, GASTROSCOPY, DUODENOSCOPY (EGD), COMBINED      with biopsy and endoscopic U/S     EXCISE NEUROMA LOWER EXTREMITY Left 2016    Procedure: EXCISE NEUROMA LOWER EXTREMITY;  Surgeon: Edi Reed MD;  Location: [facility]     EYE SURGERY Right 2020     FUSION LUMBAR ANTERIOR WITH MARCY CAGES      L5-S1     HYSTERECTOMY TOTAL ABDOMINAL, BILATERAL SALPINGO-OOPHORECTOMY, COMBINED N/A      ORTHOPEDIC SURGERY  2015    right shoulder     ORTHOPEDIC SURGERY  2015    right knee     ORTHOPEDIC SURGERY Right 2018    hip labrum tear     pionidal cyst excision       TRANSPOSITION ULNAR NERVE (ELBOW)         Social History     Tobacco Use     Smoking status: Former Smoker     Packs/day: 1.00     Years: 30.00     Pack years: 30.00     Types: Cigarettes, Pipe     Start date:  1969     Quit date: 1999     Years since quittin.3     Smokeless tobacco: Never Used     Tobacco comment: quit in    Substance Use Topics     Alcohol use: No     Family History   Problem Relation Age of Onset     Cancer Mother      Colon Polyps Mother      Heart Failure Mother 87        congestive, cause of death     Myocardial Infarction Mother         myocardial infarction     Myocardial Infarction Father         myocardial infarction - cause of death     C.A.D. Father      Cancer Paternal Uncle         cause of death     C.A.D. Brother      Other - See Comments Other         factor 5 - family h/o     Asthma No family hx of              Current Outpatient Medications   Medication Sig Dispense Refill     albuterol (PROVENTIL) (2.5 MG/3ML) 0.083% neb solution Take 2.5 mg by nebulization every 6 hours as needed for shortness of breath / dyspnea or wheezing       aspirin 81 MG EC tablet Take 81 mg by mouth daily HS       Cholecalciferol (VITAMIN D3 PO) Take 3,000 mg by mouth daily AM       doxycycline monohydrate (MONODOX) 100 MG capsule Take 100 mg by mouth 2 times daily       escitalopram (LEXAPRO) 10 MG tablet Take 1 tablet by mouth once daily 90 tablet 0     HEMP OIL OR EXTRACT OR OTHER CBD CANNABINOID, NOT MEDICAL CANNABIS,        hydrochlorothiazide (HYDRODIURIL) 25 MG tablet Take 1 tablet by mouth once daily 90 tablet 1     losartan (COZAAR) 100 MG tablet 1 po every day 90 tablet 1     metoprolol succinate ER (TOPROL-XL) 50 MG 24 hr tablet TAKE 1 & 1/2 (ONE & ONE-HALF) TABLETS BY MOUTH ONCE DAILY 90 tablet 1     omeprazole (PRILOSEC) 20 MG DR capsule Take 20 mg by mouth       order for DME Nebulizer machine and tubing    DX:  Bronchitis 1 Device 0     potassium chloride ER (KLOR-CON M) 10 MEQ CR tablet Take 1 tablet (10 mEq) by mouth daily 90 tablet 3     traZODone (DESYREL) 50 MG tablet TAKE 1 TO 2 TABLETS BY MOUTH NIGHTLY AS NEEDED 180 tablet 0     warfarin ANTICOAGULANT (COUMADIN) 5 MG  tablet TAKE 1.5 TABLETS BY MOUTH ON MONDAY, WEDNESDAY, AND FRIDAY AND 1 TABLET ALL OTHER DAYS OR AS DIRECTED BY [facility] 109 tablet 0         Allergies   Allergen Reactions     Amlodipine Besylate Swelling     Norvasc     Amoxicillin      Atorvastatin      myualgia     Cephalexin Monohydrate Hives     Keflex     Erythromycin Base [Kdc:Yellow Dye+Erythromycin+Brilliant Blue Fcf] Nausea and Vomiting     Meloxicam Other (See Comments)     Mobic - confusion, depression     Sulfa Drugs Hives     Adhesive Tape Rash     Prochlorperazine Edisylate Swelling and Rash     Compazine     Prochlorperazine Maleate Swelling and Rash         Recent Labs   Lab Test 03/08/22  1046 08/27/21  1221 08/27/21  0917 04/16/21  1014 02/08/21  1432 11/02/20  1018 10/26/20  1123 02/03/20  1451 09/27/19  1137   LDL 95  --  127*  --   --   --   --   --  122*   HDL 34*  --  34*  --   --   --   --   --  36*   TRIG 184*  --  255*  --   --   --   --   --  280*   ALT 28 33  --  29 36  --   --    < > 34   CR 0.94 1.04  --  0.95 0.92  --  0.94   < > 0.91   GFRESTIMATED 66 56*  --  62 65  --  63   < > 67   GFRESTBLACK  --   --   --  72 75  --  73   < > 77   POTASSIUM 3.6 3.5  --  3.3* 3.4   < > 3.3*   < > 3.8   TSH  --  2.86  --   --   --   --  2.08  --  1.42    < > = values in this interval not displayed.          BP Readings from Last 3 Encounters:   04/27/22 124/78   03/08/22 128/60   12/08/21 130/70    Wt Readings from Last 3 Encounters:   04/27/22 103.9 kg (229 lb)   03/08/22 104.4 kg (230 lb 1.6 oz)   12/08/21 106.4 kg (234 lb 8 oz)              Review of Systems   Constitutional, HEENT, cardiovascular, pulmonary, GI, , musculoskeletal, neuro, skin, endocrine and psych systems are negative, except as otherwise noted.          Objective    /78 (BP Location: Left arm, Patient Position: Sitting, Cuff Size: Adult Large)   Pulse 74   Temp 98.4  F (36.9  C) (Tympanic)   Resp 20   Wt 103.9 kg (229 lb)   SpO2 97%   BMI 38.11 kg/m     Body mass index is 38.11 kg/m .       Physical Exam   GENERAL: healthy, alert and no distress  NECK: no adenopathy, no asymmetry, masses, or scars and thyroid normal to palpation  RESP: lungs clear to auscultation - no rales, rhonchi or wheezes  CV: regular rate and rhythm, normal S1 S2, no S3 or S4, no murmur, click or rub, no peripheral edema and peripheral pulses strong  SKIN: no suspicious lesions or rashes  PSYCH: mentation appears normal, affect normal/bright          PROCEDURE: XR CHEST 2 VW 4/27/2022 10:47 AM     HISTORY: Chest congestion     COMPARISONS: 12/14/2020.     TECHNIQUE: 2 views.     FINDINGS: Heart is stable in size. No confluent infiltrate is seen and  there is no pleural effusion. Mild linear changes at the lung bases  appear chronic.     There are bilateral reverse shoulder arthroplasties. There is  multilevel degenerative change in the spine. Surgical clips are seen  in the upper abdomen anteriorly.                                                                        IMPRESSION: No acute infiltrate.     KAT DACOSTA MD

## 2022-04-27 ENCOUNTER — ANCILLARY PROCEDURE (OUTPATIENT)
Dept: GENERAL RADIOLOGY | Facility: OTHER | Age: 67
End: 2022-04-27
Attending: NURSE PRACTITIONER
Payer: MEDICARE

## 2022-04-27 ENCOUNTER — OFFICE VISIT (OUTPATIENT)
Dept: FAMILY MEDICINE | Facility: OTHER | Age: 67
End: 2022-04-27
Attending: NURSE PRACTITIONER
Payer: COMMERCIAL

## 2022-04-27 VITALS
DIASTOLIC BLOOD PRESSURE: 78 MMHG | HEART RATE: 74 BPM | WEIGHT: 229 LBS | TEMPERATURE: 98.4 F | RESPIRATION RATE: 20 BRPM | BODY MASS INDEX: 38.11 KG/M2 | OXYGEN SATURATION: 97 % | SYSTOLIC BLOOD PRESSURE: 124 MMHG

## 2022-04-27 DIAGNOSIS — I26.99 PULMONARY EMBOLISM AND INFARCTION (H): ICD-10-CM

## 2022-04-27 DIAGNOSIS — R06.02 SOB (SHORTNESS OF BREATH): ICD-10-CM

## 2022-04-27 DIAGNOSIS — R09.89 CHEST CONGESTION: ICD-10-CM

## 2022-04-27 DIAGNOSIS — R09.89 CHEST CONGESTION: Primary | ICD-10-CM

## 2022-04-27 PROCEDURE — 71046 X-RAY EXAM CHEST 2 VIEWS: CPT | Mod: TC,FY

## 2022-04-27 PROCEDURE — G0463 HOSPITAL OUTPT CLINIC VISIT: HCPCS

## 2022-04-27 PROCEDURE — G0463 HOSPITAL OUTPT CLINIC VISIT: HCPCS | Mod: 25

## 2022-04-27 PROCEDURE — 99214 OFFICE O/P EST MOD 30 MIN: CPT | Performed by: NURSE PRACTITIONER

## 2022-04-27 RX ORDER — ALBUTEROL SULFATE 90 UG/1
2 AEROSOL, METERED RESPIRATORY (INHALATION) EVERY 6 HOURS
Qty: 18 G | Refills: 1 | Status: SHIPPED | OUTPATIENT
Start: 2022-04-27 | End: 2023-08-22

## 2022-04-27 RX ORDER — ALBUTEROL SULFATE 0.83 MG/ML
2.5 SOLUTION RESPIRATORY (INHALATION) EVERY 6 HOURS PRN
COMMUNITY

## 2022-04-27 RX ORDER — DOXYCYCLINE 100 MG/1
100 CAPSULE ORAL 2 TIMES DAILY
COMMUNITY
Start: 2022-04-16 | End: 2023-03-17

## 2022-04-27 ASSESSMENT — PAIN SCALES - GENERAL: PAINLEVEL: NO PAIN (0)

## 2022-04-27 NOTE — PATIENT INSTRUCTIONS
Assessment & Plan       Chest congestion  - Pulmonary Function Test; Future  - General PFT Lab (Please always keep checked)      SOB (shortness of breath)  - General PFT Lab (Please always keep checked)  - albuterol (PROAIR HFA/PROVENTIL HFA/VENTOLIN HFA) 108 (90 Base) MCG/ACT inhaler; Inhale 2 puffs into the lungs every 6 hours      Pulmonary embolism and infarction (H)  - Pulmonary Function Test; Future  - General PFT Lab (Please always keep checked)      To ER with increased symptoms        Eliz Hartman CNP  [facility] - [place] [facility]

## 2022-04-27 NOTE — NURSING NOTE
"No chief complaint on file.      Initial /78 (BP Location: Left arm, Patient Position: Sitting, Cuff Size: Adult Large)   Pulse 74   Temp 98.4  F (36.9  C) (Tympanic)   Resp 20   Wt 103.9 kg (229 lb)   SpO2 97%   BMI 38.11 kg/m   Estimated body mass index is 38.11 kg/m  as calculated from the following:    Height as of 11/23/21: 1.651 m (5' 5\").    Weight as of this encounter: 103.9 kg (229 lb).  Medication Reconciliation: complete  Arlene Ortiz LPN    "

## 2022-05-03 ENCOUNTER — ANTICOAGULATION THERAPY VISIT (OUTPATIENT)
Dept: ANTICOAGULATION | Facility: OTHER | Age: 67
End: 2022-05-03
Attending: NURSE PRACTITIONER
Payer: COMMERCIAL

## 2022-05-03 DIAGNOSIS — I26.99 PULMONARY EMBOLISM AND INFARCTION (H): ICD-10-CM

## 2022-05-03 DIAGNOSIS — Z79.01 LONG TERM CURRENT USE OF ANTICOAGULANT THERAPY: Primary | ICD-10-CM

## 2022-05-03 DIAGNOSIS — I82.409 DEEP VEIN THROMBOSIS (DVT) (H): ICD-10-CM

## 2022-05-03 LAB — INR HOME MONITORING: 2.9 (ref 2–3)

## 2022-05-03 NOTE — PROGRESS NOTES
ANTICOAGULATION FOLLOW-UP CLINIC VISIT    Patient Name:  Cassy Avila  Date:  5/3/2022  Contact Type:  no call needed patient to continue same dose    SUBJECTIVE:  Patient Findings         Clinical Outcomes     Negatives:  Major bleeding event, Thromboembolic event, Anticoagulation-related hospital admission, Anticoagulation-related ED visit, Anticoagulation-related fatality           OBJECTIVE    Recent labs: (last 7 days)     22  0000   INR 2.90       ASSESSMENT / PLAN  INR assessment THER    Recheck INR In: 2 WEEKS    INR Location Home INR      Anticoagulation Summary  As of 5/3/2022    INR goal:  2.0-3.0   TTR:  78.9 % (1 y)   INR used for dosin.90 (5/3/2022)   Warfarin maintenance plan:  7.5 mg (5 mg x 1.5) every Mon, Wed, Fri; 5 mg (5 mg x 1) all other days   Full warfarin instructions:  7.5 mg every Mon, Wed, Fri; 5 mg all other days   Weekly warfarin total:  42.5 mg   No change documented:  Corry Galvan RN   Plan last modified:  Mary Galo RN (2022)   Next INR check:  2022   Priority:  High   Target end date:  Indefinite    Indications    Long-term (current) use of anticoagulants [Z79.01] [Z79.01]  Pulmonary embolism and infarction (H) [I26.99]  Deep vein thrombosis (DVT) (H) [I82.409] [I82.409]             Anticoagulation Episode Summary     INR check location:  Home Draw    Preferred lab:      Send INR reminders to:  DAREN CAMEJO    Comments:  [employer] Monitoring.  Call cell phone with any dosing.      Anticoagulation Care Providers     Provider Role Specialty Phone number    Eliz HartmanJANINA Responsible Family Medicine [phone]            See the Encounter Report to view Anticoagulation Flowsheet and Dosing Calendar (Go to Encounters tab in chart review, and find the Anticoagulation Therapy Visit)        Corry Galvan, RN

## 2022-05-06 ENCOUNTER — OFFICE VISIT (OUTPATIENT)
Dept: FAMILY MEDICINE | Facility: OTHER | Age: 67
End: 2022-05-06
Attending: NURSE PRACTITIONER
Payer: COMMERCIAL

## 2022-05-06 VITALS
SYSTOLIC BLOOD PRESSURE: 128 MMHG | DIASTOLIC BLOOD PRESSURE: 84 MMHG | WEIGHT: 230 LBS | TEMPERATURE: 97.8 F | OXYGEN SATURATION: 97 % | RESPIRATION RATE: 18 BRPM | HEART RATE: 64 BPM | BODY MASS INDEX: 38.27 KG/M2

## 2022-05-06 DIAGNOSIS — R21 RASH: Primary | ICD-10-CM

## 2022-05-06 PROCEDURE — 99213 OFFICE O/P EST LOW 20 MIN: CPT | Performed by: NURSE PRACTITIONER

## 2022-05-06 PROCEDURE — G0463 HOSPITAL OUTPT CLINIC VISIT: HCPCS

## 2022-05-06 RX ORDER — PREDNISONE 20 MG/1
20 TABLET ORAL DAILY
Qty: 5 TABLET | Refills: 0 | Status: SHIPPED | OUTPATIENT
Start: 2022-05-06 | End: 2022-05-11

## 2022-05-06 ASSESSMENT — PAIN SCALES - GENERAL: PAINLEVEL: NO PAIN (0)

## 2022-05-06 NOTE — NURSING NOTE
"Chief Complaint   Patient presents with     Derm Problem       Initial /84 (BP Location: Left arm, Patient Position: Sitting, Cuff Size: Adult Large)   Pulse 64   Temp 97.8  F (36.6  C) (Tympanic)   Resp 18   Wt 104.3 kg (230 lb)   SpO2 97%   BMI 38.27 kg/m   Estimated body mass index is 38.27 kg/m  as calculated from the following:    Height as of 11/23/21: 1.651 m (5' 5\").    Weight as of this encounter: 104.3 kg (230 lb).  Medication Reconciliation: complete  Arlene Ortiz LPN  "

## 2022-05-06 NOTE — PROGRESS NOTES
Assessment & Plan        Rash  - predniSONE (DELTASONE) 20 MG tablet; Take 1 tablet (20 mg) by mouth daily for 5 days  - Aveeno lotion OTC      Follow-up as needed      Eliz Hartman CNP  [facility] - [place] Madrigal is a 66 year old who presents for the following health issues         Rash  Onset/Duration: 3 days  Description  Location: arms  Character: burning, red  Itching: no  Intensity:  mild  Progression of Symptoms:  worsening  Accompanying signs and symptoms:   Fever: no  Body aches or joint pain: no  Sore throat symptoms: no  Recent cold symptoms: no  History:           Previous episodes of similar rash: None  New exposures:  None  Recent travel: no  Exposure to similar rash: no  Precipitating or alleviating factors: none  Therapies tried and outcome: none        Patient Active Problem List   Diagnosis     Primary hypertension     Pulmonary embolism and infarction (H)     Contact dermatitis and other eczema, due to unspecified cause     Edema     Hyperlipidemia LDL goal <100     Neurofibromatosis, type 1 (H)     Advanced care planning/counseling discussion     Moderate episode of recurrent major depressive disorder (H)     Long-term (current) use of anticoagulants [Z79.01]     Deep vein thrombosis (DVT) (H) [I82.409]     Lumbar spondylosis with myelopathy     Degeneration of lumbar or lumbosacral intervertebral disc     Family history of colon cancer     Statin medication not prescribed per physician orders     Vitamin D deficiency     Failed arthroplasty (H)     H/O total shoulder replacement, left     Primary insomnia     Factor V Leiden mutation (H)     Obesity (BMI 35.0-39.9) with comorbidity (H)     Diarrhea     Abdominal pain, generalized     Chest pain, unspecified     Muscle weakness of right upper extremity     Pain in right upper arm     Stiffness of right shoulder joint     Activated protein C resistance (H)     Shoulder pain     Past Surgical History:   Procedure Laterality  Date     ------------OTHER-------------      shoulder replacement; Provider: Karen     ARTHROPLASTY KNEE  2014    Procedure: ARTHROPLASTY KNEE;  Surgeon: Sean Alexander MD;  Location: [facility]     ARTHROSCOPY SHOULDER      right, bone spurs     CA ANESTH,SHOULDER REPLACEMENT Right 2020      SECTION      x3     CHOLECYSTECTOMY       COLONOSCOPY  02/15/2018    [place],,polyps     elbow ulnar tunnel release       ELECTROTHERMAL THERAPY INTRADISC  2017    stimulator     ENDOSCOPIC SINUS SURGERY, SEPTOPLASTY, TURBINOPLASTY, MAXILLARY SINUSOTOMY, COMBINED N/A 2015    Procedure: COMBINED ENDOSCOPIC SINUS SURGERY, SEPTOPLASTY, TURBINOPLASTY, MAXILLARY SINUSOTOMY;  Surgeon: Seema Conn MD;  Location: [facility]     ESOPHAGOSCOPY, GASTROSCOPY, DUODENOSCOPY (EGD), COMBINED      with biopsy and endoscopic U/S     EXCISE NEUROMA LOWER EXTREMITY Left 2016    Procedure: EXCISE NEUROMA LOWER EXTREMITY;  Surgeon: Edi Reed MD;  Location: [facility]     EYE SURGERY Right 2020     FUSION LUMBAR ANTERIOR WITH MARCY CAGES      L5-S1     HYSTERECTOMY TOTAL ABDOMINAL, BILATERAL SALPINGO-OOPHORECTOMY, COMBINED N/A      ORTHOPEDIC SURGERY  2015    right shoulder     ORTHOPEDIC SURGERY  2015    right knee     ORTHOPEDIC SURGERY Right 2018    hip labrum tear     pionidal cyst excision       TRANSPOSITION ULNAR NERVE (ELBOW)         Social History     Tobacco Use     Smoking status: Former Smoker     Packs/day: 1.00     Years: 30.00     Pack years: 30.00     Types: Cigarettes, Pipe     Start date: 1969     Quit date: 1999     Years since quittin.3     Smokeless tobacco: Never Used     Tobacco comment: quit in    Substance Use Topics     Alcohol use: No     Family History   Problem Relation Age of Onset     Cancer Mother      Colon Polyps Mother      Heart Failure Mother 87        congestive, cause of death     Myocardial Infarction Mother          myocardial infarction     Myocardial Infarction Father         myocardial infarction - cause of death     C.A.D. Father      Cancer Paternal Uncle         cause of death     C.A.D. Brother      Other - See Comments Other         factor 5 - family h/o     Asthma No family hx of              Current Outpatient Medications   Medication Sig Dispense Refill     albuterol (PROAIR HFA/PROVENTIL HFA/VENTOLIN HFA) 108 (90 Base) MCG/ACT inhaler Inhale 2 puffs into the lungs every 6 hours 18 g 1     albuterol (PROVENTIL) (2.5 MG/3ML) 0.083% neb solution Take 2.5 mg by nebulization every 6 hours as needed for shortness of breath / dyspnea or wheezing       aspirin 81 MG EC tablet Take 81 mg by mouth daily HS       Cholecalciferol (VITAMIN D3 PO) Take 3,000 mg by mouth daily AM       doxycycline monohydrate (MONODOX) 100 MG capsule Take 100 mg by mouth 2 times daily       escitalopram (LEXAPRO) 10 MG tablet Take 1 tablet by mouth once daily 90 tablet 0     HEMP OIL OR EXTRACT OR OTHER CBD CANNABINOID, NOT MEDICAL CANNABIS,        hydrochlorothiazide (HYDRODIURIL) 25 MG tablet Take 1 tablet by mouth once daily 90 tablet 1     losartan (COZAAR) 100 MG tablet 1 po every day 90 tablet 1     metoprolol succinate ER (TOPROL-XL) 50 MG 24 hr tablet TAKE 1 & 1/2 (ONE & ONE-HALF) TABLETS BY MOUTH ONCE DAILY 90 tablet 1     omeprazole (PRILOSEC) 20 MG DR capsule Take 20 mg by mouth       order for DME Nebulizer machine and tubing    DX:  Bronchitis 1 Device 0     potassium chloride ER (KLOR-CON M) 10 MEQ CR tablet Take 1 tablet (10 mEq) by mouth daily 90 tablet 3     traZODone (DESYREL) 50 MG tablet TAKE 1 TO 2 TABLETS BY MOUTH NIGHTLY AS NEEDED 180 tablet 0     warfarin ANTICOAGULANT (COUMADIN) 5 MG tablet TAKE 1.5 TABLETS BY MOUTH ON MONDAY, WEDNESDAY, AND FRIDAY AND 1 TABLET ALL OTHER DAYS OR AS DIRECTED BY [facility] 109 tablet 0         Allergies   Allergen Reactions     Amlodipine Besylate Swelling     Norvasc     Amoxicillin       Atorvastatin      myualgia     Cephalexin Monohydrate Hives     Keflex     Erythromycin Base [Kdc:Yellow Dye+Erythromycin+Brilliant Blue Fcf] Nausea and Vomiting     Meloxicam Other (See Comments)     Mobic - confusion, depression     Sulfa Drugs Hives     Adhesive Tape Rash     Prochlorperazine Edisylate Swelling and Rash     Compazine     Prochlorperazine Maleate Swelling and Rash         Recent Labs   Lab Test 03/08/22  1046 08/27/21  1221 08/27/21  0917 04/16/21  1014 02/08/21  1432 11/02/20  1018 10/26/20  1123 02/03/20  1451 09/27/19  1137   LDL 95  --  127*  --   --   --   --   --  122*   HDL 34*  --  34*  --   --   --   --   --  36*   TRIG 184*  --  255*  --   --   --   --   --  280*   ALT 28 33  --  29 36  --   --    < > 34   CR 0.94 1.04  --  0.95 0.92  --  0.94   < > 0.91   GFRESTIMATED 66 56*  --  62 65  --  63   < > 67   GFRESTBLACK  --   --   --  72 75  --  73   < > 77   POTASSIUM 3.6 3.5  --  3.3* 3.4   < > 3.3*   < > 3.8   TSH  --  2.86  --   --   --   --  2.08  --  1.42    < > = values in this interval not displayed.          BP Readings from Last 3 Encounters:   05/06/22 128/84   04/27/22 124/78   03/08/22 128/60    Wt Readings from Last 3 Encounters:   05/06/22 104.3 kg (230 lb)   04/27/22 103.9 kg (229 lb)   03/08/22 104.4 kg (230 lb 1.6 oz)                Review of Systems   Constitutional, HEENT, cardiovascular, pulmonary, gi and gu systems are negative, except as otherwise noted.        Objective    /84 (BP Location: Left arm, Patient Position: Sitting, Cuff Size: Adult Large)   Pulse 64   Temp 97.8  F (36.6  C) (Tympanic)   Resp 18   Wt 104.3 kg (230 lb)   SpO2 97%   BMI 38.27 kg/m    Body mass index is 38.27 kg/m .       Physical Exam   GENERAL: healthy, alert and no distress  NECK: no adenopathy, no asymmetry, masses, or scars and thyroid normal to palpation  RESP: lungs clear to auscultation - no rales, rhonchi or wheezes  CV: regular rate and rhythm, normal S1 S2, no S3 or  S4, no murmur, click or rub, no peripheral edema and peripheral pulses strong  SKIN: bilateral forearm rash, burning of skin - petichiae  PSYCH: mentation appears normal, affect normal/bright

## 2022-05-06 NOTE — PATIENT INSTRUCTIONS
Assessment & Plan        Rash  - predniSONE (DELTASONE) 20 MG tablet; Take 1 tablet (20 mg) by mouth daily for 5 days  - Aveeno lotion OTC      Follow-up as needed      Eliz Hartman CNP  [facility] - [place] [facility]

## 2022-05-17 ENCOUNTER — ANTICOAGULATION THERAPY VISIT (OUTPATIENT)
Dept: ANTICOAGULATION | Facility: OTHER | Age: 67
End: 2022-05-17
Attending: NURSE PRACTITIONER
Payer: MEDICARE

## 2022-05-17 DIAGNOSIS — Z79.01 LONG TERM CURRENT USE OF ANTICOAGULANT THERAPY: Primary | ICD-10-CM

## 2022-05-17 DIAGNOSIS — I82.409 DEEP VEIN THROMBOSIS (DVT) (H): ICD-10-CM

## 2022-05-17 DIAGNOSIS — I26.99 PULMONARY EMBOLISM AND INFARCTION (H): ICD-10-CM

## 2022-05-17 LAB — INR HOME MONITORING: 2.5 (ref 2–3)

## 2022-05-17 NOTE — PROGRESS NOTES
ANTICOAGULATION MANAGEMENT     Cassy Avila 66 year old female is on warfarin with therapeutic INR result. (Goal INR 2.0-3.0)    Recent labs: (last 7 days)     05/17/22  0000   INR 2.50       ASSESSMENT       Source(s): Chart Review    Previous INR was Therapeutic last 2(+) visits    Medication, diet, health changes since last INR patient see OV for rash started/finished prednisone x 5 days           PLAN     Recommended plan for no diet, medication or health factor changes affecting INR     Dosing Instructions: continue your current warfarin dose with next INR in 2 weeks       Summary  As of 5/17/2022    Full warfarin instructions:  7.5 mg every Mon, Wed, Fri; 5 mg all other days   Next INR check:  5/31/2022             no call needed patient to continue same dose    Patient to recheck with home meter    Education provided: None required    Plan made per [facility] anticoagulation protocol    Corry Galvan, RN  Anticoagulation Clinic  5/17/2022    _______________________________________________________________________     Anticoagulation Episode Summary     Current INR goal:  2.0-3.0   TTR:  79.2 % (1 y)   Target end date:  Indefinite   Send INR reminders to:  [facility] [place]    Indications    Long-term (current) use of anticoagulants [Z79.01] [Z79.01]  Pulmonary embolism and infarction (H) [I26.99]  Deep vein thrombosis (DVT) (H) [I82.409] [I82.409]           Comments:  [employer] Monitoring.  Call cell phone with any dosing.         Anticoagulation Care Providers     Provider Role Specialty Phone number    Eliz Hartman CNP Responsible Family Medicine [phone]

## 2022-06-01 DIAGNOSIS — F33.1 MODERATE EPISODE OF RECURRENT MAJOR DEPRESSIVE DISORDER (H): ICD-10-CM

## 2022-06-01 NOTE — TELEPHONE ENCOUNTER
Escitalopram      Last Written Prescription Date:  3/4/2022  Last Fill Quantity: 90,   # refills: 0  Last Office Visit: 5/6/2022  Future Office visit:

## 2022-06-02 RX ORDER — ESCITALOPRAM OXALATE 10 MG/1
TABLET ORAL
Qty: 90 TABLET | Refills: 0 | Status: SHIPPED | OUTPATIENT
Start: 2022-06-02 | End: 2022-08-22

## 2022-06-07 ENCOUNTER — ANTICOAGULATION THERAPY VISIT (OUTPATIENT)
Dept: ANTICOAGULATION | Facility: OTHER | Age: 67
End: 2022-06-07
Attending: NURSE PRACTITIONER
Payer: COMMERCIAL

## 2022-06-07 DIAGNOSIS — I82.409 DEEP VEIN THROMBOSIS (DVT) (H): ICD-10-CM

## 2022-06-07 DIAGNOSIS — I26.99 PULMONARY EMBOLISM AND INFARCTION (H): ICD-10-CM

## 2022-06-07 DIAGNOSIS — Z79.01 LONG TERM CURRENT USE OF ANTICOAGULANT THERAPY: Primary | ICD-10-CM

## 2022-06-07 LAB — INR HOME MONITORING: 2.7 (ref 2–3)

## 2022-06-07 NOTE — PROGRESS NOTES
ANTICOAGULATION MANAGEMENT     Cassy Avila 66 year old female is on warfarin with therapeutic INR result. (Goal INR 2.0-3.0)    Recent labs: (last 7 days)     06/07/22  0000   INR 2.70       ASSESSMENT       Source(s): Chart Review       Warfarin doses taken: Warfarin taken as instructed    Diet: No new diet changes identified    New illness, injury, or hospitalization: No    Medication/supplement changes: None noted    Signs or symptoms of bleeding or clotting: No    Previous INR: Therapeutic last 2(+) visits    Additional findings: None       PLAN     Recommended plan for no diet, medication or health factor changes affecting INR     Dosing Instructions: continue your current warfarin dose with next INR in 2 weeks       Summary  As of 6/7/2022    Full warfarin instructions:  7.5 mg every Mon, Wed, Fri; 5 mg all other days   Next INR check:  6/21/2022             No call needed, patient to continue same dose    Patient to recheck with home meter    Education provided: None required and Please call back if any changes to your diet, medications or how you've been taking warfarin    Plan made per [facility] anticoagulation protocol    Ashanti Arnold RN  Anticoagulation Clinic  6/7/2022    _______________________________________________________________________     Anticoagulation Episode Summary     Current INR goal:  2.0-3.0   TTR:  84.9 % (1 y)   Target end date:  Indefinite   Send INR reminders to:  [facility]    Indications    Long-term (current) use of anticoagulants [Z79.01] [Z79.01]  Pulmonary embolism and infarction (H) [I26.99]  Deep vein thrombosis (DVT) (H) [I82.409] [I82.409]           Comments:  [employer].  Call cell phone with any dosing.         Anticoagulation Care Providers     Provider Role Specialty Phone number    Eliz Hartman CNP [facility] [phone]

## 2022-06-21 ENCOUNTER — ANTICOAGULATION THERAPY VISIT (OUTPATIENT)
Dept: ANTICOAGULATION | Facility: OTHER | Age: 67
End: 2022-06-21
Attending: NURSE PRACTITIONER
Payer: COMMERCIAL

## 2022-06-21 DIAGNOSIS — I26.99 PULMONARY EMBOLISM AND INFARCTION (H): ICD-10-CM

## 2022-06-21 DIAGNOSIS — I82.409 DEEP VEIN THROMBOSIS (DVT) (H): ICD-10-CM

## 2022-06-21 DIAGNOSIS — Z79.01 LONG TERM CURRENT USE OF ANTICOAGULANT THERAPY: Primary | ICD-10-CM

## 2022-06-21 LAB — INR HOME MONITORING: 2.8 (ref 2–3)

## 2022-06-21 NOTE — PROGRESS NOTES
ANTICOAGULATION MANAGEMENT     Cassy Avila 66 year old female is on warfarin with therapeutic INR result. (Goal INR 2.0-3.0)    Recent labs: (last 7 days)     06/21/22  0000   INR 2.8       ASSESSMENT       Source(s): Chart Review    Previous INR was Therapeutic last 2(+) visits    Medication, diet, health changes since last INR chart reviewed; none identified           PLAN     Recommended plan for no diet, medication or health factor changes affecting INR     Dosing Instructions: continue your current warfarin dose with next INR in 2 weeks       Summary  As of 6/21/2022    Full warfarin instructions:  7.5 mg every Mon, Wed, Fri; 5 mg all other days   Next INR check:  7/5/2022             No call needed, patient to continue same dose    Patient to recheck with home meter    Education provided: None required    Plan made per [facility] anticoagulation protocol    Ashanti Arnold RN  Anticoagulation Clinic  6/21/2022    _______________________________________________________________________     Anticoagulation Episode Summary     Current INR goal:  2.0-3.0   TTR:  88.8 % (1 y)   Target end date:  Indefinite   Send INR reminders to:  [facility]    Indications    Long-term (current) use of anticoagulants [Z79.01] [Z79.01]  Pulmonary embolism and infarction (H) [I26.99]  Deep vein thrombosis (DVT) (H) [I82.409] [I82.409]           Comments:  [employer].  Call cell phone with any dosing.         Anticoagulation Care Providers     Provider Role Specialty Phone number    Eliz Hartman CNP Responsible Family Medicine [phone]

## 2022-07-07 ENCOUNTER — MYC MEDICAL ADVICE (OUTPATIENT)
Dept: FAMILY MEDICINE | Facility: OTHER | Age: 67
End: 2022-07-07

## 2022-07-08 ENCOUNTER — ANCILLARY PROCEDURE (OUTPATIENT)
Dept: MAMMOGRAPHY | Facility: OTHER | Age: 67
End: 2022-07-08
Attending: NURSE PRACTITIONER
Payer: MEDICARE

## 2022-07-08 DIAGNOSIS — Z12.31 ENCOUNTER FOR SCREENING MAMMOGRAM FOR MALIGNANT NEOPLASM OF BREAST: ICD-10-CM

## 2022-07-08 PROCEDURE — 77067 SCR MAMMO BI INCL CAD: CPT | Mod: TC

## 2022-07-11 ENCOUNTER — MYC MEDICAL ADVICE (OUTPATIENT)
Dept: FAMILY MEDICINE | Facility: OTHER | Age: 67
End: 2022-07-11

## 2022-07-11 NOTE — TELEPHONE ENCOUNTER
Called Vanessa from  to ask her to call pt and resched. She advised they are out until the end of Nov. Called pt and LM for her to call back so I can verify if that is ok to wait, [facility]

## 2022-07-12 ENCOUNTER — ANTICOAGULATION THERAPY VISIT (OUTPATIENT)
Dept: ANTICOAGULATION | Facility: OTHER | Age: 67
End: 2022-07-12
Attending: NURSE PRACTITIONER
Payer: MEDICARE

## 2022-07-12 DIAGNOSIS — I26.99 PULMONARY EMBOLISM AND INFARCTION (H): ICD-10-CM

## 2022-07-12 DIAGNOSIS — Z79.01 LONG TERM CURRENT USE OF ANTICOAGULANT THERAPY: Primary | ICD-10-CM

## 2022-07-12 DIAGNOSIS — I82.409 DEEP VEIN THROMBOSIS (DVT) (H): ICD-10-CM

## 2022-07-12 LAB — INR HOME MONITORING: 2.4 (ref 2–3)

## 2022-07-12 NOTE — PROGRESS NOTES
ANTICOAGULATION MANAGEMENT     Cassy Avila 66 year old female is on warfarin with therapeutic INR result. (Goal INR 2.0-3.0)    Recent labs: (last 7 days)     07/12/22  0000   INR 2.4       ASSESSMENT       Source(s): Chart Review and Patient/Caregiver Call       Warfarin doses taken: Warfarin taken as instructed    Diet: No new diet changes identified    New illness, injury, or hospitalization: No    Medication/supplement changes: None noted    Signs or symptoms of bleeding or clotting: No    Previous INR: Therapeutic last 2(+) visits    Additional findings: None       PLAN     Recommended plan for no diet, medication or health factor changes affecting INR     Dosing Instructions: continue your current warfarin dose with next INR in 2 weeks       Summary  As of 7/12/2022    Full warfarin instructions:  7.5 mg every Mon, Wed, Fri; 5 mg all other days   Next INR check:  7/26/2022             Telephone call with Kaitlin who verbalizes understanding and agrees to plan    Patient to recheck with home meter    Education provided: Resume manage by exception with home monitor. Continue to submit INR results to home monitor company.You will only be called when your result is out of range. Please call and notify [facility] if new medication started, dose missed, signs or symptoms of bleeding or clotting, or a surgery/procedure is scheduled.    Plan made per [facility] anticoagulation protocol    Corry Galvan RN  Anticoagulation Clinic  7/12/2022    _______________________________________________________________________     Anticoagulation Episode Summary     Current INR goal:  2.0-3.0   TTR:  89.6 % (1 y)   Target end date:  Indefinite   Send INR reminders to:  [facility]    Indications    Long-term (current) use of anticoagulants [Z79.01] [Z79.01]  Pulmonary embolism and infarction (H) [I26.99]  Deep vein thrombosis (DVT) (H) [I82.409] [I82.409]           Comments:  [employer].  Call cell phone with any dosing.          Anticoagulation Care Providers     Provider Role Specialty Phone number    Eliz Hartman, JANINA Responsible Family Medicine [phone]

## 2022-07-21 DIAGNOSIS — Z79.01 LONG TERM CURRENT USE OF ANTICOAGULANT THERAPY: ICD-10-CM

## 2022-07-22 RX ORDER — WARFARIN SODIUM 5 MG/1
TABLET ORAL
Qty: 109 TABLET | Refills: 0 | Status: SHIPPED | OUTPATIENT
Start: 2022-07-22 | End: 2022-10-11

## 2022-07-22 NOTE — TELEPHONE ENCOUNTER
warfarin      Last Written Prescription Date:  4/21/22  Last Fill Quantity: 109,   # refills: 0  Last Office Visit: 4/27/22  Future Office visit:    Next 5 appointments (look out 90 days)    Sep 16, 2022 11:15 AM  (Arrive by 11:00 AM)  SHORT with Eliz Hartman CNP  [facility] ([facility] ) [address]  [place] [address]  [phone]

## 2022-08-02 ENCOUNTER — ANTICOAGULATION THERAPY VISIT (OUTPATIENT)
Dept: ANTICOAGULATION | Facility: OTHER | Age: 67
End: 2022-08-02
Attending: NURSE PRACTITIONER
Payer: MEDICARE

## 2022-08-02 DIAGNOSIS — I26.99 PULMONARY EMBOLISM AND INFARCTION (H): ICD-10-CM

## 2022-08-02 DIAGNOSIS — I82.409 DEEP VEIN THROMBOSIS (DVT) (H): ICD-10-CM

## 2022-08-02 DIAGNOSIS — Z79.01 LONG TERM CURRENT USE OF ANTICOAGULANT THERAPY: Primary | ICD-10-CM

## 2022-08-02 LAB — INR HOME MONITORING: 2.9 (ref 2–3)

## 2022-08-02 NOTE — PROGRESS NOTES
ANTICOAGULATION  MANAGEMENT-Home Monitor Managed by Exception    Cassy CHIU Austin 66 year old female is on warfarin with therapeutic INR result. (Goal INR 2.0-3.0)    Recent labs: (last 7 days)     08/02/22  0000   INR 2.9         Previous INR was Therapeutic    Medication, diet, health changes since last INR:chart reviewed; none identified    Contacted within the last 12 weeks by phone on 7/12/22      DAIRO     Cassy was NOT contacted regarding therapeutic result today per home monitoring policy manage by exception agreement.   Current warfarin dose is to be continued:     Summary  As of 8/2/2022    Full warfarin instructions:  7.5 mg every Mon, Wed, Fri; 5 mg all other days   Next INR check:             ?   Corry Galvan RN  Anticoagulation Clinic  8/2/2022    _______________________________________________________________________     Anticoagulation Episode Summary     Current INR goal:  2.0-3.0   TTR:  89.6 % (1 y)   Target end date:  Indefinite   Send INR reminders to:  [facility]    Indications    Long-term (current) use of anticoagulants [Z79.01] [Z79.01]  Pulmonary embolism and infarction (H) [I26.99]  Deep vein thrombosis (DVT) (H) [I82.409] [I82.409]           Comments:  [employer].  Call cell phone with any dosing.         Anticoagulation Care Providers     Provider Role Specialty Phone number    Eliz Hartman CNP Responsible Family Medicine [phone]

## 2022-08-19 DIAGNOSIS — F33.1 MODERATE EPISODE OF RECURRENT MAJOR DEPRESSIVE DISORDER (H): ICD-10-CM

## 2022-08-19 DIAGNOSIS — F51.01 PRIMARY INSOMNIA: ICD-10-CM

## 2022-08-22 RX ORDER — TRAZODONE HYDROCHLORIDE 50 MG/1
TABLET, FILM COATED ORAL
Qty: 180 TABLET | Refills: 0 | Status: SHIPPED | OUTPATIENT
Start: 2022-08-22 | End: 2023-02-15

## 2022-08-22 RX ORDER — ESCITALOPRAM OXALATE 10 MG/1
TABLET ORAL
Qty: 90 TABLET | Refills: 0 | Status: SHIPPED | OUTPATIENT
Start: 2022-08-22 | End: 2022-11-28

## 2022-08-22 NOTE — TELEPHONE ENCOUNTER
Escitalopram      Last Written Prescription Date:  6/2/22  Last Fill Quantity: 90,   # refills: 0  Last Office Visit: 5/6/22  Future Office visit:    Next 5 appointments (look out 90 days)    Sep 16, 2022 11:15 AM  (Arrive by 11:00 AM)  SHORT with Eliz Hartman CNP  [facility] ([facility] ) [address]  [place] [address]  [phone]           Trazadone      Last Written Prescription Date:  2/21/22  Last Fill Quantity: 180,   # refills: 0  Last Office Visit: 5/6/22  Future Office visit:    Next 5 appointments (look out 90 days)    Sep 16, 2022 11:15 AM  (Arrive by 11:00 AM)  SHORT with Eliz Hartman CNP  [facility] ([facility] ) [address]  [place] [address]  [phone]

## 2022-08-23 ENCOUNTER — ANTICOAGULATION THERAPY VISIT (OUTPATIENT)
Dept: ANTICOAGULATION | Facility: OTHER | Age: 67
End: 2022-08-23
Payer: MEDICARE

## 2022-08-23 DIAGNOSIS — Z79.01 LONG TERM CURRENT USE OF ANTICOAGULANT THERAPY: Primary | ICD-10-CM

## 2022-08-23 DIAGNOSIS — I26.99 PULMONARY EMBOLISM AND INFARCTION (H): ICD-10-CM

## 2022-08-23 DIAGNOSIS — I82.409 DEEP VEIN THROMBOSIS (DVT) (H): ICD-10-CM

## 2022-08-23 LAB — INR HOME MONITORING: 2.2 (ref 2–3)

## 2022-08-23 NOTE — PROGRESS NOTES
ANTICOAGULATION  MANAGEMENT-Home Monitor Managed by Exception    Cassy CHIU Austin 66 year old female is on warfarin with therapeutic INR result. (Goal INR 2.0-3.0)    Recent labs: (last 7 days)     08/23/22  0000   INR 2.2         Previous INR was Therapeutic    Medication, diet, health changes since last INR:chart reviewed; none identified  Contacted within the last 12 weeks by phone on 7/12/22    DARIO     Cassy was NOT contacted regarding therapeutic result today per home monitoring policy manage by exception agreement.   Current warfarin dose is to be continued:     Summary  As of 8/23/2022    Full warfarin instructions:  7.5 mg every Mon, Wed, Fri; 5 mg all other days   Next INR check:  9/13/2022           ?   Melody Gilmore RN  Anticoagulation Clinic  8/23/2022    _______________________________________________________________________     Anticoagulation Episode Summary     Current INR goal:  2.0-3.0   TTR:  89.6 % (1 y)   Target end date:  Indefinite   Send INR reminders to:  [facility]    Indications    Long-term (current) use of anticoagulants [Z79.01] [Z79.01]  Pulmonary embolism and infarction (H) [I26.99]  Deep vein thrombosis (DVT) (H) [I82.409] [I82.409]           Comments:  [employer].  Call cell phone with any dosing.         Anticoagulation Care Providers     Provider Role Specialty Phone number    Eliz Hartman CNP [facility] Family [facility] [phone]

## 2022-08-25 DIAGNOSIS — I10 BENIGN ESSENTIAL HYPERTENSION: ICD-10-CM

## 2022-08-26 RX ORDER — LOSARTAN POTASSIUM 100 MG/1
TABLET ORAL
Qty: 90 TABLET | Refills: 2 | Status: SHIPPED | OUTPATIENT
Start: 2022-08-26 | End: 2023-06-13

## 2022-09-12 ENCOUNTER — ANTICOAGULATION THERAPY VISIT (OUTPATIENT)
Dept: ANTICOAGULATION | Facility: OTHER | Age: 67
End: 2022-09-12
Attending: NURSE PRACTITIONER
Payer: MEDICARE

## 2022-09-12 DIAGNOSIS — I82.409 DEEP VEIN THROMBOSIS (DVT) (H): ICD-10-CM

## 2022-09-12 DIAGNOSIS — I26.99 PULMONARY EMBOLISM AND INFARCTION (H): ICD-10-CM

## 2022-09-12 DIAGNOSIS — Z79.01 LONG TERM CURRENT USE OF ANTICOAGULANT THERAPY: Primary | ICD-10-CM

## 2022-09-12 LAB — INR HOME MONITORING: 2.1 (ref 2–3)

## 2022-09-12 NOTE — PROGRESS NOTES
ANTICOAGULATION  MANAGEMENT-Home Monitor Managed by Exception    Cassy APPLE Avila 66 year old female is on warfarin with therapeutic INR result. (Goal INR 2.0-3.0)    Recent labs: (last 7 days)     09/12/22  0000   INR 2.1         Previous INR was Therapeutic    Medication, diet, health changes since last INR:chart reviewed; none identified    Contacted within the last 12 weeks by phone on 7/12/22      DARIO     Cassy was NOT contacted regarding therapeutic result today per home monitoring policy manage by exception agreement.   Current warfarin dose is to be continued:     Summary  As of 9/12/2022    Full warfarin instructions:  7.5 mg every Mon, Wed, Fri; 5 mg all other days   Next INR check:  10/10/2022           ?   Corry Galvan RN  Anticoagulation Clinic  9/12/2022    _______________________________________________________________________     Anticoagulation Episode Summary     Current INR goal:  2.0-3.0   TTR:  89.6 % (1 y)   Target end date:  Indefinite   Send INR reminders to:  [facility]    Indications    Long-term (current) use of anticoagulants [Z79.01] [Z79.01]  Pulmonary embolism and infarction (H) [I26.99]  Deep vein thrombosis (DVT) (H) [I82.409] [I82.409]           Comments:  [employer].  Call cell phone with any dosing.         Anticoagulation Care Providers     Provider Role Specialty Phone number    Eliz Hartman CNP [facility] [phone]        ;

## 2022-09-15 NOTE — PROGRESS NOTES
Assessment & Plan         Essential hypertension  - metoprolol succinate ER (TOPROL XL) 50 MG 24 hr tablet; Take 1.5 tablets (75 mg) by mouth daily  - hydrochlorothiazide (HYDRODIURIL) 25 MG tablet; Take 1 tablet (25 mg) by mouth daily  - Comprehensive metabolic panel  - TSH with free T4 reflex      Need for prophylactic vaccination and inoculation against influenza  - INFLUENZA, QUAD, HIGH DOSE, PF, 65YR + (FLUZONE HD)  - ADMIN INFLUENZA (For MEDICARE Patients ONLY) []      Immunization due  - PNEUMOCOCCAL 20 VALENT CONJUGATE (PREVNAR 20)      Hyperlipidemia LDL goal <100  - Lipid Profile (Chol, Trig, HDL, LDL calc)  - Comprehensive metabolic panel  - TSH with free T4 reflex      Primary insomnia  - Follow-up as needed      Moderate episode of recurrent major depressive disorder (H)  - Continue plan of care      Pulmonary embolism and infarction (H)  - Continue plan of care  - Pulmonary consult in November as scheduled        Return in about 6 months (around 3/16/2023).      Eliz Hartman CNP  [facility] - CURRY Madrigal is a 66 year old, presenting for the following health issues:  Chronic Disease Management and Imm/Inj (Flu Shot)      Hyperlipidemia Follow-Up    Are you regularly taking any medication or supplement to lower your cholesterol?   No    Are you having muscle aches or other side effects that you think could be caused by your cholesterol lowering medication?  No      Hypertension Follow-up    Do you check your blood pressure regularly outside of the clinic? No     Are you following a low salt diet? Yes    Are your blood pressures ever more than 140 on the top number (systolic) OR more   than 90 on the bottom number (diastolic), for example 140/90? Yes      Depression and Anxiety Follow-Up    How are you doing with your depression since your last visit? Improved     How are you doing with your anxiety since your last visit?  Improved     Are you having other symptoms that might  be associated with depression or anxiety? No    Have you had a significant life event? No     Do you have any concerns with your use of alcohol or other drugs? No      Insomnia  Chronic, stable        GERD  Stable on Omeprazole  No concerns      History of PE  On chronic anticoagulation  Has an appointment with Pulmonary Medicine in November        Social History     Tobacco Use     Smoking status: Former Smoker     Packs/day: 1.00     Years: 30.00     Pack years: 30.00     Types: Cigarettes, Pipe     Start date: 1969     Quit date: 1999     Years since quittin.7     Smokeless tobacco: Never Used     Tobacco comment: quit in    Vaping Use     Vaping Use: Never used   Substance Use Topics     Alcohol use: No     Drug use: No         PHQ 2021 3/8/2022 2022   PHQ-9 Total Score 4 0 4   Q9: Thoughts of better off dead/self-harm past 2 weeks Not at all Not at all Not at all   F/U: Thoughts of suicide or self-harm - - -   F/U: Safety concerns - - -         MARGA-7 SCORE 2021 3/8/2022 2022   Total Score 0 0 2         Last PHQ-9 2022   1.  Little interest or pleasure in doing things 0   2.  Feeling down, depressed, or hopeless 0   3.  Trouble falling or staying asleep, or sleeping too much 2   4.  Feeling tired or having little energy 1   5.  Poor appetite or overeating 1   6.  Feeling bad about yourself 0   7.  Trouble concentrating 0   8.  Moving slowly or restless 0   Q9: Thoughts of better off dead/self-harm past 2 weeks 0   PHQ-9 Total Score 4   Difficulty at work, home, or with people Not difficult at all   In the past two weeks have you had thoughts of suicide or self harm? -   Do you have concerns about your personal safety or the safety of others? -         MARGA-7  2022   1. Feeling nervous, anxious, or on edge 0   2. Not being able to stop or control worrying 0   3. Worrying too much about different things 1   4. Trouble relaxing 1   5. Being so restless that it is hard to  sit still 0   6. Becoming easily annoyed or irritable 0   7. Feeling afraid, as if something awful might happen 0   MARGA-7 Total Score 2   If you checked any problems, how difficult have they made it for you to do your work, take care of things at home, or get along with other people? Not difficult at all         Patient Active Problem List   Diagnosis     Primary hypertension     Pulmonary embolism and infarction (H)     Contact dermatitis and other eczema, due to unspecified cause     Edema     Hyperlipidemia LDL goal <100     Neurofibromatosis, type 1 (H)     Advanced care planning/counseling discussion     Moderate episode of recurrent major depressive disorder (H)     Long-term (current) use of anticoagulants [Z79.01]     Deep vein thrombosis (DVT) (H) [I82.409]     Lumbar spondylosis with myelopathy     Degeneration of lumbar or lumbosacral intervertebral disc     Family history of colon cancer     Statin medication not prescribed per physician orders     Vitamin D deficiency     Failed arthroplasty (H)     H/O total shoulder replacement, left     Primary insomnia     Factor V Leiden mutation (H)     Obesity (BMI 35.0-39.9) with comorbidity (H)     Diarrhea     Abdominal pain, generalized     Chest pain, unspecified     Muscle weakness of right upper extremity     Pain in right upper arm     Stiffness of right shoulder joint     Activated protein C resistance (H)     Shoulder pain     Past Surgical History:   Procedure Laterality Date     ------------OTHER-------------      shoulder replacement; Provider: Karen     ARTHROPLASTY KNEE  2014    Procedure: ARTHROPLASTY KNEE;  Surgeon: Sean Alexander MD;  Location: [facility] OR     ARTHROSCOPY SHOULDER      right, bone spurs     CA ANESTH,SHOULDER REPLACEMENT Right 2020      SECTION      x3     CHOLECYSTECTOMY       COLONOSCOPY  02/15/2018    [place],,polyps     elbow ulnar tunnel release       ELECTROTHERMAL THERAPY INTRADISC  2017     stimulator     ENDOSCOPIC SINUS SURGERY, SEPTOPLASTY, TURBINOPLASTY, MAXILLARY SINUSOTOMY, COMBINED N/A 2015    Procedure: COMBINED ENDOSCOPIC SINUS SURGERY, SEPTOPLASTY, TURBINOPLASTY, MAXILLARY SINUSOTOMY;  Surgeon: Seema Conn MD;  Location: [facility] OR     ESOPHAGOSCOPY, GASTROSCOPY, DUODENOSCOPY (EGD), COMBINED      with biopsy and endoscopic U/S     EXCISE NEUROMA LOWER EXTREMITY Left 2016    Procedure: EXCISE NEUROMA LOWER EXTREMITY;  Surgeon: Edi Reed MD;  Location: [facility] OR     EYE SURGERY Right 2020     FUSION LUMBAR ANTERIOR WITH MARCY CAGES      L5-S1     HYSTERECTOMY TOTAL ABDOMINAL, BILATERAL SALPINGO-OOPHORECTOMY, COMBINED N/A      ORTHOPEDIC SURGERY  2015    right shoulder     ORTHOPEDIC SURGERY  2015    right knee     ORTHOPEDIC SURGERY Right 2018    hip labrum tear     pionidal cyst excision       TRANSPOSITION ULNAR NERVE (ELBOW)         Social History     Tobacco Use     Smoking status: Former Smoker     Packs/day: 1.00     Years: 30.00     Pack years: 30.00     Types: Cigarettes, Pipe     Start date: 1969     Quit date: 1999     Years since quittin.7     Smokeless tobacco: Never Used     Tobacco comment: quit in    Substance Use Topics     Alcohol use: No     Family History   Problem Relation Age of Onset     Cancer Mother      Colon Polyps Mother      Heart Failure Mother 87        congestive, cause of death     Myocardial Infarction Mother         myocardial infarction     Myocardial Infarction Father         myocardial infarction - cause of death     C.A.D. Father      Cancer Paternal Uncle         cause of death     C.A.D. Brother      Other - See Comments Other         factor 5 - family h/o     Asthma No family hx of              Current Outpatient Medications   Medication Sig Dispense Refill     albuterol (PROAIR HFA/PROVENTIL HFA/VENTOLIN HFA) 108 (90 Base) MCG/ACT inhaler Inhale 2 puffs into the lungs every 6 hours 18 g 1      albuterol (PROVENTIL) (2.5 MG/3ML) 0.083% neb solution Take 2.5 mg by nebulization every 6 hours as needed for shortness of breath / dyspnea or wheezing       aspirin 81 MG EC tablet Take 81 mg by mouth daily HS       Cholecalciferol (VITAMIN D3 PO) Take 3,000 mg by mouth daily AM       doxycycline monohydrate (MONODOX) 100 MG capsule Take 100 mg by mouth 2 times daily       escitalopram (LEXAPRO) 10 MG tablet Take 1 tablet by mouth once daily 90 tablet 0     HEMP OIL OR EXTRACT OR OTHER CBD CANNABINOID, NOT MEDICAL CANNABIS,        hydrochlorothiazide (HYDRODIURIL) 25 MG tablet Take 1 tablet by mouth once daily 90 tablet 1     losartan (COZAAR) 100 MG tablet Take 1 tablet by mouth once daily 90 tablet 2     metoprolol succinate ER (TOPROL-XL) 50 MG 24 hr tablet TAKE 1 & 1/2 (ONE & ONE-HALF) TABLETS BY MOUTH ONCE DAILY 90 tablet 1     omeprazole (PRILOSEC) 20 MG DR capsule Take 20 mg by mouth       order for DME Nebulizer machine and tubing    DX:  Bronchitis 1 Device 0     potassium chloride ER (KLOR-CON M) 10 MEQ CR tablet Take 1 tablet (10 mEq) by mouth daily 90 tablet 3     traZODone (DESYREL) 50 MG tablet TAKE 1 TO 2 TABLETS BY MOUTH NIGHTLY AS NEEDED 180 tablet 0     warfarin ANTICOAGULANT (COUMADIN) 5 MG tablet TAKE 1 & 1/2 (ONE & ONE-HALF) TABLETS BY MOUTH  MONDAY, WEDNESDAY, FRIDAY AND 1 TABLET ALL OTHER DAYS OR AS DIRECTED BY WARFARIN CLINIC 109 tablet 0         Allergies   Allergen Reactions     Amlodipine Besylate Swelling     Norvasc     Amoxicillin      Atorvastatin      myualgia     Cephalexin Monohydrate Hives     Keflex     Erythromycin Base [Kdc:Yellow Dye+Erythromycin+Brilliant Blue Fcf] Nausea and Vomiting     Meloxicam Other (See Comments)     Mobic - confusion, depression     Sulfa Drugs Hives     Adhesive Tape Rash     Prochlorperazine Edisylate Swelling and Rash     Compazine     Prochlorperazine Maleate Swelling and Rash         Recent Labs   Lab Test 03/08/22  1046 08/27/21  1221  08/27/21  0917 04/16/21  1014 02/08/21  1432 11/02/20  1018 10/26/20  1123 02/03/20  1451 09/27/19  1137   LDL 95  --  127*  --   --   --   --   --  122*   HDL 34*  --  34*  --   --   --   --   --  36*   TRIG 184*  --  255*  --   --   --   --   --  280*   ALT 28 33  --  29 36  --   --    < > 34   CR 0.94 1.04  --  0.95 0.92  --  0.94   < > 0.91   GFRESTIMATED 66 56*  --  62 65  --  63   < > 67   GFRESTBLACK  --   --   --  72 75  --  73   < > 77   POTASSIUM 3.6 3.5  --  3.3* 3.4   < > 3.3*   < > 3.8   TSH  --  2.86  --   --   --   --  2.08  --  1.42    < > = values in this interval not displayed.          BP Readings from Last 3 Encounters:   09/16/22 136/78   05/06/22 128/84   04/27/22 124/78    Wt Readings from Last 3 Encounters:   09/16/22 103.8 kg (228 lb 14.4 oz)   05/06/22 104.3 kg (230 lb)   04/27/22 103.9 kg (229 lb)                 Review of Systems   Constitutional, HEENT, cardiovascular, pulmonary, GI, , musculoskeletal, neuro, skin, endocrine and psych systems are negative, except as otherwise noted.          Objective    /78 (BP Location: Right arm, Patient Position: Sitting, Cuff Size: Adult Large)   Pulse 72   Temp 98.3  F (36.8  C) (Tympanic)   Resp 20   Wt 103.8 kg (228 lb 14.4 oz)   SpO2 93%   BMI 38.09 kg/m    Body mass index is 38.09 kg/m .         Physical Exam   GENERAL: healthy, alert and no distress  EYES: Eyes grossly normal to inspection, PERRL and conjunctivae and sclerae normal  NECK: no adenopathy, no asymmetry, masses, or scars and thyroid normal to palpation  RESP: lungs clear to auscultation - no rales, rhonchi or wheezes  CV: regular rate and rhythm, normal S1 S2, no S3 or S4, no murmur, click or rub, no peripheral edema and peripheral pulses strong  SKIN: no suspicious lesions or rashes  PSYCH: mentation appears normal, affect normal/bright

## 2022-09-16 ENCOUNTER — OFFICE VISIT (OUTPATIENT)
Dept: FAMILY MEDICINE | Facility: OTHER | Age: 67
End: 2022-09-16
Attending: NURSE PRACTITIONER
Payer: MEDICARE

## 2022-09-16 VITALS
RESPIRATION RATE: 20 BRPM | SYSTOLIC BLOOD PRESSURE: 136 MMHG | WEIGHT: 228.9 LBS | TEMPERATURE: 98.3 F | OXYGEN SATURATION: 93 % | BODY MASS INDEX: 38.09 KG/M2 | HEART RATE: 72 BPM | DIASTOLIC BLOOD PRESSURE: 78 MMHG

## 2022-09-16 DIAGNOSIS — F51.01 PRIMARY INSOMNIA: ICD-10-CM

## 2022-09-16 DIAGNOSIS — E78.5 HYPERLIPIDEMIA LDL GOAL <100: ICD-10-CM

## 2022-09-16 DIAGNOSIS — Z23 NEED FOR PROPHYLACTIC VACCINATION AND INOCULATION AGAINST INFLUENZA: Primary | ICD-10-CM

## 2022-09-16 DIAGNOSIS — I10 ESSENTIAL HYPERTENSION: ICD-10-CM

## 2022-09-16 DIAGNOSIS — Z23 IMMUNIZATION DUE: ICD-10-CM

## 2022-09-16 DIAGNOSIS — I26.99 PULMONARY EMBOLISM AND INFARCTION (H): ICD-10-CM

## 2022-09-16 DIAGNOSIS — F33.1 MODERATE EPISODE OF RECURRENT MAJOR DEPRESSIVE DISORDER (H): ICD-10-CM

## 2022-09-16 LAB
ALBUMIN SERPL-MCNC: 3.7 G/DL (ref 3.4–5)
ALP SERPL-CCNC: 69 U/L (ref 40–150)
ALT SERPL W P-5'-P-CCNC: 28 U/L (ref 0–50)
ANION GAP SERPL CALCULATED.3IONS-SCNC: 4 MMOL/L (ref 3–14)
AST SERPL W P-5'-P-CCNC: 16 U/L (ref 0–45)
BILIRUB SERPL-MCNC: 0.5 MG/DL (ref 0.2–1.3)
BUN SERPL-MCNC: 16 MG/DL (ref 7–30)
CALCIUM SERPL-MCNC: 8.9 MG/DL (ref 8.5–10.1)
CHLORIDE BLD-SCNC: 106 MMOL/L (ref 94–109)
CHOLEST SERPL-MCNC: 191 MG/DL
CO2 SERPL-SCNC: 27 MMOL/L (ref 20–32)
CREAT SERPL-MCNC: 1.01 MG/DL (ref 0.52–1.04)
FASTING STATUS PATIENT QL REPORTED: NO
GFR SERPL CREATININE-BSD FRML MDRD: 61 ML/MIN/1.73M2
GLUCOSE BLD-MCNC: 99 MG/DL (ref 70–99)
HDLC SERPL-MCNC: 35 MG/DL
HOLD SPECIMEN: NORMAL
LDLC SERPL CALC-MCNC: 101 MG/DL
NONHDLC SERPL-MCNC: 156 MG/DL
POTASSIUM BLD-SCNC: 3.7 MMOL/L (ref 3.4–5.3)
PROT SERPL-MCNC: 7 G/DL (ref 6.8–8.8)
SODIUM SERPL-SCNC: 137 MMOL/L (ref 133–144)
TRIGL SERPL-MCNC: 276 MG/DL
TSH SERPL DL<=0.005 MIU/L-ACNC: 2.44 MU/L (ref 0.4–4)

## 2022-09-16 PROCEDURE — 80053 COMPREHEN METABOLIC PANEL: CPT | Mod: ZL | Performed by: NURSE PRACTITIONER

## 2022-09-16 PROCEDURE — 99214 OFFICE O/P EST MOD 30 MIN: CPT | Performed by: NURSE PRACTITIONER

## 2022-09-16 PROCEDURE — G0463 HOSPITAL OUTPT CLINIC VISIT: HCPCS

## 2022-09-16 PROCEDURE — 36415 COLL VENOUS BLD VENIPUNCTURE: CPT | Mod: ZL | Performed by: NURSE PRACTITIONER

## 2022-09-16 PROCEDURE — G0008 ADMIN INFLUENZA VIRUS VAC: HCPCS

## 2022-09-16 PROCEDURE — 84443 ASSAY THYROID STIM HORMONE: CPT | Mod: ZL | Performed by: NURSE PRACTITIONER

## 2022-09-16 PROCEDURE — 90677 PCV20 VACCINE IM: CPT

## 2022-09-16 PROCEDURE — 80061 LIPID PANEL: CPT | Mod: ZL | Performed by: NURSE PRACTITIONER

## 2022-09-16 PROCEDURE — G0463 HOSPITAL OUTPT CLINIC VISIT: HCPCS | Mod: 25

## 2022-09-16 RX ORDER — HYDROCHLOROTHIAZIDE 25 MG/1
25 TABLET ORAL DAILY
Qty: 90 TABLET | Refills: 1 | Status: SHIPPED | OUTPATIENT
Start: 2022-09-16 | End: 2023-03-17

## 2022-09-16 RX ORDER — METOPROLOL SUCCINATE 50 MG/1
75 TABLET, EXTENDED RELEASE ORAL DAILY
Qty: 90 TABLET | Refills: 1 | Status: SHIPPED | OUTPATIENT
Start: 2022-09-16 | End: 2023-03-17

## 2022-09-16 ASSESSMENT — ANXIETY QUESTIONNAIRES
IF YOU CHECKED OFF ANY PROBLEMS ON THIS QUESTIONNAIRE, HOW DIFFICULT HAVE THESE PROBLEMS MADE IT FOR YOU TO DO YOUR WORK, TAKE CARE OF THINGS AT HOME, OR GET ALONG WITH OTHER PEOPLE: NOT DIFFICULT AT ALL
7. FEELING AFRAID AS IF SOMETHING AWFUL MIGHT HAPPEN: NOT AT ALL
1. FEELING NERVOUS, ANXIOUS, OR ON EDGE: NOT AT ALL
2. NOT BEING ABLE TO STOP OR CONTROL WORRYING: NOT AT ALL
6. BECOMING EASILY ANNOYED OR IRRITABLE: NOT AT ALL
4. TROUBLE RELAXING: SEVERAL DAYS
GAD7 TOTAL SCORE: 2
GAD7 TOTAL SCORE: 2
3. WORRYING TOO MUCH ABOUT DIFFERENT THINGS: SEVERAL DAYS
5. BEING SO RESTLESS THAT IT IS HARD TO SIT STILL: NOT AT ALL

## 2022-09-16 ASSESSMENT — PATIENT HEALTH QUESTIONNAIRE - PHQ9: SUM OF ALL RESPONSES TO PHQ QUESTIONS 1-9: 4

## 2022-09-16 ASSESSMENT — PAIN SCALES - GENERAL: PAINLEVEL: NO PAIN (0)

## 2022-09-16 NOTE — PATIENT INSTRUCTIONS
Assessment & Plan         Essential hypertension  - metoprolol succinate ER (TOPROL XL) 50 MG 24 hr tablet; Take 1.5 tablets (75 mg) by mouth daily  - hydrochlorothiazide (HYDRODIURIL) 25 MG tablet; Take 1 tablet (25 mg) by mouth daily  - Comprehensive metabolic panel  - TSH with free T4 reflex      Need for prophylactic vaccination and inoculation against influenza  - INFLUENZA, QUAD, HIGH DOSE, PF, 65YR + (FLUZONE HD)  - ADMIN INFLUENZA (For MEDICARE Patients ONLY) []      Immunization due  - PNEUMOCOCCAL 20 VALENT CONJUGATE (PREVNAR 20)      Hyperlipidemia LDL goal <100  - Lipid Profile (Chol, Trig, HDL, LDL calc)  - Comprehensive metabolic panel  - TSH with free T4 reflex      Primary insomnia  - Follow-up as needed      Moderate episode of recurrent major depressive disorder (H)  - Continue plan of care      Pulmonary embolism and infarction (H)  - Continue plan of care  - Pulmonary consult in November as scheduled        Return in about 6 months (around 3/16/2023).      Eliz Hartman CNP  [facility] [place] [facility]

## 2022-09-16 NOTE — NURSING NOTE
"Chief Complaint   Patient presents with     Chronic Disease Management     Imm/Inj     Flu Shot       Initial /78 (BP Location: Right arm, Patient Position: Sitting, Cuff Size: Adult Large)   Pulse 72   Temp 98.3  F (36.8  C) (Tympanic)   Resp 20   Wt 103.8 kg (228 lb 14.4 oz)   SpO2 93%   BMI 38.09 kg/m   Estimated body mass index is 38.09 kg/m  as calculated from the following:    Height as of 11/23/21: 1.651 m (5' 5\").    Weight as of this encounter: 103.8 kg (228 lb 14.4 oz).  Medication Reconciliation: complete  Arlene Ortiz LPN  "

## 2022-09-19 DIAGNOSIS — E78.5 HYPERLIPIDEMIA LDL GOAL <100: Primary | ICD-10-CM

## 2022-09-21 ENCOUNTER — LAB (OUTPATIENT)
Dept: LAB | Facility: OTHER | Age: 67
End: 2022-09-21
Payer: MEDICARE

## 2022-09-21 DIAGNOSIS — E78.5 HYPERLIPIDEMIA LDL GOAL <100: ICD-10-CM

## 2022-09-21 LAB
CHOLEST SERPL-MCNC: 196 MG/DL
FASTING STATUS PATIENT QL REPORTED: YES
HDLC SERPL-MCNC: 38 MG/DL
LDLC SERPL CALC-MCNC: 123 MG/DL
NONHDLC SERPL-MCNC: 158 MG/DL
TRIGL SERPL-MCNC: 173 MG/DL

## 2022-09-21 PROCEDURE — 36415 COLL VENOUS BLD VENIPUNCTURE: CPT | Mod: ZL

## 2022-09-21 PROCEDURE — 80061 LIPID PANEL: CPT | Mod: ZL

## 2022-09-21 NOTE — RESULT ENCOUNTER NOTE
Risk score still elevated  Recommend Zetia 10 mg daily (non-statin)      The 10-year ASCVD risk score (Len MARLEY Jr., et al., 2013) is: 11.5%    Values used to calculate the score:      Age: 66 years      Sex: Female      Is Non- : No      Diabetic: No      Tobacco smoker: No      Systolic Blood Pressure: 136 mmHg      Is BP treated: Yes      HDL Cholesterol: 38 mg/dL      Total Cholesterol: 196 mg/dL

## 2022-09-27 ENCOUNTER — ANTICOAGULATION THERAPY VISIT (OUTPATIENT)
Dept: ANTICOAGULATION | Facility: OTHER | Age: 67
End: 2022-09-27
Attending: NURSE PRACTITIONER
Payer: MEDICARE

## 2022-09-27 DIAGNOSIS — I82.409 DEEP VEIN THROMBOSIS (DVT) (H): ICD-10-CM

## 2022-09-27 DIAGNOSIS — I26.99 PULMONARY EMBOLISM AND INFARCTION (H): ICD-10-CM

## 2022-09-27 DIAGNOSIS — Z79.01 LONG TERM CURRENT USE OF ANTICOAGULANT THERAPY: Primary | ICD-10-CM

## 2022-09-27 LAB — INR HOME MONITORING: 2.1 (ref 2–3)

## 2022-09-27 NOTE — PROGRESS NOTES
ANTICOAGULATION  MANAGEMENT-Home Monitor Managed by Exception    Cassy CHIU Austin 66 year old female is on warfarin with therapeutic INR result. (Goal INR 2.0-3.0)    Recent labs: (last 7 days)     09/27/22  0000   INR 2.1         Previous INR was Therapeutic    Medication, diet, health changes since last INR:chart reviewed; none identified    Contacted within the last 12 weeks by phone on 07/12/22.      DARIO     Cassy was NOT contacted regarding therapeutic result today per home monitoring policy manage by exception agreement.   Current warfarin dose is to be continued:     Summary  As of 9/27/2022    Full warfarin instructions:  7.5 mg every Mon, Wed, Fri; 5 mg all other days   Next INR check:  10/4/2022           ?   Nikki Deshpande RN  Anticoagulation Clinic  9/27/2022    _______________________________________________________________________     Anticoagulation Episode Summary     Current INR goal:  2.0-3.0   TTR:  89.6 % (1 y)   Target end date:  Indefinite   Send INR reminders to:  [facility] [place]    Indications    Long-term (current) use of anticoagulants [Z79.01] [Z79.01]  Pulmonary embolism and infarction (H) [I26.99]  Deep vein thrombosis (DVT) (H) [I82.409] [I82.409]           Comments:  [employer].  Call cell phone with any dosing.         Anticoagulation Care Providers     Provider Role Specialty Phone number    Eliz Hartman CNP [facility] [phone]

## 2022-10-11 ENCOUNTER — MYC REFILL (OUTPATIENT)
Dept: FAMILY MEDICINE | Facility: OTHER | Age: 67
End: 2022-10-11

## 2022-10-11 ENCOUNTER — ANTICOAGULATION THERAPY VISIT (OUTPATIENT)
Dept: ANTICOAGULATION | Facility: OTHER | Age: 67
End: 2022-10-11
Attending: NURSE PRACTITIONER
Payer: MEDICARE

## 2022-10-11 DIAGNOSIS — Z79.01 LONG TERM CURRENT USE OF ANTICOAGULANT THERAPY: Primary | ICD-10-CM

## 2022-10-11 DIAGNOSIS — I26.99 PULMONARY EMBOLISM AND INFARCTION (H): ICD-10-CM

## 2022-10-11 DIAGNOSIS — I82.401 DEEP VEIN THROMBOSIS (DVT) OF RIGHT LOWER EXTREMITY, UNSPECIFIED CHRONICITY, UNSPECIFIED VEIN (H): ICD-10-CM

## 2022-10-11 DIAGNOSIS — Z79.01 LONG TERM CURRENT USE OF ANTICOAGULANT THERAPY: ICD-10-CM

## 2022-10-11 LAB — INR HOME MONITORING: 2.5 (ref 2–3)

## 2022-10-11 NOTE — PROGRESS NOTES
ANTICOAGULATION  MANAGEMENT-Home Monitor Managed by Exception    Cassy CHIU Austin 66 year old female is on warfarin with therapeutic INR result. (Goal INR 2.0-3.0)    Recent labs: (last 7 days)     10/11/22  0000   INR 2.5         Previous INR was Therapeutic    Medication, diet, health changes since last INR:chart reviewed; none identified    Contacted within the last 12 weeks by phone on 7/12/22      DARIO     Cassy was NOT contacted regarding therapeutic result today per home monitoring policy manage by exception agreement.   Current warfarin dose is to be continued:     Summary  As of 10/11/2022    Full warfarin instructions:  7.5 mg every Mon, Wed, Fri; 5 mg all other days   Next INR check:  10/25/2022           ?   Ashanti Arnold RN  Anticoagulation Clinic  10/11/2022    _______________________________________________________________________     Anticoagulation Episode Summary     Current INR goal:  2.0-3.0   TTR:  89.6 % (1 y)   Target end date:  Indefinite   Send INR reminders to:  [facility] [place]    Indications    Long-term (current) use of anticoagulants [Z79.01] [Z79.01]  Pulmonary embolism and infarction (H) [I26.99]  Deep vein thrombosis (DVT) (H) [I82.409] [I82.409]           Comments:  [employer].  Call cell phone with any dosing.         Anticoagulation Care Providers     Provider Role Specialty Phone number    Eliz Hartman CNP [facility] [phone]

## 2022-10-12 DIAGNOSIS — Z79.01 LONG TERM CURRENT USE OF ANTICOAGULANT THERAPY: ICD-10-CM

## 2022-10-12 RX ORDER — WARFARIN SODIUM 5 MG/1
TABLET ORAL
Qty: 109 TABLET | Refills: 0 | Status: SHIPPED | OUTPATIENT
Start: 2022-10-12 | End: 2023-03-17

## 2022-10-12 NOTE — TELEPHONE ENCOUNTER
"Patient sent [employer] message requesting Rx  for the following:      Requested Prescriptions   Pending Prescriptions Disp Refills     warfarin ANTICOAGULANT (COUMADIN) 5 MG tablet 109 tablet 0     Sig: TAKE 1 & 1/2 (ONE & ONE-HALF) TABLETS BY MOUTH  MONDAY, WEDNESDAY, FRIDAY AND 1 TABLET ALL OTHER DAYS OR AS DIRECTED BY WARFARIN CLINIC       Vitamin K Antagonists Failed - 10/11/2022  9:25 AM        Failed - INR is within goal in the past 6 weeks     Confirm INR is within goal in the past 6 weeks.     Recent Labs   Lab Test 10/11/22  0000   INR 2.5           Passed - Recent (12 mo) or future (30 days) visit within the authorizing provider's specialty     Patient has had an office visit with the authorizing provider or a provider within the authorizing providers department within the previous 12 mos or has a future within next 30 days. See \"Patient Info\" tab in inbasket, or \"Choose Columns\" in Meds & Orders section of the refill encounter.          Passed - Medication is active on med list        Passed - Patient is 18 years of age or older        Passed - Patient is not pregnant        Passed - No positive pregnancy on file in past 12 months     Last Prescription Date:   07/22/22  Last Fill Qty/Refills:         109, R-1  Last Office Visit:              09/16/22   Future Office visit:           None    Nikki Deshpande RN on 10/12/2022 at 4:01 PM      "

## 2022-10-14 RX ORDER — WARFARIN SODIUM 5 MG/1
TABLET ORAL
Qty: 109 TABLET | Refills: 0 | Status: SHIPPED | OUTPATIENT
Start: 2022-10-14 | End: 2023-04-14

## 2022-10-31 ENCOUNTER — ANTICOAGULATION THERAPY VISIT (OUTPATIENT)
Dept: ANTICOAGULATION | Facility: OTHER | Age: 67
End: 2022-10-31
Attending: NURSE PRACTITIONER
Payer: MEDICARE

## 2022-10-31 DIAGNOSIS — Z79.01 LONG TERM CURRENT USE OF ANTICOAGULANT THERAPY: Primary | ICD-10-CM

## 2022-10-31 DIAGNOSIS — I82.401 DEEP VEIN THROMBOSIS (DVT) OF RIGHT LOWER EXTREMITY, UNSPECIFIED CHRONICITY, UNSPECIFIED VEIN (H): ICD-10-CM

## 2022-10-31 DIAGNOSIS — I26.99 PULMONARY EMBOLISM AND INFARCTION (H): ICD-10-CM

## 2022-10-31 LAB — INR HOME MONITORING: 2.4 (ref 2–3)

## 2022-10-31 NOTE — PROGRESS NOTES
ANTICOAGULATION  MANAGEMENT-Home Monitor Managed by Exception    Cassy Avila 67 year old female is on warfarin with therapeutic INR result. (Goal INR 2.0-3.0)    Recent labs: (last 7 days)     10/31/22  0000   INR 2.4         Previous INR was Therapeutic    Medication, diet, health changes since last INR:chart reviewed; none identified        PLAN     Cassy was NOT contacted regarding therapeutic result today per home monitoring policy manage by exception agreement.   Current warfarin dose is to be continued:     Summary  As of 10/31/2022    Full warfarin instructions:  7.5 mg every Mon, Wed, Fri; 5 mg all other days; Starting 10/31/2022   Next INR check:  11/14/2022           ?   Ashanti Arnold RN  Anticoagulation Clinic  10/31/2022    _______________________________________________________________________     Anticoagulation Episode Summary     Current INR goal:  2.0-3.0   TTR:  89.6 % (1 y)   Target end date:  Indefinite   Send INR reminders to:  [facility] [place]    Indications    Long-term (current) use of anticoagulants [Z79.01] [Z79.01]  Pulmonary embolism and infarction (H) [I26.99]  Deep vein thrombosis (DVT) (H) [I82.409] [I82.409]           Comments:  [employer].  Call cell phone with any dosing.         Anticoagulation Care Providers     Provider Role Specialty Phone number    Eliz Hartman CNP Responsible Family Medicine [phone]

## 2022-11-15 ENCOUNTER — ANTICOAGULATION THERAPY VISIT (OUTPATIENT)
Dept: ANTICOAGULATION | Facility: OTHER | Age: 67
End: 2022-11-15
Attending: NURSE PRACTITIONER
Payer: MEDICARE

## 2022-11-15 DIAGNOSIS — Z79.01 LONG TERM CURRENT USE OF ANTICOAGULANT THERAPY: Primary | ICD-10-CM

## 2022-11-15 DIAGNOSIS — I82.401 DEEP VEIN THROMBOSIS (DVT) OF RIGHT LOWER EXTREMITY, UNSPECIFIED CHRONICITY, UNSPECIFIED VEIN (H): ICD-10-CM

## 2022-11-15 DIAGNOSIS — I26.99 PULMONARY EMBOLISM AND INFARCTION (H): ICD-10-CM

## 2022-11-15 LAB — INR HOME MONITORING: 2.5 (ref 2–3)

## 2022-11-15 NOTE — PROGRESS NOTES
ANTICOAGULATION  MANAGEMENT-Home Monitor Managed by Exception    Cassy Avila 67 year old female is on warfarin with therapeutic INR result. (Goal INR 2.0-3.0)    Recent labs: (last 7 days)     11/15/22  0000   INR 2.5         Previous INR was Therapeutic    Medication, diet, health changes since last INR:chart reviewed; none identified        PLAN     Cassy was NOT contacted regarding therapeutic result today per home monitoring policy manage by exception agreement.   Current warfarin dose is to be continued:     Summary  As of 11/15/2022    Full warfarin instructions:  7.5 mg every Mon, Wed, Fri; 5 mg all other days; Starting 11/15/2022   Next INR check:  11/29/2022           ?   Ashanti Arnold RN  Anticoagulation Clinic  11/15/2022    _______________________________________________________________________     Anticoagulation Episode Summary     Current INR goal:  2.0-3.0   TTR:  89.6 % (1 y)   Target end date:  Indefinite   Send INR reminders to:  [facility] [place]    Indications    Long-term (current) use of anticoagulants [Z79.01] [Z79.01]  Pulmonary embolism and infarction (H) [I26.99]  Deep vein thrombosis (DVT) (H) [I82.409] [I82.409]           Comments:  [employer].  Call cell phone with any dosing.         Anticoagulation Care Providers     Provider Role Specialty Phone number    Eliz Hartman CNP Responsible Family Medicine [phone]

## 2022-11-27 DIAGNOSIS — F33.1 MODERATE EPISODE OF RECURRENT MAJOR DEPRESSIVE DISORDER (H): ICD-10-CM

## 2022-11-28 RX ORDER — ESCITALOPRAM OXALATE 10 MG/1
TABLET ORAL
Qty: 90 TABLET | Refills: 0 | Status: SHIPPED | OUTPATIENT
Start: 2022-11-28 | End: 2023-02-16

## 2022-11-28 NOTE — TELEPHONE ENCOUNTER
Lexapro       Last Written Prescription Date:  8/22/22  Last Fill Quantity: 90,   # refills: 0  Last Office Visit: 9/16/22  Future Office visit:

## 2022-11-29 ENCOUNTER — HOSPITAL ENCOUNTER (OUTPATIENT)
Dept: RESPIRATORY THERAPY | Facility: HOSPITAL | Age: 67
Discharge: HOME | End: 2022-11-29
Attending: NURSE PRACTITIONER | Admitting: INTERNAL MEDICINE
Payer: MEDICARE

## 2022-11-29 DIAGNOSIS — I26.99 PULMONARY EMBOLISM AND INFARCTION (H): ICD-10-CM

## 2022-11-29 DIAGNOSIS — R09.89 CHEST CONGESTION: ICD-10-CM

## 2022-11-29 DIAGNOSIS — R06.02 SOB (SHORTNESS OF BREATH): ICD-10-CM

## 2022-11-29 LAB — HGB BLD-MCNC: 14.2 G/DL (ref 11.7–15.7)

## 2022-11-29 PROCEDURE — 94729 DIFFUSING CAPACITY: CPT | Mod: 26 | Performed by: INTERNAL MEDICINE

## 2022-11-29 PROCEDURE — 94010 BREATHING CAPACITY TEST: CPT | Mod: 26 | Performed by: INTERNAL MEDICINE

## 2022-11-29 PROCEDURE — 85018 HEMOGLOBIN: CPT | Performed by: NURSE PRACTITIONER

## 2022-11-29 PROCEDURE — 94726 PLETHYSMOGRAPHY LUNG VOLUMES: CPT | Mod: 26 | Performed by: INTERNAL MEDICINE

## 2022-11-29 PROCEDURE — 36415 COLL VENOUS BLD VENIPUNCTURE: CPT | Performed by: NURSE PRACTITIONER

## 2022-11-29 PROCEDURE — 94729 DIFFUSING CAPACITY: CPT

## 2022-11-29 PROCEDURE — 94726 PLETHYSMOGRAPHY LUNG VOLUMES: CPT

## 2022-11-29 PROCEDURE — 94010 BREATHING CAPACITY TEST: CPT

## 2022-12-06 ENCOUNTER — ANTICOAGULATION THERAPY VISIT (OUTPATIENT)
Dept: ANTICOAGULATION | Facility: OTHER | Age: 67
End: 2022-12-06
Attending: NURSE PRACTITIONER
Payer: MEDICARE

## 2022-12-06 DIAGNOSIS — I82.401 DEEP VEIN THROMBOSIS (DVT) OF RIGHT LOWER EXTREMITY, UNSPECIFIED CHRONICITY, UNSPECIFIED VEIN (H): ICD-10-CM

## 2022-12-06 DIAGNOSIS — I26.99 PULMONARY EMBOLISM AND INFARCTION (H): ICD-10-CM

## 2022-12-06 DIAGNOSIS — Z79.01 LONG TERM CURRENT USE OF ANTICOAGULANT THERAPY: Primary | ICD-10-CM

## 2022-12-06 LAB — INR HOME MONITORING: 1.9 (ref 2–3)

## 2022-12-06 NOTE — PROGRESS NOTES
ANTICOAGULATION MANAGEMENT     Cassy Avila 67 year old female is on warfarin with subtherapeutic INR result. (Goal INR 2.0-3.0)    Recent labs: (last 7 days)     12/06/22  0000   INR 1.9*       ASSESSMENT       Source(s): Chart Review and Patient/Caregiver Call       Warfarin doses taken: Warfarin taken as instructed    Diet: No new diet changes identified    New illness, injury, or hospitalization: No    Medication/supplement changes: Took some aleve for a headache: educated patient on the increased risk of bleeding with that NSAID    Signs or symptoms of bleeding or clotting: No    Previous INR: Therapeutic last 2(+) visits    Additional findings: None       PLAN     Recommended plan for no diet, medication or health factor changes affecting INR     Dosing Instructions: Continue your current warfarin dose with next INR in 2 weeks       Summary  As of 12/6/2022    Full warfarin instructions:  7.5 mg every Mon, Wed, Fri; 5 mg all other days; Starting 12/6/2022   Next INR check:  12/20/2022             Telephone call with Kaitlin who verbalizes understanding and agrees to plan    Patient to recheck with home meter    Education provided: Please call back if any changes to your diet, medications or how you've been taking warfarin    Plan made per [facility] anticoagulation protocol    Ashanti Arnold RN  Anticoagulation Clinic  12/6/2022    _______________________________________________________________________     Anticoagulation Episode Summary     Current INR goal:  2.0-3.0   TTR:  90.7 % (1 y)   Target end date:  Indefinite   Send INR reminders to:  [facility] [place]    Indications    Long-term (current) use of anticoagulants [Z79.01] [Z79.01]  Pulmonary embolism and infarction (H) [I26.99]  Deep vein thrombosis (DVT) (H) [I82.409] [I82.409]           Comments:  [employer].  Call cell phone with any dosing.         Anticoagulation Care Providers     Provider Role Specialty Phone number    Eliz Hartman  CNP Responsible Family Medicine [phone]

## 2022-12-09 ENCOUNTER — TELEPHONE (OUTPATIENT)
Dept: FAMILY MEDICINE | Facility: OTHER | Age: 67
End: 2022-12-09

## 2022-12-09 NOTE — TELEPHONE ENCOUNTER
Really nothing conclusive on PFTs.  Mild airway restriction.  Inhaler and nebs as needed    Eliz MCFARLANE  [phone]

## 2022-12-20 ENCOUNTER — ANTICOAGULATION THERAPY VISIT (OUTPATIENT)
Dept: ANTICOAGULATION | Facility: OTHER | Age: 67
End: 2022-12-20
Attending: NURSE PRACTITIONER
Payer: MEDICARE

## 2022-12-20 DIAGNOSIS — Z79.01 LONG TERM CURRENT USE OF ANTICOAGULANT THERAPY: Primary | ICD-10-CM

## 2022-12-20 DIAGNOSIS — I26.99 PULMONARY EMBOLISM AND INFARCTION (H): ICD-10-CM

## 2022-12-20 DIAGNOSIS — I82.401 DEEP VEIN THROMBOSIS (DVT) OF RIGHT LOWER EXTREMITY, UNSPECIFIED CHRONICITY, UNSPECIFIED VEIN (H): ICD-10-CM

## 2022-12-20 LAB — INR HOME MONITORING: 2.4 (ref 2–3)

## 2022-12-20 NOTE — PROGRESS NOTES
ANTICOAGULATION MANAGEMENT     Cassy Avila 67 year old female is on warfarin with therapeutic INR result. (Goal INR 2.0-3.0)    Recent labs: (last 7 days)     12/20/22  0000   INR 2.4       ASSESSMENT       Source(s): Chart Review and Patient/Caregiver Call       Warfarin doses taken: Warfarin taken as instructed    Diet: No new diet changes identified    New illness, injury, or hospitalization: No    Medication/supplement changes: None noted    Signs or symptoms of bleeding or clotting: No    Previous INR: Subtherapeutic    Additional findings: None       PLAN     Recommended plan for no diet, medication or health factor changes affecting INR     Dosing Instructions: Continue your current warfarin dose with next INR in 2 weeks       Summary  As of 12/20/2022    Full warfarin instructions:  7.5 mg every Mon, Wed, Fri; 5 mg all other days; Starting 12/20/2022   Next INR check:  1/3/2023             Telephone call with Kaitlin who verbalizes understanding and agrees to plan    Patient to recheck with home meter    Education provided:     None required    Plan made per [facility] anticoagulation protocol    Corry Galvan RN  Anticoagulation Clinic  12/20/2022    _______________________________________________________________________     Anticoagulation Episode Summary     Current INR goal:  2.0-3.0   TTR:  90.0 % (1 y)   Target end date:  Indefinite   Send INR reminders to:  [facility] [place]    Indications    Long-term (current) use of anticoagulants [Z79.01] [Z79.01]  Pulmonary embolism and infarction (H) [I26.99]  Deep vein thrombosis (DVT) (H) [I82.409] [I82.409]           Comments:  [employer].  Call cell phone with any dosing.         Anticoagulation Care Providers     Provider Role Specialty Phone number    Eliz Hartman CNP [facility] [phone]

## 2023-01-01 ENCOUNTER — APPOINTMENT (OUTPATIENT)
Dept: GENERAL RADIOLOGY | Facility: HOSPITAL | Age: 68
End: 2023-01-01
Attending: PHYSICIAN ASSISTANT
Payer: MEDICARE

## 2023-01-01 ENCOUNTER — HOSPITAL ENCOUNTER (EMERGENCY)
Facility: HOSPITAL | Age: 68
Discharge: HOME | End: 2023-01-01
Attending: PHYSICIAN ASSISTANT | Admitting: PHYSICIAN ASSISTANT
Payer: MEDICARE

## 2023-01-01 VITALS
SYSTOLIC BLOOD PRESSURE: 150 MMHG | HEART RATE: 61 BPM | DIASTOLIC BLOOD PRESSURE: 79 MMHG | OXYGEN SATURATION: 97 % | RESPIRATION RATE: 16 BRPM | TEMPERATURE: 98.3 F

## 2023-01-01 DIAGNOSIS — M25.562 ACUTE PAIN OF LEFT KNEE: ICD-10-CM

## 2023-01-01 PROCEDURE — G0463 HOSPITAL OUTPT CLINIC VISIT: HCPCS

## 2023-01-01 PROCEDURE — 73562 X-RAY EXAM OF KNEE 3: CPT | Mod: LT

## 2023-01-01 PROCEDURE — 99213 OFFICE O/P EST LOW 20 MIN: CPT | Performed by: PHYSICIAN ASSISTANT

## 2023-01-01 RX ORDER — HYDROCODONE BITARTRATE AND ACETAMINOPHEN 5; 325 MG/1; MG/1
1 TABLET ORAL EVERY 6 HOURS PRN
Qty: 15 TABLET | Refills: 0 | Status: SHIPPED | OUTPATIENT
Start: 2023-01-01 | End: 2023-01-04

## 2023-01-01 NOTE — ED TRIAGE NOTES
Patient presents with c/o right knee pain that started about a week ago, but the past few days and today increased pain. 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

## 2023-01-01 NOTE — ED PROVIDER NOTES
History     Chief Complaint   Patient presents with     Knee Pain     HPI  Cassy Avila is a 67 year old female who presents with left anterior knee pain x 1 week. Mild swelling. H/o left TKA 9 years ago. H/o DVTs now on chronic anticoagulation with Coumadin. Denies any erythema or swelling similar to past DVTs. Denies injury or trauma to the knee. Pain is worse with bearing weight and flexion.     Allergies:  Allergies   Allergen Reactions     Amlodipine Besylate Swelling     Norvasc     Amoxicillin      Atorvastatin      myualgia     Cephalexin Monohydrate Hives     Keflex     Erythromycin Base [Kdc:Yellow Dye+Erythromycin+Brilliant Blue Fcf] Nausea and Vomiting     Meloxicam Other (See Comments)     Mobic - confusion, depression     Sulfa Drugs Hives     Adhesive Tape Rash     Prochlorperazine Edisylate Swelling and Rash     Compazine     Prochlorperazine Maleate Swelling and Rash       Problem List:    Patient Active Problem List    Diagnosis Date Noted     Shoulder pain 01/31/2022     Priority: Medium     Formatting of this note might be different from the original.  Added automatically from request for surgery [id number]       Muscle weakness of right upper extremity 04/15/2021     Priority: Medium     Pain in right upper arm 04/15/2021     Priority: Medium     Stiffness of right shoulder joint 04/15/2021     Priority: Medium     Diarrhea 02/08/2021     Priority: Medium     Abdominal pain, generalized 02/08/2021     Priority: Medium     Obesity (BMI 35.0-39.9) with comorbidity (H) 01/28/2020     Priority: Medium     Factor V Leiden mutation (H) 07/26/2019     Priority: Medium     Activated protein C resistance (H) 07/26/2019     Priority: Medium     Primary insomnia 10/31/2018     Priority: Medium     Vitamin D deficiency 07/13/2018     Priority: Medium     Statin medication not prescribed per physician orders 01/17/2018     Priority: Medium     Family history of colon cancer 01/02/2018     Priority:  Medium     Lumbar spondylosis with myelopathy 07/12/2017     Priority: Medium     Degeneration of lumbar or lumbosacral intervertebral disc 07/12/2017     Priority: Medium     Long-term (current) use of anticoagulants [Z79.01] 07/20/2016     Priority: Medium     Deep vein thrombosis (DVT) (H) [I82.409] 07/20/2016     Priority: Medium     Moderate episode of recurrent major depressive disorder (H) 08/08/2014     Priority: Medium     H/O total shoulder replacement, left 06/17/2014     Priority: Medium     Failed arthroplasty (H) 01/24/2014     Priority: Medium     Advanced care planning/counseling discussion 03/12/2013     Priority: Medium     Pt has information at home , not completed       Neurofibromatosis, type 1 (H) 08/22/2012     Priority: Medium     Problem list name updated by automated process. Provider to review    Formatting of this note might be different from the original.  Formatting of this note might be different from the original.  Problem list name updated by automated process. Provider to review       Chest pain, unspecified 02/11/2010     Priority: Medium     Formatting of this note might be different from the original.  IMO Update 10/11       Contact dermatitis and other eczema, due to unspecified cause 06/01/2004     Priority: Medium     Pulmonary embolism and infarction (H) 04/11/2003     Priority: Medium     Problem list name updated by automated process. Provider to review       Hyperlipidemia LDL goal <100 07/11/2002     Priority: Medium     Problem list name updated by automated process. Provider to review       Edema 01/18/2002     Priority: Medium     Primary hypertension 02/20/2001     Priority: Medium     Problem list name updated by automated process. Provider to review    Formatting of this note might be different from the original.  Formatting of this note might be different from the original.  IMO Update 10/11  Formatting of this note might be different from the original.  Problem  list name updated by automated process. Provider to review          Past Medical History:    Past Medical History:   Diagnosis Date     Abdominal pain, generalized 2/8/2021     Arthritis      Cervicalgia 1/9/2001     Closed dislocation of shoulder, unspecified site 2000     Congenital deficiency of other clotting factors 9/7/2012     Congenital factor VIII disorder (H) 7/26/2019     Contact dermatitis and other eczema, due to unspecified cause 6/1/2004     Coughing      Diarrhea 2/8/2021     Edema 1/18/2002     Essential hypertension 2/20/2001     Factor V Leiden (H)      Factor V Leiden mutation (H) 7/26/2019     Family history of colon cancer 1/2/2018     Gastro-oesophageal reflux disease      Herpes zoster without mention of complication 2003     Hyperlipidemia LDL goal <100 7/11/2002     Long term (current) use of anticoagulants 8/20/2003     Major depression 8/8/2014     Mammographic microcalcification 2003     Migraine, unspecified, without mention of intractable migraine without mention of status migrainosus 3/5/2001     Moderate episode of recurrent major depressive disorder (H) 8/8/2014     Neurofibromatosis, peripheral, NF1 (H) 8/22/2012     Neurofibromatosis, unspecified(237.70) 8/22/2012     Other and unspecified hyperlipidemia 7/11/2002     Other chronic pain      Other pulmonary embolism and infarction 4/11/2003     Primary insomnia 10/31/2018     Statin medication not prescribed per physician orders 1/17/2018     Tachycardia, unspecified 2/12/2001     Thrombosis of leg      Unspecified essential hypertension 2/20/2001     Vitamin D deficiency 7/13/2018       Past Surgical History:    Past Surgical History:   Procedure Laterality Date     ------------OTHER-------------  2012    shoulder replacement; Provider: Karen     ARTHROPLASTY KNEE  06/20/2014    Procedure: ARTHROPLASTY KNEE;  Surgeon: Sean Alexander MD;  Location: [facility]     ARTHROSCOPY SHOULDER  2012    right, bone spurs     CA ANESTH,SHOULDER  REPLACEMENT Right 2020      SECTION      x3     CHOLECYSTECTOMY       COLONOSCOPY  02/15/2018    [place],,polyps     elbow ulnar tunnel release       ELECTROTHERMAL THERAPY INTRADISC  2017    stimulator     ENDOSCOPIC SINUS SURGERY, SEPTOPLASTY, TURBINOPLASTY, MAXILLARY SINUSOTOMY, COMBINED N/A 2015    Procedure: COMBINED ENDOSCOPIC SINUS SURGERY, SEPTOPLASTY, TURBINOPLASTY, MAXILLARY SINUSOTOMY;  Surgeon: Seema Conn MD;  Location: [facility] OR     ESOPHAGOSCOPY, GASTROSCOPY, DUODENOSCOPY (EGD), COMBINED      with biopsy and endoscopic U/S     EXCISE NEUROMA LOWER EXTREMITY Left 2016    Procedure: EXCISE NEUROMA LOWER EXTREMITY;  Surgeon: Edi Reed MD;  Location: [facility]     EYE SURGERY Right 2020     FUSION LUMBAR ANTERIOR WITH MARCY CAGES      L5-S1     HYSTERECTOMY TOTAL ABDOMINAL, BILATERAL SALPINGO-OOPHORECTOMY, COMBINED N/A      ORTHOPEDIC SURGERY  2015    right shoulder     ORTHOPEDIC SURGERY  2015    right knee     ORTHOPEDIC SURGERY Right 2018    hip labrum tear     pionidal cyst excision       TRANSPOSITION ULNAR NERVE (ELBOW)         Family History:    Family History   Problem Relation Age of Onset     Cancer Mother      Colon Polyps Mother      Heart Failure Mother 87        congestive, cause of death     Myocardial Infarction Mother         myocardial infarction     Myocardial Infarction Father         myocardial infarction - cause of death     C.A.D. Father      Cancer Paternal Uncle         cause of death     C.A.D. Brother      Other - See Comments Other         factor 5 - family h/o     Asthma No family hx of        Social History:  Marital Status:   [2]  Social History     Tobacco Use     Smoking status: Former     Packs/day: 1.00     Years: 30.00     Pack years: 30.00     Types: Cigarettes, Pipe     Start date: 1969     Quit date: 1999     Years since quittin.0     Smokeless tobacco: Never     Tobacco comments:      quit in 1999   Vaping Use     Vaping Use: Never used   Substance Use Topics     Alcohol use: No     Drug use: No        Medications:    HYDROcodone-acetaminophen (NORCO) 5-325 MG tablet  warfarin ANTICOAGULANT (COUMADIN) 5 MG tablet  albuterol (PROAIR HFA/PROVENTIL HFA/VENTOLIN HFA) 108 (90 Base) MCG/ACT inhaler  albuterol (PROVENTIL) (2.5 MG/3ML) 0.083% neb solution  aspirin 81 MG EC tablet  Cholecalciferol (VITAMIN D3 PO)  doxycycline monohydrate (MONODOX) 100 MG capsule  escitalopram (LEXAPRO) 10 MG tablet  HEMP OIL OR EXTRACT OR OTHER CBD CANNABINOID, NOT MEDICAL CANNABIS,  hydrochlorothiazide (HYDRODIURIL) 25 MG tablet  losartan (COZAAR) 100 MG tablet  metoprolol succinate ER (TOPROL XL) 50 MG 24 hr tablet  omeprazole (PRILOSEC) 20 MG DR capsule  order for DME  potassium chloride ER (KLOR-CON M) 10 MEQ CR tablet  traZODone (DESYREL) 50 MG tablet  warfarin ANTICOAGULANT (COUMADIN) 5 MG tablet          Review of Systems   All other systems reviewed and are negative.      Physical Exam   BP: 150/79  Pulse: 61  Temp: 98.3  F (36.8  C)  Resp: 16  SpO2: 97 %      Physical Exam  Vitals and nursing note reviewed.   Constitutional:       Appearance: Normal appearance.   HENT:      Head: Normocephalic and atraumatic.   Cardiovascular:      Rate and Rhythm: Normal rate.      Pulses: Normal pulses.   Pulmonary:      Effort: Pulmonary effort is normal.   Musculoskeletal:      Right knee: Normal.      Left knee: Bony tenderness present. No swelling, deformity, effusion, erythema, ecchymosis or crepitus. Normal range of motion. Normal alignment. Normal pulse.        Legs:    Skin:     General: Skin is warm.      Capillary Refill: Capillary refill takes less than 2 seconds.   Neurological:      General: No focal deficit present.      Mental Status: She is alert.   Psychiatric:         Mood and Affect: Mood normal.         ED Course                 Procedures            Results for orders placed or performed during the hospital  encounter of 01/01/23 (from the past 24 hour(s))   XR Knee Left 3 Views    Narrative    PROCEDURE:  XR KNEE LEFT 3 VIEWS    HISTORY: Left knee pain. Reports unsure of cause. CMS intact. ROM  present. Pain with ambulation, bending, ect. Hx of artifical knee.  non-smoker.    COMPARISON:  None.    TECHNIQUE:  3 views of the left knee were obtained.    FINDINGS:  There is a left knee prosthesis in place. Prosthetic  elements appear well seated. Distal femur proximal tibia and fibula  appear normal.       Impression    IMPRESSION: Knee prosthesis in place      MARCELLE VENEGAS MD         SYSTEM ID:  [id number]       Medications - No data to display    Assessments & Plan (with Medical Decision Making)   PT with acute anterior knee pain. No injury. No swelling or erythema on exam. Pain with palpation to anterior knee. XR negative for acute fracture, knee prosthesis in place. Doubt DVT and she is already on Coumadin, so US not indicated at this time. She has an appointment with her orthopedist Dr. Alexander in 2 weeks. We will focus on pain control until then. RX for Norco was provided. Supportive care was otherwise recommended. Declines PT referral today.     Plan:  Rest the knee. Stay off of it as much as possible.   Take the Norco as prescribed for pain. No driving or drinking while taking this medication.   May apply OTC topical pain relievers such as voltaren or lidocaine creams.   RICE as instructed in the handout.   Follow up as scheduled with your orthopedist.   Consider physical therapy if symptoms continue.     I have reviewed the nursing notes.    I have reviewed the findings, diagnosis, plan and need for follow up with the patient.    Discharge Medication List as of 1/1/2023  2:29 PM      START taking these medications    Details   HYDROcodone-acetaminophen (NORCO) 5-325 MG tablet Take 1 tablet by mouth every 6 hours as needed for severe pain (7-10), Disp-15 tablet, R-0, E-Prescribe             Final diagnoses:    Acute pain of left knee       1/1/2023   [facility]

## 2023-01-01 NOTE — DISCHARGE INSTRUCTIONS
Rest the knee. Stay off of it as much as possible.   Take the Norco as prescribed for pain. No driving or drinking while taking this medication.   May apply OTC topical pain relievers such as voltaren or lidocaine creams.   RICE as instructed in the handout.   Follow up as scheduled with your orthopedist.   Consider physical therapy if symptoms continue.

## 2023-01-01 NOTE — ED TRIAGE NOTES
Pt presents with c/o left knee pain. Reports that she is unsure what happened. A week or so ago she was getting into bed but states its like her knee wouldn't let her get into bed. Reports it is now swollen, painful. Hurts to walk, bend, ect. Pt reports that her knee is artifical. Had knee replaced 9 years ago. Pain radiates around knee and down her calf. Pt does have hx of DVT, also [rescribed coumadin.

## 2023-01-10 ENCOUNTER — ANTICOAGULATION THERAPY VISIT (OUTPATIENT)
Dept: ANTICOAGULATION | Facility: OTHER | Age: 68
End: 2023-01-10
Attending: NURSE PRACTITIONER
Payer: MEDICARE

## 2023-01-10 DIAGNOSIS — Z79.01 LONG TERM CURRENT USE OF ANTICOAGULANT THERAPY: Primary | ICD-10-CM

## 2023-01-10 DIAGNOSIS — I26.99 PULMONARY EMBOLISM AND INFARCTION (H): ICD-10-CM

## 2023-01-10 DIAGNOSIS — I82.401 DEEP VEIN THROMBOSIS (DVT) OF RIGHT LOWER EXTREMITY, UNSPECIFIED CHRONICITY, UNSPECIFIED VEIN (H): ICD-10-CM

## 2023-01-10 LAB — INR HOME MONITORING: 2.1 (ref 2–3)

## 2023-01-10 NOTE — PROGRESS NOTES
ANTICOAGULATION  MANAGEMENT-Home Monitor Managed by Exception    Cassy CHIU Austin 67 year old female is on warfarin with therapeutic INR result. (Goal INR 2.0-3.0)    Recent labs: (last 7 days)     01/10/23  0000   INR 2.1         Previous INR was Therapeutic    Medication, diet, health changes since last INR:Yes: ED visit 1/1 limited supply given of norco for knee pain, but not anticipated to affect INR    Contacted within the last 12 weeks by phone on 12/20      DARIO Gusmana was NOT contacted regarding therapeutic result today per home monitoring policy manage by exception agreement.   Current warfarin dose is to be continued:     Summary  As of 1/10/2023    Full warfarin instructions:  7.5 mg every Mon, Wed, Fri; 5 mg all other days   Next INR check:  2/7/2023           ?   Corry Galvan RN  Anticoagulation Clinic  1/10/2023    _______________________________________________________________________     Anticoagulation Episode Summary     Current INR goal:  2.0-3.0   TTR:  90.0 % (1 y)   Target end date:  Indefinite   Send INR reminders to:  [facility]    Indications    Long-term (current) use of anticoagulants [Z79.01] [Z79.01]  Pulmonary embolism and infarction (H) [I26.99]  Deep vein thrombosis (DVT) (H) [I82.409] [I82.409]           Comments:  [employer] Monitoring.  Call cell phone with any dosing.         Anticoagulation Care Providers     Provider Role Specialty Phone number    Eliz Hartman, JANINA [facility] [phone]

## 2023-01-13 ENCOUNTER — OFFICE VISIT (OUTPATIENT)
Dept: ORTHOPEDICS | Facility: OTHER | Age: 68
End: 2023-01-13
Attending: NURSE PRACTITIONER
Payer: COMMERCIAL

## 2023-01-13 VITALS
SYSTOLIC BLOOD PRESSURE: 118 MMHG | HEART RATE: 62 BPM | OXYGEN SATURATION: 98 % | WEIGHT: 227.8 LBS | DIASTOLIC BLOOD PRESSURE: 68 MMHG | HEIGHT: 65 IN | TEMPERATURE: 97.5 F | BODY MASS INDEX: 37.95 KG/M2

## 2023-01-13 DIAGNOSIS — M25.562 CHRONIC PAIN OF LEFT KNEE: Primary | ICD-10-CM

## 2023-01-13 DIAGNOSIS — G89.29 CHRONIC PAIN OF LEFT KNEE: Primary | ICD-10-CM

## 2023-01-13 PROCEDURE — G0463 HOSPITAL OUTPT CLINIC VISIT: HCPCS

## 2023-01-13 PROCEDURE — 99213 OFFICE O/P EST LOW 20 MIN: CPT | Performed by: ORTHOPAEDIC SURGERY

## 2023-01-13 RX ORDER — KETOCONAZOLE 20 MG/G
CREAM TOPICAL
COMMUNITY
Start: 2022-11-30

## 2023-01-13 RX ORDER — POTASSIUM CHLORIDE 750 MG/1
1 TABLET, EXTENDED RELEASE ORAL
COMMUNITY
Start: 2022-11-21 | End: 2023-05-17

## 2023-01-13 RX ORDER — DOXYCYCLINE 100 MG/1
1 TABLET ORAL
COMMUNITY
Start: 2022-03-09 | End: 2023-03-17

## 2023-01-13 RX ORDER — CLINDAMYCIN HCL 150 MG
CAPSULE ORAL
COMMUNITY
Start: 2022-02-25 | End: 2023-02-25

## 2023-01-13 ASSESSMENT — PAIN SCALES - GENERAL: PAINLEVEL: MODERATE PAIN (4)

## 2023-01-13 NOTE — PROGRESS NOTES
Service Date: 2023    SUBJECTIVE:  Sia is a patient of mine.  She is here with her  and they both have been patients of mine over the years.  She has 4/10 pain with some daily pain in her left knee.  This patient had successful left total knee arthroplasty.  She states she feels good when she walks on it, but the pain has been present significantly medially.  I looked at him clinically.  Wounds have healed nicely.  Alignment is good range of motion is really quite good at 0-115 degrees or thereabouts.  Clinically, she has a pes anserine tendinitis.    She is quite tender clinically with pes anserine tendinitis.    IMPRESSION:  Left knee pes anserine tendinitis following in the distant past, left knee replacement.    PLAN:  I think a course of therapy is indicated and this was discussed with her.  I told her I will occasionally inject her but there is some risk with cortisone injections adjacent to a joint replacement.    She is actually struggled with her shoulder, she went to [facility] for a shoulder replacement.  It got infected and did have additional surgery now is on oral suppressive therapy with doxycycline, so she is aware of the significant potential risks of infection with joint replacement.  I think careful non injection approach and nonoperative approaches indicated clinically here.    Sean Alexander MD        D: 2023   T: 2023   MT: BASSAM    Name:     SIA ROLLE  MRN:      [id number]        Account:      [id number]   :      1955           Service Date: 2023       Document: [id number]

## 2023-01-24 ENCOUNTER — ANTICOAGULATION THERAPY VISIT (OUTPATIENT)
Dept: ANTICOAGULATION | Facility: OTHER | Age: 68
End: 2023-01-24
Attending: NURSE PRACTITIONER
Payer: COMMERCIAL

## 2023-01-24 DIAGNOSIS — I26.99 PULMONARY EMBOLISM AND INFARCTION (H): ICD-10-CM

## 2023-01-24 DIAGNOSIS — I82.401 DEEP VEIN THROMBOSIS (DVT) OF RIGHT LOWER EXTREMITY, UNSPECIFIED CHRONICITY, UNSPECIFIED VEIN (H): Primary | ICD-10-CM

## 2023-01-24 DIAGNOSIS — Z79.01 LONG TERM CURRENT USE OF ANTICOAGULANT THERAPY: ICD-10-CM

## 2023-01-24 LAB — INR HOME MONITORING: 2.2 (ref 2–3)

## 2023-01-24 NOTE — PROGRESS NOTES
ANTICOAGULATION  MANAGEMENT-Home Monitor Managed by Exception    Cassy APPLE Avila 67 year old female is on warfarin with therapeutic INR result. (Goal INR 2.0-3.0)    Recent labs: (last 7 days)     01/24/23  0000   INR 2.2         Previous INR was Therapeutic    Medication, diet, health changes since last INR:chart reviewed; none identified    Contacted within the last 12 weeks by phone on 12/20/22      DARIO     Cassy was NOT contacted regarding therapeutic result today per home monitoring policy manage by exception agreement.   Current warfarin dose is to be continued:     Summary  As of 1/24/2023    Full warfarin instructions:  7.5 mg every Mon, Wed, Fri; 5 mg all other days   Next INR check:  2/7/2023           ?   Corry Galvan RN  Anticoagulation Clinic  1/24/2023    _______________________________________________________________________     Anticoagulation Episode Summary     Current INR goal:  2.0-3.0   TTR:  90.0 % (1 y)   Target end date:  Indefinite   Send INR reminders to:  [facility]    Indications    Long-term (current) use of anticoagulants [Z79.01] [Z79.01]  Pulmonary embolism and infarction (H) [I26.99]  Deep vein thrombosis (DVT) (H) [I82.409] [I82.409]  Deep vein thrombosis (DVT) of right lower extremity  unspecified chronicity  unspecified vein (H) [I82.401]           Comments:  [employer] Monitoring.  Call cell phone with any dosing.         Anticoagulation Care Providers     Provider Role Specialty Phone number    Eliz Hartman CNP Referring Family Medicine [phone]             Improved

## 2023-02-14 ENCOUNTER — ANTICOAGULATION THERAPY VISIT (OUTPATIENT)
Dept: ANTICOAGULATION | Facility: OTHER | Age: 68
End: 2023-02-14
Attending: NURSE PRACTITIONER
Payer: COMMERCIAL

## 2023-02-14 DIAGNOSIS — I26.99 PULMONARY EMBOLISM AND INFARCTION (H): ICD-10-CM

## 2023-02-14 DIAGNOSIS — I82.401 DEEP VEIN THROMBOSIS (DVT) OF RIGHT LOWER EXTREMITY, UNSPECIFIED CHRONICITY, UNSPECIFIED VEIN (H): ICD-10-CM

## 2023-02-14 DIAGNOSIS — Z79.01 LONG TERM CURRENT USE OF ANTICOAGULANT THERAPY: Primary | ICD-10-CM

## 2023-02-14 DIAGNOSIS — F33.1 MODERATE EPISODE OF RECURRENT MAJOR DEPRESSIVE DISORDER (H): ICD-10-CM

## 2023-02-14 DIAGNOSIS — F51.01 PRIMARY INSOMNIA: ICD-10-CM

## 2023-02-14 LAB — INR HOME MONITORING: 2 (ref 2–3)

## 2023-02-14 NOTE — PROGRESS NOTES
ANTICOAGULATION  MANAGEMENT-Home Monitor Managed by Exception    Cassy APPLE Avila 67 year old female is on warfarin with therapeutic INR result. (Goal INR 2.0-3.0)    Recent labs: (last 7 days)     02/14/23  0000   INR 2.0         Previous INR was Therapeutic    Medication, diet, health changes since last INR:chart reviewed; none identified    Contacted within the last 12 weeks by phone on 12/20/22      DARIO     Cassy was NOT contacted regarding therapeutic result today per home monitoring policy manage by exception agreement.   Current warfarin dose is to be continued:     Summary  As of 2/14/2023    Full warfarin instructions:  7.5 mg every Mon, Wed, Fri; 5 mg all other days   Next INR check:  3/14/2023           ?   Corry Galvan RN  Anticoagulation Clinic  2/14/2023    _______________________________________________________________________     Anticoagulation Episode Summary     Current INR goal:  2.0-3.0   TTR:  90.0 % (1 y)   Target end date:  Indefinite   Send INR reminders to:  [facility]    Indications    Long-term (current) use of anticoagulants [Z79.01] [Z79.01]  Pulmonary embolism and infarction (H) [I26.99]  Deep vein thrombosis (DVT) (H) [I82.409] [I82.409]  Deep vein thrombosis (DVT) of right lower extremity  unspecified chronicity  unspecified vein (H) [I82.401]           Comments:  [employer].  Call cell phone with any dosing.         Anticoagulation Care Providers     Provider Role Specialty Phone number    Eliz Hartman CNP Referring Family Medicine [phone]

## 2023-02-15 ENCOUNTER — OFFICE VISIT (OUTPATIENT)
Dept: FAMILY MEDICINE | Facility: OTHER | Age: 68
End: 2023-02-15
Attending: NURSE PRACTITIONER
Payer: COMMERCIAL

## 2023-02-15 VITALS
HEIGHT: 65 IN | BODY MASS INDEX: 37.2 KG/M2 | SYSTOLIC BLOOD PRESSURE: 126 MMHG | WEIGHT: 223.3 LBS | HEART RATE: 71 BPM | TEMPERATURE: 98.4 F | OXYGEN SATURATION: 97 % | DIASTOLIC BLOOD PRESSURE: 76 MMHG | RESPIRATION RATE: 16 BRPM

## 2023-02-15 DIAGNOSIS — J01.00 SUBACUTE MAXILLARY SINUSITIS: Primary | ICD-10-CM

## 2023-02-15 PROCEDURE — 99213 OFFICE O/P EST LOW 20 MIN: CPT | Performed by: NURSE PRACTITIONER

## 2023-02-15 PROCEDURE — G0463 HOSPITAL OUTPT CLINIC VISIT: HCPCS

## 2023-02-15 RX ORDER — AZITHROMYCIN 250 MG/1
TABLET, FILM COATED ORAL
Qty: 11 TABLET | Refills: 0 | Status: SHIPPED | OUTPATIENT
Start: 2023-02-15 | End: 2023-02-25

## 2023-02-15 RX ORDER — TRAZODONE HYDROCHLORIDE 50 MG/1
TABLET, FILM COATED ORAL
Qty: 180 TABLET | Refills: 0 | Status: SHIPPED | OUTPATIENT
Start: 2023-02-15 | End: 2023-03-17

## 2023-02-15 ASSESSMENT — PAIN SCALES - GENERAL: PAINLEVEL: NO PAIN (0)

## 2023-02-15 NOTE — PATIENT INSTRUCTIONS
Assessment & Plan          Subacute maxillary sinusitis  - azithromycin (ZITHROMAX) 250 MG tablet; Take 2 tablets (500 mg) by mouth daily for 1 day, THEN 1 tablet (250 mg) daily for 9 days.        Mucinex liquid  Insure adequate fluid intake  Get plenty of rest  Monitor for temp at home, treat with OTC Tylenol or Ibuprofen per package instruction.  Humidity at home (add bacteriostatic solution to humidifier)  Please return in you do not improve  To UC or ER with persistent, worsening, or concerning symptoms        Eliz Hartman, CNP  [facility] [place] [facility]

## 2023-02-15 NOTE — TELEPHONE ENCOUNTER
escitalopram (LEXAPRO) 10 MG tablet   Last Written Prescription Date:  11-28-22  Last Fill Quantity: 90,   # refills: 0  Last Office Visit: 9-16-22  Future Office visit:    Next 5 appointments (look out 90 days)    Feb 15, 2023  2:00 PM  (Arrive by 1:45 PM)  SHORT with Eliz Hartman CNP  [facility] ([facility] ) [address]  [place] [address]  [phone]   Mar 17, 2023 10:30 AM  (Arrive by 10:15 AM)  SHORT with Eliz Hartman CNP  [facility] ([facility] ) [address]  [place] [address]  [phone]           Routing refill request to provider for review/approval because:

## 2023-02-15 NOTE — TELEPHONE ENCOUNTER
traZODone 50mg      Last Written Prescription Date:  8/22/22  Last Fill Quantity: 180,   # refills: 0  Last Office Visit: 2/1/23  Future Office visit:    Next 5 appointments (look out 90 days)    Feb 15, 2023  2:00 PM  (Arrive by 1:45 PM)  SHORT with Eliz Hartman CNP  [facility] ([facility] ) [address]  [place] [address]  [phone]   Mar 17, 2023 10:30 AM  (Arrive by 10:15 AM)  SHORT with Eliz Hartman CNP  [facility] ([facility] ) [address]  [place] [address]  [phone]           Routing refill request to provider for review/approval because:

## 2023-02-15 NOTE — PROGRESS NOTES
Assessment & Plan          Subacute maxillary sinusitis  - azithromycin (ZITHROMAX) 250 MG tablet; Take 2 tablets (500 mg) by mouth daily for 1 day, THEN 1 tablet (250 mg) daily for 9 days.        Mucinex liquid  Insure adequate fluid intake  Get plenty of rest  Monitor for temp at home, treat with OTC Tylenol or Ibuprofen per package instruction.  Humidity at home (add bacteriostatic solution to humidifier)  Please return in you do not improve  To UC or ER with persistent, worsening, or concerning symptoms          Eliz Hartman CNP  [facility] - [place] MARLENE aMdrigal is a 67 year old presenting for the following health issues:  URI          Acute Illness  Acute illness concerns: URI  Onset/Duration: 7 days  Symptoms:  Fever: YES  Chills/Sweats: YES  Headache (location?): No  Sinus Pressure: YES  Conjunctivitis:  No  Ear Pain: YES: bilateral  Rhinorrhea: YES  Congestion: YES  Sore Throat: No  Cough: YES-productive of green sputum  Wheeze: YES  Decreased Appetite: YES  Nausea: No  Vomiting: No  Diarrhea: No  Dysuria/Freq.: No  Dysuria or Hematuria: No  Fatigue/Achiness: YES  Sick/Strep Exposure: No  Therapies tried and outcome: nothing        Patient Active Problem List   Diagnosis     Primary hypertension     Pulmonary embolism and infarction (H)     Contact dermatitis and other eczema, due to unspecified cause     Edema     Hyperlipidemia LDL goal <100     Neurofibromatosis, type 1 (H)     Advanced care planning/counseling discussion     Moderate episode of recurrent major depressive disorder (H)     Long-term (current) use of anticoagulants [Z79.01]     Deep vein thrombosis (DVT) (H) [I82.409]     Lumbar spondylosis with myelopathy     Degeneration of lumbar or lumbosacral intervertebral disc     Family history of colon cancer     Statin medication not prescribed per physician orders     Vitamin D deficiency     Failed arthroplasty (H)     H/O total shoulder replacement, left     Primary insomnia      Factor V Leiden mutation (H)     Obesity (BMI 35.0-39.9) with comorbidity (H)     Diarrhea     Abdominal pain, generalized     Chest pain, unspecified     Muscle weakness of right upper extremity     Pain in right upper arm     Stiffness of right shoulder joint     Activated protein C resistance (H)     Shoulder pain     Deep vein thrombosis (DVT) of right lower extremity, unspecified chronicity, unspecified vein (H)     Past Surgical History:   Procedure Laterality Date     ------------OTHER-------------      shoulder replacement; Provider: Karen     ARTHROPLASTY KNEE  2014    Procedure: ARTHROPLASTY KNEE;  Surgeon: Sean Alexander MD;  Location: [facility] OR     ARTHROSCOPY SHOULDER  2012    right, bone spurs     CA ANESTH,SHOULDER REPLACEMENT Right 2020      SECTION      x3     CHOLECYSTECTOMY       COLONOSCOPY  02/15/2018    [place],,polyps     elbow ulnar tunnel release       ELECTROTHERMAL THERAPY INTRADISC  2017    stimulator     ENDOSCOPIC SINUS SURGERY, SEPTOPLASTY, TURBINOPLASTY, MAXILLARY SINUSOTOMY, COMBINED N/A 2015    Procedure: COMBINED ENDOSCOPIC SINUS SURGERY, SEPTOPLASTY, TURBINOPLASTY, MAXILLARY SINUSOTOMY;  Surgeon: Seema Conn MD;  Location: [facility] OR     ESOPHAGOSCOPY, GASTROSCOPY, DUODENOSCOPY (EGD), COMBINED      with biopsy and endoscopic U/S     EXCISE NEUROMA LOWER EXTREMITY Left 2016    Procedure: EXCISE NEUROMA LOWER EXTREMITY;  Surgeon: Edi Reed MD;  Location: [facility] OR     EYE SURGERY Right 2020     FUSION LUMBAR ANTERIOR WITH MARCY CAGES      L5-S1     HYSTERECTOMY TOTAL ABDOMINAL, BILATERAL SALPINGO-OOPHORECTOMY, COMBINED N/A      ORTHOPEDIC SURGERY  2015    right shoulder     ORTHOPEDIC SURGERY  2015    right knee     ORTHOPEDIC SURGERY Right 2018    hip labrum tear     pionidal cyst excision       TRANSPOSITION ULNAR NERVE (ELBOW)         Social History     Tobacco Use     Smoking status: Former     Packs/day:  1.00     Years: 30.00     Pack years: 30.00     Types: Cigarettes, Pipe     Start date: 1969     Quit date: 1999     Years since quittin.1     Smokeless tobacco: Never     Tobacco comments:     quit in    Substance Use Topics     Alcohol use: No     Family History   Problem Relation Age of Onset     Cancer Mother      Colon Polyps Mother      Heart Failure Mother 87        congestive, cause of death     Myocardial Infarction Mother         myocardial infarction     Myocardial Infarction Father         myocardial infarction - cause of death     C.A.D. Father      Cancer Paternal Uncle         cause of death     C.A.D. Brother      Other - See Comments Other         factor 5 - family h/o     Asthma No family hx of            Current Outpatient Medications   Medication Sig Dispense Refill     albuterol (PROAIR HFA/PROVENTIL HFA/VENTOLIN HFA) 108 (90 Base) MCG/ACT inhaler Inhale 2 puffs into the lungs every 6 hours 18 g 1     albuterol (PROVENTIL) (2.5 MG/3ML) 0.083% neb solution Take 2.5 mg by nebulization every 6 hours as needed for shortness of breath / dyspnea or wheezing       aspirin 81 MG EC tablet Take 81 mg by mouth daily HS       Cholecalciferol (VITAMIN D3 PO) Take 3,000 mg by mouth daily AM       clindamycin (CLEOCIN) 150 MG capsule TAKE 1 CAPSULE BY MOUTH EVERY SIX HOURS FOR 14 DAYS (Patient not taking: Reported on 2/15/2023)       doxycycline monohydrate (ADOXA) 100 MG tablet Take 1 tablet by mouth 2 times daily       doxycycline monohydrate (MONODOX) 100 MG capsule Take 100 mg by mouth 2 times daily       escitalopram (LEXAPRO) 10 MG tablet Take 1 tablet by mouth once daily 90 tablet 0     HEMP OIL OR EXTRACT OR OTHER CBD CANNABINOID, NOT MEDICAL CANNABIS,        hydrochlorothiazide (HYDRODIURIL) 25 MG tablet Take 1 tablet (25 mg) by mouth daily 90 tablet 1     ketoconazole (NIZORAL) 2 % external cream APPLY TO THE RASH ON FULL BACK TWICE A DAY FOR 4-6 WEEKS       losartan (COZAAR) 100 MG  tablet Take 1 tablet by mouth once daily 90 tablet 2     metoprolol succinate ER (TOPROL XL) 50 MG 24 hr tablet Take 1.5 tablets (75 mg) by mouth daily 90 tablet 1     omeprazole (PRILOSEC) 20 MG DR capsule Take 20 mg by mouth       order for DME Nebulizer machine and tubing    DX:  Bronchitis 1 Device 0     potassium chloride ER (K-TAB/KLOR-CON) 10 MEQ CR tablet Take 1 tablet by mouth daily at 2 pm       potassium chloride ER (KLOR-CON M) 10 MEQ CR tablet Take 1 tablet (10 mEq) by mouth daily 90 tablet 3     traZODone (DESYREL) 50 MG tablet TAKE 1 TO 2 TABLETS BY MOUTH NIGHTLY AS NEEDED 180 tablet 0     warfarin ANTICOAGULANT (COUMADIN) 5 MG tablet TAKE 1.5 TABLETS BY MOUTH MONDAY, WEDNESDAY, AND FRIDAY AND 1 TABLET ALL OTHER DAYS OR AS DIRECTED BY [facility] 109 tablet 0     warfarin ANTICOAGULANT (COUMADIN) 5 MG tablet TAKE 1 & 1/2 (ONE & ONE-HALF) TABLETS BY MOUTH  MONDAY, WEDNESDAY, FRIDAY AND 1 TABLET ALL OTHER DAYS OR AS DIRECTED BY [facility] 109 tablet 0     Allergies   Allergen Reactions     Amlodipine Besylate Swelling     Norvasc     Amoxicillin      Atorvastatin      myualgia     Cephalexin Monohydrate Hives     Keflex     Erythromycin Base [Kdc:Yellow Dye+Erythromycin+Brilliant Blue Fcf] Nausea and Vomiting     Meloxicam Other (See Comments)     Mobic - confusion, depression     Sulfa Drugs Hives     Adhesive Tape Rash     Prochlorperazine Edisylate Swelling and Rash     Compazine     Prochlorperazine Maleate Swelling and Rash     Recent Labs   Lab Test 09/21/22  0831 09/16/22  1141 03/08/22  1046 08/27/21  1221 08/27/21  0917 04/16/21  1014 02/08/21  1432   * 101* 95  --    < >  --   --    HDL 38* 35* 34*  --    < >  --   --    TRIG 173* 276* 184*  --    < >  --   --    ALT  --  28 28 33  --  29 36   CR  --  1.01 0.94 1.04   < > 0.95 0.92   GFRESTIMATED  --  61 66 56*   < > 62 65   GFRESTBLACK  --   --   --   --   --  72 75   POTASSIUM  --  3.7 3.6 3.5   < > 3.3* 3.4   TSH  --  2.44   "--  2.86  --   --   --     < > = values in this interval not displayed.        BP Readings from Last 3 Encounters:   02/15/23 126/76   01/13/23 118/68   01/01/23 150/79    Wt Readings from Last 3 Encounters:   02/15/23 101.3 kg (223 lb 4.8 oz)   01/13/23 103.3 kg (227 lb 12.8 oz)   09/16/22 103.8 kg (228 lb 14.4 oz)                Review of Systems   Constitutional, HEENT, cardiovascular, pulmonary, gi and gu systems are negative, except as otherwise noted.            Objective    /76 (BP Location: Left arm, Patient Position: Sitting, Cuff Size: Adult Regular)   Pulse 71   Temp 98.4  F (36.9  C) (Tympanic)   Resp 16   Ht 1.651 m (5' 5\")   Wt 101.3 kg (223 lb 4.8 oz)   SpO2 97%   BMI 37.16 kg/m    Body mass index is 37.16 kg/m .         Physical Exam   GENERAL: healthy, alert and no distress  EYES: Eyes grossly normal to inspection, PERRL and conjunctivae and sclerae normal  HENT: both ears: erythematous and sinuses: maxillary, frontal tenderness on bilateral, thick yellow PND  NECK: no adenopathy, no asymmetry, masses, or scars and thyroid normal to palpation  RESP: lungs clear to auscultation - no rales, rhonchi or wheezes  CV: regular rate and rhythm, normal S1 S2, no S3 or S4, no murmur, click or rub, no peripheral edema and peripheral pulses strong  SKIN: no suspicious lesions or rashes                "

## 2023-02-16 RX ORDER — ESCITALOPRAM OXALATE 10 MG/1
TABLET ORAL
Qty: 90 TABLET | Refills: 1 | Status: SHIPPED | OUTPATIENT
Start: 2023-02-16 | End: 2023-08-22

## 2023-02-21 ENCOUNTER — ANTICOAGULATION THERAPY VISIT (OUTPATIENT)
Dept: ANTICOAGULATION | Facility: OTHER | Age: 68
End: 2023-02-21
Attending: NURSE PRACTITIONER
Payer: MEDICARE

## 2023-02-21 DIAGNOSIS — Z79.01 LONG TERM CURRENT USE OF ANTICOAGULANT THERAPY: Primary | ICD-10-CM

## 2023-02-21 DIAGNOSIS — I82.401 DEEP VEIN THROMBOSIS (DVT) OF RIGHT LOWER EXTREMITY, UNSPECIFIED CHRONICITY, UNSPECIFIED VEIN (H): ICD-10-CM

## 2023-02-21 DIAGNOSIS — I26.99 PULMONARY EMBOLISM AND INFARCTION (H): ICD-10-CM

## 2023-02-21 LAB — INR HOME MONITORING: 1.9 (ref 2–3)

## 2023-02-21 NOTE — PROGRESS NOTES
ANTICOAGULATION MANAGEMENT     Cassy Avila 67 year old female is on warfarin with subtherapeutic INR result. (Goal INR 2.0-3.0)    Recent labs: (last 7 days)     02/21/23  0000   INR 1.9*       ASSESSMENT       Source(s): Chart Review and Patient/Caregiver Call       Warfarin doses taken: Warfarin taken as instructed    Diet: No new diet changes identified    New illness, injury, or hospitalization: No    Medication/supplement changes: started azithrmax 2/15 x 9 days for sinusitis    Signs or symptoms of bleeding or clotting: No    Previous INR: Therapeutic last 2(+) visits    Additional findings: None       PLAN     Recommended plan for temporary change(s) affecting INR     Dosing Instructions: booster dose then continue your current warfarin dose with next INR in 2 weeks       Summary  As of 2/21/2023    Full warfarin instructions:  2/21: 7.5 mg; Otherwise 7.5 mg every Mon, Wed, Fri; 5 mg all other days   Next INR check:  3/7/2023             Telephone call with Kaitlin who verbalizes understanding and agrees to plan    Patient to recheck with home meter    Education provided:     None required    Plan made per [facility] anticoagulation protocol    Corry Galvan, RN  Anticoagulation Clinic  2/21/2023    _______________________________________________________________________     Anticoagulation Episode Summary     Current INR goal:  2.0-3.0   TTR:  88.0 % (1 y)   Target end date:  Indefinite   Send INR reminders to:  [facility] [place]    Indications    Long-term (current) use of anticoagulants [Z79.01] [Z79.01]  Pulmonary embolism and infarction (H) [I26.99]  Deep vein thrombosis (DVT) (H) [I82.409] [I82.409]  Deep vein thrombosis (DVT) of right lower extremity  unspecified chronicity  unspecified vein (H) [I82.401]           Comments:  [employer].  Call cell phone with any dosing.         Anticoagulation Care Providers     Provider Role Specialty Phone number    Eliz Hartman CNP Referring Family Medicine  [phone]

## 2023-03-07 ENCOUNTER — ANTICOAGULATION THERAPY VISIT (OUTPATIENT)
Dept: ANTICOAGULATION | Facility: OTHER | Age: 68
End: 2023-03-07
Attending: NURSE PRACTITIONER
Payer: COMMERCIAL

## 2023-03-07 DIAGNOSIS — I26.99 PULMONARY EMBOLISM AND INFARCTION (H): ICD-10-CM

## 2023-03-07 DIAGNOSIS — Z79.01 LONG TERM CURRENT USE OF ANTICOAGULANT THERAPY: Primary | ICD-10-CM

## 2023-03-07 DIAGNOSIS — I82.401 DEEP VEIN THROMBOSIS (DVT) OF RIGHT LOWER EXTREMITY, UNSPECIFIED CHRONICITY, UNSPECIFIED VEIN (H): ICD-10-CM

## 2023-03-07 LAB — INR HOME MONITORING: 1.8 (ref 2–3)

## 2023-03-07 NOTE — PROGRESS NOTES
ANTICOAGULATION MANAGEMENT     Cassy Avila 67 year old female is on warfarin with subtherapeutic INR result. (Goal INR 2.0-3.0)    Recent labs: (last 7 days)     03/07/23  0000   INR 1.8*       ASSESSMENT       Source(s): Chart Review and Patient/Caregiver Call       Warfarin doses taken: Warfarin taken as instructed    Diet: No new diet changes identified    New illness, injury, or hospitalization: No    Medication/supplement changes: None noted    Signs or symptoms of bleeding or clotting: No    Previous INR: Subtherapeutic    Additional findings: None         PLAN     Recommended plan for no diet, medication or health factor changes affecting INR     Dosing Instructions: Increase your warfarin dose (5.9% change) with next INR in 2 weeks       Summary  As of 3/7/2023    Full warfarin instructions:  5 mg every Mon, Wed, Fri; 7.5 mg all other days   Next INR check:  3/21/2023             Telephone call with Kaitlin who verbalizes understanding and agrees to plan    Patient to recheck with home meter    Education provided:     None required    Plan made per [facility] anticoagulation protocol    Corry Galvan RN  Anticoagulation Clinic  3/7/2023    _______________________________________________________________________     Anticoagulation Episode Summary     Current INR goal:  2.0-3.0   TTR:  86.9 % (1 y)   Target end date:  Indefinite   Send INR reminders to:  [facility] [place]    Indications    Long-term (current) use of anticoagulants [Z79.01] [Z79.01]  Pulmonary embolism and infarction (H) [I26.99]  Deep vein thrombosis (DVT) (H) [I82.409] [I82.409]  Deep vein thrombosis (DVT) of right lower extremity  unspecified chronicity  unspecified vein (H) [I82.401]           Comments:  [employer].  Call cell phone with any dosing.         Anticoagulation Care Providers     Provider Role Specialty Phone number    Eliz Hartman CNP Referring Family Medicine [phone]

## 2023-03-16 NOTE — PROGRESS NOTES
Assessment & Plan          Hyperlipidemia LDL goal <100  - Lipid Profile (Chol, Trig, HDL, LDL calc)  - Comprehensive metabolic panel      Essential hypertension  - Lipid Profile (Chol, Trig, HDL, LDL calc)  - Comprehensive metabolic panel  - TSH with free T4 reflex  - *UA reflex to Microscopic and Culture - [facility]  - UA Microscopic with Reflex to Culture  - hydrochlorothiazide (HYDRODIURIL) 25 MG tablet; Take 1 tablet (25 mg) by mouth daily  - metoprolol succinate ER (TOPROL XL) 50 MG 24 hr tablet; Take 1.5 tablets (75 mg) by mouth daily        Depression  - Continue plan of care        Primary insomnia  - traZODone (DESYREL) 50 MG tablet; TAKE 1 TO 2 TABLETS BY MOUTH NIGHTLY AS NEEDED      Long-term (current) use of anticoagulants [Z79.01]  - Continue plan of care        When your lab results return, we will call you with abnormal findings  You can also view this information in your MyChart, if you have an account.  Please call or "[employer]hart message us with any questions you may have.         40  minutes spent on the date of the encounter doing chart review, review of outside records, review of test results, interpretation of tests, patient visit and documentation             Return in about 6 months (around 9/17/2023).           Eliz Hartman CNP  [facility] - CURRY Madrigal is a 67 year old, presenting for the following health issues:  Depression, Hypertension, and Hyperlipidemia          Hyperlipidemia Follow-Up    Are you regularly taking any medication or supplement to lower your cholesterol?   No    Are you having muscle aches or other side effects that you think could be caused by your cholesterol lowering medication?  No        Hypertension Follow-up    Do you check your blood pressure regularly outside of the clinic? No     Are you following a low salt diet? Yes    Are your blood pressures ever more than 140 on the top number (systolic) OR more   than 90 on the bottom number  (diastolic), for example 140/90? No        Depression Followup    How are you doing with your depression since your last visit? Improved     Are you having other symptoms that might be associated with depression? No    Have you had a significant life event?  No     Are you feeling anxious or having panic attacks?   No    Do you have any concerns with your use of alcohol or other drugs? No        Social History     Tobacco Use     Smoking status: Former     Packs/day: 1.00     Years: 30.00     Pack years: 30.00     Types: Cigarettes, Pipe     Start date: 1969     Quit date: 1999     Years since quittin.2     Smokeless tobacco: Never     Tobacco comments:     quit in    Vaping Use     Vaping Use: Never used   Substance Use Topics     Alcohol use: No     Drug use: No           PHQ 3/8/2022 2022 3/17/2023   PHQ-9 Total Score 0 4 2   Q9: Thoughts of better off dead/self-harm past 2 weeks Not at all Not at all Not at all   F/U: Thoughts of suicide or self-harm - - -   F/U: Safety concerns - - -         MARGA-7 SCORE 3/8/2022 2022 3/17/2023   Total Score 0 2 2           Insomnia  Onset/Duration: ongoing  Description:   Frequency of insomnia:  varies  Time to fall asleep (sleep latency): sometimes takes time  Middle of night awakening:  YES  Early morning awakening:  YES  Progression of Symptoms:  same  Accompanying Signs & Symptoms:  Daytime sleepiness/napping: No  Excessive snoring/apnea: No  Restless legs: No  Waking to urinate: YES  Chronic pain:  No  Depression symptoms (if yes, do PHQ9): No  Anxiety symptoms (if yes, do MARGA-7): No  History:  Prior Insomnia: ongoing  New stressful situation: No  Precipitating factors:   Caffeine intake: YES  OTC decongestants: No  Any new medications: No  Alleviating factors:  Self medicating (alcohol, etc.):  No  Stress-reduction (exercise, yoga, meditation etc): Sabianist  Therapies tried and outcome: trazadone          History of DVT  On coumadin  Stable, no  concerns          Patient Active Problem List   Diagnosis     Primary hypertension     Pulmonary embolism and infarction (H)     Contact dermatitis and other eczema, due to unspecified cause     Edema     Hyperlipidemia LDL goal <100     Neurofibromatosis, type 1 (H)     Advanced care planning/counseling discussion     Moderate episode of recurrent major depressive disorder (H)     Long-term (current) use of anticoagulants [Z79.01]     Deep vein thrombosis (DVT) (H) [I82.409]     Lumbar spondylosis with myelopathy     Degeneration of lumbar or lumbosacral intervertebral disc     Family history of colon cancer     Statin medication not prescribed per physician orders     Vitamin D deficiency     Failed arthroplasty (H)     H/O total shoulder replacement, left     Primary insomnia     Factor V Leiden mutation (H)     Obesity (BMI 35.0-39.9) with comorbidity (H)     Diarrhea     Abdominal pain, generalized     Chest pain, unspecified     Muscle weakness of right upper extremity     Pain in right upper arm     Stiffness of right shoulder joint     Activated protein C resistance (H)     Shoulder pain     Deep vein thrombosis (DVT) of right lower extremity, unspecified chronicity, unspecified vein (H)     Past Surgical History:   Procedure Laterality Date     ------------OTHER-------------      shoulder replacement; Provider: Karen     ARTHROPLASTY KNEE  2014    Procedure: ARTHROPLASTY KNEE;  Surgeon: Sean Alexander MD;  Location: [facility]     ARTHROSCOPY SHOULDER      right, bone spurs     CA ANESTH,SHOULDER REPLACEMENT Right 2020      SECTION      x3     CHOLECYSTECTOMY       COLONOSCOPY  02/15/2018    Anna,,polyps     elbow ulnar tunnel release       ELECTROTHERMAL THERAPY INTRADISC  2017    stimulator     ENDOSCOPIC SINUS SURGERY, SEPTOPLASTY, TURBINOPLASTY, MAXILLARY SINUSOTOMY, COMBINED N/A 2015    Procedure: COMBINED ENDOSCOPIC SINUS SURGERY, SEPTOPLASTY, TURBINOPLASTY,  MAXILLARY SINUSOTOMY;  Surgeon: Seema Conn MD;  Location: [facility] OR     ESOPHAGOSCOPY, GASTROSCOPY, DUODENOSCOPY (EGD), COMBINED      with biopsy and endoscopic U/S     EXCISE NEUROMA LOWER EXTREMITY Left 2016    Procedure: EXCISE NEUROMA LOWER EXTREMITY;  Surgeon: Edi Reed MD;  Location: [facility]     EYE SURGERY Right 2020     FUSION LUMBAR ANTERIOR WITH BAK CAGES      L5-S1     HYSTERECTOMY TOTAL ABDOMINAL, BILATERAL SALPINGO-OOPHORECTOMY, COMBINED N/A      ORTHOPEDIC SURGERY  2015    right shoulder     ORTHOPEDIC SURGERY  2015    right knee     ORTHOPEDIC SURGERY Right 2018    hip labrum tear     pionidal cyst excision       TRANSPOSITION ULNAR NERVE (ELBOW)         Social History     Tobacco Use     Smoking status: Former     Packs/day: 1.00     Years: 30.00     Pack years: 30.00     Types: Cigarettes, Pipe     Start date: 1969     Quit date: 1999     Years since quittin.2     Smokeless tobacco: Never     Tobacco comments:     quit in    Substance Use Topics     Alcohol use: No     Family History   Problem Relation Age of Onset     Cancer Mother      Colon Polyps Mother      Heart Failure Mother 87        congestive, cause of death     Myocardial Infarction Mother         myocardial infarction     Myocardial Infarction Father         myocardial infarction - cause of death     C.A.D. Father      Cancer Paternal Uncle         cause of death     C.A.D. Brother      Other - See Comments Other         factor 5 - family h/o     Asthma No family hx of                Current Outpatient Medications   Medication Sig Dispense Refill     albuterol (PROAIR HFA/PROVENTIL HFA/VENTOLIN HFA) 108 (90 Base) MCG/ACT inhaler Inhale 2 puffs into the lungs every 6 hours 18 g 1     albuterol (PROVENTIL) (2.5 MG/3ML) 0.083% neb solution Take 2.5 mg by nebulization every 6 hours as needed for shortness of breath / dyspnea or wheezing       aspirin 81 MG EC tablet Take 81 mg by  mouth daily HS       Cholecalciferol (VITAMIN D3 PO) Take 3,000 mg by mouth daily AM       escitalopram (LEXAPRO) 10 MG tablet Take 1 tablet by mouth once daily 90 tablet 1     HEMP OIL OR EXTRACT OR OTHER CBD CANNABINOID, NOT MEDICAL CANNABIS,        hydrochlorothiazide (HYDRODIURIL) 25 MG tablet Take 1 tablet (25 mg) by mouth daily 90 tablet 1     ketoconazole (NIZORAL) 2 % external cream APPLY TO THE RASH ON FULL BACK TWICE A DAY FOR 4-6 WEEKS       losartan (COZAAR) 100 MG tablet Take 1 tablet by mouth once daily 90 tablet 2     metoprolol succinate ER (TOPROL XL) 50 MG 24 hr tablet Take 1.5 tablets (75 mg) by mouth daily 90 tablet 1     omeprazole (PRILOSEC) 20 MG DR capsule Take 20 mg by mouth       order for DME Nebulizer machine and tubing    DX:  Bronchitis 1 Device 0     potassium chloride ER (K-TAB/KLOR-CON) 10 MEQ CR tablet Take 1 tablet by mouth daily at 2 pm       traZODone (DESYREL) 50 MG tablet TAKE 1 TO 2 TABLETS BY MOUTH NIGHTLY AS NEEDED 180 tablet 3     warfarin ANTICOAGULANT (COUMADIN) 5 MG tablet TAKE 1.5 TABLETS BY MOUTH MONDAY, WEDNESDAY, AND FRIDAY AND 1 TABLET ALL OTHER DAYS OR AS DIRECTED BY [facility] CLINIC 109 tablet 0           Allergies   Allergen Reactions     Amlodipine Besylate Swelling     Norvasc     Amoxicillin      Atorvastatin      myualgia     Cephalexin Monohydrate Hives     Keflex     Erythromycin Base [Kdc:Yellow Dye+Erythromycin+Brilliant Blue Fcf] Nausea and Vomiting     Meloxicam Other (See Comments)     Mobic - confusion, depression     Sulfa Drugs Hives     Adhesive Tape Rash     Prochlorperazine Edisylate Swelling and Rash     Compazine     Prochlorperazine Maleate Swelling and Rash           Recent Labs   Lab Test 09/21/22  0831 09/16/22  1141 03/08/22  1046 08/27/21  1221 08/27/21  0917 04/16/21  1014 02/08/21  1432   * 101* 95  --    < >  --   --    HDL 38* 35* 34*  --    < >  --   --    TRIG 173* 276* 184*  --    < >  --   --    ALT  --  28 28 33  --  29 36    CR  --  1.01 0.94 1.04   < > 0.95 0.92   GFRESTIMATED  --  61 66 56*   < > 62 65   GFRESTBLACK  --   --   --   --   --  72 75   POTASSIUM  --  3.7 3.6 3.5   < > 3.3* 3.4   TSH  --  2.44  --  2.86  --   --   --     < > = values in this interval not displayed.            BP Readings from Last 3 Encounters:   03/17/23 132/76   02/15/23 126/76   01/13/23 118/68    Wt Readings from Last 3 Encounters:   03/17/23 103.4 kg (228 lb)   02/15/23 101.3 kg (223 lb 4.8 oz)   01/13/23 103.3 kg (227 lb 12.8 oz)                 Review of Systems   Constitutional, HEENT, cardiovascular, pulmonary, GI, , musculoskeletal, neuro, skin, endocrine and psych systems are negative, except as otherwise noted.            Objective    /76 (BP Location: Left arm, Patient Position: Sitting, Cuff Size: Adult Large)   Pulse 68   Temp 98.2  F (36.8  C) (Tympanic)   Resp 12   Wt 103.4 kg (228 lb)   SpO2 98%   BMI 37.94 kg/m    Body mass index is 37.94 kg/m .           Physical Exam   GENERAL: healthy, alert and no distress  EYES: Eyes grossly normal to inspection, PERRL and conjunctivae and sclerae normal  HENT: ear canals and TM's normal, nose and mouth without ulcers or lesions  NECK: no adenopathy, no asymmetry, masses, or scars and thyroid normal to palpation  RESP: lungs clear to auscultation - no rales, rhonchi or wheezes  CV: regular rate and rhythm, normal S1 S2, no S3 or S4, no murmur, click or rub, no peripheral edema and peripheral pulses strong  MS: no gross musculoskeletal defects noted, no edema  SKIN: no suspicious lesions or rashes  PSYCH: mentation appears normal, affect normal/bright

## 2023-03-17 ENCOUNTER — OFFICE VISIT (OUTPATIENT)
Dept: FAMILY MEDICINE | Facility: OTHER | Age: 68
End: 2023-03-17
Attending: NURSE PRACTITIONER
Payer: COMMERCIAL

## 2023-03-17 VITALS
OXYGEN SATURATION: 98 % | RESPIRATION RATE: 12 BRPM | HEART RATE: 68 BPM | DIASTOLIC BLOOD PRESSURE: 76 MMHG | WEIGHT: 228 LBS | TEMPERATURE: 98.2 F | BODY MASS INDEX: 37.94 KG/M2 | SYSTOLIC BLOOD PRESSURE: 132 MMHG

## 2023-03-17 DIAGNOSIS — E78.5 HYPERLIPIDEMIA LDL GOAL <100: Primary | ICD-10-CM

## 2023-03-17 DIAGNOSIS — F51.01 PRIMARY INSOMNIA: ICD-10-CM

## 2023-03-17 DIAGNOSIS — R73.09 ELEVATED GLUCOSE: ICD-10-CM

## 2023-03-17 DIAGNOSIS — Z79.01 LONG TERM CURRENT USE OF ANTICOAGULANT THERAPY: ICD-10-CM

## 2023-03-17 DIAGNOSIS — I10 ESSENTIAL HYPERTENSION: ICD-10-CM

## 2023-03-17 DIAGNOSIS — F33.1 MODERATE EPISODE OF RECURRENT MAJOR DEPRESSIVE DISORDER (H): ICD-10-CM

## 2023-03-17 PROBLEM — Z53.8 STATIN MEDICATION NOT PRESCRIBED PER PHYSICIAN ORDERS: Status: RESOLVED | Noted: 2018-01-17 | Resolved: 2023-03-17

## 2023-03-17 LAB
ALBUMIN SERPL BCG-MCNC: 4 G/DL (ref 3.5–5.2)
ALBUMIN UR-MCNC: NEGATIVE MG/DL
ALP SERPL-CCNC: 79 U/L (ref 35–104)
ALT SERPL W P-5'-P-CCNC: 26 U/L (ref 10–35)
ANION GAP SERPL CALCULATED.3IONS-SCNC: 15 MMOL/L (ref 7–15)
APPEARANCE UR: CLEAR
AST SERPL W P-5'-P-CCNC: 20 U/L (ref 10–35)
BACTERIA #/AREA URNS HPF: ABNORMAL /HPF
BILIRUB SERPL-MCNC: 0.5 MG/DL
BILIRUB UR QL STRIP: NEGATIVE
BUN SERPL-MCNC: 10.7 MG/DL (ref 8–23)
CALCIUM SERPL-MCNC: 9.3 MG/DL (ref 8.8–10.2)
CHLORIDE SERPL-SCNC: 101 MMOL/L (ref 98–107)
CHOLEST SERPL-MCNC: 213 MG/DL
COLOR UR AUTO: YELLOW
CREAT SERPL-MCNC: 0.9 MG/DL (ref 0.51–0.95)
DEPRECATED HCO3 PLAS-SCNC: 25 MMOL/L (ref 22–29)
GFR SERPL CREATININE-BSD FRML MDRD: 69 ML/MIN/1.73M2
GLUCOSE SERPL-MCNC: 154 MG/DL (ref 70–99)
GLUCOSE UR STRIP-MCNC: NEGATIVE MG/DL
HDLC SERPL-MCNC: 37 MG/DL
HGB UR QL STRIP: ABNORMAL
HOLD SPECIMEN: NORMAL
KETONES UR STRIP-MCNC: NEGATIVE MG/DL
LDLC SERPL CALC-MCNC: 129 MG/DL
LEUKOCYTE ESTERASE UR QL STRIP: ABNORMAL
NITRATE UR QL: NEGATIVE
NONHDLC SERPL-MCNC: 176 MG/DL
PH UR STRIP: 6 [PH] (ref 5–7)
POTASSIUM SERPL-SCNC: 3.5 MMOL/L (ref 3.4–5.3)
PROT SERPL-MCNC: 6.7 G/DL (ref 6.4–8.3)
RBC #/AREA URNS AUTO: ABNORMAL /HPF
SODIUM SERPL-SCNC: 141 MMOL/L (ref 136–145)
SP GR UR STRIP: 1.02 (ref 1–1.03)
SQUAMOUS #/AREA URNS AUTO: ABNORMAL /LPF
TRIGL SERPL-MCNC: 233 MG/DL
TSH SERPL DL<=0.005 MIU/L-ACNC: 3.05 UIU/ML (ref 0.3–4.2)
UROBILINOGEN UR STRIP-ACNC: 0.2 E.U./DL
WBC #/AREA URNS AUTO: ABNORMAL /HPF

## 2023-03-17 PROCEDURE — 84443 ASSAY THYROID STIM HORMONE: CPT | Mod: ZL | Performed by: NURSE PRACTITIONER

## 2023-03-17 PROCEDURE — 99215 OFFICE O/P EST HI 40 MIN: CPT | Performed by: NURSE PRACTITIONER

## 2023-03-17 PROCEDURE — 81001 URINALYSIS AUTO W/SCOPE: CPT | Mod: ZL | Performed by: NURSE PRACTITIONER

## 2023-03-17 PROCEDURE — 83036 HEMOGLOBIN GLYCOSYLATED A1C: CPT | Mod: ZL | Performed by: NURSE PRACTITIONER

## 2023-03-17 PROCEDURE — 80061 LIPID PANEL: CPT | Mod: ZL | Performed by: NURSE PRACTITIONER

## 2023-03-17 PROCEDURE — 36415 COLL VENOUS BLD VENIPUNCTURE: CPT | Mod: ZL | Performed by: NURSE PRACTITIONER

## 2023-03-17 PROCEDURE — 80053 COMPREHEN METABOLIC PANEL: CPT | Mod: ZL | Performed by: NURSE PRACTITIONER

## 2023-03-17 PROCEDURE — G0463 HOSPITAL OUTPT CLINIC VISIT: HCPCS | Performed by: NURSE PRACTITIONER

## 2023-03-17 RX ORDER — METOPROLOL SUCCINATE 50 MG/1
75 TABLET, EXTENDED RELEASE ORAL DAILY
Qty: 90 TABLET | Refills: 1 | Status: SHIPPED | OUTPATIENT
Start: 2023-03-17 | End: 2023-04-24

## 2023-03-17 RX ORDER — TRAZODONE HYDROCHLORIDE 50 MG/1
TABLET, FILM COATED ORAL
Qty: 180 TABLET | Refills: 3 | Status: SHIPPED | OUTPATIENT
Start: 2023-03-17 | End: 2023-09-29

## 2023-03-17 RX ORDER — HYDROCHLOROTHIAZIDE 25 MG/1
25 TABLET ORAL DAILY
Qty: 90 TABLET | Refills: 1 | Status: SHIPPED | OUTPATIENT
Start: 2023-03-17 | End: 2023-09-29

## 2023-03-17 ASSESSMENT — ANXIETY QUESTIONNAIRES
3. WORRYING TOO MUCH ABOUT DIFFERENT THINGS: SEVERAL DAYS
GAD7 TOTAL SCORE: 2
IF YOU CHECKED OFF ANY PROBLEMS ON THIS QUESTIONNAIRE, HOW DIFFICULT HAVE THESE PROBLEMS MADE IT FOR YOU TO DO YOUR WORK, TAKE CARE OF THINGS AT HOME, OR GET ALONG WITH OTHER PEOPLE: NOT DIFFICULT AT ALL
4. TROUBLE RELAXING: NOT AT ALL
GAD7 TOTAL SCORE: 2
6. BECOMING EASILY ANNOYED OR IRRITABLE: NOT AT ALL
7. FEELING AFRAID AS IF SOMETHING AWFUL MIGHT HAPPEN: NOT AT ALL
1. FEELING NERVOUS, ANXIOUS, OR ON EDGE: NOT AT ALL
5. BEING SO RESTLESS THAT IT IS HARD TO SIT STILL: NOT AT ALL
2. NOT BEING ABLE TO STOP OR CONTROL WORRYING: SEVERAL DAYS

## 2023-03-17 ASSESSMENT — PATIENT HEALTH QUESTIONNAIRE - PHQ9: SUM OF ALL RESPONSES TO PHQ QUESTIONS 1-9: 2

## 2023-03-17 NOTE — PATIENT INSTRUCTIONS
Assessment & Plan          Hyperlipidemia LDL goal <100  - Lipid Profile (Chol, Trig, HDL, LDL calc)  - Comprehensive metabolic panel      Essential hypertension  - Lipid Profile (Chol, Trig, HDL, LDL calc)  - Comprehensive metabolic panel  - TSH with free T4 reflex  - *UA reflex to Microscopic and Culture - [facility]  - UA Microscopic with Reflex to Culture  - hydrochlorothiazide (HYDRODIURIL) 25 MG tablet; Take 1 tablet (25 mg) by mouth daily  - metoprolol succinate ER (TOPROL XL) 50 MG 24 hr tablet; Take 1.5 tablets (75 mg) by mouth daily        Depression  - Continue plan of care        Primary insomnia  - traZODone (DESYREL) 50 MG tablet; TAKE 1 TO 2 TABLETS BY MOUTH NIGHTLY AS NEEDED      Long-term (current) use of anticoagulants [Z79.01]  - Continue plan of care        When your lab results return, we will call you with abnormal findings  You can also view this information in your MyChart, if you have an account.  Please call or [employer] message us with any questions you may have.           Return in about 6 months (around 9/17/2023).           Eliz Hartman, CNP  [facility]

## 2023-03-20 DIAGNOSIS — R73.09 ELEVATED GLUCOSE: Primary | ICD-10-CM

## 2023-03-20 DIAGNOSIS — E78.5 HYPERLIPIDEMIA LDL GOAL <100: Primary | ICD-10-CM

## 2023-03-20 LAB
EST. AVERAGE GLUCOSE BLD GHB EST-MCNC: 114 MG/DL
HBA1C MFR BLD: 5.6 %

## 2023-03-20 RX ORDER — EZETIMIBE 10 MG/1
10 TABLET ORAL DAILY
Qty: 90 TABLET | Refills: 3 | Status: SHIPPED | OUTPATIENT
Start: 2023-03-20 | End: 2023-08-22

## 2023-03-21 ENCOUNTER — ANTICOAGULATION THERAPY VISIT (OUTPATIENT)
Dept: ANTICOAGULATION | Facility: OTHER | Age: 68
End: 2023-03-21
Attending: NURSE PRACTITIONER
Payer: MEDICARE

## 2023-03-21 DIAGNOSIS — I82.401 DEEP VEIN THROMBOSIS (DVT) OF RIGHT LOWER EXTREMITY, UNSPECIFIED CHRONICITY, UNSPECIFIED VEIN (H): ICD-10-CM

## 2023-03-21 DIAGNOSIS — I26.99 PULMONARY EMBOLISM AND INFARCTION (H): ICD-10-CM

## 2023-03-21 DIAGNOSIS — Z79.01 LONG TERM CURRENT USE OF ANTICOAGULANT THERAPY: Primary | ICD-10-CM

## 2023-03-21 LAB — INR HOME MONITORING: 2.7 (ref 2–3)

## 2023-03-21 NOTE — PROGRESS NOTES
ANTICOAGULATION MANAGEMENT     Cassy Avila 67 year old female is on warfarin with therapeutic INR result. (Goal INR 2.0-3.0)    Recent labs: (last 7 days)     03/21/23  0000   INR 2.7       ASSESSMENT       Source(s): Chart Review and Patient/Caregiver Call       Warfarin doses taken: Warfarin taken as instructed    Diet: No new diet changes identified    New illness, injury, or hospitalization: No    Medication/supplement changes: None noted    Signs or symptoms of bleeding or clotting: No    Previous INR: Subtherapeutic    Additional findings: None         PLAN     Recommended plan for no diet, medication or health factor changes affecting INR     Dosing Instructions: Continue your current warfarin dose with next INR in 2 weeks       Summary  As of 3/21/2023    Full warfarin instructions:  5 mg every Mon, Wed, Fri; 7.5 mg all other days   Next INR check:  4/4/2023             Telephone call with Kaitlin who verbalizes understanding and agrees to plan    Patient to recheck with home meter    Education provided:     None required    Plan made per [facility] anticoagulation protocol    Corry Galvan RN  Anticoagulation Clinic  3/21/2023    _______________________________________________________________________     Anticoagulation Episode Summary     Current INR goal:  2.0-3.0   TTR:  89.5 % (1 y)   Target end date:  Indefinite   Send INR reminders to:  [facility] [place]    Indications    Long-term (current) use of anticoagulants [Z79.01] [Z79.01]  Pulmonary embolism and infarction (H) [I26.99]  Deep vein thrombosis (DVT) (H) [I82.409] [I82.409]  Deep vein thrombosis (DVT) of right lower extremity  unspecified chronicity  unspecified vein (H) [I82.401]           Comments:  [employer].  Call cell phone with any dosing.         Anticoagulation Care Providers     Provider Role Specialty Phone number    Eliz Hartman CNP Referring Family Medicine [phone]

## 2023-04-04 ENCOUNTER — ANTICOAGULATION THERAPY VISIT (OUTPATIENT)
Dept: ANTICOAGULATION | Facility: OTHER | Age: 68
End: 2023-04-04
Attending: NURSE PRACTITIONER
Payer: COMMERCIAL

## 2023-04-04 DIAGNOSIS — Z79.01 LONG TERM CURRENT USE OF ANTICOAGULANT THERAPY: Primary | ICD-10-CM

## 2023-04-04 DIAGNOSIS — I82.401 DEEP VEIN THROMBOSIS (DVT) OF RIGHT LOWER EXTREMITY, UNSPECIFIED CHRONICITY, UNSPECIFIED VEIN (H): ICD-10-CM

## 2023-04-04 DIAGNOSIS — I26.99 PULMONARY EMBOLISM AND INFARCTION (H): ICD-10-CM

## 2023-04-04 LAB — INR HOME MONITORING: 2 (ref 2–3)

## 2023-04-04 NOTE — PROGRESS NOTES
ANTICOAGULATION  MANAGEMENT-Home Monitor Managed by Exception    Cassy APPLE Avila 67 year old female is on warfarin with therapeutic INR result. (Goal INR 2.0-3.0)    Recent labs: (last 7 days)     04/04/23  0000   INR 2.0         Previous INR was Therapeutic    Medication, diet, health changes since last INR:chart reviewed; none identified    Contacted within the last 12 weeks by phone on 3/21/23      DARIO     Cassy was NOT contacted regarding therapeutic result today per home monitoring policy manage by exception agreement.   Current warfarin dose is to be continued:     Summary  As of 4/4/2023    Full warfarin instructions:  5 mg every Mon, Wed, Fri; 7.5 mg all other days   Next INR check:  4/18/2023           ?   Ashanti Arnold RN  Anticoagulation Clinic  4/4/2023    _______________________________________________________________________     Anticoagulation Episode Summary     Current INR goal:  2.0-3.0   TTR:  89.8 % (1 y)   Target end date:  Indefinite   Send INR reminders to:  [facility] [place]    Indications    Long-term (current) use of anticoagulants [Z79.01] [Z79.01]  Pulmonary embolism and infarction (H) [I26.99]  Deep vein thrombosis (DVT) (H) [I82.409] [I82.409]  Deep vein thrombosis (DVT) of right lower extremity  unspecified chronicity  unspecified vein (H) [I82.401]           Comments:  [employer].  Call cell phone with any dosing.         Anticoagulation Care Providers     Provider Role Specialty Phone number    Eliz Hartman CNP Referring Family Medicine [phone]

## 2023-04-09 ENCOUNTER — HEALTH MAINTENANCE LETTER (OUTPATIENT)
Age: 68
End: 2023-04-09

## 2023-04-14 DIAGNOSIS — Z79.01 LONG TERM CURRENT USE OF ANTICOAGULANT THERAPY: ICD-10-CM

## 2023-04-14 RX ORDER — WARFARIN SODIUM 5 MG/1
TABLET ORAL
Qty: 109 TABLET | Refills: 0 | Status: SHIPPED | OUTPATIENT
Start: 2023-04-14 | End: 2023-07-13

## 2023-04-14 NOTE — TELEPHONE ENCOUNTER
warfarin ANTICOAGULANT (COUMADIN) 5 MG tablet      Last Written Prescription Date:  10/14/22  Last Fill Quantity: 109,   # refills: 0  Last Office Visit: 3/17/23  Future Office visit:       Routing refill request to provider for review/approval because:  Drug not on the [facility], [facility] or [facility] refill protocol or controlled substance

## 2023-04-18 ENCOUNTER — ANTICOAGULATION THERAPY VISIT (OUTPATIENT)
Dept: ANTICOAGULATION | Facility: OTHER | Age: 68
End: 2023-04-18
Attending: NURSE PRACTITIONER
Payer: MEDICARE

## 2023-04-18 DIAGNOSIS — I26.99 PULMONARY EMBOLISM AND INFARCTION (H): ICD-10-CM

## 2023-04-18 DIAGNOSIS — I82.401 DEEP VEIN THROMBOSIS (DVT) OF RIGHT LOWER EXTREMITY, UNSPECIFIED CHRONICITY, UNSPECIFIED VEIN (H): ICD-10-CM

## 2023-04-18 DIAGNOSIS — Z79.01 LONG TERM CURRENT USE OF ANTICOAGULANT THERAPY: Primary | ICD-10-CM

## 2023-04-18 LAB — INR HOME MONITORING: 2.8 (ref 2–3)

## 2023-04-18 NOTE — PROGRESS NOTES
ANTICOAGULATION  MANAGEMENT-Home Monitor Managed by Exception    Cassy APPLE Avila 67 year old female is on warfarin with therapeutic INR result. (Goal INR 2.0-3.0)    Recent labs: (last 7 days)     04/18/23  0000   INR 2.8         Previous INR was Therapeutic    Medication, diet, health changes since last INR:chart reviewed; none identified    Contacted within the last 12 weeks by phone on 3/21/23      DARIO     Cassy was NOT contacted regarding therapeutic result today per home monitoring policy manage by exception agreement.   Current warfarin dose is to be continued:     Summary  As of 4/18/2023    Full warfarin instructions:  5 mg every Mon, Wed, Fri; 7.5 mg all other days   Next INR check:  5/2/2023           ?   Corry Galvan RN  Anticoagulation Clinic  4/18/2023    _______________________________________________________________________     Anticoagulation Episode Summary     Current INR goal:  2.0-3.0   TTR:  90.1 % (1 y)   Target end date:  Indefinite   Send INR reminders to:  [facility] [place]    Indications    Long-term (current) use of anticoagulants [Z79.01] [Z79.01]  Pulmonary embolism and infarction (H) [I26.99]  Deep vein thrombosis (DVT) (H) [I82.409] [I82.409]  Deep vein thrombosis (DVT) of right lower extremity  unspecified chronicity  unspecified vein (H) [I82.401]           Comments:  [employer].  Call cell phone with any dosing.         Anticoagulation Care Providers     Provider Role Specialty Phone number    Eliz Hartman CNP Referring Family Medicine [phone]

## 2023-04-19 ENCOUNTER — MYC MEDICAL ADVICE (OUTPATIENT)
Dept: FAMILY MEDICINE | Facility: OTHER | Age: 68
End: 2023-04-19

## 2023-04-24 ENCOUNTER — LAB (OUTPATIENT)
Dept: LAB | Facility: OTHER | Age: 68
End: 2023-04-24
Payer: MEDICARE

## 2023-04-24 DIAGNOSIS — I10 ESSENTIAL HYPERTENSION: ICD-10-CM

## 2023-04-24 DIAGNOSIS — T84.84XA PAIN DUE TO HIP JOINT PROSTHESIS (H): Primary | ICD-10-CM

## 2023-04-24 DIAGNOSIS — Z79.2 ENCOUNTER FOR LONG-TERM (CURRENT) USE OF ANTIBIOTICS: ICD-10-CM

## 2023-04-24 DIAGNOSIS — Z96.649 PAIN DUE TO HIP JOINT PROSTHESIS (H): Primary | ICD-10-CM

## 2023-04-24 LAB
CRP SERPL-MCNC: <3 MG/L
ERYTHROCYTE [SEDIMENTATION RATE] IN BLOOD BY WESTERGREN METHOD: 8 MM/HR (ref 0–30)

## 2023-04-24 PROCEDURE — 85652 RBC SED RATE AUTOMATED: CPT | Mod: ZL

## 2023-04-24 PROCEDURE — 36415 COLL VENOUS BLD VENIPUNCTURE: CPT | Mod: ZL

## 2023-04-24 PROCEDURE — 86140 C-REACTIVE PROTEIN: CPT | Mod: ZL

## 2023-04-24 RX ORDER — METOPROLOL SUCCINATE 50 MG/1
75 TABLET, EXTENDED RELEASE ORAL DAILY
Qty: 90 TABLET | Refills: 1 | Status: SHIPPED | OUTPATIENT
Start: 2023-04-24 | End: 2024-04-24

## 2023-05-10 ENCOUNTER — ANTICOAGULATION THERAPY VISIT (OUTPATIENT)
Dept: ANTICOAGULATION | Facility: OTHER | Age: 68
End: 2023-05-10
Attending: NURSE PRACTITIONER
Payer: COMMERCIAL

## 2023-05-10 DIAGNOSIS — I82.401 DEEP VEIN THROMBOSIS (DVT) OF RIGHT LOWER EXTREMITY, UNSPECIFIED CHRONICITY, UNSPECIFIED VEIN (H): ICD-10-CM

## 2023-05-10 DIAGNOSIS — I26.99 PULMONARY EMBOLISM AND INFARCTION (H): ICD-10-CM

## 2023-05-10 DIAGNOSIS — Z79.01 LONG TERM CURRENT USE OF ANTICOAGULANT THERAPY: Primary | ICD-10-CM

## 2023-05-10 LAB — INR HOME MONITORING: 3.5 (ref 2–3)

## 2023-05-15 DIAGNOSIS — I10 PRIMARY HYPERTENSION: Primary | ICD-10-CM

## 2023-05-17 RX ORDER — POTASSIUM CHLORIDE 750 MG/1
TABLET, EXTENDED RELEASE ORAL
Qty: 90 TABLET | Refills: 2 | Status: SHIPPED | OUTPATIENT
Start: 2023-05-17 | End: 2023-08-22

## 2023-05-24 ENCOUNTER — APPOINTMENT (OUTPATIENT)
Dept: ANTICOAGULATION | Facility: OTHER | Age: 68
End: 2023-05-24
Attending: NURSE PRACTITIONER
Payer: COMMERCIAL

## 2023-05-30 ENCOUNTER — ANTICOAGULATION THERAPY VISIT (OUTPATIENT)
Dept: ANTICOAGULATION | Facility: OTHER | Age: 68
End: 2023-05-30
Attending: NURSE PRACTITIONER
Payer: MEDICARE

## 2023-05-30 DIAGNOSIS — I26.99 PULMONARY EMBOLISM AND INFARCTION (H): ICD-10-CM

## 2023-05-30 DIAGNOSIS — Z79.01 LONG TERM CURRENT USE OF ANTICOAGULANT THERAPY: Primary | ICD-10-CM

## 2023-05-30 DIAGNOSIS — I82.401 DEEP VEIN THROMBOSIS (DVT) OF RIGHT LOWER EXTREMITY, UNSPECIFIED CHRONICITY, UNSPECIFIED VEIN (H): ICD-10-CM

## 2023-05-30 LAB — INR HOME MONITORING: 2.1 (ref 2–3)

## 2023-05-30 NOTE — PROGRESS NOTES
ANTICOAGULATION MANAGEMENT     Cassy Avila 67 year old female is on warfarin with therapeutic INR result. (Goal INR 2.0-3.0)    Recent labs: (last 7 days)     05/30/23  0000   INR 2.1       ASSESSMENT       Source(s): Chart Review and Patient/Caregiver Call       Warfarin doses taken: Warfarin taken as instructed    Diet: No new diet changes identified    Medication/supplement changes: None noted    New illness, injury, or hospitalization: No    Signs or symptoms of bleeding or clotting: No    Previous result: Supratherapeutic    Additional findings: None         PLAN     Recommended plan for no diet, medication or health factor changes affecting INR     Dosing Instructions: Continue your current warfarin dose with next INR in 2 weeks       Summary  As of 5/30/2023    Full warfarin instructions:  5 mg every Mon, Wed, Fri; 7.5 mg all other days   Next INR check:  6/13/2023             Telephone call with Kaitlin who verbalizes understanding and agrees to plan    Patient to recheck with home meter    Education provided:     Resume manage by exception with home monitor. Continue to submit INR results to home monitor company.You will only be called when your result is out of range. Please call and notify [facility] if new medication started, dose missed, signs or symptoms of bleeding or clotting, or a surgery/procedure is scheduled.    Plan made per [facility] anticoagulation protocol    Corry Galvan RN  Anticoagulation Clinic  5/30/2023    _______________________________________________________________________     Anticoagulation Episode Summary     Current INR goal:  2.0-3.0   TTR:  85.4 % (1 y)   Target end date:  Indefinite   Send INR reminders to:  [facility] [place]    Indications    Long-term (current) use of anticoagulants [Z79.01] [Z79.01]  Pulmonary embolism and infarction (H) [I26.99]  Deep vein thrombosis (DVT) (H) [I82.409] [I82.409]  Deep vein thrombosis (DVT) of right lower extremity  unspecified  chronicity  unspecified vein (H) [I82.401]           Comments:  [employer].  Call cell phone with any dosing.         Anticoagulation Care Providers     Provider Role Specialty Phone number    Eliz Hartman CNP Referring Family Medicine [phone]

## 2023-06-12 DIAGNOSIS — I10 BENIGN ESSENTIAL HYPERTENSION: ICD-10-CM

## 2023-06-13 RX ORDER — LOSARTAN POTASSIUM 100 MG/1
TABLET ORAL
Qty: 90 TABLET | Refills: 2 | Status: SHIPPED | OUTPATIENT
Start: 2023-06-13 | End: 2024-02-07

## 2023-06-13 NOTE — TELEPHONE ENCOUNTER
Losartan (Cozaar) 100 MG tablet  Last Written Prescription Date:  08/26/2022  Last Fill Quantity: 90,   # refills: 0  Last Office Visit: 03/17/2023

## 2023-06-20 ENCOUNTER — ANTICOAGULATION THERAPY VISIT (OUTPATIENT)
Dept: ANTICOAGULATION | Facility: OTHER | Age: 68
End: 2023-06-20
Attending: NURSE PRACTITIONER
Payer: MEDICARE

## 2023-06-20 DIAGNOSIS — I82.401 DEEP VEIN THROMBOSIS (DVT) OF RIGHT LOWER EXTREMITY, UNSPECIFIED CHRONICITY, UNSPECIFIED VEIN (H): ICD-10-CM

## 2023-06-20 DIAGNOSIS — I26.99 PULMONARY EMBOLISM AND INFARCTION (H): ICD-10-CM

## 2023-06-20 DIAGNOSIS — Z79.01 LONG TERM CURRENT USE OF ANTICOAGULANT THERAPY: Primary | ICD-10-CM

## 2023-06-20 LAB — INR HOME MONITORING: 3.2 (ref 2–3)

## 2023-06-20 NOTE — PROGRESS NOTES
ANTICOAGULATION MANAGEMENT     Cassy Avila 67 year old female is on warfarin with supratherapeutic INR result. (Goal INR 2.0-3.0)    Recent labs: (last 7 days)     06/20/23  0000   INR 3.2*       ASSESSMENT       Source(s): Chart Review       Warfarin doses taken: Warfarin taken as instructed    Diet: No new diet changes identified    New illness, injury, or hospitalization: No    Medication/supplement changes: None noted    Signs or symptoms of bleeding or clotting: No    Previous INR: Therapeutic last visit; previously outside of goal range    Additional findings: None       PLAN     Recommended plan for no diet, medication or health factor changes affecting INR     Dosing Instructions: Continue your current warfarin dose with next INR in 2 weeks       Summary  As of 6/20/2023    Full warfarin instructions:  5 mg every Mon, Wed, Fri; 7.5 mg all other days   Next INR check:  7/3/2023             Detailed voice message left for Kaitlin with dosing instructions and follow up date.     Patient to recheck with home meter    Education provided: Please call back if any changes to your diet, medications or how you've been taking warfarin    Plan made per [facility] anticoagulation protocol    Ashanti Arnold RN  Anticoagulation Clinic  6/20/2023    _______________________________________________________________________     Anticoagulation Episode Summary     Current INR goal:  2.0-3.0   TTR:  84.3 % (1 y)   Target end date:  Indefinite   Send INR reminders to:  [facility] [place]    Indications    Long-term (current) use of anticoagulants [Z79.01] [Z79.01]  Pulmonary embolism and infarction (H) [I26.99]  Deep vein thrombosis (DVT) (H) [I82.409] [I82.409]  Deep vein thrombosis (DVT) of right lower extremity  unspecified chronicity  unspecified vein (H) [I82.401]           Comments:  [employer].  Call cell phone with any dosing.         Anticoagulation Care Providers     Provider Role Specialty Phone number     Eliz Hartman, CNP [facility] Medicine [phone]

## 2023-07-11 ENCOUNTER — ANTICOAGULATION THERAPY VISIT (OUTPATIENT)
Dept: ANTICOAGULATION | Facility: OTHER | Age: 68
End: 2023-07-11
Attending: NURSE PRACTITIONER
Payer: COMMERCIAL

## 2023-07-11 DIAGNOSIS — I26.99 PULMONARY EMBOLISM AND INFARCTION (H): ICD-10-CM

## 2023-07-11 DIAGNOSIS — Z79.01 LONG TERM CURRENT USE OF ANTICOAGULANT THERAPY: ICD-10-CM

## 2023-07-11 DIAGNOSIS — Z79.01 LONG TERM CURRENT USE OF ANTICOAGULANT THERAPY: Primary | ICD-10-CM

## 2023-07-11 DIAGNOSIS — I82.401 DEEP VEIN THROMBOSIS (DVT) OF RIGHT LOWER EXTREMITY, UNSPECIFIED CHRONICITY, UNSPECIFIED VEIN (H): ICD-10-CM

## 2023-07-11 LAB — INR HOME MONITORING: 2.7 (ref 2–3)

## 2023-07-11 NOTE — PROGRESS NOTES
ANTICOAGULATION MANAGEMENT     Cassy Avila 67 year old female is on warfarin with therapeutic INR result. (Goal INR 2.0-3.0)    Recent labs: (last 7 days)     07/11/23  0000   INR 2.7       ASSESSMENT       Source(s): Chart Review and Patient/Caregiver Call       Warfarin doses taken: Warfarin taken as instructed    Diet: No new diet changes identified    New illness, injury, or hospitalization: No    Medication/supplement changes: None noted    Signs or symptoms of bleeding or clotting: No    Previous INR: Supratherapeutic    Additional findings: None       PLAN     Recommended plan for no diet, medication or health factor changes affecting INR     Dosing Instructions: Continue your current warfarin dose with next INR in 2 weeks       Summary  As of 7/11/2023    Full warfarin instructions:  5 mg every Mon, Wed, Fri; 7.5 mg all other days   Next INR check:  7/25/2023             Telephone call with Kaitlin who verbalizes understanding and agrees to plan    Patient to recheck with home meter    Education provided: Please call back if any changes to your diet, medications or how you've been taking warfarin and Resume manage by exception with home monitor. Continue to submit INR results to home monitor company.You will only be called when your result is out of range. Please call and notify [facility] if new medication started, dose missed, signs or symptoms of bleeding or clotting, or a surgery/procedure is scheduled.    Plan made per [facility] anticoagulation protocol    Ashanti Arnold RN  Anticoagulation Clinic  7/11/2023    _______________________________________________________________________     Anticoagulation Episode Summary     Current INR goal:  2.0-3.0   TTR:  82.1 % (1 y)   Target end date:  Indefinite   Send INR reminders to:  [facility] [place]    Indications    Long-term (current) use of anticoagulants [Z79.01] [Z79.01]  Pulmonary embolism and infarction (H) [I26.99]  Deep vein thrombosis (DVT) (H) [I82.409]  [I82.409]  Deep vein thrombosis (DVT) of right lower extremity  unspecified chronicity  unspecified vein (H) [I82.401]           Comments:  [employer].  Call cell phone with any dosing.         Anticoagulation Care Providers     Provider Role Specialty Phone number    Eliz Hartman CNP [facility] [phone]

## 2023-07-13 RX ORDER — WARFARIN SODIUM 5 MG/1
TABLET ORAL
Qty: 109 TABLET | Refills: 0 | Status: SHIPPED | OUTPATIENT
Start: 2023-07-13 | End: 2023-10-06

## 2023-07-13 NOTE — TELEPHONE ENCOUNTER
Warfarin 5 mg      Last Written Prescription Date:  4/14/23  Last Fill Quantity: 109,   # refills: 0  Last Office Visit: 3/17/23  Future Office visit:    Next 5 appointments (look out 90 days)    Sep 29, 2023 10:00 AM  (Arrive by 9:45 AM)  SHORT with Eliz Hartman CNP  [facility] ([facility] ) [address]  [facility] [address]  [phone]           Routing refill request to provider for review/approval because:    Vitamin K Antagonists Failed      INR is within goal in the past 6 weeks    Confirm INR is within goal in the past 6 weeks.          Recent Labs   Lab Test 07/11/23  0000   INR 2.7        no

## 2023-07-25 ENCOUNTER — ANTICOAGULATION THERAPY VISIT (OUTPATIENT)
Dept: ANTICOAGULATION | Facility: OTHER | Age: 68
End: 2023-07-25
Attending: NURSE PRACTITIONER
Payer: COMMERCIAL

## 2023-07-25 DIAGNOSIS — Z79.01 LONG TERM CURRENT USE OF ANTICOAGULANT THERAPY: Primary | ICD-10-CM

## 2023-07-25 DIAGNOSIS — I26.99 PULMONARY EMBOLISM AND INFARCTION (H): ICD-10-CM

## 2023-07-25 DIAGNOSIS — I82.401 DEEP VEIN THROMBOSIS (DVT) OF RIGHT LOWER EXTREMITY, UNSPECIFIED CHRONICITY, UNSPECIFIED VEIN (H): ICD-10-CM

## 2023-07-25 LAB — INR HOME MONITORING: 1.8 (ref 2–3)

## 2023-07-25 NOTE — PROGRESS NOTES
ANTICOAGULATION MANAGEMENT     Cassy Avila 67 year old female is on warfarin with subtherapeutic INR result. (Goal INR 2.0-3.0)    Recent labs: (last 7 days)     07/25/23  0000   INR 1.8*       ASSESSMENT     Source(s): Chart Review and Patient/Caregiver Call     Warfarin doses taken: Missed dose(s) may be affecting INR  Diet: No new diet changes identified  New illness, injury, or hospitalization: No  Medication/supplement changes: None noted  Signs or symptoms of bleeding or clotting: No  Previous INR: Therapeutic last visit; previously outside of goal range  Additional findings: None     PLAN     Recommended plan for temporary change(s) affecting INR     Dosing Instructions: booster dose then continue your current warfarin dose with next INR in 2 weeks       Summary  As of 7/25/2023      Full warfarin instructions:  7/25: 10 mg; Otherwise 5 mg every Mon, Wed, Fri; 7.5 mg all other days   Next INR check:  8/8/2023               Telephone call with Kaitlin who verbalizes understanding and agrees to plan    Patient to recheck with home meter    Education provided: Please call back if any changes to your diet, medications or how you've been taking warfarin    Plan made per [facility] anticoagulation protocol    Ashanti Arnold RN  Anticoagulation Clinic  7/25/2023    _______________________________________________________________________     Anticoagulation Episode Summary       Current INR goal:  2.0-3.0   TTR:  81.2 % (1 y)   Target end date:  Indefinite   Send INR reminders to:  [facility] [place]    Indications    Long-term (current) use of anticoagulants [Z79.01] [Z79.01]  Pulmonary embolism and infarction (H) [I26.99]  Deep vein thrombosis (DVT) (H) [I82.409] [I82.409]  Deep vein thrombosis (DVT) of right lower extremity  unspecified chronicity  unspecified vein (H) [I82.401]             Comments:  [employer].  Call cell phone with any dosing.             Anticoagulation Care Providers        Provider Role Specialty Phone number    Eliz Hartman, CNP Referring Family Medicine [phone]

## 2023-08-08 ENCOUNTER — ANTICOAGULATION THERAPY VISIT (OUTPATIENT)
Dept: ANTICOAGULATION | Facility: OTHER | Age: 68
End: 2023-08-08
Payer: COMMERCIAL

## 2023-08-08 DIAGNOSIS — I82.401 DEEP VEIN THROMBOSIS (DVT) OF RIGHT LOWER EXTREMITY, UNSPECIFIED CHRONICITY, UNSPECIFIED VEIN (H): ICD-10-CM

## 2023-08-08 DIAGNOSIS — I26.99 PULMONARY EMBOLISM AND INFARCTION (H): ICD-10-CM

## 2023-08-08 DIAGNOSIS — Z79.01 LONG TERM CURRENT USE OF ANTICOAGULANT THERAPY: Primary | ICD-10-CM

## 2023-08-08 LAB — INR HOME MONITORING: 2.8 (ref 2–3)

## 2023-08-08 NOTE — PROGRESS NOTES
ANTICOAGULATION MANAGEMENT     Cassy Avila 67 year old female is on warfarin with therapeutic INR result. (Goal INR 2.0-3.0)    Recent labs: (last 7 days)     08/08/23  0000   INR 2.8       ASSESSMENT     Source(s): Chart Review and Patient/Caregiver Call     Warfarin doses taken: Warfarin taken as instructed  Diet: No new diet changes identified  Medication/supplement changes: None noted  New illness, injury, or hospitalization: No  Signs or symptoms of bleeding or clotting: No  Previous result: Subtherapeutic  Additional findings: None       PLAN     Recommended plan for no diet, medication or health factor changes affecting INR     Dosing Instructions: Continue your current warfarin dose with next INR in 2 weeks       Summary  As of 8/8/2023      Full warfarin instructions:  5 mg every Mon, Wed, Fri; 7.5 mg all other days   Next INR check:  8/22/2023               Telephone call with Kaitlin who verbalizes understanding and agrees to plan    Patient to recheck with home meter    Education provided:   None required    Plan made per [facility] anticoagulation protocol    Corry Galvan RN  Anticoagulation Clinic  8/8/2023    _______________________________________________________________________     Anticoagulation Episode Summary       Current INR goal:  2.0-3.0   TTR:  80.4 % (1 y)   Target end date:  Indefinite   Send INR reminders to:  [facility] [place]    Indications    Long-term (current) use of anticoagulants [Z79.01] [Z79.01]  Pulmonary embolism and infarction (H) [I26.99]  Deep vein thrombosis (DVT) (H) [I82.409] [I82.409]  Deep vein thrombosis (DVT) of right lower extremity  unspecified chronicity  unspecified vein (H) [I82.401]             Comments:  [employer]. Q2 week testing  Call cell phone with any dosing.             Anticoagulation Care Providers       Provider Role Specialty Phone number    Eliz Hartman CNP [facility] [phone]

## 2023-08-22 ENCOUNTER — OFFICE VISIT (OUTPATIENT)
Dept: FAMILY MEDICINE | Facility: OTHER | Age: 68
End: 2023-08-22
Attending: NURSE PRACTITIONER
Payer: COMMERCIAL

## 2023-08-22 ENCOUNTER — ANTICOAGULATION THERAPY VISIT (OUTPATIENT)
Dept: ANTICOAGULATION | Facility: OTHER | Age: 68
End: 2023-08-22
Attending: NURSE PRACTITIONER
Payer: COMMERCIAL

## 2023-08-22 VITALS
HEART RATE: 69 BPM | TEMPERATURE: 97.9 F | DIASTOLIC BLOOD PRESSURE: 74 MMHG | OXYGEN SATURATION: 95 % | WEIGHT: 229.3 LBS | RESPIRATION RATE: 18 BRPM | BODY MASS INDEX: 38.2 KG/M2 | SYSTOLIC BLOOD PRESSURE: 128 MMHG | HEIGHT: 65 IN

## 2023-08-22 DIAGNOSIS — R06.02 SOB (SHORTNESS OF BREATH): ICD-10-CM

## 2023-08-22 DIAGNOSIS — F33.1 MODERATE EPISODE OF RECURRENT MAJOR DEPRESSIVE DISORDER (H): ICD-10-CM

## 2023-08-22 DIAGNOSIS — R10.31 RLQ ABDOMINAL PAIN: ICD-10-CM

## 2023-08-22 DIAGNOSIS — R19.03 RLQ ABDOMINAL MASS: Primary | ICD-10-CM

## 2023-08-22 DIAGNOSIS — I10 PRIMARY HYPERTENSION: ICD-10-CM

## 2023-08-22 DIAGNOSIS — Z12.31 ENCOUNTER FOR SCREENING MAMMOGRAM FOR BREAST CANCER: ICD-10-CM

## 2023-08-22 DIAGNOSIS — I82.401 DEEP VEIN THROMBOSIS (DVT) OF RIGHT LOWER EXTREMITY, UNSPECIFIED CHRONICITY, UNSPECIFIED VEIN (H): ICD-10-CM

## 2023-08-22 DIAGNOSIS — I26.99 PULMONARY EMBOLISM AND INFARCTION (H): ICD-10-CM

## 2023-08-22 DIAGNOSIS — Z79.01 LONG TERM CURRENT USE OF ANTICOAGULANT THERAPY: Primary | ICD-10-CM

## 2023-08-22 LAB — INR HOME MONITORING: 3.5 (ref 2–3)

## 2023-08-22 PROCEDURE — 99214 OFFICE O/P EST MOD 30 MIN: CPT | Performed by: NURSE PRACTITIONER

## 2023-08-22 PROCEDURE — G0463 HOSPITAL OUTPT CLINIC VISIT: HCPCS

## 2023-08-22 RX ORDER — POTASSIUM CHLORIDE 750 MG/1
10 TABLET, EXTENDED RELEASE ORAL DAILY
Qty: 90 TABLET | Refills: 2 | Status: SHIPPED | OUTPATIENT
Start: 2023-08-22 | End: 2024-05-28

## 2023-08-22 RX ORDER — ALBUTEROL SULFATE 90 UG/1
2 AEROSOL, METERED RESPIRATORY (INHALATION) EVERY 6 HOURS
Qty: 18 G | Refills: 1 | Status: SHIPPED | OUTPATIENT
Start: 2023-08-22 | End: 2024-04-01

## 2023-08-22 RX ORDER — ESCITALOPRAM OXALATE 10 MG/1
10 TABLET ORAL DAILY
Qty: 90 TABLET | Refills: 1 | Status: SHIPPED | OUTPATIENT
Start: 2023-08-22 | End: 2024-02-26

## 2023-08-22 ASSESSMENT — PAIN SCALES - GENERAL: PAINLEVEL: MODERATE PAIN (4)

## 2023-08-22 NOTE — PROGRESS NOTES
ANTICOAGULATION MANAGEMENT     Cassy Avila 67 year old female is on warfarin with supratherapeutic INR result. (Goal INR 2.0-3.0)    Recent labs: (last 7 days)     08/22/23  0000   INR 3.5*       ASSESSMENT     Source(s): Chart Review and Patient/Caregiver Call     Warfarin doses taken: Warfarin taken as instructed  Diet: No new diet changes identified  Medication/supplement changes: None noted  New illness, injury, or hospitalization: No  Signs or symptoms of bleeding or clotting: No  Previous result: Therapeutic last visit; previously outside of goal range  Additional findings: None       PLAN     Recommended plan for no diet, medication or health factor changes affecting INR     Dosing Instructions: partial hold then continue your current warfarin dose with next INR in 2 weeks       Summary  As of 8/22/2023      Full warfarin instructions:  8/22: 5 mg; Otherwise 5 mg every Mon, Wed, Fri; 7.5 mg all other days   Next INR check:  9/5/2023               Telephone call with Kaitlin who verbalizes understanding and agrees to plan    Patient to recheck with home meter    Education provided:   None required    Plan made per [facility] anticoagulation protocol    Corry Galvan RN  Anticoagulation Clinic  8/22/2023    _______________________________________________________________________     Anticoagulation Episode Summary       Current INR goal:  2.0-3.0   TTR:  77.7 % (1 y)   Target end date:  Indefinite   Send INR reminders to:  [facility] [place]    Indications    Long-term (current) use of anticoagulants [Z79.01] [Z79.01]  Pulmonary embolism and infarction (H) [I26.99]  Deep vein thrombosis (DVT) (H) [I82.409] [I82.409]  Deep vein thrombosis (DVT) of right lower extremity  unspecified chronicity  unspecified vein (H) [I82.401]             Comments:  [employer]. Q2 week testing  Call cell phone with any dosing.             Anticoagulation Care Providers       Provider Role Specialty Phone number    Eliz Hartman  CNP Responsible Family Medicine [phone]

## 2023-08-22 NOTE — PATIENT INSTRUCTIONS
Just water until they call you to schedule the CT scan. Okay to have this tomorrow or next day or two. If you have a sudden increas in symptoms or new symptoms (such as fever/chills, nausea/vomiting) you do need to go into the ER.

## 2023-08-29 ENCOUNTER — HOSPITAL ENCOUNTER (OUTPATIENT)
Dept: CT IMAGING | Facility: HOSPITAL | Age: 68
Discharge: HOME | End: 2023-08-29
Attending: NURSE PRACTITIONER | Admitting: NURSE PRACTITIONER
Payer: MEDICARE

## 2023-08-29 ENCOUNTER — TELEPHONE (OUTPATIENT)
Dept: FAMILY MEDICINE | Facility: OTHER | Age: 68
End: 2023-08-29

## 2023-08-29 DIAGNOSIS — R19.03 RLQ ABDOMINAL MASS: ICD-10-CM

## 2023-08-29 DIAGNOSIS — R10.31 RLQ ABDOMINAL PAIN: ICD-10-CM

## 2023-08-29 PROCEDURE — 250N000011 HC RX IP 250 OP 636: Performed by: RADIOLOGY

## 2023-08-29 PROCEDURE — 74177 CT ABD & PELVIS W/CONTRAST: CPT

## 2023-08-29 RX ORDER — IOPAMIDOL 755 MG/ML
112 INJECTION, SOLUTION INTRAVASCULAR ONCE
Status: COMPLETED | OUTPATIENT
Start: 2023-08-29 | End: 2023-08-29

## 2023-08-29 RX ORDER — IOPAMIDOL 755 MG/ML
17 INJECTION, SOLUTION INTRAVASCULAR ONCE
Status: COMPLETED | OUTPATIENT
Start: 2023-08-29 | End: 2023-08-29

## 2023-08-29 RX ADMIN — IOPAMIDOL 112 ML: 755 INJECTION, SOLUTION INTRAVENOUS at 10:40

## 2023-08-29 RX ADMIN — IOPAMIDOL 17 ML: 755 INJECTION, SOLUTION INTRAVENOUS at 10:40

## 2023-08-29 NOTE — TELEPHONE ENCOUNTER
Reviewed results.  She will start  100mg docusate sodium BID with miralax every other day as needed and follow up with her primary as scheduled.   Carla Adams, APRN, CNP

## 2023-09-05 ENCOUNTER — ANTICOAGULATION THERAPY VISIT (OUTPATIENT)
Dept: ANTICOAGULATION | Facility: OTHER | Age: 68
End: 2023-09-05
Attending: NURSE PRACTITIONER
Payer: MEDICARE

## 2023-09-05 DIAGNOSIS — I82.401 DEEP VEIN THROMBOSIS (DVT) OF RIGHT LOWER EXTREMITY, UNSPECIFIED CHRONICITY, UNSPECIFIED VEIN (H): ICD-10-CM

## 2023-09-05 DIAGNOSIS — Z79.01 LONG TERM CURRENT USE OF ANTICOAGULANT THERAPY: Primary | ICD-10-CM

## 2023-09-05 DIAGNOSIS — I26.99 PULMONARY EMBOLISM AND INFARCTION (H): ICD-10-CM

## 2023-09-05 LAB — INR HOME MONITORING: 3.2 (ref 2–3)

## 2023-09-05 NOTE — PROGRESS NOTES
ANTICOAGULATION MANAGEMENT     Cassy Avila 67 year old female is on warfarin with supratherapeutic INR result. (Goal INR 2.0-3.0)    Recent labs: (last 7 days)     09/05/23  0000   INR 3.2*       ASSESSMENT     Source(s): Chart Review and Patient/Caregiver Call     Warfarin doses taken: Warfarin taken as instructed  Diet: Decreased greens/vitamin K in diet; plans to resume previous intake  Medication/supplement changes: None noted  New illness, injury, or hospitalization: No  Signs or symptoms of bleeding or clotting: No  Previous result: Supratherapeutic  Additional findings: None       PLAN     Recommended plan for temporary change(s) affecting INR     Dosing Instructions: Continue your current warfarin dose with next INR in 2 weeks       Summary  As of 9/5/2023      Full warfarin instructions:  5 mg every Mon, Wed, Fri; 7.5 mg all other days   Next INR check:  9/12/2023               Telephone call with Kaitlin who verbalizes understanding and agrees to plan    Patient to recheck with home meter    Education provided:   None required    Plan made per [facility] anticoagulation protocol    Corry Galvan RN  Anticoagulation Clinic  9/5/2023    _______________________________________________________________________     Anticoagulation Episode Summary       Current INR goal:  2.0-3.0   TTR:  73.9 % (1 y)   Target end date:  Indefinite   Send INR reminders to:  [facility] [place]    Indications    Long-term (current) use of anticoagulants [Z79.01] [Z79.01]  Pulmonary embolism and infarction (H) [I26.99]  Deep vein thrombosis (DVT) (H) [I82.409] [I82.409]  Deep vein thrombosis (DVT) of right lower extremity  unspecified chronicity  unspecified vein (H) [I82.401]             Comments:  [employer]. Q2 week testing  Call cell phone with any dosing.             Anticoagulation Care Providers       Provider Role Specialty Phone number    Eliz Hartman CNP [facility] [phone]

## 2023-09-09 ENCOUNTER — HEALTH MAINTENANCE LETTER (OUTPATIENT)
Age: 68
End: 2023-09-09

## 2023-09-19 ENCOUNTER — APPOINTMENT (OUTPATIENT)
Dept: ANTICOAGULATION | Facility: OTHER | Age: 68
End: 2023-09-19
Attending: NURSE PRACTITIONER
Payer: COMMERCIAL

## 2023-09-21 ENCOUNTER — ANTICOAGULATION THERAPY VISIT (OUTPATIENT)
Dept: ANTICOAGULATION | Facility: OTHER | Age: 68
End: 2023-09-21
Attending: NURSE PRACTITIONER
Payer: COMMERCIAL

## 2023-09-21 DIAGNOSIS — I82.401 DEEP VEIN THROMBOSIS (DVT) OF RIGHT LOWER EXTREMITY, UNSPECIFIED CHRONICITY, UNSPECIFIED VEIN (H): ICD-10-CM

## 2023-09-21 DIAGNOSIS — Z79.01 LONG TERM CURRENT USE OF ANTICOAGULANT THERAPY: Primary | ICD-10-CM

## 2023-09-21 DIAGNOSIS — I26.99 PULMONARY EMBOLISM AND INFARCTION (H): ICD-10-CM

## 2023-09-21 LAB — INR HOME MONITORING: 2.3 (ref 2–3)

## 2023-09-21 NOTE — PROGRESS NOTES
ANTICOAGULATION MANAGEMENT     Cassy Avila 67 year old female is on warfarin with therapeutic INR result. (Goal INR 2.0-3.0)    Recent labs: (last 7 days)     09/21/23  0000   INR 2.3       ASSESSMENT     Source(s): Chart Review and Patient/Caregiver Call     Warfarin doses taken: Warfarin taken as instructed  Diet: No new diet changes identified  New illness, injury, or hospitalization: No  Medication/supplement changes: None noted  Signs or symptoms of bleeding or clotting: No  Previous INR: Supratherapeutic  Additional findings: None     PLAN     Recommended plan for no diet, medication or health factor changes affecting INR     Dosing Instructions: Continue your current warfarin dose with next INR in 2 weeks       Summary  As of 9/21/2023      Full warfarin instructions:  5 mg every Mon, Wed, Fri; 7.5 mg all other days   Next INR check:  10/5/2023               Telephone call with Kaitlin who verbalizes understanding and agrees to plan    Patient to recheck with home meter    Education provided: Please call back if any changes to your diet, medications or how you've been taking warfarin and Resume manage by exception with home monitor. Continue to submit INR results to home monitor company.You will only be called when your result is out of range. Please call and notify [facility] if new medication started, dose missed, signs or symptoms of bleeding or clotting, or a surgery/procedure is scheduled.    Plan made per [facility] anticoagulation protocol    Ashanti Arnold RN  Anticoagulation Clinic  9/21/2023    _______________________________________________________________________     Anticoagulation Episode Summary       Current INR goal:  2.0-3.0   TTR:  72.9 % (1 y)   Target end date:  Indefinite   Send INR reminders to:  [facility] [place]    Indications    Long-term (current) use of anticoagulants [Z79.01] [Z79.01]  Pulmonary embolism and infarction (H) [I26.99]  Deep vein thrombosis (DVT) (H) [I82.409]  [I82.409]  Deep vein thrombosis (DVT) of right lower extremity  unspecified chronicity  unspecified vein (H) [I82.401]             Comments:  [employer]. Q2 week testing  Call cell phone with any dosing.             Anticoagulation Care Providers       Provider Role Specialty Phone number    Eliz Hartman CNP [facility] [phone]

## 2023-09-28 NOTE — PROGRESS NOTES
Assessment & Plan         Primary hypertension  - Comprehensive metabolic panel  - Lipid Profile (Chol, Trig, HDL, LDL calc)  - TSH with free T4 reflex      Hyperlipidemia LDL goal <100  - Comprehensive metabolic panel  - Lipid Profile (Chol, Trig, HDL, LDL calc)  - TSH with dave  e T4 reflex    Moderate episode of recurrent major depressive disorder (H)  - Continue plan of care      Primary insomnia  - traZODone (DESYREL) 50 MG tablet; TAKE 1 TO 2 TABLETS BY MOUTH NIGHTLY AS NEEDED      Essential hypertension  - Continue plan of care  - hydrochlorothiazide (HYDRODIURIL) 25 MG tablet; Take 1 tablet (25 mg) by mouth daily      When your lab results return, we will call you with abnormal findings  You can also view this information in your MyChart, if you have an account.  Please call or [employer] message us with any questions you may have.          40  minutes spent by me on the date of the encounter doing chart review, review of test results, interpretation of tests, patient visit, and documentation            Return in about 6 months (around 3/29/2024).        Eliz Hartman, CNP  [facility] - CURRY Madrigal is a 67 year old, presenting for the following health issues:  Chronic Disease Management          Hyperlipidemia Follow-Up  Are you regularly taking any medication or supplement to lower your cholesterol?   No  Are you having muscle aches or other side effects that you think could be caused by your cholesterol lowering medication?  No        Hypertension Follow-up  Do you check your blood pressure regularly outside of the clinic? No   Are you following a low salt diet? Yes  Are your blood pressures ever more than 140 on the top number (systolic) OR more   than 90 on the bottom number (diastolic), for example 140/90? No        Depression Followup  How are you doing with your depression since your last visit? No change  Are you having other symptoms that might be associated with depression?  No  Have you had a significant life event?  No   Are you feeling anxious or having panic attacks?   No  Do you have any concerns with your use of alcohol or other drugs? No        Social History     Tobacco Use    Smoking status: Former     Packs/day: 1.00     Years: 30.00     Pack years: 30.00     Types: Cigarettes, Pipe     Start date: 1969     Quit date: 1999     Years since quittin.7    Smokeless tobacco: Never    Tobacco comments:     quit in    Vaping Use    Vaping Use: Never used   Substance Use Topics    Alcohol use: No    Drug use: No             2022    11:00 AM 3/17/2023    10:21 AM 2023    10:05 AM   PHQ   PHQ-9 Total Score 4 2 1   Q9: Thoughts of better off dead/self-harm past 2 weeks Not at all Not at all Not at all             2022    11:00 AM 3/17/2023    10:21 AM 2023    10:07 AM   MARGA-7 SCORE   Total Score 2 2 2             2023    10:05 AM   Last PHQ-9   1.  Little interest or pleasure in doing things 0   2.  Feeling down, depressed, or hopeless 1   3.  Trouble falling or staying asleep, or sleeping too much 0   4.  Feeling tired or having little energy 0   5.  Poor appetite or overeating 0   6.  Feeling bad about yourself 0   7.  Trouble concentrating 0   8.  Moving slowly or restless 0   Q9: Thoughts of better off dead/self-harm past 2 weeks 0   PHQ-9 Total Score 1   Difficulty at work, home, or with people Not difficult at all             2023    10:07 AM   MARGA-7    1. Feeling nervous, anxious, or on edge 0   2. Not being able to stop or control worrying 0   3. Worrying too much about different things 0   4. Trouble relaxing 0   5. Being so restless that it is hard to sit still 0   6. Becoming easily annoyed or irritable 2   7. Feeling afraid, as if something awful might happen 0   MARGA-7 Total Score 2   If you checked any problems, how difficult have they made it for you to do your work, take care of things at home, or get along with other people?  Not difficult at all           Patient Active Problem List   Diagnosis    Primary hypertension    Pulmonary embolism and infarction (H)    Contact dermatitis and other eczema, due to unspecified cause    Edema    Hyperlipidemia LDL goal <100    Neurofibromatosis, type 1 (H)    Advanced care planning/counseling discussion    Moderate episode of recurrent major depressive disorder (H)    Long-term (current) use of anticoagulants [Z79.01]    Deep vein thrombosis (DVT) (H) [I82.409]    Lumbar spondylosis with myelopathy    Degeneration of lumbar or lumbosacral intervertebral disc    Family history of colon cancer    Vitamin D deficiency    Failed arthroplasty (H)    H/O total shoulder replacement, left    Primary insomnia    Factor V Leiden mutation (H)    Obesity (BMI 35.0-39.9) with comorbidity (H)    Diarrhea    Chest pain, unspecified    Muscle weakness of right upper extremity    Pain in right upper arm    Stiffness of right shoulder joint    Activated protein C resistance (H)    Shoulder pain    Deep vein thrombosis (DVT) of right lower extremity, unspecified chronicity, unspecified vein (H)     Past Surgical History:   Procedure Laterality Date    ------------OTHER-------------      shoulder replacement; Provider: Karen    ARTHROPLASTY KNEE  2014    Procedure: ARTHROPLASTY KNEE;  Surgeon: Sean Alexander MD;  Location: [facility] OR    ARTHROSCOPY SHOULDER      right, bone spurs    CA ANESTH,SHOULDER REPLACEMENT Right 2020     SECTION      x3    CHOLECYSTECTOMY      COLONOSCOPY  02/15/2018    [place],,polyps    elbow ulnar tunnel release      ELECTROTHERMAL THERAPY INTRADISC  2017    stimulator    ENDOSCOPIC SINUS SURGERY, SEPTOPLASTY, TURBINOPLASTY, MAXILLARY SINUSOTOMY, COMBINED N/A 2015    Procedure: COMBINED ENDOSCOPIC SINUS SURGERY, SEPTOPLASTY, TURBINOPLASTY, MAXILLARY SINUSOTOMY;  Surgeon: Seema Conn MD;  Location: [facility] OR    ESOPHAGOSCOPY, GASTROSCOPY, DUODENOSCOPY  (EGD), COMBINED      with biopsy and endoscopic U/S    EXCISE NEUROMA LOWER EXTREMITY Left 2016    Procedure: EXCISE NEUROMA LOWER EXTREMITY;  Surgeon: Edi Reed MD;  Location: [facility] OR    EYE SURGERY Right 2020    FUSION LUMBAR ANTERIOR WITH BAK CAGES      L5-S1    HYSTERECTOMY TOTAL ABDOMINAL, BILATERAL SALPINGO-OOPHORECTOMY, COMBINED N/A     ORTHOPEDIC SURGERY  2015    right shoulder    ORTHOPEDIC SURGERY  2015    right knee    ORTHOPEDIC SURGERY Right 2018    hip labrum tear    pionidal cyst excision      TRANSPOSITION ULNAR NERVE (ELBOW)         Social History     Tobacco Use    Smoking status: Former     Packs/day: 1.00     Years: 30.00     Pack years: 30.00     Types: Cigarettes, Pipe     Start date: 1969     Quit date: 1999     Years since quittin.7    Smokeless tobacco: Never    Tobacco comments:     quit in    Substance Use Topics    Alcohol use: No     Family History   Problem Relation Age of Onset    Cancer Mother     Colon Polyps Mother     Heart Failure Mother 87        congestive, cause of death    Myocardial Infarction Mother         myocardial infarction    Myocardial Infarction Father         myocardial infarction - cause of death    C.A.D. Father     Cancer Paternal Uncle         cause of death    C.A.D. Brother     Other - See Comments Other         factor 5 - family h/o    Asthma No family hx of              Current Outpatient Medications   Medication Sig Dispense Refill    albuterol (PROAIR HFA/PROVENTIL HFA/VENTOLIN HFA) 108 (90 Base) MCG/ACT inhaler Inhale 2 puffs into the lungs every 6 hours 18 g 1    albuterol (PROVENTIL) (2.5 MG/3ML) 0.083% neb solution Take 2.5 mg by nebulization every 6 hours as needed for shortness of breath / dyspnea or wheezing      aspirin 81 MG EC tablet Take 81 mg by mouth daily HS      Cholecalciferol (VITAMIN D3 PO) Take 3,000 mg by mouth daily AM      escitalopram (LEXAPRO) 10 MG tablet Take 1 tablet (10 mg)  by mouth daily 90 tablet 1    HEMP OIL OR EXTRACT OR OTHER CBD CANNABINOID, NOT MEDICAL CANNABIS,       hydrochlorothiazide (HYDRODIURIL) 25 MG tablet Take 1 tablet (25 mg) by mouth daily 90 tablet 1    ketoconazole (NIZORAL) 2 % external cream APPLY TO THE RASH ON FULL BACK TWICE A DAY FOR 4-6 WEEKS      losartan (COZAAR) 100 MG tablet Take 1 tablet by mouth once daily 90 tablet 2    metoprolol succinate ER (TOPROL XL) 50 MG 24 hr tablet Take 1.5 tablets (75 mg) by mouth daily 90 tablet 1    order for DME Nebulizer machine and tubing    DX:  Bronchitis 1 Device 0    potassium chloride ER (K-TAB/KLOR-CON) 10 MEQ CR tablet Take 1 tablet (10 mEq) by mouth daily 90 tablet 2    traZODone (DESYREL) 50 MG tablet TAKE 1 TO 2 TABLETS BY MOUTH NIGHTLY AS NEEDED 180 tablet 3    warfarin ANTICOAGULANT (COUMADIN) 5 MG tablet TAKE 1 TABLET BY MOUTH EVERY MONDAY ,WEDNESDAY AND  FRIDAY AND 1.5 TABLET ALL OTHER DAYS OR AS DIRECTED BY WARFARIN CLINIC 109 tablet 0         Allergies   Allergen Reactions    Amlodipine Besylate Swelling     Norvasc    Amoxicillin     Atorvastatin      myualgia    Cephalexin Monohydrate Hives     Keflex    Erythromycin Base [Erythromycin Base] Nausea and Vomiting    Meloxicam Other (See Comments)     Mobic - confusion, depression    Sulfa Antibiotics Hives    Adhesive Tape Rash    Prochlorperazine Edisylate Swelling and Rash     Compazine    Prochlorperazine Maleate Swelling and Rash         Recent Labs   Lab Test 03/17/23  1029 03/17/23  1028 09/21/22  0831 09/16/22  1141 03/08/22  1046 08/27/21  0917 04/16/21  1014 02/08/21  1432   A1C 5.6  --   --   --   --   --   --   --    LDL  --  129* 123* 101* 95   < >  --   --    HDL  --  37* 38* 35* 34*   < >  --   --    TRIG  --  233* 173* 276* 184*   < >  --   --    ALT  --  26  --  28 28   < > 29 36   CR  --  0.90  --  1.01 0.94   < > 0.95 0.92   GFRESTIMATED  --  69  --  61 66   < > 62 65   GFRESTBLACK  --   --   --   --   --   --  72 75   POTASSIUM  --   3.5  --  3.7 3.6   < > 3.3* 3.4   TSH  --  3.05  --  2.44  --    < >  --   --     < > = values in this interval not displayed.          BP Readings from Last 3 Encounters:   09/29/23 132/84   08/22/23 128/74   03/17/23 132/76    Wt Readings from Last 3 Encounters:   09/29/23 103.8 kg (228 lb 14.4 oz)   08/22/23 104 kg (229 lb 4.8 oz)   03/17/23 103.4 kg (228 lb)                   Review of Systems   Constitutional, HEENT, cardiovascular, pulmonary, GI, , musculoskeletal, neuro, skin, endocrine and psych systems are negative, except as otherwise noted.          Objective    /84 (BP Location: Left arm, Patient Position: Sitting, Cuff Size: Adult Large)   Pulse 64   Temp 98  F (36.7  C) (Tympanic)   Wt 103.8 kg (228 lb 14.4 oz)   SpO2 94%   BMI 38.09 kg/m    Body mass index is 38.09 kg/m .        Physical Exam   GENERAL: healthy, alert and no distress  EYES: Eyes grossly normal to inspection, PERRL and conjunctivae and sclerae normal  HENT: ear canals and TM's normal, nose and mouth without ulcers or lesions  NECK: no adenopathy, no asymmetry, masses, or scars and thyroid normal to palpation  RESP: lungs clear to auscultation - no rales, rhonchi or wheezes  CV: regular rate and rhythm, normal S1 S2, no S3 or S4, no murmur, click or rub, no peripheral edema and peripheral pulses strong  SKIN: no suspicious lesions or rashes  PSYCH: mentation appears normal, affect normal/bright

## 2023-09-29 ENCOUNTER — OFFICE VISIT (OUTPATIENT)
Dept: FAMILY MEDICINE | Facility: OTHER | Age: 68
End: 2023-09-29
Attending: NURSE PRACTITIONER
Payer: COMMERCIAL

## 2023-09-29 VITALS
TEMPERATURE: 98 F | HEART RATE: 64 BPM | DIASTOLIC BLOOD PRESSURE: 84 MMHG | WEIGHT: 228.9 LBS | OXYGEN SATURATION: 94 % | BODY MASS INDEX: 38.09 KG/M2 | SYSTOLIC BLOOD PRESSURE: 132 MMHG

## 2023-09-29 DIAGNOSIS — Z23 IMMUNIZATION DUE: ICD-10-CM

## 2023-09-29 DIAGNOSIS — I10 PRIMARY HYPERTENSION: Primary | ICD-10-CM

## 2023-09-29 DIAGNOSIS — F51.01 PRIMARY INSOMNIA: ICD-10-CM

## 2023-09-29 DIAGNOSIS — I10 ESSENTIAL HYPERTENSION: ICD-10-CM

## 2023-09-29 DIAGNOSIS — E78.5 HYPERLIPIDEMIA LDL GOAL <100: ICD-10-CM

## 2023-09-29 DIAGNOSIS — F33.1 MODERATE EPISODE OF RECURRENT MAJOR DEPRESSIVE DISORDER (H): ICD-10-CM

## 2023-09-29 PROBLEM — R10.84 ABDOMINAL PAIN, GENERALIZED: Status: RESOLVED | Noted: 2021-02-08 | Resolved: 2023-09-29

## 2023-09-29 LAB
ALBUMIN SERPL BCG-MCNC: 4.1 G/DL (ref 3.5–5.2)
ALP SERPL-CCNC: 67 U/L (ref 35–104)
ALT SERPL W P-5'-P-CCNC: 18 U/L (ref 0–50)
ANION GAP SERPL CALCULATED.3IONS-SCNC: 13 MMOL/L (ref 7–15)
AST SERPL W P-5'-P-CCNC: 22 U/L (ref 0–45)
BILIRUB SERPL-MCNC: 0.3 MG/DL
BUN SERPL-MCNC: 13.7 MG/DL (ref 8–23)
CALCIUM SERPL-MCNC: 9.2 MG/DL (ref 8.8–10.2)
CHLORIDE SERPL-SCNC: 102 MMOL/L (ref 98–107)
CHOLEST SERPL-MCNC: 190 MG/DL
CREAT SERPL-MCNC: 1 MG/DL (ref 0.51–0.95)
DEPRECATED HCO3 PLAS-SCNC: 24 MMOL/L (ref 22–29)
EGFRCR SERPLBLD CKD-EPI 2021: 61 ML/MIN/1.73M2
GLUCOSE SERPL-MCNC: 126 MG/DL (ref 70–99)
HDLC SERPL-MCNC: 37 MG/DL
HOLD SPECIMEN: NORMAL
LDLC SERPL CALC-MCNC: 119 MG/DL
NONHDLC SERPL-MCNC: 153 MG/DL
POTASSIUM SERPL-SCNC: 3.9 MMOL/L (ref 3.4–5.3)
PROT SERPL-MCNC: 6.8 G/DL (ref 6.4–8.3)
SODIUM SERPL-SCNC: 139 MMOL/L (ref 135–145)
TRIGL SERPL-MCNC: 171 MG/DL
TSH SERPL DL<=0.005 MIU/L-ACNC: 3.34 UIU/ML (ref 0.3–4.2)

## 2023-09-29 PROCEDURE — 82374 ASSAY BLOOD CARBON DIOXIDE: CPT | Mod: ZL | Performed by: NURSE PRACTITIONER

## 2023-09-29 PROCEDURE — 84132 ASSAY OF SERUM POTASSIUM: CPT | Mod: ZL | Performed by: NURSE PRACTITIONER

## 2023-09-29 PROCEDURE — 36415 COLL VENOUS BLD VENIPUNCTURE: CPT | Mod: ZL | Performed by: NURSE PRACTITIONER

## 2023-09-29 PROCEDURE — G0008 ADMIN INFLUENZA VIRUS VAC: HCPCS

## 2023-09-29 PROCEDURE — G0463 HOSPITAL OUTPT CLINIC VISIT: HCPCS | Mod: 25

## 2023-09-29 PROCEDURE — 99215 OFFICE O/P EST HI 40 MIN: CPT | Performed by: NURSE PRACTITIONER

## 2023-09-29 PROCEDURE — 80061 LIPID PANEL: CPT | Mod: ZL | Performed by: NURSE PRACTITIONER

## 2023-09-29 PROCEDURE — 84443 ASSAY THYROID STIM HORMONE: CPT | Mod: ZL | Performed by: NURSE PRACTITIONER

## 2023-09-29 RX ORDER — HYDROCHLOROTHIAZIDE 25 MG/1
25 TABLET ORAL DAILY
Qty: 90 TABLET | Refills: 1 | Status: SHIPPED | OUTPATIENT
Start: 2023-09-29 | End: 2024-04-24

## 2023-09-29 RX ORDER — TRAZODONE HYDROCHLORIDE 50 MG/1
TABLET, FILM COATED ORAL
Qty: 180 TABLET | Refills: 3 | Status: SHIPPED | OUTPATIENT
Start: 2023-09-29 | End: 2024-09-30

## 2023-09-29 ASSESSMENT — PAIN SCALES - GENERAL: PAINLEVEL: NO PAIN (0)

## 2023-09-29 ASSESSMENT — ANXIETY QUESTIONNAIRES
2. NOT BEING ABLE TO STOP OR CONTROL WORRYING: NOT AT ALL
7. FEELING AFRAID AS IF SOMETHING AWFUL MIGHT HAPPEN: NOT AT ALL
1. FEELING NERVOUS, ANXIOUS, OR ON EDGE: NOT AT ALL
5. BEING SO RESTLESS THAT IT IS HARD TO SIT STILL: NOT AT ALL
6. BECOMING EASILY ANNOYED OR IRRITABLE: MORE THAN HALF THE DAYS
4. TROUBLE RELAXING: NOT AT ALL
GAD7 TOTAL SCORE: 2
GAD7 TOTAL SCORE: 2
IF YOU CHECKED OFF ANY PROBLEMS ON THIS QUESTIONNAIRE, HOW DIFFICULT HAVE THESE PROBLEMS MADE IT FOR YOU TO DO YOUR WORK, TAKE CARE OF THINGS AT HOME, OR GET ALONG WITH OTHER PEOPLE: NOT DIFFICULT AT ALL
3. WORRYING TOO MUCH ABOUT DIFFERENT THINGS: NOT AT ALL

## 2023-09-29 ASSESSMENT — PATIENT HEALTH QUESTIONNAIRE - PHQ9: SUM OF ALL RESPONSES TO PHQ QUESTIONS 1-9: 1

## 2023-09-29 NOTE — PATIENT INSTRUCTIONS
Assessment & Plan           Primary hypertension  - Comprehensive metabolic panel  - Lipid Profile (Chol, Trig, HDL, LDL calc)  - TSH with free T4 reflex      Hyperlipidemia LDL goal <100  - Comprehensive metabolic panel  - Lipid Profile (Chol, Trig, HDL, LDL calc)  - TSH with dave  e T4 reflex    Moderate episode of recurrent major depressive disorder (H)  - Continue plan of care      Primary insomnia  - traZODone (DESYREL) 50 MG tablet; TAKE 1 TO 2 TABLETS BY MOUTH NIGHTLY AS NEEDED      Essential hypertension  - Continue plan of care  - hydrochlorothiazide (HYDRODIURIL) 25 MG tablet; Take 1 tablet (25 mg) by mouth daily      When your lab results return, we will call you with abnormal findings  You can also view this information in your MyChart, if you have an account.  Please call or [employer] message us with any questions you may have.              Return in about 6 months (around 3/29/2024).        Eliz Hartman CNP  [facility]

## 2023-10-04 DIAGNOSIS — Z79.01 LONG TERM CURRENT USE OF ANTICOAGULANT THERAPY: ICD-10-CM

## 2023-10-06 RX ORDER — WARFARIN SODIUM 5 MG/1
TABLET ORAL
Qty: 109 TABLET | Refills: 0 | Status: SHIPPED | OUTPATIENT
Start: 2023-10-06 | End: 2023-12-22

## 2023-10-06 NOTE — TELEPHONE ENCOUNTER
Coumadin      Last Written Prescription Date:  7.13.23  Last Fill Quantity: #109,   # refills: 0  Last Office Visit: 9.29.23  Future Office visit:       Routing refill request to provider for review/approval because:

## 2023-10-24 ENCOUNTER — ANCILLARY PROCEDURE (OUTPATIENT)
Dept: MAMMOGRAPHY | Facility: OTHER | Age: 68
End: 2023-10-24
Attending: NURSE PRACTITIONER
Payer: MEDICARE

## 2023-10-24 ENCOUNTER — TELEPHONE (OUTPATIENT)
Dept: MAMMOGRAPHY | Facility: OTHER | Age: 68
End: 2023-10-24

## 2023-10-24 DIAGNOSIS — Z12.31 ENCOUNTER FOR SCREENING MAMMOGRAM FOR BREAST CANCER: ICD-10-CM

## 2023-10-24 PROCEDURE — 77067 SCR MAMMO BI INCL CAD: CPT | Mod: TC

## 2023-10-31 ENCOUNTER — ANTICOAGULATION THERAPY VISIT (OUTPATIENT)
Dept: ANTICOAGULATION | Facility: OTHER | Age: 68
End: 2023-10-31
Attending: NURSE PRACTITIONER
Payer: COMMERCIAL

## 2023-10-31 DIAGNOSIS — I82.401 DEEP VEIN THROMBOSIS (DVT) OF RIGHT LOWER EXTREMITY, UNSPECIFIED CHRONICITY, UNSPECIFIED VEIN (H): ICD-10-CM

## 2023-10-31 DIAGNOSIS — Z79.01 LONG TERM CURRENT USE OF ANTICOAGULANT THERAPY: Primary | ICD-10-CM

## 2023-10-31 DIAGNOSIS — I26.99 PULMONARY EMBOLISM AND INFARCTION (H): ICD-10-CM

## 2023-10-31 LAB — INR HOME MONITORING: 2.5 (ref 2–3)

## 2023-10-31 NOTE — PROGRESS NOTES
ANTICOAGULATION  MANAGEMENT-Home Monitor Managed by Exception    Cassy APPLE Avila 68 year old female is on warfarin with therapeutic INR result. (Goal INR 2.0-3.0)    Recent labs: (last 7 days)     10/31/23  0000   INR 2.5       Previous INR was Therapeutic  Medication, diet, health changes since last INR:chart reviewed; none identified  Contacted within the last 12 weeks by phone on 9/21/23      DARIO     Cassy was NOT contacted regarding therapeutic result today per home monitoring policy manage by exception agreement.   Current warfarin dose is to be continued:     Summary  As of 10/31/2023      Full warfarin instructions:  5 mg every Mon, Wed, Fri; 7.5 mg all other days   Next INR check:  11/28/2023             ?   Corry Galvan RN  Anticoagulation Clinic  10/31/2023    _______________________________________________________________________     Anticoagulation Episode Summary       Current INR goal:  2.0-3.0   TTR:  72.9% (1 y)   Target end date:  Indefinite   Send INR reminders to:  [facility] [place]    Indications    Long-term (current) use of anticoagulants [Z79.01] [Z79.01]  Pulmonary embolism and infarction (H) [I26.99]  Deep vein thrombosis (DVT) (H) [I82.409] [I82.409]  Deep vein thrombosis (DVT) of right lower extremity  unspecified chronicity  unspecified vein (H) [I82.401]             Comments:  [employer]. Q2 week testing  Call cell phone with any dosing.             Anticoagulation Care Providers       Provider Role Specialty Phone number    Eliz Hartman CNP Responsible Family Medicine [phone]

## 2023-11-21 ENCOUNTER — ANTICOAGULATION THERAPY VISIT (OUTPATIENT)
Dept: ANTICOAGULATION | Facility: OTHER | Age: 68
End: 2023-11-21
Payer: MEDICARE

## 2023-11-21 DIAGNOSIS — Z79.01 LONG TERM CURRENT USE OF ANTICOAGULANT THERAPY: Primary | ICD-10-CM

## 2023-11-21 DIAGNOSIS — I82.401 DEEP VEIN THROMBOSIS (DVT) OF RIGHT LOWER EXTREMITY, UNSPECIFIED CHRONICITY, UNSPECIFIED VEIN (H): ICD-10-CM

## 2023-11-21 DIAGNOSIS — I26.99 PULMONARY EMBOLISM AND INFARCTION (H): ICD-10-CM

## 2023-11-21 LAB — INR HOME MONITORING: 2.7 (ref 2–3)

## 2023-11-21 NOTE — PROGRESS NOTES
ANTICOAGULATION  MANAGEMENT-Home Monitor Managed by Exception    Cassy APPLE Avila 68 year old female is on warfarin with therapeutic INR result. (Goal INR 2.0-3.0)    Recent labs: (last 7 days)     11/21/23  0000   INR 2.7       Previous INR was Therapeutic  Medication, diet, health changes since last INR:chart reviewed; none identified  Contacted within the last 12 weeks by phone on 9/21/23      DARIO     Cassy was NOT contacted regarding therapeutic result today per home monitoring policy manage by exception agreement.   Current warfarin dose is to be continued:     Summary  As of 11/21/2023      Full warfarin instructions:  5 mg every Mon, Wed, Fri; 7.5 mg all other days   Next INR check:  12/5/2023             ?   Corry Galvan RN  Anticoagulation Clinic  11/21/2023    _______________________________________________________________________     Anticoagulation Episode Summary       Current INR goal:  2.0-3.0   TTR:  72.9% (1 y)   Target end date:  Indefinite   Send INR reminders to:  [facility] [place]    Indications    Long-term (current) use of anticoagulants [Z79.01] [Z79.01]  Pulmonary embolism and infarction (H) [I26.99]  Deep vein thrombosis (DVT) (H) [I82.409] [I82.409]  Deep vein thrombosis (DVT) of right lower extremity  unspecified chronicity  unspecified vein (H) [I82.401]             Comments:  [employer]. Q2 week testing  Call cell phone with any dosing.             Anticoagulation Care Providers       Provider Role Specialty Phone number    Eliz Hartman CNP Responsible Family Medicine [phone]

## 2023-11-28 ENCOUNTER — TRANSFERRED RECORDS (OUTPATIENT)
Dept: HEALTH INFORMATION MANAGEMENT | Facility: CLINIC | Age: 68
End: 2023-11-28
Payer: COMMERCIAL

## 2023-12-05 ENCOUNTER — ANTICOAGULATION THERAPY VISIT (OUTPATIENT)
Dept: ANTICOAGULATION | Facility: OTHER | Age: 68
End: 2023-12-05
Payer: COMMERCIAL

## 2023-12-05 DIAGNOSIS — I82.401 DEEP VEIN THROMBOSIS (DVT) OF RIGHT LOWER EXTREMITY, UNSPECIFIED CHRONICITY, UNSPECIFIED VEIN (H): ICD-10-CM

## 2023-12-05 DIAGNOSIS — Z79.01 LONG TERM CURRENT USE OF ANTICOAGULANT THERAPY: Primary | ICD-10-CM

## 2023-12-05 DIAGNOSIS — I26.99 PULMONARY EMBOLISM AND INFARCTION (H): ICD-10-CM

## 2023-12-05 LAB — INR HOME MONITORING: 3.4 (ref 2–3)

## 2023-12-05 NOTE — PROGRESS NOTES
ANTICOAGULATION MANAGEMENT     Cassy Avila 68 year old female is on warfarin with supratherapeutic INR result. (Goal INR 2.0-3.0)    Recent labs: (last 7 days)     12/05/23  0000   INR 3.4*       ASSESSMENT     Source(s): Chart Review and Patient/Caregiver Call     Warfarin doses taken: Warfarin taken as instructed  Diet: Decreased greens/vitamin K in diet; plans to resume previous intake  New illness, injury, or hospitalization: No  Medication/supplement changes: None noted  Signs or symptoms of bleeding or clotting: No  Previous INR: Therapeutic last 2(+) visits  Additional findings: None       PLAN     Recommended plan for no diet, medication or health factor changes affecting INR     Dosing Instructions: decrease your warfarin dose (5.6% change) with next INR in 2 weeks       Summary  As of 12/5/2023      Full warfarin instructions:  7.5 mg every Sun, Thu, Sat; 5 mg all other days   Next INR check:  12/19/2023               Telephone call with Kaitlin who verbalizes understanding and agrees to plan    Patient to recheck with home meter    Education provided: Importance of notifying clinic for changes in medications; a sooner lab recheck maybe needed.    Plan made per [facility] anticoagulation protocol    Mary Galo RN  Anticoagulation Clinic  12/5/2023    _______________________________________________________________________     Anticoagulation Episode Summary       Current INR goal:  2.0-3.0   TTR:  71.4% (1 y)   Target end date:  Indefinite   Send INR reminders to:  [facility] [place]    Indications    Long-term (current) use of anticoagulants [Z79.01] [Z79.01]  Pulmonary embolism and infarction (H) [I26.99]  Deep vein thrombosis (DVT) (H) [I82.409] [I82.409]  Deep vein thrombosis (DVT) of right lower extremity  unspecified chronicity  unspecified vein (H) [I82.401]             Comments:  [employer]. Q2 week testing  Call cell phone with any dosing.             Anticoagulation Care Providers        Provider Role Specialty Phone number    Eliz Hartman CNP [facility] Family Medicine [phone]

## 2023-12-07 NOTE — PROGRESS NOTES
[facility]  [address]  [place]  [place] [address]  Phone: [phone]  Primary Provider: Eliz Sanon  Pre-op Performing Provider: ELIZ SANON        PREOPERATIVE EVALUATION:  Today's date: 12/15/2023      Kaitlin is a 68 year old, presenting for the following:  Pre-Op Exam      Surgical Information:  Surgery/Procedure:  Left thumb trigger digit release and left long finger palmar and digital fasciectomy  Surgery Location: [facility]  Surgeon: Dr. Chavez  Surgery Date: 01/08/2024  Time of Surgery: TBD  Where patient plans to recover: At home with family  Fax number for surgical facility: on file        HPI related to upcoming procedure:     Left hand pain  Left thumb trigger finger  Dupuytren's nodule left hand            12/14/2023     5:39 PM   Preop Questions   1. Have you ever had a heart attack or stroke? No   2. Have you ever had surgery on your heart or blood vessels, such as a stent placement, a coronary artery bypass, or surgery on an artery in your head, neck, heart, or legs? No   3. Do you have chest pain with activity? No   4. Do you have a history of  heart failure? No   5. Do you currently have a cold, bronchitis or symptoms of other infection? No   6. Do you have a cough, shortness of breath, or wheezing? YES - chronic    7. Do you or anyone in your family have previous history of blood clots? YES - patient    8. Do you or does anyone in your family have a serious bleeding problem such as prolonged bleeding following surgeries or cuts? No   9. Have you ever had problems with anemia or been told to take iron pills? No   10. Have you had any abnormal blood loss such as black, tarry or bloody stools, or abnormal vaginal bleeding? No   11. Have you ever had a blood transfusion? No   12. Are you willing to have a blood transfusion if it is medically needed before, during, or after your surgery? Yes   13. Have you or any of your relatives ever had problems with anesthesia? No    14. Do you have sleep apnea, excessive snoring or daytime drowsiness? No   15. Do you have any artifical heart valves or other implanted medical devices like a pacemaker, defibrillator, or continuous glucose monitor? No   16. Do you have artificial joints? YES - LT Knee, Bilateral Shoulders    17. Are you allergic to latex? No           Health Care Directive:  Patient does not have a Health Care Directive or Living Will: Discussed advance care planning with patient; however, patient declined at this time.        Status of Chronic Conditions:  See problem list for active medical problems.  Problems all longstanding and stable, except as noted/documented.  See ROS for pertinent symptoms related to these conditions.        Review of Systems  CONSTITUTIONAL: NEGATIVE for fever, chills, change in weight  INTEGUMENTARY/SKIN: NEGATIVE for worrisome rashes, moles or lesions  EYES: NEGATIVE for vision changes or irritation  ENT/MOUTH: NEGATIVE for ear, mouth and throat problems  RESP: NEGATIVE for significant cough or SOB  CV: NEGATIVE for chest pain, palpitations or peripheral edema  GI: NEGATIVE for nausea, abdominal pain, heartburn, or change in bowel habits  : NEGATIVE for frequency, dysuria, or hematuria  MUSCULOSKELETAL: NEGATIVE for significant arthralgias or myalgia  NEURO: NEGATIVE for weakness, dizziness or paresthesias  ENDOCRINE: NEGATIVE for temperature intolerance, skin/hair changes  HEME: NEGATIVE for bleeding problems  PSYCHIATRIC: NEGATIVE for changes in mood or affect          Patient Active Problem List    Diagnosis Date Noted    Deep vein thrombosis (DVT) of right lower extremity, unspecified chronicity, unspecified vein (H) 01/24/2023     Priority: Medium    Shoulder pain 01/31/2022     Priority: Medium     Formatting of this note might be different from the original.  Added automatically from request for surgery [id number]      Muscle weakness of right upper extremity 04/15/2021     Priority: Medium     Pain in right upper arm 04/15/2021     Priority: Medium    Stiffness of right shoulder joint 04/15/2021     Priority: Medium    Diarrhea 02/08/2021     Priority: Medium    Obesity (BMI 35.0-39.9) with comorbidity (H) 01/28/2020     Priority: Medium    Factor V Leiden mutation (H24) 07/26/2019     Priority: Medium    Activated protein C resistance (H24) 07/26/2019     Priority: Medium    Primary insomnia 10/31/2018     Priority: Medium    Vitamin D deficiency 07/13/2018     Priority: Medium    Family history of colon cancer 01/02/2018     Priority: Medium    Lumbar spondylosis with myelopathy 07/12/2017     Priority: Medium    Degeneration of lumbar or lumbosacral intervertebral disc 07/12/2017     Priority: Medium    Long-term (current) use of anticoagulants [Z79.01] 07/20/2016     Priority: Medium    Deep vein thrombosis (DVT) (H) [I82.409] 07/20/2016     Priority: Medium    Moderate episode of recurrent major depressive disorder (H) 08/08/2014     Priority: Medium    H/O total shoulder replacement, left 06/17/2014     Priority: Medium    Failed arthroplasty (H24) 01/24/2014     Priority: Medium    Advanced care planning/counseling discussion 03/12/2013     Priority: Medium     Pt has information at home , not completed      Neurofibromatosis, type 1 (H) 08/22/2012     Priority: Medium     Problem list name updated by automated process. Provider to review    Formatting of this note might be different from the original.  Formatting of this note might be different from the original.  Problem list name updated by automated process. Provider to review      Chest pain, unspecified 02/11/2010     Priority: Medium     Formatting of this note might be different from the original.  IMO Update 10/11      Contact dermatitis and other eczema, due to unspecified cause 06/01/2004     Priority: Medium    Pulmonary embolism and infarction (H) 04/11/2003     Priority: Medium     Problem list name updated by automated process.  Provider to review      Hyperlipidemia LDL goal <100 07/11/2002     Priority: Medium     Problem list name updated by automated process. Provider to review      Edema 01/18/2002     Priority: Medium    Primary hypertension 02/20/2001     Priority: Medium     Problem list name updated by automated process. Provider to review    Formatting of this note might be different from the original.  Formatting of this note might be different from the original.  IMO Update 10/11  Formatting of this note might be different from the original.  Problem list name updated by automated process. Provider to review            Past Medical History:   Diagnosis Date    Abdominal pain, generalized 2/8/2021    Arthritis     Cervicalgia 1/9/2001    Closed dislocation of shoulder, unspecified site 2000    Congenital deficiency of other clotting factors 9/7/2012    factor V deficiency, congenital    Congenital factor VIII disorder (H) 7/26/2019    Contact dermatitis and other eczema, due to unspecified cause 6/1/2004    Coughing     Diarrhea 2/8/2021    Edema 1/18/2002    Essential hypertension 2/20/2001     Problem list name updated by automated process. Provider to review    Factor V Leiden (H24)     Factor V Leiden mutation (H24) 7/26/2019    Family history of colon cancer 1/2/2018    Gastro-oesophageal reflux disease     Herpes zoster without mention of complication 2003    resolved from problem list    Hyperlipidemia LDL goal <100 7/11/2002     Problem list name updated by automated process. Provider to review    Long term (current) use of anticoagulants 8/20/2003    Major depression 8/8/2014    Mammographic microcalcification 2003    resolved from problem list    Migraine, unspecified, without mention of intractable migraine without mention of status migrainosus 3/5/2001    Moderate episode of recurrent major depressive disorder (H) 8/8/2014    Neurofibromatosis, peripheral, NF1 (H) 8/22/2012     Problem list name updated by automated  process. Provider to review    Neurofibromatosis, unspecified(237.70) 2012    Other and unspecified hyperlipidemia 2002    Other chronic pain     Other pulmonary embolism and infarction 2003    Primary insomnia 10/31/2018    Statin medication not prescribed per physician orders 2018    Tachycardia, unspecified 2001    Thrombosis of leg     Unspecified essential hypertension 2001    Vitamin D deficiency 2018         Past Surgical History:   Procedure Laterality Date    ------------OTHER-------------      shoulder replacement; Provider: Karen    ARTHROPLASTY KNEE  2014    Procedure: ARTHROPLASTY KNEE;  Surgeon: Sean Alexander MD;  Location: [facility] OR    ARTHROSCOPY SHOULDER      right, bone spurs    CA ANESTH,SHOULDER REPLACEMENT Right 2020     SECTION      x3    CHOLECYSTECTOMY      COLONOSCOPY  02/15/2018    [place],,polyps    elbow ulnar tunnel release      ELECTROTHERMAL THERAPY INTRADISC  2017    stimulator    ENDOSCOPIC SINUS SURGERY, SEPTOPLASTY, TURBINOPLASTY, MAXILLARY SINUSOTOMY, COMBINED N/A 2015    Procedure: COMBINED ENDOSCOPIC SINUS SURGERY, SEPTOPLASTY, TURBINOPLASTY, MAXILLARY SINUSOTOMY;  Surgeon: Seema Conn MD;  Location: [facility] OR    ESOPHAGOSCOPY, GASTROSCOPY, DUODENOSCOPY (EGD), COMBINED      with biopsy and endoscopic U/S    EXCISE NEUROMA LOWER EXTREMITY Left 2016    Procedure: EXCISE NEUROMA LOWER EXTREMITY;  Surgeon: Edi Reed MD;  Location: [facility] OR    EYE SURGERY Right 2020    FUSION LUMBAR ANTERIOR WITH MARCY CAGES      L5-S1    HYSTERECTOMY TOTAL ABDOMINAL, BILATERAL SALPINGO-OOPHORECTOMY, COMBINED N/A     ORTHOPEDIC SURGERY  2015    right shoulder    ORTHOPEDIC SURGERY  2015    right knee    ORTHOPEDIC SURGERY Right 2018    hip labrum tear    pionidal cyst excision      TRANSPOSITION ULNAR NERVE (ELBOW)           Current Outpatient Medications   Medication Sig Dispense Refill     albuterol (PROAIR HFA/PROVENTIL HFA/VENTOLIN HFA) 108 (90 Base) MCG/ACT inhaler Inhale 2 puffs into the lungs every 6 hours 18 g 1    albuterol (PROVENTIL) (2.5 MG/3ML) 0.083% neb solution Take 2.5 mg by nebulization every 6 hours as needed for shortness of breath / dyspnea or wheezing      aspirin 81 MG EC tablet Take 81 mg by mouth daily HS      Cholecalciferol (VITAMIN D3 PO) Take 3,000 mg by mouth daily AM      escitalopram (LEXAPRO) 10 MG tablet Take 1 tablet (10 mg) by mouth daily 90 tablet 1    hydrochlorothiazide (HYDRODIURIL) 25 MG tablet Take 1 tablet (25 mg) by mouth daily 90 tablet 1    ketoconazole (NIZORAL) 2 % external cream APPLY TO THE RASH ON FULL BACK TWICE A DAY FOR 4-6 WEEKS      losartan (COZAAR) 100 MG tablet Take 1 tablet by mouth once daily 90 tablet 2    metoprolol succinate ER (TOPROL XL) 50 MG 24 hr tablet Take 1.5 tablets (75 mg) by mouth daily 90 tablet 1    order for DME Nebulizer machine and tubing    DX:  Bronchitis 1 Device 0    potassium chloride ER (K-TAB/KLOR-CON) 10 MEQ CR tablet Take 1 tablet (10 mEq) by mouth daily 90 tablet 2    traZODone (DESYREL) 50 MG tablet TAKE 1 TO 2 TABLETS BY MOUTH NIGHTLY AS NEEDED 180 tablet 3    warfarin ANTICOAGULANT (COUMADIN) 5 MG tablet TAKE 1 TABLET BY MOUTH EVERY MONDAY WEDNESDAY AND  FRIDAY AND 1.5 TABLET ALL OTHER DAYS OR AS  DIRECTED BY WARFARIN CLINIC 109 tablet 0         Allergies   Allergen Reactions    Amlodipine Besylate Swelling     Norvasc    Amoxicillin     Atorvastatin      myualgia    Cephalexin Monohydrate Hives     Keflex    Erythromycin Base [Erythromycin Base] Nausea and Vomiting    Meloxicam Other (See Comments)     Mobic - confusion, depression    Sulfa Antibiotics Hives    Adhesive Tape Rash    Prochlorperazine Edisylate Swelling and Rash     Compazine    Prochlorperazine Maleate Swelling and Rash          Social History     Tobacco Use    Smoking status: Former     Packs/day: 1.00     Years: 30.00     Additional pack  years: 0.00     Total pack years: 30.00     Types: Cigarettes, Pipe     Start date: 1969     Quit date: 1999     Years since quittin.9    Smokeless tobacco: Never    Tobacco comments:     quit in    Substance Use Topics    Alcohol use: No       Family History   Problem Relation Age of Onset    Cancer Mother     Colon Polyps Mother     Heart Failure Mother 87        congestive, cause of death    Myocardial Infarction Mother         myocardial infarction    Myocardial Infarction Father         myocardial infarction - cause of death    C.A.D. Father     Cancer Paternal Uncle         cause of death    C.A.D. Brother     Other - See Comments Other         factor 5 - family h/o    Asthma No family hx of          History   Drug Use No          Objective     /78 (BP Location: Left arm, Patient Position: Sitting, Cuff Size: Adult Large)   Pulse 60   Temp 97.7  F (36.5  C) (Tympanic)   Resp 20   Wt 103 kg (227 lb)   SpO2 94%   BMI 37.77 kg/m          Physical Exam    GENERAL APPEARANCE: healthy, alert and no distress     EYES: EOMI, PERRL     HENT: ear canals and TM's normal and nose and mouth without ulcers or lesions     NECK: no adenopathy, no asymmetry, masses, or scars and thyroid normal to palpation     RESP: lungs clear to auscultation - no rales, rhonchi or wheezes     CV: regular rates and rhythm, normal S1 S2, no S3 or S4 and no murmur, click or rub     ABDOMEN:  soft, nontender, no HSM or masses and bowel sounds normal     MS: Left thumb pain, left palmar pain proximal to long finger     SKIN: no suspicious lesions or rashes     NEURO: Normal strength and tone, sensory exam grossly normal, mentation intact and speech normal     PSYCH: mentation appears normal. and affect normal/bright     LYMPHATICS: No cervical adenopathy        Recent Labs   Lab Test 23  0000 23  0000 10/31/23  0000 23  1022 23  0000 23  1029 23  1028 22  0000 22  1212    HGB  --   --   --   --   --   --   --   --  14.2   INR 3.4* 2.7   < >  --    < >  --   --    < >  --    NA  --   --   --  139  --   --  141  --   --    POTASSIUM  --   --   --  3.9  --   --  3.5  --   --    CR  --   --   --  1.00*  --   --  0.90  --   --    A1C  --   --   --   --   --  5.6  --   --   --     < > = values in this interval not displayed.          Diagnostics:  Recent Results (from the past 24 hour(s))   Comprehensive metabolic panel    Collection Time: 12/15/23  9:10 AM   Result Value Ref Range    Sodium 138 135 - 145 mmol/L    Potassium 3.6 3.4 - 5.3 mmol/L    Carbon Dioxide (CO2) 26 22 - 29 mmol/L    Anion Gap 11 7 - 15 mmol/L    Urea Nitrogen 12.2 8.0 - 23.0 mg/dL    Creatinine 0.96 (H) 0.51 - 0.95 mg/dL    GFR Estimate 64 >60 mL/min/1.73m2    Calcium 9.3 8.8 - 10.2 mg/dL    Chloride 101 98 - 107 mmol/L    Glucose 113 (H) 70 - 99 mg/dL    Alkaline Phosphatase 61 40 - 150 U/L    AST 17 0 - 45 U/L    ALT 21 0 - 50 U/L    Protein Total 6.7 6.4 - 8.3 g/dL    Albumin 4.1 3.5 - 5.2 g/dL    Bilirubin Total 0.4 <=1.2 mg/dL   CBC with platelets and differential    Collection Time: 12/15/23  9:10 AM   Result Value Ref Range    WBC Count 6.0 4.0 - 11.0 10e3/uL    RBC Count 4.96 3.80 - 5.20 10e6/uL    Hemoglobin 14.3 11.7 - 15.7 g/dL    Hematocrit 41.0 35.0 - 47.0 %    MCV 83 78 - 100 fL    MCH 28.8 26.5 - 33.0 pg    MCHC 34.9 31.5 - 36.5 g/dL    RDW 13.7 10.0 - 15.0 %    Platelet Count 191 150 - 450 10e3/uL    % Neutrophils 64 %    % Lymphocytes 25 %    % Monocytes 9 %    % Eosinophils 2 %    % Basophils 1 %    % Immature Granulocytes 0 %    Absolute Neutrophils 3.8 1.6 - 8.3 10e3/uL    Absolute Lymphocytes 1.5 0.8 - 5.3 10e3/uL    Absolute Monocytes 0.5 0.0 - 1.3 10e3/uL    Absolute Eosinophils 0.1 0.0 - 0.7 10e3/uL    Absolute Basophils 0.0 0.0 - 0.2 10e3/uL    Absolute Immature Granulocytes 0.0 <=0.4 10e3/uL   EKG 12-lead complete w/read - (Clinic Performed)    Collection Time: 12/15/23  9:43 AM   Result  Value Ref Range    Systolic Blood Pressure  mmHg    Diastolic Blood Pressure  mmHg    Ventricular Rate 60 BPM    Atrial Rate 60 BPM    WV Interval 144 ms    QRS Duration 84 ms     ms    QTc 398 ms    P Axis 112 degrees    R AXIS 59 degrees    T Axis 66 degrees    Interpretation ECG       Sinus rhythm  Normal ECG  No previous ECGs available              EKG: appears normal, NSR, unchanged from previous tracings        Revised Cardiac Risk Index (RCRI):  The patient has the following serious cardiovascular risks for perioperative complications:   - No serious cardiac risks = 0 points         RCRI Interpretation: 0 points: Class I (very low risk - 0.4% complication rate)        Assessment & Plan       The proposed surgical procedure is considered LOW risk.        Pre-op exam  - EKG 12-lead complete w/read - (Clinic Performed)  - Comprehensive metabolic panel  - CBC with platelets and differential    Trigger finger of left thumb  - See above    Dupuytren contracture  See above           - No identified additional risk factors other than previously addressed          Antiplatelet or Anticoagulation Medication Instructions:   - warfarin: Bridging therapy will be coordinated by coumadin clinic          Additional Medication Instructions:  Hold aspirin, Ibuprofen, vitamins, minerals, supplements until after surgery beginning 1 week prior Take BP medication the am of surgery with a small sip of water      RECOMMENDATION:  APPROVAL GIVEN to proceed with proposed procedure, without further diagnostic evaluation.          40  minutes spent by me on the date of the encounter doing chart review, review of outside records, review of test results, interpretation of tests, patient visit, and documentation            Signed Electronically by: Eliz Hartman CNP  Copy of this evaluation report is provided to requesting physician.

## 2023-12-15 ENCOUNTER — OFFICE VISIT (OUTPATIENT)
Dept: FAMILY MEDICINE | Facility: OTHER | Age: 68
End: 2023-12-15
Attending: NURSE PRACTITIONER
Payer: COMMERCIAL

## 2023-12-15 VITALS
TEMPERATURE: 97.7 F | RESPIRATION RATE: 20 BRPM | SYSTOLIC BLOOD PRESSURE: 128 MMHG | BODY MASS INDEX: 37.77 KG/M2 | DIASTOLIC BLOOD PRESSURE: 78 MMHG | WEIGHT: 227 LBS | OXYGEN SATURATION: 94 % | HEART RATE: 60 BPM

## 2023-12-15 DIAGNOSIS — M65.312 TRIGGER FINGER OF LEFT THUMB: ICD-10-CM

## 2023-12-15 DIAGNOSIS — Z01.818 PRE-OP EXAM: Primary | ICD-10-CM

## 2023-12-15 DIAGNOSIS — M72.0 DUPUYTREN CONTRACTURE: ICD-10-CM

## 2023-12-15 LAB
ALBUMIN SERPL BCG-MCNC: 4.1 G/DL (ref 3.5–5.2)
ALP SERPL-CCNC: 61 U/L (ref 40–150)
ALT SERPL W P-5'-P-CCNC: 21 U/L (ref 0–50)
ANION GAP SERPL CALCULATED.3IONS-SCNC: 11 MMOL/L (ref 7–15)
AST SERPL W P-5'-P-CCNC: 17 U/L (ref 0–45)
BASOPHILS # BLD AUTO: 0 10E3/UL (ref 0–0.2)
BASOPHILS NFR BLD AUTO: 1 %
BILIRUB SERPL-MCNC: 0.4 MG/DL
BUN SERPL-MCNC: 12.2 MG/DL (ref 8–23)
CALCIUM SERPL-MCNC: 9.3 MG/DL (ref 8.8–10.2)
CHLORIDE SERPL-SCNC: 101 MMOL/L (ref 98–107)
CREAT SERPL-MCNC: 0.96 MG/DL (ref 0.51–0.95)
DEPRECATED HCO3 PLAS-SCNC: 26 MMOL/L (ref 22–29)
EGFRCR SERPLBLD CKD-EPI 2021: 64 ML/MIN/1.73M2
EOSINOPHIL # BLD AUTO: 0.1 10E3/UL (ref 0–0.7)
EOSINOPHIL NFR BLD AUTO: 2 %
ERYTHROCYTE [DISTWIDTH] IN BLOOD BY AUTOMATED COUNT: 13.7 % (ref 10–15)
GLUCOSE SERPL-MCNC: 113 MG/DL (ref 70–99)
HCT VFR BLD AUTO: 41 % (ref 35–47)
HGB BLD-MCNC: 14.3 G/DL (ref 11.7–15.7)
IMM GRANULOCYTES # BLD: 0 10E3/UL
IMM GRANULOCYTES NFR BLD: 0 %
LYMPHOCYTES # BLD AUTO: 1.5 10E3/UL (ref 0.8–5.3)
LYMPHOCYTES NFR BLD AUTO: 25 %
MCH RBC QN AUTO: 28.8 PG (ref 26.5–33)
MCHC RBC AUTO-ENTMCNC: 34.9 G/DL (ref 31.5–36.5)
MCV RBC AUTO: 83 FL (ref 78–100)
MONOCYTES # BLD AUTO: 0.5 10E3/UL (ref 0–1.3)
MONOCYTES NFR BLD AUTO: 9 %
NEUTROPHILS # BLD AUTO: 3.8 10E3/UL (ref 1.6–8.3)
NEUTROPHILS NFR BLD AUTO: 64 %
PLATELET # BLD AUTO: 191 10E3/UL (ref 150–450)
POTASSIUM SERPL-SCNC: 3.6 MMOL/L (ref 3.4–5.3)
PROT SERPL-MCNC: 6.7 G/DL (ref 6.4–8.3)
RBC # BLD AUTO: 4.96 10E6/UL (ref 3.8–5.2)
SODIUM SERPL-SCNC: 138 MMOL/L (ref 135–145)
WBC # BLD AUTO: 6 10E3/UL (ref 4–11)

## 2023-12-15 PROCEDURE — 93005 ELECTROCARDIOGRAM TRACING: CPT | Performed by: NURSE PRACTITIONER

## 2023-12-15 PROCEDURE — 93010 ELECTROCARDIOGRAM REPORT: CPT | Mod: 77 | Performed by: INTERNAL MEDICINE

## 2023-12-15 PROCEDURE — 82374 ASSAY BLOOD CARBON DIOXIDE: CPT | Mod: ZL | Performed by: NURSE PRACTITIONER

## 2023-12-15 PROCEDURE — G0463 HOSPITAL OUTPT CLINIC VISIT: HCPCS

## 2023-12-15 PROCEDURE — 36415 COLL VENOUS BLD VENIPUNCTURE: CPT | Mod: ZL | Performed by: NURSE PRACTITIONER

## 2023-12-15 PROCEDURE — 99215 OFFICE O/P EST HI 40 MIN: CPT | Performed by: NURSE PRACTITIONER

## 2023-12-15 PROCEDURE — 85025 COMPLETE CBC W/AUTO DIFF WBC: CPT | Mod: ZL | Performed by: NURSE PRACTITIONER

## 2023-12-15 PROCEDURE — G0463 HOSPITAL OUTPT CLINIC VISIT: HCPCS | Mod: 25

## 2023-12-15 PROCEDURE — 82310 ASSAY OF CALCIUM: CPT | Mod: ZL | Performed by: NURSE PRACTITIONER

## 2023-12-15 ASSESSMENT — PAIN SCALES - GENERAL: PAINLEVEL: MILD PAIN (2)

## 2023-12-15 NOTE — PATIENT INSTRUCTIONS
Preparing for Your Surgery  Getting started  A nurse will call you to review your health history and instructions. They will give you an arrival time based on your scheduled surgery time. Please be ready to share:  Your doctor's clinic name and phone number  Your medical, surgical, and anesthesia history  A list of allergies and sensitivities  A list of medicines, including herbal treatments and over-the-counter drugs  Whether the patient has a legal guardian (ask how to send us the papers in advance)  Please tell us if you're pregnant--or if there's any chance you might be pregnant. Some surgeries may injure a fetus (unborn baby), so they require a pregnancy test. Surgeries that are safe for a fetus don't always need a test, and you can choose whether to have one.   If you have a child who's having surgery, please ask for a copy of Preparing for Your Child's Surgery.        Assessment & Plan       The proposed surgical procedure is considered LOW risk.      Pre-op exam  - EKG 12-lead complete w/read - (Clinic Performed)  - Comprehensive metabolic panel  - CBC with platelets and differential    Trigger finger of left thumb  - See above    Dupuytren contracture  See above     - No identified additional risk factors other than previously addressed          Antiplatelet or Anticoagulation Medication Instructions:   - warfarin: Bridging therapy will be coordinated by coumadin clinic    Additional Medication Instructions:  Hold aspirin, Ibuprofen, vitamins, minerals, supplements until after surgery beginning 1 week prior Take BP medication the am of surgery with a small sip of water          RECOMMENDATION:  APPROVAL GIVEN to proceed with proposed procedure, without further diagnostic evaluation.    Preparing for surgery  Within 10 to 30 days of surgery: Have a pre-op exam (sometimes called an H&P, or History and Physical). This can be done at a clinic or pre-operative center.  If you're having a , you may not  need this exam. Talk to your care team.  At your pre-op exam, talk to your care team about all medicines you take. If you need to stop any medicines before surgery, ask when to start taking them again.  We do this for your safety. Many medicines can make you bleed too much during surgery. Some change how well surgery (anesthesia) drugs work.  Call your insurance company to let them know you're having surgery. (If you don't have insurance, call [phone].)  Call your clinic if there's any change in your health. This includes signs of a cold or flu (sore throat, runny nose, cough, rash, fever). It also includes a scrape or scratch near the surgery site.  If you have questions on the day of surgery, call your hospital or surgery center.  Eating and drinking guidelines  For your safety: Unless your surgeon tells you otherwise, follow the guidelines below.  Eat and drink as usual until 8 hours before you arrive for surgery. After that, no food or milk.  Drink clear liquids until 2 hours before you arrive. These are liquids you can see through, like water, Gatorade, and Propel Water. They also include plain black coffee and tea (no cream or milk), candy, and breath mints. You can spit out gum when you arrive.  If you drink alcohol: Stop drinking it the night before surgery.  If your care team tells you to take medicine on the morning of surgery, it's okay to take it with a sip of water.  Preventing infection  Shower or bathe the night before and morning of your surgery. Follow the instructions your clinic gave you. (If no instructions, use regular soap.)  Don't shave or clip hair near your surgery site. We'll remove the hair if needed.  Don't smoke or vape the morning of surgery. You may chew nicotine gum up to 2 hours before surgery. A nicotine patch is okay.  Note: Some surgeries require you to completely quit smoking and nicotine. Check with your surgeon.  Your care team will make every effort to keep you safe from  infection. We will:  Clean our hands often with soap and water (or an alcohol-based hand rub).  Clean the skin at your surgery site with a special soap that kills germs.  Give you a special gown to keep you warm. (Cold raises the risk of infection.)  Wear special hair covers, masks, gowns and gloves during surgery.  Give antibiotic medicine, if prescribed. Not all surgeries need antibiotics.  What to bring on the day of surgery  Photo ID and insurance card  Copy of your health care directive, if you have one  Glasses and hearing aids (bring cases)  You can't wear contacts during surgery  Inhaler and eye drops, if you use them (tell us about these when you arrive)  CPAP machine or breathing device, if you use them  A few personal items, if spending the night  If you have . . .  A pacemaker, ICD (cardiac defibrillator) or other implant: Bring the ID card.  An implanted stimulator: Bring the remote control.  A legal guardian: Bring a copy of the certified (court-stamped) guardianship papers.  Please remove any jewelry, including body piercings. Leave jewelry and other valuables at home.  If you're going home the day of surgery  You must have a responsible adult drive you home. They should stay with you overnight as well.  If you don't have someone to stay with you, and you aren't safe to go home alone, we may keep you overnight. Insurance often won't pay for this.  After surgery  If it's hard to control your pain or you need more pain medicine, please call your surgeon's office.  Questions?   If you have any questions for your care team, list them here: _________________________________________________________________________________________________________________________________________________________________________ ____________________________________ ____________________________________ ____________________________________  For informational purposes only. Not to replace the advice of your health care provider.  Copyright   2003, 2019 [facility]. All rights reserved. Clinically reviewed by Jamaica Vincent MD. [employer] [id number] - REV 12/22.

## 2023-12-19 ENCOUNTER — HOSPITAL ENCOUNTER (EMERGENCY)
Facility: HOSPITAL | Age: 68
Discharge: HOME | End: 2023-12-19
Attending: NURSE PRACTITIONER | Admitting: NURSE PRACTITIONER
Payer: MEDICARE

## 2023-12-19 ENCOUNTER — ANTICOAGULATION THERAPY VISIT (OUTPATIENT)
Dept: ANTICOAGULATION | Facility: OTHER | Age: 68
End: 2023-12-19
Payer: MEDICARE

## 2023-12-19 ENCOUNTER — TELEPHONE (OUTPATIENT)
Dept: FAMILY MEDICINE | Facility: OTHER | Age: 68
End: 2023-12-19

## 2023-12-19 VITALS
OXYGEN SATURATION: 95 % | RESPIRATION RATE: 20 BRPM | SYSTOLIC BLOOD PRESSURE: 114 MMHG | DIASTOLIC BLOOD PRESSURE: 68 MMHG | TEMPERATURE: 99.3 F | HEART RATE: 82 BPM

## 2023-12-19 DIAGNOSIS — I82.401 DEEP VEIN THROMBOSIS (DVT) OF RIGHT LOWER EXTREMITY, UNSPECIFIED CHRONICITY, UNSPECIFIED VEIN (H): ICD-10-CM

## 2023-12-19 DIAGNOSIS — Z79.01 LONG TERM CURRENT USE OF ANTICOAGULANT THERAPY: ICD-10-CM

## 2023-12-19 DIAGNOSIS — I26.99 PULMONARY EMBOLISM AND INFARCTION (H): Primary | ICD-10-CM

## 2023-12-19 DIAGNOSIS — I82.401 DEEP VEIN THROMBOSIS (DVT) OF RIGHT LOWER EXTREMITY, UNSPECIFIED CHRONICITY, UNSPECIFIED VEIN (H): Primary | ICD-10-CM

## 2023-12-19 DIAGNOSIS — B34.9 ACUTE VIRAL SYNDROME: Primary | ICD-10-CM

## 2023-12-19 LAB
ATRIAL RATE - MUSE: 60 BPM
DIASTOLIC BLOOD PRESSURE - MUSE: NORMAL MMHG
FLUAV RNA SPEC QL NAA+PROBE: NEGATIVE
FLUBV RNA RESP QL NAA+PROBE: NEGATIVE
INR HOME MONITORING: 2.2 (ref 2–3)
INTERPRETATION ECG - MUSE: NORMAL
P AXIS - MUSE: 112 DEGREES
PR INTERVAL - MUSE: 144 MS
QRS DURATION - MUSE: 84 MS
QT - MUSE: 398 MS
QTC - MUSE: 398 MS
R AXIS - MUSE: 59 DEGREES
RSV RNA SPEC NAA+PROBE: NEGATIVE
SARS-COV-2 RNA RESP QL NAA+PROBE: POSITIVE
SYSTOLIC BLOOD PRESSURE - MUSE: NORMAL MMHG
T AXIS - MUSE: 66 DEGREES
VENTRICULAR RATE- MUSE: 60 BPM

## 2023-12-19 PROCEDURE — C9803 HOPD COVID-19 SPEC COLLECT: HCPCS

## 2023-12-19 PROCEDURE — 87637 SARSCOV2&INF A&B&RSV AMP PRB: CPT | Performed by: NURSE PRACTITIONER

## 2023-12-19 PROCEDURE — G0463 HOSPITAL OUTPT CLINIC VISIT: HCPCS

## 2023-12-19 PROCEDURE — 99213 OFFICE O/P EST LOW 20 MIN: CPT | Performed by: NURSE PRACTITIONER

## 2023-12-19 ASSESSMENT — ENCOUNTER SYMPTOMS
SORE THROAT: 0
EYE DISCHARGE: 0
FEVER: 1
VOMITING: 1
COUGH: 1
MYALGIAS: 1
HEADACHES: 1
TROUBLE SWALLOWING: 0
ABDOMINAL PAIN: 0
EYE REDNESS: 0
RHINORRHEA: 1
FATIGUE: 1
PSYCHIATRIC NEGATIVE: 1
CHILLS: 0
SHORTNESS OF BREATH: 1
DIARRHEA: 0
NAUSEA: 0

## 2023-12-19 NOTE — PROGRESS NOTES
ANTICOAGULATION MANAGEMENT     Cassy Avila 68 year old female is on warfarin with therapeutic INR result. (Goal INR 2.0-3.0)    Recent labs: (last 7 days)     12/19/23  0000   INR 2.2       ASSESSMENT     Source(s): Chart Review and Patient/Caregiver Call     Warfarin doses taken: Warfarin taken as instructed  Diet: No new diet changes identified  Medication/supplement changes: None noted  New illness, injury, or hospitalization: No  Signs or symptoms of bleeding or clotting: No  Previous result: Supratherapeutic  Additional findings: Upcoming surgery/procedure having trigger finger release done 1/8 will need bridging per Eliz Hartman NP       PLAN     Recommended plan for no diet, medication or health factor changes affecting INR     Dosing Instructions: Continue your current warfarin dose with next INR in 2 weeks       Summary  As of 12/19/2023      Full warfarin instructions:  7.5 mg every Mon, Wed, Fri; 5 mg all other days   Next INR check:  1/2/2024               Telephone call with Kaitlin who verbalizes understanding and agrees to plan    Patient to recheck with home meter    Education provided:   Sending bridging instructions in telephone note.     Plan made per [facility] anticoagulation protocol    Corry Galvan RN  Anticoagulation Clinic  12/19/2023    _______________________________________________________________________     Anticoagulation Episode Summary       Current INR goal:  2.0-3.0   TTR:  71.1% (1 y)   Target end date:  Indefinite   Send INR reminders to:  [facility]    Indications    Long-term (current) use of anticoagulants [Z79.01] [Z79.01]  Pulmonary embolism and infarction (H) [I26.99]  Deep vein thrombosis (DVT) (H) [I82.409] [I82.409]  Deep vein thrombosis (DVT) of right lower extremity  unspecified chronicity  unspecified vein (H) [I82.401]             Comments:  [employer]. Q2 week testing  Call cell phone with any dosing.             Anticoagulation Care Providers        Provider Role Specialty Phone number    Eliz Hartman, CNP Referring Family Medicine [phone]

## 2023-12-19 NOTE — TELEPHONE ENCOUNTER
"Cassy Avila  [address] [place] [address]      Procedure Instructions for Anticoagulation     For your upcoming trigger finger release on 1/8/24 your healthcare provider wants you to stop warfarin as directed below. To protect you from a clot while your off your warfarin, your provider wants you to inject a short-acting medication called enoxaparin (Lovenox), this is called \"bridging.\"      Enoxaparin (Lovenox) is an injection that you or a caregiver can give at home. It is injected just underneath the skin in your stomach. Your anticoagulation nurse can explain to you how to give the injection if you have not done so before. Attached is more information on enoxaparin. Additionally a video is available at [email or web address]    Bring these instructions with you to your procedure to show the provider doing the procedure. Please discuss with the provider doing the procedure if it is okay to restart warfarin and enoxaparin as we have planned.      You will take enoxaparin 100 mg every 12 hours (one full syringe) as outlined below. A prescription was sent to [facility] pharmacy.    1/2, Take last dose of warfarin  1/3, NO warfarin  1/4, NO warfarin  1/5, NO warfarin, enoxaparin every 12 hours (AM and PM)  1/6, NO warfarin, enoxaparin every 12 hours (AM and PM)  1/7, NO warfarin, enoxaparin AM only (no enoxaparin 24 hours prior to surgery)    1/8, DAY OF PROCEDURE, NO enoxaparin. Restart warfarin 10 mg in the evening, if okay with provider doing the procedure.    Make sure to ask the provider doing your procedure if it is okay to begin your warfarin and enoxaparin as planned     1/9, Restart enoxaparin every 12 hours (AM and PM), unless instructed otherwise by the physician, and 5 mg warfarin in the evening.   1/10, Enoxaparin every 12 hours (AM and PM) and 7.5 mg warfarin in the evening.  1/11, Enoxaparin every 12 hours (AM and PM) and 5 mg warfarin in the evening.  1/12, " Enoxaparin every 12 hours (AM and PM) and 7.5 mg warfarin in the evening.  1/13, Enoxaparin every 12 hours (AM and PM) and 5 mg warfarin in the evening.  1/14, Enoxaparin every 12 hours (AM and PM) and 5 mg warfarin in the evening.  1/15, Enoxaparin in the AM. Recheck INR. Anticoagulation clinic to call with further dosing instructions.     Please watch for symptoms of both bleeding and clotting while on warfarin and enoxaparin:    Call 911 or go to the emergency room if you are experiencing any of the following:   Coughing or throwing up blood, can't control bleeding from a cut or injury after putting pressure on it, have a sudden severe headache, have red or black stools, or have red or orange urine.  Chest pain or shortness of breath  Any symptoms of a stroke including: sudden numbness or weakness in your face, arm or leg; sudden confusion or trouble speaking, reading or understanding; sudden blurred or decreased vision; sudden trouble walking or moving a part of the body; sudden severe headache for no reason.    Call your care team if you have:   Bleeding gums, large bruises, pale skin.  Sudden pain, tenderness or swelling in your leg or arm    Please contact the Anticoagulation Clinic at  [phone]  if you have any questions or concerns.     Corry Galvan RN

## 2023-12-19 NOTE — TELEPHONE ENCOUNTER
This can wait till PCP is back procedure is on 1/8. Protocol for bridging and Lovenox prescription attached. Please review and sign if appropriate.

## 2023-12-19 NOTE — ED PROVIDER NOTES
History     Chief Complaint   Patient presents with    Covid Concern     HPI  Cassy Avila is a 68 year old female who presents urgent care today ambulatory with complaints of fatigue, low-grade fever, congestion, ear pain, rhinorrhea, cough, mild shortness of breath, myalgia and headache which started 3 days ago.  Exposed to COVID-positive individual.  Staying hydrated.  No rashes.  No asthma/COPD.  Wants COVID, influenza and RSV testing today.  Has been taking Robitussin DM.  No other concerns.    Allergies:  Allergies   Allergen Reactions    Amlodipine Besylate Swelling     Norvasc    Amoxicillin     Atorvastatin      myualgia    Cephalexin Monohydrate Hives     Keflex    Erythromycin Base [Erythromycin Base] Nausea and Vomiting    Meloxicam Other (See Comments)     Mobic - confusion, depression    Sulfa Antibiotics Hives    Adhesive Tape Rash    Prochlorperazine Edisylate Swelling and Rash     Compazine    Prochlorperazine Maleate Swelling and Rash       Problem List:    Patient Active Problem List    Diagnosis Date Noted    Deep vein thrombosis (DVT) of right lower extremity, unspecified chronicity, unspecified vein (H) 01/24/2023     Priority: Medium    Shoulder pain 01/31/2022     Priority: Medium     Formatting of this note might be different from the original.  Added automatically from request for surgery [id number]      Muscle weakness of right upper extremity 04/15/2021     Priority: Medium    Pain in right upper arm 04/15/2021     Priority: Medium    Stiffness of right shoulder joint 04/15/2021     Priority: Medium    Diarrhea 02/08/2021     Priority: Medium    Obesity (BMI 35.0-39.9) with comorbidity (H) 01/28/2020     Priority: Medium    Factor V Leiden mutation (H24) 07/26/2019     Priority: Medium    Activated protein C resistance (H24) 07/26/2019     Priority: Medium    Primary insomnia 10/31/2018     Priority: Medium    Vitamin D deficiency 07/13/2018     Priority: Medium    Family history of  colon cancer 01/02/2018     Priority: Medium    Lumbar spondylosis with myelopathy 07/12/2017     Priority: Medium    Degeneration of lumbar or lumbosacral intervertebral disc 07/12/2017     Priority: Medium    Long-term (current) use of anticoagulants [Z79.01] 07/20/2016     Priority: Medium    Deep vein thrombosis (DVT) (H) [I82.409] 07/20/2016     Priority: Medium    Moderate episode of recurrent major depressive disorder (H) 08/08/2014     Priority: Medium    H/O total shoulder replacement, left 06/17/2014     Priority: Medium    Failed arthroplasty (H24) 01/24/2014     Priority: Medium    Advanced care planning/counseling discussion 03/12/2013     Priority: Medium     Pt has information at home , not completed      Neurofibromatosis, type 1 (H) 08/22/2012     Priority: Medium     Problem list name updated by automated process. Provider to review    Formatting of this note might be different from the original.  Formatting of this note might be different from the original.  Problem list name updated by automated process. Provider to review      Chest pain, unspecified 02/11/2010     Priority: Medium     Formatting of this note might be different from the original.  IMO Update 10/11      Contact dermatitis and other eczema, due to unspecified cause 06/01/2004     Priority: Medium    Pulmonary embolism and infarction (H) 04/11/2003     Priority: Medium     Problem list name updated by automated process. Provider to review      Hyperlipidemia LDL goal <100 07/11/2002     Priority: Medium     Problem list name updated by automated process. Provider to review      Edema 01/18/2002     Priority: Medium    Primary hypertension 02/20/2001     Priority: Medium     Problem list name updated by automated process. Provider to review    Formatting of this note might be different from the original.  Formatting of this note might be different from the original.  IMO Update 10/11  Formatting of this note might be different from  the original.  Problem list name updated by automated process. Provider to review          Past Medical History:    Past Medical History:   Diagnosis Date    Abdominal pain, generalized 2/8/2021    Arthritis     Cervicalgia 1/9/2001    Closed dislocation of shoulder, unspecified site 2000    Congenital deficiency of other clotting factors 9/7/2012    Congenital factor VIII disorder (H) 7/26/2019    Contact dermatitis and other eczema, due to unspecified cause 6/1/2004    Coughing     Diarrhea 2/8/2021    Edema 1/18/2002    Essential hypertension 2/20/2001    Factor V Leiden (H24)     Factor V Leiden mutation (H24) 7/26/2019    Family history of colon cancer 1/2/2018    Gastro-oesophageal reflux disease     Herpes zoster without mention of complication 2003    Hyperlipidemia LDL goal <100 7/11/2002    Long term (current) use of anticoagulants 8/20/2003    Major depression 8/8/2014    Mammographic microcalcification 2003    Migraine, unspecified, without mention of intractable migraine without mention of status migrainosus 3/5/2001    Moderate episode of recurrent major depressive disorder (H) 8/8/2014    Neurofibromatosis, peripheral, NF1 (H) 8/22/2012    Neurofibromatosis, unspecified(237.70) 8/22/2012    Other and unspecified hyperlipidemia 7/11/2002    Other chronic pain     Other pulmonary embolism and infarction 4/11/2003    Primary insomnia 10/31/2018    Statin medication not prescribed per physician orders 1/17/2018    Tachycardia, unspecified 2/12/2001    Thrombosis of leg     Unspecified essential hypertension 2/20/2001    Vitamin D deficiency 7/13/2018       Past Surgical History:    Past Surgical History:   Procedure Laterality Date    ------------OTHER-------------  2012    shoulder replacement; Provider: Karen    ARTHROPLASTY KNEE  06/20/2014    Procedure: ARTHROPLASTY KNEE;  Surgeon: Sean Alexander MD;  Location: [facility]    ARTHROSCOPY SHOULDER  2012    right, bone spurs    CA ANESTH,SHOULDER  REPLACEMENT Right 2020     SECTION      x3    CHOLECYSTECTOMY      COLONOSCOPY  02/15/2018    [place],,polyps    elbow ulnar tunnel release      ELECTROTHERMAL THERAPY INTRADISC  2017    stimulator    ENDOSCOPIC SINUS SURGERY, SEPTOPLASTY, TURBINOPLASTY, MAXILLARY SINUSOTOMY, COMBINED N/A 2015    Procedure: COMBINED ENDOSCOPIC SINUS SURGERY, SEPTOPLASTY, TURBINOPLASTY, MAXILLARY SINUSOTOMY;  Surgeon: Seema Conn MD;  Location: [facility] OR    ESOPHAGOSCOPY, GASTROSCOPY, DUODENOSCOPY (EGD), COMBINED      with biopsy and endoscopic U/S    EXCISE NEUROMA LOWER EXTREMITY Left 2016    Procedure: EXCISE NEUROMA LOWER EXTREMITY;  Surgeon: Edi Reed MD;  Location: [facility] OR    EYE SURGERY Right 2020    FUSION LUMBAR ANTERIOR WITH MARCY CAGES      L5-S1    HYSTERECTOMY TOTAL ABDOMINAL, BILATERAL SALPINGO-OOPHORECTOMY, COMBINED N/A     ORTHOPEDIC SURGERY  2015    right shoulder    ORTHOPEDIC SURGERY  2015    right knee    ORTHOPEDIC SURGERY Right 2018    hip labrum tear    pionidal cyst excision      TRANSPOSITION ULNAR NERVE (ELBOW)         Family History:    Family History   Problem Relation Age of Onset    Cancer Mother     Colon Polyps Mother     Heart Failure Mother 87        congestive, cause of death    Myocardial Infarction Mother         myocardial infarction    Myocardial Infarction Father         myocardial infarction - cause of death    C.A.D. Father     Cancer Paternal Uncle         cause of death    C.A.D. Brother     Other - See Comments Other         factor 5 - family h/o    Asthma No family hx of        Social History:  Marital Status:   [2]  Social History     Tobacco Use    Smoking status: Former     Packs/day: 1.00     Years: 30.00     Additional pack years: 0.00     Total pack years: 30.00     Types: Cigarettes, Pipe     Start date: 1969     Quit date: 1999     Years since quittin.9    Smokeless tobacco: Never    Tobacco  comments:     quit in 1999   Vaping Use    Vaping Use: Never used   Substance Use Topics    Alcohol use: No    Drug use: No        Medications:    albuterol (PROAIR HFA/PROVENTIL HFA/VENTOLIN HFA) 108 (90 Base) MCG/ACT inhaler  albuterol (PROVENTIL) (2.5 MG/3ML) 0.083% neb solution  aspirin 81 MG EC tablet  Cholecalciferol (VITAMIN D3 PO)  escitalopram (LEXAPRO) 10 MG tablet  hydrochlorothiazide (HYDRODIURIL) 25 MG tablet  ketoconazole (NIZORAL) 2 % external cream  losartan (COZAAR) 100 MG tablet  metoprolol succinate ER (TOPROL XL) 50 MG 24 hr tablet  order for DME  potassium chloride ER (K-TAB/KLOR-CON) 10 MEQ CR tablet  traZODone (DESYREL) 50 MG tablet  warfarin ANTICOAGULANT (COUMADIN) 5 MG tablet      Review of Systems   Constitutional:  Positive for fatigue and fever (TMAX 100.8). Negative for chills.   HENT:  Positive for congestion, ear pain and rhinorrhea. Negative for sore throat and trouble swallowing.    Eyes:  Negative for discharge and redness.   Respiratory:  Positive for cough and shortness of breath (mild).    Cardiovascular:  Negative for chest pain.   Gastrointestinal:  Positive for vomiting (once). Negative for abdominal pain, diarrhea and nausea.   Genitourinary:  Negative for decreased urine volume.   Musculoskeletal:  Positive for myalgias.   Skin:  Negative for rash.   Neurological:  Positive for headaches.   Psychiatric/Behavioral: Negative.       Physical Exam   BP: 114/68  Pulse: 82  Temp: 99.3  F (37.4  C)  Resp: 20  SpO2: 95 %    Physical Exam  Vitals and nursing note reviewed.   Constitutional:       General: She is not in acute distress.     Appearance: Normal appearance. She is not ill-appearing or toxic-appearing.   HENT:      Right Ear: Tympanic membrane, ear canal and external ear normal.      Left Ear: Tympanic membrane, ear canal and external ear normal.      Nose: Congestion and rhinorrhea present.      Mouth/Throat:      Mouth: Mucous membranes are moist.      Pharynx:  Oropharynx is clear. No oropharyngeal exudate or posterior oropharyngeal erythema.   Cardiovascular:      Rate and Rhythm: Normal rate and regular rhythm.      Pulses: Normal pulses.      Heart sounds: Normal heart sounds.   Pulmonary:      Effort: Pulmonary effort is normal.      Breath sounds: Normal breath sounds.   Abdominal:      General: Bowel sounds are normal.      Palpations: Abdomen is soft.      Tenderness: There is no abdominal tenderness.   Musculoskeletal:      Cervical back: Normal range of motion and neck supple. No rigidity or tenderness.   Lymphadenopathy:      Cervical: No cervical adenopathy.   Skin:     General: Skin is warm and dry.   Neurological:      Mental Status: She is alert.   Psychiatric:         Mood and Affect: Mood normal.       ED Course     Results for orders placed or performed in visit on 12/19/23 (from the past 24 hour(s))   INR (External Result)   Result Value Ref Range    INR HOME MONITORING 2.2 2.000 - 3.000     *Note: Due to a large number of results and/or encounters for the requested time period, some results have not been displayed. A complete set of results can be found in Results Review.       Medications - No data to display    Assessments & Plan (with Medical Decision Making)     I have reviewed the nursing notes.    I have reviewed the findings, diagnosis, plan and need for follow up with the patient.  (B34.9) Acute viral syndrome  (primary encounter diagnosis)  Plan:   Patient ambulatory with a nontoxic appearance.  Lungs clear throughout, no asthma or COPD.  No signs of otitis media or strep.  Staying hydrated.  No rashes.  Exposure to COVID-positive individual.  COVID, influenza and RSV test pending.  Symptomatic treatment recommendations provided.  Alternate Tylenol and ibuprofen unless previously told you cannot take.  Push fluids.  Over-the-counter Flonase as needed for nasal congestion.  Follow-up with primary care provider or return to urgent care/ED with any  worsening in condition or additional concerns.  Patient in agreement treatment plan.    New Prescriptions    No medications on file     Final diagnoses:   Acute viral syndrome     12/19/2023   [facility]       Adri Rodriguez, NORAH  12/19/23 1660

## 2023-12-19 NOTE — ED TRIAGE NOTES
Pt presents with c/o   Sx started sat  Ears, cough, fever 100.8 today, headaches, body aches, fatigue, chills, vomited 1x yesterday   Covid exposure      Robitussin dm

## 2023-12-20 DIAGNOSIS — Z79.01 LONG TERM CURRENT USE OF ANTICOAGULANT THERAPY: ICD-10-CM

## 2023-12-20 RX ORDER — ENOXAPARIN SODIUM 100 MG/ML
1 INJECTION SUBCUTANEOUS EVERY 12 HOURS
Qty: 18 ML | Refills: 1 | Status: SHIPPED | OUTPATIENT
Start: 2023-12-20 | End: 2024-02-07

## 2023-12-21 NOTE — TELEPHONE ENCOUNTER
Coumadin      Last Written Prescription Date:  10/6/23  Last Fill Quantity: 109,   # refills: 0  Last Office Visit: 12/15/23  Future Office visit:       Routing refill request to provider for review/approval because:

## 2023-12-22 RX ORDER — WARFARIN SODIUM 5 MG/1
TABLET ORAL
Qty: 109 TABLET | Refills: 0 | Status: SHIPPED | OUTPATIENT
Start: 2023-12-22 | End: 2024-03-26

## 2024-01-02 ENCOUNTER — ANTICOAGULATION THERAPY VISIT (OUTPATIENT)
Dept: ANTICOAGULATION | Facility: OTHER | Age: 69
End: 2024-01-02
Payer: MEDICARE

## 2024-01-02 DIAGNOSIS — I26.99 PULMONARY EMBOLISM AND INFARCTION (H): ICD-10-CM

## 2024-01-02 DIAGNOSIS — Z79.01 LONG TERM CURRENT USE OF ANTICOAGULANT THERAPY: Primary | ICD-10-CM

## 2024-01-02 DIAGNOSIS — I82.401 DEEP VEIN THROMBOSIS (DVT) OF RIGHT LOWER EXTREMITY, UNSPECIFIED CHRONICITY, UNSPECIFIED VEIN (H): ICD-10-CM

## 2024-01-02 LAB — INR HOME MONITORING: 3.6 (ref 2–3)

## 2024-01-02 NOTE — PROGRESS NOTES
ANTICOAGULATION MANAGEMENT     Cassy Avila 68 year old female is on warfarin with supratherapeutic INR result. (Goal INR 2.0-3.0)    Recent labs: (last 7 days)     01/02/24  0000   INR 3.6*       ASSESSMENT     Source(s): In person and chart review    Warfarin doses taken: Warfarin taken as instructed  Diet: No new diet changes identified  New illness, injury, or hospitalization: No  Medication/supplement changes: None noted  Signs or symptoms of bleeding or clotting: No  Previous INR: Therapeutic last visit; previously outside of goal range  Additional findings: None     PLAN     Recommended plan for no diet, medication or health factor changes affecting INR     Dosing Instructions:  Hold today and then follow instructions given prior to procedure  with next INR in 2 weeks       Summary  As of 1/2/2024      Full warfarin instructions:  7.5 mg every Mon, Wed, Fri; 5 mg all other days   Next INR check:                 Telephone call with Kaitlin who verbalizes understanding and agrees to plan and who agrees to plan and repeated back plan correctly    Lab visit scheduled    Education provided: Please call back if any changes to your diet, medications or how you've been taking warfarin, reviewed her pre and post op warfarin plan.     Plan made per [facility]agulation protocol    Melody Gilmore RN  Anticoagulation Clinic  1/2/2024    _______________________________________________________________________     Anticoagulation Episode Summary       Current INR goal:  2.0-3.0   TTR:  69.5% (1 y)   Target end date:  Indefinite   Send INR reminders to:  [facility]    Indications    Long-term (current) use of anticoagulants [Z79.01] [Z79.01]  Pulmonary embolism and infarction (H) [I26.99]  Deep vein thrombosis (DVT) (H) [I82.409] [I82.409]  Deep vein thrombosis (DVT) of right lower extremity  unspecified chronicity  unspecified vein (H) [I82.401]             Comments:  [employer]. Q2 week testing  Call  cell phone with any dosing.             Anticoagulation Care Providers       Provider Role Specialty Phone number    Eliz Hartman CNP Referring Family Medicine [phone]

## 2024-01-15 ENCOUNTER — ANTICOAGULATION THERAPY VISIT (OUTPATIENT)
Dept: ANTICOAGULATION | Facility: OTHER | Age: 69
End: 2024-01-15
Attending: NURSE PRACTITIONER
Payer: MEDICARE

## 2024-01-15 DIAGNOSIS — I82.401 DEEP VEIN THROMBOSIS (DVT) OF RIGHT LOWER EXTREMITY, UNSPECIFIED CHRONICITY, UNSPECIFIED VEIN (H): ICD-10-CM

## 2024-01-15 DIAGNOSIS — I26.99 PULMONARY EMBOLISM AND INFARCTION (H): ICD-10-CM

## 2024-01-15 DIAGNOSIS — Z79.01 LONG TERM CURRENT USE OF ANTICOAGULANT THERAPY: Primary | ICD-10-CM

## 2024-01-16 LAB — INR HOME MONITORING: 1.4 (ref 2–3)

## 2024-01-16 NOTE — PROGRESS NOTES
ANTICOAGULATION MANAGEMENT     Cassy Avila 68 year old female is on warfarin with subtherapeutic INR result. (Goal INR 2.0-3.0)    Recent labs: (last 7 days)     01/16/24  0000   INR 1.4*       ASSESSMENT     Source(s): Patient/ Care giver call     Warfarin doses taken: Warfarin taken as instructed  Diet: No new diet changes identified  New illness, injury, or hospitalization: Yes: Patient had a procedure to take out a lump for their trigger finger.  Medication/supplement changes: None noted  Signs or symptoms of bleeding or clotting: No  Previous INR: Supratherapeutic  Additional findings:  Patient stated they ar not picking up the refill of their Lovenox because it cost $600 with insurance, offered to send patient through to financial advocates to discuss getting help paying for the Lovenox. Patient declined and stated they will take a booster dose and recheck on 1/19/2024.     PLAN     Recommended plan for temporary change(s) affecting INR     Dosing Instructions: booster dose then continue your current warfarin dose with next INR in 3 days       Summary  As of 1/15/2024      Full warfarin instructions:  1/16: 10 mg; Otherwise 7.5 mg every Mon, Wed, Fri; 5 mg all other days   Next INR check:  1/19/2024               Telephone call with Kaitlin who verbalizes understanding and agrees to plan    Patient to recheck with home meter    Education provided: Please call back if any changes to your diet, medications or how you've been taking warfarin, Monitoring for clotting signs and symptoms, and When to seek medical attention/emergency care    Plan made per [facility] anticoagulation protocol    Karly Zayas RN  Anticoagulation Clinic  1/16/2024    _______________________________________________________________________     Anticoagulation Episode Summary       Current INR goal:  2.0-3.0   TTR:  67.4% (1 y)   Target end date:  Indefinite   Send INR reminders to:  [facility]    Indications    Long-term (current) use  of anticoagulants [Z79.01] [Z79.01]  Pulmonary embolism and infarction (H) [I26.99]  Deep vein thrombosis (DVT) (H) [I82.409] [I82.409]  Deep vein thrombosis (DVT) of right lower extremity  unspecified chronicity  unspecified vein (H) [I82.401]             Comments:  [employer]. Q2 week testing  Call cell phone with any dosing.             Anticoagulation Care Providers       Provider Role Specialty Phone number    Eliz Hartman CNP Referring Family Medicine [phone]

## 2024-01-19 ENCOUNTER — ANTICOAGULATION THERAPY VISIT (OUTPATIENT)
Dept: ANTICOAGULATION | Facility: OTHER | Age: 69
End: 2024-01-19
Attending: NURSE PRACTITIONER
Payer: COMMERCIAL

## 2024-01-19 DIAGNOSIS — Z79.01 LONG TERM CURRENT USE OF ANTICOAGULANT THERAPY: Primary | ICD-10-CM

## 2024-01-19 DIAGNOSIS — I26.99 PULMONARY EMBOLISM AND INFARCTION (H): ICD-10-CM

## 2024-01-19 DIAGNOSIS — I82.401 DEEP VEIN THROMBOSIS (DVT) OF RIGHT LOWER EXTREMITY, UNSPECIFIED CHRONICITY, UNSPECIFIED VEIN (H): ICD-10-CM

## 2024-01-19 LAB — INR HOME MONITORING: 2 (ref 2–3)

## 2024-01-19 NOTE — PROGRESS NOTES
ANTICOAGULATION MANAGEMENT     Cassy Avila 68 year old female is on warfarin with therapeutic INR result. (Goal INR 2.0-3.0)    Recent labs: (last 7 days)     01/19/24  0000   INR 2.0       ASSESSMENT     Source(s): Patient/ Care giver call     Warfarin doses taken: Warfarin taken as instructed  Diet: No new diet changes identified  New illness, injury, or hospitalization: No  Medication/supplement changes: None noted  Signs or symptoms of bleeding or clotting: No  Previous INR: Subtherapeutic  Additional findings: None     PLAN     Recommended plan for no diet, medication or health factor changes affecting INR     Dosing Instructions: Continue your current warfarin dose with next INR in 2 weeks       Summary  As of 1/19/2024      Full warfarin instructions:  7.5 mg every Mon, Wed, Fri; 5 mg all other days   Next INR check:  2/2/2024               Telephone call with Kaitlin who verbalizes understanding and agrees to plan    Patient to recheck with home meter    Education provided: Please call back if any changes to your diet, medications or how you've been taking warfarin, Monitoring for clotting signs and symptoms, and When to seek medical attention/emergency care    Plan made per [facility] anticoagulation protocol    Karly Zayas RN  Anticoagulation Clinic  1/19/2024    _______________________________________________________________________     Anticoagulation Episode Summary       Current INR goal:  2.0-3.0   TTR:  66.6% (1 y)   Target end date:  Indefinite   Send INR reminders to:  [facility]    Indications    Long-term (current) use of anticoagulants [Z79.01] [Z79.01]  Pulmonary embolism and infarction (H) [I26.99]  Deep vein thrombosis (DVT) (H) [I82.409] [I82.409]  Deep vein thrombosis (DVT) of right lower extremity  unspecified chronicity  unspecified vein (H) [I82.401]             Comments:  [employer] Monitoring. Q2 week testing  Call cell phone with any dosing.             Anticoagulation Care  Providers       Provider Role Specialty Phone number    Eliz Hartman CNP Referring Family Medicine [phone]

## 2024-02-02 ENCOUNTER — APPOINTMENT (OUTPATIENT)
Dept: ANTICOAGULATION | Facility: OTHER | Age: 69
End: 2024-02-02
Attending: NURSE PRACTITIONER
Payer: MEDICARE

## 2024-02-06 ENCOUNTER — ANTICOAGULATION THERAPY VISIT (OUTPATIENT)
Dept: ANTICOAGULATION | Facility: OTHER | Age: 69
End: 2024-02-06
Payer: COMMERCIAL

## 2024-02-06 DIAGNOSIS — I82.401 DEEP VEIN THROMBOSIS (DVT) OF RIGHT LOWER EXTREMITY, UNSPECIFIED CHRONICITY, UNSPECIFIED VEIN (H): ICD-10-CM

## 2024-02-06 DIAGNOSIS — I26.99 PULMONARY EMBOLISM AND INFARCTION (H): ICD-10-CM

## 2024-02-06 DIAGNOSIS — Z79.01 LONG TERM CURRENT USE OF ANTICOAGULANT THERAPY: Primary | ICD-10-CM

## 2024-02-06 LAB — INR HOME MONITORING: 2.3 (ref 2–3)

## 2024-02-06 NOTE — PROGRESS NOTES
ANTICOAGULATION  MANAGEMENT-Home Monitor Managed by Exception    Cassy APPLE Avila 68 year old female is on warfarin with therapeutic INR result. (Goal INR 2.0-3.0)    Recent labs: (last 7 days)     02/06/24  0000   INR 2.3       Previous INR was Therapeutic  Medication, diet, health changes since last INR:chart reviewed; none identified  Contacted within the last 12 weeks by phone on 1/19/24  Last [facility] referral date: 12/19/2023      DARIO     Cassy was NOT contacted regarding therapeutic result today per home monitoring policy manage by exception agreement.   Current warfarin dose is to be continued:     Summary  As of 2/6/2024      Full warfarin instructions:  7.5 mg every Mon, Wed, Fri; 5 mg all other days   Next INR check:  2/20/2024             ?   Corry Galvan RN  Anticoagulation Clinic  2/6/2024    _______________________________________________________________________     Anticoagulation Episode Summary       Current INR goal:  2.0-3.0   TTR:  66.6% (1 y)   Target end date:  Indefinite   Send INR reminders to:  [facility]    Indications    Long-term (current) use of anticoagulants [Z79.01] [Z79.01]  Pulmonary embolism and infarction (H) [I26.99]  Deep vein thrombosis (DVT) (H) [I82.409] [I82.409]  Deep vein thrombosis (DVT) of right lower extremity  unspecified chronicity  unspecified vein (H) [I82.401]             Comments:  [employer] Monitoring. Q2 week testing  Call cell phone with any dosing.             Anticoagulation Care Providers       Provider Role Specialty Phone number    Eliz Hartman CNP Referring Family Medicine [phone]

## 2024-02-07 ENCOUNTER — OFFICE VISIT (OUTPATIENT)
Dept: FAMILY MEDICINE | Facility: OTHER | Age: 69
End: 2024-02-07
Attending: NURSE PRACTITIONER
Payer: COMMERCIAL

## 2024-02-07 VITALS
OXYGEN SATURATION: 95 % | TEMPERATURE: 97.3 F | WEIGHT: 228.8 LBS | HEART RATE: 68 BPM | RESPIRATION RATE: 20 BRPM | BODY MASS INDEX: 38.07 KG/M2 | SYSTOLIC BLOOD PRESSURE: 122 MMHG | DIASTOLIC BLOOD PRESSURE: 68 MMHG

## 2024-02-07 DIAGNOSIS — M25.562 LEFT KNEE PAIN, UNSPECIFIED CHRONICITY: Primary | ICD-10-CM

## 2024-02-07 DIAGNOSIS — M25.362 INSTABILITY OF LEFT KNEE JOINT: ICD-10-CM

## 2024-02-07 PROCEDURE — G0463 HOSPITAL OUTPT CLINIC VISIT: HCPCS

## 2024-02-07 PROCEDURE — 99213 OFFICE O/P EST LOW 20 MIN: CPT | Performed by: NURSE PRACTITIONER

## 2024-02-07 RX ORDER — LOSARTAN POTASSIUM 100 MG/1
TABLET ORAL
COMMUNITY
Start: 2024-02-07 | End: 2024-04-01

## 2024-02-07 ASSESSMENT — PAIN SCALES - GENERAL: PAINLEVEL: MODERATE PAIN (4)

## 2024-02-07 NOTE — PROGRESS NOTES
Assessment & Plan       Left knee pain, unspecified chronicity  - Please update Orthopedics      Instability of left knee joint  - Please update Orthopedics        Eliz Hartman C-FN  [phone]         Kaitlin is a 68 year old, presenting for the following health issues:  Musculoskeletal Problem        Musculoskeletal problem/pain  Left knee pain  Saw Dr. Alexander and [facility]  When did you first notice your pain? 2 months ago   Have you seen anyone else for your pain? Yes, Benjamin  How has your pain affected your ability to work? Not applicable  Where in your body do you have pain?   Onset/Duration: 2 months   Description  Location: knee - left  Joint Swelling: yes  Redness: No  Pain: YES  Warmth: YES  Intensity:  4/10  Progression of Symptoms:  same  Accompanying signs and symptoms:   Fevers: No  Numbness/tingling/weakness: YES, weakness  History  Trauma to the area: No  Recent illness:  No  Previous similar problem: has had a knee replacement  Previous evaluation:  YES, with Dr. Alexander  Precipitating or alleviating factors:  Aggravating factors include: climbing stairs and bending knee  Therapies tried and outcome: heat, ice, stretching, exercises, acetaminophen, and Ibuprofen, not helpful           Patient Active Problem List   Diagnosis    Primary hypertension    Pulmonary embolism and infarction (H)    Contact dermatitis and other eczema, due to unspecified cause    Edema    Hyperlipidemia LDL goal <100    Neurofibromatosis, type 1 (H)    Advanced care planning/counseling discussion    Moderate episode of recurrent major depressive disorder (H)    Long-term (current) use of anticoagulants [Z79.01]    Deep vein thrombosis (DVT) (H) [I82.409]    Lumbar spondylosis with myelopathy    Degeneration of lumbar or lumbosacral intervertebral disc    Family history of colon cancer    Vitamin D deficiency    Failed arthroplasty (H24)    H/O total shoulder replacement, left    Primary insomnia    Factor  V Leiden mutation (H24)    Obesity (BMI 35.0-39.9) with comorbidity (H)    Diarrhea    Chest pain, unspecified    Muscle weakness of right upper extremity    Pain in right upper arm    Stiffness of right shoulder joint    Activated protein C resistance (H24)    Shoulder pain    Deep vein thrombosis (DVT) of right lower extremity, unspecified chronicity, unspecified vein (H)     Past Surgical History:   Procedure Laterality Date    ------------OTHER-------------      shoulder replacement; Provider: Karen    ARTHROPLASTY KNEE  2014    Procedure: ARTHROPLASTY KNEE;  Surgeon: Sean Alexander MD;  Location: [facility]    ARTHROSCOPY SHOULDER  2012    right, bone spurs    CA ANESTH,SHOULDER REPLACEMENT Right 2020     SECTION      x3    CHOLECYSTECTOMY      COLONOSCOPY  02/15/2018    [place]ard,,polyps    elbow ulnar tunnel release  2002    ELECTROTHERMAL THERAPY INTRADISC  2017    stimulator    ENDOSCOPIC SINUS SURGERY, SEPTOPLASTY, TURBINOPLASTY, MAXILLARY SINUSOTOMY, COMBINED N/A 2015    Procedure: COMBINED ENDOSCOPIC SINUS SURGERY, SEPTOPLASTY, TURBINOPLASTY, MAXILLARY SINUSOTOMY;  Surgeon: Seema Conn MD;  Location: [facility]    ESOPHAGOSCOPY, GASTROSCOPY, DUODENOSCOPY (EGD), COMBINED      with biopsy and endoscopic U/S    EXCISE NEUROMA LOWER EXTREMITY Left 2016    Procedure: EXCISE NEUROMA LOWER EXTREMITY;  Surgeon: Edi Reed MD;  Location: [facility] OR    EYE SURGERY Right 2020    FUSION LUMBAR ANTERIOR WITH MARCY CAGES      L5-S1    HYSTERECTOMY TOTAL ABDOMINAL, BILATERAL SALPINGO-OOPHORECTOMY, COMBINED N/A     ORTHOPEDIC SURGERY  2015    right shoulder    ORTHOPEDIC SURGERY  2015    right knee    ORTHOPEDIC SURGERY Right 2018    hip labrum tear    pionidal cyst excision      TRANSPOSITION ULNAR NERVE (ELBOW)         Social History     Tobacco Use    Smoking status: Former     Packs/day: 1.00     Years: 30.00     Additional pack years: 0.00     Total pack  years: 30.00     Types: Cigarettes, Pipe     Start date: 1969     Quit date: 1999     Years since quittin.1    Smokeless tobacco: Never    Tobacco comments:     quit in    Substance Use Topics    Alcohol use: No     Family History   Problem Relation Age of Onset    Cancer Mother     Colon Polyps Mother     Heart Failure Mother 87        congestive, cause of death    Myocardial Infarction Mother         myocardial infarction    Myocardial Infarction Father         myocardial infarction - cause of death    C.A.D. Father     Cancer Paternal Uncle         cause of death    C.A.D. Brother     Other - See Comments Other         factor 5 - family h/o    Asthma No family hx of              Current Outpatient Medications   Medication Sig Dispense Refill    albuterol (PROAIR HFA/PROVENTIL HFA/VENTOLIN HFA) 108 (90 Base) MCG/ACT inhaler Inhale 2 puffs into the lungs every 6 hours 18 g 1    albuterol (PROVENTIL) (2.5 MG/3ML) 0.083% neb solution Take 2.5 mg by nebulization every 6 hours as needed for shortness of breath / dyspnea or wheezing      aspirin 81 MG EC tablet Take 81 mg by mouth daily HS      Cholecalciferol (VITAMIN D3 PO) Take 3,000 mg by mouth daily AM      enoxaparin ANTICOAGULANT (LOVENOX) 100 MG/ML syringe Inject 1 mL (100 mg) Subcutaneous every 12 hours 18 mL 1    escitalopram (LEXAPRO) 10 MG tablet Take 1 tablet (10 mg) by mouth daily 90 tablet 1    hydrochlorothiazide (HYDRODIURIL) 25 MG tablet Take 1 tablet (25 mg) by mouth daily 90 tablet 1    ketoconazole (NIZORAL) 2 % external cream APPLY TO THE RASH ON FULL BACK TWICE A DAY FOR 4-6 WEEKS      metoprolol succinate ER (TOPROL XL) 50 MG 24 hr tablet Take 1.5 tablets (75 mg) by mouth daily 90 tablet 1    order for DME Nebulizer machine and tubing    DX:  Bronchitis 1 Device 0    potassium chloride ER (K-TAB/KLOR-CON) 10 MEQ CR tablet Take 1 tablet (10 mEq) by mouth daily 90 tablet 2    traZODone (DESYREL) 50 MG tablet TAKE 1 TO 2 TABLETS BY  MOUTH NIGHTLY AS NEEDED 180 tablet 3    warfarin ANTICOAGULANT (COUMADIN) 5 MG tablet TAKE 1.5 TABLETS BY MOUTH ON MONDAY, WEDNESDAY AND FRIDAY AND  1  TABLET  ALL  OTHER  DAYS  OR  AS  DIRECTED  BY  WARFARIN  CLINIC 109 tablet 0    losartan (COZAAR) 100 MG tablet Take 1 tablet by mouth once daily 90 tablet 2         Allergies   Allergen Reactions    Amlodipine Besylate Swelling     Norvasc    Amoxicillin     Atorvastatin      myualgia    Cephalexin Monohydrate Hives     Keflex    Erythromycin Base [Erythromycin Base] Nausea and Vomiting    Meloxicam Other (See Comments)     Mobic - confusion, depression    Sulfa Antibiotics Hives    Adhesive Tape Rash    Prochlorperazine Edisylate Swelling and Rash     Compazine    Prochlorperazine Maleate Swelling and Rash         Recent Labs   Lab Test 12/15/23  0910 09/29/23  1022 03/17/23  1029 03/17/23  1028 09/21/22  0831 09/16/22  1141 08/27/21  0917 04/16/21  1014 02/08/21  1432   A1C  --   --  5.6  --   --   --   --   --   --    LDL  --  119*  --  129* 123*  --    < >  --   --    HDL  --  37*  --  37* 38*  --    < >  --   --    TRIG  --  171*  --  233* 173*  --    < >  --   --    ALT 21 18  --  26  --   --    < > 29 36   CR 0.96* 1.00*  --  0.90  --   --    < > 0.95 0.92   GFRESTIMATED 64 61  --  69  --   --    < > 62 65   GFRESTBLACK  --   --   --   --   --   --   --  72 75   POTASSIUM 3.6 3.9  --  3.5  --    < >   < > 3.3* 3.4   TSH  --  3.34  --  3.05  --   --    < >  --   --     < > = values in this interval not displayed.          BP Readings from Last 3 Encounters:   02/07/24 122/68   12/19/23 114/68   12/15/23 128/78    Wt Readings from Last 3 Encounters:   02/07/24 103.8 kg (228 lb 12.8 oz)   12/15/23 103 kg (227 lb)   09/29/23 103.8 kg (228 lb 14.4 oz)                Review of Systems  Constitutional, HEENT, cardiovascular, pulmonary, GI, , musculoskeletal, neuro, skin, endocrine and psych systems are negative, except as otherwise noted.          Objective    BP  122/68 (BP Location: Left arm, Patient Position: Sitting, Cuff Size: Adult Large)   Pulse 68   Temp 97.3  F (36.3  C) (Tympanic)   Resp 20   Wt 103.8 kg (228 lb 12.8 oz)   SpO2 95%   BMI 38.07 kg/m    Body mass index is 38.07 kg/m .        Physical Exam   GENERAL: alert and no distress  RESP: lungs clear to auscultation - no rales, rhonchi or wheezes  CV: regular rate and rhythm, normal S1 S2, no S3 or S4, no murmur, click or rub, no peripheral edema  MS: Left anterior and medial knee pain - altered gait, feels knee is unstable - moderate anterior swelling.   SKIN: no suspicious lesions or rashes  PSYCH: mentation appears normal, affect normal/bright            Signed Electronically by: Eliz Hartman CNP

## 2024-02-07 NOTE — PATIENT INSTRUCTIONS
Assessment & Plan         Left knee pain, unspecified chronicity  - Please update Orthopedics      Instability of left knee joint  - Please update Orthopedics        Eliz SCRUGGSCLEVELAND  [phone]

## 2024-02-22 ENCOUNTER — HOSPITAL ENCOUNTER (EMERGENCY)
Facility: HOSPITAL | Age: 69
Discharge: HOME | End: 2024-02-22
Attending: NURSE PRACTITIONER | Admitting: NURSE PRACTITIONER
Payer: MEDICARE

## 2024-02-22 ENCOUNTER — APPOINTMENT (OUTPATIENT)
Dept: GENERAL RADIOLOGY | Facility: HOSPITAL | Age: 69
End: 2024-02-22
Attending: NURSE PRACTITIONER
Payer: MEDICARE

## 2024-02-22 VITALS
RESPIRATION RATE: 19 BRPM | HEART RATE: 61 BPM | TEMPERATURE: 97.7 F | OXYGEN SATURATION: 96 % | DIASTOLIC BLOOD PRESSURE: 79 MMHG | SYSTOLIC BLOOD PRESSURE: 140 MMHG

## 2024-02-22 DIAGNOSIS — S90.32XA CONTUSION OF LEFT FOOT, INITIAL ENCOUNTER: ICD-10-CM

## 2024-02-22 DIAGNOSIS — M10.9 GOUT: ICD-10-CM

## 2024-02-22 DIAGNOSIS — M10.9 GOUT OF LEFT FOOT, UNSPECIFIED CAUSE, UNSPECIFIED CHRONICITY: Primary | ICD-10-CM

## 2024-02-22 LAB
HOLD SPECIMEN: NORMAL
URATE SERPL-MCNC: 8.4 MG/DL (ref 2.4–5.7)

## 2024-02-22 PROCEDURE — 73630 X-RAY EXAM OF FOOT: CPT | Mod: LT

## 2024-02-22 PROCEDURE — G0463 HOSPITAL OUTPT CLINIC VISIT: HCPCS

## 2024-02-22 PROCEDURE — 99213 OFFICE O/P EST LOW 20 MIN: CPT | Performed by: NURSE PRACTITIONER

## 2024-02-22 PROCEDURE — 250N000013 HC RX MED GY IP 250 OP 250 PS 637: Performed by: NURSE PRACTITIONER

## 2024-02-22 PROCEDURE — 36415 COLL VENOUS BLD VENIPUNCTURE: CPT | Performed by: NURSE PRACTITIONER

## 2024-02-22 PROCEDURE — 84550 ASSAY OF BLOOD/URIC ACID: CPT | Performed by: NURSE PRACTITIONER

## 2024-02-22 RX ORDER — PREDNISONE 10 MG/1
TABLET ORAL
Qty: 7 TABLET | Refills: 0 | Status: SHIPPED | OUTPATIENT
Start: 2024-02-22 | End: 2024-02-28

## 2024-02-22 RX ORDER — ACETAMINOPHEN 325 MG/1
650 TABLET ORAL ONCE
Status: COMPLETED | OUTPATIENT
Start: 2024-02-22 | End: 2024-02-22

## 2024-02-22 RX ADMIN — ACETAMINOPHEN 650 MG: 325 TABLET, FILM COATED ORAL at 12:28

## 2024-02-22 ASSESSMENT — ENCOUNTER SYMPTOMS
COLOR CHANGE: 1
ACTIVITY CHANGE: 1
CHILLS: 0
APPETITE CHANGE: 0
VOMITING: 0
FEVER: 0
NAUSEA: 0
SHORTNESS OF BREATH: 0
NUMBNESS: 0

## 2024-02-22 ASSESSMENT — ACTIVITIES OF DAILY LIVING (ADL)
ADLS_ACUITY_SCORE: 37
ADLS_ACUITY_SCORE: 37

## 2024-02-22 ASSESSMENT — COLUMBIA-SUICIDE SEVERITY RATING SCALE - C-SSRS
1. IN THE PAST MONTH, HAVE YOU WISHED YOU WERE DEAD OR WISHED YOU COULD GO TO SLEEP AND NOT WAKE UP?: NO
6. HAVE YOU EVER DONE ANYTHING, STARTED TO DO ANYTHING, OR PREPARED TO DO ANYTHING TO END YOUR LIFE?: NO
2. HAVE YOU ACTUALLY HAD ANY THOUGHTS OF KILLING YOURSELF IN THE PAST MONTH?: NO

## 2024-02-22 NOTE — DISCHARGE INSTRUCTIONS
Keep affected extremity elevated as much as possible for next 24 - 48 hours. Ice to affected area 20 minutes every hour as needed for comfort. After 48 hours you can apply heat. Tylenol 650 to 1000 mg every four to six hours as needed (not to exceed more than 4000 mg in a 24 hour period). May use interchangeably. Suggest medicating around the clock for the next 24-48 hours. Use ace wrap  and ortho shoe until you can walk on your left foot without having discomfort.  Slowly start to wiggle your toes and move foot as often as possible but not beyond the point of pain. Follow up with primary provider  as needed    Do not use ibuprofen while taking prednisone or while on Coumadin.    Call Coumadin clinic to notify them you are taking prednisone.    Follow-up with primary care provider concerning gout.

## 2024-02-22 NOTE — ED TRIAGE NOTES
Pt presents with c/o left foot swelling    Sx started the other night   Swelling to the top of her foot, pain radiates up to her calf.sharp pain.  States that her dog did jump on her foot also    No otc meds

## 2024-02-22 NOTE — ED PROVIDER NOTES
History     Chief Complaint   Patient presents with    Foot Pain     HPI  Cassy Avila is a 68 year old female who is accompanied per her .  She presents with left foot pain and swelling that started 2 days ago when her dog stepped on her foot while she was in bed.  Accompanied with tingling and bruising over the dorsal region of the foot.  Took tramadol last night that did not decrease her discomfort.  History of gout.  Non-smoker.  Denies fevers, chills, nausea, vomiting, and shortness of breath.    Musculoskeletal problem/pain    Duration: two days ago  Description  Location: left foot  Intensity:  9/10 sharp constant  Accompanying signs and symptoms: tingling, swelling, redness, and discoloration of bruising  History  Previous similar problem: no   Previous evaluation:  none  Precipitating or alleviating factors:  Trauma or overuse: YES- dog stepped on foot  Aggravating factors include: walking  Therapies tried and outcome: elevation. Tramadol last night did not decrease her discomfort     Allergies:  Allergies   Allergen Reactions    Amlodipine Besylate Swelling     Norvasc    Amoxicillin     Atorvastatin      myualgia    Cephalexin Monohydrate Hives     Keflex    Erythromycin Base [Erythromycin Base] Nausea and Vomiting    Meloxicam Other (See Comments)     Mobic - confusion, depression    Sulfa Antibiotics Hives    Adhesive Tape Rash    Prochlorperazine Edisylate Swelling and Rash     Compazine    Prochlorperazine Maleate Swelling and Rash       Problem List:    Patient Active Problem List    Diagnosis Date Noted    Deep vein thrombosis (DVT) of right lower extremity, unspecified chronicity, unspecified vein (H) 01/24/2023     Priority: Medium    Shoulder pain 01/31/2022     Priority: Medium     Formatting of this note might be different from the original.  Added automatically from request for surgery [id number]      Muscle weakness of right upper extremity 04/15/2021     Priority: Medium    Pain  in right upper arm 04/15/2021     Priority: Medium    Stiffness of right shoulder joint 04/15/2021     Priority: Medium    Diarrhea 02/08/2021     Priority: Medium    Obesity (BMI 35.0-39.9) with comorbidity (H) 01/28/2020     Priority: Medium    Factor V Leiden mutation (H24) 07/26/2019     Priority: Medium    Activated protein C resistance (H24) 07/26/2019     Priority: Medium    Primary insomnia 10/31/2018     Priority: Medium    Vitamin D deficiency 07/13/2018     Priority: Medium    Family history of colon cancer 01/02/2018     Priority: Medium    Lumbar spondylosis with myelopathy 07/12/2017     Priority: Medium    Degeneration of lumbar or lumbosacral intervertebral disc 07/12/2017     Priority: Medium    Long-term (current) use of anticoagulants [Z79.01] 07/20/2016     Priority: Medium    Deep vein thrombosis (DVT) (H) [I82.409] 07/20/2016     Priority: Medium    Moderate episode of recurrent major depressive disorder (H) 08/08/2014     Priority: Medium    H/O total shoulder replacement, left 06/17/2014     Priority: Medium    Failed arthroplasty (H24) 01/24/2014     Priority: Medium    Advanced care planning/counseling discussion 03/12/2013     Priority: Medium     Pt has information at home , not completed      Neurofibromatosis, type 1 (H) 08/22/2012     Priority: Medium     Problem list name updated by automated process. Provider to review    Formatting of this note might be different from the original.  Formatting of this note might be different from the original.  Problem list name updated by automated process. Provider to review      Chest pain, unspecified 02/11/2010     Priority: Medium     Formatting of this note might be different from the original.  IMO Update 10/11      Contact dermatitis and other eczema, due to unspecified cause 06/01/2004     Priority: Medium    Pulmonary embolism and infarction (H) 04/11/2003     Priority: Medium     Problem list name updated by automated process. Provider  to review      Hyperlipidemia LDL goal <100 07/11/2002     Priority: Medium     Problem list name updated by automated process. Provider to review      Edema 01/18/2002     Priority: Medium    Primary hypertension 02/20/2001     Priority: Medium     Problem list name updated by automated process. Provider to review    Formatting of this note might be different from the original.  Formatting of this note might be different from the original.  IMO Update 10/11  Formatting of this note might be different from the original.  Problem list name updated by automated process. Provider to review          Past Medical History:    Past Medical History:   Diagnosis Date    Abdominal pain, generalized 2/8/2021    Arthritis     Cervicalgia 1/9/2001    Closed dislocation of shoulder, unspecified site 2000    Congenital deficiency of other clotting factors 9/7/2012    Congenital factor VIII disorder (H) 7/26/2019    Contact dermatitis and other eczema, due to unspecified cause 6/1/2004    Coughing     Diarrhea 2/8/2021    Edema 1/18/2002    Essential hypertension 2/20/2001    Factor V Leiden (H24)     Factor V Leiden mutation (H24) 7/26/2019    Family history of colon cancer 1/2/2018    Gastro-oesophageal reflux disease     Herpes zoster without mention of complication 2003    Hyperlipidemia LDL goal <100 7/11/2002    Long term (current) use of anticoagulants 8/20/2003    Major depression 8/8/2014    Mammographic microcalcification 2003    Migraine, unspecified, without mention of intractable migraine without mention of status migrainosus 3/5/2001    Moderate episode of recurrent major depressive disorder (H) 8/8/2014    Neurofibromatosis, peripheral, NF1 (H) 8/22/2012    Neurofibromatosis, unspecified(237.70) 8/22/2012    Other and unspecified hyperlipidemia 7/11/2002    Other chronic pain     Other pulmonary embolism and infarction 4/11/2003    Primary insomnia 10/31/2018    Statin medication not prescribed per physician orders  2018    Tachycardia, unspecified 2001    Thrombosis of leg     Unspecified essential hypertension 2001    Vitamin D deficiency 2018       Past Surgical History:    Past Surgical History:   Procedure Laterality Date    ------------OTHER-------------      shoulder replacement; Provider: Karen    ARTHROPLASTY KNEE  2014    Procedure: ARTHROPLASTY KNEE;  Surgeon: Sean lAexander MD;  Location: [facility]    ARTHROSCOPY SHOULDER      right, bone spurs    CA ANESTH,SHOULDER REPLACEMENT Right 2020     SECTION      x3    CHOLECYSTECTOMY      COLONOSCOPY  02/15/2018    [place],,polyps    elbow ulnar tunnel release      ELECTROTHERMAL THERAPY INTRADISC  2017    stimulator    ENDOSCOPIC SINUS SURGERY, SEPTOPLASTY, TURBINOPLASTY, MAXILLARY SINUSOTOMY, COMBINED N/A 2015    Procedure: COMBINED ENDOSCOPIC SINUS SURGERY, SEPTOPLASTY, TURBINOPLASTY, MAXILLARY SINUSOTOMY;  Surgeon: Seema Conn MD;  Location: [facility]    ESOPHAGOSCOPY, GASTROSCOPY, DUODENOSCOPY (EGD), COMBINED      with biopsy and endoscopic U/S    EXCISE NEUROMA LOWER EXTREMITY Left 2016    Procedure: EXCISE NEUROMA LOWER EXTREMITY;  Surgeon: Edi Reed MD;  Location: [facility]    EYE SURGERY Right 2020    FUSION LUMBAR ANTERIOR WITH MARCY CAGES      L5-S1    HYSTERECTOMY TOTAL ABDOMINAL, BILATERAL SALPINGO-OOPHORECTOMY, COMBINED N/A     ORTHOPEDIC SURGERY  2015    right shoulder    ORTHOPEDIC SURGERY  2015    right knee    ORTHOPEDIC SURGERY Right 2018    hip labrum tear    pionidal cyst excision      TRANSPOSITION ULNAR NERVE (ELBOW)         Family History:    Family History   Problem Relation Age of Onset    Cancer Mother     Colon Polyps Mother     Heart Failure Mother 87        congestive, cause of death    Myocardial Infarction Mother         myocardial infarction    Myocardial Infarction Father         myocardial infarction - cause of death    C.A.D. Father     Cancer  Paternal Uncle         cause of death    C.A.D. Brother     Other - See Comments Other         factor 5 - family h/o    Asthma No family hx of        Social History:  Marital Status:   [2]  Social History     Tobacco Use    Smoking status: Former     Packs/day: 1.00     Years: 30.00     Additional pack years: 0.00     Total pack years: 30.00     Types: Cigarettes, Pipe     Start date: 1969     Quit date: 1999     Years since quittin.1    Smokeless tobacco: Never    Tobacco comments:     quit in    Vaping Use    Vaping Use: Never used   Substance Use Topics    Alcohol use: No    Drug use: No        Medications:    aspirin 81 MG EC tablet  Cholecalciferol (VITAMIN D3 PO)  escitalopram (LEXAPRO) 10 MG tablet  hydrochlorothiazide (HYDRODIURIL) 25 MG tablet  losartan (COZAAR) 100 MG tablet  metoprolol succinate ER (TOPROL XL) 50 MG 24 hr tablet  potassium chloride ER (K-TAB/KLOR-CON) 10 MEQ CR tablet  predniSONE (DELTASONE) 10 MG tablet  warfarin ANTICOAGULANT (COUMADIN) 5 MG tablet  albuterol (PROAIR HFA/PROVENTIL HFA/VENTOLIN HFA) 108 (90 Base) MCG/ACT inhaler  albuterol (PROVENTIL) (2.5 MG/3ML) 0.083% neb solution  ketoconazole (NIZORAL) 2 % external cream  order for DME  traZODone (DESYREL) 50 MG tablet          Review of Systems   Constitutional:  Positive for activity change. Negative for appetite change, chills and fever.   Respiratory:  Negative for shortness of breath.    Gastrointestinal:  Negative for nausea and vomiting.   Musculoskeletal:         Left foot pain, swelling and bruising   Skin:  Positive for color change (mild redness over medial great toe and brusing foot).   Neurological:  Negative for numbness (tingling).       Physical Exam   BP: 140/79  Pulse: 61  Temp: 97.7  F (36.5  C)  Resp: 19  SpO2: 96 %      Physical Exam  Vitals and nursing note reviewed.   Constitutional:       General: She is in acute distress (Mild).      Appearance: She is overweight.   Cardiovascular:       Rate and Rhythm: Normal rate.      Pulses:           Dorsalis pedis pulses are 3+ on the left side.        Posterior tibial pulses are 3+ on the left side.   Pulmonary:      Effort: Pulmonary effort is normal.   Musculoskeletal:         General: Swelling, tenderness and signs of injury present.        Feet:       Comments: Left foot   Feet:      Left foot:      Skin integrity: Erythema present.   Skin:     General: Skin is warm and dry.      Findings: Bruising and erythema present.   Neurological:      Mental Status: She is alert and oriented to person, place, and time.   Psychiatric:         Behavior: Behavior normal.         ED Course                 Procedures           Results for orders placed or performed during the hospital encounter of 02/22/24 (from the past 24 hour(s))   Foot XR, G/E 3 views, left    Narrative    PROCEDURE:  XR FOOT LEFT G/E 3 VIEWS    HISTORY: pain over first through thrid metatarsal. dog stepped on foot    COMPARISON: MR left ankle 1/31/2018    TECHNIQUE:  XR FOOT LEFT 3 VIEWS    FINDINGS:   No acute fracture or dislocation is identified. No suspicious osseous  lesion. Mild-to-moderate degenerative changes of the first MTP joint.  Calcaneal Achilles and plantar enthesopathy.    No foreign body or subcutaneous emphysema.        Impression    IMPRESSION:   No acute osseous abnormality.    DEEPA FISHER MD         SYSTEM ID:  [id number]   Uric acid   Result Value Ref Range    Uric Acid 8.4 (H) 2.4 - 5.7 mg/dL   Extra Tube    Narrative    The following orders were created for panel order Extra Tube.  Procedure                               Abnormality         Status                     ---------                               -----------         ------                     Extra Purple Top Tube[[id number]]                            In process                   Please view results for these tests on the individual orders.     *Note: Due to a large number of results and/or encounters for the  requested time period, some results have not been displayed. A complete set of results can be found in Results Review.       Medications   acetaminophen (TYLENOL) tablet 650 mg (650 mg Oral $Given 2/22/24 1228)       Assessments & Plan (with Medical Decision Making)     I have reviewed the nursing notes.    I have reviewed the findings, diagnosis, plan and need for follow up with the patient.  (S90.32XA) Contusion of left foot, initial encounter    (M10.9) Gout  Comment: 68 year old female who is accompanied per her .  She presents with left foot pain and swelling that started 2 days ago when her dog stepped on her foot while she was in bed.  Accompanied with tingling and bruising over the dorsal region of the foot.  Took tramadol last night that did not decrease her discomfort.  History of gout.  Non-smoker.  Denies fevers, chills, nausea, vomiting, and shortness of breath.    MDM:Discoloration/ecchymosis over first second and third metatarsals of dorsal region.  Mild erythema over first MTP.  Tenderness in these regions with palpation.  Pedal pulses 3+.    Left foot x-ray reviewed and per radiology;  No acute osseous abnormality.     Ace wrap and Ortho shoe applied per LPN.    Uric acid 8.4    Plan: Prednisone burst.  Education provided and/or discussed for this/these medication and gout.  Keep affected extremity elevated as much as possible for next 24 - 48 hours. Ice to affected area 20 minutes every hour as needed for comfort. After 48 hours you can apply heat. Tylenol 650 to 1000 mg every four to six hours as needed (not to exceed more than 4000 mg in a 24 hour period). May use interchangeably. Suggest medicating around the clock for the next 24-48 hours. Use ace wrap  and ortho shoe until you can walk on your left foot without having discomfort.  Slowly start to wiggle your toes and move foot as often as possible but not beyond the point of pain. Follow up with primary provider  as needed    Do not use  ibuprofen while taking prednisone or while on Coumadin.    Call Coumadin clinic to notify them you are taking prednisone.    Follow-up with primary care provider concerning gout.  These discharge instructions and medications were reviewed with her and her  and understanding verbalized.    This document was prepared using a combination of typing and voice generated software.  While every attempt was made for accuracy, spelling and grammatical errors may exist.    Discharge Medication List as of 2/22/2024  1:03 PM        START taking these medications    Details   predniSONE (DELTASONE) 10 MG tablet Two tablets daily for 2 days, then one tab for two days, the 1/2 tab daily for 2 days, Disp-7 tablet, R-0, E-Prescribe             Final diagnoses:   Contusion of left foot, initial encounter   Gout       2/22/2024   [facility]\         Maci Loyd, CNP  02/22/24 1321

## 2024-02-26 ENCOUNTER — ANTICOAGULATION THERAPY VISIT (OUTPATIENT)
Dept: ANTICOAGULATION | Facility: OTHER | Age: 69
End: 2024-02-26
Attending: NURSE PRACTITIONER
Payer: COMMERCIAL

## 2024-02-26 DIAGNOSIS — Z79.01 LONG TERM CURRENT USE OF ANTICOAGULANT THERAPY: Primary | ICD-10-CM

## 2024-02-26 DIAGNOSIS — F33.1 MODERATE EPISODE OF RECURRENT MAJOR DEPRESSIVE DISORDER (H): ICD-10-CM

## 2024-02-26 DIAGNOSIS — I26.99 PULMONARY EMBOLISM AND INFARCTION (H): ICD-10-CM

## 2024-02-26 DIAGNOSIS — I82.401 DEEP VEIN THROMBOSIS (DVT) OF RIGHT LOWER EXTREMITY, UNSPECIFIED CHRONICITY, UNSPECIFIED VEIN (H): ICD-10-CM

## 2024-02-26 LAB — INR HOME MONITORING: 2.4 (ref 2–3)

## 2024-02-26 RX ORDER — ESCITALOPRAM OXALATE 10 MG/1
10 TABLET ORAL DAILY
Qty: 90 TABLET | Refills: 0 | Status: SHIPPED | OUTPATIENT
Start: 2024-02-26 | End: 2024-04-01

## 2024-02-26 NOTE — TELEPHONE ENCOUNTER
Escitalopram (Lexapro)  10 Mg tablet    Last Written Prescription Date:  08/22/2023  Last Fill Quantity: 90,   # refills: 0  Last Office Visit: 02/07/2024

## 2024-02-27 ENCOUNTER — OFFICE VISIT (OUTPATIENT)
Dept: FAMILY MEDICINE | Facility: OTHER | Age: 69
End: 2024-02-27
Attending: NURSE PRACTITIONER
Payer: COMMERCIAL

## 2024-02-27 VITALS
HEART RATE: 72 BPM | WEIGHT: 224.4 LBS | HEIGHT: 65 IN | BODY MASS INDEX: 37.39 KG/M2 | TEMPERATURE: 97.9 F | RESPIRATION RATE: 18 BRPM | DIASTOLIC BLOOD PRESSURE: 60 MMHG | SYSTOLIC BLOOD PRESSURE: 104 MMHG | OXYGEN SATURATION: 97 %

## 2024-02-27 DIAGNOSIS — M25.562 CHRONIC PAIN OF LEFT KNEE: ICD-10-CM

## 2024-02-27 DIAGNOSIS — G89.29 CHRONIC PAIN OF LEFT KNEE: ICD-10-CM

## 2024-02-27 DIAGNOSIS — M10.9 ACUTE GOUT INVOLVING TOE OF LEFT FOOT, UNSPECIFIED CAUSE: Primary | ICD-10-CM

## 2024-02-27 PROBLEM — Z87.39 HISTORY OF GOUT: Status: ACTIVE | Noted: 2024-02-27

## 2024-02-27 PROCEDURE — 99213 OFFICE O/P EST LOW 20 MIN: CPT | Performed by: NURSE PRACTITIONER

## 2024-02-27 PROCEDURE — G0463 HOSPITAL OUTPT CLINIC VISIT: HCPCS

## 2024-02-27 RX ORDER — COLCHICINE 0.6 MG/1
TABLET ORAL
Qty: 3 TABLET | Refills: 1 | Status: SHIPPED | OUTPATIENT
Start: 2024-02-27 | End: 2024-04-01

## 2024-02-27 ASSESSMENT — PAIN SCALES - GENERAL: PAINLEVEL: SEVERE PAIN (7)

## 2024-02-27 NOTE — PATIENT INSTRUCTIONS
Assessment & Plan         Acute gout involving toe of left foot, unspecified cause  - colchicine (COLCRYS) 0.6 MG tablet; 2 tabs po now, then 1 tab one hour later      Chronic pain of left knee  - Orthopedic  Referral; Future        [facility]  [phone]

## 2024-02-27 NOTE — PROGRESS NOTES
Assessment & Plan         Acute gout involving toe of left foot, unspecified cause  - colchicine (COLCRYS) 0.6 MG tablet; 2 tabs po now, then 1 tab one hour later      Chronic pain of left knee  - Orthopedic  Referral; Future        Eliz Hartman [facility]  [phone]           Kaitlin is a 68 year old, presenting for the following health issues:  Arthritis        Via the Health Maintenance questionnaire, the patient has reported the following services have been completed -Colonscopy, this information has been sent to the abstraction team.        ED/UC Followup:  Facility:  [facility]  Date of visit: 2/22/24  Reason for visit: Gout of left foot   Current Status: about the same not better at all was put on prednisone         Was treated with prednisone  Continues with pain  Swelling has improved a bit  No redness        Ongoing left knee pain post op  Has seen Ortho for Follow-up - Benjamin  Has completed PT  Continues stiff, sore, and has lateral pain lateral and medial patella      PT feels she may have nerve damage and advised she should see a neurologist        Patient Active Problem List   Diagnosis    Primary hypertension    Pulmonary embolism and infarction (H)    Contact dermatitis and other eczema, due to unspecified cause    Edema    Hyperlipidemia LDL goal <100    Neurofibromatosis, type 1 (H)    Advanced care planning/counseling discussion    Moderate episode of recurrent major depressive disorder (H)    Long-term (current) use of anticoagulants [Z79.01]    Deep vein thrombosis (DVT) (H) [I82.409]    Lumbar spondylosis with myelopathy    Degeneration of lumbar or lumbosacral intervertebral disc    Family history of colon cancer    Vitamin D deficiency    Failed arthroplasty (H24)    H/O total shoulder replacement, left    Primary insomnia    Factor V Leiden mutation (H24)    Obesity (BMI 35.0-39.9) with comorbidity (H)    Diarrhea    Chest pain, unspecified    Muscle weakness of right upper extremity     Pain in right upper arm    Stiffness of right shoulder joint    Activated protein C resistance (H24)    Shoulder pain    Deep vein thrombosis (DVT) of right lower extremity, unspecified chronicity, unspecified vein (H)     Past Surgical History:   Procedure Laterality Date    ------------OTHER-------------      shoulder replacement; Provider: Karen    ARTHROPLASTY KNEE  2014    Procedure: ARTHROPLASTY KNEE;  Surgeon: Sean Alexander MD;  Location: [facility]    ARTHROSCOPY SHOULDER  2012    right, bone spurs    CA ANESTH,SHOULDER REPLACEMENT Right 2020     SECTION      x3    CHOLECYSTECTOMY      COLONOSCOPY  02/15/2018    [place],,polyps    elbow ulnar tunnel release      ELECTROTHERMAL THERAPY INTRADISC  2017    stimulator    ENDOSCOPIC SINUS SURGERY, SEPTOPLASTY, TURBINOPLASTY, MAXILLARY SINUSOTOMY, COMBINED N/A 2015    Procedure: COMBINED ENDOSCOPIC SINUS SURGERY, SEPTOPLASTY, TURBINOPLASTY, MAXILLARY SINUSOTOMY;  Surgeon: Seema Conn MD;  Location: [facility]    ESOPHAGOSCOPY, GASTROSCOPY, DUODENOSCOPY (EGD), COMBINED      with biopsy and endoscopic U/S    EXCISE NEUROMA LOWER EXTREMITY Left 2016    Procedure: EXCISE NEUROMA LOWER EXTREMITY;  Surgeon: Edi Reed MD;  Location: [facility]    EYE SURGERY Right 2020    FUSION LUMBAR ANTERIOR WITH MARCY CAGES      L5-S1    HYSTERECTOMY TOTAL ABDOMINAL, BILATERAL SALPINGO-OOPHORECTOMY, COMBINED N/A     ORTHOPEDIC SURGERY  2015    right shoulder    ORTHOPEDIC SURGERY  2015    right knee    ORTHOPEDIC SURGERY Right 2018    hip labrum tear    pionidal cyst excision      TRANSPOSITION ULNAR NERVE (ELBOW)         Social History     Tobacco Use    Smoking status: Former     Packs/day: 1.00     Years: 30.00     Additional pack years: 0.00     Total pack years: 30.00     Types: Cigarettes, Pipe     Start date: 1969     Quit date: 1999     Years since quittin.1    Smokeless tobacco: Never     Tobacco comments:     quit in 1999   Substance Use Topics    Alcohol use: No     Family History   Problem Relation Age of Onset    Cancer Mother     Colon Polyps Mother     Heart Failure Mother 87        congestive, cause of death    Myocardial Infarction Mother         myocardial infarction    Myocardial Infarction Father         myocardial infarction - cause of death    C.A.D. Father     Cancer Paternal Uncle         cause of death    C.A.D. Brother     Other - See Comments Other         factor 5 - family h/o    Asthma No family hx of              Current Outpatient Medications   Medication Sig Dispense Refill    albuterol (PROAIR HFA/PROVENTIL HFA/VENTOLIN HFA) 108 (90 Base) MCG/ACT inhaler Inhale 2 puffs into the lungs every 6 hours 18 g 1    albuterol (PROVENTIL) (2.5 MG/3ML) 0.083% neb solution Take 2.5 mg by nebulization every 6 hours as needed for shortness of breath / dyspnea or wheezing      aspirin 81 MG EC tablet Take 81 mg by mouth daily HS      Cholecalciferol (VITAMIN D3 PO) Take 3,000 mg by mouth daily AM      colchicine (COLCRYS) 0.6 MG tablet 2 tabs po now, then 1 tab one hour later 3 tablet 1    escitalopram (LEXAPRO) 10 MG tablet Take 1 tablet by mouth once daily 90 tablet 0    hydrochlorothiazide (HYDRODIURIL) 25 MG tablet Take 1 tablet (25 mg) by mouth daily 90 tablet 1    ketoconazole (NIZORAL) 2 % external cream APPLY TO THE RASH ON FULL BACK TWICE A DAY FOR 4-6 WEEKS      losartan (COZAAR) 100 MG tablet Take 1 tablet by mouth once daily      metoprolol succinate ER (TOPROL XL) 50 MG 24 hr tablet Take 1.5 tablets (75 mg) by mouth daily 90 tablet 1    order for DME Nebulizer machine and tubing    DX:  Bronchitis 1 Device 0    potassium chloride ER (K-TAB/KLOR-CON) 10 MEQ CR tablet Take 1 tablet (10 mEq) by mouth daily 90 tablet 2    predniSONE (DELTASONE) 10 MG tablet Two tablets daily for 2 days, then one tab for two days, the 1/2 tab daily for 2 days 7 tablet 0    traZODone (DESYREL) 50 MG  tablet TAKE 1 TO 2 TABLETS BY MOUTH NIGHTLY AS NEEDED 180 tablet 3    warfarin ANTICOAGULANT (COUMADIN) 5 MG tablet TAKE 1.5 TABLETS BY MOUTH ON MONDAY, WEDNESDAY AND FRIDAY AND  1  TABLET  ALL  OTHER  DAYS  OR  AS  DIRECTED  BY  WARFARIN  CLINIC 109 tablet 0           Allergies   Allergen Reactions    Amlodipine Besylate Swelling     Norvasc    Amoxicillin     Atorvastatin      myualgia    Cephalexin Monohydrate Hives     Keflex    Erythromycin Base [Erythromycin Base] Nausea and Vomiting    Meloxicam Other (See Comments)     Mobic - confusion, depression    Sulfa Antibiotics Hives    Adhesive Tape Rash    Prochlorperazine Edisylate Swelling and Rash     Compazine    Prochlorperazine Maleate Swelling and Rash         Recent Labs   Lab Test 12/15/23  0910 09/29/23  1022 03/17/23  1029 03/17/23  1028 09/21/22  0831 09/16/22  1141 08/27/21  0917 04/16/21  1014 02/08/21  1432   A1C  --   --  5.6  --   --   --   --   --   --    LDL  --  119*  --  129* 123*  --    < >  --   --    HDL  --  37*  --  37* 38*  --    < >  --   --    TRIG  --  171*  --  233* 173*  --    < >  --   --    ALT 21 18  --  26  --   --    < > 29 36   CR 0.96* 1.00*  --  0.90  --   --    < > 0.95 0.92   GFRESTIMATED 64 61  --  69  --   --    < > 62 65   GFRESTBLACK  --   --   --   --   --   --   --  72 75   POTASSIUM 3.6 3.9  --  3.5  --    < >   < > 3.3* 3.4   TSH  --  3.34  --  3.05  --   --    < >  --   --     < > = values in this interval not displayed.          BP Readings from Last 3 Encounters:   02/27/24 104/60   02/22/24 140/79   02/07/24 122/68    Wt Readings from Last 3 Encounters:   02/27/24 101.8 kg (224 lb 6.4 oz)   02/07/24 103.8 kg (228 lb 12.8 oz)   12/15/23 103 kg (227 lb)                     Review of Systems  Constitutional, neuro, ENT, endocrine, pulmonary, cardiac, gastrointestinal, genitourinary, musculoskeletal, integument and psychiatric systems are negative, except as otherwise noted.          Objective    /60 (BP  "Location: Right arm, Patient Position: Chair, Cuff Size: Adult Large)   Pulse 72   Temp 97.9  F (36.6  C) (Tympanic)   Resp 18   Ht 1.651 m (5' 5\")   Wt 101.8 kg (224 lb 6.4 oz)   SpO2 97%   BMI 37.34 kg/m    Body mass index is 37.34 kg/m .        Physical Exam   GENERAL: alert and no distress  RESP: lungs clear to auscultation - no rales, rhonchi or wheezes  CV: regular rate and rhythm, normal S1 S2, no S3 or S4, no murmur, click or rub, no peripheral edema  MS:  Left knee - medial and lateral patellar pain            Signed Electronically by: Eliz Hartman CNP    "

## 2024-03-04 ENCOUNTER — MYC MEDICAL ADVICE (OUTPATIENT)
Dept: FAMILY MEDICINE | Facility: OTHER | Age: 69
End: 2024-03-04

## 2024-03-13 ENCOUNTER — ANTICOAGULATION THERAPY VISIT (OUTPATIENT)
Dept: ANTICOAGULATION | Facility: OTHER | Age: 69
End: 2024-03-13
Payer: COMMERCIAL

## 2024-03-13 DIAGNOSIS — I26.99 PULMONARY EMBOLISM AND INFARCTION (H): ICD-10-CM

## 2024-03-13 DIAGNOSIS — I82.401 DEEP VEIN THROMBOSIS (DVT) OF RIGHT LOWER EXTREMITY, UNSPECIFIED CHRONICITY, UNSPECIFIED VEIN (H): ICD-10-CM

## 2024-03-13 DIAGNOSIS — Z79.01 LONG TERM CURRENT USE OF ANTICOAGULANT THERAPY: Primary | ICD-10-CM

## 2024-03-13 LAB — INR HOME MONITORING: 2.7 (ref 2–3)

## 2024-03-13 NOTE — PROGRESS NOTES
ANTICOAGULATION  MANAGEMENT-Home Monitor Managed by Exception    Cassy Avila 68 year old female is on warfarin with therapeutic INR result. (Goal INR 2.0-3.0)    Recent labs: (last 7 days)     03/13/24  0000   INR 2.7       Previous INR was Therapeutic  Medication, diet, health changes since last INR:Yes: started colchicine for toe pain, may get injection at [facility], but not anticipated to affect INR  Contacted within the last 12 weeks by phone on 2/26/24  Last [facility] referral date: 12/19/2023      DARIO Gusmana was NOT contacted regarding therapeutic result today per home monitoring policy manage by exception agreement.   Current warfarin dose is to be continued:     Summary  As of 3/13/2024      Full warfarin instructions:  7.5 mg every Mon, Wed, Fri; 5 mg all other days   Next INR check:  3/27/2024             ?   Corry Galvan RN  Anticoagulation Clinic  3/13/2024    _______________________________________________________________________     Anticoagulation Episode Summary       Current INR goal:  2.0-3.0   TTR:  73.2% (1 y)   Target end date:  Indefinite   Send INR reminders to:  [facility] [place]    Indications    Long-term (current) use of anticoagulants [Z79.01] [Z79.01]  Pulmonary embolism and infarction (H) [I26.99]  Deep vein thrombosis (DVT) (H) [I82.409] [I82.409]  Deep vein thrombosis (DVT) of right lower extremity  unspecified chronicity  unspecified vein (H) [I82.401]             Comments:  [employer] Monitoring. Q2 week testing  Call cell phone with any dosing.             Anticoagulation Care Providers       Provider Role Specialty Phone number    Eliz Hartman CNP Referring Family Medicine [phone]

## 2024-03-19 ENCOUNTER — TRANSFERRED RECORDS (OUTPATIENT)
Dept: HEALTH INFORMATION MANAGEMENT | Facility: CLINIC | Age: 69
End: 2024-03-19
Payer: COMMERCIAL

## 2024-03-19 ENCOUNTER — MYC MEDICAL ADVICE (OUTPATIENT)
Dept: FAMILY MEDICINE | Facility: OTHER | Age: 69
End: 2024-03-19

## 2024-03-19 NOTE — PATIENT INSTRUCTIONS
ASSESSMENT/PLAN:  1. Cough  symptomatic  - guaiFENesin-codeine (ROBITUSSIN AC) 100-10 MG/5ML SOLN solution; Take 5-10 mLs by mouth every 6 hours as needed  Dispense: 180 mL; Refill: 0    2. Other acute nonsuppurative otitis media of right ear  symtpomatic  - doxycycline (VIBRAMYCIN) 100 MG capsule; Take 1 capsule (100 mg) by mouth 2 times daily  Dispense: 20 capsule; Refill: 0      Contact coumadin clinic with new use of antibiotics.   Use acetaminophen, ibuprofen,   Increase fluids and rest.   Follow up if symptoms do not improve or worsen    Maegan Sharma,   Certified Adult Nurse Practitioner  [phone]                     My Depression Action Plan  Name: Cassy Avila   Date of Birth 1955  Date: 2/6/2017    My doctor: Eliz Hartman   My clinic: [facility]  [address] [place] [id number]  [phone]          GREEN    ZONE   Good Control    What it looks like:     Things are going generally well. You have normal up s and down s. You may even feel depressed from time to time, but bad moods usually last less than a day.   What you need to do:  1. Continue to care for yourself (see self care plan)  2. Check your depression survival kit and update it as needed  3. Follow your physician s recommendations including any medication.  4. Do not stop taking medication unless you consult with your physician first.           YELLOW         ZONE Getting Worse    What it looks like:     Depression is starting to interfere with your life.     It may be hard to get out of bed; you may be starting to isolate yourself from others.    Symptoms of depression are starting to last most all day and this has happened for several days.     You may have suicidal thoughts but they are not constant.   What you need to do:     1. Call your care team, your response to treatment will improve if you keep your care team informed of your progress. Yellow periods are signs an adjustment may need  "Call placed to mom to let her know that there is a new prescription for the 50 mg available at the [facility] pharmacy.    Mom reports no noticeable benefit or side effects since increase to 40 mg. Patient took 50 mg dose yesterday and patient had asked for a \"booster\" because she was tired in the afternoon, no other side effects noted.  " to be made.     2. Continue your self-care, even if you have to fake it!    3. Talk to someone in your support network    4. Open up your depression survival kit           RED    ZONE Medical Alert - Get Help    What it looks like:     Depression is seriously interfering with your life.     You may experience these or other symptoms: You can t get out of bed most days, can t work or engage in other necessary activities, you have trouble taking care of basic hygiene, or basic responsibilities, thoughts of suicide or death that will not go away, self-injurious behavior.     What you need to do:  1. Call your care team and request a same-day appointment. If they are not available (weekends or after hours) call your local crisis line, emergency room or 911.      Electronically signed by: Lis Vickers, February 6, 2017    Depression Self Care Plan / Survival Kit    Self-Care for Depression  Here s the deal. Your body and mind are really not as separate as most people think.  What you do and think affects how you feel and how you feel influences what you do and think. This means if you do things that people who feel good do, it will help you feel better.  Sometimes this is all it takes.  There is also a place for medication and therapy depending on how severe your depression is, so be sure to consult with your medical provider and/ or Behavioral Health Consultant if your symptoms are worsening or not improving.     In order to better manage my stress, I will:    Exercise  Get some form of exercise, every day. This will help reduce pain and release endorphins, the  feel good  chemicals in your brain. This is almost as good as taking antidepressants!  This is not the same as joining a gym and then never going! (they count on that by the way ) It can be as simple as just going for a walk or doing some gardening, anything that will get you moving.      Hygiene   Maintain good hygiene (Get out of bed in the morning, Make your  bed, Brush your teeth, Take a shower, and Get dressed like you were going to work, even if you are unemployed).  If your clothes don't fit try to get ones that do.    Diet  I will strive to eat foods that are good for me, drink plenty of water, and avoid excessive sugar, caffeine, alcohol, and other mood-altering substances.  Some foods that are helpful in depression are: complex carbohydrates, B vitamins, flaxseed, fish or fish oil, fresh fruits and vegetables.    Psychotherapy  I agree to participate in Individual Therapy (if recommended).    Medication  If prescribed medications, I agree to take them.  Missing doses can result in serious side effects.  I understand that drinking alcohol, or other illicit drug use, may cause potential side effects.  I will not stop my medication abruptly without first discussing it with my provider.    Staying Connected With Others  I will stay in touch with my friends, family members, and my primary care provider/team.    Use your imagination  Be creative.  We all have a creative side; it doesn t matter if it s oil painting, sand castles, or mud pies! This will also kick up the endorphins.    Witness Beauty  (AKA stop and smell the roses) Take a look outside, even in mid-winter. Notice colors, textures. Watch the squirrels and birds.     Service to others  Be of service to others.  There is always someone else in need.  By helping others we can  get out of ourselves  and remember the really important things.  This also provides opportunities for practicing all the other parts of the program.    Humor  Laugh and be silly!  Adjust your TV habits for less news and crime-drama and more comedy.    Control your stress  Try breathing deep, massage therapy, biofeedback, and meditation. Find time to relax each day.     My support system    Clinic Contact:  Phone number:    Contact 1:  Phone number:    Contact 2:  Phone number:    Shinto/:  Phone number:    Therapist:   Phone number:    Local crisis center:    Phone number:    Other community support:  Phone number:

## 2024-03-26 ENCOUNTER — ANTICOAGULATION THERAPY VISIT (OUTPATIENT)
Dept: ANTICOAGULATION | Facility: OTHER | Age: 69
End: 2024-03-26
Attending: NURSE PRACTITIONER
Payer: COMMERCIAL

## 2024-03-26 DIAGNOSIS — Z79.01 LONG TERM CURRENT USE OF ANTICOAGULANT THERAPY: Primary | ICD-10-CM

## 2024-03-26 DIAGNOSIS — Z79.01 LONG TERM CURRENT USE OF ANTICOAGULANT THERAPY: ICD-10-CM

## 2024-03-26 DIAGNOSIS — I82.401 DEEP VEIN THROMBOSIS (DVT) OF RIGHT LOWER EXTREMITY, UNSPECIFIED CHRONICITY, UNSPECIFIED VEIN (H): ICD-10-CM

## 2024-03-26 DIAGNOSIS — I26.99 PULMONARY EMBOLISM AND INFARCTION (H): ICD-10-CM

## 2024-03-26 LAB — INR HOME MONITORING: 2.1 (ref 2–3)

## 2024-03-26 RX ORDER — WARFARIN SODIUM 5 MG/1
TABLET ORAL
Qty: 109 TABLET | Refills: 1 | Status: SHIPPED | OUTPATIENT
Start: 2024-03-26 | End: 2024-09-30

## 2024-03-26 NOTE — PROGRESS NOTES
ANTICOAGULATION  MANAGEMENT-Home Monitor Managed by Exception    Cassy CHIU Austin 68 year old female is on warfarin with therapeutic INR result. (Goal INR 2.0-3.0)    Recent labs: (last 7 days)     03/26/24  0000   INR 2.1       Previous INR was Therapeutic  Medication, diet, health changes since last INR: having foot surgery 4/17 preop scheduled 4/9  Contacted within the last 12 weeks by phone on 2/26/24  Last [facility] referral date: 12/19/2023      DARIO     Cassy was NOT contacted regarding therapeutic result today per home monitoring policy manage by exception agreement.   Current warfarin dose is to be continued:     Summary  As of 3/26/2024      Full warfarin instructions:  7.5 mg every Mon, Wed, Fri; 5 mg all other days   Next INR check:  4/9/2024             ?   Corry Galvan RN  Anticoagulation Clinic  3/26/2024    _______________________________________________________________________     Anticoagulation Episode Summary       Current INR goal:  2.0-3.0   TTR:  73.2% (1 y)   Target end date:  Indefinite   Send INR reminders to:  [facility] [place]    Indications    Long-term (current) use of anticoagulants [Z79.01] [Z79.01]  Pulmonary embolism and infarction (H) [I26.99]  Deep vein thrombosis (DVT) (H) [I82.409] [I82.409]  Deep vein thrombosis (DVT) of right lower extremity  unspecified chronicity  unspecified vein (H) [I82.401]             Comments:  [employer] Home Monitoring. Q2 week testing  Call cell phone with any dosing.             Anticoagulation Care Providers       Provider Role Specialty Phone number    Eliz Hartman CNP Referring Family Medicine [phone]

## 2024-03-26 NOTE — TELEPHONE ENCOUNTER
ANTICOAGULATION MANAGEMENT:  Medication Refill    Anticoagulation Summary  As of 3/26/2024      Warfarin maintenance plan:  7.5 mg (5 mg x 1.5) every Mon, Wed, Fri; 5 mg (5 mg x 1) all other days   Next INR check:  4/9/2024   Target end date:  Indefinite    Indications    Long-term (current) use of anticoagulants [Z79.01] [Z79.01]  Pulmonary embolism and infarction (H) [I26.99]  Deep vein thrombosis (DVT) (H) [I82.409] [I82.409]  Deep vein thrombosis (DVT) of right lower extremity  unspecified chronicity  unspecified vein (H) [I82.401]                 Anticoagulation Care Providers       Provider Role Specialty Phone number    Eliz Hartman CNP Referring Family Medicine [phone]            Refill Criteria    Visit with referring provider/group: Meets criteria: office visit within referring provider group in the last 1 year on 2/27/24    [facility] referral last signed: 12/19/2023; within last year: Yes    Lab monitoring not exceeding 2 weeks overdue: Yes    Cassy meets all criteria for refill. Rx instructions and quantity supplied updated to match patient's current dosing plan. Warfarin 90 day supply with 1 refill granted per [facility] protocol     Corry Galvan RN  Anticoagulation Clinic

## 2024-03-28 NOTE — PROGRESS NOTES
Assessment & Plan         Primary hypertension  - Comprehensive metabolic panel  - Lipid Profile (Chol, Trig, HDL, LDL calc)  - TSH with free T4 reflex      Hyperlipidemia LDL goal <100  - Comprehensive metabolic panel  - Lipid Profile (Chol, Trig, HDL, LDL calc)  - TSH with free T4 reflex      Moderate episode of recurrent major depressive disorder (H)  - escitalopram (LEXAPRO) 10 MG tablet; Take 1 tablet (10 mg) by mouth daily      Primary insomnia  - Continue plan of care      Vitamin D deficiency  - Vitamin D Deficiency      Special screening for malignant neoplasms, colon  - Colonoscopy Screening  Referral; Future      Family history of colon cancer  - Colonoscopy Screening  Referral; Future        Please continue to take all medication as directed  Please notify your pharmacy or our refill line of future refills needed.  Please return sooner than your next scheduled appointment with any concerns.      When your lab results return, we will call you with abnormal findings  You can also view this information in your MyChart, if you have an account.  Please call or GigOwl message us with any questions you may have.          Return in about 6 months (around 10/1/2024).        Eliz Minnie [facility]  [phone]           Kaitlin is a 68 year old, presenting for the following health issues:  Chronic Disease Management        Hyperlipidemia Follow-Up  Are you regularly taking any medication or supplement to lower your cholesterol?   No  Are you having muscle aches or other side effects that you think could be caused by your cholesterol lowering medication?  No        Hypertension Follow-up  Do you check your blood pressure regularly outside of the clinic? No   Are you following a low salt diet? Yes  Are your blood pressures ever more than 140 on the top number (systolic) OR more   than 90 on the bottom number (diastolic), for example 140/90? No        Depression   How are you doing with your depression  since your last visit? Improved   Are you having other symptoms that might be associated with depression? No  Have you had a significant life event?  No   Are you feeling anxious or having panic attacks?   No  Do you have any concerns with your use of alcohol or other drugs? No        Social History     Tobacco Use    Smoking status: Former     Packs/day: 1.00     Years: 30.00     Additional pack years: 0.00     Total pack years: 30.00     Types: Cigarettes, Pipe     Start date: 1969     Quit date: 1999     Years since quittin.2    Smokeless tobacco: Never    Tobacco comments:     quit in    Vaping Use    Vaping Use: Never used   Substance Use Topics    Alcohol use: No    Drug use: No               3/17/2023    10:21 AM 2023    10:05 AM 2024     8:52 AM   PHQ   PHQ-9 Total Score 2 1 2   Q9: Thoughts of better off dead/self-harm past 2 weeks Not at all Not at all Not at all               3/17/2023    10:21 AM 2023    10:07 AM 2024     8:52 AM   MARGA-7 SCORE   Total Score   0 (minimal anxiety)   Total Score 2 2 0               2024     8:52 AM   Last PHQ-9   1.  Little interest or pleasure in doing things 0   2.  Feeling down, depressed, or hopeless 0   3.  Trouble falling or staying asleep, or sleeping too much 1   4.  Feeling tired or having little energy 1   5.  Poor appetite or overeating 0   6.  Feeling bad about yourself 0   7.  Trouble concentrating 0   8.  Moving slowly or restless 0   Q9: Thoughts of better off dead/self-harm past 2 weeks 0   PHQ-9 Total Score 2               2024     8:52 AM   MARGA-7    1. Feeling nervous, anxious, or on edge 0   2. Not being able to stop or control worrying 0   3. Worrying too much about different things 0   4. Trouble relaxing 0   5. Being so restless that it is hard to sit still 0   6. Becoming easily annoyed or irritable 0   7. Feeling afraid, as if something awful might happen 0   MARGA-7 Total Score 0            Insomnia  Onset/Duration: prior  Description:   Frequency of insomnia:  occasionally  Time to fall asleep (sleep latency): 30 minutes  Middle of night awakening:  YES  Early morning awakening:  YES  Progression of Symptoms:  improving  Accompanying Signs & Symptoms:  Daytime sleepiness/napping: No  Excessive snoring/apnea: No  Restless legs: No  Waking to urinate: YES  Depression symptoms (if yes, do PHQ9): No  Anxiety symptoms (if yes, do MARGA-7): No  History:  Prior Insomnia: YES  New stressful situation: No  Precipitating factors:   Caffeine intake: yes, small to moderate amount  OTC decongestants: No  Any new medications: No  Alleviating factors:  Self medicating (alcohol, etc.):  No  Stress-reduction (exercise, yoga, meditation etc): No  Therapies tried and outcome: trazodone          Vitamin D deficiency  - Takes OTC D3  - D level due          Patient Active Problem List   Diagnosis    Primary hypertension    Pulmonary embolism and infarction (H)    Contact dermatitis and other eczema, due to unspecified cause    Edema    Hyperlipidemia LDL goal <100    Neurofibromatosis, type 1 (H)    Advanced care planning/counseling discussion    Moderate episode of recurrent major depressive disorder (H)    Long-term (current) use of anticoagulants [Z79.01]    Lumbar spondylosis with myelopathy    Degeneration of lumbar or lumbosacral intervertebral disc    Family history of colon cancer    Vitamin D deficiency    Failed arthroplasty (H24)    H/O total shoulder replacement, left    Primary insomnia    Factor V Leiden mutation (H24)    Obesity (BMI 35.0-39.9) with comorbidity (H)    Diarrhea    Chest pain, unspecified    Muscle weakness of right upper extremity    Pain in right upper arm    Stiffness of right shoulder joint    Activated protein C resistance (H24)    Shoulder pain    Deep vein thrombosis (DVT) of right lower extremity, unspecified chronicity, unspecified vein (H)    History of gout     Past Surgical  History:   Procedure Laterality Date    ------------OTHER-------------      shoulder replacement; Provider: Karen    ARTHROPLASTY KNEE  2014    Procedure: ARTHROPLASTY KNEE;  Surgeon: Sean Alexander MD;  Location: [facility] OR    ARTHROSCOPY SHOULDER      right, bone spurs    CA ANESTH,SHOULDER REPLACEMENT Right 2020     SECTION      x3    CHOLECYSTECTOMY      COLONOSCOPY  02/15/2018    [place],,polyps    elbow ulnar tunnel release      ELECTROTHERMAL THERAPY INTRADISC  2017    stimulator    ENDOSCOPIC SINUS SURGERY, SEPTOPLASTY, TURBINOPLASTY, MAXILLARY SINUSOTOMY, COMBINED N/A 2015    Procedure: COMBINED ENDOSCOPIC SINUS SURGERY, SEPTOPLASTY, TURBINOPLASTY, MAXILLARY SINUSOTOMY;  Surgeon: Seema Conn MD;  Location: [facility] OR    ESOPHAGOSCOPY, GASTROSCOPY, DUODENOSCOPY (EGD), COMBINED      with biopsy and endoscopic U/S    EXCISE NEUROMA LOWER EXTREMITY Left 2016    Procedure: EXCISE NEUROMA LOWER EXTREMITY;  Surgeon: Edi Reed MD;  Location: [facility] OR    EYE SURGERY Right 2020    FUSION LUMBAR ANTERIOR WITH MARCY CAGES      L5-S1    HYSTERECTOMY TOTAL ABDOMINAL, BILATERAL SALPINGO-OOPHORECTOMY, COMBINED N/A     ORTHOPEDIC SURGERY  2015    right shoulder    ORTHOPEDIC SURGERY  2015    right knee    ORTHOPEDIC SURGERY Right 2018    hip labrum tear    pionidal cyst excision      TRANSPOSITION ULNAR NERVE (ELBOW)         Social History     Tobacco Use    Smoking status: Former     Packs/day: 1.00     Years: 30.00     Additional pack years: 0.00     Total pack years: 30.00     Types: Cigarettes, Pipe     Start date: 1969     Quit date: 1999     Years since quittin.2    Smokeless tobacco: Never    Tobacco comments:     quit in    Substance Use Topics    Alcohol use: No     Family History   Problem Relation Age of Onset    Cancer Mother     Colon Polyps Mother     Heart Failure Mother 87        congestive, cause of death     Myocardial Infarction Mother         myocardial infarction    Myocardial Infarction Father         myocardial infarction - cause of death    C.A.D. Father     Cancer Paternal Uncle         cause of death    C.A.D. Brother     Other - See Comments Other         factor 5 - family h/o    Asthma No family hx of                Current Outpatient Medications   Medication Sig Dispense Refill    albuterol (PROVENTIL) (2.5 MG/3ML) 0.083% neb solution Take 2.5 mg by nebulization every 6 hours as needed for shortness of breath / dyspnea or wheezing      aspirin 81 MG EC tablet Take 81 mg by mouth daily HS      Cholecalciferol (VITAMIN D3 PO) Take 3,000 mg by mouth daily AM      escitalopram (LEXAPRO) 10 MG tablet Take 1 tablet (10 mg) by mouth daily 90 tablet 1    hydrochlorothiazide (HYDRODIURIL) 25 MG tablet Take 1 tablet (25 mg) by mouth daily 90 tablet 1    ketoconazole (NIZORAL) 2 % external cream APPLY TO THE RASH ON FULL BACK TWICE A DAY FOR 4-6 WEEKS      metoprolol succinate ER (TOPROL XL) 50 MG 24 hr tablet Take 1.5 tablets (75 mg) by mouth daily 90 tablet 1    order for DME Nebulizer machine and tubing    DX:  Bronchitis 1 Device 0    potassium chloride ER (K-TAB/KLOR-CON) 10 MEQ CR tablet Take 1 tablet (10 mEq) by mouth daily 90 tablet 2    traZODone (DESYREL) 50 MG tablet TAKE 1 TO 2 TABLETS BY MOUTH NIGHTLY AS NEEDED 180 tablet 3    warfarin ANTICOAGULANT (COUMADIN) 5 MG tablet TAKE 1 & 1/2 (ONE & ONE-HALF) TABLETS BY MOUTH MONDAY WEDNESDAY AND FRIDAY AND 1 TABLET ALL OTHER DAYS OR AS DIRECTED BY WARFARIN CLINIC 109 tablet 1         Allergies   Allergen Reactions    Amlodipine Besylate Swelling     Norvasc    Amoxicillin     Atorvastatin      myualgia    Cephalexin Monohydrate Hives     Keflex    Erythromycin Base [Erythromycin Base] Nausea and Vomiting    Meloxicam Other (See Comments)     Mobic - confusion, depression    Sulfa Antibiotics Hives    Adhesive Tape Rash    Prochlorperazine Edisylate Swelling and  Rash     Compazine    Prochlorperazine Maleate Swelling and Rash         Recent Labs   Lab Test 12/15/23  0910 09/29/23  1022 03/17/23  1029 03/17/23  1028 09/21/22  0831 09/16/22  1141 08/27/21  0917 04/16/21  1014 02/08/21  1432   A1C  --   --  5.6  --   --   --   --   --   --    LDL  --  119*  --  129* 123*  --    < >  --   --    HDL  --  37*  --  37* 38*  --    < >  --   --    TRIG  --  171*  --  233* 173*  --    < >  --   --    ALT 21 18  --  26  --   --    < > 29 36   CR 0.96* 1.00*  --  0.90  --   --    < > 0.95 0.92   GFRESTIMATED 64 61  --  69  --   --    < > 62 65   GFRESTBLACK  --   --   --   --   --   --   --  72 75   POTASSIUM 3.6 3.9  --  3.5  --    < >   < > 3.3* 3.4   TSH  --  3.34  --  3.05  --   --    < >  --   --     < > = values in this interval not displayed.          BP Readings from Last 3 Encounters:   04/01/24 136/80   02/27/24 104/60   02/22/24 140/79    Wt Readings from Last 3 Encounters:   04/01/24 102.6 kg (226 lb 1.6 oz)   02/27/24 101.8 kg (224 lb 6.4 oz)   02/07/24 103.8 kg (228 lb 12.8 oz)                 Review of Systems  Constitutional, HEENT, cardiovascular, pulmonary, GI, , musculoskeletal, neuro, skin, endocrine and psych systems are negative, except as otherwise noted.            Objective    /80 (BP Location: Left arm, Patient Position: Sitting, Cuff Size: Adult Large)   Pulse 88   Temp 97.9  F (36.6  C) (Tympanic)   Resp 20   Wt 102.6 kg (226 lb 1.6 oz)   SpO2 95%   BMI 37.63 kg/m    Body mass index is 37.63 kg/m .          Physical Exam   GENERAL: alert and no distress  EYES: Eyes grossly normal to inspection, PERRL and conjunctivae and sclerae normal  HENT: ear canals and TM's normal, nose and mouth without ulcers or lesions  NECK: no adenopathy, no asymmetry, masses, or scars  RESP: lungs clear to auscultation - no rales, rhonchi or wheezes  CV: regular rate and rhythm, normal S1 S2, no S3 or S4, no murmur, click or rub, no peripheral edema  MS: no gross  musculoskeletal defects noted, no edema  SKIN: no suspicious lesions or rashes  PSYCH: mentation appears normal, affect normal/bright          The longitudinal plan of care for the diagnosis(es)/condition(s) as documented were addressed during this visit. Due to the added complexity in care, I will continue to support Kaitlin in the subsequent management and with ongoing continuity of care.             Signed Electronically by: Eliz Hartman CNP

## 2024-04-01 ENCOUNTER — OFFICE VISIT (OUTPATIENT)
Dept: FAMILY MEDICINE | Facility: OTHER | Age: 69
End: 2024-04-01
Attending: NURSE PRACTITIONER
Payer: COMMERCIAL

## 2024-04-01 VITALS
HEART RATE: 88 BPM | TEMPERATURE: 97.9 F | BODY MASS INDEX: 37.63 KG/M2 | OXYGEN SATURATION: 95 % | WEIGHT: 226.1 LBS | RESPIRATION RATE: 20 BRPM | DIASTOLIC BLOOD PRESSURE: 80 MMHG | SYSTOLIC BLOOD PRESSURE: 136 MMHG

## 2024-04-01 DIAGNOSIS — F33.1 MODERATE EPISODE OF RECURRENT MAJOR DEPRESSIVE DISORDER (H): ICD-10-CM

## 2024-04-01 DIAGNOSIS — E78.5 HYPERLIPIDEMIA LDL GOAL <100: ICD-10-CM

## 2024-04-01 DIAGNOSIS — I10 PRIMARY HYPERTENSION: Primary | ICD-10-CM

## 2024-04-01 DIAGNOSIS — F51.01 PRIMARY INSOMNIA: ICD-10-CM

## 2024-04-01 DIAGNOSIS — Z12.11 SPECIAL SCREENING FOR MALIGNANT NEOPLASMS, COLON: ICD-10-CM

## 2024-04-01 DIAGNOSIS — E55.9 VITAMIN D DEFICIENCY: ICD-10-CM

## 2024-04-01 DIAGNOSIS — Z80.0 FAMILY HISTORY OF COLON CANCER: ICD-10-CM

## 2024-04-01 LAB
ALBUMIN SERPL BCG-MCNC: 4.1 G/DL (ref 3.5–5.2)
ALP SERPL-CCNC: 53 U/L (ref 40–150)
ALT SERPL W P-5'-P-CCNC: 17 U/L (ref 0–50)
ANION GAP SERPL CALCULATED.3IONS-SCNC: 16 MMOL/L (ref 7–15)
AST SERPL W P-5'-P-CCNC: 15 U/L (ref 0–45)
BILIRUB SERPL-MCNC: 0.3 MG/DL
BUN SERPL-MCNC: 15.3 MG/DL (ref 8–23)
CALCIUM SERPL-MCNC: 9.3 MG/DL (ref 8.8–10.2)
CHLORIDE SERPL-SCNC: 100 MMOL/L (ref 98–107)
CHOLEST SERPL-MCNC: 192 MG/DL
CREAT SERPL-MCNC: 0.92 MG/DL (ref 0.51–0.95)
DEPRECATED HCO3 PLAS-SCNC: 23 MMOL/L (ref 22–29)
EGFRCR SERPLBLD CKD-EPI 2021: 67 ML/MIN/1.73M2
FASTING STATUS PATIENT QL REPORTED: ABNORMAL
GLUCOSE SERPL-MCNC: 127 MG/DL (ref 70–99)
HDLC SERPL-MCNC: 35 MG/DL
HOLD SPECIMEN: NORMAL
HOLD SPECIMEN: NORMAL
LDLC SERPL CALC-MCNC: 127 MG/DL
NONHDLC SERPL-MCNC: 157 MG/DL
POTASSIUM SERPL-SCNC: 3.4 MMOL/L (ref 3.4–5.3)
PROT SERPL-MCNC: 7 G/DL (ref 6.4–8.3)
SODIUM SERPL-SCNC: 139 MMOL/L (ref 135–145)
TRIGL SERPL-MCNC: 148 MG/DL
TSH SERPL DL<=0.005 MIU/L-ACNC: 2.73 UIU/ML (ref 0.3–4.2)

## 2024-04-01 PROCEDURE — 84443 ASSAY THYROID STIM HORMONE: CPT | Mod: ZL | Performed by: NURSE PRACTITIONER

## 2024-04-01 PROCEDURE — 80061 LIPID PANEL: CPT | Mod: ZL | Performed by: NURSE PRACTITIONER

## 2024-04-01 PROCEDURE — 36415 COLL VENOUS BLD VENIPUNCTURE: CPT | Mod: ZL | Performed by: NURSE PRACTITIONER

## 2024-04-01 PROCEDURE — 82306 VITAMIN D 25 HYDROXY: CPT | Mod: ZL | Performed by: NURSE PRACTITIONER

## 2024-04-01 PROCEDURE — G0463 HOSPITAL OUTPT CLINIC VISIT: HCPCS

## 2024-04-01 PROCEDURE — 99214 OFFICE O/P EST MOD 30 MIN: CPT | Performed by: NURSE PRACTITIONER

## 2024-04-01 PROCEDURE — 80053 COMPREHEN METABOLIC PANEL: CPT | Mod: ZL | Performed by: NURSE PRACTITIONER

## 2024-04-01 PROCEDURE — G2211 COMPLEX E/M VISIT ADD ON: HCPCS | Performed by: NURSE PRACTITIONER

## 2024-04-01 RX ORDER — ESCITALOPRAM OXALATE 10 MG/1
10 TABLET ORAL DAILY
Qty: 90 TABLET | Refills: 1 | Status: SHIPPED | OUTPATIENT
Start: 2024-04-01

## 2024-04-01 ASSESSMENT — PATIENT HEALTH QUESTIONNAIRE - PHQ9
10. IF YOU CHECKED OFF ANY PROBLEMS, HOW DIFFICULT HAVE THESE PROBLEMS MADE IT FOR YOU TO DO YOUR WORK, TAKE CARE OF THINGS AT HOME, OR GET ALONG WITH OTHER PEOPLE: NOT DIFFICULT AT ALL
SUM OF ALL RESPONSES TO PHQ QUESTIONS 1-9: 2
SUM OF ALL RESPONSES TO PHQ QUESTIONS 1-9: 2

## 2024-04-01 ASSESSMENT — ANXIETY QUESTIONNAIRES
GAD7 TOTAL SCORE: 0
2. NOT BEING ABLE TO STOP OR CONTROL WORRYING: NOT AT ALL
4. TROUBLE RELAXING: NOT AT ALL
7. FEELING AFRAID AS IF SOMETHING AWFUL MIGHT HAPPEN: NOT AT ALL
6. BECOMING EASILY ANNOYED OR IRRITABLE: NOT AT ALL
7. FEELING AFRAID AS IF SOMETHING AWFUL MIGHT HAPPEN: NOT AT ALL
GAD7 TOTAL SCORE: 0
GAD7 TOTAL SCORE: 0
3. WORRYING TOO MUCH ABOUT DIFFERENT THINGS: NOT AT ALL
5. BEING SO RESTLESS THAT IT IS HARD TO SIT STILL: NOT AT ALL
1. FEELING NERVOUS, ANXIOUS, OR ON EDGE: NOT AT ALL

## 2024-04-01 ASSESSMENT — PAIN SCALES - GENERAL: PAINLEVEL: NO PAIN (0)

## 2024-04-01 NOTE — PATIENT INSTRUCTIONS
Assessment & Plan         Primary hypertension  - Comprehensive metabolic panel  - Lipid Profile (Chol, Trig, HDL, LDL calc)  - TSH with free T4 reflex      Hyperlipidemia LDL goal <100  - Comprehensive metabolic panel  - Lipid Profile (Chol, Trig, HDL, LDL calc)  - TSH with free T4 reflex      Moderate episode of recurrent major depressive disorder (H)  - escitalopram (LEXAPRO) 10 MG tablet; Take 1 tablet (10 mg) by mouth daily      Primary insomnia  - Continue plan of care      Vitamin D deficiency  - Vitamin D Deficiency      Special screening for malignant neoplasms, colon  - Colonoscopy Screening  Referral; Future      Family history of colon cancer  - Colonoscopy Screening  Referral; Future        Please continue to take all medication as directed  Please notify your pharmacy or our refill line of future refills needed.  Please return sooner than your next scheduled appointment with any concerns.      When your lab results return, we will call you with abnormal findings  You can also view this information in your MyChart, if you have an account.  Please call or Electrikushart message us with any questions you may have.          Return in about 6 months (around 10/1/2024).          Eliz ENCARNACION  [phone]

## 2024-04-02 ENCOUNTER — TELEPHONE (OUTPATIENT)
Dept: FAMILY MEDICINE | Facility: OTHER | Age: 69
End: 2024-04-02

## 2024-04-02 LAB — VIT D+METAB SERPL-MCNC: 60 NG/ML (ref 20–50)

## 2024-04-02 NOTE — RESULT ENCOUNTER NOTE
High D level  Hold D3 for 1 month, then resume at 2000 U daily OTC    Eliz SCRUGGSUniversity [facility]  [phone]

## 2024-04-08 NOTE — PROGRESS NOTES
Preoperative Evaluation  [facility]  [address]  [facility] [address]  Phone: [phone]      Primary Provider: Eliz Sanon  Pre-op Performing Provider: ELIZ SANON        Apr 9, 2024         Kaitlin is a 68 year old, presenting for the following:  Pre-Op Exam        Surgical Information  Surgery/Procedure: Hammertoe surgery; tailor's bunion surgery and hallux valgus repair  Surgery Location: [facility]   Surgeon: Dr. Clay  Surgery Date: 04/17/2024  Time of Surgery: TBD  Where patient plans to recover: At home with family  Fax number for surgical facility: On File        HPI related to upcoming procedure:     Bunion Right foot              4/5/2024     8:08 AM   Preop Questions   1. Have you ever had a heart attack or stroke? No   2. Have you ever had surgery on your heart or blood vessels, such as a stent placement, a coronary artery bypass, or surgery on an artery in your head, neck, heart, or legs? No   3. Do you have chest pain with activity? No   4. Do you have a history of  heart failure? No   5. Do you currently have a cold, bronchitis or symptoms of other infection? No   6. Do you have a cough, shortness of breath, or wheezing? YES - chronic   7. Do you or anyone in your family have previous history of blood clots? No   8. Do you or does anyone in your family have a serious bleeding problem such as prolonged bleeding following surgeries or cuts? YES    9. Have you ever had problems with anemia or been told to take iron pills? No   10. Have you had any abnormal blood loss such as black, tarry or bloody stools, or abnormal vaginal bleeding? No   11. Have you ever had a blood transfusion? No   12. Are you willing to have a blood transfusion if it is medically needed before, during, or after your surgery? Yes   13. Have you or any of your relatives ever had problems with anesthesia? No   14. Do you have sleep apnea, excessive snoring or daytime drowsiness? No    15. Do you have any artifical heart valves or other implanted medical devices like a pacemaker, defibrillator, or continuous glucose monitor? No   16. Do you have artificial joints? YES    17. Are you allergic to latex? No           Health Care Directive  Patient does not have a Health Care Directive or Living Will: Discussed advance care planning with patient; however, patient declined at this time.        History of DVT  History of PE      Status of Chronic Conditions:  See problem list for active medical problems.  Problems all longstanding and stable, except as noted/documented.  See ROS for pertinent symptoms related to these conditions.        Patient Active Problem List    Diagnosis Date Noted    History of gout 02/27/2024     Priority: Medium    Deep vein thrombosis (DVT) of right lower extremity, unspecified chronicity, unspecified vein (H) 01/24/2023     Priority: Medium    Shoulder pain 01/31/2022     Priority: Medium     Formatting of this note might be different from the original.  Added automatically from request for surgery [id number]      Muscle weakness of right upper extremity 04/15/2021     Priority: Medium    Pain in right upper arm 04/15/2021     Priority: Medium    Stiffness of right shoulder joint 04/15/2021     Priority: Medium    Diarrhea 02/08/2021     Priority: Medium    Obesity (BMI 35.0-39.9) with comorbidity (H) 01/28/2020     Priority: Medium    Factor V Leiden mutation (H24) 07/26/2019     Priority: Medium    Activated protein C resistance (H24) 07/26/2019     Priority: Medium    Primary insomnia 10/31/2018     Priority: Medium    Vitamin D deficiency 07/13/2018     Priority: Medium    Family history of colon cancer 01/02/2018     Priority: Medium    Lumbar spondylosis with myelopathy 07/12/2017     Priority: Medium    Degeneration of lumbar or lumbosacral intervertebral disc 07/12/2017     Priority: Medium    Long-term (current) use of anticoagulants [Z79.01] 07/20/2016      Priority: Medium    Moderate episode of recurrent major depressive disorder (H) 08/08/2014     Priority: Medium    H/O total shoulder replacement, left 06/17/2014     Priority: Medium    Failed arthroplasty (H24) 01/24/2014     Priority: Medium    Advanced care planning/counseling discussion 03/12/2013     Priority: Medium     Pt has information at home , not completed      Neurofibromatosis, type 1 (H) 08/22/2012     Priority: Medium     Problem list name updated by automated process. Provider to review    Formatting of this note might be different from the original.  Formatting of this note might be different from the original.  Problem list name updated by automated process. Provider to review      Chest pain, unspecified 02/11/2010     Priority: Medium     Formatting of this note might be different from the original.  IMO Update 10/11      Contact dermatitis and other eczema, due to unspecified cause 06/01/2004     Priority: Medium    Pulmonary embolism and infarction (H) 04/11/2003     Priority: Medium     Problem list name updated by automated process. Provider to review      Hyperlipidemia LDL goal <100 07/11/2002     Priority: Medium     Problem list name updated by automated process. Provider to review      Edema 01/18/2002     Priority: Medium    Primary hypertension 02/20/2001     Priority: Medium     Problem list name updated by automated process. Provider to review    Formatting of this note might be different from the original.  Formatting of this note might be different from the original.  IMO Update 10/11  Formatting of this note might be different from the original.  Problem list name updated by automated process. Provider to review            Past Medical History:   Diagnosis Date    Abdominal pain, generalized 2/8/2021    Arthritis     Cervicalgia 1/9/2001    Closed dislocation of shoulder, unspecified site 2000    Congenital deficiency of other clotting factors 9/7/2012    factor V deficiency,  congenital    Congenital factor VIII disorder (H) 2019    Contact dermatitis and other eczema, due to unspecified cause 2004    Coughing     Diarrhea 2021    Edema 2002    Essential hypertension 2001     Problem list name updated by automated process. Provider to review    Factor V Leiden (H24)     Factor V Leiden mutation (H24) 2019    Family history of colon cancer 2018    Gastro-oesophageal reflux disease     Herpes zoster without mention of complication     resolved from problem list    Hyperlipidemia LDL goal <100 2002     Problem list name updated by automated process. Provider to review    Long term (current) use of anticoagulants 2003    Major depression 2014    Mammographic microcalcification     resolved from problem list    Migraine, unspecified, without mention of intractable migraine without mention of status migrainosus 3/5/2001    Moderate episode of recurrent major depressive disorder (H) 2014    Neurofibromatosis, peripheral, NF1 (H) 2012     Problem list name updated by automated process. Provider to review    Neurofibromatosis, unspecified(237.70) 2012    Other and unspecified hyperlipidemia 2002    Other chronic pain     Other pulmonary embolism and infarction 2003    Primary insomnia 10/31/2018    Statin medication not prescribed per physician orders 2018    Tachycardia, unspecified 2001    Thrombosis of leg     Unspecified essential hypertension 2001    Vitamin D deficiency 2018         Past Surgical History:   Procedure Laterality Date    ------------OTHER-------------      shoulder replacement; Provider: Karen    ARTHROPLASTY KNEE  2014    Procedure: ARTHROPLASTY KNEE;  Surgeon: Sean Alexander MD;  Location: [facility] OR    ARTHROSCOPY SHOULDER      right, bone spurs    CA ANESTH,SHOULDER REPLACEMENT Right 2020     SECTION      x3    CHOLECYSTECTOMY      COLONOSCOPY   02/15/2018    [place],,polyps    elbow ulnar tunnel release  2002    ELECTROTHERMAL THERAPY INTRADISC  2017    stimulator    ENDOSCOPIC SINUS SURGERY, SEPTOPLASTY, TURBINOPLASTY, MAXILLARY SINUSOTOMY, COMBINED N/A 04/29/2015    Procedure: COMBINED ENDOSCOPIC SINUS SURGERY, SEPTOPLASTY, TURBINOPLASTY, MAXILLARY SINUSOTOMY;  Surgeon: Seema Conn MD;  Location: [facility] OR    ESOPHAGOSCOPY, GASTROSCOPY, DUODENOSCOPY (EGD), COMBINED  2011    with biopsy and endoscopic U/S    EXCISE NEUROMA LOWER EXTREMITY Left 07/13/2016    Procedure: EXCISE NEUROMA LOWER EXTREMITY;  Surgeon: Edi Reed MD;  Location: [facility] OR    EYE SURGERY Right 09/2020    FUSION LUMBAR ANTERIOR WITH BAK CAGES      L5-S1    HYSTERECTOMY TOTAL ABDOMINAL, BILATERAL SALPINGO-OOPHORECTOMY, COMBINED N/A     ORTHOPEDIC SURGERY  02/2015    right shoulder    ORTHOPEDIC SURGERY  08/28/2015    right knee    ORTHOPEDIC SURGERY Right 06/11/2018    hip labrum tear    pionidal cyst excision      TRANSPOSITION ULNAR NERVE (ELBOW)           Current Outpatient Medications   Medication Sig Dispense Refill    albuterol (PROVENTIL) (2.5 MG/3ML) 0.083% neb solution Take 2.5 mg by nebulization every 6 hours as needed for shortness of breath / dyspnea or wheezing      aspirin 81 MG EC tablet Take 81 mg by mouth daily HS      Cholecalciferol (VITAMIN D3 PO) Take 3,000 mg by mouth daily AM      escitalopram (LEXAPRO) 10 MG tablet Take 1 tablet (10 mg) by mouth daily 90 tablet 1    hydrochlorothiazide (HYDRODIURIL) 25 MG tablet Take 1 tablet (25 mg) by mouth daily 90 tablet 1    ketoconazole (NIZORAL) 2 % external cream APPLY TO THE RASH ON FULL BACK TWICE A DAY FOR 4-6 WEEKS      metoprolol succinate ER (TOPROL XL) 50 MG 24 hr tablet Take 1.5 tablets (75 mg) by mouth daily 90 tablet 1    order for DME Nebulizer machine and tubing    DX:  Bronchitis 1 Device 0    potassium chloride ER (K-TAB/KLOR-CON) 10 MEQ CR tablet Take 1 tablet (10 mEq) by mouth daily 90  tablet 2    traZODone (DESYREL) 50 MG tablet TAKE 1 TO 2 TABLETS BY MOUTH NIGHTLY AS NEEDED 180 tablet 3    warfarin ANTICOAGULANT (COUMADIN) 5 MG tablet TAKE 1 & 1/2 (ONE & ONE-HALF) TABLETS BY MOUTH  AND FRIDAY AND 1 TABLET ALL OTHER DAYS OR AS DIRECTED BY WARFARIN CLINIC 109 tablet 1       Allergies   Allergen Reactions    Amlodipine Besylate Swelling     Norvasc    Amoxicillin     Atorvastatin      myualgia    Cephalexin Monohydrate Hives     Keflex    Erythromycin Base [Erythromycin Base] Nausea and Vomiting    Meloxicam Other (See Comments)     Mobic - confusion, depression    Sulfa Antibiotics Hives    Adhesive Tape Rash    Prochlorperazine Edisylate Swelling and Rash     Compazine    Prochlorperazine Maleate Swelling and Rash        Social History     Tobacco Use    Smoking status: Former     Packs/day: 1.00     Years: 30.00     Additional pack years: 0.00     Total pack years: 30.00     Types: Cigarettes, Pipe     Start date: 1969     Quit date: 1999     Years since quittin.2    Smokeless tobacco: Never    Tobacco comments:     quit in    Substance Use Topics    Alcohol use: No       Family History   Problem Relation Age of Onset    Cancer Mother     Colon Polyps Mother     Heart Failure Mother 87        congestive, cause of death    Myocardial Infarction Mother         myocardial infarction    Myocardial Infarction Father         myocardial infarction - cause of death    C.A.D. Father     Cancer Paternal Uncle         cause of death    C.A.D. Brother     Other - See Comments Other         factor 5 - family h/o    Asthma No family hx of            History   Drug Use No           Review of Systems  Constitutional, HEENT, cardiovascular, pulmonary, GI, , musculoskeletal, neuro, skin, endocrine and psych systems are negative, except as otherwise noted.        Objective    /70 (BP Location: Right arm, Patient Position: Sitting, Cuff Size: Adult Large)   Pulse 64   Temp  "98.7  F (37.1  C) (Tympanic)   Resp 22   Wt 103.6 kg (228 lb 8 oz)   SpO2 94%   BMI 38.02 kg/m     Estimated body mass index is 38.02 kg/m  as calculated from the following:    Height as of 2/27/24: 1.651 m (5' 5\").    Weight as of this encounter: 103.6 kg (228 lb 8 oz).          Physical Exam  GENERAL: alert and no distress  EYES: Eyes grossly normal to inspection, PERRL and conjunctivae and sclerae normal  HENT: ear canals and TM's normal, nose and mouth without ulcers or lesions  NECK: no adenopathy, no asymmetry, masses, or scars  RESP: lungs clear to auscultation - no rales, rhonchi or wheezes  CV: regular rate and rhythm, normal S1 S2, no S3 or S4, no murmur, click or rub, no peripheral edema  MS: bilateral foot pain  SKIN: no suspicious lesions or rashes  PSYCH: mentation appears normal, affect normal/bright        Recent Labs   Lab Test 04/01/24  0926 03/26/24  0000 03/13/24  0000 12/19/23  0000 12/15/23  0910 03/21/23  0000 03/17/23  1029 12/06/22  0000 11/29/22  1212   HGB  --   --   --   --  14.3  --   --   --  14.2   PLT  --   --   --   --  191  --   --   --   --    INR  --  2.1 2.7   < >  --    < >  --    < >  --      --   --   --  138   < >  --    < >  --    POTASSIUM 3.4  --   --   --  3.6   < >  --    < >  --    CR 0.92  --   --   --  0.96*   < >  --    < >  --    A1C  --   --   --   --   --   --  5.6  --   --     < > = values in this interval not displayed.        Results for orders placed or performed in visit on 04/09/24   Extra Tube     Status: None (In process)    Narrative    The following orders were created for panel order Extra Tube.  Procedure                               Abnormality         Status                     ---------                               -----------         ------                     Extra Serum Separator Tu...[[id number]]                      In process                   Please view results for these tests on the individual orders.   CBC with platelets and " differential     Status: None   Result Value Ref Range    WBC Count 6.2 4.0 - 11.0 10e3/uL    RBC Count 4.74 10e6/uL    Hemoglobin 13.7 g/dL    Hematocrit 39.3 %    MCV 83 78 - 100 fL    MCH 28.9 26.5 - 33.0 pg    MCHC 34.9 31.5 - 36.5 g/dL    RDW 13.5 10.0 - 15.0 %    Platelet Count 198 150 - 450 10e3/uL    % Neutrophils 54 %    % Lymphocytes 33 %    % Monocytes 10 %    % Eosinophils 2 %    % Basophils 1 %    % Immature Granulocytes 0 %    Absolute Neutrophils 3.4 1.6 - 8.3 10e3/uL    Absolute Lymphocytes 2.0 0.8 - 5.3 10e3/uL    Absolute Monocytes 0.6 0.0 - 1.3 10e3/uL    Absolute Eosinophils 0.1 0.0 - 0.7 10e3/uL    Absolute Basophils 0.0 0.0 - 0.2 10e3/uL    Absolute Immature Granulocytes 0.0 <=0.4 10e3/uL   CBC with platelets and differential     Status: None    Narrative    The following orders were created for panel order CBC with platelets and differential.  Procedure                               Abnormality         Status                     ---------                               -----------         ------                     CBC with platelets and d...[[id number]]                      Final result                 Please view results for these tests on the individual orders.   Results for orders placed or performed in visit on 04/09/24   INR (External Result)     Status: None   Result Value Ref Range    INR HOME MONITORING 2.4 2.000 - 3.000          Diagnostics  Recent Results (from the past 720 hour(s))   INR (External Result)    Collection Time: 03/13/24 12:00 AM   Result Value Ref Range    INR HOME MONITORING 2.7 2.000 - 3.000   INR (External Result)    Collection Time: 03/26/24 12:00 AM   Result Value Ref Range    INR HOME MONITORING 2.1 2.000 - 3.000   Extra Purple Top Tube    Collection Time: 04/01/24  9:26 AM   Result Value Ref Range    Hold Specimen JIC    Extra Green Top (Lithium Heparin) Tube    Collection Time: 04/01/24  9:26 AM   Result Value Ref Range    Hold Specimen JIC    Comprehensive  metabolic panel    Collection Time: 04/01/24  9:26 AM   Result Value Ref Range    Sodium 139 135 - 145 mmol/L    Potassium 3.4 3.4 - 5.3 mmol/L    Carbon Dioxide (CO2) 23 22 - 29 mmol/L    Anion Gap 16 (H) 7 - 15 mmol/L    Urea Nitrogen 15.3 8.0 - 23.0 mg/dL    Creatinine 0.92 0.51 - 0.95 mg/dL    GFR Estimate 67 >60 mL/min/1.73m2    Calcium 9.3 8.8 - 10.2 mg/dL    Chloride 100 98 - 107 mmol/L    Glucose 127 (H) 70 - 99 mg/dL    Alkaline Phosphatase 53 40 - 150 U/L    AST 15 0 - 45 U/L    ALT 17 0 - 50 U/L    Protein Total 7.0 6.4 - 8.3 g/dL    Albumin 4.1 3.5 - 5.2 g/dL    Bilirubin Total 0.3 <=1.2 mg/dL   Lipid Profile (Chol, Trig, HDL, LDL calc)    Collection Time: 04/01/24  9:26 AM   Result Value Ref Range    Cholesterol 192 <200 mg/dL    Triglycerides 148 <150 mg/dL    Direct Measure HDL 35 (L) >=50 mg/dL    LDL Cholesterol Calculated 127 (H) <=100 mg/dL    Non HDL Cholesterol 157 (H) <130 mg/dL    Patient Fasting > 8hrs? Unknown    TSH with free T4 reflex    Collection Time: 04/01/24  9:26 AM   Result Value Ref Range    TSH 2.73 0.30 - 4.20 uIU/mL   Vitamin D Deficiency    Collection Time: 04/01/24  9:26 AM   Result Value Ref Range    Vitamin D, Total (25-Hydroxy) 60 (H) 20 - 50 ng/mL   INR (External Result)    Collection Time: 04/09/24 12:00 AM   Result Value Ref Range    INR HOME MONITORING 2.4 2.000 - 3.000            Component  Ref Range & Units 12/15/23  9:43 AM     Systolic Blood Pressure  mmHg     Diastolic Blood Pressure  mmHg     Ventricular Rate  BPM 60    Atrial Rate  BPM 60    PA Interval  ms 144    QRS Duration  ms 84    QT  ms 398    QTc  ms 398    P Axis  degrees 112    R AXIS  degrees 59    T Axis  degrees 66    Interpretation ECG Sinus rhythm  Normal ECG  No previous ECGs available  Confirmed by MD Zain, Facundo ([id number]) on 12/19/2023 10:17:18 AM       Revised Cardiac Risk Index (RCRI)  The patient has the following serious cardiovascular risks for perioperative complications:   - No  serious cardiac risks = 0 points       RCRI Interpretation: 0 points: Class I (very low risk - 0.4% complication rate)       Assessment & Plan     The proposed surgical procedure is considered LOW risk.        Pre-op exam  - CBC with platelets and differential; Future      Bunion, right  - See above      Hammer toe of right foot  - See above      Tailor's bunion of right foot  - See above           - No identified additional risk factors other than previously addressed          Antiplatelet or Anticoagulation Medication Instructions   - warfarin: Bridging therapy will be coordinated by coumadin clinic          Additional Medication Instructions  No aspirin, anti-inflammatories, vitamins, minerals, supplements prior to surgery  Take you BP medication the morning of surgery with a small sip of water          Recommendation  APPROVAL GIVEN to proceed with proposed procedure, without further diagnostic evaluation.        Signed Electronically by: Eliz Hartman CNP  Copy of this evaluation report is provided to requesting physician.

## 2024-04-09 ENCOUNTER — OFFICE VISIT (OUTPATIENT)
Dept: FAMILY MEDICINE | Facility: OTHER | Age: 69
End: 2024-04-09
Attending: NURSE PRACTITIONER
Payer: COMMERCIAL

## 2024-04-09 ENCOUNTER — ANTICOAGULATION THERAPY VISIT (OUTPATIENT)
Dept: ANTICOAGULATION | Facility: OTHER | Age: 69
End: 2024-04-09
Attending: NURSE PRACTITIONER
Payer: MEDICARE

## 2024-04-09 ENCOUNTER — TRANSFERRED RECORDS (OUTPATIENT)
Dept: HEALTH INFORMATION MANAGEMENT | Facility: CLINIC | Age: 69
End: 2024-04-09

## 2024-04-09 VITALS
RESPIRATION RATE: 22 BRPM | SYSTOLIC BLOOD PRESSURE: 136 MMHG | HEART RATE: 64 BPM | DIASTOLIC BLOOD PRESSURE: 70 MMHG | BODY MASS INDEX: 38.02 KG/M2 | TEMPERATURE: 98.7 F | WEIGHT: 228.5 LBS | OXYGEN SATURATION: 94 %

## 2024-04-09 DIAGNOSIS — Z79.01 LONG TERM CURRENT USE OF ANTICOAGULANT THERAPY: Primary | ICD-10-CM

## 2024-04-09 DIAGNOSIS — M20.41 HAMMER TOE OF RIGHT FOOT: ICD-10-CM

## 2024-04-09 DIAGNOSIS — M21.621 TAILOR'S BUNION OF RIGHT FOOT: ICD-10-CM

## 2024-04-09 DIAGNOSIS — M21.611 BUNION, RIGHT: ICD-10-CM

## 2024-04-09 DIAGNOSIS — I26.99 PULMONARY EMBOLISM AND INFARCTION (H): ICD-10-CM

## 2024-04-09 DIAGNOSIS — I82.401 DEEP VEIN THROMBOSIS (DVT) OF RIGHT LOWER EXTREMITY, UNSPECIFIED CHRONICITY, UNSPECIFIED VEIN (H): ICD-10-CM

## 2024-04-09 DIAGNOSIS — Z01.818 PRE-OP EXAM: Primary | ICD-10-CM

## 2024-04-09 LAB
BASOPHILS # BLD AUTO: 0 10E3/UL (ref 0–0.2)
BASOPHILS NFR BLD AUTO: 1 %
EOSINOPHIL # BLD AUTO: 0.1 10E3/UL (ref 0–0.7)
EOSINOPHIL NFR BLD AUTO: 2 %
ERYTHROCYTE [DISTWIDTH] IN BLOOD BY AUTOMATED COUNT: 13.5 % (ref 10–15)
HCT VFR BLD AUTO: 39.3 %
HGB BLD-MCNC: 13.7 G/DL
HOLD SPECIMEN: NORMAL
IMM GRANULOCYTES # BLD: 0 10E3/UL
IMM GRANULOCYTES NFR BLD: 0 %
INR HOME MONITORING: 2.4 (ref 2–3)
LYMPHOCYTES # BLD AUTO: 2 10E3/UL (ref 0.8–5.3)
LYMPHOCYTES NFR BLD AUTO: 33 %
MCH RBC QN AUTO: 28.9 PG (ref 26.5–33)
MCHC RBC AUTO-ENTMCNC: 34.9 G/DL (ref 31.5–36.5)
MCV RBC AUTO: 83 FL (ref 78–100)
MONOCYTES # BLD AUTO: 0.6 10E3/UL (ref 0–1.3)
MONOCYTES NFR BLD AUTO: 10 %
NEUTROPHILS # BLD AUTO: 3.4 10E3/UL (ref 1.6–8.3)
NEUTROPHILS NFR BLD AUTO: 54 %
PLATELET # BLD AUTO: 198 10E3/UL (ref 150–450)
RBC # BLD AUTO: 4.74 10E6/UL
WBC # BLD AUTO: 6.2 10E3/UL (ref 4–11)

## 2024-04-09 PROCEDURE — 36415 COLL VENOUS BLD VENIPUNCTURE: CPT | Mod: ZL | Performed by: NURSE PRACTITIONER

## 2024-04-09 PROCEDURE — 99214 OFFICE O/P EST MOD 30 MIN: CPT | Performed by: NURSE PRACTITIONER

## 2024-04-09 PROCEDURE — 85025 COMPLETE CBC W/AUTO DIFF WBC: CPT | Mod: ZL | Performed by: NURSE PRACTITIONER

## 2024-04-09 PROCEDURE — G0463 HOSPITAL OUTPT CLINIC VISIT: HCPCS

## 2024-04-09 ASSESSMENT — PAIN SCALES - GENERAL: PAINLEVEL: MILD PAIN (3)

## 2024-04-09 NOTE — PATIENT INSTRUCTIONS
Preparing for Your Surgery  Getting started  A nurse will call you to review your health history and instructions. They will give you an arrival time based on your scheduled surgery time. Please be ready to share:  Your doctor's clinic name and phone number  Your medical, surgical, and anesthesia history  A list of allergies and sensitivities  A list of medicines, including herbal treatments and over-the-counter drugs  Whether the patient has a legal guardian (ask how to send us the papers in advance)  Please tell us if you're pregnant--or if there's any chance you might be pregnant. Some surgeries may injure a fetus (unborn baby), so they require a pregnancy test. Surgeries that are safe for a fetus don't always need a test, and you can choose whether to have one.   If you have a child who's having surgery, please ask for a copy of Preparing for Your Child's Surgery.    Preparing for surgery  Within 10 to 30 days of surgery: Have a pre-op exam (sometimes called an H&P, or History and Physical). This can be done at a clinic or pre-operative center.  If you're having a , you may not need this exam. Talk to your care team.  At your pre-op exam, talk to your care team about all medicines you take. If you need to stop any medicines before surgery, ask when to start taking them again.  We do this for your safety. Many medicines can make you bleed too much during surgery. Some change how well surgery (anesthesia) drugs work.  Call your insurance company to let them know you're having surgery. (If you don't have insurance, call [phone].)  Call your clinic if there's any change in your health. This includes signs of a cold or flu (sore throat, runny nose, cough, rash, fever). It also includes a scrape or scratch near the surgery site.  If you have questions on the day of surgery, call your hospital or surgery center.  Eating and drinking guidelines  For your safety: Unless your surgeon tells you otherwise,  follow the guidelines below.  Eat and drink as usual until 8 hours before you arrive for surgery. After that, no food or milk.  Drink clear liquids until 2 hours before you arrive. These are liquids you can see through, like water, Gatorade, and Propel Water. They also include plain black coffee and tea (no cream or milk), candy, and breath mints. You can spit out gum when you arrive.  If you drink alcohol: Stop drinking it the night before surgery.  If your care team tells you to take medicine on the morning of surgery, it's okay to take it with a sip of water.  Preventing infection  Shower or bathe the night before and morning of your surgery. Follow the instructions your clinic gave you. (If no instructions, use regular soap.)  Don't shave or clip hair near your surgery site. We'll remove the hair if needed.  Don't smoke or vape the morning of surgery. You may chew nicotine gum up to 2 hours before surgery. A nicotine patch is okay.  Note: Some surgeries require you to completely quit smoking and nicotine. Check with your surgeon.  Your care team will make every effort to keep you safe from infection. We will:  Clean our hands often with soap and water (or an alcohol-based hand rub).  Clean the skin at your surgery site with a special soap that kills germs.  Give you a special gown to keep you warm. (Cold raises the risk of infection.)  Wear special hair covers, masks, gowns and gloves during surgery.  Give antibiotic medicine, if prescribed. Not all surgeries need antibiotics.  What to bring on the day of surgery  Photo ID and insurance card  Copy of your health care directive, if you have one  Glasses and hearing aids (bring cases)  You can't wear contacts during surgery  Inhaler and eye drops, if you use them (tell us about these when you arrive)  CPAP machine or breathing device, if you use them  A few personal items, if spending the night  If you have . . .  A pacemaker, ICD (cardiac defibrillator) or other  implant: Bring the ID card.  An implanted stimulator: Bring the remote control.  A legal guardian: Bring a copy of the certified (court-stamped) guardianship papers.  Please remove any jewelry, including body piercings. Leave jewelry and other valuables at home.  If you're going home the day of surgery  You must have a responsible adult drive you home. They should stay with you overnight as well.  If you don't have someone to stay with you, and you aren't safe to go home alone, we may keep you overnight. Insurance often won't pay for this.  After surgery  If it's hard to control your pain or you need more pain medicine, please call your surgeon's office.  Questions?   If you have any questions for your care team, list them here: _________________________________________________________________________________________________________________________________________________________________________ ____________________________________ ____________________________________ ____________________________________  For informational purposes only. Not to replace the advice of your health care provider. Copyright   2003, 2019 [facility]. All rights reserved. Clinically reviewed by Jamaica Vincent MD. [employer] [id number] - REV 12/22.

## 2024-04-09 NOTE — PROGRESS NOTES
ANTICOAGULATION  MANAGEMENT-Home Monitor Managed by Exception    Cassy Avila 68 year old female is on warfarin with therapeutic INR result. (Goal INR 2.0-3.0)    Recent labs: (last 7 days)     04/09/24  0000   INR 2.4       Previous INR was Therapeutic  Medication, diet, health changes since last INR: patient is scheduled for foot surgery 4/17 having a preop today  Contacted within the last 12 weeks by phone on 2/26/2  Last [facility] referral date: 12/19/2023      DARIO     Cassy was NOT contacted regarding therapeutic result today per home monitoring policy manage by exception agreement.   Current warfarin dose is to be continued:     Summary  As of 4/9/2024      Full warfarin instructions:  7.5 mg every Mon, Wed, Fri; 5 mg all other days   Next INR check:  4/23/2024             ?   Corry Galvan RN  Anticoagulation Clinic  4/9/2024    _______________________________________________________________________     Anticoagulation Episode Summary       Current INR goal:  2.0-3.0   TTR:  73.2% (1 y)   Target end date:  Indefinite   Send INR reminders to:  DAREN CAMEJO    Indications    Long-term (current) use of anticoagulants [Z79.01] [Z79.01]  Pulmonary embolism and infarction (H) [I26.99]  Deep vein thrombosis (DVT) (H) [I82.409] (Resolved) [I82.409]  Deep vein thrombosis (DVT) of right lower extremity  unspecified chronicity  unspecified vein (H) [I82.401]             Comments:  [employer] Home Monitoring. Q2 week testing  Call cell phone with any dosing.             Anticoagulation Care Providers       Provider Role Specialty Phone number    Eliz Hartman CNP Referring Family Medicine [phone]

## 2024-04-10 ENCOUNTER — TELEPHONE (OUTPATIENT)
Dept: ANTICOAGULATION | Facility: OTHER | Age: 69
End: 2024-04-10
Payer: COMMERCIAL

## 2024-04-10 DIAGNOSIS — Z79.01 LONG TERM CURRENT USE OF ANTICOAGULANT THERAPY: Primary | ICD-10-CM

## 2024-04-10 DIAGNOSIS — I26.99 PULMONARY EMBOLISM AND INFARCTION (H): ICD-10-CM

## 2024-04-10 RX ORDER — ENOXAPARIN SODIUM 100 MG/ML
1 INJECTION SUBCUTANEOUS EVERY 12 HOURS
Qty: 18 ML | Refills: 1 | Status: SHIPPED | OUTPATIENT
Start: 2024-04-10 | End: 2024-06-10

## 2024-04-10 NOTE — TELEPHONE ENCOUNTER
Called and spoke to Patient after verifying last name and date of birth.   Spoke with patient about the schedule for holding warfarin and bridging with warfarin. Will send schedule and information in [facility] message to patient.     Savita Rodriguez RN on 4/10/2024 at 3:21 PM

## 2024-04-10 NOTE — TELEPHONE ENCOUNTER
Eliz Hartman NP,   Patient is having surgery on her foot on 4/17/24. I have attached a bridging schedule for her as well as ashley'd up the lovenox prescription. Please review.     Savita Rodriguez RN on 4/10/2024 at 1:56 PM

## 2024-04-10 NOTE — TELEPHONE ENCOUNTER
"Cassy Avila  [address] [place] [address]      Procedure Instructions for Anticoagulation     For your upcoming foot surgery on 4/17/24 your healthcare provider wants you to stop warfarin as directed below. To protect you from a clot while your off your warfarin, your provider wants you to inject a short-acting medication called enoxaparin (Lovenox), this is called \"bridging.\"      Enoxaparin (Lovenox) is an injection that you or a caregiver can give at home. It is injected just underneath the skin in your stomach. Your anticoagulation nurse can explain to you how to give the injection if you have not done so before. Attached is more information on enoxaparin. Additionally a video is available at [email or web address]    Bring these instructions with you to your procedure to show the provider doing the procedure. Please discuss with the provider doing the procedure if it is okay to restart warfarin and enoxaparin as we have planned.      You will take enoxaparin 100 mg every 12 hours (one full syringe) as outlined below. A prescription was sent to [facility] pharmacy in [facility].    4/11/24, Take last dose of warfarin  4/12/24, NO warfarin  4/13/24, NO warfarin  4/14/24, NO warfarin, enoxaparin every 12 hours (AM and PM)  4/15/24, NO warfarin, enoxaparin every 12 hours (AM and PM)  4/16/24, NO warfarin, enoxaparin AM only (no enoxaparin 24 hours prior to surgery)    4/17/24, DAY OF PROCEDURE, NO enoxaparin. Restart warfarin 15mg in the evening, if okay with provider doing the procedure.    Make sure to ask the provider doing your procedure if it is okay to begin your warfarin and enoxaparin as planned     4/18/24, Restart enoxaparin every 12 hours (AM and PM), unless instructed otherwise by the physician, and 10 mg warfarin in the evening.   4/19/24, Enoxaparin every 12 hours (AM and PM) and 7.5 mg warfarin in the evening.  4/20/24, Enoxaparin every 12 hours (AM and PM) and 5 mg " warfarin in the evening.  4/21/24, Enoxaparin every 12 hours (AM and PM) and 5 mg warfarin in the evening.  4/22/24, Enoxaparin every 12 hours (AM and PM) and 7.5 mg warfarin in the evening.  4/23/24, Enoxaparin in the AM. Recheck INR. Anticoagulation clinic to call with further dosing instructions.     Please watch for symptoms of both bleeding and clotting while on warfarin and enoxaparin:    Call 911 or go to the emergency room if you are experiencing any of the following:   Coughing or throwing up blood, can't control bleeding from a cut or injury after putting pressure on it, have a sudden severe headache, have red or black stools, or have red or orange urine.  Chest pain or shortness of breath  Any symptoms of a stroke including: sudden numbness or weakness in your face, arm or leg; sudden confusion or trouble speaking, reading or understanding; sudden blurred or decreased vision; sudden trouble walking or moving a part of the body; sudden severe headache for no reason.    Call your care team if you have:   Bleeding gums, large bruises, pale skin.  Sudden pain, tenderness or swelling in your leg or arm    Please contact the Anticoagulation Clinic at  [phone] or [phone]  if you have any questions or concerns.     Savita Rodriguez RN

## 2024-04-23 ENCOUNTER — ANTICOAGULATION THERAPY VISIT (OUTPATIENT)
Dept: ANTICOAGULATION | Facility: OTHER | Age: 69
End: 2024-04-23
Attending: NURSE PRACTITIONER
Payer: MEDICARE

## 2024-04-23 DIAGNOSIS — I26.99 PULMONARY EMBOLISM AND INFARCTION (H): ICD-10-CM

## 2024-04-23 DIAGNOSIS — Z79.01 LONG TERM CURRENT USE OF ANTICOAGULANT THERAPY: Primary | ICD-10-CM

## 2024-04-23 DIAGNOSIS — I82.401 DEEP VEIN THROMBOSIS (DVT) OF RIGHT LOWER EXTREMITY, UNSPECIFIED CHRONICITY, UNSPECIFIED VEIN (H): ICD-10-CM

## 2024-04-23 LAB — INR HOME MONITORING: 1.7 (ref 2–3)

## 2024-04-23 NOTE — PROGRESS NOTES
ANTICOAGULATION MANAGEMENT     Cassy Avila 68 year old female is on warfarin with subtherapeutic INR result. (Goal INR 2.0-3.0)    Recent labs: (last 7 days)     04/23/24  0000   INR 1.7*       ASSESSMENT     Source(s): Patient/ Care giver call     Warfarin doses taken: Less warfarin taken than planned which may be affecting INR and Held for 5 days for surgery  recently which may be affecting INR  Diet: No new diet changes identified  New illness, injury, or hospitalization: No  Medication/supplement changes: None noted  Signs or symptoms of bleeding or clotting: No  Previous INR: Therapeutic last 2(+) visits  Additional findings: None, Bridging with Enoxaparin until INR >= 2.0, and had foot surgery on      PLAN     Recommended plan for temporary change(s) affecting INR     Dosing Instructions: Continue your current warfarin dose with next INR in 3 days       Summary  As of 4/23/2024      Next INR check:                 Telephone call with Kaitlin who verbalizes understanding and agrees to plan    Patient to recheck with home meter    Education provided: Please call back if any changes to your diet, medications or how you've been taking warfarin    Plan made per [facility] anticoagulation protocol    Savita Rodriguez RN  Anticoagulation Clinic  4/23/2024    _______________________________________________________________________     Anticoagulation Episode Summary       Current INR goal:  2.0-3.0   TTR:  71.5% (1 y)   Target end date:  Indefinite   Send INR reminders to:  [facility]    Indications    Long-term (current) use of anticoagulants [Z79.01] [Z79.01]  Pulmonary embolism and infarction (H) [I26.99]  Deep vein thrombosis (DVT) (H) [I82.409] (Resolved) [I82.409]  Deep vein thrombosis (DVT) of right lower extremity  unspecified chronicity  unspecified vein (H) [I82.401]             Comments:  [employer] Home Monitoring. Q2 week testing  Call cell phone with any dosing.             Anticoagulation Care Providers        Provider Role Specialty Phone number    Eliz Hartman, CNP Referring Family Medicine [phone]

## 2024-04-24 DIAGNOSIS — I10 ESSENTIAL HYPERTENSION: ICD-10-CM

## 2024-04-24 RX ORDER — METOPROLOL SUCCINATE 50 MG/1
TABLET, EXTENDED RELEASE ORAL DAILY
Qty: 90 TABLET | Refills: 3 | Status: SHIPPED | OUTPATIENT
Start: 2024-04-24

## 2024-04-24 RX ORDER — HYDROCHLOROTHIAZIDE 25 MG/1
25 TABLET ORAL DAILY
Qty: 90 TABLET | Refills: 3 | Status: SHIPPED | OUTPATIENT
Start: 2024-04-24

## 2024-04-24 NOTE — TELEPHONE ENCOUNTER
metoprolol succinate ER (TOPROL XL) 50 MG 24 hr tablet 90 tablet 1 4/24/2023     hydrochlorothiazide (HYDRODIURIL) 25 MG tablet 90 tablet 1 9/29/2023     Last Office Visit: 04/09/2024  Future Office visit:       Routing refill request to provider for review/approval because:

## 2024-04-25 ENCOUNTER — TRANSFERRED RECORDS (OUTPATIENT)
Dept: HEALTH INFORMATION MANAGEMENT | Facility: CLINIC | Age: 69
End: 2024-04-25
Payer: COMMERCIAL

## 2024-04-26 ENCOUNTER — ANTICOAGULATION THERAPY VISIT (OUTPATIENT)
Dept: ANTICOAGULATION | Facility: OTHER | Age: 69
End: 2024-04-26
Attending: NURSE PRACTITIONER
Payer: COMMERCIAL

## 2024-04-26 DIAGNOSIS — I82.401 DEEP VEIN THROMBOSIS (DVT) OF RIGHT LOWER EXTREMITY, UNSPECIFIED CHRONICITY, UNSPECIFIED VEIN (H): ICD-10-CM

## 2024-04-26 DIAGNOSIS — Z79.01 LONG TERM CURRENT USE OF ANTICOAGULANT THERAPY: Primary | ICD-10-CM

## 2024-04-26 DIAGNOSIS — I26.99 PULMONARY EMBOLISM AND INFARCTION (H): ICD-10-CM

## 2024-04-26 LAB — INR HOME MONITORING: 2.5 (ref 2–3)

## 2024-04-26 NOTE — PROGRESS NOTES
ANTICOAGULATION MANAGEMENT     Cassy Avila 68 year old female is on warfarin with subtherapeutic INR result. (Goal INR 2.0-3.0)    Recent labs: (last 7 days)     04/26/24  0000   INR 2.5       ASSESSMENT     Source(s): Chart Review and Patient/Caregiver Call     Warfarin doses taken: Warfarin taken as instructed  Diet: No new diet changes identified  New illness, injury, or hospitalization: No  Medication/supplement changes: None noted  Signs or symptoms of bleeding or clotting: No  Previous INR: Subtherapeutic  Additional findings: None       PLAN     Recommended plan for no diet, medication or health factor changes affecting INR     Dosing Instructions: Continue your current warfarin dose with next INR in 2 weeks       Summary  As of 4/26/2024      Full warfarin instructions:  7.5 mg every Mon, Wed, Fri; 5 mg all other days   Next INR check:  5/7/2024               Telephone call with Kaitlin who verbalizes understanding and agrees to plan    Lab visit scheduled    Education provided: Please call back if any changes to your diet, medications or how you've been taking warfarin    Plan made per [facility] anticoagulation protocol    Melody Gilmore RN  Anticoagulation Clinic  4/26/2024    _______________________________________________________________________     Anticoagulation Episode Summary       Current INR goal:  2.0-3.0   TTR:  72.0% (1 y)   Target end date:  Indefinite   Send INR reminders to:  [facility]    Indications    Long-term (current) use of anticoagulants [Z79.01] [Z79.01]  Pulmonary embolism and infarction (H) [I26.99]  Deep vein thrombosis (DVT) (H) [I82.409] (Resolved) [I82.409]  Deep vein thrombosis (DVT) of right lower extremity  unspecified chronicity  unspecified vein (H) [I82.401]             Comments:  [employer] Monitoring. Q2 week testing  Call cell phone with any dosing.             Anticoagulation Care Providers       Provider Role Specialty Phone number    Minnie, Eliz, CNP Referring  Family Medicine [phone]

## 2024-04-30 ENCOUNTER — TRANSFERRED RECORDS (OUTPATIENT)
Dept: HEALTH INFORMATION MANAGEMENT | Facility: CLINIC | Age: 69
End: 2024-04-30
Payer: COMMERCIAL

## 2024-05-06 ENCOUNTER — HOSPITAL ENCOUNTER (EMERGENCY)
Facility: HOSPITAL | Age: 69
Discharge: HOME | End: 2024-05-06
Attending: NURSE PRACTITIONER | Admitting: NURSE PRACTITIONER
Payer: MEDICARE

## 2024-05-06 VITALS
HEART RATE: 65 BPM | RESPIRATION RATE: 16 BRPM | TEMPERATURE: 98.3 F | HEIGHT: 65 IN | BODY MASS INDEX: 36.65 KG/M2 | DIASTOLIC BLOOD PRESSURE: 76 MMHG | OXYGEN SATURATION: 98 % | WEIGHT: 220 LBS | SYSTOLIC BLOOD PRESSURE: 121 MMHG

## 2024-05-06 DIAGNOSIS — L03.115 CELLULITIS OF RIGHT LOWER LEG: Primary | ICD-10-CM

## 2024-05-06 PROCEDURE — 99213 OFFICE O/P EST LOW 20 MIN: CPT | Performed by: NURSE PRACTITIONER

## 2024-05-06 PROCEDURE — G0463 HOSPITAL OUTPT CLINIC VISIT: HCPCS

## 2024-05-06 RX ORDER — CLINDAMYCIN HCL 150 MG
450 CAPSULE ORAL 3 TIMES DAILY
Qty: 45 CAPSULE | Refills: 0 | Status: SHIPPED | OUTPATIENT
Start: 2024-05-06 | End: 2024-05-11

## 2024-05-06 ASSESSMENT — ENCOUNTER SYMPTOMS
DIARRHEA: 0
PSYCHIATRIC NEGATIVE: 1
SHORTNESS OF BREATH: 0
FEVER: 0
WOUND: 0
COLOR CHANGE: 1
NAUSEA: 0
VOMITING: 0
CHILLS: 0

## 2024-05-06 ASSESSMENT — ACTIVITIES OF DAILY LIVING (ADL): ADLS_ACUITY_SCORE: 37

## 2024-05-06 NOTE — ED PROVIDER NOTES
History     Chief Complaint   Patient presents with    Leg Pain     HPI  Cassy Avila is a 68 year old female who presents to urgent care today via wheelchair with complaints of redness warmth and pain to right shin.  Patient recently had right foot surgery and has been using a knee scooter and redness started where the knee scooter hits.  Denies any calf pain or tenderness.  Currently on Coumadin, takes medication as ordered.  Denies any foot or ankle issues postsurgery.  Denies any fever, chills, nausea, vomiting, diarrhea, shortness of breath or chest pain.  No OTC meds.  No other concerns.    Allergies:  Allergies   Allergen Reactions    Amlodipine Besylate Swelling     Norvasc    Amoxicillin     Atorvastatin      myualgia    Cephalexin Monohydrate Hives     Keflex    Erythromycin Base [Erythromycin Base] Nausea and Vomiting    Meloxicam Other (See Comments)     Mobic - confusion, depression    Sulfa Antibiotics Hives    Adhesive Tape Rash    Prochlorperazine Edisylate Swelling and Rash     Compazine    Prochlorperazine Maleate Swelling and Rash       Problem List:    Patient Active Problem List    Diagnosis Date Noted    History of gout 02/27/2024     Priority: Medium    Deep vein thrombosis (DVT) of right lower extremity, unspecified chronicity, unspecified vein (H) 01/24/2023     Priority: Medium    Shoulder pain 01/31/2022     Priority: Medium     Formatting of this note might be different from the original.  Added automatically from request for surgery [id number]      Muscle weakness of right upper extremity 04/15/2021     Priority: Medium    Pain in right upper arm 04/15/2021     Priority: Medium    Stiffness of right shoulder joint 04/15/2021     Priority: Medium    Diarrhea 02/08/2021     Priority: Medium    Obesity (BMI 35.0-39.9) with comorbidity (H) 01/28/2020     Priority: Medium    Factor V Leiden mutation (H24) 07/26/2019     Priority: Medium    Activated protein C resistance (H24) 07/26/2019      Priority: Medium    Primary insomnia 10/31/2018     Priority: Medium    Vitamin D deficiency 07/13/2018     Priority: Medium    Family history of colon cancer 01/02/2018     Priority: Medium    Lumbar spondylosis with myelopathy 07/12/2017     Priority: Medium    Degeneration of lumbar or lumbosacral intervertebral disc 07/12/2017     Priority: Medium    Long-term (current) use of anticoagulants [Z79.01] 07/20/2016     Priority: Medium    Moderate episode of recurrent major depressive disorder (H) 08/08/2014     Priority: Medium    H/O total shoulder replacement, left 06/17/2014     Priority: Medium    Failed arthroplasty (H24) 01/24/2014     Priority: Medium    Advanced care planning/counseling discussion 03/12/2013     Priority: Medium     Pt has information at home , not completed      Neurofibromatosis, type 1 (H) 08/22/2012     Priority: Medium     Problem list name updated by automated process. Provider to review    Formatting of this note might be different from the original.  Formatting of this note might be different from the original.  Problem list name updated by automated process. Provider to review      Chest pain, unspecified 02/11/2010     Priority: Medium     Formatting of this note might be different from the original.  IMO Update 10/11      Contact dermatitis and other eczema, due to unspecified cause 06/01/2004     Priority: Medium    Pulmonary embolism and infarction (H) 04/11/2003     Priority: Medium     Problem list name updated by automated process. Provider to review      Hyperlipidemia LDL goal <100 07/11/2002     Priority: Medium     Problem list name updated by automated process. Provider to review      Edema 01/18/2002     Priority: Medium    Primary hypertension 02/20/2001     Priority: Medium     Problem list name updated by automated process. Provider to review    Formatting of this note might be different from the original.  Formatting of this note might be different from the  original.  IMO Update 10/11  Formatting of this note might be different from the original.  Problem list name updated by automated process. Provider to review          Past Medical History:    Past Medical History:   Diagnosis Date    Abdominal pain, generalized 2/8/2021    Arthritis     Cervicalgia 1/9/2001    Closed dislocation of shoulder, unspecified site 2000    Congenital deficiency of other clotting factors 9/7/2012    Congenital factor VIII disorder (H) 7/26/2019    Contact dermatitis and other eczema, due to unspecified cause 6/1/2004    Coughing     Diarrhea 2/8/2021    Edema 1/18/2002    Essential hypertension 2/20/2001    Factor V Leiden (H24)     Factor V Leiden mutation (H24) 7/26/2019    Family history of colon cancer 1/2/2018    Gastro-oesophageal reflux disease     Herpes zoster without mention of complication 2003    Hyperlipidemia LDL goal <100 7/11/2002    Long term (current) use of anticoagulants 8/20/2003    Major depression 8/8/2014    Mammographic microcalcification 2003    Migraine, unspecified, without mention of intractable migraine without mention of status migrainosus 3/5/2001    Moderate episode of recurrent major depressive disorder (H) 8/8/2014    Neurofibromatosis, peripheral, NF1 (H) 8/22/2012    Neurofibromatosis, unspecified(237.70) 8/22/2012    Other and unspecified hyperlipidemia 7/11/2002    Other chronic pain     Other pulmonary embolism and infarction 4/11/2003    Primary insomnia 10/31/2018    Statin medication not prescribed per physician orders 1/17/2018    Tachycardia, unspecified 2/12/2001    Thrombosis of leg     Unspecified essential hypertension 2/20/2001    Vitamin D deficiency 7/13/2018       Past Surgical History:    Past Surgical History:   Procedure Laterality Date    ------------OTHER-------------  2012    shoulder replacement; Provider: Karen    ARTHROPLASTY KNEE  06/20/2014    Procedure: ARTHROPLASTY KNEE;  Surgeon: Sean Alexander MD;  Location: [facility]     ARTHROSCOPY SHOULDER  2012    right, bone spurs    CA ANESTH,SHOULDER REPLACEMENT Right 2020     SECTION      x3    CHOLECYSTECTOMY      COLONOSCOPY  02/15/2018    [place],,polyps    elbow ulnar tunnel release      ELECTROTHERMAL THERAPY INTRADISC  2017    stimulator    ENDOSCOPIC SINUS SURGERY, SEPTOPLASTY, TURBINOPLASTY, MAXILLARY SINUSOTOMY, COMBINED N/A 2015    Procedure: COMBINED ENDOSCOPIC SINUS SURGERY, SEPTOPLASTY, TURBINOPLASTY, MAXILLARY SINUSOTOMY;  Surgeon: Seema Conn MD;  Location: [facility]    ESOPHAGOSCOPY, GASTROSCOPY, DUODENOSCOPY (EGD), COMBINED      with biopsy and endoscopic U/S    EXCISE NEUROMA LOWER EXTREMITY Left 2016    Procedure: EXCISE NEUROMA LOWER EXTREMITY;  Surgeon: Edi Reed MD;  Location: [facility]    EYE SURGERY Right 2020    FUSION LUMBAR ANTERIOR WITH MARCY CAGES      L5-S1    HYSTERECTOMY TOTAL ABDOMINAL, BILATERAL SALPINGO-OOPHORECTOMY, COMBINED N/A     ORTHOPEDIC SURGERY  2015    right shoulder    ORTHOPEDIC SURGERY  2015    right knee    ORTHOPEDIC SURGERY Right 2018    hip labrum tear    pionidal cyst excision      TRANSPOSITION ULNAR NERVE (ELBOW)         Family History:    Family History   Problem Relation Age of Onset    Cancer Mother     Colon Polyps Mother     Heart Failure Mother 87        congestive, cause of death    Myocardial Infarction Mother         myocardial infarction    Myocardial Infarction Father         myocardial infarction - cause of death    C.A.D. Father     Cancer Paternal Uncle         cause of death    C.A.D. Brother     Other - See Comments Other         factor 5 - family h/o    Asthma No family hx of        Social History:  Marital Status:   [2]  Social History     Tobacco Use    Smoking status: Former     Current packs/day: 0.00     Average packs/day: 1 pack/day for 30.0 years (30.0 ttl pk-yrs)     Types: Cigarettes, Pipe     Start date: 1969     Quit date: 1999      "Years since quittin.3    Smokeless tobacco: Never    Tobacco comments:     quit in    Vaping Use    Vaping status: Never Used   Substance Use Topics    Alcohol use: No    Drug use: No        Medications:    clindamycin (CLEOCIN) 150 MG capsule  albuterol (PROVENTIL) (2.5 MG/3ML) 0.083% neb solution  aspirin 81 MG EC tablet  Cholecalciferol (VITAMIN D3 PO)  enoxaparin ANTICOAGULANT (LOVENOX) 100 MG/ML syringe  escitalopram (LEXAPRO) 10 MG tablet  hydrochlorothiazide (HYDRODIURIL) 25 MG tablet  ketoconazole (NIZORAL) 2 % external cream  metoprolol succinate ER (TOPROL XL) 50 MG 24 hr tablet  order for DME  potassium chloride ER (K-TAB/KLOR-CON) 10 MEQ CR tablet  traZODone (DESYREL) 50 MG tablet  warfarin ANTICOAGULANT (COUMADIN) 5 MG tablet      Review of Systems   Constitutional:  Negative for chills and fever.   Respiratory:  Negative for shortness of breath.    Cardiovascular:  Negative for chest pain.   Gastrointestinal:  Negative for diarrhea, nausea and vomiting.   Musculoskeletal:  Negative for gait problem.   Skin:  Positive for color change (Redness to right shin). Negative for wound.   Psychiatric/Behavioral: Negative.       Physical Exam   BP: 121/76  Pulse: 65  Temp: 98.3  F (36.8  C)  Resp: 16  Height: 165.1 cm (5' 5\")  Weight: 99.8 kg (220 lb)  SpO2: 98 %    Physical Exam  Vitals and nursing note reviewed.   Constitutional:       General: She is not in acute distress.     Appearance: Normal appearance. She is not ill-appearing or toxic-appearing.   Cardiovascular:      Rate and Rhythm: Normal rate and regular rhythm.      Pulses: Normal pulses.      Heart sounds: Normal heart sounds.   Pulmonary:      Effort: Pulmonary effort is normal.      Breath sounds: Normal breath sounds.   Skin:     General: Skin is warm and dry.      Capillary Refill: Capillary refill takes less than 2 seconds.      Comments: Mild cellulitis to right shin, no drainage or wounds.   Neurological:      Mental Status: She is " alert.   Psychiatric:         Mood and Affect: Mood normal.       ED Course     No results found. However, due to the size of the patient record, not all encounters were searched. Please check Results Review for a complete set of results.    Medications - No data to display    Assessments & Plan (with Medical Decision Making)     I have reviewed the nursing notes.    I have reviewed the findings, diagnosis, plan and need for follow up with the patient.  (L03.115) Cellulitis of right lower leg  (primary encounter diagnosis)  Plan:   Patient ambulatory with a nontoxic appearance.  Mild cellulitis to right shin, no drainage or wounds.  No calf pain or leg pain/tenderness.  Currently on Coumadin, taking as ordered.  Denies any postop issues related to right foot surgery.  Denies any fever, chills, nausea, vomiting, diarrhea, shortness of breath or chest pain.  Will treat cellulitis with clindamycin due to multiple allergies.  Patient to follow-up with primary care provider or return to urgent care/ED with any worsening in condition or additional concerns.  Patient in agreement treatment plan.    Discharge Medication List as of 5/6/2024  5:10 PM        START taking these medications    Details   clindamycin (CLEOCIN) 150 MG capsule Take 3 capsules (450 mg) by mouth 3 times daily for 5 days, Disp-45 capsule, R-0, E-Prescribe           Final diagnoses:   Cellulitis of right lower leg     5/6/2024   [facility]       Adri Rodriguez, NORAH  05/06/24 [id number]

## 2024-05-06 NOTE — ED TRIAGE NOTES
MELINDA Rodriguez CNP assessed patient in triage and determined patient Urgent Care appropriate. Will be seen in Urgent Care.

## 2024-05-06 NOTE — DISCHARGE INSTRUCTIONS
Clindamycin as ordered  - Take entire course of antibiotic even if you start to feel better.  - Antibiotics can cause stomach upset including nausea and diarrhea. Read your bottle or ask the pharmacist if antibiotic can be taken with food to help prevent nausea. If you have symptoms of diarrhea you can take an over-the-counter probiotic and/or increase foods with probiotics such as yogurt, [place], sauerkraut.    Follow-up with primary care provider or return to urgent care/ED with any worsening in condition or additional concerns

## 2024-05-07 ENCOUNTER — TELEPHONE (OUTPATIENT)
Dept: FAMILY MEDICINE | Facility: OTHER | Age: 69
End: 2024-05-07

## 2024-05-07 ENCOUNTER — MEDICAL CORRESPONDENCE (OUTPATIENT)
Dept: ULTRASOUND IMAGING | Facility: HOSPITAL | Age: 69
End: 2024-05-07

## 2024-05-07 ENCOUNTER — HOSPITAL ENCOUNTER (OUTPATIENT)
Dept: ULTRASOUND IMAGING | Facility: HOSPITAL | Age: 69
Discharge: HOME | End: 2024-05-07
Attending: PODIATRIST | Admitting: PODIATRIST
Payer: MEDICARE

## 2024-05-07 ENCOUNTER — ANTICOAGULATION THERAPY VISIT (OUTPATIENT)
Dept: ANTICOAGULATION | Facility: OTHER | Age: 69
End: 2024-05-07
Attending: NURSE PRACTITIONER
Payer: MEDICARE

## 2024-05-07 DIAGNOSIS — I82.401 DEEP VEIN THROMBOSIS (DVT) OF RIGHT LOWER EXTREMITY, UNSPECIFIED CHRONICITY, UNSPECIFIED VEIN (H): ICD-10-CM

## 2024-05-07 DIAGNOSIS — I26.99 PULMONARY EMBOLISM AND INFARCTION (H): ICD-10-CM

## 2024-05-07 DIAGNOSIS — Z79.01 LONG TERM CURRENT USE OF ANTICOAGULANT THERAPY: Primary | ICD-10-CM

## 2024-05-07 DIAGNOSIS — I82.409 ACUTE THROMBOEMBOLISM OF DEEP VEINS OF LOWER EXTREMITY (H): ICD-10-CM

## 2024-05-07 DIAGNOSIS — I82.591 CHRONIC EMBOLISM AND THROMBOSIS OF OTHER SPECIFIED DEEP VEIN OF RIGHT LOWER EXTREMITY (H): ICD-10-CM

## 2024-05-07 LAB — INR HOME MONITORING: 2.7 (ref 2–3)

## 2024-05-07 PROCEDURE — 93971 EXTREMITY STUDY: CPT | Mod: RT

## 2024-05-07 NOTE — TELEPHONE ENCOUNTER
How weird that they didn't order an US if they were concerned about a clot.   She is on chronic anticoagulation  Which leg?      Eliz MCFARLANE  [phone]

## 2024-05-07 NOTE — PROGRESS NOTES
ANTICOAGULATION MANAGEMENT     Cassy Avila 68 year old female is on warfarin with therapeutic INR result. (Goal INR 2.0-3.0)    Recent labs: (last 7 days)     05/07/24  0000   INR 2.7       ASSESSMENT     Source(s): Patient/ Care giver call     Warfarin doses taken: Warfarin taken as instructed  Diet: No new diet changes identified  New illness, injury, or hospitalization: Yes: Cellulitis of right lower leg   Medication/supplement changes:  clindamycin  started on 05/06/2024 No interaction anticipated  Signs or symptoms of bleeding or clotting: No  Previous INR: Therapeutic last visit; previously outside of goal range  Additional findings: None     PLAN     Recommended plan for temporary change(s) affecting INR     Dosing Instructions: Continue your current warfarin dose with next INR in 1 week       Summary  As of 5/7/2024      Full warfarin instructions:  7.5 mg every Mon, Wed, Fri; 5 mg all other days   Next INR check:  5/14/2024               Telephone call with Kaitlin who verbalizes understanding and agrees to plan    Patient to recheck with home meter    Education provided: Please call back if any changes to your diet, medications or how you've been taking warfarin    Plan made per [facility] anticoagulation protocol    Nikki Deshpande, RN  Anticoagulation Clinic  5/7/2024    _______________________________________________________________________     Anticoagulation Episode Summary       Current INR goal:  2.0-3.0   TTR:  75.0% (1 y)   Target end date:  Indefinite   Send INR reminders to:  [facility]    Indications    Long-term (current) use of anticoagulants [Z79.01] [Z79.01]  Pulmonary embolism and infarction (H) [I26.99]  Deep vein thrombosis (DVT) (H) [I82.409] (Resolved) [I82.409]  Deep vein thrombosis (DVT) of right lower extremity  unspecified chronicity  unspecified vein (H) [I82.401]             Comments:  [employer] Monitoring. Q2 week testing  Call cell phone with any dosing.              Anticoagulation Care Providers       Provider Role Specialty Phone number    Eliz Hartman, CNP Referring Family Medicine [phone]

## 2024-05-07 NOTE — TELEPHONE ENCOUNTER
Recommendations from pcp given no calf pain noted per patient is on anticoagulant and antibiotic to er/uc with worsening symptoms call surgeon to have put in for f/o blood clot if needed.

## 2024-05-07 NOTE — TELEPHONE ENCOUNTER
12:59 PM    Reason for Call: Phone Call    Description: Patient requesting call back from provider or nurse. Patient went to urgent care 05/06/24 and they suggested an ultrasound to make sure patient has no blood clot due to prior foot surgery.    Was an appointment offered for this call? No  If yes : Appointment type              Date    Preferred method for responding to this message: Telephone Call  What is your phone number ? [phone]    If we cannot reach you directly, may we leave a detailed response at the number you provided? Yes    Can this message wait until your PCP/provider returns, if available today? Provider is out    Jose Husain

## 2024-05-14 ENCOUNTER — APPOINTMENT (OUTPATIENT)
Dept: ANTICOAGULATION | Facility: OTHER | Age: 69
End: 2024-05-14
Attending: NURSE PRACTITIONER
Payer: MEDICARE

## 2024-05-21 ENCOUNTER — ANTICOAGULATION THERAPY VISIT (OUTPATIENT)
Dept: ANTICOAGULATION | Facility: OTHER | Age: 69
End: 2024-05-21
Attending: NURSE PRACTITIONER
Payer: COMMERCIAL

## 2024-05-21 DIAGNOSIS — Z79.01 LONG TERM CURRENT USE OF ANTICOAGULANT THERAPY: Primary | ICD-10-CM

## 2024-05-21 DIAGNOSIS — I82.401 DEEP VEIN THROMBOSIS (DVT) OF RIGHT LOWER EXTREMITY, UNSPECIFIED CHRONICITY, UNSPECIFIED VEIN (H): ICD-10-CM

## 2024-05-21 DIAGNOSIS — I26.99 PULMONARY EMBOLISM AND INFARCTION (H): ICD-10-CM

## 2024-05-21 LAB — INR HOME MONITORING: 2.4 (ref 2–3)

## 2024-05-21 NOTE — PROGRESS NOTES
ANTICOAGULATION  MANAGEMENT-Home Monitor Managed by Exception    Cassy APPLE Avila 68 year old female is on warfarin with therapeutic INR result. (Goal INR 2.0-3.0)    Recent labs: (last 7 days)     05/21/24  0000   INR 2.4       Previous INR was Therapeutic  Medication, diet, health changes since last INR:chart reviewed; none identified  Contacted within the last 12 weeks by phone on 5/7/24  Last [facility] referral date: 12/19/2023      DARIO     Cassy was NOT contacted regarding therapeutic result today per home monitoring policy manage by exception agreement.   Current warfarin dose is to be continued:     Summary  As of 5/21/2024      Full warfarin instructions:  7.5 mg every Mon, Wed, Fri; 5 mg all other days   Next INR check:  6/4/2024             ?   Corry Galvan RN  Anticoagulation Clinic  5/21/2024    _______________________________________________________________________     Anticoagulation Episode Summary       Current INR goal:  2.0-3.0   TTR:  77.5% (1 y)   Target end date:  Indefinite   Send INR reminders to:  [facility]    Indications    Long-term (current) use of anticoagulants [Z79.01] [Z79.01]  Pulmonary embolism and infarction (H) [I26.99]  Deep vein thrombosis (DVT) (H) [I82.409] (Resolved) [I82.409]  Deep vein thrombosis (DVT) of right lower extremity  unspecified chronicity  unspecified vein (H) [I82.401]             Comments:  [employer] Monitoring. Q2 week testing  Call cell phone with any dosing.             Anticoagulation Care Providers       Provider Role Specialty Phone number    Eliz Hartman CNP Referring Family Medicine [phone]

## 2024-05-28 DIAGNOSIS — I10 PRIMARY HYPERTENSION: ICD-10-CM

## 2024-05-28 RX ORDER — POTASSIUM CHLORIDE 750 MG/1
10 TABLET, EXTENDED RELEASE ORAL DAILY
Qty: 90 TABLET | Refills: 2 | Status: SHIPPED | OUTPATIENT
Start: 2024-05-28

## 2024-05-29 ENCOUNTER — TRANSFERRED RECORDS (OUTPATIENT)
Dept: HEALTH INFORMATION MANAGEMENT | Facility: CLINIC | Age: 69
End: 2024-05-29
Payer: COMMERCIAL

## 2024-06-04 ENCOUNTER — APPOINTMENT (OUTPATIENT)
Dept: ANTICOAGULATION | Facility: OTHER | Age: 69
End: 2024-06-04
Attending: NURSE PRACTITIONER
Payer: COMMERCIAL

## 2024-06-05 ENCOUNTER — TELEPHONE (OUTPATIENT)
Dept: FAMILY MEDICINE | Facility: OTHER | Age: 69
End: 2024-06-05

## 2024-06-10 ENCOUNTER — OFFICE VISIT (OUTPATIENT)
Dept: FAMILY MEDICINE | Facility: OTHER | Age: 69
End: 2024-06-10
Attending: NURSE PRACTITIONER
Payer: COMMERCIAL

## 2024-06-10 VITALS
HEART RATE: 66 BPM | DIASTOLIC BLOOD PRESSURE: 70 MMHG | TEMPERATURE: 98.9 F | BODY MASS INDEX: 37.75 KG/M2 | SYSTOLIC BLOOD PRESSURE: 130 MMHG | OXYGEN SATURATION: 97 % | RESPIRATION RATE: 20 BRPM | WEIGHT: 226.85 LBS

## 2024-06-10 DIAGNOSIS — Z48.02 VISIT FOR SUTURE REMOVAL: ICD-10-CM

## 2024-06-10 DIAGNOSIS — M10.9 ACUTE GOUT, UNSPECIFIED CAUSE, UNSPECIFIED SITE: Primary | ICD-10-CM

## 2024-06-10 LAB
HOLD SPECIMEN: NORMAL
HOLD SPECIMEN: NORMAL
URATE SERPL-MCNC: 9.1 MG/DL (ref 2.4–5.7)

## 2024-06-10 PROCEDURE — 99213 OFFICE O/P EST LOW 20 MIN: CPT | Performed by: NURSE PRACTITIONER

## 2024-06-10 PROCEDURE — 36415 COLL VENOUS BLD VENIPUNCTURE: CPT | Mod: ZL | Performed by: NURSE PRACTITIONER

## 2024-06-10 PROCEDURE — 84550 ASSAY OF BLOOD/URIC ACID: CPT | Mod: ZL | Performed by: NURSE PRACTITIONER

## 2024-06-10 PROCEDURE — G0463 HOSPITAL OUTPT CLINIC VISIT: HCPCS

## 2024-06-10 RX ORDER — COLCHICINE 0.6 MG/1
TABLET ORAL
Qty: 3 TABLET | Refills: 0 | Status: SHIPPED | OUTPATIENT
Start: 2024-06-10 | End: 2024-09-19

## 2024-06-10 ASSESSMENT — PAIN SCALES - GENERAL: PAINLEVEL: MODERATE PAIN (5)

## 2024-06-10 NOTE — PROGRESS NOTES
Assessment & Plan         Acute gout, unspecified cause, unspecified site  - Uric acid; Future  - Uric acid  - colchicine (COLCRYS) 0.6 MG tablet; 2 tabs now, then one tab 2 hours later  - If uric acid level is high, will consider allopurinol if the coumadin clinic feels this is reasonable        Visit for suture removal  - Sutures removed  - Steri strips applied        Eliz [facility]  [phone]           Kaitlin is a 68 year old, presenting for the following health issues:  Arthritis        Gout/ Single Inflamed Joint  Onset/Duration: this morning  Description:   Location: foot and ankle - left  Joint Swelling: No  Redness: No  Pain: YES  Intensity: 5/10  Progression of Symptoms: worsening  Accompanying Signs & Symptoms:  Fevers: No  History:   Trauma to the area: No  Previous history of gout: YES  Recent illness: No  Alcohol use: No  Diuretic use: YES  Precipitating or alleviating factors: None  Therapies tried and outcome: none          Patient Active Problem List   Diagnosis    Primary hypertension    Pulmonary embolism and infarction (H)    Contact dermatitis and other eczema, due to unspecified cause    Edema    Hyperlipidemia LDL goal <100    Neurofibromatosis, type 1 (H)    Advanced care planning/counseling discussion    Moderate episode of recurrent major depressive disorder (H)    Long-term (current) use of anticoagulants [Z79.01]    Lumbar spondylosis with myelopathy    Degeneration of lumbar or lumbosacral intervertebral disc    Family history of colon cancer    Vitamin D deficiency    Failed arthroplasty (H24)    H/O total shoulder replacement, left    Primary insomnia    Factor V Leiden mutation (H24)    Obesity (BMI 35.0-39.9) with comorbidity (H)    Diarrhea    Chest pain, unspecified    Muscle weakness of right upper extremity    Pain in right upper arm    Stiffness of right shoulder joint    Activated protein C resistance (H24)    Shoulder pain    Deep vein thrombosis (DVT) of right lower  extremity, unspecified chronicity, unspecified vein (H)    History of gout     Past Surgical History:   Procedure Laterality Date    ------------OTHER-------------      shoulder replacement; Provider: Karen    ARTHROPLASTY KNEE  2014    Procedure: ARTHROPLASTY KNEE;  Surgeon: Sean Alexander MD;  Location: [facility] OR    ARTHROSCOPY SHOULDER  2012    right, bone spurs    CA ANESTH,SHOULDER REPLACEMENT Right 2020     SECTION      x3    CHOLECYSTECTOMY      COLONOSCOPY  02/15/2018    [place],,polyps    elbow ulnar tunnel release      ELECTROTHERMAL THERAPY INTRADISC  2017    stimulator    ENDOSCOPIC SINUS SURGERY, SEPTOPLASTY, TURBINOPLASTY, MAXILLARY SINUSOTOMY, COMBINED N/A 2015    Procedure: COMBINED ENDOSCOPIC SINUS SURGERY, SEPTOPLASTY, TURBINOPLASTY, MAXILLARY SINUSOTOMY;  Surgeon: Seema Conn MD;  Location: [facility] OR    ESOPHAGOSCOPY, GASTROSCOPY, DUODENOSCOPY (EGD), COMBINED      with biopsy and endoscopic U/S    EXCISE NEUROMA LOWER EXTREMITY Left 2016    Procedure: EXCISE NEUROMA LOWER EXTREMITY;  Surgeon: Edi Reed MD;  Location: [facility]    EYE SURGERY Right 2020    FUSION LUMBAR ANTERIOR WITH MARCY CAGES      L5-S1    HYSTERECTOMY TOTAL ABDOMINAL, BILATERAL SALPINGO-OOPHORECTOMY, COMBINED N/A     ORTHOPEDIC SURGERY  2015    right shoulder    ORTHOPEDIC SURGERY  2015    right knee    ORTHOPEDIC SURGERY Right 2018    hip labrum tear    pionidal cyst excision      TRANSPOSITION ULNAR NERVE (ELBOW)         Social History     Tobacco Use    Smoking status: Former     Current packs/day: 0.00     Average packs/day: 1 pack/day for 30.0 years (30.0 ttl pk-yrs)     Types: Cigarettes, Pipe     Start date: 1969     Quit date: 1999     Years since quittin.4    Smokeless tobacco: Never    Tobacco comments:     quit in    Substance Use Topics    Alcohol use: No     Family History   Problem Relation Age of Onset    Cancer Mother      Colon Polyps Mother     Heart Failure Mother 87        congestive, cause of death    Myocardial Infarction Mother         myocardial infarction    Myocardial Infarction Father         myocardial infarction - cause of death    C.A.D. Father     Cancer Paternal Uncle         cause of death    C.A.D. Brother     Other - See Comments Other         factor 5 - family h/o    Asthma No family hx of              Current Outpatient Medications   Medication Sig Dispense Refill    albuterol (PROVENTIL) (2.5 MG/3ML) 0.083% neb solution Take 2.5 mg by nebulization every 6 hours as needed for shortness of breath / dyspnea or wheezing      aspirin 81 MG EC tablet Take 81 mg by mouth daily HS      Cholecalciferol (VITAMIN D3 PO) Take 3,000 mg by mouth daily AM      escitalopram (LEXAPRO) 10 MG tablet Take 1 tablet (10 mg) by mouth daily 90 tablet 1    hydrochlorothiazide (HYDRODIURIL) 25 MG tablet Take 1 tablet by mouth once daily 90 tablet 3    ketoconazole (NIZORAL) 2 % external cream APPLY TO THE RASH ON FULL BACK TWICE A DAY FOR 4-6 WEEKS      metoprolol succinate ER (TOPROL XL) 50 MG 24 hr tablet TAKE 1 & 1/2 (ONE & ONE-HALF) TABLETS BY MOUTH ONCE DAILY 90 tablet 3    order for DME Nebulizer machine and tubing    DX:  Bronchitis 1 Device 0    potassium chloride ER (K-TAB/KLOR-CON) 10 MEQ CR tablet Take 1 tablet by mouth once daily 90 tablet 2    traZODone (DESYREL) 50 MG tablet TAKE 1 TO 2 TABLETS BY MOUTH NIGHTLY AS NEEDED 180 tablet 3    warfarin ANTICOAGULANT (COUMADIN) 5 MG tablet TAKE 1 & 1/2 (ONE & ONE-HALF) TABLETS BY MOUTH MONDAY WEDNESDAY AND FRIDAY AND 1 TABLET ALL OTHER DAYS OR AS DIRECTED BY WARFARIN CLINIC 109 tablet 1         Allergies   Allergen Reactions    Amlodipine Besylate Swelling     Norvasc    Amoxicillin     Atorvastatin      myualgia    Cephalexin Monohydrate Hives     Keflex    Erythromycin Base [Erythromycin Base] Nausea and Vomiting    Meloxicam Other (See Comments)     Mobic - confusion, depression     Sulfa Antibiotics Hives    Adhesive Tape Rash    Prochlorperazine Edisylate Swelling and Rash     Compazine    Prochlorperazine Maleate Swelling and Rash       Recent Labs   Lab Test 04/01/24  0926 12/15/23  0910 09/29/23  1022 03/17/23  1029 03/17/23  1028 08/27/21  0917 04/16/21  1014 02/08/21  1432   A1C  --   --   --  5.6  --   --   --   --    *  --  119*  --  129*   < >  --   --    HDL 35*  --  37*  --  37*   < >  --   --    TRIG 148  --  171*  --  233*   < >  --   --    ALT 17 21 18  --  26   < > 29 36   CR 0.92 0.96* 1.00*  --  0.90   < > 0.95 0.92   GFRESTIMATED 67 64 61  --  69   < > 62 65   GFRESTBLACK  --   --   --   --   --   --  72 75   POTASSIUM 3.4 3.6 3.9  --  3.5   < > 3.3* 3.4   TSH 2.73  --  3.34  --  3.05   < >  --   --     < > = values in this interval not displayed.          BP Readings from Last 3 Encounters:   06/10/24 130/70   05/06/24 121/76   04/09/24 136/70    Wt Readings from Last 3 Encounters:   06/10/24 102.9 kg (226 lb 13.7 oz)   05/06/24 99.8 kg (220 lb)   04/09/24 103.6 kg (228 lb 8 oz)                  Review of Systems  Constitutional, HEENT, cardiovascular, pulmonary, gi and gu systems are negative, except as otherwise noted.          Objective    /70 (BP Location: Right arm, Patient Position: Sitting, Cuff Size: Adult Large)   Pulse 66   Temp 98.9  F (37.2  C) (Tympanic)   Resp 20   Wt 102.9 kg (226 lb 13.7 oz)   SpO2 97%   BMI 37.75 kg/m    Body mass index is 37.75 kg/m .        Physical Exam   GENERAL: alert and no distress  MS: left foot - pain at the base of the great toe  Skin - surgery -right  foot 2nd toe, two sutures due for removal        History of elevated uric acid in UC            Signed Electronically by: Eliz Hartman, CNP

## 2024-06-10 NOTE — PATIENT INSTRUCTIONS
Assessment & Plan         Acute gout, unspecified cause, unspecified site  - Uric acid; Future  - Uric acid  - colchicine (COLCRYS) 0.6 MG tablet; 2 tabs now, then one tab 2 hours later  - If uric acid level is high, will consider allopurinol if the coumadin clinic feels this is reasonable        Visit for suture removal  - Sutures removed  - Steri strips applied        [facility]  [phone]

## 2024-06-11 ENCOUNTER — ANTICOAGULATION THERAPY VISIT (OUTPATIENT)
Dept: ANTICOAGULATION | Facility: OTHER | Age: 69
End: 2024-06-11
Payer: COMMERCIAL

## 2024-06-11 DIAGNOSIS — I26.99 PULMONARY EMBOLISM AND INFARCTION (H): ICD-10-CM

## 2024-06-11 DIAGNOSIS — I82.401 DEEP VEIN THROMBOSIS (DVT) OF RIGHT LOWER EXTREMITY, UNSPECIFIED CHRONICITY, UNSPECIFIED VEIN (H): ICD-10-CM

## 2024-06-11 DIAGNOSIS — Z79.01 LONG TERM CURRENT USE OF ANTICOAGULANT THERAPY: Primary | ICD-10-CM

## 2024-06-11 DIAGNOSIS — M1A.9XX0 CHRONIC GOUT INVOLVING TOE WITHOUT TOPHUS, UNSPECIFIED CAUSE, UNSPECIFIED LATERALITY: Primary | ICD-10-CM

## 2024-06-11 LAB — INR HOME MONITORING: 2.6 (ref 2–3)

## 2024-06-11 RX ORDER — ALLOPURINOL 100 MG/1
100 TABLET ORAL DAILY
Qty: 90 TABLET | Refills: 3 | Status: SHIPPED | OUTPATIENT
Start: 2024-06-11 | End: 2024-06-19

## 2024-06-11 NOTE — PROGRESS NOTES
ANTICOAGULATION  MANAGEMENT-Home Monitor Managed by Exception    Cassy Avila 68 year old female is on warfarin with therapeutic INR result. (Goal INR 2.0-3.0)    Recent labs: (last 7 days)     06/11/24  0000   INR 2.6       Previous INR was Therapeutic  Medication, diet, health changes since last INR: patient had 3 dose colchicine and will start on allopurinol long term  Contacted within the last 12 weeks by phone on 5/7/24  Last [facility] referral date: 12/19/2023      DARIO     Cassy was NOT contacted regarding therapeutic result today per home monitoring policy manage by exception agreement.   Current warfarin dose is to be continued:     Summary  As of 6/11/2024      Full warfarin instructions:  7.5 mg every Mon, Wed, Fri; 5 mg all other days   Next INR check:  6/25/2024             ?   Corry Galvan RN  Anticoagulation Clinic  6/11/2024    _______________________________________________________________________     Anticoagulation Episode Summary       Current INR goal:  2.0-3.0   TTR:  77.5% (1 y)   Target end date:  Indefinite   Send INR reminders to:  [facility] [place]    Indications    Long-term (current) use of anticoagulants [Z79.01] [Z79.01]  Pulmonary embolism and infarction (H) [I26.99]  Deep vein thrombosis (DVT) (H) [I82.409] (Resolved) [I82.409]  Deep vein thrombosis (DVT) of right lower extremity  unspecified chronicity  unspecified vein (H) [I82.401]             Comments:  [employer]. Q2 week testing  Call cell phone with any dosing.             Anticoagulation Care Providers       Provider Role Specialty Phone number    Eliz Hartman CNP Referring Family Medicine [phone]

## 2024-06-15 ENCOUNTER — HEALTH MAINTENANCE LETTER (OUTPATIENT)
Age: 69
End: 2024-06-15

## 2024-06-19 ENCOUNTER — ANTICOAGULATION THERAPY VISIT (OUTPATIENT)
Dept: ANTICOAGULATION | Facility: OTHER | Age: 69
End: 2024-06-19
Attending: NURSE PRACTITIONER
Payer: COMMERCIAL

## 2024-06-19 DIAGNOSIS — Z79.01 LONG TERM CURRENT USE OF ANTICOAGULANT THERAPY: Primary | ICD-10-CM

## 2024-06-19 DIAGNOSIS — I26.99 PULMONARY EMBOLISM AND INFARCTION (H): ICD-10-CM

## 2024-06-19 DIAGNOSIS — I82.401 DEEP VEIN THROMBOSIS (DVT) OF RIGHT LOWER EXTREMITY, UNSPECIFIED CHRONICITY, UNSPECIFIED VEIN (H): ICD-10-CM

## 2024-06-19 LAB — INR HOME MONITORING: 2.4 (ref 2–3)

## 2024-06-19 NOTE — PROGRESS NOTES
ANTICOAGULATION  MANAGEMENT-Home Monitor Managed by Exception    Cassy APPLE Avila 68 year old female is on warfarin with therapeutic INR result. (Goal INR 2.0-3.0)    Recent labs: (last 7 days)     06/19/24  0000   INR 2.4       Previous INR was Therapeutic  Medication, diet, health changes since last INR:chart reviewed; none identified  Contacted within the last 12 weeks by phone on 5/7/24  Last [facility] referral date: 12/19/2023      DARIO     Cassy was NOT contacted regarding therapeutic result today per home monitoring policy manage by exception agreement.   Current warfarin dose is to be continued:     Summary  As of 6/19/2024      Full warfarin instructions:  7.5 mg every Mon, Wed, Fri; 5 mg all other days   Next INR check:  7/3/2024             ?   Corry Galvan RN  Anticoagulation Clinic  6/19/2024    _______________________________________________________________________     Anticoagulation Episode Summary       Current INR goal:  2.0-3.0   TTR:  78.5% (1 y)   Target end date:  Indefinite   Send INR reminders to:  [facility] [place]    Indications    Long-term (current) use of anticoagulants [Z79.01] [Z79.01]  Pulmonary embolism and infarction (H) [I26.99]  Deep vein thrombosis (DVT) (H) [I82.409] (Resolved) [I82.409]  Deep vein thrombosis (DVT) of right lower extremity  unspecified chronicity  unspecified vein (H) [I82.401]             Comments:  [employer]. Q2 week testing  Call cell phone with any dosing.             Anticoagulation Care Providers       Provider Role Specialty Phone number    Eliz Hartman CNP Referring Family Medicine [phone]

## 2024-07-02 ENCOUNTER — ANTICOAGULATION THERAPY VISIT (OUTPATIENT)
Dept: ANTICOAGULATION | Facility: OTHER | Age: 69
End: 2024-07-02
Payer: MEDICARE

## 2024-07-02 DIAGNOSIS — I82.401 DEEP VEIN THROMBOSIS (DVT) OF RIGHT LOWER EXTREMITY, UNSPECIFIED CHRONICITY, UNSPECIFIED VEIN (H): ICD-10-CM

## 2024-07-02 DIAGNOSIS — Z79.01 LONG TERM CURRENT USE OF ANTICOAGULANT THERAPY: Primary | ICD-10-CM

## 2024-07-02 DIAGNOSIS — I26.99 PULMONARY EMBOLISM AND INFARCTION (H): ICD-10-CM

## 2024-07-02 LAB — INR HOME MONITORING: 2.8 (ref 2–3)

## 2024-07-02 NOTE — PROGRESS NOTES
ANTICOAGULATION  MANAGEMENT-Home Monitor Managed by Exception    Cassy APPLE Avila 68 year old female is on warfarin with therapeutic INR result. (Goal INR 2.0-3.0)    Recent labs: (last 7 days)     07/02/24  0000   INR 2.8       Previous INR was Therapeutic  Medication, diet, health changes since last INR:chart reviewed; none identified  Contacted within the last 12 weeks by phone on 5/7/24  Last [facility] referral date: 12/19/2023      DARIO     Cassy was NOT contacted regarding therapeutic result today per home monitoring policy manage by exception agreement.   Current warfarin dose is to be continued:     Summary  As of 7/2/2024      Full warfarin instructions:  7.5 mg every Mon, Wed, Fri; 5 mg all other days   Next INR check:  7/16/2024             ?   Corry Galvan RN  Anticoagulation Clinic  7/2/2024    _______________________________________________________________________     Anticoagulation Episode Summary       Current INR goal:  2.0-3.0   TTR:  80.9% (1 y)   Target end date:  Indefinite   Send INR reminders to:  [facility] [place]    Indications    Long-term (current) use of anticoagulants [Z79.01] [Z79.01]  Pulmonary embolism and infarction (H) [I26.99]  Deep vein thrombosis (DVT) (H) [I82.409] (Resolved) [I82.409]  Deep vein thrombosis (DVT) of right lower extremity  unspecified chronicity  unspecified vein (H) [I82.401]             Comments:  [employer]. Q2 week testing  Call cell phone with any dosing.             Anticoagulation Care Providers       Provider Role Specialty Phone number    Eliz Hartman CNP Referring Family Medicine [phone]

## 2024-07-23 ENCOUNTER — ANTICOAGULATION THERAPY VISIT (OUTPATIENT)
Dept: ANTICOAGULATION | Facility: OTHER | Age: 69
End: 2024-07-23
Payer: MEDICARE

## 2024-07-23 DIAGNOSIS — Z79.01 LONG TERM CURRENT USE OF ANTICOAGULANT THERAPY: Primary | ICD-10-CM

## 2024-07-23 DIAGNOSIS — I26.99 PULMONARY EMBOLISM AND INFARCTION (H): ICD-10-CM

## 2024-07-23 DIAGNOSIS — I82.401 DEEP VEIN THROMBOSIS (DVT) OF RIGHT LOWER EXTREMITY, UNSPECIFIED CHRONICITY, UNSPECIFIED VEIN (H): ICD-10-CM

## 2024-07-23 LAB — INR HOME MONITORING: 2.5 (ref 2–3)

## 2024-07-23 NOTE — PROGRESS NOTES
ANTICOAGULATION MANAGEMENT     Cassy Avila 68 year old female is on warfarin with therapeutic INR result. (Goal INR 2.0-3.0)    Recent labs: (last 7 days)     07/23/24  0000   INR 2.5       ASSESSMENT     Source(s): Chart review     Warfarin doses taken: Warfarin taken as instructed  Diet: No new diet changes identified  New illness, injury, or hospitalization: No  Medication/supplement changes: None noted  Signs or symptoms of bleeding or clotting: No  Previous INR: Therapeutic last 2(+) visits  Additional findings: None     PLAN     Recommended plan for no diet, medication or health factor changes affecting INR     Dosing Instructions: Continue your current warfarin dose with next INR in 2 weeks       Summary  As of 7/23/2024      Full warfarin instructions:  7.5 mg every Mon, Wed, Fri; 5 mg all other days   Next INR check:  8/6/2024               Detailed voice message left for Kaitlin with dosing instructions and follow up date.   Sent Jumiat message with dosing and follow up instructions    Patient to recheck with home meter    Education provided: Please call back if any changes to your diet, medications or how you've been taking warfarin    Plan made per [facility] anticoagulation protocol    Ashanti Velazquez, RN  Anticoagulation Clinic  7/23/2024    _______________________________________________________________________     Anticoagulation Episode Summary       Current INR goal:  2.0-3.0   TTR:  81.4% (1 y)   Target end date:  Indefinite   Send INR reminders to:  [facility] [place]    Indications    Long-term (current) use of anticoagulants [Z79.01] [Z79.01]  Pulmonary embolism and infarction (H) [I26.99]  Deep vein thrombosis (DVT) (H) [I82.409] (Resolved) [I82.409]  Deep vein thrombosis (DVT) of right lower extremity  unspecified chronicity  unspecified vein (H) [I82.401]             Comments:  [employer]. Q2 week testing  Call cell phone with any dosing.             Anticoagulation Care Providers        Provider Role Specialty Phone number    Eliz Hartman, CNP Referring Family Medicine [phone]

## 2024-08-06 ENCOUNTER — ANTICOAGULATION THERAPY VISIT (OUTPATIENT)
Dept: ANTICOAGULATION | Facility: OTHER | Age: 69
End: 2024-08-06
Attending: NURSE PRACTITIONER
Payer: COMMERCIAL

## 2024-08-06 DIAGNOSIS — I26.99 PULMONARY EMBOLISM AND INFARCTION (H): ICD-10-CM

## 2024-08-06 DIAGNOSIS — I82.401 DEEP VEIN THROMBOSIS (DVT) OF RIGHT LOWER EXTREMITY, UNSPECIFIED CHRONICITY, UNSPECIFIED VEIN (H): ICD-10-CM

## 2024-08-06 DIAGNOSIS — Z79.01 LONG TERM CURRENT USE OF ANTICOAGULANT THERAPY: Primary | ICD-10-CM

## 2024-08-06 LAB — INR HOME MONITORING: 3.4 (ref 2–3)

## 2024-08-06 NOTE — PROGRESS NOTES
ANTICOAGULATION MANAGEMENT     Cassy Avila 68 year old female is on warfarin with supratherapeutic INR result. (Goal INR 2.0-3.0)    Recent labs: (last 7 days)     08/06/24  0000   INR 3.4*       ASSESSMENT     Source(s): Chart Review and Patient/Caregiver Call     Warfarin doses taken: Warfarin taken as instructed  Diet: No new diet changes identified  Medication/supplement changes: None noted  New illness, injury, or hospitalization: No  Signs or symptoms of bleeding or clotting: No  Previous result: Therapeutic last 2(+) visits  Additional findings: None       PLAN     Recommended plan for no diet, medication or health factor changes affecting INR     Dosing Instructions: Continue your current warfarin dose with next INR in 2 weeks       Summary  As of 8/6/2024      Full warfarin instructions:  7.5 mg every Mon, Wed, Fri; 5 mg all other days   Next INR check:  8/20/2024               Telephone call with Kaitlin who verbalizes understanding and agrees to plan    Patient to recheck with home meter    Education provided: None required    Plan made per [facility] anticoagulation protocol    Corry Galvan, RN  Anticoagulation Clinic  8/6/2024    _______________________________________________________________________     Anticoagulation Episode Summary       Current INR goal:  2.0-3.0   TTR:  80.8% (1 y)   Target end date:  Indefinite   Send INR reminders to:  [facility]    Indications    Long-term (current) use of anticoagulants [Z79.01] [Z79.01]  Pulmonary embolism and infarction (H) [I26.99]  Deep vein thrombosis (DVT) (H) [I82.409] (Resolved) [I82.409]  Deep vein thrombosis (DVT) of right lower extremity  unspecified chronicity  unspecified vein (H) [I82.401]             Comments:  [employer]. Q2 week testing  Call cell phone with any dosing.             Anticoagulation Care Providers       Provider Role Specialty Phone number    Eliz Hartman CNP Referring Family Medicine [phone]

## 2024-08-12 ENCOUNTER — TRANSFERRED RECORDS (OUTPATIENT)
Dept: HEALTH INFORMATION MANAGEMENT | Facility: CLINIC | Age: 69
End: 2024-08-12
Payer: COMMERCIAL

## 2024-08-20 ENCOUNTER — APPOINTMENT (OUTPATIENT)
Dept: ANTICOAGULATION | Facility: OTHER | Age: 69
End: 2024-08-20
Attending: NURSE PRACTITIONER
Payer: COMMERCIAL

## 2024-08-24 ENCOUNTER — HOSPITAL ENCOUNTER (EMERGENCY)
Facility: HOSPITAL | Age: 69
Discharge: HOME | End: 2024-08-25
Attending: EMERGENCY MEDICINE | Admitting: EMERGENCY MEDICINE
Payer: MEDICARE

## 2024-08-24 ENCOUNTER — APPOINTMENT (OUTPATIENT)
Dept: CT IMAGING | Facility: HOSPITAL | Age: 69
End: 2024-08-24
Attending: EMERGENCY MEDICINE
Payer: MEDICARE

## 2024-08-24 VITALS
OXYGEN SATURATION: 100 % | DIASTOLIC BLOOD PRESSURE: 92 MMHG | TEMPERATURE: 97.9 F | SYSTOLIC BLOOD PRESSURE: 169 MMHG | RESPIRATION RATE: 16 BRPM | HEART RATE: 60 BPM

## 2024-08-24 DIAGNOSIS — E86.0 DEHYDRATION: ICD-10-CM

## 2024-08-24 DIAGNOSIS — R51.9 ACUTE NONINTRACTABLE HEADACHE, UNSPECIFIED HEADACHE TYPE: ICD-10-CM

## 2024-08-24 LAB
ALBUMIN SERPL BCG-MCNC: 4.2 G/DL (ref 3.5–5.2)
ALP SERPL-CCNC: 63 U/L (ref 40–150)
ALT SERPL W P-5'-P-CCNC: 20 U/L (ref 0–50)
ANION GAP SERPL CALCULATED.3IONS-SCNC: 9 MMOL/L (ref 7–15)
AST SERPL W P-5'-P-CCNC: 21 U/L (ref 0–45)
BASOPHILS # BLD AUTO: 0.1 10E3/UL (ref 0–0.2)
BASOPHILS NFR BLD AUTO: 1 %
BILIRUB SERPL-MCNC: 0.3 MG/DL
BUN SERPL-MCNC: 14.2 MG/DL (ref 8–23)
CALCIUM SERPL-MCNC: 9.7 MG/DL (ref 8.8–10.4)
CHLORIDE SERPL-SCNC: 100 MMOL/L (ref 98–107)
CREAT SERPL-MCNC: 1.1 MG/DL (ref 0.51–0.95)
EGFRCR SERPLBLD CKD-EPI 2021: 54 ML/MIN/1.73M2
EOSINOPHIL # BLD AUTO: 0.1 10E3/UL (ref 0–0.7)
EOSINOPHIL NFR BLD AUTO: 2 %
ERYTHROCYTE [DISTWIDTH] IN BLOOD BY AUTOMATED COUNT: 14.1 % (ref 10–15)
GLUCOSE SERPL-MCNC: 104 MG/DL (ref 70–99)
HCO3 SERPL-SCNC: 30 MMOL/L (ref 22–29)
HCT VFR BLD AUTO: 41.3 % (ref 35–47)
HGB BLD-MCNC: 14.1 G/DL (ref 11.7–15.7)
IMM GRANULOCYTES # BLD: 0 10E3/UL
IMM GRANULOCYTES NFR BLD: 0 %
INR PPP: 1.72 (ref 0.85–1.15)
LACTATE SERPL-SCNC: 1.9 MMOL/L (ref 0.7–2)
LYMPHOCYTES # BLD AUTO: 2.3 10E3/UL (ref 0.8–5.3)
LYMPHOCYTES NFR BLD AUTO: 34 %
MCH RBC QN AUTO: 29 PG (ref 26.5–33)
MCHC RBC AUTO-ENTMCNC: 34.1 G/DL (ref 31.5–36.5)
MCV RBC AUTO: 85 FL (ref 78–100)
MONOCYTES # BLD AUTO: 0.6 10E3/UL (ref 0–1.3)
MONOCYTES NFR BLD AUTO: 10 %
NEUTROPHILS # BLD AUTO: 3.5 10E3/UL (ref 1.6–8.3)
NEUTROPHILS NFR BLD AUTO: 53 %
NRBC # BLD AUTO: 0 10E3/UL
NRBC BLD AUTO-RTO: 0 /100
PLATELET # BLD AUTO: 203 10E3/UL (ref 150–450)
POTASSIUM SERPL-SCNC: 4.5 MMOL/L (ref 3.4–5.3)
PROT SERPL-MCNC: 7 G/DL (ref 6.4–8.3)
RBC # BLD AUTO: 4.86 10E6/UL (ref 3.8–5.2)
SODIUM SERPL-SCNC: 139 MMOL/L (ref 135–145)
WBC # BLD AUTO: 6.7 10E3/UL (ref 4–11)

## 2024-08-24 PROCEDURE — 83605 ASSAY OF LACTIC ACID: CPT | Performed by: EMERGENCY MEDICINE

## 2024-08-24 PROCEDURE — 93010 ELECTROCARDIOGRAM REPORT: CPT | Performed by: INTERNAL MEDICINE

## 2024-08-24 PROCEDURE — 96361 HYDRATE IV INFUSION ADD-ON: CPT

## 2024-08-24 PROCEDURE — 99285 EMERGENCY DEPT VISIT HI MDM: CPT | Mod: 25

## 2024-08-24 PROCEDURE — 250N000011 HC RX IP 250 OP 636: Performed by: EMERGENCY MEDICINE

## 2024-08-24 PROCEDURE — 93005 ELECTROCARDIOGRAM TRACING: CPT

## 2024-08-24 PROCEDURE — 85610 PROTHROMBIN TIME: CPT | Performed by: EMERGENCY MEDICINE

## 2024-08-24 PROCEDURE — 85025 COMPLETE CBC W/AUTO DIFF WBC: CPT | Performed by: EMERGENCY MEDICINE

## 2024-08-24 PROCEDURE — 258N000003 HC RX IP 258 OP 636: Performed by: EMERGENCY MEDICINE

## 2024-08-24 PROCEDURE — 96374 THER/PROPH/DIAG INJ IV PUSH: CPT

## 2024-08-24 PROCEDURE — 36415 COLL VENOUS BLD VENIPUNCTURE: CPT | Performed by: EMERGENCY MEDICINE

## 2024-08-24 PROCEDURE — 70496 CT ANGIOGRAPHY HEAD: CPT | Mod: MF

## 2024-08-24 PROCEDURE — 70450 CT HEAD/BRAIN W/O DYE: CPT | Mod: MF

## 2024-08-24 PROCEDURE — 99284 EMERGENCY DEPT VISIT MOD MDM: CPT | Performed by: EMERGENCY MEDICINE

## 2024-08-24 PROCEDURE — 82374 ASSAY BLOOD CARBON DIOXIDE: CPT | Performed by: EMERGENCY MEDICINE

## 2024-08-24 RX ORDER — METOCLOPRAMIDE HYDROCHLORIDE 5 MG/ML
10 INJECTION INTRAMUSCULAR; INTRAVENOUS ONCE
Status: COMPLETED | OUTPATIENT
Start: 2024-08-24 | End: 2024-08-24

## 2024-08-24 RX ORDER — IOPAMIDOL 755 MG/ML
67 INJECTION, SOLUTION INTRAVASCULAR ONCE
Status: COMPLETED | OUTPATIENT
Start: 2024-08-24 | End: 2024-08-24

## 2024-08-24 RX ADMIN — IOPAMIDOL 67 ML: 755 INJECTION, SOLUTION INTRAVENOUS at 21:53

## 2024-08-24 RX ADMIN — SODIUM CHLORIDE 1000 ML: 9 INJECTION, SOLUTION INTRAVENOUS at 21:10

## 2024-08-24 RX ADMIN — METOCLOPRAMIDE 10 MG: 5 INJECTION, SOLUTION INTRAMUSCULAR; INTRAVENOUS at 21:11

## 2024-08-24 ASSESSMENT — ACTIVITIES OF DAILY LIVING (ADL)
ADLS_ACUITY_SCORE: 37

## 2024-08-25 ASSESSMENT — ENCOUNTER SYMPTOMS
FEVER: 0
CHILLS: 0
COUGH: 0
SHORTNESS OF BREATH: 0

## 2024-08-25 NOTE — ED TRIAGE NOTES
Pt states she has had an onset of dizziness for about 4 days, that is just when she turns her head. Pt states she has had a sore neck for that same amount of time. Pt denies any falls, or slips. Pt does state that she has had some hypertension at home. 192/92 at home, and here it is 161/75.  Pt has not had any medication changes. Pt was able to walk into triage with a steady gait. BEFAST negative. Pt states she has just been feeling unwell this week.

## 2024-08-25 NOTE — ED PROVIDER NOTES
History     Chief Complaint   Patient presents with    Dizziness     HPI  Cassy Avila is a 68 year old female who is here with a headache.  Generalized headache.  Also some posterior neck pain along the right side.  States she does have dizziness however that is described further as sensation of passing out.  That sensation is noticed primarily upon standing.  Headache is my attending.  All the symptoms have been present for 4 days.    Allergies:  Allergies   Allergen Reactions    Allopurinol Shortness Of Breath    Amlodipine Besylate Swelling     Norvasc    Amoxicillin     Atorvastatin      myualgia    Cephalexin Monohydrate Hives     Keflex    Erythromycin Base [Erythromycin Base] Nausea and Vomiting    Meloxicam Other (See Comments)     Mobic - confusion, depression    Sulfa Antibiotics Hives    Adhesive Tape Rash    Prochlorperazine Edisylate Swelling and Rash     Compazine    Prochlorperazine Maleate Swelling and Rash       Problem List:    Patient Active Problem List    Diagnosis Date Noted    History of gout 02/27/2024     Priority: Medium    Deep vein thrombosis (DVT) of right lower extremity, unspecified chronicity, unspecified vein (H) 01/24/2023     Priority: Medium    Shoulder pain 01/31/2022     Priority: Medium     Formatting of this note might be different from the original.  Added automatically from request for surgery [id number]      Muscle weakness of right upper extremity 04/15/2021     Priority: Medium    Pain in right upper arm 04/15/2021     Priority: Medium    Stiffness of right shoulder joint 04/15/2021     Priority: Medium    Diarrhea 02/08/2021     Priority: Medium    Obesity (BMI 35.0-39.9) with comorbidity (H) 01/28/2020     Priority: Medium    Factor V Leiden mutation (H24) 07/26/2019     Priority: Medium    Activated protein C resistance (H24) 07/26/2019     Priority: Medium    Primary insomnia 10/31/2018     Priority: Medium    Vitamin D deficiency 07/13/2018     Priority: Medium     Family history of colon cancer 01/02/2018     Priority: Medium    Lumbar spondylosis with myelopathy 07/12/2017     Priority: Medium    Degeneration of lumbar or lumbosacral intervertebral disc 07/12/2017     Priority: Medium    Long-term (current) use of anticoagulants [Z79.01] 07/20/2016     Priority: Medium    Moderate episode of recurrent major depressive disorder (H) 08/08/2014     Priority: Medium    H/O total shoulder replacement, left 06/17/2014     Priority: Medium    Failed arthroplasty (H24) 01/24/2014     Priority: Medium    Advanced care planning/counseling discussion 03/12/2013     Priority: Medium     Pt has information at home , not completed      Neurofibromatosis, type 1 (H) 08/22/2012     Priority: Medium     Problem list name updated by automated process. Provider to review    Formatting of this note might be different from the original.  Formatting of this note might be different from the original.  Problem list name updated by automated process. Provider to review      Chest pain, unspecified 02/11/2010     Priority: Medium     Formatting of this note might be different from the original.  IMO Update 10/11      Contact dermatitis and other eczema, due to unspecified cause 06/01/2004     Priority: Medium    Pulmonary embolism and infarction (H) 04/11/2003     Priority: Medium     Problem list name updated by automated process. Provider to review      Hyperlipidemia LDL goal <100 07/11/2002     Priority: Medium     Problem list name updated by automated process. Provider to review      Edema 01/18/2002     Priority: Medium    Primary hypertension 02/20/2001     Priority: Medium     Problem list name updated by automated process. Provider to review    Formatting of this note might be different from the original.  Formatting of this note might be different from the original.  IMO Update 10/11  Formatting of this note might be different from the original.  Problem list name updated by automated  process. Provider to review          Past Medical History:    Past Medical History:   Diagnosis Date    Abdominal pain, generalized 2021    Arthritis     Cervicalgia 2001    Closed dislocation of shoulder, unspecified site     Congenital deficiency of other clotting factors 2012    Congenital factor VIII disorder (H) 2019    Contact dermatitis and other eczema, due to unspecified cause 2004    Coughing     Diarrhea 2021    Edema 2002    Essential hypertension 2001    Factor V Leiden (H24)     Factor V Leiden mutation (H24) 2019    Family history of colon cancer 2018    Gastro-oesophageal reflux disease     Herpes zoster without mention of complication     Hyperlipidemia LDL goal <100 2002    Long term (current) use of anticoagulants 2003    Major depression 2014    Mammographic microcalcification 2003    Migraine, unspecified, without mention of intractable migraine without mention of status migrainosus 3/5/2001    Moderate episode of recurrent major depressive disorder (H) 2014    Neurofibromatosis, peripheral, NF1 (H) 2012    Neurofibromatosis, unspecified(237.70) 2012    Other and unspecified hyperlipidemia 2002    Other chronic pain     Other pulmonary embolism and infarction 2003    Primary insomnia 10/31/2018    Statin medication not prescribed per physician orders 2018    Tachycardia, unspecified 2001    Thrombosis of leg     Unspecified essential hypertension 2001    Vitamin D deficiency 2018       Past Surgical History:    Past Surgical History:   Procedure Laterality Date    ------------OTHER-------------      shoulder replacement; Provider: Karen    ARTHROPLASTY KNEE  2014    Procedure: ARTHROPLASTY KNEE;  Surgeon: Sean Alexander MD;  Location: [facility] OR    ARTHROSCOPY SHOULDER      right, bone spurs    CA ANESTH,SHOULDER REPLACEMENT Right 2020     SECTION      x3     CHOLECYSTECTOMY      COLONOSCOPY  02/15/2018    Gobles,,polyps    elbow ulnar tunnel release  2002    ELECTROTHERMAL THERAPY INTRADISC  2017    stimulator    ENDOSCOPIC SINUS SURGERY, SEPTOPLASTY, TURBINOPLASTY, MAXILLARY SINUSOTOMY, COMBINED N/A 2015    Procedure: COMBINED ENDOSCOPIC SINUS SURGERY, SEPTOPLASTY, TURBINOPLASTY, MAXILLARY SINUSOTOMY;  Surgeon: Seema Conn MD;  Location: [facility] OR    ESOPHAGOSCOPY, GASTROSCOPY, DUODENOSCOPY (EGD), COMBINED      with biopsy and endoscopic U/S    EXCISE NEUROMA LOWER EXTREMITY Left 2016    Procedure: EXCISE NEUROMA LOWER EXTREMITY;  Surgeon: Edi Reed MD;  Location: [facility]    EYE SURGERY Right 2020    FUSION LUMBAR ANTERIOR WITH MARCY CAGES      L5-S1    HYSTERECTOMY TOTAL ABDOMINAL, BILATERAL SALPINGO-OOPHORECTOMY, COMBINED N/A     ORTHOPEDIC SURGERY  2015    right shoulder    ORTHOPEDIC SURGERY  2015    right knee    ORTHOPEDIC SURGERY Right 2018    hip labrum tear    pionidal cyst excision      TRANSPOSITION ULNAR NERVE (ELBOW)         Family History:    Family History   Problem Relation Age of Onset    Cancer Mother     Colon Polyps Mother     Heart Failure Mother 87        congestive, cause of death    Myocardial Infarction Mother         myocardial infarction    Myocardial Infarction Father         myocardial infarction - cause of death    C.A.D. Father     Cancer Paternal Uncle         cause of death    C.A.D. Brother     Other - See Comments Other         factor 5 - family h/o    Asthma No family hx of        Social History:  Marital Status:   [2]  Social History     Tobacco Use    Smoking status: Former     Current packs/day: 0.00     Average packs/day: 1 pack/day for 30.0 years (30.0 ttl pk-yrs)     Types: Cigarettes, Pipe     Start date: 1969     Quit date: 1999     Years since quittin.6    Smokeless tobacco: Never    Tobacco comments:     quit in    Vaping Use    Vaping status: Never  Used   Substance Use Topics    Alcohol use: No    Drug use: No        Medications:    albuterol (PROVENTIL) (2.5 MG/3ML) 0.083% neb solution  aspirin 81 MG EC tablet  Cholecalciferol (VITAMIN D3 PO)  colchicine (COLCRYS) 0.6 MG tablet  escitalopram (LEXAPRO) 10 MG tablet  hydrochlorothiazide (HYDRODIURIL) 25 MG tablet  ketoconazole (NIZORAL) 2 % external cream  metoprolol succinate ER (TOPROL XL) 50 MG 24 hr tablet  order for DME  potassium chloride ER (K-TAB/KLOR-CON) 10 MEQ CR tablet  traZODone (DESYREL) 50 MG tablet  warfarin ANTICOAGULANT (COUMADIN) 5 MG tablet          Review of Systems   Constitutional:  Negative for chills and fever.   Respiratory:  Negative for cough and shortness of breath.    All other systems reviewed and are negative.      Physical Exam   BP: 161/75  Pulse: 72  Temp: 97.9  F (36.6  C)  Resp: 16  SpO2: 100 %      Physical Exam  Constitutional:       General: She is not in acute distress.     Appearance: She is not diaphoretic.   HENT:      Head: Normocephalic and atraumatic.      Right Ear: External ear normal.      Left Ear: External ear normal.      Nose: No congestion or rhinorrhea.      Mouth/Throat:      Pharynx: Oropharynx is clear. No oropharyngeal exudate.   Eyes:      General: No scleral icterus.     Pupils: Pupils are equal, round, and reactive to light.   Neck:      Comments: No carotid bruit  Cardiovascular:      Rate and Rhythm: Normal rate and regular rhythm.      Heart sounds: Normal heart sounds.   Pulmonary:      Effort: No respiratory distress.      Breath sounds: Normal breath sounds.   Abdominal:      General: Bowel sounds are normal.      Palpations: Abdomen is soft.      Tenderness: There is no abdominal tenderness.   Musculoskeletal:         General: No tenderness.      Cervical back: Normal range of motion and neck supple.      Right lower leg: No edema.      Left lower leg: No edema.   Skin:     General: Skin is warm.      Capillary Refill: Capillary refill takes  less than 2 seconds.      Findings: No rash.   Neurological:      Mental Status: Mental status is at baseline.      Cranial Nerves: No cranial nerve deficit.      Comments: CN II through XII intact, finger-nose quick and coordinated bilaterally, gait steady, 5-5 strength upper and lower extremities   Psychiatric:         Mood and Affect: Mood normal.         Behavior: Behavior normal.         ED Course        Procedures             Critical Care time:               Results for orders placed or performed during the hospital encounter of 08/24/24 (from the past 24 hour(s))   EKG 12 lead   Result Value Ref Range    Systolic Blood Pressure  mmHg    Diastolic Blood Pressure  mmHg    Ventricular Rate 57 BPM    Atrial Rate 57 BPM    MO Interval 156 ms    QRS Duration 92 ms     ms    QTc 416 ms    P Axis 2 degrees    R AXIS 4 degrees    T Axis -11 degrees    Interpretation ECG       Sinus bradycardia  Otherwise normal ECG  When compared with ECG of 15-Dec-2023 09:43,  ST no longer elevated in Inferior leads  T wave inversion now evident in Inferior leads     CBC with platelets differential    Narrative    The following orders were created for panel order CBC with platelets differential.  Procedure                               Abnormality         Status                     ---------                               -----------         ------                     CBC with platelets and d...[[id number]]                      Final result                 Please view results for these tests on the individual orders.   INR   Result Value Ref Range    INR 1.72 (H) 0.85 - 1.15   Comprehensive metabolic panel   Result Value Ref Range    Sodium 139 135 - 145 mmol/L    Potassium 4.5 3.4 - 5.3 mmol/L    Carbon Dioxide (CO2) 30 (H) 22 - 29 mmol/L    Anion Gap 9 7 - 15 mmol/L    Urea Nitrogen 14.2 8.0 - 23.0 mg/dL    Creatinine 1.10 (H) 0.51 - 0.95 mg/dL    GFR Estimate 54 (L) >60 mL/min/1.73m2    Calcium 9.7 8.8 - 10.4 mg/dL     Chloride 100 98 - 107 mmol/L    Glucose 104 (H) 70 - 99 mg/dL    Alkaline Phosphatase 63 40 - 150 U/L    AST 21 0 - 45 U/L    ALT 20 0 - 50 U/L    Protein Total 7.0 6.4 - 8.3 g/dL    Albumin 4.2 3.5 - 5.2 g/dL    Bilirubin Total 0.3 <=1.2 mg/dL   Lactic acid whole blood with 1x repeat in 2 hr when >2   Result Value Ref Range    Lactic Acid, Initial 1.9 0.7 - 2.0 mmol/L   CBC with platelets and differential   Result Value Ref Range    WBC Count 6.7 4.0 - 11.0 10e3/uL    RBC Count 4.86 3.80 - 5.20 10e6/uL    Hemoglobin 14.1 11.7 - 15.7 g/dL    Hematocrit 41.3 35.0 - 47.0 %    MCV 85 78 - 100 fL    MCH 29.0 26.5 - 33.0 pg    MCHC 34.1 31.5 - 36.5 g/dL    RDW 14.1 10.0 - 15.0 %    Platelet Count 203 150 - 450 10e3/uL    % Neutrophils 53 %    % Lymphocytes 34 %    % Monocytes 10 %    % Eosinophils 2 %    % Basophils 1 %    % Immature Granulocytes 0 %    NRBCs per 100 WBC 0 <1 /100    Absolute Neutrophils 3.5 1.6 - 8.3 10e3/uL    Absolute Lymphocytes 2.3 0.8 - 5.3 10e3/uL    Absolute Monocytes 0.6 0.0 - 1.3 10e3/uL    Absolute Eosinophils 0.1 0.0 - 0.7 10e3/uL    Absolute Basophils 0.1 0.0 - 0.2 10e3/uL    Absolute Immature Granulocytes 0.0 <=0.4 10e3/uL    Absolute NRBCs 0.0 10e3/uL     *Note: Due to a large number of results and/or encounters for the requested time period, some results have not been displayed. A complete set of results can be found in Results Review.       Medications   sodium chloride 0.9% BOLUS 1,000 mL (0 mLs Intravenous Stopped 8/24/24 2210)   metoclopramide (REGLAN) injection 10 mg (10 mg Intravenous $Given 8/24/24 2111)   iopamidol (ISOVUE-370) solution 67 mL (67 mLs Intravenous $Given 8/24/24 2153)   sodium chloride (PF) 0.9% PF flush 100 mL (100 mLs Intravenous $Given 8/24/24 2153)       Assessments & Plan (with Medical Decision Making)     I have reviewed the nursing notes.    I have reviewed the findings, diagnosis, plan and need for follow up with the patient.          Medical Decision  Making  The patient's presentation was of moderate complexity (an undiagnosed new problem with uncertain prognosis).    The patient's evaluation involved:  ordering and/or review of 3+ test(s) in this encounter (see separate area of note for details)    The patient's management necessitated moderate risk (prescription drug management including medications given in the ED).    68-year-old female here with a headache, right-sided neck pain, and presyncope.  Told patient I would be getting a CT of her head neck to look for any issues with her to her carotid arteries, patient states she has had this worked up before because of similar presentations and that was normal.  Still, I wonder reimage and all imaging was thankfully normal.  Patient's pain went down to a 4 after Reglan.  Discussed negative workup and offered discharge versus prolonged observation, patient was fine to go home.  Strict return precautions were provided.    Discharge Medication List as of 8/24/2024 11:50 PM          Final diagnoses:   Acute nonintractable headache, unspecified headache type   Dehydration       8/24/2024   [facility]       Alberto Jc MD  08/25/24 0863

## 2024-08-26 ENCOUNTER — TELEPHONE (OUTPATIENT)
Dept: FAMILY MEDICINE | Facility: OTHER | Age: 69
End: 2024-08-26

## 2024-08-26 LAB
ATRIAL RATE - MUSE: 57 BPM
DIASTOLIC BLOOD PRESSURE - MUSE: NORMAL MMHG
INTERPRETATION ECG - MUSE: NORMAL
P AXIS - MUSE: 2 DEGREES
PR INTERVAL - MUSE: 156 MS
QRS DURATION - MUSE: 92 MS
QT - MUSE: 428 MS
QTC - MUSE: 416 MS
R AXIS - MUSE: 4 DEGREES
SYSTOLIC BLOOD PRESSURE - MUSE: NORMAL MMHG
T AXIS - MUSE: -11 DEGREES
VENTRICULAR RATE- MUSE: 57 BPM

## 2024-08-26 NOTE — TELEPHONE ENCOUNTER
Date of ER/UC Visit:  8/24/24    Location: [facility]     Reason for ER/UC Visit:  Dizziness, headache, pre-syncope    Medication Changes:  none     Medication picked up/started: na     Symptoms improved or worsened: same     ER/UC follow up recommended:  Follow up with [facility] (EMERGENCY MEDICINE); If symptoms worsen     Questions/concern: continued headaches, tired and BP remains elevated 162/97 (which has come down since ED visit per patient)    Appointment Scheduled:        8/28/24 at 215 pm with Eliz Hartman CNP

## 2024-08-26 NOTE — TELEPHONE ENCOUNTER
Symptom or reason needing to speak to RN: ER follow / [facility] / 8-24     Best number to return call: [phone]      Best time to return call: this afternoon after 1pm

## 2024-08-27 ENCOUNTER — ANTICOAGULATION THERAPY VISIT (OUTPATIENT)
Dept: ANTICOAGULATION | Facility: OTHER | Age: 69
End: 2024-08-27
Attending: NURSE PRACTITIONER
Payer: MEDICARE

## 2024-08-27 DIAGNOSIS — Z79.01 LONG TERM CURRENT USE OF ANTICOAGULANT THERAPY: Primary | ICD-10-CM

## 2024-08-27 DIAGNOSIS — I26.99 PULMONARY EMBOLISM AND INFARCTION (H): ICD-10-CM

## 2024-08-27 DIAGNOSIS — I82.401 DEEP VEIN THROMBOSIS (DVT) OF RIGHT LOWER EXTREMITY, UNSPECIFIED CHRONICITY, UNSPECIFIED VEIN (H): ICD-10-CM

## 2024-08-27 LAB — INR HOME MONITORING: 2.1 (ref 2–3)

## 2024-08-27 NOTE — PROGRESS NOTES
ANTICOAGULATION MANAGEMENT     Cassy Avila 68 year old female is on warfarin with therapeutic INR result. (Goal INR 2.0-3.0)    Recent labs: (last 7 days)     08/27/24  0000   INR 2.1       ASSESSMENT     Source(s): Patient/ Care giver call     Warfarin doses taken: Missed dose(s) may be affecting INR  Diet: No new diet changes identified  New illness, injury, or hospitalization: Yes: ED on 8/24/24 for dizziness-seeing PCP tomorrow  Medication/supplement changes: None noted  Signs or symptoms of bleeding or clotting: No  Previous INR: Supratherapeutic  Additional findings: None     PLAN     Recommended plan for temporary change(s) affecting INR     Dosing Instructions: Continue your current warfarin dose with next INR in 2 weeks       Summary  As of 8/27/2024      Full warfarin instructions:  7.5 mg every Mon, Wed, Fri; 5 mg all other days   Next INR check:  9/10/2024               Telephone call with Kaitlin who verbalizes understanding and agrees to plan    Patient to recheck with home meter    Education provided: Please call back if any changes to your diet, medications or how you've been taking warfarin    Plan made per [facility] anticoagulation protocol    Ashanti Velazquez RN  Anticoagulation Clinic  8/27/2024    _______________________________________________________________________     Anticoagulation Episode Summary       Current INR goal:  2.0-3.0   TTR:  82.6% (1 y)   Target end date:  Indefinite   Send INR reminders to:  [facility] [place]    Indications    Long-term (current) use of anticoagulants [Z79.01] [Z79.01]  Pulmonary embolism and infarction (H) [I26.99]  Deep vein thrombosis (DVT) (H) [I82.409] (Resolved) [I82.409]  Deep vein thrombosis (DVT) of right lower extremity  unspecified chronicity  unspecified vein (H) [I82.401]             Comments:  [employer]. Q2 week testing  Call cell phone with any dosing.             Anticoagulation Care Providers       Provider Role Specialty Phone  number    Eliz Hartman, [facility] [phone]

## 2024-08-28 ENCOUNTER — OFFICE VISIT (OUTPATIENT)
Dept: FAMILY MEDICINE | Facility: OTHER | Age: 69
End: 2024-08-28
Attending: NURSE PRACTITIONER
Payer: COMMERCIAL

## 2024-08-28 VITALS
TEMPERATURE: 99.3 F | SYSTOLIC BLOOD PRESSURE: 166 MMHG | HEART RATE: 63 BPM | DIASTOLIC BLOOD PRESSURE: 88 MMHG | BODY MASS INDEX: 38.11 KG/M2 | RESPIRATION RATE: 18 BRPM | WEIGHT: 229 LBS | OXYGEN SATURATION: 94 %

## 2024-08-28 DIAGNOSIS — Z12.11 SPECIAL SCREENING FOR MALIGNANT NEOPLASMS, COLON: ICD-10-CM

## 2024-08-28 DIAGNOSIS — Z87.898 HISTORY OF HEADACHE: Primary | ICD-10-CM

## 2024-08-28 DIAGNOSIS — I10 PRIMARY HYPERTENSION: ICD-10-CM

## 2024-08-28 DIAGNOSIS — I49.9 IRREGULAR HEART RATE: ICD-10-CM

## 2024-08-28 DIAGNOSIS — R94.31 T WAVE INVERSION IN EKG: ICD-10-CM

## 2024-08-28 PROCEDURE — G2211 COMPLEX E/M VISIT ADD ON: HCPCS | Performed by: NURSE PRACTITIONER

## 2024-08-28 PROCEDURE — G0463 HOSPITAL OUTPT CLINIC VISIT: HCPCS

## 2024-08-28 PROCEDURE — 99214 OFFICE O/P EST MOD 30 MIN: CPT | Performed by: NURSE PRACTITIONER

## 2024-08-28 RX ORDER — LOSARTAN POTASSIUM 25 MG/1
25 TABLET ORAL DAILY
Qty: 90 TABLET | Refills: 1 | Status: SHIPPED | OUTPATIENT
Start: 2024-08-28 | End: 2024-09-19

## 2024-08-28 ASSESSMENT — PAIN SCALES - GENERAL: PAINLEVEL: NO PAIN (0)

## 2024-08-28 NOTE — PATIENT INSTRUCTIONS
Assessment & Plan       History of headache  - Improved      Primary hypertension  - ZIO PATCH 8-14 DAYS (additional cost to patient); Future  - ZIO PATCH 8-14 DAYS APPLICATION; Future  - Adult Cardiology Eval  Referral  - losartan (COZAAR) 25 MG tablet; Take 1 tablet (25 mg) by mouth daily.      Irregular heart rate  - ZIO PATCH 8-14 DAYS (additional cost to patient); Future  - ZIO PATCH 8-14 DAYS APPLICATION; Future  - Adult Cardiology Eval  Referral      T wave inversion in EKG  - ZIO PATCH 8-14 DAYS (additional cost to patient); Future  - ZIO PATCH 8-14 DAYS APPLICATION; Future  - Adult Cardiology Eval  Referral      Special screening for malignant neoplasms, colon  - Adult GI  Referral - Procedure Only; Future        To ER as needed        MED REC REQUIRED  Post Medication Reconciliation Status: discharge medications reconciled, continue medications without change        Please continue to take all medication as directed  Please notify your pharmacy or our refill line of future refills needed.  Please return sooner than your next scheduled appointment with any concerns.        When your lab results return, we will call you with abnormal findings  You can also view this information in your MyChart, if you have an account.  Please call or [employer] message us with any questions you may have.          Eliz ENCARNACION  [phone]

## 2024-08-28 NOTE — PROGRESS NOTES
Assessment & Plan       History of headache  - Improved      Primary hypertension  - ZIO PATCH 8-14 DAYS (additional cost to patient); Future  - ZIO PATCH 8-14 DAYS APPLICATION; Future  - Adult Cardiology Eval  Referral  - losartan (COZAAR) 25 MG tablet; Take 1 tablet (25 mg) by mouth daily.      Irregular heart rate  - ZIO PATCH 8-14 DAYS (additional cost to patient); Future  - ZIO PATCH 8-14 DAYS APPLICATION; Future  - Adult Cardiology Eval  Referral      T wave inversion in EKG  - ZIO PATCH 8-14 DAYS (additional cost to patient); Future  - ZIO PATCH 8-14 DAYS APPLICATION; Future  - Adult Cardiology Eval  Referral      Special screening for malignant neoplasms, colon  - Adult GI  Referral - Procedure Only; Future        To ER as needed        MED REC REQUIRED  Post Medication Reconciliation Status: discharge medications reconciled, continue medications without change        Please continue to take all medication as directed  Please notify your pharmacy or our refill line of future refills needed.  Please return sooner than your next scheduled appointment with any concerns.      When your lab results return, we will call you with abnormal findings  You can also view this information in your MyChart, if you have an account.  Please call or Targovax message us with any questions you may have.        Eliz Minnie [facility]  [phone]           Kaitlin is a 68 year old, presenting for the following health issues:  ER F/U        ED/UC Followup:  Facility:  [facility]  Date of visit: 08/24/2024  Reason for visit: headache  Current Status: headache is improved, blood pressure is high        HPI  Cassy Avila is a 68 year old female who is here with a headache.  Generalized headache.  Also some posterior neck pain along the right side.  States she does have dizziness however that is described further as sensation of passing out.  That sensation is noticed primarily upon standing.   Headache is my attending.  All the symptoms have been present for 4 days.        EKG 12 lead   Result Value Ref Range     Systolic Blood Pressure   mmHg     Diastolic Blood Pressure   mmHg     Ventricular Rate 57 BPM     Atrial Rate 57 BPM     WV Interval 156 ms     QRS Duration 92 ms      ms     QTc 416 ms     P Axis 2 degrees     R AXIS 4 degrees     T Axis -11 degrees     Interpretation ECG           Sinus bradycardia  Otherwise normal ECG  When compared with ECG of 15-Dec-2023 09:43,  ST no longer elevated in Inferior leads  T wave inversion now evident in Inferior leads      CBC with platelets differential     Narrative     The following orders were created for panel order CBC with platelets differential.  Procedure                               Abnormality         Status                     ---------                               -----------         ------                     CBC with platelets and d...[[id number]]                      Final result                  Please view results for these tests on the individual orders.   INR   Result Value Ref Range     INR 1.72 (H) 0.85 - 1.15   Comprehensive metabolic panel   Result Value Ref Range     Sodium 139 135 - 145 mmol/L     Potassium 4.5 3.4 - 5.3 mmol/L     Carbon Dioxide (CO2) 30 (H) 22 - 29 mmol/L     Anion Gap 9 7 - 15 mmol/L     Urea Nitrogen 14.2 8.0 - 23.0 mg/dL     Creatinine 1.10 (H) 0.51 - 0.95 mg/dL     GFR Estimate 54 (L) >60 mL/min/1.73m2     Calcium 9.7 8.8 - 10.4 mg/dL     Chloride 100 98 - 107 mmol/L     Glucose 104 (H) 70 - 99 mg/dL     Alkaline Phosphatase 63 40 - 150 U/L     AST 21 0 - 45 U/L     ALT 20 0 - 50 U/L     Protein Total 7.0 6.4 - 8.3 g/dL     Albumin 4.2 3.5 - 5.2 g/dL     Bilirubin Total 0.3 <=1.2 mg/dL   Lactic acid whole blood with 1x repeat in 2 hr when >2   Result Value Ref Range     Lactic Acid, Initial 1.9 0.7 - 2.0 mmol/L   CBC with platelets and differential   Result Value Ref Range     WBC Count 6.7 4.0 - 11.0  10e3/uL     RBC Count 4.86 3.80 - 5.20 10e6/uL     Hemoglobin 14.1 11.7 - 15.7 g/dL     Hematocrit 41.3 35.0 - 47.0 %     MCV 85 78 - 100 fL     MCH 29.0 26.5 - 33.0 pg     MCHC 34.1 31.5 - 36.5 g/dL     RDW 14.1 10.0 - 15.0 %     Platelet Count 203 150 - 450 10e3/uL     % Neutrophils 53 %     % Lymphocytes 34 %     % Monocytes 10 %     % Eosinophils 2 %     % Basophils 1 %     % Immature Granulocytes 0 %     NRBCs per 100 WBC 0 <1 /100     Absolute Neutrophils 3.5 1.6 - 8.3 10e3/uL     Absolute Lymphocytes 2.3 0.8 - 5.3 10e3/uL     Absolute Monocytes 0.6 0.0 - 1.3 10e3/uL     Absolute Eosinophils 0.1 0.0 - 0.7 10e3/uL     Absolute Basophils 0.1 0.0 - 0.2 10e3/uL     Absolute Immature Granulocytes 0.0 <=0.4 10e3/uL     Absolute NRBCs 0.0 10e3/uL         Alberto Jc MD  08/25/24 0435        Headaches have resolved  Notes episodes of irregular heart rate    No chest pain  SOB walking less than one block - this is new      Patient Active Problem List   Diagnosis    Primary hypertension    Pulmonary embolism and infarction (H)    Contact dermatitis and other eczema, due to unspecified cause    Edema    Hyperlipidemia LDL goal <100    Neurofibromatosis, type 1 (H)    Advanced care planning/counseling discussion    Moderate episode of recurrent major depressive disorder (H)    Long-term (current) use of anticoagulants [Z79.01]    Lumbar spondylosis with myelopathy    Degeneration of lumbar or lumbosacral intervertebral disc    Family history of colon cancer    Vitamin D deficiency    Failed arthroplasty (H24)    H/O total shoulder replacement, left    Primary insomnia    Factor V Leiden mutation (H24)    Obesity (BMI 35.0-39.9) with comorbidity (H)    Diarrhea    Chest pain, unspecified    Muscle weakness of right upper extremity    Pain in right upper arm    Stiffness of right shoulder joint    Activated protein C resistance (H24)    Shoulder pain    Deep vein thrombosis (DVT) of right lower extremity, unspecified  chronicity, unspecified vein (H)    History of gout     Past Surgical History:   Procedure Laterality Date    ------------OTHER-------------      shoulder replacement; Provider: Karen    ARTHROPLASTY KNEE  2014    Procedure: ARTHROPLASTY KNEE;  Surgeon: Sean Alexander MD;  Location: [facility] OR    ARTHROSCOPY SHOULDER  2012    right, bone spurs    CA ANESTH,SHOULDER REPLACEMENT Right 2020     SECTION      x3    CHOLECYSTECTOMY      COLONOSCOPY  02/15/2018    [place],,polyps    elbow ulnar tunnel release      ELECTROTHERMAL THERAPY INTRADISC  2017    stimulator    ENDOSCOPIC SINUS SURGERY, SEPTOPLASTY, TURBINOPLASTY, MAXILLARY SINUSOTOMY, COMBINED N/A 2015    Procedure: COMBINED ENDOSCOPIC SINUS SURGERY, SEPTOPLASTY, TURBINOPLASTY, MAXILLARY SINUSOTOMY;  Surgeon: Seema Conn MD;  Location: [facility] OR    ESOPHAGOSCOPY, GASTROSCOPY, DUODENOSCOPY (EGD), COMBINED      with biopsy and endoscopic U/S    EXCISE NEUROMA LOWER EXTREMITY Left 2016    Procedure: EXCISE NEUROMA LOWER EXTREMITY;  Surgeon: Edi Reed MD;  Location: [facility] OR    EYE SURGERY Right 2020    FUSION LUMBAR ANTERIOR WITH MARCY CAGES      L5-S1    HYSTERECTOMY TOTAL ABDOMINAL, BILATERAL SALPINGO-OOPHORECTOMY, COMBINED N/A     ORTHOPEDIC SURGERY  2015    right shoulder    ORTHOPEDIC SURGERY  2015    right knee    ORTHOPEDIC SURGERY Right 2018    hip labrum tear    pionidal cyst excision      TRANSPOSITION ULNAR NERVE (ELBOW)         Social History     Tobacco Use    Smoking status: Former     Current packs/day: 0.00     Average packs/day: 1 pack/day for 30.0 years (30.0 ttl pk-yrs)     Types: Cigarettes, Pipe     Start date: 1969     Quit date: 1999     Years since quittin.6     Passive exposure: Past    Smokeless tobacco: Never    Tobacco comments:     quit in    Substance Use Topics    Alcohol use: No     Family History   Problem Relation Age of Onset    Cancer Mother      Colon Polyps Mother     Heart Failure Mother 87        congestive, cause of death    Myocardial Infarction Mother         myocardial infarction    Myocardial Infarction Father         myocardial infarction - cause of death    C.A.D. Father     Cancer Paternal Uncle         cause of death    C.A.D. Brother     Other - See Comments Other         factor 5 - family h/o    Asthma No family hx of            Current Outpatient Medications   Medication Sig Dispense Refill    albuterol (PROVENTIL) (2.5 MG/3ML) 0.083% neb solution Take 2.5 mg by nebulization every 6 hours as needed for shortness of breath / dyspnea or wheezing      aspirin 81 MG EC tablet Take 81 mg by mouth daily HS      Cholecalciferol (VITAMIN D3 PO) Take 3,000 mg by mouth daily AM      colchicine (COLCRYS) 0.6 MG tablet 2 tabs now, then one tab 2 hours later 3 tablet 0    escitalopram (LEXAPRO) 10 MG tablet Take 1 tablet (10 mg) by mouth daily 90 tablet 1    hydrochlorothiazide (HYDRODIURIL) 25 MG tablet Take 1 tablet by mouth once daily 90 tablet 3    ketoconazole (NIZORAL) 2 % external cream APPLY TO THE RASH ON FULL BACK TWICE A DAY FOR 4-6 WEEKS      metoprolol succinate ER (TOPROL XL) 50 MG 24 hr tablet TAKE 1 & 1/2 (ONE & ONE-HALF) TABLETS BY MOUTH ONCE DAILY 90 tablet 3    order for DME Nebulizer machine and tubing    DX:  Bronchitis 1 Device 0    potassium chloride ER (K-TAB/KLOR-CON) 10 MEQ CR tablet Take 1 tablet by mouth once daily 90 tablet 2    traZODone (DESYREL) 50 MG tablet TAKE 1 TO 2 TABLETS BY MOUTH NIGHTLY AS NEEDED 180 tablet 3    warfarin ANTICOAGULANT (COUMADIN) 5 MG tablet TAKE 1 & 1/2 (ONE & ONE-HALF) TABLETS BY MOUTH MONDAY WEDNESDAY AND FRIDAY AND 1 TABLET ALL OTHER DAYS OR AS DIRECTED BY WARFARIN CLINIC 109 tablet 1         Allergies   Allergen Reactions    Allopurinol Shortness Of Breath    Amlodipine Besylate Swelling     Norvasc    Amoxicillin     Atorvastatin      myualgia    Cephalexin Monohydrate Hives     Keflex     Erythromycin Base [Erythromycin Base] Nausea and Vomiting    Meloxicam Other (See Comments)     Mobic - confusion, depression    Sulfa Antibiotics Hives    Adhesive Tape Rash    Prochlorperazine Edisylate Swelling and Rash     Compazine    Prochlorperazine Maleate Swelling and Rash         Recent Labs   Lab Test 08/24/24  2109 04/01/24  0926 12/15/23  0910 09/29/23  1022 03/17/23  1029 03/17/23  1028 08/27/21  0917 04/16/21  1014 02/08/21  1432   A1C  --   --   --   --  5.6  --   --   --   --    LDL  --  127*  --  119*  --  129*   < >  --   --    HDL  --  35*  --  37*  --  37*   < >  --   --    TRIG  --  148  --  171*  --  233*   < >  --   --    ALT 20 17 21 18  --  26   < > 29 36   CR 1.10* 0.92 0.96* 1.00*  --  0.90   < > 0.95 0.92   GFRESTIMATED 54* 67 64 61  --  69   < > 62 65   GFRESTBLACK  --   --   --   --   --   --   --  72 75   POTASSIUM 4.5 3.4 3.6 3.9  --  3.5   < > 3.3* 3.4   TSH  --  2.73  --  3.34  --  3.05   < >  --   --     < > = values in this interval not displayed.          BP Readings from Last 3 Encounters:   08/28/24 (!) 166/88   08/24/24 169/92   06/10/24 130/70    Wt Readings from Last 3 Encounters:   08/28/24 103.9 kg (229 lb)   06/10/24 102.9 kg (226 lb 13.7 oz)   05/06/24 99.8 kg (220 lb)                  Review of Systems  Constitutional, neuro, ENT, endocrine, pulmonary, cardiac, gastrointestinal, genitourinary, musculoskeletal, integument and psychiatric systems are negative, except as otherwise noted.          Objective    BP (!) 166/88 (BP Location: Right arm, Patient Position: Sitting, Cuff Size: Adult Large)   Pulse 63   Temp 99.3  F (37.4  C) (Tympanic)   Resp 18   Wt 103.9 kg (229 lb)   SpO2 94%   Breastfeeding No   BMI 38.11 kg/m    Body mass index is 38.11 kg/m .        Physical Exam   GENERAL: alert and no distress  EYES: Eyes grossly normal to inspection, PERRL and conjunctivae and sclerae normal  HENT: ear canals and TM's normal, nose and mouth without ulcers or  lesions  NECK: no adenopathy, no asymmetry, masses, or scars  RESP: lungs clear to auscultation - no rales, rhonchi or wheezes  CV: regular rate and rhythm, normal S1 S2, no S3 or S4, no murmur, click or rub, no peripheral edema  SKIN: no suspicious lesions or rashes  PSYCH: mentation appears normal, affect normal/bright        The longitudinal plan of care for the diagnosis(es)/condition(s) as documented were addressed during this visit. Due to the added complexity in care, I will continue to support Kaitlin in the subsequent management and with ongoing continuity of care.           Signed Electronically by: Eliz Hartman CNP

## 2024-08-29 ENCOUNTER — TELEPHONE (OUTPATIENT)
Dept: FAMILY MEDICINE | Facility: OTHER | Age: 69
End: 2024-08-29

## 2024-08-29 ENCOUNTER — ALLIED HEALTH/NURSE VISIT (OUTPATIENT)
Dept: FAMILY MEDICINE | Facility: OTHER | Age: 69
End: 2024-08-29
Attending: NURSE PRACTITIONER
Payer: COMMERCIAL

## 2024-08-29 DIAGNOSIS — I10 PRIMARY HYPERTENSION: ICD-10-CM

## 2024-08-29 DIAGNOSIS — I49.9 IRREGULAR HEART RATE: ICD-10-CM

## 2024-08-29 DIAGNOSIS — R94.31 T WAVE INVERSION IN EKG: ICD-10-CM

## 2024-08-29 PROCEDURE — 93246 EXT ECG>7D<15D RECORDING: CPT

## 2024-08-29 NOTE — PROGRESS NOTES
Cassy Avila arrived here on 8/29/2024 1:41 PM for 8-14 Days  Zio monitor placement per ordering provider Eliz Hartman for the diagnosis HTN, irregular HR, and T-Wave inversion.  Patient s skin was prepped per protocol. Dr. Pittman is the supervising MD.  Zio monitor was placed.  Instructions were reviewed with and given to the patient.  Patient verbalized understanding of wear, troubleshooting and monitor return instructions.

## 2024-09-10 ENCOUNTER — ANTICOAGULATION THERAPY VISIT (OUTPATIENT)
Dept: ANTICOAGULATION | Facility: OTHER | Age: 69
End: 2024-09-10
Attending: NURSE PRACTITIONER
Payer: COMMERCIAL

## 2024-09-10 DIAGNOSIS — I26.99 PULMONARY EMBOLISM AND INFARCTION (H): ICD-10-CM

## 2024-09-10 DIAGNOSIS — Z79.01 LONG TERM CURRENT USE OF ANTICOAGULANT THERAPY: Primary | ICD-10-CM

## 2024-09-10 DIAGNOSIS — I82.401 DEEP VEIN THROMBOSIS (DVT) OF RIGHT LOWER EXTREMITY, UNSPECIFIED CHRONICITY, UNSPECIFIED VEIN (H): ICD-10-CM

## 2024-09-10 LAB — INR HOME MONITORING: 2.8 (ref 2–3)

## 2024-09-10 NOTE — PROGRESS NOTES
ANTICOAGULATION  MANAGEMENT-Home Monitor Managed by Exception    Cassy APPLE Avila 68 year old female is on warfarin with therapeutic INR result. (Goal INR 2.0-3.0)    Recent labs: (last 7 days)     09/10/24  0000   INR 2.8       Previous INR was Therapeutic  Medication, diet, health changes since last INR:chart reviewed; none identified  Contacted within the last 12 weeks by phone on 8/27/24  Last [facility] referral date: 12/19/2023      DARIO     Cassy was NOT contacted regarding therapeutic result today per home monitoring policy manage by exception agreement.   Current warfarin dose is to be continued:     Summary  As of 9/10/2024      Full warfarin instructions:  7.5 mg every Mon, Wed, Fri; 5 mg all other days   Next INR check:  9/24/2024             ?   Corry Galvan RN  [facility] Clinic  9/10/2024    _______________________________________________________________________     Anticoagulation Episode Summary       Current INR goal:  2.0-3.0   TTR:  85.8% (1 y)   Target end date:  Indefinite   Send INR reminders to:  [facility] [place]    Indications    Long-term (current) use of anticoagulants [Z79.01] [Z79.01]  Pulmonary embolism and infarction (H) [I26.99]  Deep vein thrombosis (DVT) (H) [I82.409] (Resolved) [I82.409]  Deep vein thrombosis (DVT) of right lower extremity  unspecified chronicity  unspecified vein (H) [I82.401]             Comments:  [employer] Monitoring. Q2 week testing  Call cell phone with any dosing.             Anticoagulation Care Providers       Provider Role Specialty Phone number    Eliz Hartman CNP Referring Family Medicine [phone]

## 2024-09-11 ENCOUNTER — DOCUMENTATION ONLY (OUTPATIENT)
Dept: OTHER | Facility: CLINIC | Age: 69
End: 2024-09-11
Payer: COMMERCIAL

## 2024-09-18 PROCEDURE — 93248 EXT ECG>7D<15D REV&INTERPJ: CPT | Performed by: INTERNAL MEDICINE

## 2024-09-19 ENCOUNTER — OFFICE VISIT (OUTPATIENT)
Dept: CARDIOLOGY | Facility: OTHER | Age: 69
End: 2024-09-19
Attending: NURSE PRACTITIONER
Payer: COMMERCIAL

## 2024-09-19 VITALS
OXYGEN SATURATION: 93 % | WEIGHT: 229 LBS | HEART RATE: 62 BPM | HEIGHT: 65 IN | SYSTOLIC BLOOD PRESSURE: 146 MMHG | BODY MASS INDEX: 38.15 KG/M2 | DIASTOLIC BLOOD PRESSURE: 82 MMHG

## 2024-09-19 DIAGNOSIS — Z79.01 ON CONTINUOUS ORAL ANTICOAGULATION: ICD-10-CM

## 2024-09-19 DIAGNOSIS — E66.01 MORBID OBESITY (H): ICD-10-CM

## 2024-09-19 DIAGNOSIS — I65.23 CALCIFICATION OF BOTH CAROTID ARTERIES: ICD-10-CM

## 2024-09-19 DIAGNOSIS — D68.51 ACTIVATED PROTEIN C RESISTANCE (H): ICD-10-CM

## 2024-09-19 DIAGNOSIS — E78.5 HYPERLIPIDEMIA LDL GOAL <100: ICD-10-CM

## 2024-09-19 DIAGNOSIS — R06.09 DYSPNEA ON EXERTION: Primary | ICD-10-CM

## 2024-09-19 DIAGNOSIS — I10 ESSENTIAL HYPERTENSION: ICD-10-CM

## 2024-09-19 DIAGNOSIS — R07.89 ATYPICAL CHEST PAIN: ICD-10-CM

## 2024-09-19 DIAGNOSIS — I49.9 IRREGULAR HEART RATE: ICD-10-CM

## 2024-09-19 DIAGNOSIS — E27.8 ADRENAL MASS (H): ICD-10-CM

## 2024-09-19 DIAGNOSIS — D68.51 FACTOR V LEIDEN MUTATION (H): ICD-10-CM

## 2024-09-19 LAB
ANION GAP SERPL CALCULATED.3IONS-SCNC: 10 MMOL/L (ref 7–15)
ATRIAL RATE - MUSE: 61 BPM
BUN SERPL-MCNC: 14 MG/DL (ref 8–23)
CALCIUM SERPL-MCNC: 9.3 MG/DL (ref 8.8–10.4)
CHLORIDE SERPL-SCNC: 103 MMOL/L (ref 98–107)
CREAT SERPL-MCNC: 0.97 MG/DL (ref 0.51–0.95)
DIASTOLIC BLOOD PRESSURE - MUSE: NORMAL MMHG
EGFRCR SERPLBLD CKD-EPI 2021: 63 ML/MIN/1.73M2
GLUCOSE SERPL-MCNC: 114 MG/DL (ref 70–99)
HCO3 SERPL-SCNC: 26 MMOL/L (ref 22–29)
INTERPRETATION ECG - MUSE: NORMAL
NT-PROBNP SERPL-MCNC: 252 PG/ML (ref 0–900)
P AXIS - MUSE: 63 DEGREES
POTASSIUM SERPL-SCNC: 3.8 MMOL/L (ref 3.4–5.3)
PR INTERVAL - MUSE: 156 MS
QRS DURATION - MUSE: 88 MS
QT - MUSE: 402 MS
QTC - MUSE: 404 MS
R AXIS - MUSE: 62 DEGREES
SODIUM SERPL-SCNC: 139 MMOL/L (ref 135–145)
SYSTOLIC BLOOD PRESSURE - MUSE: NORMAL MMHG
T AXIS - MUSE: 89 DEGREES
TSH SERPL DL<=0.005 MIU/L-ACNC: 2.2 UIU/ML (ref 0.3–4.2)
VENTRICULAR RATE- MUSE: 61 BPM

## 2024-09-19 PROCEDURE — 82088 ASSAY OF ALDOSTERONE: CPT | Mod: ZL | Performed by: NURSE PRACTITIONER

## 2024-09-19 PROCEDURE — G0463 HOSPITAL OUTPT CLINIC VISIT: HCPCS

## 2024-09-19 PROCEDURE — 99205 OFFICE O/P NEW HI 60 MIN: CPT | Performed by: NURSE PRACTITIONER

## 2024-09-19 PROCEDURE — 84244 ASSAY OF RENIN: CPT | Mod: ZL | Performed by: NURSE PRACTITIONER

## 2024-09-19 PROCEDURE — 93005 ELECTROCARDIOGRAM TRACING: CPT | Performed by: NURSE PRACTITIONER

## 2024-09-19 PROCEDURE — 84443 ASSAY THYROID STIM HORMONE: CPT | Mod: ZL | Performed by: NURSE PRACTITIONER

## 2024-09-19 PROCEDURE — 80048 BASIC METABOLIC PNL TOTAL CA: CPT | Mod: ZL | Performed by: NURSE PRACTITIONER

## 2024-09-19 PROCEDURE — 93010 ELECTROCARDIOGRAM REPORT: CPT | Performed by: INTERNAL MEDICINE

## 2024-09-19 PROCEDURE — 83880 ASSAY OF NATRIURETIC PEPTIDE: CPT | Mod: ZL | Performed by: NURSE PRACTITIONER

## 2024-09-19 PROCEDURE — 36415 COLL VENOUS BLD VENIPUNCTURE: CPT | Mod: ZL | Performed by: NURSE PRACTITIONER

## 2024-09-19 RX ORDER — LOSARTAN POTASSIUM 25 MG/1
50 TABLET ORAL DAILY
COMMUNITY
Start: 2024-09-19

## 2024-09-19 ASSESSMENT — PAIN SCALES - GENERAL: PAINLEVEL: MILD PAIN (3)

## 2024-09-19 NOTE — PROGRESS NOTES
"[facility] HEART CARE   CARDIOLOGY CONSULT     Cassy Avila   [address]   [place] [address]      Eliz Hartman     Chief Complaint   Patient presents with    Consult    Hypertension        HPI:   Cassy \"Kaitlin\" Austin is a pleasant 68-year old female who presents for cardiology consult regarding irregular heart rate and T wave changes on EKG. She has a cardiovascular history including hyperlipidemia, hypertension. She has a non-cardiac medical history including Factor V Leiden with history of pulmonary embolus on chronic oral anticoagulation, depression, insomnia.       Today, Kaitlin is concerned about recent elevated blood pressures, progressive dyspnea on exertion and fatigue.   \"My blood pressure is still high and I can feel some pounding. I get so short of breath and tired. Sometimes my pulse is really down. Chest discomfort.\"   She has been taking her blood pressures at home. When they are good usually 120-130 systolic. Lately, in the last month, her blood pressures have been elevated 180-200 systolic and 100 diastolic. She has been more active with the nicer weather this summer. Symptoms of fatigue, dyspnea, pounding in chest is also newer.   She describes occasional \"heavy feeling\" in the mid-sternal chest area. She does admit that when she presses on the area her chest wall does hurt. No radiation of discomfort. Chest discomfort can come on at rest, laying, sitting, moving. She has had this discomfort every day. She does not usually wake up with discomfort.   She was able to walk to her sister's house about a block away. In the past month she has been feeling so short of breath she feels like she is going to pass out. \"Going up and down the basement steps about does me in. Walking down the [place] at [employer] is tough.\" She estimates she could not even walk a block without needing to rest. Prior to onset of symptoms in the last month she was able to walk down 4 blocks \"no problem.\"   She gets sensation of " "\"pounding\" randomly. Usually lasts maybe a minute, no longer than 2 minutes, and goes back to normal. She gets associated lightheadedness.   Fatigue- per  \"she has been sleeping a lot.\"   She has a history of bilateral LE edema. 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 Ankle is always swollen. Sometimes has a little swelling in left leg.       Smoking History: Started smoking at age 12, quit smoking around age 49. At peak was smoking 0.5 ppd.    Alcohol History: None    Substance Abuse History: No history of elicit substance use. History of fen-phen use.    Sleep History: Sleeps in bed. Usually goes to bed around 10/10:30 p.m. Wakes up around 7/7:30 am. Wakes up most mornings in the last month feeling tired \"and like I want to go back to bed.\" She has been taking naps lately due to fatigue- usually 1 nap- lasting 2 hours. She does feel rested when she wakes up from this nap. She does snore. No witnessed apnea. Sleep study in . Lately has had some orthopnea. No PND.     Family History:   - Maternal grandfather: heart attack at age 72  - Maternal grandmother: heart disease due to diabetes age 59  - Mother: CHF, pacemaker  - Father:  of MI at the age of 49 years old  - Father had a large family- several members less than 50 with history of heart attacks  - Brother: atrial fibrillation  - Sister: atrial fibrillation      RELEVANT TESTING:  Zio Patch 2024  Conclusion  Zio XT patch report on Cassy Avila.  Ordered secondary to hypertension.   Worn for 13 days and 21 hr's.  After removing artifact, total time was 13 days and 5 hr's. Placed on 2024 at 1:39 PM and completed on 2024 at 10:22 AM.     Underlying rhythm was sinus.   Hrt rate ranged from 48 bpm, maximum heart rate of 182 bpm, averaging 63 bpm.   No significant bradycardia, pauses, Mobitz type II or 3rd degree heart block.   No atrial fibrillation on this study.   x 15 triggered events and x 15 diary entries.  These " corresponded to SVT, normal sinus rhythm, SVE's and VE's.   x 0 runs of VT.     x 13 runs of SVT longest lasting 7 beats with a maximum heart rate of 182 bpm.   Rare, <1% of PAC's, atrial couplets, atrial triplets, and PVC's.   No episodes of ventricular bigeminy.   No episodes of ventricular trigeminy.      Facundo Pittman, DO     Zio Patch 2020  Conclusion  Zio XT patch report on Cassy Avila.  Ordered secondary to palpitations.   Worn for 6 days and 21 hr's.  After removing artifact, total time was 6 days and 21 hr's. Placed on 10/27/2020 at 1:23 PM and completed on 11/3/2020 at 9:29 AM.     Underlying rhythm was sinus.   Hrt rate ranged from 51 bpm, maximum heart rate of 167 bmp, averaging 79 bmp.   No significant bradycardia, pauses, 2nd degree Mobitz type II or 3rd degree heart block.   No atrial fibrillation was identified on this study.   x2 triggered events and x2 diary entries.  These corresponded to SVE, VE, and sinus rhythm.   x0 runs of VT.     x3 runs of SVT lasting up to 19 beats with a maximum heart rate of 167 bmp.   Rare, less than 1% of PAC's, atrial couplets, atrial triplets, and ventricular couplets.   Occasional PVC's at 1.8%.   + episodes of ventricular bigeminy lasting up to 6.7 sec's.   + episodes of ventricular trigeminy lasting up to 15.3 sec's.    Transthoracic Echocardiogram 2016  ASSESSMENT:  Echocardiographic study revealing  1.  Normal left ventricular size and systolic function.  Ejection  fraction is 60%.  2.  Borderline left atrial enlargement.  3.  Normal right ventricular size and function.  4.  Trace mitral regurgitation.  5.  Normal aortic valve.  6.  Trace tricuspid regurgitation.  Peak systolic pressure of the  right ventricle is estimated at 23 plus right atrium.  Exam Date: Randall 15, 2016 10:03:57 AM  Author: MARIYA AGUILERA      PAST MEDICAL HISTORY:   Past Medical History:   Diagnosis Date    Abdominal pain, generalized 2/8/2021    Arthritis     Cervicalgia  1/9/2001    Closed dislocation of shoulder, unspecified site 2000    Congenital deficiency of other clotting factors 9/7/2012    factor V deficiency, congenital    Congenital factor VIII disorder (H) 7/26/2019    Contact dermatitis and other eczema, due to unspecified cause 6/1/2004    Coughing     Diarrhea 2/8/2021    Edema 1/18/2002    Essential hypertension 2/20/2001     Problem list name updated by automated process. Provider to review    Factor V Leiden (H24)     Factor V Leiden mutation (H24) 7/26/2019    Family history of colon cancer 1/2/2018    Gastro-oesophageal reflux disease     Herpes zoster without mention of complication 2003    resolved from problem list    Hyperlipidemia LDL goal <100 7/11/2002     Problem list name updated by automated process. 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 Provider to review    Neurofibromatosis, unspecified(237.70) 8/22/2012    Other and unspecified hyperlipidemia 7/11/2002    Other chronic pain     Other pulmonary embolism and infarction 4/11/2003    Primary insomnia 10/31/2018    Statin medication not prescribed per physician orders 1/17/2018    Tachycardia, unspecified 2/12/2001    Thrombosis of leg     Unspecified essential hypertension 2/20/2001    Vitamin D deficiency 7/13/2018          FAMILY HISTORY:   Family History   Problem Relation Age of Onset    Cancer Mother     Colon Polyps Mother     Heart Failure Mother 87        congestive, cause of death    Myocardial Infarction Mother         myocardial infarction    Myocardial Infarction Father          myocardial infarction - cause of death    C.A.D. Father     Cancer Paternal Uncle         cause of death    C.A.D. Brother     Other - See Comments Other         factor 5 - family h/o    Asthma No family hx of           PAST SURGICAL HISTORY:   Past Surgical History:   Procedure Laterality Date    ------------OTHER-------------      shoulder replacement; Provider: Karen    ARTHROPLASTY KNEE  2014    Procedure: ARTHROPLASTY KNEE;  Surgeon: Sean Alexander MD;  Location: [facility] OR    ARTHROSCOPY SHOULDER      right, bone spurs    CA ANESTH,SHOULDER REPLACEMENT Right 2020     SECTION      x3    CHOLECYSTECTOMY      COLONOSCOPY  02/15/2018    [place],,polyps    elbow ulnar tunnel release      ELECTROTHERMAL THERAPY INTRADISC  2017    stimulator    ENDOSCOPIC SINUS SURGERY, SEPTOPLASTY, TURBINOPLASTY, MAXILLARY SINUSOTOMY, COMBINED N/A 2015    Procedure: COMBINED ENDOSCOPIC SINUS SURGERY, SEPTOPLASTY, TURBINOPLASTY, MAXILLARY SINUSOTOMY;  Surgeon: Seema Conn MD;  Location: [facility] OR    ESOPHAGOSCOPY, GASTROSCOPY, DUODENOSCOPY (EGD), COMBINED      with biopsy and endoscopic U/S    EXCISE NEUROMA LOWER EXTREMITY Left 2016    Procedure: EXCISE NEUROMA LOWER EXTREMITY;  Surgeon: Edi Reed MD;  Location: [facility] OR    EYE SURGERY Right 2020    FUSION LUMBAR ANTERIOR WITH MARCY CAGES      L5-S1    HYSTERECTOMY TOTAL ABDOMINAL, BILATERAL SALPINGO-OOPHORECTOMY, COMBINED N/A     ORTHOPEDIC SURGERY  2015    right shoulder    ORTHOPEDIC SURGERY  2015    right knee    ORTHOPEDIC SURGERY Right 2018    hip labrum tear    pionidal cyst excision      TRANSPOSITION ULNAR NERVE (ELBOW)            SOCIAL HISTORY:   Social History     Socioeconomic History    Marital status:      Spouse name: None    Number of children: None    Years of education: None    Highest education level: None   Tobacco Use    Smoking status: Former     Current packs/day: 0.00      Average packs/day: 1 pack/day for 30.0 years (30.0 ttl pk-yrs)     Types: Cigarettes, Pipe     Start date: 1969     Quit date: 1999     Years since quittin.7     Passive exposure: Past    Smokeless tobacco: Never    Tobacco comments:     quit in    Vaping Use    Vaping status: Never Used   Substance and Sexual Activity    Alcohol use: No    Drug use: No    Sexual activity: Yes     Partners: Male   Other Topics Concern    Blood Transfusions Yes    Caffeine Concern Yes     Comment: 2 cups coffee daily    Exercise Yes     Comment: walking, aerobic daily (5-10 hrs/week)    Parent/sibling w/ CABG, MI or angioplasty before 65F 55M? Yes     Social Determinants of Health     Financial Resource Strain: Low Risk  (2024)    Financial Resource Strain     Within the past 12 months, have you or your family members you live with been unable to get utilities (heat, electricity) when it was really needed?: No   Food Insecurity: Low Risk  (2024)    Food Insecurity     Within the past 12 months, did you worry that your food would run out before you got money to buy more?: No     Within the past 12 months, did the food you bought just not last and you didn t have money to get more?: No   Transportation Needs: Low Risk  (2024)    Transportation Needs     Within the past 12 months, has lack of transportation kept you from medical appointments, getting your medicines, non-medical meetings or appointments, work, or from getting things that you need?: No   Interpersonal Safety: Low Risk  (2024)    Interpersonal Safety     Do you feel physically and emotionally safe where you currently live?: Yes     Within the past 12 months, have you been hit, slapped, kicked or otherwise physically hurt by someone?: No     Within the past 12 months, have you been humiliated or emotionally abused in other ways by your partner or ex-partner?: No   Housing Stability: Low Risk  (2024)    Housing Stability     Do you have  housing? : Yes     Are you worried about losing your housing?: No          CURRENT MEDICATIONS:   Current Outpatient Medications   Medication Sig Dispense Refill    albuterol (PROVENTIL) (2.5 MG/3ML) 0.083% neb solution Take 2.5 mg by nebulization every 6 hours as needed for shortness of breath / dyspnea or wheezing      aspirin 81 MG EC tablet Take 81 mg by mouth daily HS      Cholecalciferol (VITAMIN D3 PO) Take 3,000 mg by mouth daily AM      escitalopram (LEXAPRO) 10 MG tablet Take 1 tablet (10 mg) by mouth daily 90 tablet 1    hydrochlorothiazide (HYDRODIURIL) 25 MG tablet Take 1 tablet by mouth once daily 90 tablet 3    ketoconazole (NIZORAL) 2 % external cream APPLY TO THE RASH ON FULL BACK TWICE A DAY FOR 4-6 WEEKS      losartan (COZAAR) 25 MG tablet Take 2 tablets (50 mg) by mouth daily.      metoprolol succinate ER (TOPROL XL) 50 MG 24 hr tablet TAKE 1 & 1/2 (ONE & ONE-HALF) TABLETS BY MOUTH ONCE DAILY 90 tablet 3    order for DME Nebulizer machine and tubing    DX:  Bronchitis 1 Device 0    potassium chloride ER (K-TAB/KLOR-CON) 10 MEQ CR tablet Take 1 tablet by mouth once daily 90 tablet 2    traZODone (DESYREL) 50 MG tablet TAKE 1 TO 2 TABLETS BY MOUTH NIGHTLY AS NEEDED 180 tablet 3    warfarin ANTICOAGULANT (COUMADIN) 5 MG tablet TAKE 1 & 1/2 (ONE & ONE-HALF) TABLETS BY MOUTH MONDAY WEDNESDAY AND FRIDAY AND 1 TABLET ALL OTHER DAYS OR AS DIRECTED BY [facility] CLINIC 109 tablet 1     No current facility-administered medications for this visit.            ALLERGIES:   Allergies   Allergen Reactions    Allopurinol Shortness Of Breath    Amlodipine Besylate Swelling     Norvasc    Amoxicillin     Atorvastatin      myualgia    Cephalexin Monohydrate Hives     Keflex    Erythromycin Base [Erythromycin Base] Nausea and Vomiting    Meloxicam Other (See Comments)     Mobic - confusion, depression    Sulfa Antibiotics Hives    Adhesive Tape Rash    Prochlorperazine Edisylate Swelling and Rash     Compazine     "Prochlorperazine Maleate Swelling and Rash          PHYSICAL EXAM:     Vitals: BP (!) 146/82 (BP Location: Left arm, Patient Position: Sitting, Cuff Size: Adult Regular)   Pulse 62   Ht 1.651 m (5' 5\")   Wt 103.9 kg (229 lb)   SpO2 93%   BMI 38.11 kg/m    BMI= Body mass index is 38.11 kg/m .    GENERAL: The patient is a well-developed, well-nourished, in no apparent distress.  HEENT: Head is normocephalic and atraumatic.   NECK: Supple. Carotid upstroke are normal bilaterally, no cervical bruits, JVP not visible.   CHEST/ LUNGS: Lungs clear to auscultation, no rales, rhonchi or wheezes, no use of accessory muscles, no retractions, respirations unlabored and normal respiratory rate.   CARDIO: Regular rate and rhythm normal with S1 and S2, no S3 or S4 and no murmur, click or rub. Precordium quiet with normal PMI.    ABD: Abdomen is soft and nontender, nondistended. No abdominal bruits heard.   EXTREMITIES: Trace bilateral pretibial edema. Peripheral pulses normal and equal bilaterally.  VASC: Radial, dorsalis pedis and posterior tibialis pulses normal.   MUSCULOSKELETAL: No joint swelling.   NEUROLOGIC: Alert and oriented X3. Normal speech, gait and affect.   SKIN: No jaundice.        TEST RESULTS:   Results for orders placed or performed in visit on 09/19/24   N terminal pro BNP outpatient     Status: Normal   Result Value Ref Range    N Terminal Pro BNP Outpatient 252 0 - 900 pg/mL   Basic metabolic panel     Status: Abnormal   Result Value Ref Range    Sodium 139 135 - 145 mmol/L    Potassium 3.8 3.4 - 5.3 mmol/L    Chloride 103 98 - 107 mmol/L    Carbon Dioxide (CO2) 26 22 - 29 mmol/L    Anion Gap 10 7 - 15 mmol/L    Urea Nitrogen 14.0 8.0 - 23.0 mg/dL    Creatinine 0.97 (H) 0.51 - 0.95 mg/dL    GFR Estimate 63 >60 mL/min/1.73m2    Calcium 9.3 8.8 - 10.4 mg/dL    Glucose 114 (H) 70 - 99 mg/dL   TSH with free T4 reflex     Status: Normal   Result Value Ref Range    TSH 2.20 0.30 - 4.20 uIU/mL   EKG 12-lead " "complete w/read - (Clinic Performed)     Status: None (Preliminary result)   Result Value Ref Range    Systolic Blood Pressure  mmHg    Diastolic Blood Pressure  mmHg    Ventricular Rate 61 BPM    Atrial Rate 61 BPM    AR Interval 156 ms    QRS Duration 88 ms     ms    QTc 404 ms    P Axis 63 degrees    R AXIS 62 degrees    T Axis 89 degrees    Interpretation ECG       Sinus rhythm  Nonspecific ST abnormality  Abnormal ECG  When compared with ECG of 24-Aug-2024 20:42,  Questionable change in QRS axis  T wave inversion no longer evident in Inferior leads     Aldosterone Renin Ratio     Status: None (In process)    Narrative    The following orders were created for panel order Aldosterone Renin Ratio.  Procedure                               Abnormality         Status                     ---------                               -----------         ------                     Aldosterone[[id number]]                                      In process                 Renin activity[[id number]]                                   In process                 Aldosterone Renin Ratio[[id number]]                          In process                   Please view results for these tests on the individual orders.         ASSESSMENT:     Cassy \"Kaitlin\" Austin is a pleasant 68-year old female who presents for cardiology consult regarding irregular heart rate and T wave changes on EKG. She has a cardiovascular history including hyperlipidemia, hypertension. She has a non-cardiac medical history including Factor V Leiden with history of pulmonary embolus on chronic oral anticoagulation, depression, insomnia.       Today, Ms. Avila is most concerned about recent progressive dyspnea on exertion, fatigue, and hypertension over the last month. She has been limited in her physical activity due to dyspnea and fatigue. She was able to walk at least 4 blocks and now cannot walk half a block. She was able to carry her laundry basket up the stairs " and now puts the laundry basket on the step and goes up one by one.       PLAN:   Dyspnea on exertion  Atypical chest discomfort  Racing sensation in chest   Reviewed zio patch- no significant concerns. Some symptomatic PACs, PVCs and short non-sustained SVT. Given average heart rate of 63 bpm no adjustment in beta-blocker at this time  Given cardiovascular risk factors and progressive symptoms over the last month plan for NM stress testing. She is limited due to dyspnea and agrees to chemical stress testing. Pending results of stress test reviewed coronary angiogram may be warranted  Plan for transthoracic echocardiogram to evaluate cardiac structure and function.   Consider CTA chest.       Hypertension  Adrenal gland mass (CT abdomen 2023)  ED 8/2024 for significant headache, presyncopal symptoms. CT head, CTA head neck without significant findings. She was hypertensive. Saw PCP 8/28/2024, hypertensive, and started on losartan   Plan for aldosterone: renin ratio with recent elevated blood pressure  INCREASE losartan 25 mg once daily to 50 mg once daily  Continue metoprolol succinate 75 mg once daily  Continue hydrochlorothiazide 25 mg once daily  Heart healthy lifestyle including:  Heart healthy diet low in sodium and saturated fats  Maintain a healthy weight  At least 30 minutes of moderate intensity physical activity daily  Smoking cessation, if applicable  Alcohol in moderation- this is no more than 1 drink per day for women, 2 drinks per day for men    BP Readings from Last 6 Encounters:   09/19/24 (!) 146/82   08/28/24 (!) 166/88   08/24/24 169/92   06/10/24 130/70   05/06/24 121/76   04/09/24 136/70       Hyperlipidemia with LDL goal less than 100, uncontrolled  Carotid artery calcifications without stenosis 8/2024 CTA head/neck  Statin would be recommended.   She is not on a statin medication. Prior side effects.   Heart healthy lifestyle encouraged    Recent Labs   Lab Test 04/01/24  0926 09/29/23  1022    CHOL 192 190   HDL 35* 37*   * 119*   TRIG 148 171*     The 10-year ASCVD risk score (Rupert NAQVI, et al., 2019) is: 15.9%    Values used to calculate the score:      Age: 68 years      Sex: Female      Is Non- : No      Diabetic: No      Tobacco smoker: No      Systolic Blood Pressure: 146 mmHg      Is BP treated: Yes      HDL Cholesterol: 35 mg/dL      Total Cholesterol: 192 mg/dL        Obesity  Heart healthy lifestyle  Body mass index is 38.11 kg/m .  Wt Readings from Last 5 Encounters:   09/19/24 103.9 kg (229 lb)   08/28/24 103.9 kg (229 lb)   06/10/24 102.9 kg (226 lb 13.7 oz)   05/06/24 99.8 kg (220 lb)   04/09/24 103.6 kg (228 lb 8 oz)       Former smoker    Factor V leiden  History of pulmonary embolus  Chronic oral anticoagulation  Diagnosed after surgery on her leg. No recurrence.   INR levels have been therapeutic  US E 5/2024: negative for DVT      Follow-up with cardiology after completion of testing, certainly sooner with acute concerns.     Thank you for allowing me to participate in the care of your patient. Please do not hesitate to contact me if you have any questions.     Ashli Tijerina CNP       Total time spent on day of visit, including review of tests, obtaining/reviewing separately obtained history, ordering medications/tests/procedures, communicating with PCP/consultants, and documenting in electronic medical record: 70 minutes.

## 2024-09-19 NOTE — PATIENT INSTRUCTIONS
Thank you for allowing Ashli Tijerina CNP and our  team to participate in your care. Please call our office at [phone] with scheduling questions or if you need to cancel or change your appointment. With any other questions or concerns you may call cardiology nurse at  [phone].       If you experience chest pain, chest pressure, chest tightness, shortness of breath, fainting, lightheadedness, nausea, vomiting, or other concerning symptoms, please report to the Emergency Department or call 911. These symptoms may be emergent, and best treated in the Emergency Department.

## 2024-09-20 ENCOUNTER — HOSPITAL ENCOUNTER (OUTPATIENT)
Dept: CARDIOLOGY | Facility: HOSPITAL | Age: 69
Discharge: HOME | End: 2024-09-20
Attending: NURSE PRACTITIONER | Admitting: INTERNAL MEDICINE
Payer: MEDICARE

## 2024-09-20 DIAGNOSIS — R06.09 DYSPNEA ON EXERTION: ICD-10-CM

## 2024-09-20 DIAGNOSIS — R07.89 ATYPICAL CHEST PAIN: ICD-10-CM

## 2024-09-20 LAB
ALDOST SERPL-MCNC: 19.4 NG/DL (ref 0–31)
LVEF ECHO: NORMAL

## 2024-09-20 PROCEDURE — 93306 TTE W/DOPPLER COMPLETE: CPT

## 2024-09-20 PROCEDURE — 93306 TTE W/DOPPLER COMPLETE: CPT | Mod: 26 | Performed by: INTERNAL MEDICINE

## 2024-09-22 ENCOUNTER — MYC MEDICAL ADVICE (OUTPATIENT)
Dept: CARDIOLOGY | Facility: OTHER | Age: 69
End: 2024-09-22

## 2024-09-22 DIAGNOSIS — E27.8 ADRENAL MASS (H): ICD-10-CM

## 2024-09-22 DIAGNOSIS — I16.0 HYPERTENSIVE URGENCY: ICD-10-CM

## 2024-09-22 DIAGNOSIS — R79.89 ABNORMAL ALDOSTERONE TO RENIN RATIO: Primary | ICD-10-CM

## 2024-09-22 LAB — RENIN PLAS-CCNC: 0.2 NG/ML/HR

## 2024-09-23 LAB — ALDOST/RENIN PLAS-RTO: 97 {RATIO} (ref 0–25)

## 2024-09-24 ENCOUNTER — ANTICOAGULATION THERAPY VISIT (OUTPATIENT)
Dept: ANTICOAGULATION | Facility: OTHER | Age: 69
End: 2024-09-24
Attending: NURSE PRACTITIONER
Payer: COMMERCIAL

## 2024-09-24 DIAGNOSIS — I82.401 DEEP VEIN THROMBOSIS (DVT) OF RIGHT LOWER EXTREMITY, UNSPECIFIED CHRONICITY, UNSPECIFIED VEIN (H): ICD-10-CM

## 2024-09-24 DIAGNOSIS — I26.99 PULMONARY EMBOLISM AND INFARCTION (H): ICD-10-CM

## 2024-09-24 DIAGNOSIS — Z79.01 LONG TERM CURRENT USE OF ANTICOAGULANT THERAPY: Primary | ICD-10-CM

## 2024-09-24 LAB — INR HOME MONITORING: 2.7 (ref 2–3)

## 2024-09-24 NOTE — PROGRESS NOTES
ANTICOAGULATION  MANAGEMENT-Home Monitor Managed by Exception    Cassy CHIU Austin 68 year old female is on warfarin with therapeutic INR result. (Goal INR 2.0-3.0)    Recent labs: (last 7 days)     09/24/24  0000   INR 2.7       Previous INR was Therapeutic  Medication, diet, health changes since last INR:Yes: increase in losartan dose, but not anticipated to affect INR  Contacted within the last 12 weeks by phone on 8/27/24  Last [facility] referral date: 12/19/2023      DARIO     Cassy was NOT contacted regarding therapeutic result today per home monitoring policy manage by exception agreement.   Current warfarin dose is to be continued:     Summary  As of 9/24/2024      Full warfarin instructions:  7.5 mg every Mon, Wed, Fri; 5 mg all other days   Next INR check:  10/8/2024             ?   Corry Galvan RN  Anticoagulation Clinic  9/24/2024    _______________________________________________________________________     Anticoagulation Episode Summary       Current INR goal:  2.0-3.0   TTR:  85.8% (1 y)   Target end date:  Indefinite   Send INR reminders to:  [facility] [place]    Indications    Long-term (current) use of anticoagulants [Z79.01] [Z79.01]  Pulmonary embolism and infarction (H) [I26.99]  Deep vein thrombosis (DVT) (H) [I82.409] (Resolved) [I82.409]  Deep vein thrombosis (DVT) of right lower extremity  unspecified chronicity  unspecified vein (H) [I82.401]             Comments:  [employer] Monitoring. Q2 week testing  Call cell phone with any dosing.             Anticoagulation Care Providers       Provider Role Specialty Phone number    Eliz Hartman CNP Referring Family Medicine [phone]

## 2024-09-25 ENCOUNTER — HOSPITAL ENCOUNTER (OUTPATIENT)
Dept: NUCLEAR MEDICINE | Facility: HOSPITAL | Age: 69
Setting detail: NUCLEAR MEDICINE
Discharge: HOME | End: 2024-09-25
Attending: NURSE PRACTITIONER
Payer: MEDICARE

## 2024-09-25 ENCOUNTER — HOSPITAL ENCOUNTER (OUTPATIENT)
Dept: CARDIOLOGY | Facility: HOSPITAL | Age: 69
Setting detail: NUCLEAR MEDICINE
Discharge: HOME | End: 2024-09-25
Attending: NURSE PRACTITIONER
Payer: MEDICARE

## 2024-09-25 DIAGNOSIS — R07.89 ATYPICAL CHEST PAIN: ICD-10-CM

## 2024-09-25 DIAGNOSIS — R06.09 DYSPNEA ON EXERTION: ICD-10-CM

## 2024-09-25 LAB
CV BLOOD PRESSURE: 75 MMHG
CV STRESS MAX HR HE: 83
NUC STRESS EJECTION FRACTION: 82 %
RATE PRESSURE PRODUCT: NORMAL
STRESS ECHO BASELINE DIASTOLIC HE: 90
STRESS ECHO BASELINE HR: 56 BPM
STRESS ECHO BASELINE SYSTOLIC BP: 140
STRESS ECHO CALCULATED PERCENT HR: 55 %
STRESS ECHO LAST STRESS DIASTOLIC BP: 90
STRESS ECHO LAST STRESS SYSTOLIC BP: 150
STRESS ECHO TARGET HR: 151

## 2024-09-25 PROCEDURE — 343N000001 HC RX 343: Performed by: RADIOLOGY

## 2024-09-25 PROCEDURE — 93017 CV STRESS TEST TRACING ONLY: CPT

## 2024-09-25 PROCEDURE — 93016 CV STRESS TEST SUPVJ ONLY: CPT | Performed by: INTERNAL MEDICINE

## 2024-09-25 PROCEDURE — 250N000011 HC RX IP 250 OP 636: Performed by: INTERNAL MEDICINE

## 2024-09-25 PROCEDURE — A9500 TC99M SESTAMIBI: HCPCS | Performed by: RADIOLOGY

## 2024-09-25 PROCEDURE — 78452 HT MUSCLE IMAGE SPECT MULT: CPT | Mod: ME

## 2024-09-25 PROCEDURE — 93018 CV STRESS TEST I&R ONLY: CPT | Performed by: INTERNAL MEDICINE

## 2024-09-25 RX ORDER — REGADENOSON 0.08 MG/ML
0.4 INJECTION, SOLUTION INTRAVENOUS ONCE
Status: COMPLETED | OUTPATIENT
Start: 2024-09-25 | End: 2024-09-25

## 2024-09-25 RX ADMIN — Medication 32.7 MILLICURIE: at 09:48

## 2024-09-25 RX ADMIN — REGADENOSON 0.4 MG: 0.08 INJECTION, SOLUTION INTRAVENOUS at 09:47

## 2024-09-25 RX ADMIN — Medication 10.1 MILLICURIE: at 07:26

## 2024-09-27 ENCOUNTER — TELEPHONE (OUTPATIENT)
Dept: CARDIOLOGY | Facility: OTHER | Age: 69
End: 2024-09-27

## 2024-09-27 NOTE — TELEPHONE ENCOUNTER
----- Message from Ashli Tijerina sent at 9/20/2024  2:49 PM CDT -----  Regarding: follow-up  Can we see if she has had less palpitations, changes in breathing or weight with increasing furosemide?    Thanks,    Ashli Tijerina, CNP on 9/20/2024 at 2:49 PM

## 2024-09-30 ENCOUNTER — MYC REFILL (OUTPATIENT)
Dept: FAMILY MEDICINE | Facility: OTHER | Age: 69
End: 2024-09-30

## 2024-09-30 DIAGNOSIS — F51.01 PRIMARY INSOMNIA: ICD-10-CM

## 2024-09-30 DIAGNOSIS — Z79.01 LONG TERM CURRENT USE OF ANTICOAGULANT THERAPY: ICD-10-CM

## 2024-09-30 RX ORDER — TRAZODONE HYDROCHLORIDE 50 MG/1
TABLET, FILM COATED ORAL
Qty: 180 TABLET | Refills: 3 | Status: SHIPPED | OUTPATIENT
Start: 2024-09-30

## 2024-09-30 RX ORDER — WARFARIN SODIUM 5 MG/1
TABLET ORAL
Qty: 109 TABLET | Refills: 1 | Status: SHIPPED | OUTPATIENT
Start: 2024-09-30

## 2024-09-30 RX ORDER — TRAZODONE HYDROCHLORIDE 50 MG/1
TABLET, FILM COATED ORAL
Qty: 180 TABLET | Refills: 3 | Status: CANCELLED | OUTPATIENT
Start: 2024-09-30

## 2024-09-30 NOTE — TELEPHONE ENCOUNTER
ANTICOAGULATION MANAGEMENT:  Medication Refill    Anticoagulation Summary  As of 9/24/2024      Warfarin maintenance plan:  7.5 mg (5 mg x 1.5) every Mon, Wed, Fri; 5 mg (5 mg x 1) all other days   Next INR check:  10/8/2024   Target end date:  Indefinite    Indications    Long-term (current) use of anticoagulants [Z79.01] [Z79.01]  Pulmonary embolism and infarction (H) [I26.99]  Deep vein thrombosis (DVT) (H) [I82.409] (Resolved) [I82.409]  Deep vein thrombosis (DVT) of right lower extremity  unspecified chronicity  unspecified vein (H) [I82.401]                 Anticoagulation Care Providers       Provider Role Specialty Phone number    Eliz Hartman CNP Referring Family Medicine [phone]            Visit with referring provider/group within last year: Yes 8/28/24    [facility] referral signed within last year: Yes    Cassy meets all criteria for refill (current [facility] referral, visit with referring provider/group in last 15 months unless directed to return in 2 years in last referring provider visit note, lab monitoring up to date or not exceeding 2 weeks overdue). Rx instructions and quantity match patient's current dosing plan. Warfarin 90 day supply with 1 refill granted per [facility] protocol     Ashanti Velazquez RN  Anticoagulation Clinic

## 2024-10-08 ENCOUNTER — ANTICOAGULATION THERAPY VISIT (OUTPATIENT)
Dept: ANTICOAGULATION | Facility: OTHER | Age: 69
End: 2024-10-08
Attending: NURSE PRACTITIONER
Payer: COMMERCIAL

## 2024-10-08 DIAGNOSIS — I26.99 PULMONARY EMBOLISM AND INFARCTION (H): ICD-10-CM

## 2024-10-08 DIAGNOSIS — Z79.01 LONG TERM CURRENT USE OF ANTICOAGULANT THERAPY: Primary | ICD-10-CM

## 2024-10-08 DIAGNOSIS — I82.401 DEEP VEIN THROMBOSIS (DVT) OF RIGHT LOWER EXTREMITY, UNSPECIFIED CHRONICITY, UNSPECIFIED VEIN (H): ICD-10-CM

## 2024-10-08 LAB — INR HOME MONITORING: 3.9 (ref 2–3)

## 2024-10-08 NOTE — PROGRESS NOTES
ANTICOAGULATION MANAGEMENT     Cassy Avila 68 year old female is on warfarin with supratherapeutic INR result. (Goal INR 2.0-3.0)    Recent labs: (last 7 days)     10/08/24  0000   INR 3.9*       ASSESSMENT     Source(s): Chart Review and Patient/Caregiver Call     Warfarin doses taken: Warfarin taken as instructed  Diet:  Started Ryze mushroom coffee on 10/25/24  New illness, injury, or hospitalization: No  Medication/supplement changes: None noted  Signs or symptoms of bleeding or clotting: No  Previous INR: Therapeutic last 2(+) visits  Additional findings:  has started to lose weight since drinking the mushroom coffee       PLAN     Recommended plan for ongoing change(s) affecting INR     Dosing Instructions: decrease your warfarin dose (11/8% change) with next INR in 2 weeks       Summary  As of 10/8/2024      Full warfarin instructions:  7.5 mg every Mon; 5 mg all other days   Next INR check:  10/22/2024               Telephone call with Kaitlin who verbalizes understanding and agrees to plan    Patient to recheck with home meter    Education provided: Please call back if any changes to your diet, medications or how you've been taking warfarin    Plan made per [facility] anticoagulation protocol    Ashanti Velazquez RN  Anticoagulation Clinic  10/8/2024    _______________________________________________________________________     Anticoagulation Episode Summary       Current INR goal:  2.0-3.0   TTR:  82.9% (1 y)   Target end date:  Indefinite   Send INR reminders to:  [facility] [place]    Indications    Long-term (current) use of anticoagulants [Z79.01] [Z79.01]  Pulmonary embolism and infarction (H) [I26.99]  Deep vein thrombosis (DVT) (H) [I82.409] (Resolved) [I82.409]  Deep vein thrombosis (DVT) of right lower extremity  unspecified chronicity  unspecified vein (H) [I82.401]             Comments:  [employer] Monitoring. Q2 week testing  Call cell phone with any dosing.             Anticoagulation Care  Providers       Provider Role Specialty Phone number    Eliz Hartman CNP Referring Family Medicine [phone]

## 2024-10-15 ENCOUNTER — OFFICE VISIT (OUTPATIENT)
Dept: CARDIOLOGY | Facility: OTHER | Age: 69
End: 2024-10-15
Attending: NURSE PRACTITIONER
Payer: COMMERCIAL

## 2024-10-15 VITALS
DIASTOLIC BLOOD PRESSURE: 88 MMHG | HEART RATE: 84 BPM | BODY MASS INDEX: 38.65 KG/M2 | OXYGEN SATURATION: 95 % | WEIGHT: 232 LBS | SYSTOLIC BLOOD PRESSURE: 162 MMHG | HEIGHT: 65 IN | RESPIRATION RATE: 20 BRPM

## 2024-10-15 DIAGNOSIS — R06.09 DYSPNEA ON EXERTION: ICD-10-CM

## 2024-10-15 DIAGNOSIS — E27.8 ADRENAL MASS (H): ICD-10-CM

## 2024-10-15 DIAGNOSIS — Z79.01 ON CONTINUOUS ORAL ANTICOAGULATION: ICD-10-CM

## 2024-10-15 DIAGNOSIS — E66.01 MORBID OBESITY (H): ICD-10-CM

## 2024-10-15 DIAGNOSIS — R79.89 ABNORMAL ALDOSTERONE TO RENIN RATIO: Primary | ICD-10-CM

## 2024-10-15 DIAGNOSIS — I16.0 HYPERTENSIVE URGENCY: ICD-10-CM

## 2024-10-15 DIAGNOSIS — E78.5 HYPERLIPIDEMIA LDL GOAL <100: ICD-10-CM

## 2024-10-15 DIAGNOSIS — I65.23 CALCIFICATION OF BOTH CAROTID ARTERIES: ICD-10-CM

## 2024-10-15 PROCEDURE — G0463 HOSPITAL OUTPT CLINIC VISIT: HCPCS

## 2024-10-15 PROCEDURE — 99214 OFFICE O/P EST MOD 30 MIN: CPT | Performed by: NURSE PRACTITIONER

## 2024-10-15 RX ORDER — HYDRALAZINE HYDROCHLORIDE 50 MG/1
50 TABLET, FILM COATED ORAL 3 TIMES DAILY
COMMUNITY
Start: 2024-10-10 | End: 2024-10-15

## 2024-10-15 RX ORDER — HYDRALAZINE HYDROCHLORIDE 50 MG/1
50 TABLET, FILM COATED ORAL 3 TIMES DAILY
COMMUNITY
Start: 2024-10-15 | End: 2024-10-31

## 2024-10-15 SDOH — HEALTH STABILITY: PHYSICAL HEALTH: ON AVERAGE, HOW MANY MINUTES DO YOU ENGAGE IN EXERCISE AT THIS LEVEL?: 0 MIN

## 2024-10-15 SDOH — HEALTH STABILITY: PHYSICAL HEALTH: ON AVERAGE, HOW MANY DAYS PER WEEK DO YOU ENGAGE IN MODERATE TO STRENUOUS EXERCISE (LIKE A BRISK WALK)?: 0 DAYS

## 2024-10-15 ASSESSMENT — PAIN SCALES - GENERAL: PAINLEVEL: NO PAIN (0)

## 2024-10-15 ASSESSMENT — PATIENT HEALTH QUESTIONNAIRE - PHQ9
10. IF YOU CHECKED OFF ANY PROBLEMS, HOW DIFFICULT HAVE THESE PROBLEMS MADE IT FOR YOU TO DO YOUR WORK, TAKE CARE OF THINGS AT HOME, OR GET ALONG WITH OTHER PEOPLE: SOMEWHAT DIFFICULT
SUM OF ALL RESPONSES TO PHQ QUESTIONS 1-9: 8
SUM OF ALL RESPONSES TO PHQ QUESTIONS 1-9: 8

## 2024-10-15 ASSESSMENT — SOCIAL DETERMINANTS OF HEALTH (SDOH): HOW OFTEN DO YOU GET TOGETHER WITH FRIENDS OR RELATIVES?: TWICE A WEEK

## 2024-10-15 NOTE — PROGRESS NOTES
[facility] HEART CARE   CARDIOLOGY PROGRESS NOTE     Chief Complaint   Patient presents with    RECHECK          Diagnosis:    ICD-10-CM    1. Abnormal aldosterone to renin ratio  R79.89       2. Adrenal mass on CT abd/pelvis 2023  E27.8       3. Hypertensive urgency  I16.0       4. Dyspnea on exertion  R06.09       5. Hyperlipidemia LDL goal <100  E78.5       6. Calcification of both carotid arteries  I65.23       7. On continuous oral anticoagulation  Z79.01       8. Obesity (BMI 35.0-39.9) with comorbidity (H)  E66.01             Assessment/Plan:    Dyspnea on exertion  Atypical chest discomfort  Racing sensation in chest   NF-1  Reviewed zio patch- no significant concerns. Some symptomatic PACs, PVCs and short non-sustained SVT. Given average heart rate of 63 bpm no adjustment in beta-blocker at this time  Given cardiovascular risk factors and progressive symptoms over the last month she underwent NM lexiscan stress testing 9/25/2024 which was negative for inducible myocardial ischemia or infarction. No EKG changes.  Transthoracic echocardiogram 9/20/2024  No significant findings. Normal LV systolic function with LVEF 55-60%  Consider CTA chest; however, with significant elevation in aldosterone:renin ratio in addition to known adrenal gland mass we will wait and see if plan with endocrinology improves symptoms.   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 Endocrinology will be obtaining metanephrines as part of work-up with elevated blood pressure, headaches, and sensation of racing in her chest.       Hypertension  Adrenal gland mass (CT abdomen 2023)  ED 8/2024 for significant headache, presyncopal symptoms. CT head, CTA head neck without significant findings. She was hypertensive. Saw PCP 8/28/2024, hypertensive, and started on losartan   Elevated aldosterone:renin ratio. Saw endo at [facility] 10/10/2024. She was instructed to stopp hydrochlorothiazide, potassium and losartan. She  "will have labs drawn 2 weeks after stopping these medications 10/25/2024. She will follow-up with endocrinology after this.   She has had weight gain and fluid retention. Feels bloated.   Blood pressure elevated today. Endo started her on hydralazine 50 mg three times daily. She will increase to 75 mg three times daily with uncontrolled blood pressures.     BP Readings from Last 6 Encounters:   10/15/24 (!) 162/88   09/19/24 (!) 146/82   08/28/24 (!) 166/88   08/24/24 169/92   06/10/24 130/70   05/06/24 121/76       Hyperlipidemia with LDL goal less than 100, uncontrolled  Carotid artery calcifications without stenosis 8/2024 CTA head/neck  Statin would be recommended.   She is not on a statin medication. Prior side effects.   Heart healthy lifestyle encouraged    Recent Labs   Lab Test 04/01/24  0926 09/29/23  1022   CHOL 192 190   HDL 35* 37*   * 119*   TRIG 148 171*     The 10-year ASCVD risk score (Rupert NAQVI, et al., 2019) is: 19.2%    Values used to calculate the score:      Age: 68 years      Sex: Female      Is Non- : No      Diabetic: No      Tobacco smoker: No      Systolic Blood Pressure: 162 mmHg      Is BP treated: Yes      HDL Cholesterol: 35 mg/dL      Total Cholesterol: 192 mg/dL      Obesity  Heart healthy lifestyle  Body mass index is 38.61 kg/m .  Wt Readings from Last 5 Encounters:   10/15/24 105.2 kg (232 lb)   09/19/24 103.9 kg (229 lb)   08/28/24 103.9 kg (229 lb)   06/10/24 102.9 kg (226 lb 13.7 oz)   05/06/24 99.8 kg (220 lb)           Former smoker    Factor V leiden  History of pulmonary embolus  Chronic oral anticoagulation  Diagnosed after surgery on her leg. No recurrence.   INR levels have been therapeutic  US RLE 5/2024: negative for DVT      Follow-up with cardiology after endocrinology follow-up, certainly sooner with acute concerns.     Interval history:    Cassy \"Kaitlin\" Austin is a pleasant 68-year old female who presents for cardiology follow-up. " "Prior visit was initial consult regarding irregular heart rate and T wave changes on EKG, progressive dyspnea on exertion. She has a cardiovascular history including hyperlipidemia, hypertension. She has a non-cardiac medical history including Factor V Leiden with history of pulmonary embolus on chronic oral anticoagulation, depression, insomnia.       Today, Kaitlin endorses she continues with progress fatigue, generalized weakness, low motivation/ambition, dyspnea on exertion. She has gained weight and feels she is retaining fluid-- especially since hydrochlorothiazide was stopped last week by endocrinology. Appetite has been fair. Eats because she has to eat but after eating she feels she gets really bloated. She does feel like she is retaining fluid. No chest discomfort. Has been having headaches. No vision changes. Sometimes gets racing sensation in her chest. Experienced this morning but monitor showed heart rate of 70 bpm.       Smoking History: Started smoking at age 12, quit smoking around age 49. At peak was smoking 0.5 ppd.    Alcohol History: None    Substance Abuse History: No history of elicit substance use. History of fen-phen use.    Sleep History: Sleeps in bed. Usually goes to bed around 10/10:30 p.m. Wakes up around 7/7:30 am. Wakes up most mornings in the last month feeling tired \"and like I want to go back to bed.\" She has been taking naps lately due to fatigue- usually 1 nap- lasting 2 hours. She does feel rested when she wakes up from this nap. She does snore. No witnessed apnea. Sleep study in . Lately has had some orthopnea. No PND.     Family History:   - Maternal grandfather: heart attack at age 72  - Maternal grandmother: heart disease due to diabetes age 59  - Mother: CHF, pacemaker  - Father:  of MI at the age of 49 years old  - Father had a large family- several members less than 50 with history of heart attacks  - Brother: atrial fibrillation  - Sister: atrial fibrillation    HPI: " "   Cassy \"Kaitlin\" Austin is a pleasant 68-year old female who presents for cardiology consult regarding irregular heart rate and T wave changes on EKG. She has a cardiovascular history including hyperlipidemia, hypertension. She has a non-cardiac medical history including Factor V Leiden with history of pulmonary embolus on chronic oral anticoagulation, depression, insomnia.       Today, Kaitlin is concerned about recent elevated blood pressures, progressive dyspnea on exertion and fatigue.   \"My blood pressure is still high and I can feel some pounding. I get so short of breath and tired. Sometimes my pulse is really down. Chest discomfort.\"   She has been taking her blood pressures at home. When they are good usually 120-130 systolic. Lately, in the last month, her blood pressures have been elevated 180-200 systolic and 100 diastolic. She has been more active with the nicer weather this summer. Symptoms of fatigue, dyspnea, pounding in chest is also newer.   She describes occasional \"heavy feeling\" in the mid-sternal chest area. She does admit that when she presses on the area her chest wall does hurt. No radiation of discomfort. Chest discomfort can come on at rest, laying, sitting, moving. She has had this discomfort every day. She does not usually wake up with discomfort.   She was able to walk to her sister's house about a block away. In the past month she has been feeling so short of breath she feels like she is going to pass out. \"Going up and down the basement steps about does me in. Walking down the [place] at [place] is tough.\" She estimates she could not even walk a block without needing to rest. Prior to onset of symptoms in the last month she was able to walk down 4 blocks \"no problem.\"   She gets sensation of \"pounding\" randomly. Usually lasts maybe a minute, no longer than 2 minutes, and goes back to normal. She gets associated lightheadedness.   Fatigue- per  \"she has been sleeping a lot.\"   She has a " history of bilateral LE edema. 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 Ankle is always swollen. Sometimes has a little swelling in left leg.         RELEVANT TESTING:    NM Lexiscan Stress test 9/2024  Narrative    The nuclear stress test is negative for inducible myocardial ischemia  or infarction.    Left ventricular function is normal.    The left ventricular ejection fraction at rest is 75%.  The left  ventricular ejection fraction at stress is 82%.    A prior study was conducted on 10/11/2019.      ECG Summary    ECG Baseline electrocardiogram demonstrates sinus rhythm.   The patient did not develop any acute EKG changes or arrhythmias during the Lexiscan portion of the study       Transthoracic Echocardiogram 9/2024  Interpretation Summary  Global and regional left ventricular function is normal with an EF of 55-60%.  Left ventricular wall thickness is normal. Left ventricular size is normal.  Left ventricular diastolic function is indeterminate. No regional wall motion  abnormalities are seen.  Right ventricular function, chamber size, wall motion, and thickness are  normal.  Pulmonary artery systolic pressure cannot be assessed.   The inferior vena cava is normal.  No pericardial effusion is present.  There is no prior study for direct comparison.    Zio Patch 8/2024  Conclusion  Zio XT patch report on Cassy Avila.  Ordered secondary to hypertension.   Worn for 13 days and 21 hr's.  After removing artifact, total time was 13 days and 5 hr's. Placed on August 29, 2024 at 1:39 PM and completed on 8/12/2024 at 10:22 AM.     Underlying rhythm was sinus.   Hrt rate ranged from 48 bpm, maximum heart rate of 182 bpm, averaging 63 bpm.   No significant bradycardia, pauses, Mobitz type II or 3rd degree heart block.   No atrial fibrillation on this study.   x 15 triggered events and x 15 diary entries.  These corresponded to SVT, normal sinus rhythm, SVE's and VE's.   x 0 runs of VT.     x 13 runs of SVT  longest lasting 7 beats with a maximum heart rate of 182 bpm.   Rare, <1% of PAC's, atrial couplets, atrial triplets, and PVC's.   No episodes of ventricular bigeminy.   No episodes of ventricular trigeminy.      Facundo Pittman, DO     Zio Patch 2020  Conclusion  Zio XT patch report on Cassy Avila.  Ordered secondary to palpitations.   Worn for 6 days and 21 hr's.  After removing artifact, total time was 6 days and 21 hr's. Placed on 10/27/2020 at 1:23 PM and completed on 11/3/2020 at 9:29 AM.     Underlying rhythm was sinus.   Hrt rate ranged from 51 bpm, maximum heart rate of 167 bmp, averaging 79 bmp.   No significant bradycardia, pauses, 2nd degree Mobitz type II or 3rd degree heart block.   No atrial fibrillation was identified on this study.   x2 triggered events and x2 diary entries.  These corresponded to SVE, VE, and sinus rhythm.   x0 runs of VT.     x3 runs of SVT lasting up to 19 beats with a maximum heart rate of 167 bmp.   Rare, less than 1% of PAC's, atrial couplets, atrial triplets, and ventricular couplets.   Occasional PVC's at 1.8%.   + episodes of ventricular bigeminy lasting up to 6.7 sec's.   + episodes of ventricular trigeminy lasting up to 15.3 sec's.    Transthoracic Echocardiogram 2016  ASSESSMENT:  Echocardiographic study revealing  1.  Normal left ventricular size and systolic function.  Ejection  fraction is 60%.  2.  Borderline left atrial enlargement.  3.  Normal right ventricular size and function.  4.  Trace mitral regurgitation.  5.  Normal aortic valve.  6.  Trace tricuspid regurgitation.  Peak systolic pressure of the  right ventricle is estimated at 23 plus right atrium.  Exam Date: Randall 15, 2016 10:03:57 AM  Author: MARIYA AGUILERA      Past Medical History:   Diagnosis Date    Abdominal pain, generalized 2/8/2021    Arthritis     Cervicalgia 1/9/2001    Closed dislocation of shoulder, unspecified site 2000    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, congenital    Congenital factor VIII disorder (H) 2019    Contact dermatitis and other eczema, due to unspecified cause 2004    Coughing     Diarrhea 2021    Edema 2002    Essential hypertension 2001     Problem list name updated by automated process. Provider to review    Factor V Leiden (H)     Factor V Leiden mutation (H) 2019    Family history of colon cancer 2018    Gastro-oesophageal reflux disease     Herpes zoster without mention of complication     resolved from problem list    Hyperlipidemia LDL goal <100 2002     Problem list name updated by automated process. Provider to review    Long term (current) use of anticoagulants 2003    Major depression 2014    Mammographic microcalcification     resolved from problem list    Migraine, unspecified, without mention of intractable migraine without mention of status migrainosus 3/5/2001    Moderate episode of recurrent major depressive disorder (H) 2014    Neurofibromatosis, peripheral, NF1 (H) 2012     Problem list name updated by automated process. Provider to review    Neurofibromatosis, unspecified(237.70) 2012    Other and unspecified hyperlipidemia 2002    Other chronic pain     Other pulmonary embolism and infarction 2003    Primary insomnia 10/31/2018    Statin medication not prescribed per physician orders 2018    Tachycardia, unspecified 2001    Thrombosis of leg     Unspecified essential hypertension 2001    Vitamin D deficiency 2018       Past Surgical History:   Procedure Laterality Date    ------------OTHER-------------      shoulder replacement; Provider: Karen    ARTHROPLASTY KNEE  2014    Procedure: ARTHROPLASTY KNEE;  Surgeon: Sean Alexander MD;  Location: [facility] OR    ARTHROSCOPY SHOULDER      right, bone spurs    CA ANESTH,SHOULDER REPLACEMENT Right 2020     SECTION      x3    CHOLECYSTECTOMY       COLONOSCOPY  02/15/2018    [place],,polyps    elbow ulnar tunnel release  2002    ELECTROTHERMAL THERAPY INTRADISC  2017    stimulator    ENDOSCOPIC SINUS SURGERY, SEPTOPLASTY, TURBINOPLASTY, MAXILLARY SINUSOTOMY, COMBINED N/A 04/29/2015    Procedure: COMBINED ENDOSCOPIC SINUS SURGERY, SEPTOPLASTY, TURBINOPLASTY, MAXILLARY SINUSOTOMY;  Surgeon: Seema Conn MD;  Location: [facility] OR    ESOPHAGOSCOPY, GASTROSCOPY, DUODENOSCOPY (EGD), COMBINED  2011    with biopsy and endoscopic U/S    EXCISE NEUROMA LOWER EXTREMITY Left 07/13/2016    Procedure: EXCISE NEUROMA LOWER EXTREMITY;  Surgeon: Edi Reed MD;  Location: [facility] OR    EYE SURGERY Right 09/2020    FUSION LUMBAR ANTERIOR WITH BAK CAGES      L5-S1    HYSTERECTOMY TOTAL ABDOMINAL, BILATERAL SALPINGO-OOPHORECTOMY, COMBINED N/A     ORTHOPEDIC SURGERY  02/2015    right shoulder    ORTHOPEDIC SURGERY  08/28/2015    right knee    ORTHOPEDIC SURGERY Right 06/11/2018    hip labrum tear    pionidal cyst excision      TRANSPOSITION ULNAR NERVE (ELBOW)         Allergies   Allergen Reactions    Allopurinol Shortness Of Breath    Amlodipine Besylate Swelling     Norvasc    Amoxicillin     Atorvastatin      myualgia    Cephalexin Monohydrate Hives     Keflex    Erythromycin Base [Erythromycin Base] Nausea and Vomiting    Meloxicam Other (See Comments)     Mobic - confusion, depression    Sulfa Antibiotics Hives    Adhesive Tape Rash    Prochlorperazine Edisylate Swelling and Rash     Compazine    Prochlorperazine Maleate Swelling and Rash       Current Outpatient Medications   Medication Sig Dispense Refill    albuterol (PROVENTIL) (2.5 MG/3ML) 0.083% neb solution Take 2.5 mg by nebulization every 6 hours as needed for shortness of breath / dyspnea or wheezing      aspirin 81 MG EC tablet Take 81 mg by mouth daily HS      Cholecalciferol (VITAMIN D3 PO) Take 3,000 mg by mouth daily AM      escitalopram (LEXAPRO) 10 MG tablet Take 1 tablet (10 mg) by mouth daily 90  tablet 1    hydrALAZINE (APRESOLINE) 50 MG tablet Take 1.5 tablets (75 mg) by mouth 3 times daily.      ketoconazole (NIZORAL) 2 % external cream APPLY TO THE RASH ON FULL BACK TWICE A DAY FOR 4-6 WEEKS      metoprolol succinate ER (TOPROL XL) 50 MG 24 hr tablet TAKE 1 & 1/2 (ONE & ONE-HALF) TABLETS BY MOUTH ONCE DAILY 90 tablet 3    order for DME Nebulizer machine and tubing    DX:  Bronchitis 1 Device 0    traZODone (DESYREL) 50 MG tablet TAKE 1 TO 2 TABLETS BY MOUTH NIGHTLY AS NEEDED 180 tablet 3    warfarin ANTICOAGULANT (COUMADIN) 5 MG tablet TAKE 1 & 1/2 (ONE & ONE-HALF) TABLETS BY MOUTH  AND FRIDAY AND 1 TABLET ALL OTHER DAYS OR AS DIRECTED BY WARFARIN CLINIC 109 tablet 1       Social History     Socioeconomic History    Marital status:      Spouse name: Not on file    Number of children: Not on file    Years of education: Not on file    Highest education level: Not on file   Occupational History    Not on file   Tobacco Use    Smoking status: Former     Current packs/day: 0.00     Average packs/day: 1 pack/day for 30.0 years (30.0 ttl pk-yrs)     Types: Cigarettes, Pipe     Start date: 1969     Quit date: 1999     Years since quittin.8     Passive exposure: Past    Smokeless tobacco: Never    Tobacco comments:     quit in    Vaping Use    Vaping status: Never Used   Substance and Sexual Activity    Alcohol use: No    Drug use: No    Sexual activity: Yes     Partners: Male   Other Topics Concern     Service Not Asked    Blood Transfusions Yes    Caffeine Concern Yes     Comment: 2 cups coffee daily    Occupational Exposure Not Asked    Hobby Hazards Not Asked    Sleep Concern Not Asked    Stress Concern Not Asked    Weight Concern Not Asked    Special Diet Not Asked    Back Care Not Asked    Exercise Yes     Comment: walking, aerobic daily (5-10 hrs/week)    Bike Helmet Not Asked    Seat Belt Not Asked    Self-Exams Not Asked    Parent/sibling w/ CABG, MI or  angioplasty before 65F 55M? Yes   Social History Narrative    Not on file     Social Determinants of Health     Financial Resource Strain: Low Risk  (10/15/2024)    Financial Resource Strain     Within the past 12 months, have you or your family members you live with been unable to get utilities (heat, electricity) when it was really needed?: No   Food Insecurity: Low Risk  (10/15/2024)    Food Insecurity     Within the past 12 months, did you worry that your food would run out before you got money to buy more?: No     Within the past 12 months, did the food you bought just not last and you didn t have money to get more?: No   Transportation Needs: Low Risk  (10/15/2024)    Transportation Needs     Within the past 12 months, has lack of transportation kept you from medical appointments, getting your medicines, non-medical meetings or appointments, work, or from getting things that you need?: No   Physical Activity: Inactive (10/15/2024)    Exercise Vital Sign     Days of Exercise per Week: 0 days     Minutes of Exercise per Session: 0 min   Stress: No Stress Concern Present (10/15/2024)    [employer] [place] [employer] - Occupational Stress Questionnaire     Feeling of Stress : Not at all   Social Connections: Unknown (10/15/2024)    Social Connection and Isolation Panel [NHANES]     Frequency of Communication with Friends and Family: Not on file     Frequency of Social Gatherings with Friends and Family: Twice a week     Attends [employer] Services: Not on file     Active Member of Clubs or Organizations: Not on file     Attends Club or Organization Meetings: Not on file     Marital Status: Not on file   Interpersonal Safety: Low Risk  (4/1/2024)    Interpersonal Safety     Do you feel physically and emotionally safe where you currently live?: Yes     Within the past 12 months, have you been hit, slapped, kicked or otherwise physically hurt by someone?: No     Within the past 12 months, have you been  "humiliated or emotionally abused in other ways by your partner or ex-partner?: No   Housing Stability: Low Risk  (10/15/2024)    Housing Stability     Do you have housing? : Yes     Are you worried about losing your housing?: No       TEST RESULTS:   No results found for any visits on 10/15/24.        Review of systems: Negative except that which was noted in the HPI.    Physical examination:    Vitals: BP (!) 162/88 (BP Location: Right arm, Patient Position: Sitting, Cuff Size: Adult Regular)   Pulse 84   Resp 20   Ht 1.651 m (5' 5\")   Wt 105.2 kg (232 lb)   SpO2 95%   BMI 38.61 kg/m    BMI= Body mass index is 38.61 kg/m .      GENERAL APPEARANCE: alert and no distress  CHEST: lungs clear to auscultation - no rales, rhonchi or wheezes, no use of accessory muscles, no retractions, respirations are unlabored, normal respiratory rate  CARDIOVASCULAR: regular rhythm, normal S1 with physiologic split S2, no S3 or S4 and no murmur, click or rub  EXTREMITIES: +trace to +1 bilateral LE edema  NEURO: alert and oriented normal speech, and affect  VASC: No carotid bruits heard.      Thank you for allowing me to participate in the care of your patient. Please do not hesitate to contact me if you have any questions.       Ashli Tijerina, JANINA    " Patent

## 2024-10-15 NOTE — PATIENT INSTRUCTIONS
Thank you for allowing Ashli Tijerina CNP and our  team to participate in your care. Please call our office at [phone] with scheduling questions or if you need to cancel or change your appointment. With any other questions or concerns you may call cardiology nurse at  [phone].       If you experience chest pain, chest pressure, chest tightness, shortness of breath, fainting, lightheadedness, nausea, vomiting, or other concerning symptoms, please report to the Emergency Department or call 911. These symptoms may be emergent, and best treated in the Emergency Department.

## 2024-10-15 NOTE — PROGRESS NOTES
----- Message from Jessenia Severino DO sent at 10/24/2017  8:24 AM CDT -----  MRI imaging was reviewed. Chronic appearing R posterior parietal/occipital area stroke. Nothing acute. Preventive Care Visit  [facility]  Eliz Hartman CNP, Family Medicine        Oct 16, 2024          Assessment & Plan       Encounter for Medicare annual wellness exam  - CBC with platelets and differential; Future      Hyperlipidemia LDL goal <100 - stable  - Continue to work on low saturated fat diet  - Lipid Profile (Chol, Trig, HDL, LDL calc); Future      Primary hypertension - BP elevated  - Continue BP medication as directed  - I updated your cardiology provider  - Lipid Profile (Chol, Trig, HDL, LDL calc); Future      History of embolism and infarction (H)  - Continue anti-coagulation      Moderate episode of recurrent major depressive disorder (H)  - escitalopram (LEXAPRO) 10 MG tablet; Take 1 tablet (10 mg) by mouth daily.      Primary insomnia - stable  - Stable      Vitamin D deficiency  - Vitamin D level      Encounter for screening mammogram for malignant neoplasm of breast  - MA Screen Bilateral w/[facility]; Future      Colon cancer screening  - Colonoscopy Screening  Referral; Future        Patient has been advised of split billing requirements and indicates understanding: Yes        Please continue to take all medication as directed  Please notify your pharmacy or our refill line of future refills needed.  Please return sooner than your next scheduled appointment with any concerns.        When your lab results return, we will call you with abnormal findings  You can also view this information in your MyChart, if you have an account.  Please call or [employer] message us with any questions you may have.          Return in about 6 months (around 4/16/2025).        Eliz Hartman KAEL-STACY  [phone]           Kaitlin is a 68 year old, presenting for the following:  Physical        Hyperlipidemia Follow-Up  Are you regularly taking any medication or supplement to lower your cholesterol?   No  Are you having muscle aches or other side effects that you think could be caused by your cholesterol lowering  medication?  No        Hypertension Follow-up  Do you check your blood pressure regularly outside of the clinic? Yes   Are you following a low salt diet? Yes  Are your blood pressures ever more than 140 on the top number (systolic) OR more   than 90 on the bottom number (diastolic), for example 140/90? Yes        Depression   How are you doing with your depression since your last visit? Worsened   Are you having other symptoms that might be associated with depression? No  Have you had a significant life event?  Health Concerns   Are you feeling anxious or having panic attacks?   Yes:  anxious  Do you have any concerns with your use of alcohol or other drugs? No        Social History     Tobacco Use    Smoking status: Former     Current packs/day: 0.00     Average packs/day: 1 pack/day for 30.0 years (30.0 ttl pk-yrs)     Types: Cigarettes, Pipe     Start date: 1969     Quit date: 2000     Years since quittin.8     Passive exposure: Past    Smokeless tobacco: Never    Tobacco comments:     quit in    Vaping Use    Vaping status: Never Used   Substance Use Topics    Alcohol use: No    Drug use: No             2023    10:05 AM 2024     8:52 AM 10/15/2024     9:00 AM   PHQ   PHQ-9 Total Score 1 2 8   Q9: Thoughts of better off dead/self-harm past 2 weeks Not at all Not at all Not at all             2023    10:07 AM 2024     8:52 AM 10/16/2024     2:20 PM   MARGA-7 SCORE   Total Score  0 (minimal anxiety) 5 (mild anxiety)   Total Score 2 0 5             10/15/2024     9:00 AM   Last PHQ-9   1.  Little interest or pleasure in doing things 1   2.  Feeling down, depressed, or hopeless 1   3.  Trouble falling or staying asleep, or sleeping too much 2   4.  Feeling tired or having little energy 2   5.  Poor appetite or overeating 0   6.  Feeling bad about yourself 0   7.  Trouble concentrating 1   8.  Moving slowly or restless 1   Q9: Thoughts of better off dead/self-harm past 2 weeks 0    PHQ-9 Total Score 8             10/16/2024     2:20 PM   MARGA-7    1. Feeling nervous, anxious, or on edge 1   2. Not being able to stop or control worrying 1   3. Worrying too much about different things 0   4. Trouble relaxing 1   5. Being so restless that it is hard to sit still 0   6. Becoming easily annoyed or irritable 1   7. Feeling afraid, as if something awful might happen 1   MARGA-7 Total Score 5   If you checked any problems, how difficult have they made it for you to do your work, take care of things at home, or get along with other people? Somewhat difficult           Vitamin D deficiency  Lab due          Insomnia  Unchanged  Uses Trazodone as needed              10/15/2024   General Health   How would you rate your overall physical health? Good   Feel stress (tense, anxious, or unable to sleep) Not at all              10/15/2024   Nutrition   Diet: Regular (no restrictions)              10/15/2024   Exercise   Days per week of moderate/strenous exercise 0 days   Average minutes spent exercising at this level 0 min          (!) EXERCISE CONCERN      10/15/2024   Social Factors   Frequency of gathering with friends or relatives Twice a week   Worry food won't last until get money to buy more No   Food not last or not have enough money for food? No   Do you have housing? (Housing is defined as stable permanent housing and does not include staying ouside in a car, in a tent, in an abandoned building, in an overnight shelter, or couch-surfing.) Yes   Are you worried about losing your housing? No   Lack of transportation? No   Unable to get utilities (heat,electricity)? No            10/15/2024   Fall Risk   Fallen 2 or more times in the past year? No   Trouble with walking or balance? No                 10/15/2024   Activities of Daily Living- Home Safety   Needs help with the following daily activites None of the above   Safety concerns in the home No handrails on the stairs              10/15/2024    Dental   Dentist two times every year? (!) NO            10/15/2024   Hearing Screening   Hearing concerns? (!) I FEEL THAT PEOPLE ARE MUMBLING OR NOT SPEAKING CLEARLY.    (!) IT'S HARD TO FOLLOW A CONVERSATION IN A NOISY RESTAURANT OR CROWDED ROOM.       Multiple values from one day are sorted in reverse-chronological order         10/15/2024   Driving Risk Screening   Patient/family members have concerns about driving No            10/15/2024   General Alertness/Fatigue Screening   Have you been more tired than usual lately? (!) YES            10/15/2024   Urinary Incontinence Screening   Bothered by leaking urine in past 6 months Yes            2024   TB Screening   Were you born outside of the [place]? No          Today's PHQ-9 Score:       10/15/2024     9:00 AM   PHQ-9 SCORE   PHQ-9 Total Score MyChart 8 (Mild depression)   PHQ-9 Total Score 8             10/15/2024   Substance Use   Alcohol more than 3/day or more than 7/wk Not Applicable   Do you have a current opioid prescription? No   How severe/bad is pain from 1 to 10? 0/10 (No Pain)   Do you use any other substances recreationally? (!) CANNABIS PRODUCTS          Social History     Tobacco Use    Smoking status: Former     Current packs/day: 0.00     Average packs/day: 1 pack/day for 30.0 years (30.0 ttl pk-yrs)     Types: Cigarettes, Pipe     Start date: 1969     Quit date: 2000     Years since quittin.8     Passive exposure: Past    Smokeless tobacco: Never    Tobacco comments:     quit in    Vaping Use    Vaping status: Never Used   Substance Use Topics    Alcohol use: No    Drug use: No           10/24/2023   LAST FHS-7 RESULTS   1st degree relative breast or ovarian cancer No   Any relative bilateral breast cancer No   Any male have breast cancer No   Any ONE woman have BOTH breast AND ovarian cancer No   Any woman with breast cancer before 50yrs No   2 or more relatives with breast AND/OR ovarian cancer No   2 or more relatives  with breast AND/OR bowel cancer No            History of abnormal Pap smear: No - age 65 or older with adequate negative prior screening test results (3 consecutive negative cytology results, 2 consecutive negative cotesting results, or 2 consecutive negative HrHPV test results within 10 years, with the most recent test occurring within the recommended screening interval for the test used)       ASCVD Risk   The 10-year ASCVD risk score (Rupert NAQVI, et al., 2019) is: 22.1%    Values used to calculate the score:      Age: 68 years      Sex: Female      Is Non- : No      Diabetic: No      Tobacco smoker: No      Systolic Blood Pressure: 175 mmHg      Is BP treated: Yes      HDL Cholesterol: 35 mg/dL      Total Cholesterol: 192 mg/dL                        Past Medical History:   Diagnosis Date    Abdominal pain, generalized 02/08/2021    Arthritis     Cervicalgia 01/09/2001    Closed dislocation of shoulder, unspecified site 2000    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 Provider to review    Factor V Leiden (H)     Factor V Leiden mutation (H) 07/26/2019    Family history of colon cancer 01/02/2018    Gastro-oesophageal reflux disease     Herpes zoster without mention of complication 2003    resolved from problem list    Hyperlipidemia LDL goal <100 07/11/2002     Problem list name updated by automated process. Provider to review    Long term (current) use of anticoagulants 08/20/2003    Major depression 08/08/2014    Mammographic microcalcification 2003    resolved from problem list    Migraine, unspecified, without mention of  intractable migraine without mention of status migrainosus 2001    Moderate episode of recurrent major depressive disorder (H) 2014    Neurofibromatosis, peripheral, NF1 (H) 2012     Problem list name updated by automated process. Provider to review    Neurofibromatosis, unspecified(237.70) 2012    Other and unspecified hyperlipidemia 2002    Other chronic pain     Other pulmonary embolism and infarction 2003    Primary insomnia 10/31/2018    Statin medication not prescribed per physician orders 2018    Tachycardia, unspecified 2001    Thrombosis of leg     Unspecified essential hypertension 2001    Vitamin D deficiency 2018     Past Surgical History:   Procedure Laterality Date    ------------OTHER-------------      shoulder replacement; Provider: Karen    ARTHROPLASTY KNEE  2014    Procedure: ARTHROPLASTY KNEE;  Surgeon: Sean Alexander MD;  Location: [facility]    ARTHROSCOPY SHOULDER      right, bone spurs    BIOPSY      CA ANESTH,SHOULDER REPLACEMENT Right 2020     SECTION      x3    CHOLECYSTECTOMY      COLONOSCOPY  02/15/2018    [place],,polyps    elbow ulnar tunnel release      ELECTROTHERMAL THERAPY INTRADISC  2017    stimulator    ENDOSCOPIC SINUS SURGERY, SEPTOPLASTY, TURBINOPLASTY, MAXILLARY SINUSOTOMY, COMBINED N/A 2015    Procedure: COMBINED ENDOSCOPIC SINUS SURGERY, SEPTOPLASTY, TURBINOPLASTY, MAXILLARY SINUSOTOMY;  Surgeon: Seema Conn MD;  Location: [facility]    ENT SURGERY      ESOPHAGOSCOPY, GASTROSCOPY, DUODENOSCOPY (EGD), COMBINED      with biopsy and endoscopic U/S    EXCISE NEUROMA LOWER EXTREMITY Left 2016    Procedure: EXCISE NEUROMA LOWER EXTREMITY;  Surgeon: Edi Reed MD;  Location: [facility]    EYE SURGERY Right 2020    FUSION LUMBAR ANTERIOR WITH MARCY CAGES      L5-S1    HYSTERECTOMY TOTAL ABDOMINAL, BILATERAL SALPINGO-OOPHORECTOMY, COMBINED N/A     ORTHOPEDIC  SURGERY  2015    right shoulder    ORTHOPEDIC SURGERY  2015    right knee    ORTHOPEDIC SURGERY Right 2018    hip labrum tear    pionidal cyst excision      TRANSPOSITION ULNAR NERVE (ELBOW)       OB History    Para Term  AB Living   3 3 3 0 0 0   SAB IAB Ectopic Multiple Live Births   0 0 0 0 0      # Outcome Date GA Lbr Baldemar/2nd Weight Sex Type Anes PTL Lv   3 Term            2 Term            1 Term              Lab work is in process  Labs reviewed in EPIC  BP Readings from Last 3 Encounters:   10/16/24 (!) 175/76   10/15/24 (!) 162/88   24 (!) 146/82    Wt Readings from Last 3 Encounters:   10/16/24 105.7 kg (233 lb)   10/15/24 105.2 kg (232 lb)   24 103.9 kg (229 lb)                  Patient Active Problem List   Diagnosis    Primary hypertension    Pulmonary embolism and infarction (H)    Contact dermatitis and other eczema, due to unspecified cause    Edema    Hyperlipidemia LDL goal <100    Neurofibromatosis, type 1 (H)    Advanced care planning/counseling discussion    Moderate episode of recurrent major depressive disorder (H)    Long-term (current) use of anticoagulants [Z79.01]    Lumbar spondylosis with myelopathy    Degeneration of lumbar or lumbosacral intervertebral disc    Family history of colon cancer    Vitamin D deficiency    Failed arthroplasty (H)    H/O total shoulder replacement, left    Primary insomnia    Factor V Leiden mutation (H)    Obesity (BMI 35.0-39.9) with comorbidity (H)    Diarrhea    Chest pain, unspecified    Muscle weakness of right upper extremity    Pain in right upper arm    Stiffness of right shoulder joint    Activated protein C resistance (H)    Shoulder pain    Deep vein thrombosis (DVT) of right lower extremity, unspecified chronicity, unspecified vein (H)    History of gout     Past Surgical History:   Procedure Laterality Date    ------------OTHER-------------      shoulder replacement; Provider: Karen HOUSE  KNEE  2014    Procedure: ARTHROPLASTY KNEE;  Surgeon: Sean Alexander MD;  Location: [facility]    ARTHROSCOPY SHOULDER      right, bone spurs    BIOPSY      CA ANESTH,SHOULDER REPLACEMENT Right 2020     SECTION      x3    CHOLECYSTECTOMY      COLONOSCOPY  02/15/2018    [place],,polyps    elbow ulnar tunnel release      ELECTROTHERMAL THERAPY INTRADISC  2017    stimulator    ENDOSCOPIC SINUS SURGERY, SEPTOPLASTY, TURBINOPLASTY, MAXILLARY SINUSOTOMY, COMBINED N/A 2015    Procedure: COMBINED ENDOSCOPIC SINUS SURGERY, SEPTOPLASTY, TURBINOPLASTY, MAXILLARY SINUSOTOMY;  Surgeon: Seema Conn MD;  Location: [facility]    ENT SURGERY      ESOPHAGOSCOPY, GASTROSCOPY, DUODENOSCOPY (EGD), COMBINED      with biopsy and endoscopic U/S    EXCISE NEUROMA LOWER EXTREMITY Left 2016    Procedure: EXCISE NEUROMA LOWER EXTREMITY;  Surgeon: Edi Reed MD;  Location: [facility]    EYE SURGERY Right 2020    FUSION LUMBAR ANTERIOR WITH MARCY CAGES      L5-S1    HYSTERECTOMY TOTAL ABDOMINAL, BILATERAL SALPINGO-OOPHORECTOMY, COMBINED N/A     ORTHOPEDIC SURGERY  2015    right shoulder    ORTHOPEDIC SURGERY  2015    right knee    ORTHOPEDIC SURGERY Right 2018    hip labrum tear    pionidal cyst excision      TRANSPOSITION ULNAR NERVE (ELBOW)         Social History     Tobacco Use    Smoking status: Former     Current packs/day: 0.00     Average packs/day: 1 pack/day for 30.0 years (30.0 ttl pk-yrs)     Types: Cigarettes, Pipe     Start date: 1969     Quit date: 2000     Years since quittin.8     Passive exposure: Past    Smokeless tobacco: Never    Tobacco comments:     quit in    Substance Use Topics    Alcohol use: No     Family History   Problem Relation Age of Onset    Cancer Mother     Colon Polyps Mother     Heart Failure Mother 87        congestive, cause of death    Myocardial Infarction Mother         myocardial infarction    Coronary Artery  Disease Mother             Hypertension Mother     Colon Cancer Mother     Osteoporosis Mother     Genetic Disorder Mother     Myocardial Infarction Father         myocardial infarction - cause of death    C.A.D. Father     Coronary Artery Disease Father             Hypertension Father             Hyperlipidemia Father     Cancer Paternal Uncle         cause of death    C.A.D. Brother     Other - See Comments Other         factor 5 - family h/o    Diabetes Maternal Grandmother             Hypertension Maternal Half-Sister     Depression Maternal Half-Sister     Hypertension Brother     Other Cancer Son         testicular    Asthma Daughter          Current Outpatient Medications   Medication Sig Dispense Refill    albuterol (PROVENTIL) (2.5 MG/3ML) 0.083% neb solution Take 2.5 mg by nebulization every 6 hours as needed for shortness of breath / dyspnea or wheezing      aspirin 81 MG EC tablet Take 81 mg by mouth daily HS      Cholecalciferol (VITAMIN D3 PO) Take 3,000 mg by mouth daily AM      escitalopram (LEXAPRO) 10 MG tablet Take 1 tablet (10 mg) by mouth daily. 90 tablet 1    hydrALAZINE (APRESOLINE) 50 MG tablet Take 1.5 tablets (75 mg) by mouth 3 times daily.      ketoconazole (NIZORAL) 2 % external cream APPLY TO THE RASH ON FULL BACK TWICE A DAY FOR 4-6 WEEKS      metoprolol succinate ER (TOPROL XL) 50 MG 24 hr tablet TAKE 1 & 1/2 (ONE & ONE-HALF) TABLETS BY MOUTH ONCE DAILY 90 tablet 3    order for DME Nebulizer machine and tubing    DX:  Bronchitis 1 Device 0    traZODone (DESYREL) 50 MG tablet TAKE 1 TO 2 TABLETS BY MOUTH NIGHTLY AS NEEDED 180 tablet 3    warfarin ANTICOAGULANT (COUMADIN) 5 MG tablet TAKE 1 & 1/2 (ONE & ONE-HALF) TABLETS BY MOUTH  AND FRIDAY AND 1 TABLET ALL OTHER DAYS OR AS DIRECTED BY [facility] CL[facility] 109 tablet 1     Allergies   Allergen Reactions    Allopurinol Shortness Of Breath    Amlodipine Besylate Swelling     Norvasc     Amoxicillin     Atorvastatin      myualgia    Cephalexin Monohydrate Hives     Keflex    Erythromycin Base [Erythromycin Base] Nausea and Vomiting    Meloxicam Other (See Comments)     Mobic - confusion, depression    Sulfa Antibiotics Hives    Adhesive Tape Rash    Prochlorperazine Edisylate Swelling and Rash     Compazine    Prochlorperazine Maleate Swelling and Rash     Recent Labs   Lab Test 09/19/24  1041 08/24/24  2109 04/01/24  0926 12/15/23  0910 09/29/23  1022 03/17/23  1029 03/17/23  1028 08/27/21  0917 04/16/21  1014 02/08/21  1432   A1C  --   --   --   --   --  5.6  --   --   --   --    LDL  --   --  127*  --  119*  --  129*   < >  --   --    HDL  --   --  35*  --  37*  --  37*   < >  --   --    TRIG  --   --  148  --  171*  --  233*   < >  --   --    ALT  --  20 17 21 18  --  26   < > 29 36   CR 0.97* 1.10* 0.92 0.96* 1.00*  --  0.90   < > 0.95 0.92   GFRESTIMATED 63 54* 67 64 61  --  69   < > 62 65   GFRESTBLACK  --   --   --   --   --   --   --   --  72 75   POTASSIUM 3.8 4.5 3.4 3.6 3.9  --  3.5   < > 3.3* 3.4   TSH 2.20  --  2.73  --  3.34  --  3.05   < >  --   --     < > = values in this interval not displayed.            Current providers sharing in care for this patient include:  Patient Care Team:  Eliz Hartman CNP as PCP - General (Family Practice)  Eliz Hartman CNP as Assigned PCP          The following health maintenance items are reviewed in Epic and correct as of today:  Health Maintenance   Topic Date Due    ZOSTER IMMUNIZATION (1 of 2) Never done    COLORECTAL CANCER SCREENING  03/08/2024    INFLUENZA VACCINE (1) 09/01/2024    COVID-19 Vaccine (6 - 2024-25 season) 09/01/2024    MAMMO SCREENING  10/24/2024    RSV VACCINE (1 - Risk 60-74 years 1-dose series) 04/01/2025 (Originally 10/18/2014)    LIPID  04/01/2025    MARGA ASSESSMENT  04/16/2025    PHQ-9  04/16/2025    BMP  09/19/2025    MEDICARE ANNUAL WELLNESS VISIT  10/16/2025    FALL RISK ASSESSMENT  10/16/2025    GLUCOSE  09/19/2027     "ADVANCE CARE PLANNING  09/11/2029    DTAP/TDAP/TD IMMUNIZATION (3 - Td or Tdap) 09/15/2031    DEXA  08/25/2036    DEPRESSION ACTION PLAN  Completed    Pneumococcal Vaccine: 65+ Years  Completed    HEPATITIS C SCREENING  Addressed    HPV IMMUNIZATION  Aged Out    MENINGITIS IMMUNIZATION  Aged Out    RSV MONOCLONAL ANTIBODY  Aged Out    LUNG CANCER SCREENING  Discontinued           Review of Systems  Constitutional, neuro, ENT, endocrine, pulmonary, cardiac, gastrointestinal, genitourinary, musculoskeletal, integument and psychiatric systems are negative, except as otherwise noted.         Objective    Exam  BP (!) 175/76 (BP Location: Left arm, Patient Position: Sitting, Cuff Size: Adult Large)   Pulse 68   Temp 98.2  F (36.8  C) (Tympanic)   Resp 20   Wt 105.7 kg (233 lb)   SpO2 95%   Breastfeeding No   BMI 38.77 kg/m     Estimated body mass index is 38.77 kg/m  as calculated from the following:    Height as of 10/15/24: 1.651 m (5' 5\").    Weight as of this encounter: 105.7 kg (233 lb).        Physical Exam  GENERAL: alert and no distress  EYES: Eyes grossly normal to inspection, PERRL and conjunctivae and sclerae normal  HENT: ear canals and TM's normal, nose and mouth without ulcers or lesions  NECK: no adenopathy, no asymmetry, masses, or scars  RESP: lungs clear to auscultation - no rales, rhonchi or wheezes  CV: regular rate and rhythm, normal S1 S2, no S3 or S4, no murmur, click or rub, no peripheral edema  MS: no gross musculoskeletal defects noted, no edema  SKIN: no suspicious lesions or rashes  PSYCH: mentation appears normal, affect normal/bright              10/16/2024   Mini Cog   Clock Draw Score 2 Normal   3 Item Recall 3 objects recalled   Mini Cog Total Score 5                 Signed Electronically by: Eliz Hartman CNP    "

## 2024-10-16 ENCOUNTER — OFFICE VISIT (OUTPATIENT)
Dept: FAMILY MEDICINE | Facility: OTHER | Age: 69
End: 2024-10-16
Attending: NURSE PRACTITIONER
Payer: COMMERCIAL

## 2024-10-16 VITALS
SYSTOLIC BLOOD PRESSURE: 175 MMHG | BODY MASS INDEX: 38.77 KG/M2 | RESPIRATION RATE: 20 BRPM | TEMPERATURE: 98.2 F | WEIGHT: 233 LBS | HEART RATE: 68 BPM | OXYGEN SATURATION: 95 % | DIASTOLIC BLOOD PRESSURE: 76 MMHG

## 2024-10-16 DIAGNOSIS — E78.5 HYPERLIPIDEMIA LDL GOAL <100: ICD-10-CM

## 2024-10-16 DIAGNOSIS — Z12.11 COLON CANCER SCREENING: ICD-10-CM

## 2024-10-16 DIAGNOSIS — I26.99 PULMONARY EMBOLISM AND INFARCTION (H): ICD-10-CM

## 2024-10-16 DIAGNOSIS — E55.9 VITAMIN D DEFICIENCY: ICD-10-CM

## 2024-10-16 DIAGNOSIS — I10 PRIMARY HYPERTENSION: ICD-10-CM

## 2024-10-16 DIAGNOSIS — F33.1 MODERATE EPISODE OF RECURRENT MAJOR DEPRESSIVE DISORDER (H): ICD-10-CM

## 2024-10-16 DIAGNOSIS — F51.01 PRIMARY INSOMNIA: ICD-10-CM

## 2024-10-16 DIAGNOSIS — Z12.31 ENCOUNTER FOR SCREENING MAMMOGRAM FOR MALIGNANT NEOPLASM OF BREAST: ICD-10-CM

## 2024-10-16 DIAGNOSIS — Z00.00 ENCOUNTER FOR MEDICARE ANNUAL WELLNESS EXAM: Primary | ICD-10-CM

## 2024-10-16 PROCEDURE — 99213 OFFICE O/P EST LOW 20 MIN: CPT | Mod: 25 | Performed by: NURSE PRACTITIONER

## 2024-10-16 PROCEDURE — G0463 HOSPITAL OUTPT CLINIC VISIT: HCPCS

## 2024-10-16 PROCEDURE — G0439 PPPS, SUBSEQ VISIT: HCPCS | Performed by: NURSE PRACTITIONER

## 2024-10-16 RX ORDER — ESCITALOPRAM OXALATE 10 MG/1
10 TABLET ORAL DAILY
Qty: 90 TABLET | Refills: 1 | Status: SHIPPED | OUTPATIENT
Start: 2024-10-16

## 2024-10-16 ASSESSMENT — ANXIETY QUESTIONNAIRES
2. NOT BEING ABLE TO STOP OR CONTROL WORRYING: SEVERAL DAYS
1. FEELING NERVOUS, ANXIOUS, OR ON EDGE: SEVERAL DAYS
5. BEING SO RESTLESS THAT IT IS HARD TO SIT STILL: NOT AT ALL
GAD7 TOTAL SCORE: 5
6. BECOMING EASILY ANNOYED OR IRRITABLE: SEVERAL DAYS
GAD7 TOTAL SCORE: 5
GAD7 TOTAL SCORE: 5
4. TROUBLE RELAXING: SEVERAL DAYS
8. IF YOU CHECKED OFF ANY PROBLEMS, HOW DIFFICULT HAVE THESE MADE IT FOR YOU TO DO YOUR WORK, TAKE CARE OF THINGS AT HOME, OR GET ALONG WITH OTHER PEOPLE?: SOMEWHAT DIFFICULT
IF YOU CHECKED OFF ANY PROBLEMS ON THIS QUESTIONNAIRE, HOW DIFFICULT HAVE THESE PROBLEMS MADE IT FOR YOU TO DO YOUR WORK, TAKE CARE OF THINGS AT HOME, OR GET ALONG WITH OTHER PEOPLE: SOMEWHAT DIFFICULT
3. WORRYING TOO MUCH ABOUT DIFFERENT THINGS: NOT AT ALL
7. FEELING AFRAID AS IF SOMETHING AWFUL MIGHT HAPPEN: SEVERAL DAYS
7. FEELING AFRAID AS IF SOMETHING AWFUL MIGHT HAPPEN: SEVERAL DAYS

## 2024-10-16 ASSESSMENT — PAIN SCALES - GENERAL: PAINLEVEL: NO PAIN (0)

## 2024-10-16 NOTE — Clinical Note
Hi!  I saw pt today for a physical.  She wanted me to let you know she is very SOB the past few days.  Exam is normal

## 2024-10-17 ENCOUNTER — LAB (OUTPATIENT)
Dept: LAB | Facility: OTHER | Age: 69
End: 2024-10-17
Payer: MEDICARE

## 2024-10-17 DIAGNOSIS — E55.9 VITAMIN D DEFICIENCY: ICD-10-CM

## 2024-10-17 DIAGNOSIS — I10 PRIMARY HYPERTENSION: ICD-10-CM

## 2024-10-17 DIAGNOSIS — Z00.00 ENCOUNTER FOR MEDICARE ANNUAL WELLNESS EXAM: ICD-10-CM

## 2024-10-17 DIAGNOSIS — E78.5 HYPERLIPIDEMIA LDL GOAL <100: ICD-10-CM

## 2024-10-17 LAB
BASOPHILS # BLD AUTO: 0 10E3/UL (ref 0–0.2)
BASOPHILS NFR BLD AUTO: 1 %
CHOLEST SERPL-MCNC: 163 MG/DL
EOSINOPHIL # BLD AUTO: 0.1 10E3/UL (ref 0–0.7)
EOSINOPHIL NFR BLD AUTO: 2 %
ERYTHROCYTE [DISTWIDTH] IN BLOOD BY AUTOMATED COUNT: 14.4 % (ref 10–15)
FASTING STATUS PATIENT QL REPORTED: YES
HCT VFR BLD AUTO: 36.8 % (ref 35–47)
HDLC SERPL-MCNC: 38 MG/DL
HGB BLD-MCNC: 12.9 G/DL (ref 11.7–15.7)
IMM GRANULOCYTES # BLD: 0 10E3/UL
IMM GRANULOCYTES NFR BLD: 0 %
LDLC SERPL CALC-MCNC: 104 MG/DL
LYMPHOCYTES # BLD AUTO: 1.3 10E3/UL (ref 0.8–5.3)
LYMPHOCYTES NFR BLD AUTO: 18 %
MCH RBC QN AUTO: 29.6 PG (ref 26.5–33)
MCHC RBC AUTO-ENTMCNC: 35.1 G/DL (ref 31.5–36.5)
MCV RBC AUTO: 84 FL (ref 78–100)
MONOCYTES # BLD AUTO: 0.6 10E3/UL (ref 0–1.3)
MONOCYTES NFR BLD AUTO: 8 %
NEUTROPHILS # BLD AUTO: 4.8 10E3/UL (ref 1.6–8.3)
NEUTROPHILS NFR BLD AUTO: 71 %
NONHDLC SERPL-MCNC: 125 MG/DL
PLATELET # BLD AUTO: 179 10E3/UL (ref 150–450)
RBC # BLD AUTO: 4.36 10E6/UL (ref 3.8–5.2)
TRIGL SERPL-MCNC: 106 MG/DL
VIT D+METAB SERPL-MCNC: 48 NG/ML (ref 20–50)
WBC # BLD AUTO: 6.8 10E3/UL (ref 4–11)

## 2024-10-17 PROCEDURE — 36415 COLL VENOUS BLD VENIPUNCTURE: CPT | Mod: ZL

## 2024-10-17 PROCEDURE — 80061 LIPID PANEL: CPT | Mod: ZL

## 2024-10-17 PROCEDURE — 82306 VITAMIN D 25 HYDROXY: CPT | Mod: ZL

## 2024-10-17 PROCEDURE — 85004 AUTOMATED DIFF WBC COUNT: CPT | Mod: ZL

## 2024-10-18 ENCOUNTER — TRANSFERRED RECORDS (OUTPATIENT)
Dept: HEALTH INFORMATION MANAGEMENT | Facility: CLINIC | Age: 69
End: 2024-10-18
Payer: COMMERCIAL

## 2024-10-18 LAB — EJECTION FRACTION: NORMAL %

## 2024-10-22 ENCOUNTER — ANTICOAGULATION THERAPY VISIT (OUTPATIENT)
Dept: ANTICOAGULATION | Facility: OTHER | Age: 69
End: 2024-10-22
Attending: NURSE PRACTITIONER
Payer: COMMERCIAL

## 2024-10-22 ENCOUNTER — OFFICE VISIT (OUTPATIENT)
Dept: CARDIOLOGY | Facility: OTHER | Age: 69
End: 2024-10-22
Attending: NURSE PRACTITIONER
Payer: COMMERCIAL

## 2024-10-22 VITALS
SYSTOLIC BLOOD PRESSURE: 152 MMHG | DIASTOLIC BLOOD PRESSURE: 66 MMHG | RESPIRATION RATE: 20 BRPM | HEIGHT: 65 IN | BODY MASS INDEX: 36.99 KG/M2 | OXYGEN SATURATION: 94 % | WEIGHT: 222 LBS | HEART RATE: 80 BPM

## 2024-10-22 DIAGNOSIS — I16.0 HYPERTENSIVE URGENCY: Primary | ICD-10-CM

## 2024-10-22 DIAGNOSIS — I82.401 DEEP VEIN THROMBOSIS (DVT) OF RIGHT LOWER EXTREMITY, UNSPECIFIED CHRONICITY, UNSPECIFIED VEIN (H): ICD-10-CM

## 2024-10-22 DIAGNOSIS — E27.8 ADRENAL MASS (H): ICD-10-CM

## 2024-10-22 DIAGNOSIS — R79.89 ABNORMAL ALDOSTERONE TO RENIN RATIO: ICD-10-CM

## 2024-10-22 DIAGNOSIS — I26.99 PULMONARY EMBOLISM AND INFARCTION (H): ICD-10-CM

## 2024-10-22 DIAGNOSIS — Z79.01 LONG TERM CURRENT USE OF ANTICOAGULANT THERAPY: Primary | ICD-10-CM

## 2024-10-22 DIAGNOSIS — I1A.0 RESISTANT HYPERTENSION: ICD-10-CM

## 2024-10-22 DIAGNOSIS — R06.09 DYSPNEA ON EXERTION: ICD-10-CM

## 2024-10-22 LAB — INR HOME MONITORING: 2.1 (ref 2–3)

## 2024-10-22 PROCEDURE — 99214 OFFICE O/P EST MOD 30 MIN: CPT | Performed by: NURSE PRACTITIONER

## 2024-10-22 PROCEDURE — G0463 HOSPITAL OUTPT CLINIC VISIT: HCPCS

## 2024-10-22 RX ORDER — LOSARTAN POTASSIUM 50 MG/1
1 TABLET ORAL DAILY
COMMUNITY
Start: 2024-10-20

## 2024-10-22 RX ORDER — FUROSEMIDE 40 MG/1
20 TABLET ORAL DAILY
COMMUNITY
Start: 2024-10-19 | End: 2024-10-22

## 2024-10-22 RX ORDER — HYDROCHLOROTHIAZIDE 25 MG/1
1 TABLET ORAL DAILY
COMMUNITY
Start: 2024-10-20 | End: 2024-10-22

## 2024-10-22 RX ORDER — SPIRONOLACTONE 25 MG/1
25 TABLET ORAL DAILY
Qty: 90 TABLET | Refills: 3 | Status: SHIPPED | OUTPATIENT
Start: 2024-10-22 | End: 2024-11-05

## 2024-10-22 RX ORDER — FUROSEMIDE 20 MG/1
20 TABLET ORAL DAILY
Qty: 90 TABLET | Refills: 3 | Status: SHIPPED | OUTPATIENT
Start: 2024-10-22 | End: 2024-11-05

## 2024-10-22 ASSESSMENT — PAIN SCALES - GENERAL: PAINLEVEL: NO PAIN (0)

## 2024-10-22 NOTE — PATIENT INSTRUCTIONS
Thank you for allowing Ashli Tijerina CNP and our  team to participate in your care. Please call our office at [phone] with scheduling questions or if you need to cancel or change your appointment. With any other questions or concerns you may call cardiology nurse at  [phone].       If you experience chest pain, chest pressure, chest tightness, shortness of breath, fainting, lightheadedness, nausea, vomiting, or other concerning symptoms, please report to the Emergency Department or call 911. These symptoms may be emergent, and best treated in the Emergency Department.    DECREASE hydralazine 75 mg three times daily to 50 mg three times daily for 5 days, then decrease to 25 mg three times daily for 5 days, then stop.     START spironolactone 25 mg once daily in the morning    START furosemide 20 mg once daily     STOP hydrochlorothiazide 25 mg once daily    Continue losartan 50 mg once daily and metoprolol 75 mg once daily.     Send [place] message on Friday with home blood pressures, home weights, new/worse/improved symptoms with medication changes    Lab only visit in 1 week with medication changes.     Ashli Tijerina CNP

## 2024-10-22 NOTE — PROGRESS NOTES
ANTICOAGULATION MANAGEMENT     Cassy Avila 69 year old female is on warfarin with therapeutic INR result. (Goal INR 2.0-3.0)    Recent labs: (last 7 days)     10/22/24  0000   INR 2.1       ASSESSMENT     Source(s): Chart Review  Previous INR was Supratherapeutic  Medication, diet, health changes since last INR patient admitted hospital 10/17-10/19 for fluid overload. Medications hydrochlorothiazide and losartan were discontinued and patient gain 7 lbs. Medications restarted         PLAN     Recommended plan for ongoing change(s) affecting INR     Dosing Instructions: Continue your current warfarin dose with next INR in 2 weeks       Summary  As of 10/22/2024      Full warfarin instructions:  7.5 mg every Mon; 5 mg all other days   Next INR check:  11/5/2024               Detailed voice message left for Kaitlin with dosing instructions and follow up date.     Patient to recheck with home meter    Education provided: Please call back if any changes to your diet, medications or how you've been taking warfarin  Contact [phone] with any changes, questions or concerns.     Plan made per [facility] anticoagulation protocol    Corry Galvan RN  10/22/2024  Anticoagulation Clinic  [employer] for routing messages: p DAREN CAMEJO          _______________________________________________________________________     Anticoagulation Episode Summary       Current INR goal:  2.0-3.0   TTR:  81.0% (1 y)   Target end date:  Indefinite   Send INR reminders to:  DAREN CAMEJO    Indications    Long-term (current) use of anticoagulants [Z79.01] [Z79.01]  Pulmonary embolism and infarction (H) [I26.99]  Deep vein thrombosis (DVT) (H) [I82.409] (Resolved) [I82.409]  Deep vein thrombosis (DVT) of right lower extremity  unspecified chronicity  unspecified vein (H) [I82.401]             Comments:  [employer]. Q2 week testing  Call cell phone with any dosing.             Anticoagulation Care Providers       Provider Role  Specialty Phone number    Eliz Hartman, CNP Referring Family Medicine [phone]

## 2024-10-22 NOTE — PROGRESS NOTES
[facility] HEART CARE   CARDIOLOGY PROGRESS NOTE     Chief Complaint   Patient presents with    Follow Up          Diagnosis:    ICD-10-CM    1. Hypertensive urgency  I16.0 spironolactone (ALDACTONE) 25 MG tablet     furosemide (LASIX) 20 MG tablet     Basic metabolic panel      2. Resistant hypertension  I1A.0       3. Abnormal aldosterone to renin ratio  R79.89 spironolactone (ALDACTONE) 25 MG tablet     furosemide (LASIX) 20 MG tablet     Basic metabolic panel      4. Adrenal mass on CT abd/pelvis 2023  E27.8       5. Dyspnea on exertion  R06.09               Assessment/Plan:    Dyspnea on exertion  Atypical chest discomfort  Racing sensation in chest   NF-1  Reviewed zio patch- no significant concerns. Some symptomatic PACs, PVCs and short non-sustained SVT. Given average heart rate of 63 bpm no adjustment in beta-blocker at this time  Given cardiovascular risk factors and progressive symptoms over the last month she underwent NM lexiscan stress testing 9/25/2024 which was negative for inducible myocardial ischemia or infarction. No EKG changes.  Transthoracic echocardiogram 9/20/2024  No significant findings. Normal LV systolic function with LVEF 55-60%  Consider CTA chest; however, with significant elevation in aldosterone:renin ratio in addition to known adrenal gland mass we will wait and see if plan with endocrinology improves symptoms.   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 Endocrinology will be obtaining metanephrines as part of work-up with elevated blood pressure, headaches, and sensation of racing in her chest.       Hypertension  Adrenal gland mass (CT abdomen 2023)  ED 8/2024 for significant headache, presyncopal symptoms. CT head, CTA head neck without significant findings. She was hypertensive. Saw PCP 8/28/2024, hypertensive, and started on losartan   Elevated aldosterone:renin ratio. Saw endo at [facility] 10/10/2024. She was instructed to stopp  hydrochlorothiazide, potassium and losartan. She will have labs drawn 2 weeks after stopping these medications 10/25/2024. She will follow-up with endocrinology after this.   Blood pressure not controlled with medication adjustments made by susan and she has since been hospitalized for uncontrolled htn, acute HFpEF.   New endocrinology referral but this is several months out.   Goal is to control blood pressure, prevent any end organ damage, and manage hypervolemia symptoms.   DECREASE hydralazine 75 mg three times daily to 50 mg three times daily for 5 days, then decrease to 25 mg three times daily for 5 days, then stop.   START spironolactone 25 mg once daily in the morning  START furosemide 20 mg once daily  STOP hydrochlorothiazide 25 mg once daily  Continue losartan 50 mg once daily and metoprolol 75 mg once daily.     BP Readings from Last 6 Encounters:   10/22/24 (!) 152/66   10/16/24 (!) 175/76   10/15/24 (!) 162/88   09/19/24 (!) 146/82   08/28/24 (!) 166/88   08/24/24 169/92       Hyperlipidemia with LDL goal less than 100, uncontrolled  Carotid artery calcifications without stenosis 8/2024 CTA head/neck  Statin would be recommended.   She is not on a statin medication. Prior side effects.   Heart healthy lifestyle encouraged    Recent Labs   Lab Test 04/01/24  0926 09/29/23  1022   CHOL 192 190   HDL 35* 37*   * 119*   TRIG 148 171*     The 10-year ASCVD risk score (Rupert NAQVI, et al., 2019) is: 17.5%    Values used to calculate the score:      Age: 69 years      Sex: Female      Is Non- : No      Diabetic: No      Tobacco smoker: No      Systolic Blood Pressure: 152 mmHg      Is BP treated: Yes      HDL Cholesterol: 38 mg/dL      Total Cholesterol: 163 mg/dL      Obesity  Heart healthy lifestyle  Body mass index is 36.94 kg/m .  Wt Readings from Last 5 Encounters:   10/22/24 100.7 kg (222 lb)   10/16/24 105.7 kg (233 lb)   10/15/24 105.2 kg (232 lb)   09/19/24 103.9 kg (229  "lb)   08/28/24 103.9 kg (229 lb)           Former smoker    Factor V leiden  History of pulmonary embolus  Chronic oral anticoagulation  Diagnosed after surgery on her leg. No recurrence.   INR levels have been therapeutic  US E 5/2024: negative for DVT      Send [employer] message on Friday with home blood pressures, home weights, new/worse/improved symptoms with medication changes    Lab only visit in 1 week with medication changes.     Interval history:    Cassy \"Kaitlin\" Austin is a pleasant 69-year old female who presents for cardiology follow-up. Prior visit was initial consult regarding irregular heart rate and T wave changes on EKG, progressive dyspnea on exertion. She has a cardiovascular history including hyperlipidemia, hypertension. She has a non-cardiac medical history including Factor V Leiden with history of pulmonary embolus on chronic oral anticoagulation, depression, insomnia.       Today, Kaitlin presents to follow-up from recent hospitalization 10/17/2024 for uncontrolled hypertension and congestive heart failure. She saw endocrinology who stopped medications for management of hypertension and started hydralazine with plan to re-check labs in a few weeks. Since this change blood pressures have been uncontrolled, she has gained weight, she is more dyspneic. BNP mildly elevated at 490 with concerns for acute HFpEF. She was started on IV lasix with significant improvement in symptoms and weight. She was re-started on hydrochlorothiazide and losartan for blood pressure control.     She does have some improved blood pressure readings and continues to feel slightly better since hospitalization. She is most bothered by elevated blood pressure readings as she gets headaches, weight gain/swelling, dyspnea, general malaise/generalized weakness.     Smoking History: Started smoking at age 12, quit smoking around age 49. At peak was smoking 0.5 ppd.    Alcohol History: None    Substance Abuse History: No history of " "elicit substance use. History of fen-phen use.    Sleep History: Sleeps in bed. Usually goes to bed around 10/10:30 p.m. Wakes up around 7/7:30 am. Wakes up most mornings in the last month feeling tired \"and like I want to go back to bed.\" She has been taking naps lately due to fatigue- usually 1 nap- lasting 2 hours. She does feel rested when she wakes up from this nap. She does snore. No witnessed apnea. Sleep study in . Lately has had some orthopnea. No PND.     Family History:   - Maternal grandfather: heart attack at age 72  - Maternal grandmother: heart disease due to diabetes age 59  - Mother: CHF, pacemaker  - Father:  of MI at the age of 49 years old  - Father had a large family- several members less than 50 with history of heart attacks  - Brother: atrial fibrillation  - Sister: atrial fibrillation    HPI:    Cassy \"Kaitlin\" Austin is a pleasant 68-year old female who presents for cardiology consult regarding irregular heart rate and T wave changes on EKG. She has a cardiovascular history including hyperlipidemia, hypertension. She has a non-cardiac medical history including Factor V Leiden with history of pulmonary embolus on chronic oral anticoagulation, depression, insomnia.       Today, Kaitlin is concerned about recent elevated blood pressures, progressive dyspnea on exertion and fatigue.   \"My blood pressure is still high and I can feel some pounding. I get so short of breath and tired. Sometimes my pulse is really down. Chest discomfort.\"   She has been taking her blood pressures at home. When they are good usually 120-130 systolic. Lately, in the last month, her blood pressures have been elevated 180-200 systolic and 100 diastolic. She has been more active with the nicer weather this summer. Symptoms of fatigue, dyspnea, pounding in chest is also newer.   She describes occasional \"heavy feeling\" in the mid-sternal chest area. She does admit that when she presses on the area her chest wall does " "hurt. No radiation of discomfort. Chest discomfort can come on at rest, laying, sitting, moving. She has had this discomfort every day. She does not usually wake up with discomfort.   She was able to walk to her sister's house about a block away. In the past month she has been feeling so short of breath she feels like she is going to pass out. \"Going up and down the basement steps about does me in. Walking down the [place] at [employer] is tough.\" She estimates she could not even walk a block without needing to rest. Prior to onset of symptoms in the last month she was able to walk down 4 blocks \"no problem.\"   She gets sensation of \"pounding\" randomly. Usually lasts maybe a minute, no longer than 2 minutes, and goes back to normal. She gets associated lightheadedness.   Fatigue- per  \"she has been sleeping a lot.\"   She has a history of bilateral LE edema. 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 Ankle is always swollen. Sometimes has a little swelling in left leg.         RELEVANT TESTING:    NM Lexiscan Stress test 9/2024  Narrative    The nuclear stress test is negative for inducible myocardial ischemia  or infarction.    Left ventricular function is normal.    The left ventricular ejection fraction at rest is 75%.  The left  ventricular ejection fraction at stress is 82%.    A prior study was conducted on 10/11/2019.      ECG Summary    ECG Baseline electrocardiogram demonstrates sinus rhythm.   The patient did not develop any acute EKG changes or arrhythmias during the Lexiscan portion of the study       Transthoracic Echocardiogram 9/2024  Interpretation Summary  Global and regional left ventricular function is normal with an EF of 55-60%.  Left ventricular wall thickness is normal. Left ventricular size is normal.  Left ventricular diastolic function is indeterminate. No regional wall motion  abnormalities are seen.  Right ventricular function, chamber size, wall motion, and thickness " are  normal.  Pulmonary artery systolic pressure cannot be assessed.   The inferior vena cava is normal.  No pericardial effusion is present.  There is no prior study for direct comparison.    Zio Patch 8/2024  Conclusion  Zio XT patch report on Cassy Avila.  Ordered secondary to hypertension.   Worn for 13 days and 21 hr's.  After removing artifact, total time was 13 days and 5 hr's. Placed on August 29, 2024 at 1:39 PM and completed on 8/12/2024 at 10:22 AM.     Underlying rhythm was sinus.   Hrt rate ranged from 48 bpm, maximum heart rate of 182 bpm, averaging 63 bpm.   No significant bradycardia, pauses, Mobitz type II or 3rd degree heart block.   No atrial fibrillation on this study.   x 15 triggered events and x 15 diary entries.  These corresponded to SVT, normal sinus rhythm, SVE's and VE's.   x 0 runs of VT.     x 13 runs of SVT longest lasting 7 beats with a maximum heart rate of 182 bpm.   Rare, <1% of PAC's, atrial couplets, atrial triplets, and PVC's.   No episodes of ventricular bigeminy.   No episodes of ventricular trigeminy.      Facundo Pittman, DO     Zio Patch 2020  Conclusion  Zio XT patch report on Cassy Avila.  Ordered secondary to palpitations.   Worn for 6 days and 21 hr's.  After removing artifact, total time was 6 days and 21 hr's. Placed on 10/27/2020 at 1:23 PM and completed on 11/3/2020 at 9:29 AM.     Underlying rhythm was sinus.   Hrt rate ranged from 51 bpm, maximum heart rate of 167 bmp, averaging 79 bmp.   No significant bradycardia, pauses, 2nd degree Mobitz type II or 3rd degree heart block.   No atrial fibrillation was identified on this study.   x2 triggered events and x2 diary entries.  These corresponded to SVE, VE, and sinus rhythm.   x0 runs of VT.     x3 runs of SVT lasting up to 19 beats with a maximum heart rate of 167 bmp.   Rare, less than 1% of PAC's, atrial couplets, atrial triplets, and ventricular couplets.   Occasional PVC's at 1.8%.   + episodes of  ventricular bigeminy lasting up to 6.7 sec's.   + episodes of ventricular trigeminy lasting up to 15.3 sec's.    Transthoracic Echocardiogram 2016  ASSESSMENT:  Echocardiographic study revealing  1.  Normal left ventricular size and systolic function.  Ejection  fraction is 60%.  2.  Borderline left atrial enlargement.  3.  Normal right ventricular size and function.  4.  Trace mitral regurgitation.  5.  Normal aortic valve.  6.  Trace tricuspid regurgitation.  Peak systolic pressure of the  right ventricle is estimated at 23 plus right atrium.  Exam Date: Randall 15, 2016 10:03:57 AM  Author: MARIYA AGUILERA      Past Medical History:   Diagnosis Date    Abdominal pain, generalized 02/08/2021    Arthritis     Cervicalgia 01/09/2001    Closed dislocation of shoulder, unspecified site 2000    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 Provider to review    Factor V Leiden (H)     Factor V Leiden mutation (H) 07/26/2019    Family history of colon cancer 01/02/2018    Gastro-oesophageal reflux disease     Herpes zoster without mention of complication 2003    resolved from problem list    Hyperlipidemia LDL goal <100 07/11/2002     Problem list name updated by automated process. Provider to review    Long term (current) use of anticoagulants 08/20/2003    Major depression 08/08/2014    Mammographic microcalcification 2003    resolved from problem list    Migraine, unspecified, without mention of intractable migraine without mention of status migrainosus 03/05/2001    Moderate episode of recurrent major depressive disorder (H) 08/08/2014    Neurofibromatosis, peripheral, NF1  (H) 2012     Problem list name updated by automated process. Provider to review    Neurofibromatosis, unspecified(237.70) 2012    Other and unspecified hyperlipidemia 2002    Other chronic pain     Other pulmonary embolism and infarction 2003    Primary insomnia 10/31/2018    Statin medication not prescribed per physician orders 2018    Tachycardia, unspecified 2001    Thrombosis of leg     Unspecified essential hypertension 2001    Vitamin D deficiency 2018       Past Surgical History:   Procedure Laterality Date    ------------OTHER-------------      shoulder replacement; Provider: Karen    ARTHROPLASTY KNEE  2014    Procedure: ARTHROPLASTY KNEE;  Surgeon: Sean Alexander MD;  Location: [facility] OR    ARTHROSCOPY SHOULDER      right, bone spurs    BIOPSY      CA ANESTH,SHOULDER REPLACEMENT Right 2020     SECTION      x3    CHOLECYSTECTOMY      COLONOSCOPY  02/15/2018    [place],,polyps    elbow ulnar tunnel release      ELECTROTHERMAL THERAPY INTRADISC  2017    stimulator    ENDOSCOPIC SINUS SURGERY, SEPTOPLASTY, TURBINOPLASTY, MAXILLARY SINUSOTOMY, COMBINED N/A 2015    Procedure: COMBINED ENDOSCOPIC SINUS SURGERY, SEPTOPLASTY, TURBINOPLASTY, MAXILLARY SINUSOTOMY;  Surgeon: Seema Conn MD;  Location: [facility] OR    ENT SURGERY      ESOPHAGOSCOPY, GASTROSCOPY, DUODENOSCOPY (EGD), COMBINED      with biopsy and endoscopic U/S    EXCISE NEUROMA LOWER EXTREMITY Left 2016    Procedure: EXCISE NEUROMA LOWER EXTREMITY;  Surgeon: Edi Reed MD;  Location: [facility] OR    EYE SURGERY Right 2020    FUSION LUMBAR ANTERIOR WITH MARCY CAGES      L5-S1    HYSTERECTOMY TOTAL ABDOMINAL, BILATERAL SALPINGO-OOPHORECTOMY, COMBINED N/A     ORTHOPEDIC SURGERY  2015    right shoulder    ORTHOPEDIC SURGERY  2015    right knee    ORTHOPEDIC SURGERY Right 2018    hip labrum tear    pionidal cyst excision       TRANSPOSITION ULNAR NERVE (ELBOW)         Allergies   Allergen Reactions    Allopurinol Shortness Of Breath    Amlodipine Besylate Swelling     Norvasc    Amoxicillin     Atorvastatin      myualgia    Cephalexin Monohydrate Hives     Keflex    Erythromycin Base [Erythromycin Base] Nausea and Vomiting    Meloxicam Other (See Comments)     Mobic - confusion, depression    Sulfa Antibiotics Hives    Adhesive Tape Rash    Prochlorperazine Edisylate Swelling and Rash     Compazine    Prochlorperazine Maleate Swelling and Rash       Current Outpatient Medications   Medication Sig Dispense Refill    albuterol (PROVENTIL) (2.5 MG/3ML) 0.083% neb solution Take 2.5 mg by nebulization every 6 hours as needed for shortness of breath / dyspnea or wheezing      aspirin 81 MG EC tablet Take 81 mg by mouth daily HS      Cholecalciferol (VITAMIN D3 PO) Take 3,000 mg by mouth daily AM      escitalopram (LEXAPRO) 10 MG tablet Take 1 tablet (10 mg) by mouth daily. 90 tablet 1    furosemide (LASIX) 20 MG tablet Take 1 tablet (20 mg) by mouth daily. 90 tablet 3    ketoconazole (NIZORAL) 2 % external cream APPLY TO THE RASH ON FULL BACK TWICE A DAY FOR 4-6 WEEKS      losartan (COZAAR) 50 MG tablet Take 1 tablet by mouth daily.      metoprolol succinate ER (TOPROL XL) 50 MG 24 hr tablet TAKE 1 & 1/2 (ONE & ONE-HALF) TABLETS BY MOUTH ONCE DAILY 90 tablet 3    order for DME Nebulizer machine and tubing    DX:  Bronchitis 1 Device 0    spironolactone (ALDACTONE) 25 MG tablet Take 1 tablet (25 mg) by mouth daily. 90 tablet 3    traZODone (DESYREL) 50 MG tablet TAKE 1 TO 2 TABLETS BY MOUTH NIGHTLY AS NEEDED 180 tablet 3    warfarin ANTICOAGULANT (COUMADIN) 5 MG tablet TAKE 1 & 1/2 (ONE & ONE-HALF) TABLETS BY MOUTH MONDAY WEDNESDAY AND FRIDAY AND 1 TABLET ALL OTHER DAYS OR AS DIRECTED BY WARFARIN CLINIC 109 tablet 1       Social History     Socioeconomic History    Marital status:      Spouse name: Not on file    Number of children: Not  on file    Years of education: Not on file    Highest education level: Not on file   Occupational History    Not on file   Tobacco Use    Smoking status: Former     Current packs/day: 0.00     Average packs/day: 1 pack/day for 30.0 years (30.0 ttl pk-yrs)     Types: Cigarettes, Pipe     Start date: 1969     Quit date: 2000     Years since quittin.8     Passive exposure: Past    Smokeless tobacco: Never    Tobacco comments:     quit in    Vaping Use    Vaping status: Never Used   Substance and Sexual Activity    Alcohol use: No    Drug use: No    Sexual activity: Not Currently     Partners: Male   Other Topics Concern     Service Not Asked    Blood Transfusions Yes    Caffeine Concern Yes     Comment: 2 cups coffee daily    Occupational Exposure Not Asked    Hobby Hazards Not Asked    Sleep Concern Not Asked    Stress Concern Not Asked    Weight Concern Not Asked    Special Diet Not Asked    Back Care Not Asked    Exercise Yes     Comment: walking, aerobic daily (5-10 hrs/week)    Bike Helmet Not Asked    Seat Belt Not Asked    Self-Exams Not Asked    Parent/sibling w/ CABG, MI or angioplasty before 65F 55M? No   Social History Narrative    Not on file     Social Drivers of Health     Financial Resource Strain: Low Risk  (10/15/2024)    Financial Resource Strain     Within the past 12 months, have you or your family members you live with been unable to get utilities (heat, electricity) when it was really needed?: No   Food Insecurity: Low Risk  (10/15/2024)    Food Insecurity     Within the past 12 months, did you worry that your food would run out before you got money to buy more?: No     Within the past 12 months, did the food you bought just not last and you didn t have money to get more?: No   Transportation Needs: Low Risk  (10/15/2024)    Transportation Needs     Within the past 12 months, has lack of transportation kept you from medical appointments, getting your medicines, non-medical  "meetings or appointments, work, or from getting things that you need?: No   Physical Activity: Inactive (10/15/2024)    Exercise Vital Sign     Days of Exercise per Week: 0 days     Minutes of Exercise per Session: 0 min   Stress: No Stress Concern Present (10/15/2024)    [employer] - Occupational Stress Questionnaire     Feeling of Stress : Not at all   Social Connections: Unknown (10/15/2024)    Social Connection and Isolation Panel [NHANES]     Frequency of Communication with Friends and Family: Not on file     Frequency of Social Gatherings with Friends and Family: Twice a week     Attends Religion Services: Not on file     Active Member of Clubs or Organizations: Not on file     Attends Club or Organization Meetings: Not on file     Marital Status: Not on file   Interpersonal Safety: Not At Risk (10/17/2024)    Received [facility] and [facility] IPV     Do you feel UNSAFE in any of your personal relationships with your family members or any other acquaintances?: No   Housing Stability: Low Risk  (10/15/2024)    Housing Stability     Do you have housing? : Yes     Are you worried about losing your housing?: No       TEST RESULTS:   Results for orders placed or performed in visit on 10/22/24   INR (External Result)     Status: None   Result Value Ref Range    INR HOME MONITORING 2.1 2.000 - 3.000           Review of systems: Negative except that which was noted in the HPI.    Physical examination:    Vitals: BP (!) 152/66   Pulse 80   Resp 20   Ht 1.651 m (5' 5\")   Wt 100.7 kg (222 lb)   SpO2 94%   BMI 36.94 kg/m    BMI= Body mass index is 36.94 kg/m .      GENERAL APPEARANCE: alert and no distress  CHEST: lungs clear to auscultation - no rales, rhonchi or wheezes, no use of accessory muscles, no retractions, respirations are unlabored, normal respiratory rate  CARDIOVASCULAR: regular rhythm, normal S1 with physiologic split S2, no S3 " or S4 and no murmur, click or rub  EXTREMITIES: +trace to +1 bilateral LE edema  NEURO: alert and oriented normal speech, and affect  VASC: No carotid bruits heard.      Thank you for allowing me to participate in the care of your patient. Please do not hesitate to contact me if you have any questions.       Ashli Tijerina, CNP

## 2024-10-25 DIAGNOSIS — I1A.0 RESISTANT HYPERTENSION: Primary | ICD-10-CM

## 2024-10-25 DIAGNOSIS — E27.8 ADRENAL MASS (H): ICD-10-CM

## 2024-10-25 DIAGNOSIS — R79.89 ABNORMAL ALDOSTERONE TO RENIN RATIO: ICD-10-CM

## 2024-10-29 ENCOUNTER — MYC MEDICAL ADVICE (OUTPATIENT)
Dept: CARDIOLOGY | Facility: OTHER | Age: 69
End: 2024-10-29

## 2024-10-29 ENCOUNTER — LAB (OUTPATIENT)
Dept: LAB | Facility: OTHER | Age: 69
End: 2024-10-29
Payer: MEDICARE

## 2024-10-29 DIAGNOSIS — I16.0 HYPERTENSIVE URGENCY: ICD-10-CM

## 2024-10-29 DIAGNOSIS — R79.89 ABNORMAL ALDOSTERONE TO RENIN RATIO: ICD-10-CM

## 2024-10-29 LAB
ANION GAP SERPL CALCULATED.3IONS-SCNC: 14 MMOL/L (ref 7–15)
BUN SERPL-MCNC: 10.5 MG/DL (ref 8–23)
CALCIUM SERPL-MCNC: 9.3 MG/DL (ref 8.8–10.4)
CHLORIDE SERPL-SCNC: 104 MMOL/L (ref 98–107)
CREAT SERPL-MCNC: 1.07 MG/DL (ref 0.51–0.95)
EGFRCR SERPLBLD CKD-EPI 2021: 56 ML/MIN/1.73M2
GLUCOSE SERPL-MCNC: 101 MG/DL (ref 70–99)
HCO3 SERPL-SCNC: 22 MMOL/L (ref 22–29)
POTASSIUM SERPL-SCNC: 4.2 MMOL/L (ref 3.4–5.3)
SODIUM SERPL-SCNC: 140 MMOL/L (ref 135–145)

## 2024-10-29 PROCEDURE — 36415 COLL VENOUS BLD VENIPUNCTURE: CPT | Mod: ZL

## 2024-10-29 PROCEDURE — 80048 BASIC METABOLIC PNL TOTAL CA: CPT | Mod: ZL

## 2024-10-31 NOTE — TELEPHONE ENCOUNTER
I sent [employer] message this morning.  Can we schedule her for f/up in [facility] on the 12th and do a BMP that day?      Thanks,    Ashli Tijerina CNP on 10/31/2024 at 9:04 AM

## 2024-11-05 ENCOUNTER — OFFICE VISIT (OUTPATIENT)
Dept: CARDIOLOGY | Facility: OTHER | Age: 69
End: 2024-11-05
Attending: NURSE PRACTITIONER
Payer: MEDICARE

## 2024-11-05 ENCOUNTER — ANTICOAGULATION THERAPY VISIT (OUTPATIENT)
Dept: ANTICOAGULATION | Facility: OTHER | Age: 69
End: 2024-11-05
Attending: NURSE PRACTITIONER
Payer: COMMERCIAL

## 2024-11-05 ENCOUNTER — LAB (OUTPATIENT)
Dept: LAB | Facility: OTHER | Age: 69
End: 2024-11-05
Attending: NURSE PRACTITIONER
Payer: COMMERCIAL

## 2024-11-05 ENCOUNTER — TELEPHONE (OUTPATIENT)
Dept: ENDOCRINOLOGY | Facility: CLINIC | Age: 69
End: 2024-11-05

## 2024-11-05 VITALS
WEIGHT: 223 LBS | HEART RATE: 80 BPM | BODY MASS INDEX: 37.15 KG/M2 | HEIGHT: 65 IN | SYSTOLIC BLOOD PRESSURE: 108 MMHG | RESPIRATION RATE: 20 BRPM | DIASTOLIC BLOOD PRESSURE: 60 MMHG | OXYGEN SATURATION: 93 %

## 2024-11-05 DIAGNOSIS — I1A.0 RESISTANT HYPERTENSION: ICD-10-CM

## 2024-11-05 DIAGNOSIS — Z79.01 LONG TERM CURRENT USE OF ANTICOAGULANT THERAPY: ICD-10-CM

## 2024-11-05 DIAGNOSIS — I26.99 PULMONARY EMBOLISM AND INFARCTION (H): Primary | ICD-10-CM

## 2024-11-05 DIAGNOSIS — R79.89 ABNORMAL ALDOSTERONE TO RENIN RATIO: ICD-10-CM

## 2024-11-05 DIAGNOSIS — I10 ESSENTIAL HYPERTENSION: ICD-10-CM

## 2024-11-05 DIAGNOSIS — J43.9 PULMONARY EMPHYSEMA, UNSPECIFIED EMPHYSEMA TYPE (H): ICD-10-CM

## 2024-11-05 DIAGNOSIS — E27.8 ADRENAL MASS (H): ICD-10-CM

## 2024-11-05 DIAGNOSIS — I16.0 HYPERTENSIVE URGENCY: ICD-10-CM

## 2024-11-05 DIAGNOSIS — I82.401 DEEP VEIN THROMBOSIS (DVT) OF RIGHT LOWER EXTREMITY, UNSPECIFIED CHRONICITY, UNSPECIFIED VEIN (H): ICD-10-CM

## 2024-11-05 DIAGNOSIS — R06.09 DYSPNEA ON EXERTION: ICD-10-CM

## 2024-11-05 DIAGNOSIS — I1A.0 RESISTANT HYPERTENSION: Primary | ICD-10-CM

## 2024-11-05 LAB
ANION GAP SERPL CALCULATED.3IONS-SCNC: 17 MMOL/L (ref 7–15)
BUN SERPL-MCNC: 14 MG/DL (ref 8–23)
CALCIUM SERPL-MCNC: 9.4 MG/DL (ref 8.8–10.4)
CHLORIDE SERPL-SCNC: 103 MMOL/L (ref 98–107)
CREAT SERPL-MCNC: 1.04 MG/DL (ref 0.51–0.95)
EGFRCR SERPLBLD CKD-EPI 2021: 58 ML/MIN/1.73M2
GLUCOSE SERPL-MCNC: 149 MG/DL (ref 70–99)
HCO3 SERPL-SCNC: 19 MMOL/L (ref 22–29)
HOLD SPECIMEN: NORMAL
INR HOME MONITORING: 2.9 (ref 2–3)
POTASSIUM SERPL-SCNC: 4 MMOL/L (ref 3.4–5.3)
SODIUM SERPL-SCNC: 139 MMOL/L (ref 135–145)

## 2024-11-05 PROCEDURE — 99214 OFFICE O/P EST MOD 30 MIN: CPT | Performed by: NURSE PRACTITIONER

## 2024-11-05 PROCEDURE — 36415 COLL VENOUS BLD VENIPUNCTURE: CPT | Mod: ZL

## 2024-11-05 PROCEDURE — G0463 HOSPITAL OUTPT CLINIC VISIT: HCPCS

## 2024-11-05 PROCEDURE — 80048 BASIC METABOLIC PNL TOTAL CA: CPT | Mod: ZL

## 2024-11-05 RX ORDER — SPIRONOLACTONE 25 MG/1
50 TABLET ORAL DAILY
COMMUNITY
Start: 2024-11-05

## 2024-11-05 RX ORDER — METOPROLOL SUCCINATE 50 MG/1
25 TABLET, EXTENDED RELEASE ORAL DAILY
COMMUNITY
Start: 2024-11-05

## 2024-11-05 RX ORDER — FLUTICASONE FUROATE, UMECLIDINIUM BROMIDE AND VILANTEROL TRIFENATATE 100; 62.5; 25 UG/1; UG/1; UG/1
1 POWDER RESPIRATORY (INHALATION) DAILY
Qty: 60 EACH | Refills: 3 | Status: SHIPPED | OUTPATIENT
Start: 2024-11-05

## 2024-11-05 RX ORDER — FUROSEMIDE 20 MG/1
20 TABLET ORAL PRN
COMMUNITY
Start: 2024-11-05

## 2024-11-05 ASSESSMENT — PAIN SCALES - GENERAL: PAINLEVEL_OUTOF10: MILD PAIN (2)

## 2024-11-05 NOTE — TELEPHONE ENCOUNTER
Patient confirmed scheduled appointment:  Date: 11/25  Time: 12:30  Visit type: new endo  Provider: Waylon  Location: virtual  Testing/imaging:   Additional notes: Patient was unable to do in person visit in [place]. Patient confirmed she should be in person for visit.    Meredith Flores on 11/5/2024 at 5:22 PM

## 2024-11-05 NOTE — PATIENT INSTRUCTIONS
Thank you for allowing Ashli Tijerina CNP and our  team to participate in your care. Please call our office at [phone] with scheduling questions or if you need to cancel or change your appointment. With any other questions or concerns you may call cardiology nurse at  [phone].       If you experience chest pain, chest pressure, chest tightness, shortness of breath, fainting, lightheadedness, nausea, vomiting, or other concerning symptoms, please report to the Emergency Department or call 911. These symptoms may be emergent, and best treated in the Emergency Department.      DECREASE metoprolol succinate 50 mg once daily to 25 mg once daily    CHANGE furosemide 20 mg once daily to once daily as needed for increased swelling, shortness of breath when you lay down, waking up gasping for air, weight gain of 3 pounds overnight or 5 pounds in a week.     TRIAL Trelegy inhaler. If this is too costly have pharmacy contact us to let us know what would be better covered.       Follow-up in 3 months, certainly sooner with acute concerns.    Ashli Tijerina CNP

## 2024-11-05 NOTE — PROGRESS NOTES
[facility] HEART CARE   CARDIOLOGY PROGRESS NOTE     Chief Complaint   Patient presents with    Follow Up          Diagnosis:    ICD-10-CM    1. Resistant hypertension  I1A.0 Basic metabolic panel      2. Abnormal aldosterone to renin ratio  R79.89 Basic metabolic panel     spironolactone (ALDACTONE) 25 MG tablet     furosemide (LASIX) 20 MG tablet      3. Adrenal mass (H)  E27.8 Basic metabolic panel      4. Hypertensive urgency  I16.0 spironolactone (ALDACTONE) 25 MG tablet     furosemide (LASIX) 20 MG tablet      5. Essential hypertension  I10 metoprolol succinate ER (TOPROL XL) 50 MG 24 hr tablet      6. Dyspnea on exertion  R06.09       7. Pulmonary emphysema, unspecified emphysema type (H)  J43.9 Fluticasone-Umeclidin-Vilant (TRELEGY ELLIPTA) 100-62.5-25 MCG/ACT oral inhaler              Assessment/Plan:    Dyspnea on exertion  Atypical chest discomfort  Racing sensation in chest   NF-1  Reviewed zio patch- no significant concerns. Some symptomatic PACs, PVCs and short non-sustained SVT. Given average heart rate of 63 bpm no adjustment in beta-blocker at this time  Given cardiovascular risk factors and progressive symptoms over the last month she underwent NM lexiscan stress testing 9/25/2024 which was negative for inducible myocardial ischemia or infarction. No EKG changes.  Transthoracic echocardiogram 9/20/2024  No significant findings. Normal LV systolic function with LVEF 55-60%  Consider CTA chest; however, with significant elevation in aldosterone:renin ratio in addition to known adrenal gland mass we will wait and see if plan with endocrinology improves symptoms.   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 Endocrinology was planning to obtain metanephrines as part of work-up with elevated blood pressure, headaches, and sensation of racing in her chest; however, she was in the ED/hospital and labs not obtained. She is wanting to see a different endocrinologist.        Hypertension  Adrenal gland mass (CT abdomen 2023)  ED 8/2024 for significant headache, presyncopal symptoms. CT head, CTA head neck without significant findings. She was hypertensive. Saw PCP 8/28/2024, hypertensive, and started on losartan   Elevated aldosterone:renin ratio. Saw endo at [facility] 10/10/2024. She was instructed to stopp hydrochlorothiazide, potassium and losartan. She will have labs drawn 2 weeks after stopping these medications 10/25/2024. She will follow-up with endocrinology after this.   Blood pressure not controlled with medication adjustments made by susan and she has since been hospitalized for uncontrolled htn, acute HFpEF.   New endocrinology referral but this is several months out.   Goal is to control blood pressure, prevent any end organ damage, and manage hypervolemia symptoms. She has had significant improvement in blood pressure with addition of spironolactone.   She has now completely stopped hydralazine   Continue spironolactone 50 mg once daily in the morning  CHANGE furosemide 20 mg once daily to once daily as needed for increased swelling, shortness of breath when you lay down, waking up gasping for air, weight gain of 3 pounds overnight or 5 pounds in a week.  Continue losartan 50 mg once daily  DECREASE metoprolol succinate 50 mg once daily to 25 mg once daily with lower heart rates, fatigue. She does get some palpitations, racing in chest with history of paroxysmal SVT and may not tolerate being without AV payton blocking agent.     BP Readings from Last 6 Encounters:   11/05/24 108/60   10/22/24 (!) 152/66   10/16/24 (!) 175/76   10/15/24 (!) 162/88   09/19/24 (!) 146/82   08/28/24 (!) 166/88       Hyperlipidemia with LDL goal less than 100, uncontrolled  Carotid artery calcifications without stenosis 8/2024 CTA head/neck  Statin would be recommended.   She is not on a statin medication. Prior side effects.   Heart healthy lifestyle encouraged    Recent Labs   Lab Test  "04/01/24  0926 09/29/23  1022   CHOL 192 190   HDL 35* 37*   * 119*   TRIG 148 171*     The 10-year ASCVD risk score (Rupert NAQVI, et al., 2019) is: 9.2%    Values used to calculate the score:      Age: 69 years      Sex: Female      Is Non- : No      Diabetic: No      Tobacco smoker: No      Systolic Blood Pressure: 108 mmHg      Is BP treated: Yes      HDL Cholesterol: 38 mg/dL      Total Cholesterol: 163 mg/dL      Obesity  Heart healthy lifestyle  Body mass index is 37.11 kg/m .  Wt Readings from Last 5 Encounters:   11/05/24 101.2 kg (223 lb)   10/22/24 100.7 kg (222 lb)   10/16/24 105.7 kg (233 lb)   10/15/24 105.2 kg (232 lb)   09/19/24 103.9 kg (229 lb)           Former smoker  COPD  Reviewed PFTs from 2022 showing mild obstruction, moderate diffusion defect. Given ongoing dyspnea on exertion, former smoker and prior PFTs recommend trial of inhaler to see if this improves. She does have mucus/phlegm in the mornings as well as some wheezing with exertion  TRIAL Trelegy inhaler. If this is too costly have pharmacy contact us to let us know what would be better covered.     Factor V leiden  History of pulmonary embolus  Chronic oral anticoagulation  Diagnosed after surgery on her leg. No recurrence.   INR levels have been therapeutic  US RLE 5/2024: negative for DVT    Follow-up in 3 months, certainly sooner with acute concerns.      Interval history:    Cassy \"Kaitlin\" Austin is a pleasant 69-year old female who presents for cardiology follow-up. Prior visit was initial consult regarding irregular heart rate and T wave changes on EKG, progressive dyspnea on exertion. She has a cardiovascular history including hyperlipidemia, hypertension. She has a non-cardiac medical history including Factor V Leiden with history of pulmonary embolus on chronic oral anticoagulation, depression, insomnia.       Today, Kaitlin presents with improved home blood pressure readings but has sometimes felt " "lightheaded. She feels lightheaded just sitting in exam room today. Recall, prior visit was to follow-up from recent hospitalization 10/17/2024 for uncontrolled hypertension and suspected congestive heart failure after blood pressure medications were changed by endo  She continues with fatigue. This has been one of the most bothersome symptoms for her. \"I am so tired. I can't stay awake more than 4-5 hours.\"   Lightheadedness today.  Sometimes gets racing sensation in her chest. Lasts seconds to a minute or so.   No chest pain or pressure.   Continues with dyspnea on exertion, sometimes wheezing on exertion  No orthopnea, PND.  Weights have been stable.       Smoking History: Started smoking at age 12, quit smoking around age 49. At peak was smoking 0.5 ppd.    Alcohol History: None    Substance Abuse History: No history of elicit substance use. History of fen-phen use.    Sleep History: Sleeps in bed. Usually goes to bed around 10/10:30 p.m. Wakes up around 7/7:30 am. Wakes up most mornings in the last month feeling tired \"and like I want to go back to bed.\" She has been taking naps lately due to fatigue- usually 1 nap- lasting 2 hours. She does feel rested when she wakes up from this nap. She does snore. No witnessed apnea. Sleep study in . Lately has had some orthopnea. No PND.     Family History:   - Maternal grandfather: heart attack at age 72  - Maternal grandmother: heart disease due to diabetes age 59  - Mother: CHF, pacemaker  - Father:  of MI at the age of 49 years old  - Father had a large family- several members less than 50 with history of heart attacks  - Brother: atrial fibrillation  - Sister: atrial fibrillation    HPI:    Cassy \"Kaitlin\" Austin is a pleasant 68-year old female who presents for cardiology consult regarding irregular heart rate and T wave changes on EKG. She has a cardiovascular history including hyperlipidemia, hypertension. She has a non-cardiac medical history including Factor " "V Leiden with history of pulmonary embolus on chronic oral anticoagulation, depression, insomnia.       Today, Kaitlin is concerned about recent elevated blood pressures, progressive dyspnea on exertion and fatigue.   \"My blood pressure is still high and I can feel some pounding. I get so short of breath and tired. Sometimes my pulse is really down. Chest discomfort.\"   She has been taking her blood pressures at home. When they are good usually 120-130 systolic. Lately, in the last month, her blood pressures have been elevated 180-200 systolic and 100 diastolic. She has been more active with the nicer weather this summer. Symptoms of fatigue, dyspnea, pounding in chest is also newer.   She describes occasional \"heavy feeling\" in the mid-sternal chest area. She does admit that when she presses on the area her chest wall does hurt. No radiation of discomfort. Chest discomfort can come on at rest, laying, sitting, moving. She has had this discomfort every day. She does not usually wake up with discomfort.   She was able to walk to her sister's house about a block away. In the past month she has been feeling so short of breath she feels like she is going to pass out. \"Going up and down the basement steps about does me in. Walking down the [place] at [place] is tough.\" She estimates she could not even walk a block without needing to rest. Prior to onset of symptoms in the last month she was able to walk down 4 blocks \"no problem.\"   She gets sensation of \"pounding\" randomly. Usually lasts maybe a minute, no longer than 2 minutes, and goes back to normal. She gets associated lightheadedness.   Fatigue- per  \"she has been sleeping a lot.\"   She has a history of bilateral LE edema. 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 Ankle is always swollen. Sometimes has a little swelling in left leg.         RELEVANT TESTING:    NM Lexiscan Stress test 9/2024  Narrative    The nuclear stress test is negative for inducible " myocardial ischemia  or infarction.    Left ventricular function is normal.    The left ventricular ejection fraction at rest is 75%.  The left  ventricular ejection fraction at stress is 82%.    A prior study was conducted on 10/11/2019.      ECG Summary    ECG Baseline electrocardiogram demonstrates sinus rhythm.   The patient did not develop any acute EKG changes or arrhythmias during the Lexiscan portion of the study       Transthoracic Echocardiogram 9/2024  Interpretation Summary  Global and regional left ventricular function is normal with an EF of 55-60%.  Left ventricular wall thickness is normal. Left ventricular size is normal.  Left ventricular diastolic function is indeterminate. No regional wall motion  abnormalities are seen.  Right ventricular function, chamber size, wall motion, and thickness are  normal.  Pulmonary artery systolic pressure cannot be assessed.   The inferior vena cava is normal.  No pericardial effusion is present.  There is no prior study for direct comparison.    Zio Patch 8/2024  Conclusion  Zio XT patch report on Cassy Avila.  Ordered secondary to hypertension.   Worn for 13 days and 21 hr's.  After removing artifact, total time was 13 days and 5 hr's. Placed on August 29, 2024 at 1:39 PM and completed on 8/12/2024 at 10:22 AM.     Underlying rhythm was sinus.   Hrt rate ranged from 48 bpm, maximum heart rate of 182 bpm, averaging 63 bpm.   No significant bradycardia, pauses, Mobitz type II or 3rd degree heart block.   No atrial fibrillation on this study.   x 15 triggered events and x 15 diary entries.  These corresponded to SVT, normal sinus rhythm, SVE's and VE's.   x 0 runs of VT.     x 13 runs of SVT longest lasting 7 beats with a maximum heart rate of 182 bpm.   Rare, <1% of PAC's, atrial couplets, atrial triplets, and PVC's.   No episodes of ventricular bigeminy.   No episodes of ventricular trigeminy.      Facundo Pittman, DO     Zio Patch 2020  Conclusion  Zio  XT patch report on Cassy Avila.  Ordered secondary to palpitations.   Worn for 6 days and 21 hr's.  After removing artifact, total time was 6 days and 21 hr's. Placed on 10/27/2020 at 1:23 PM and completed on 11/3/2020 at 9:29 AM.     Underlying rhythm was sinus.   Hrt rate ranged from 51 bpm, maximum heart rate of 167 bmp, averaging 79 bmp.   No significant bradycardia, pauses, 2nd degree Mobitz type II or 3rd degree heart block.   No atrial fibrillation was identified on this study.   x2 triggered events and x2 diary entries.  These corresponded to SVE, VE, and sinus rhythm.   x0 runs of VT.     x3 runs of SVT lasting up to 19 beats with a maximum heart rate of 167 bmp.   Rare, less than 1% of PAC's, atrial couplets, atrial triplets, and ventricular couplets.   Occasional PVC's at 1.8%.   + episodes of ventricular bigeminy lasting up to 6.7 sec's.   + episodes of ventricular trigeminy lasting up to 15.3 sec's.    Transthoracic Echocardiogram 2016  ASSESSMENT:  Echocardiographic study revealing  1.  Normal left ventricular size and systolic function.  Ejection  fraction is 60%.  2.  Borderline left atrial enlargement.  3.  Normal right ventricular size and function.  4.  Trace mitral regurgitation.  5.  Normal aortic valve.  6.  Trace tricuspid regurgitation.  Peak systolic pressure of the  right ventricle is estimated at 23 plus right atrium.  Exam Date: Randall 15, 2016 10:03:57 AM  Author: MARIYA AGUILERA      Past Medical History:   Diagnosis Date    Abdominal pain, generalized 02/08/2021    Arthritis     Cervicalgia 01/09/2001    Closed dislocation of shoulder, unspecified site 2000    Congenital deficiency of other clotting factors 09/07/2012    factor V deficiency, congenital    Congenital factor VIII disorder (H) 07/26/2019    Contact dermatitis and other eczema, due to unspecified cause 06/01/2004    Coughing     Depressive disorder 8/8/2014    Diarrhea 02/08/2021    Edema 01/18/2002    Essential  hypertension 2001     Problem list name updated by automated process. Provider to review    Factor V Leiden (H)     Factor V Leiden mutation (H) 2019    Family history of colon cancer 2018    Gastro-oesophageal reflux disease     Herpes zoster without mention of complication     resolved from problem list    Hyperlipidemia LDL goal <100 2002     Problem list name updated by automated process. Provider to review    Long term (current) use of anticoagulants 2003    Major depression 2014    Mammographic microcalcification     resolved from problem list    Migraine, unspecified, without mention of intractable migraine without mention of status migrainosus 2001    Moderate episode of recurrent major depressive disorder (H) 2014    Neurofibromatosis, peripheral, NF1 (H) 2012     Problem list name updated by automated process. Provider to review    Neurofibromatosis, unspecified(237.70) 2012    Other and unspecified hyperlipidemia 2002    Other chronic pain     Other pulmonary embolism and infarction 2003    Primary insomnia 10/31/2018    Statin medication not prescribed per physician orders 2018    Tachycardia, unspecified 2001    Thrombosis of leg     Unspecified essential hypertension 2001    Vitamin D deficiency 2018       Past Surgical History:   Procedure Laterality Date    ------------OTHER-------------      shoulder replacement; Provider: Karen    ARTHROPLASTY KNEE  2014    Procedure: ARTHROPLASTY KNEE;  Surgeon: Sean Alexander MD;  Location: [facility] OR    ARTHROSCOPY SHOULDER      right, bone spurs    BIOPSY      CA ANESTH,SHOULDER REPLACEMENT Right 2020     SECTION      x3    CHOLECYSTECTOMY      COLONOSCOPY  02/15/2018    [place],,polyps    elbow ulnar tunnel release      ELECTROTHERMAL THERAPY INTRADISC  2017    stimulator    ENDOSCOPIC SINUS SURGERY, SEPTOPLASTY,  TURBINOPLASTY, MAXILLARY SINUSOTOMY, COMBINED N/A 04/29/2015    Procedure: COMBINED ENDOSCOPIC SINUS SURGERY, SEPTOPLASTY, TURBINOPLASTY, MAXILLARY SINUSOTOMY;  Surgeon: Seema Conn MD;  Location: [facility] OR    ENT SURGERY  2014    ESOPHAGOSCOPY, GASTROSCOPY, DUODENOSCOPY (EGD), COMBINED  2011    with biopsy and endoscopic U/S    EXCISE NEUROMA LOWER EXTREMITY Left 07/13/2016    Procedure: EXCISE NEUROMA LOWER EXTREMITY;  Surgeon: Edi Reed MD;  Location: [facility] OR    EYE SURGERY Right 09/2020    FUSION LUMBAR ANTERIOR WITH BAK CAGES      L5-S1    HYSTERECTOMY TOTAL ABDOMINAL, BILATERAL SALPINGO-OOPHORECTOMY, COMBINED N/A     ORTHOPEDIC SURGERY  02/2015    right shoulder    ORTHOPEDIC SURGERY  08/28/2015    right knee    ORTHOPEDIC SURGERY Right 06/11/2018    hip labrum tear    pionidal cyst excision      TRANSPOSITION ULNAR NERVE (ELBOW)         Allergies   Allergen Reactions    Allopurinol Shortness Of Breath    Amlodipine Besylate Swelling     Norvasc    Amoxicillin     Atorvastatin      myualgia    Cephalexin Monohydrate Hives     Keflex    Erythromycin Base [Erythromycin Base] Nausea and Vomiting    Meloxicam Other (See Comments)     Mobic - confusion, depression    Sulfa Antibiotics Hives    Adhesive Tape Rash    Prochlorperazine Edisylate Swelling and Rash     Compazine    Prochlorperazine Maleate Swelling and Rash       Current Outpatient Medications   Medication Sig Dispense Refill    albuterol (PROVENTIL) (2.5 MG/3ML) 0.083% neb solution Take 2.5 mg by nebulization every 6 hours as needed for shortness of breath / dyspnea or wheezing      aspirin 81 MG EC tablet Take 81 mg by mouth daily HS      Cholecalciferol (VITAMIN D3 PO) Take 3,000 mg by mouth daily AM      escitalopram (LEXAPRO) 10 MG tablet Take 1 tablet (10 mg) by mouth daily. 90 tablet 1    Fluticasone-Umeclidin-Vilant (TRELEGY ELLIPTA) 100-62.5-25 MCG/ACT oral inhaler Inhale 1 puff into the lungs daily. 60 each 3    furosemide  (LASIX) 20 MG tablet Take 1 tablet (20 mg) by mouth as needed (swelling, weight gain).      ketoconazole (NIZORAL) 2 % external cream APPLY TO THE RASH ON FULL BACK TWICE A DAY FOR 4-6 WEEKS      losartan (COZAAR) 50 MG tablet Take 1 tablet by mouth daily.      metoprolol succinate ER (TOPROL XL) 50 MG 24 hr tablet Take 0.5 tablets (25 mg) by mouth daily.      order for DME Nebulizer machine and tubing    DX:  Bronchitis 1 Device 0    spironolactone (ALDACTONE) 25 MG tablet Take 2 tablets (50 mg) by mouth daily.      traZODone (DESYREL) 50 MG tablet TAKE 1 TO 2 TABLETS BY MOUTH NIGHTLY AS NEEDED 180 tablet 3    warfarin ANTICOAGULANT (COUMADIN) 5 MG tablet TAKE 1 & 1/2 (ONE & ONE-HALF) TABLETS BY MOUTH  AND FRIDAY AND 1 TABLET ALL OTHER DAYS OR AS DIRECTED BY WARFARIN CLINIC 109 tablet 1       Social History     Socioeconomic History    Marital status:      Spouse name: Not on file    Number of children: Not on file    Years of education: Not on file    Highest education level: Not on file   Occupational History    Not on file   Tobacco Use    Smoking status: Former     Current packs/day: 0.00     Average packs/day: 1 pack/day for 30.0 years (30.0 ttl pk-yrs)     Types: Cigarettes, Pipe     Start date: 1969     Quit date: 2000     Years since quittin.8     Passive exposure: Past    Smokeless tobacco: Never    Tobacco comments:     quit in    Vaping Use    Vaping status: Never Used   Substance and Sexual Activity    Alcohol use: No    Drug use: No    Sexual activity: Not Currently     Partners: Male   Other Topics Concern     Service Not Asked    Blood Transfusions Yes    Caffeine Concern Yes     Comment: 2 cups coffee daily    Occupational Exposure Not Asked    Hobby Hazards Not Asked    Sleep Concern Not Asked    Stress Concern Not Asked    Weight Concern Not Asked    Special Diet Not Asked    Back Care Not Asked    Exercise Yes     Comment: walking, aerobic daily  (5-10 hrs/week)    Bike Helmet Not Asked    Seat Belt Not Asked    Self-Exams Not Asked    Parent/sibling w/ CABG, MI or angioplasty before 65F 55M? No   Social History Narrative    Not on file     Social Drivers of Health     Financial Resource Strain: Low Risk  (10/15/2024)    Financial Resource Strain     Within the past 12 months, have you or your family members you live with been unable to get utilities (heat, electricity) when it was really needed?: No   Food Insecurity: Low Risk  (10/15/2024)    Food Insecurity     Within the past 12 months, did you worry that your food would run out before you got money to buy more?: No     Within the past 12 months, did the food you bought just not last and you didn t have money to get more?: No   Transportation Needs: Low Risk  (10/15/2024)    Transportation Needs     Within the past 12 months, has lack of transportation kept you from medical appointments, getting your medicines, non-medical meetings or appointments, work, or from getting things that you need?: No   Physical Activity: Inactive (10/15/2024)    Exercise Vital Sign     Days of Exercise per Week: 0 days     Minutes of Exercise per Session: 0 min   Stress: No Stress Concern Present (10/15/2024)    [employer] [place] - Occupational Stress Questionnaire     Feeling of Stress : Not at all   Social Connections: Unknown (10/15/2024)    Social Connection and Isolation Panel [NHANES]     Frequency of Communication with Friends and Family: Not on file     Frequency of Social Gatherings with Friends and Family: Twice a week     Attends Jain Services: Not on file     Active Member of Clubs or Organizations: Not on file     Attends Club or Organization Meetings: Not on file     Marital Status: Not on file   Interpersonal Safety: Not At Risk (10/17/2024)    Received from [facility]     IP Custom IPV     Do you feel UNSAFE in any of your personal relationships  "with your family members or any other acquaintances?: No   Housing Stability: Low Risk  (10/15/2024)    Housing Stability     Do you have housing? : Yes     Are you worried about losing your housing?: No       TEST RESULTS:   Results for orders placed or performed in visit on 11/05/24   Basic metabolic panel     Status: Abnormal   Result Value Ref Range    Sodium 139 135 - 145 mmol/L    Potassium 4.0 3.4 - 5.3 mmol/L    Chloride 103 98 - 107 mmol/L    Carbon Dioxide (CO2) 19 (L) 22 - 29 mmol/L    Anion Gap 17 (H) 7 - 15 mmol/L    Urea Nitrogen 14.0 8.0 - 23.0 mg/dL    Creatinine 1.04 (H) 0.51 - 0.95 mg/dL    GFR Estimate 58 (L) >60 mL/min/1.73m2    Calcium 9.4 8.8 - 10.4 mg/dL    Glucose 149 (H) 70 - 99 mg/dL   Extra Tube     Status: None    Narrative    The following orders were created for panel order Extra Tube.  Procedure                               Abnormality         Status                     ---------                               -----------         ------                     Extra Purple Top Tube[[id number]]                            Final result                 Please view results for these tests on the individual orders.   Extra Purple Top Tube     Status: None   Result Value Ref Range    Hold Specimen JIC    Results for orders placed or performed in visit on 11/05/24   INR (External Result)     Status: None   Result Value Ref Range    INR HOME MONITORING 2.9 2.000 - 3.000           Review of systems: Negative except that which was noted in the HPI.    Physical examination:    Vitals: /60 (BP Location: Right arm, Patient Position: Sitting, Cuff Size: Adult Regular)   Pulse 80   Resp 20   Ht 1.651 m (5' 5\")   Wt 101.2 kg (223 lb)   SpO2 93%   BMI 37.11 kg/m    BMI= Body mass index is 37.11 kg/m .      GENERAL APPEARANCE: alert and no distress  CHEST: lungs clear to auscultation - no rales, rhonchi or wheezes, no use of accessory muscles, no retractions, respirations are unlabored, normal " respiratory rate  CARDIOVASCULAR: regular rhythm, normal S1 with physiologic split S2, no S3 or S4 and no murmur, click or rub  EXTREMITIES: +trace bilateral LE edema  NEURO: alert and oriented normal speech, and affect  VASC: No carotid bruits heard.      Thank you for allowing me to participate in the care of your patient. Please do not hesitate to contact me if you have any questions.       Ashli Tijerina, CNP

## 2024-11-05 NOTE — PROGRESS NOTES
ANTICOAGULATION MANAGEMENT     Cassy Avila 69 year old female is on warfarin with therapeutic INR result. (Goal INR 2.0-3.0)    Recent labs: (last 7 days)     11/05/24  0000   INR 2.9       ASSESSMENT     Source(s): Chart Review and Patient/Caregiver Call     Warfarin doses taken: Warfarin taken as instructed  Diet: No new diet changes identified  Medication/supplement changes:  increase in spironolactone  New illness, injury, or hospitalization: Yes: adrenal mass found hospital stay 10/17 but has not seen endocrinology yet  Signs or symptoms of bleeding or clotting: No  Previous result: Therapeutic last visit; previously outside of goal range  Additional findings: None       PLAN     Recommended plan for ongoing change(s) affecting INR     Dosing Instructions: Continue your current warfarin dose with next INR in 2 weeks       Summary  As of 11/5/2024      Full warfarin instructions:  7.5 mg every Mon; 5 mg all other days   Next INR check:  11/19/2024               Telephone call with Kaitlin who verbalizes understanding and agrees to plan    Patient to recheck with home meter    Education provided: Resume manage by exception with home monitor. Continue to submit INR results to home monitor company.You will only be called when your result is out of range. Please call and notify [facility] if new medication started, dose missed, signs or symptoms of bleeding or clotting, or a surgery/procedure is scheduled. Due for next call no later than: 2/3/25.    Plan made per [facility] anticoagulation protocol    Corry Galvan RN  11/5/2024  Anticoagulation Clinic  [facility] for routing messages: erwin CAMEJO  [facility] patient phone line: [phone]        _______________________________________________________________________     Anticoagulation Episode Summary       Current INR goal:  2.0-3.0   TTR:  81.0% (1 y)   Target end date:  Indefinite   Send INR reminders to:  DAREN CAMEJO    Indications    Long-term (current) use of  anticoagulants [Z79.01] [Z79.01]  Pulmonary embolism and infarction (H) [I26.99]  Deep vein thrombosis (DVT) (H) [I82.409] (Resolved) [I82.409]  Deep vein thrombosis (DVT) of right lower extremity  unspecified chronicity  unspecified vein (H) [I82.401]             Comments:  [employer] Monitoring. Q2 week testing  Call cell phone with any dosing.             Anticoagulation Care Providers       Provider Role Specialty Phone number    Eliz Hartman CNP Referring Family Medicine [phone]

## 2024-11-06 ENCOUNTER — VIRTUAL VISIT (OUTPATIENT)
Dept: ENDOCRINOLOGY | Facility: CLINIC | Age: 69
End: 2024-11-06
Payer: COMMERCIAL

## 2024-11-06 ENCOUNTER — PRE VISIT (OUTPATIENT)
Dept: ENDOCRINOLOGY | Facility: CLINIC | Age: 69
End: 2024-11-06

## 2024-11-06 DIAGNOSIS — E27.9 ADRENAL NODULE (H): Primary | ICD-10-CM

## 2024-11-06 DIAGNOSIS — E26.9 HIGH ALDOSTERONE TO RENIN RATIO (H): ICD-10-CM

## 2024-11-06 DIAGNOSIS — I1A.0 RESISTANT HYPERTENSION: ICD-10-CM

## 2024-11-06 PROCEDURE — 99205 OFFICE O/P NEW HI 60 MIN: CPT | Mod: 95 | Performed by: INTERNAL MEDICINE

## 2024-11-06 NOTE — NURSING NOTE
Current patient location: [address] [place] [address] [address]  [place] [address]    Is the patient currently in the state of [place]? YES    Visit mode:VIDEO    If the visit is dropped, the patient can be reconnected by: VIDEO VISIT: Text to cell phone:   Telephone Information:   Mobile [phone]       Will anyone else be joining the visit? NO  (If patient encounters technical issues they should call [phone]    Are changes needed to the allergy or medication list? No and Pt stated no med changes    Are refills needed on medications prescribed by this physician? NO    Rooming Documentation:  Not applicable    Reason for visit: Consult    Hyacinth MARTINEZ

## 2024-11-06 NOTE — CONFIDENTIAL NOTE
RECORDS RECEIVED FROM: internal    DATE RECEIVED: 11.6.24    NOTES (FOR ALL VISITS) STATUS DETAILS   OFFICE NOTES from referring provider internal    Ashli Tijerina CNP      MEDICATION LIST internal     IMAGING      CT (HEAD/NECK/CHEST/ABDOMEN) internal /ce  [facility]- 10.17.24  internal - 8.24.24, 8.29.23    NUCLEAR  [facility]  [facility]    LABS     DIABETES: HBGA1C, CREATININE, FASTING LIPIDS, MICROALBUMIN URINE, POTASSIUM, TSH, T4    THYROID: TSH, T4, CBC, THYRODLONULIN, TOTAL T3, FREE T4, CALCITONIN, CEA internal /ce

## 2024-11-06 NOTE — LETTER
11/6/2024       RE: Cassy Avila  [address]   [place] [address]     Dear Colleague,    Thank you for referring your patient, Cassy Avila, to the [facility] ENDOCRINOLOGY [facility] [place] at [facility]. Please see a copy of my visit note below.    Endocrinology Clinic Visit    Chief Complaint: Consult     Information obtained from:Patient      Assessment/Treatment Plan:    Adrenal nodule  High aldosterone to renin ratio  Resistant hypertension      Aldosterone of 19.4 with renin activity 0.2, ARR 97 in the setting of clinical features suggestive of high risk for primary aldosteronism supports a diagnosis of primary aldosteronism.  Primary aldosteronism is treated if unilateral surgically and bilateral medically. Kaitlin is already started on spironolactone which is treatment for primary aldosteronism and hyper patient has improved since starting the medication.  Significant response to blood pressure improvement after starting spironolactone also supports the diagnosis of primary aldosteronism.    After discussing in detail the workup and management of PA, decision was made to proceed with next steps which is confirmatory testing and then if indicated adrenal venous sampling.  Will do the urine aldosterone suppression test as a next step.  Precautions regarding this testing particularly watching blood pressure and symptoms of heart failure addressed.  Further plan based on results.  Adrenal nodule  I have personally reviewed CT scan from 8/29/2023 and agree with the interpretation of left adrenal nodule measuring about 1.3 cm in diameter [CT with contrast therefore Hounsfield units unknown].  Will pursue adrenal dedicated CT scan to assess further.     Test and/or medications prescribed today:  Orders Placed This Encounter   Procedures     CT Abdomen w/o & w Contrast     Creatinine timed urine     Cortisol free urine     Aldosterone urine      Sodium timed urine     DHEA sulfate     Metanephrines Plasma Free         Foreign Connors MD  Staff Endocrinologist    Division of Endocrinology and Diabetes      Subjective:         HPI: Cassy Avila is a 69 year old female with history of hypertension and adrenal nodule who is seen in consultation at Referred Self's request for the same.    Longstanding hypertension on multiple blood pressure medications however was noted to have significantly elevated blood pressure few months ago.  Systolic blood pressure approaching 200s per report.  At the time patient was screened for primary aldosteronism with aldosterone renin activity labs which showed the following and was initiated on spironolactone 50 mg daily.  Since spironolactone was added blood pressure has significantly improved and her last blood pressure was 108/60.  She continues to take other blood pressure medications including metoprolol & losartan.  While her blood pressure was high she was also found to have congestive heart failure but right now clinically stable from congestive failure standpoint.  Has past medical history of neurofibromatosis type I.  On review of system no headache or vision change.  Shortness of breath with activity for which she is currently following with cardiology.  No significant weight gain other than weight loss of 13 pounds after treated for CHF previously.  No significant stretch marks.  No nausea or vomiting but reports frequent bowel movements.  Reports she has been feeling dizzy and tired yesterday and her blood pressure was low at the time and metoprolol dose was reduced.     Latest Ref Rng 9/19/2024  10:41 AM   ENDO ADRENAL LABS     Aldosterone 0.0 - 31.0 ng/dL 19.4    Creatinine 0.51 - 0.95 mg/dL 0.97 (H)    Potassium 3.4 - 5.3 mmol/L 3.8    Renin Activity ng/mL/hr 0.2    Sodium 135 - 145 mmol/L 139         Allergies   Allergen Reactions     Allopurinol Shortness Of Breath     Amlodipine Besylate Swelling      Norvasc     Amoxicillin      Atorvastatin      myualgia     Cephalexin Monohydrate Hives     Keflex     Erythromycin Base [Erythromycin Base] Nausea and Vomiting     Meloxicam Other (See Comments)     Mobic - confusion, depression     Sulfa Antibiotics Hives     Adhesive Tape Rash     Prochlorperazine Edisylate Swelling and Rash     Compazine     Prochlorperazine Maleate Swelling and Rash       Current Outpatient Medications   Medication Sig Dispense Refill     albuterol (PROVENTIL) (2.5 MG/3ML) 0.083% neb solution Take 2.5 mg by nebulization every 6 hours as needed for shortness of breath / dyspnea or wheezing       aspirin 81 MG EC tablet Take 81 mg by mouth daily HS       Cholecalciferol (VITAMIN D3 PO) Take 3,000 mg by mouth daily AM       escitalopram (LEXAPRO) 10 MG tablet Take 1 tablet (10 mg) by mouth daily. 90 tablet 1     Fluticasone-Umeclidin-Vilant (TRELEGY ELLIPTA) 100-62.5-25 MCG/ACT oral inhaler Inhale 1 puff into the lungs daily. 60 each 3     furosemide (LASIX) 20 MG tablet Take 1 tablet (20 mg) by mouth as needed (swelling, weight gain).       ketoconazole (NIZORAL) 2 % external cream APPLY TO THE RASH ON FULL BACK TWICE A DAY FOR 4-6 WEEKS       losartan (COZAAR) 50 MG tablet Take 1 tablet by mouth daily.       metoprolol succinate ER (TOPROL XL) 50 MG 24 hr tablet Take 0.5 tablets (25 mg) by mouth daily.       order for DME Nebulizer machine and tubing    DX:  Bronchitis 1 Device 0     spironolactone (ALDACTONE) 25 MG tablet Take 2 tablets (50 mg) by mouth daily.       traZODone (DESYREL) 50 MG tablet TAKE 1 TO 2 TABLETS BY MOUTH NIGHTLY AS NEEDED 180 tablet 3     warfarin ANTICOAGULANT (COUMADIN) 5 MG tablet TAKE 1 & 1/2 (ONE & ONE-HALF) TABLETS BY MOUTH MONDAY WEDNESDAY AND FRIDAY AND 1 TABLET ALL OTHER DAYS OR AS DIRECTED BY WARFARIN CLINIC 109 tablet 1       Review of Systems     11 point review system (Constitutional, HENT, Eyes, Respiratory, Cardiovascular, Gastrointestinal, Genitourinary,  Musculoskeletal,Neurological, Psychiatric/Behavioural, Endocrine) is negative or is as per HPI above.  Past medical history, past surgical history, social and, family history and social determinants of health reviewed.    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 Provider to review     Factor V Leiden (H)      Factor V Leiden mutation (H) 07/26/2019     Family history of colon cancer 01/02/2018     Gastro-oesophageal reflux disease      Herpes zoster without mention of complication 2003    resolved from problem list     Hyperlipidemia LDL goal <100 07/11/2002     Problem list name updated by automated process. Provider to review     Long term (current) use of anticoagulants 08/20/2003     Major depression 08/08/2014     Mammographic microcalcification 2003    resolved from problem list     Migraine, unspecified, without mention of intractable migraine without mention of status migrainosus 03/05/2001     Moderate episode of recurrent major depressive disorder (H) 08/08/2014     Neurofibromatosis, peripheral, NF1 (H) 08/22/2012     Problem list name updated by automated process. Provider to review     Neurofibromatosis, unspecified(237.70) 08/22/2012     Other and unspecified hyperlipidemia 07/11/2002     Other chronic pain      Other pulmonary embolism and infarction 04/11/2003     Primary insomnia 10/31/2018     Statin medication not prescribed per physician orders 01/17/2018      Tachycardia, unspecified 02/12/2001     Thrombosis of leg      Unspecified essential hypertension 02/20/2001     Vitamin D deficiency 07/13/2018     Social Drivers of Health     Financial Resource Strain: Low Risk  (10/15/2024)    Financial Resource Strain      Within the past 12 months, have you or your family members you live with been unable to get utilities (heat, electricity) when it was really needed?: No   Food Insecurity: Low Risk  (10/15/2024)    Food Insecurity      Within the past 12 months, did you worry that your food would run out before you got money to buy more?: No      Within the past 12 months, did the food you bought just not last and you didn t have money to get more?: No   Transportation Needs: Low Risk  (10/15/2024)    Transportation Needs      Within the past 12 months, has lack of transportation kept you from medical appointments, getting your medicines, non-medical meetings or appointments, work, or from getting things that you need?: No   Physical Activity: Inactive (10/15/2024)    Exercise Vital Sign      Days of Exercise per Week: 0 days      Minutes of Exercise per Session: 0 min   Stress: No Stress Concern Present (10/15/2024)    [employer] [place] [employer] - Occupational Stress Questionnaire      Feeling of Stress : Not at all   Social Connections: Unknown (10/15/2024)    Social Connection and Isolation Panel [NHANES]      Frequency of Social Gatherings with Friends and Family: Twice a week   Interpersonal Safety: Not At Risk (10/17/2024)    Received from [facility]    EH IP Custom IPV      Do you feel UNSAFE in any of your personal relationships with your family members or any other acquaintances?: No   Housing Stability: Low Risk  (10/15/2024)    Housing Stability      Do you have housing? : Yes      Are you worried about losing your housing?: No       Objective:   GENERAL: alert and no distress  EYES: Eyes grossly normal to  inspection.    RESP: No audible wheeze, cough, or visible cyanosis.    PSYCH: Appropriate affect, tone, and pace of words      In House Labs:   Lab Results   Component Value Date    A1C 5.6 03/17/2023    A1C 5.5 11/05/2013       TSH   Date Value Ref Range Status   09/19/2024 2.20 0.30 - 4.20 uIU/mL Final   04/01/2024 2.73 0.30 - 4.20 uIU/mL Final   09/29/2023 3.34 0.30 - 4.20 uIU/mL Final   03/17/2023 3.05 0.30 - 4.20 uIU/mL Final   09/16/2022 2.44 0.40 - 4.00 mU/L Final   08/27/2021 2.86 0.40 - 4.00 mU/L Final   10/26/2020 2.08 0.40 - 4.00 mU/L Final   09/27/2019 1.42 0.40 - 4.00 mU/L Final   02/05/2019 2.71 0.40 - 4.00 mU/L Final   10/31/2018 2.32 0.40 - 4.00 mU/L Final   07/13/2018 2.51 0.40 - 4.00 mU/L Final     T4 Free   Date Value Ref Range Status   01/17/2018 0.89 0.76 - 1.46 ng/dL Final   09/28/2015 0.88 0.76 - 1.46 ng/dL Final   11/05/2013 0.61 0.59 - 1.61 ng/dL Final       Creatinine   Date Value Ref Range Status   11/05/2024 1.04 (H) 0.51 - 0.95 mg/dL Final   04/16/2021 0.95 0.52 - 1.04 mg/dL Final     This note has been dictated using voice recognition software.  As a result, there may be errors in the documentation that have gone undetected.  Please consider this when interpreting information in this documentation.      Video-Visit Details    Type of service:  Video Visit  Joined the call at 11/6/2024, 9:06:57 am.  Left the call at 11/6/2024, 9:31:43 am.  You were on the call for 24 minutes 45 seconds .  Distant Location (provider location):  ON-site.   Platform used for Video Visit: González  62 minutes spent by me on the date of the encounter doing chart/imaging review, history, counseling on workup and management of primary aldosteronism, documentation and further activities per the note.       Again, thank you for allowing me to participate in the care of your patient.      Sincerely,    Foreign Connors MD

## 2024-11-06 NOTE — PATIENT INSTRUCTIONS
What is Primary Aldosteronism?  Primary aldosteronism (PA) is a condition where your adrenal glands produce too much of the hormone aldosterone. This hormone helps balance your body's salt and water levels. When there is too much aldosterone, it can lead to high blood pressure and low potassium levels.    Why Does It Happen?  Primary aldosteronism is often caused by:  Adrenal Tumor: A benign growth on one of the adrenal glands.  Overactive Adrenal Glands: Both glands may be producing too much aldosterone.    Who Is at Risk and Who Needs to be Screened?  People with high blood pressure that remains elevated despite taking three or more medications, including a diuretic, or those requiring four or more medications to control their blood pressure may be at risk. Additionally, individuals with low potassium levels, an adrenal tumor, or high blood pressure diagnosed at a young age could also be at risk. This list is not exhaustive, so if you have high blood pressure, talk to your healthcare provider about whether screening for primary aldosteronism might be right for you.      Symptoms of Primary Aldosteronism  Not everyone will have symptoms, but common ones include:  High Blood Pressure: Often resistant to standard treatment.  Low Potassium Levels: Can cause fatigue, muscle cramps or weakness.    How is Primary Aldosteronism Diagnosed?  To diagnose primary aldosteronism, your doctor may order:  Blood Tests: To check aldosterone and renin (a kidney enzyme) levels. And if the testing shows that you have this condition then -  Imaging Tests: CT scan or MRI to look at your adrenal glands.  Adrenal Vein Sampling: A test to see which adrenal gland is producing too much aldosterone.    Treatment Options  Primary aldosteronism is treatable. Your treatment may include:  Medications:  Aldosterone Blockers: Medications like spironolactone or eplerenone can help block the effects of aldosterone.  Blood Pressure Medications:  "These may also be prescribed if high blood pressure is a concern.  Surgery:  Adrenalectomy: If only one adrenal gland is overproducing aldosterone, removing that gland may be recommended.    Lifestyle Changes to Help Manage PA  Limit Salt: A low-salt diet can help reduce blood pressure.  Eat Potassium-Rich Foods: Such as bananas, oranges, tomatoes, and potatoes.  Stay Active: Regular physical activity can help manage blood pressure.  Quit Smoking and Limit Alcohol: Smoking and alcohol can worsen high blood pressure.    Living with Primary Aldosteronism  Primary aldosteronism can be managed with proper treatment. Many people see a reduction in their blood pressure and improved health. Regular check-ups, medication, and lifestyle changes can all contribute to better management of the condition.    Key Takeaway  Primary aldosteronism is a treatable condition. Early diagnosis and treatment can greatly improve your quality of life, reduce blood pressure, and prevent complications.    Next steps; blood work, urine test and CT scan of the adrenal gland   Aldosterone Urine test   #1 Increase salt intake for 3 days - add one flat teaspoon of salt to your daily food intake and consume salty foods for 72 hours (3 days).  #2 Collect urine collection jug from the laboratory.   #3 On the third day of increased salt intake; start 24 hour urine collection until the morning of day. Brief description of 24 hour urine collection:  Do the test on a day off, so you can stay home.  Wake up and flush the first urine.  Then collect all the urine for that day, evening and overnight if you wake up to urinate. Plus the first urine of the next morning.    For women,  the lab should also give you a \"hat\" which is a device to help collect the urine and to pour it into the collection jug. The urine must be kept cool, not frozen.  So, do not put it out on the porch.  Keep it in the fridge.  #4 Drop the jug back to the laboratory after collection " is complete on the 4th day.     #5 Please monitor blood pressure closely while on increased salt intake.    Foreign Connors MD

## 2024-11-06 NOTE — PROGRESS NOTES
"Virtual Visit Details    Type of service:  Video Visit   Video Start Time: {video visit start/end time for provider to select:127266}  Video End Time:{video visit start/end time for provider to select:397983}    Originating Location (pt. Location): {video visit patient location:284005::\"Home\"}  {PROVIDER LOCATION On-site should be selected for visits conducted from your clinic location or adjoining [facility] hospital, academic office, or other nearby [facility] building. Off-site should be selected for all other provider locations, including home:618363}  Distant Location (provider location):  {virtual location provider:278235}  Platform used for Video Visit: {Virtual Visit Platforms:543952::\"[employer]\"}  "

## 2024-11-06 NOTE — PROGRESS NOTES
Endocrinology Clinic Visit    Chief Complaint: Consult     Information obtained from:Patient      Assessment/Treatment Plan:    Adrenal nodule  High aldosterone to renin ratio  Resistant hypertension      Aldosterone of 19.4 with renin activity 0.2, ARR 97 in the setting of clinical features suggestive of high risk for primary aldosteronism supports a diagnosis of primary aldosteronism.  Primary aldosteronism is treated if unilateral surgically and bilateral medically. Kaitlin is already started on spironolactone which is treatment for primary aldosteronism and hyper patient has improved since starting the medication.  Significant response to blood pressure improvement after starting spironolactone also supports the diagnosis of primary aldosteronism.    After discussing in detail the workup and management of PA, decision was made to proceed with next steps which is confirmatory testing and then if indicated adrenal venous sampling.  Will do the urine aldosterone suppression test as a next step.  Precautions regarding this testing particularly watching blood pressure and symptoms of heart failure addressed.  Further plan based on results.  Adrenal nodule  I have personally reviewed CT scan from 8/29/2023 and agree with the interpretation of left adrenal nodule measuring about 1.3 cm in diameter [CT with contrast therefore Hounsfield units unknown].  Will pursue adrenal dedicated CT scan to assess further.     Test and/or medications prescribed today:  Orders Placed This Encounter   Procedures    CT Abdomen w/o & w Contrast    Creatinine timed urine    Cortisol free urine    Aldosterone urine    Sodium timed urine    DHEA sulfate    Metanephrines Plasma Free         Foreign Connors MD  Staff Endocrinologist    Division of Endocrinology and Diabetes      Subjective:         HPI: Cassy Avila is a 69 year old female with history of hypertension and adrenal nodule who is seen in consultation at [facility]'s  request for the same.    Longstanding hypertension on multiple blood pressure medications however was noted to have significantly elevated blood pressure few months ago.  Systolic blood pressure approaching 200s per report.  At the time patient was screened for primary aldosteronism with aldosterone renin activity labs which showed the following and was initiated on spironolactone 50 mg daily.  Since spironolactone was added blood pressure has significantly improved and her last blood pressure was 108/60.  She continues to take other blood pressure medications including metoprolol & losartan.  While her blood pressure was high she was also found to have congestive heart failure but right now clinically stable from congestive failure standpoint.  Has past medical history of neurofibromatosis type I.  On review of system no headache or vision change.  Shortness of breath with activity for which she is currently following with cardiology.  No significant weight gain other than weight loss of 13 pounds after treated for CHF previously.  No significant stretch marks.  No nausea or vomiting but reports frequent bowel movements.  Reports she has been feeling dizzy and tired yesterday and her blood pressure was low at the time and metoprolol dose was reduced.     Latest Ref Rng 9/19/2024  10:41 AM   ENDO ADRENAL LABS     Aldosterone 0.0 - 31.0 ng/dL 19.4    Creatinine 0.51 - 0.95 mg/dL 0.97 (H)    Potassium 3.4 - 5.3 mmol/L 3.8    Renin Activity ng/mL/hr 0.2    Sodium 135 - 145 mmol/L 139         Allergies   Allergen Reactions    Allopurinol Shortness Of Breath    Amlodipine Besylate Swelling     Norvasc    Amoxicillin     Atorvastatin      myualgia    Cephalexin Monohydrate Hives     Keflex    Erythromycin Base [Erythromycin Base] Nausea and Vomiting    Meloxicam Other (See Comments)     Mobic - confusion, depression    Sulfa Antibiotics Hives    Adhesive Tape Rash    Prochlorperazine Edisylate Swelling and Rash      Compazine    Prochlorperazine Maleate Swelling and Rash       Current Outpatient Medications   Medication Sig Dispense Refill    albuterol (PROVENTIL) (2.5 MG/3ML) 0.083% neb solution Take 2.5 mg by nebulization every 6 hours as needed for shortness of breath / dyspnea or wheezing      aspirin 81 MG EC tablet Take 81 mg by mouth daily HS      Cholecalciferol (VITAMIN D3 PO) Take 3,000 mg by mouth daily AM      escitalopram (LEXAPRO) 10 MG tablet Take 1 tablet (10 mg) by mouth daily. 90 tablet 1    Fluticasone-Umeclidin-Vilant (TRELEGY ELLIPTA) 100-62.5-25 MCG/ACT oral inhaler Inhale 1 puff into the lungs daily. 60 each 3    furosemide (LASIX) 20 MG tablet Take 1 tablet (20 mg) by mouth as needed (swelling, weight gain).      ketoconazole (NIZORAL) 2 % external cream APPLY TO THE RASH ON FULL BACK TWICE A DAY FOR 4-6 WEEKS      losartan (COZAAR) 50 MG tablet Take 1 tablet by mouth daily.      metoprolol succinate ER (TOPROL XL) 50 MG 24 hr tablet Take 0.5 tablets (25 mg) by mouth daily.      order for DME Nebulizer machine and tubing    DX:  Bronchitis 1 Device 0    spironolactone (ALDACTONE) 25 MG tablet Take 2 tablets (50 mg) by mouth daily.      traZODone (DESYREL) 50 MG tablet TAKE 1 TO 2 TABLETS BY MOUTH NIGHTLY AS NEEDED 180 tablet 3    warfarin ANTICOAGULANT (COUMADIN) 5 MG tablet TAKE 1 & 1/2 (ONE & ONE-HALF) TABLETS BY MOUTH MONDAY WEDNESDAY AND FRIDAY AND 1 TABLET ALL OTHER DAYS OR AS DIRECTED BY WARFARIN CLINIC 109 tablet 1       Review of Systems     11 point review system (Constitutional, HENT, Eyes, Respiratory, Cardiovascular, Gastrointestinal, Genitourinary, Musculoskeletal,Neurological, Psychiatric/Behavioural, Endocrine) is negative or is as per HPI above.  Past medical history, past surgical history, social and, family history and social determinants of health reviewed.    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 Provider to review    Factor V Leiden (H)     Factor V Leiden mutation (H) 07/26/2019    Family history of colon cancer 01/02/2018    Gastro-oesophageal reflux disease     Herpes zoster without mention of complication 2003    resolved from problem list    Hyperlipidemia LDL goal <100 07/11/2002     Problem list name updated by automated process. Provider to review    Long term (current) use of anticoagulants 08/20/2003    Major depression 08/08/2014    Mammographic microcalcification 2003    resolved from problem list    Migraine, unspecified, without mention of intractable migraine without mention of status migrainosus 03/05/2001    Moderate episode of recurrent major depressive disorder (H) 08/08/2014    Neurofibromatosis, peripheral, NF1 (H) 08/22/2012     Problem list name updated by automated process. Provider to review    Neurofibromatosis, unspecified(237.70) 08/22/2012    Other and unspecified hyperlipidemia 07/11/2002    Other chronic pain     Other pulmonary embolism and infarction 04/11/2003    Primary insomnia 10/31/2018    Statin medication not prescribed per physician orders 01/17/2018    Tachycardia, unspecified 02/12/2001    Thrombosis of leg     Unspecified essential hypertension 02/20/2001    Vitamin D deficiency 07/13/2018     Social Drivers of Health     Financial Resource Strain: Low Risk  (10/15/2024)    Financial Resource Strain     Within the past 12 months, have you or your family members you live with been unable to get utilities (heat,  electricity) when it was really needed?: No   Food Insecurity: Low Risk  (10/15/2024)    Food Insecurity     Within the past 12 months, did you worry that your food would run out before you got money to buy more?: No     Within the past 12 months, did the food you bought just not last and you didn t have money to get more?: No   Transportation Needs: Low Risk  (10/15/2024)    Transportation Needs     Within the past 12 months, has lack of transportation kept you from medical appointments, getting your medicines, non-medical meetings or appointments, work, or from getting things that you need?: No   Physical Activity: Inactive (10/15/2024)    Exercise Vital Sign     Days of Exercise per Week: 0 days     Minutes of Exercise per Session: 0 min   Stress: No Stress Concern Present (10/15/2024)    [employer] - Occupational Stress Questionnaire     Feeling of Stress : Not at all   Social Connections: Unknown (10/15/2024)    Social Connection and Isolation Panel [NHANES]     Frequency of Social Gatherings with Friends and Family: Twice a week   Interpersonal Safety: Not At Risk (10/17/2024)    Received from  and [facility]     IP Custom IPV     Do you feel UNSAFE in any of your personal relationships with your family members or any other acquaintances?: No   Housing Stability: Low Risk  (10/15/2024)    Housing Stability     Do you have housing? : Yes     Are you worried about losing your housing?: No       Objective:   GENERAL: alert and no distress  EYES: Eyes grossly normal to inspection.    RESP: No audible wheeze, cough, or visible cyanosis.    PSYCH: Appropriate affect, tone, and pace of words      In House Labs:   Lab Results   Component Value Date    A1C 5.6 03/17/2023    A1C 5.5 11/05/2013       TSH   Date Value Ref Range Status   09/19/2024 2.20 0.30 - 4.20 uIU/mL Final   04/01/2024 2.73 0.30 - 4.20 uIU/mL Final   09/29/2023 3.34 0.30 - 4.20 uIU/mL  Final   03/17/2023 3.05 0.30 - 4.20 uIU/mL Final   09/16/2022 2.44 0.40 - 4.00 mU/L Final   08/27/2021 2.86 0.40 - 4.00 mU/L Final   10/26/2020 2.08 0.40 - 4.00 mU/L Final   09/27/2019 1.42 0.40 - 4.00 mU/L Final   02/05/2019 2.71 0.40 - 4.00 mU/L Final   10/31/2018 2.32 0.40 - 4.00 mU/L Final   07/13/2018 2.51 0.40 - 4.00 mU/L Final     T4 Free   Date Value Ref Range Status   01/17/2018 0.89 0.76 - 1.46 ng/dL Final   09/28/2015 0.88 0.76 - 1.46 ng/dL Final   11/05/2013 0.61 0.59 - 1.61 ng/dL Final       Creatinine   Date Value Ref Range Status   11/05/2024 1.04 (H) 0.51 - 0.95 mg/dL Final   04/16/2021 0.95 0.52 - 1.04 mg/dL Final     This note has been dictated using voice recognition software.  As a result, there may be errors in the documentation that have gone undetected.  Please consider this when interpreting information in this documentation.      Video-Visit Details    Type of service:  Video Visit  Joined the call at 11/6/2024, 9:06:57 am.  Left the call at 11/6/2024, 9:31:43 am.  You were on the call for 24 minutes 45 seconds .  Distant Location (provider location):  ON-site.   Platform used for Video Visit: [employer]  62 minutes spent by me on the date of the encounter doing chart/imaging review, history, counseling on workup and management of primary aldosteronism, documentation and further activities per the note.

## 2024-11-06 NOTE — TELEPHONE ENCOUNTER
Left Voicemail (1st Attempt) for the patient to call back and schedule the following:    Appointment type: New endocrine   Provider: Waylon  Return date: virtual add on 11/6 at 9 am over book okay (if 12 pm return slot still avail can schedule pt there as virtual over book as well)   Specialty phone number: [phone]   Additional appointment(s) needed:   Additonal Notes: LVM, MyC x1   From: Foreign Connors MD   Sent: 11/5/2024   2:48 PM CST   To: Magali Zayas RN; *   Subject: RE: Aldosterone Renin Ratio                      I have tomorrow at 9:00 am opening; okay to offer that spot.   Otherwise, please schedule next DANA with me and put on cancellation list as well.   Thank you,     *This is not a scheduled slot in Epic as it is already booked. You will have to manually add this patient onto the providers schedule by hitting select a time under the providers name when scheduling to add this patient by date, time, and location as stated above. Thank you*    Patricia Ureña on 11/6/2024 at 7:34 AM

## 2024-11-08 ENCOUNTER — LAB (OUTPATIENT)
Dept: LAB | Facility: OTHER | Age: 69
End: 2024-11-08
Payer: MEDICARE

## 2024-11-08 DIAGNOSIS — E27.9 ADRENAL NODULE (H): ICD-10-CM

## 2024-11-08 DIAGNOSIS — E26.9 HIGH ALDOSTERONE TO RENIN RATIO (H): ICD-10-CM

## 2024-11-08 PROCEDURE — 82024 ASSAY OF ACTH: CPT | Mod: ZL

## 2024-11-08 PROCEDURE — 83835 ASSAY OF METANEPHRINES: CPT | Mod: ZL

## 2024-11-08 PROCEDURE — 36416 COLLJ CAPILLARY BLOOD SPEC: CPT | Mod: ZL

## 2024-11-08 PROCEDURE — 82627 DEHYDROEPIANDROSTERONE: CPT | Mod: ZL

## 2024-11-10 PROCEDURE — 82088 ASSAY OF ALDOSTERONE: CPT | Mod: ZL

## 2024-11-10 PROCEDURE — 82570 ASSAY OF URINE CREATININE: CPT | Mod: ZL

## 2024-11-10 PROCEDURE — 82530 CORTISOL FREE: CPT | Mod: ZL

## 2024-11-10 PROCEDURE — 84300 ASSAY OF URINE SODIUM: CPT | Mod: ZL

## 2024-11-11 ENCOUNTER — APPOINTMENT (OUTPATIENT)
Dept: LAB | Facility: OTHER | Age: 69
End: 2024-11-11
Payer: MEDICARE

## 2024-11-11 LAB
ACTH PLAS-MCNC: 13 PG/ML
COLLECT DURATION TIME UR: 24 H
COLLECT DURATION TIME UR: 24 H
CREAT 24H UR-MRATE: 1.41 G/SPEC (ref 0.72–1.51)
CREAT UR-MCNC: 65.8 MG/DL
DHEA-S SERPL-MCNC: 52 UG/DL (ref 35–430)
SODIUM 24H UR-SRATE: 174 MMOL/SPEC (ref 40–220)
SODIUM UR-SCNC: 81 MMOL/L
SPECIMEN VOL UR: 2150 ML
SPECIMEN VOL UR: 2150 ML

## 2024-11-12 LAB
ANNOTATION COMMENT IMP: NORMAL
METANEPHS SERPL-SCNC: 0.33 NMOL/L
NORMETANEPHRINE SERPL-SCNC: 0.44 NMOL/L

## 2024-11-14 LAB
ANNOTATION COMMENT IMP: ABNORMAL
CORTIS F 24H UR HPLC-MCNC: 9.86 UG/L
CORTIS F 24H UR-MRATE: 21.2 UG/D
CORTIS F/CREAT 24H UR: 14.94 UG/G CRT
CREAT 24H UR-MRATE: 1419 MG/D
CREAT UR-MCNC: 66 MG/DL

## 2024-11-16 LAB
ALDOST 24H UR-MRATE: 10 UG/D
CREAT 24H UR-MRATE: 1440 MG/D
CREAT UR-MCNC: 67 MG/DL

## 2024-11-18 ENCOUNTER — HOSPITAL ENCOUNTER (OUTPATIENT)
Dept: CT IMAGING | Facility: HOSPITAL | Age: 69
Discharge: HOME | End: 2024-11-18
Attending: INTERNAL MEDICINE | Admitting: INTERNAL MEDICINE
Payer: MEDICARE

## 2024-11-18 ENCOUNTER — TRANSFERRED RECORDS (OUTPATIENT)
Dept: HEALTH INFORMATION MANAGEMENT | Facility: CLINIC | Age: 69
End: 2024-11-18
Payer: COMMERCIAL

## 2024-11-18 DIAGNOSIS — E27.9 ADRENAL NODULE (H): ICD-10-CM

## 2024-11-18 PROCEDURE — 74150 CT ABDOMEN W/O CONTRAST: CPT | Mod: MG

## 2024-11-18 PROCEDURE — G1010 CDSM STANSON: HCPCS

## 2024-11-18 PROCEDURE — 250N000011 HC RX IP 250 OP 636: Performed by: INTERNAL MEDICINE

## 2024-11-18 RX ORDER — IOPAMIDOL 755 MG/ML
125 INJECTION, SOLUTION INTRAVASCULAR ONCE
Status: COMPLETED | OUTPATIENT
Start: 2024-11-18 | End: 2024-11-18

## 2024-11-18 RX ADMIN — IOPAMIDOL 125 ML: 755 INJECTION, SOLUTION INTRAVENOUS at 15:24

## 2024-11-19 ENCOUNTER — MYC MEDICAL ADVICE (OUTPATIENT)
Dept: ENDOCRINOLOGY | Facility: CLINIC | Age: 69
End: 2024-11-19

## 2024-11-19 ENCOUNTER — ANTICOAGULATION THERAPY VISIT (OUTPATIENT)
Dept: ANTICOAGULATION | Facility: OTHER | Age: 69
End: 2024-11-19
Attending: NURSE PRACTITIONER
Payer: MEDICARE

## 2024-11-19 ENCOUNTER — VIRTUAL VISIT (OUTPATIENT)
Dept: ENDOCRINOLOGY | Facility: CLINIC | Age: 69
End: 2024-11-19
Payer: COMMERCIAL

## 2024-11-19 DIAGNOSIS — E26.9 HIGH ALDOSTERONE TO RENIN RATIO (H): ICD-10-CM

## 2024-11-19 DIAGNOSIS — Z79.01 LONG TERM CURRENT USE OF ANTICOAGULANT THERAPY: Primary | ICD-10-CM

## 2024-11-19 DIAGNOSIS — E26.09 PRIMARY ALDOSTERONISM (H): Primary | ICD-10-CM

## 2024-11-19 DIAGNOSIS — I82.401 DEEP VEIN THROMBOSIS (DVT) OF RIGHT LOWER EXTREMITY, UNSPECIFIED CHRONICITY, UNSPECIFIED VEIN (H): ICD-10-CM

## 2024-11-19 DIAGNOSIS — I26.99 PULMONARY EMBOLISM AND INFARCTION (H): ICD-10-CM

## 2024-11-19 LAB — INR HOME MONITORING: 3.1 (ref 2–3)

## 2024-11-19 ASSESSMENT — PAIN SCALES - GENERAL: PAINLEVEL_OUTOF10: NO PAIN (1)

## 2024-11-19 NOTE — TELEPHONE ENCOUNTER
Writer reached out to patient and confirmed that she is able to do virtual video at 1:00 pm today.     Staff message was also sent to  pool to see if team can add this patient for virtual check in.       Provider was notified that patient is in agreement for virtual visit today @ 1:00 pm         Pepper Mg CMA  Adult Endocrinology   [facility]

## 2024-11-19 NOTE — PROGRESS NOTES
ANTICOAGULATION MANAGEMENT     Cassy Avila 69 year old female is on warfarin with supratherapeutic INR result. (Goal INR 2.0-3.0)    Recent labs: (last 7 days)     11/19/24  0000   INR 3.1*       ASSESSMENT     Source(s): Chart Review and Patient/Caregiver Call     Warfarin doses taken: Warfarin taken as instructed  Diet: No new diet changes identified  Medication/supplement changes: None noted  New illness, injury, or hospitalization: Yes: patient has been doing further testing on the adrenal mass found during her hospital stay 10/17 with endo  Signs or symptoms of bleeding or clotting: No  Previous result: Therapeutic last 2(+) visits  Additional findings: None       PLAN     Recommended plan for no diet, medication or health factor changes affecting INR     Dosing Instructions: Continue your current warfarin dose with next INR in 1 week       Summary  As of 11/19/2024      Full warfarin instructions:  7.5 mg every Mon; 5 mg all other days   Next INR check:  11/26/2024               Telephone call with Kaitlin who verbalizes understanding and agrees to plan    Patient to recheck with home meter    Education provided: None required    Plan made per [facility] anticoagulation protocol    Corry Galvan RN  11/19/2024  Anticoagulation Clinic  iHigh for routing messages: erwin CAMEJO  [facility] patient phone line: [phone]        _______________________________________________________________________     Anticoagulation Episode Summary       Current INR goal:  2.0-3.0   TTR:  79.1% (1 y)   Target end date:  Indefinite   Send INR reminders to:  DAREN CAMEJO    Indications    Long-term (current) use of anticoagulants [Z79.01] [Z79.01]  Pulmonary embolism and infarction (H) [I26.99]  Deep vein thrombosis (DVT) (H) [I82.409] (Resolved) [I82.409]  Deep vein thrombosis (DVT) of right lower extremity  unspecified chronicity  unspecified vein (H) [I82.401]             Comments:  [employer]. Q2 week  testing  Call cell phone with any dosing.             Anticoagulation Care Providers       Provider Role Specialty Phone number    Eliz Hartman CNP Referring Family Medicine [phone]

## 2024-11-19 NOTE — LETTER
11/19/2024      Cassy Avila  [address]   [place] [address]      Dear Colleague,    Thank you for referring your patient, Cassy Avila, to the [facility]. Please see a copy of my visit note below.    Endocrinology Clinic Visit    Chief Complaint: RECHECK (RETURN ENDOCRINE - results)     Information obtained from:Patient      Assessment/Treatment Plan:    Adrenal nodule  High aldosterone to renin ratio  Resistant hypertension      Aldosterone of 19.4 with renin activity 0.2, ARR 97 in the setting of clinical features suggestive of high risk for primary aldosteronism supports a diagnosis of primary aldosteronism.  Primary aldosteronism is treated if unilateral surgically and bilateral medically. Kaitlin is already started on spironolactone which is treatment for primary aldosteronism and hyper patient has improved since starting the medication.  Significant response to blood pressure improvement after starting spironolactone also supports the diagnosis of primary aldosteronism.    After discussing in detail the workup and management of PA, decision was made to proceed with next steps which is confirmatory testing.  Urine aldosterone was 10 for a urine sodium of 117 which did not meet quite the criteria for confirming primary aldosteronism per current criteria.  However, considering high probability of disease we are scheduling IV saline infusion test.  Kaitlin is interested in surgical intervention therefore we will pursue adrenal venous sampling if the test confirms the diagnosis..  Adrenal nodule  I have personally reviewed CT scan from 11/18/24 and agree with the interpretation of left adrenal nodule measuring about 1.2 cm in diameter with Hounsfield units <10 consistent with adrenal adenoma..    Test and/or medications prescribed today:  Admit therapy placed for aldosterone suppression test using IV saline.        Foreign Connors MD  Staff Endocrinologist    Division of  Endocrinology and Diabetes      Subjective:         HPI: Cassy Avila is a 69 year old female with history of hypertension and adrenal nodule who is seen in consultation at Referred Self's request for the same.    Longstanding hypertension on multiple blood pressure medications however was noted to have significantly elevated blood pressure few months ago.  Systolic blood pressure approaching 200s per report.  At the time patient was screened for primary aldosteronism with aldosterone renin activity labs which showed the following and was initiated on spironolactone 50 mg daily.  Since spironolactone was added blood pressure has significantly improved and her last blood pressure was 108/60.  She continues to take other blood pressure medications including metoprolol & losartan.  While her blood pressure was high she was also found to have congestive heart failure but right now clinically stable from congestive failure standpoint.  Has past medical history of neurofibromatosis type I.  On review of system no headache or vision change.  Shortness of breath with activity for which she is currently following with cardiology.  No significant weight gain other than weight loss of 13 pounds after treated for CHF previously.  No significant stretch marks.  No nausea or vomiting but reports frequent bowel movements.  Reports she has been feeling dizzy and tired yesterday and her blood pressure was low at the time and metoprolol dose was reduced.     Latest Ref Rng 9/19/2024  10:41 AM   ENDO ADRENAL LABS     Aldosterone 0.0 - 31.0 ng/dL 19.4    Creatinine 0.51 - 0.95 mg/dL 0.97 (H)    Potassium 3.4 - 5.3 mmol/L 3.8    Renin Activity ng/mL/hr 0.2    Sodium 135 - 145 mmol/L 139       Component      Latest Ref Rng 11/8/2024  11:41 AM 11/10/2024  12:01 PM   Cortisol Free Urine Random      ug/L  9.86    Cortisol ug/g creatinine      ug/g CRT  14.94    Cortisol Free Urine      <=45.0 ug/d  21.2    Creatinine, Urine per volume       mg/dL  66    Creatinine, Urine per volume      mg/dL  67    Creatinine, Urine per 24hr      500 - 1400 mg/d  1419 (H)    Creatinine, Urine per 24hr      500 - 1400 mg/d  1440 (H)    Cortisol Free Urine Intrepretation  See Note    Creatinine Urine      mg/dL  65.8    Duration in hours      h  24.0    Duration in hours      h  24.0    Volume in mL      mL  2,150    Volume in mL      mL  2,150    Creatinine Urine Timed      0.72 - 1.51 g/spec  1.41    Sodium Urine mmol/L      mmol/L  81    Sodium Urine mmol/24 h      40 - 220 mmol/spec  174    Aldosterone Urine      1.2 - 28.1 ug/d  10.0    Metanephrine      0.00 - 0.49 nmol/L 0.33     Normetanephrine      0.00 - 0.89 nmol/L 0.44     Metanephrines Interpretation See Note     DHEA Sulfate      35 - 430 ug/dL 52     Adrenal Corticotropin      <47 pg/mL 13        Allergies   Allergen Reactions     Allopurinol Shortness Of Breath     Amlodipine Besylate Swelling     Norvasc     Amoxicillin      Atorvastatin      myualgia     Cephalexin Monohydrate Hives     Keflex     Erythromycin Base [Erythromycin Base] Nausea and Vomiting     Meloxicam Other (See Comments)     Mobic - confusion, depression     Sulfa Antibiotics Hives     Adhesive Tape Rash     Prochlorperazine Edisylate Swelling and Rash     Compazine     Prochlorperazine Maleate Swelling and Rash       Current Outpatient Medications   Medication Sig Dispense Refill     albuterol (PROVENTIL) (2.5 MG/3ML) 0.083% neb solution Take 2.5 mg by nebulization every 6 hours as needed for shortness of breath / dyspnea or wheezing       aspirin 81 MG EC tablet Take 81 mg by mouth daily HS       Cholecalciferol (VITAMIN D3 PO) Take 3,000 mg by mouth daily AM       escitalopram (LEXAPRO) 10 MG tablet Take 1 tablet (10 mg) by mouth daily. 90 tablet 1     Fluticasone-Umeclidin-Vilant (TRELEGY ELLIPTA) 100-62.5-25 MCG/ACT oral inhaler Inhale 1 puff into the lungs daily. 60 each 3     furosemide (LASIX) 20 MG tablet Take 1 tablet (20  mg) by mouth as needed (swelling, weight gain).       ketoconazole (NIZORAL) 2 % external cream APPLY TO THE RASH ON FULL BACK TWICE A DAY FOR 4-6 WEEKS       losartan (COZAAR) 50 MG tablet Take 1 tablet by mouth daily.       metoprolol succinate ER (TOPROL XL) 50 MG 24 hr tablet Take 0.5 tablets (25 mg) by mouth daily.       order for DME Nebulizer machine and tubing    DX:  Bronchitis 1 Device 0     spironolactone (ALDACTONE) 25 MG tablet Take 2 tablets (50 mg) by mouth daily.       traZODone (DESYREL) 50 MG tablet TAKE 1 TO 2 TABLETS BY MOUTH NIGHTLY AS NEEDED 180 tablet 3     warfarin ANTICOAGULANT (COUMADIN) 5 MG tablet TAKE 1 & 1/2 (ONE & ONE-HALF) TABLETS BY MOUTH MONDAY WEDNESDAY AND FRIDAY AND 1 TABLET ALL OTHER DAYS OR AS DIRECTED BY [facility] 109 tablet 1       Review of Systems     11 point review system (Constitutional, HENT, Eyes, Respiratory, Cardiovascular, Gastrointestinal, Genitourinary, Musculoskeletal,Neurological, Psychiatric/Behavioural, Endocrine) is negative or is as per HPI above.  Past medical history, past surgical history, social and, family history and social determinants of health reviewed.  Past medical history pertinent to the visit reviewed.    Social Drivers of Health     Financial Resource Strain: Low Risk  (10/15/2024)    Financial Resource Strain      Within the past 12 months, have you or your family members you live with been unable to get utilities (heat, electricity) when it was really needed?: No   Food Insecurity: Low Risk  (10/15/2024)    Food Insecurity      Within the past 12 months, did you worry that your food would run out before you got money to buy more?: No      Within the past 12 months, did the food you bought just not last and you didn t have money to get more?: No   Transportation Needs: Low Risk  (10/15/2024)    Transportation Needs      Within the past 12 months, has lack of transportation kept you from medical appointments, getting your medicines,  non-medical meetings or appointments, work, or from getting things that you need?: No   Physical Activity: Inactive (10/15/2024)    Exercise Vital Sign      Days of Exercise per Week: 0 days      Minutes of Exercise per Session: 0 min   Stress: No Stress Concern Present (10/15/2024)    [employer] [place] [employer] - Occupational Stress Questionnaire      Feeling of Stress : Not at all   Social Connections: Unknown (10/15/2024)    Social Connection and Isolation Panel [NHANES]      Frequency of Social Gatherings with Friends and Family: Twice a week   Interpersonal Safety: Not At Risk (10/17/2024)    Received from [facility] and [facility] IP Custom IPV      Do you feel UNSAFE in any of your personal relationships with your family members or any other acquaintances?: No   Housing Stability: Low Risk  (10/15/2024)    Housing Stability      Do you have housing? : Yes      Are you worried about losing your housing?: No       Objective:   GENERAL: alert and no distress  EYES: Eyes grossly normal to inspection.    RESP: No audible wheeze, cough, or visible cyanosis.    PSYCH: Appropriate affect, tone, and pace of words      In House Labs:   Lab Results   Component Value Date    A1C 5.6 03/17/2023    A1C 5.5 11/05/2013       TSH   Date Value Ref Range Status   09/19/2024 2.20 0.30 - 4.20 uIU/mL Final   04/01/2024 2.73 0.30 - 4.20 uIU/mL Final   09/29/2023 3.34 0.30 - 4.20 uIU/mL Final   03/17/2023 3.05 0.30 - 4.20 uIU/mL Final   09/16/2022 2.44 0.40 - 4.00 mU/L Final   08/27/2021 2.86 0.40 - 4.00 mU/L Final   10/26/2020 2.08 0.40 - 4.00 mU/L Final   09/27/2019 1.42 0.40 - 4.00 mU/L Final   02/05/2019 2.71 0.40 - 4.00 mU/L Final   10/31/2018 2.32 0.40 - 4.00 mU/L Final   07/13/2018 2.51 0.40 - 4.00 mU/L Final     T4 Free   Date Value Ref Range Status   01/17/2018 0.89 0.76 - 1.46 ng/dL Final   09/28/2015 0.88 0.76 - 1.46 ng/dL Final   11/05/2013 0.61 0.59 - 1.61 ng/dL Final        Creatinine   Date Value Ref Range Status   11/05/2024 1.04 (H) 0.51 - 0.95 mg/dL Final   04/16/2021 0.95 0.52 - 1.04 mg/dL Final     This note has been dictated using voice recognition software.  As a result, there may be errors in the documentation that have gone undetected.  Please consider this when interpreting information in this documentation.      Video-Visit Details    Type of service:  Video Visit  Joined the call at 11/19/2024, 1:02:08 pm.  Left the call at 11/19/2024, 1:18:09 pm.  You were on the call for 16 minutes 1 second .  Distant Location (provider location):  ON-site.   Platform used for Video Visit: Gonz[employer]   The longitudinal plan of care for the diagnosis(es)/condition(s) as documented were addressed during this visit. Due to the added complexity in care, I will continue to support Kaitlin in the subsequent management and with ongoing continuity of care.      Again, thank you for allowing me to participate in the care of your patient.        Sincerely,        Foreign Connors MD

## 2024-11-19 NOTE — TELEPHONE ENCOUNTER
Component      Latest Ref Rng 11/8/2024  11:41 AM 11/10/2024  12:01 PM   Cortisol Free Urine Random      ug/L  9.86    Cortisol ug/g creatinine      ug/g CRT  14.94    Cortisol Free Urine      <=45.0 ug/d  21.2    Creatinine, Urine per volume      mg/dL  66    Creatinine, Urine per volume      mg/dL  67    Creatinine, Urine per 24hr      500 - 1400 mg/d  1419 (H)    Creatinine, Urine per 24hr      500 - 1400 mg/d  1440 (H)    Cortisol Free Urine Intrepretation  See Note    Creatinine Urine      mg/dL  65.8    Duration in hours      h  24.0    Duration in hours      h  24.0    Volume in mL      mL  2,150    Volume in mL      mL  2,150    Creatinine Urine Timed      0.72 - 1.51 g/spec  1.41    Sodium Urine mmol/L      mmol/L  81    Sodium Urine mmol/24 h      40 - 220 mmol/spec  174    Aldosterone Urine      1.2 - 28.1 ug/d  10.0    Metanephrine      0.00 - 0.49 nmol/L 0.33     Normetanephrine      0.00 - 0.89 nmol/L 0.44     Metanephrines Interpretation See Note     DHEA Sulfate      35 - 430 ug/dL 52     Adrenal Corticotropin      <47 pg/mL 13

## 2024-11-19 NOTE — PROGRESS NOTES
Endocrinology Clinic Visit    Chief Complaint: RECHECK (RETURN ENDOCRINE - results)     Information obtained from:Patient      Assessment/Treatment Plan:    Adrenal nodule  High aldosterone to renin ratio  Resistant hypertension      Aldosterone of 19.4 with renin activity 0.2, ARR 97 in the setting of clinical features suggestive of high risk for primary aldosteronism supports a diagnosis of primary aldosteronism.  Primary aldosteronism is treated if unilateral surgically and bilateral medically. Kaitlin is already started on spironolactone which is treatment for primary aldosteronism and hyper patient has improved since starting the medication.  Significant response to blood pressure improvement after starting spironolactone also supports the diagnosis of primary aldosteronism.    After discussing in detail the workup and management of PA, decision was made to proceed with next steps which is confirmatory testing.  Urine aldosterone was 10 for a urine sodium of 117 which did not meet quite the criteria for confirming primary aldosteronism per current criteria.  However, considering high probability of disease we are scheduling IV saline infusion test.  Kaitlin is interested in surgical intervention therefore we will pursue adrenal venous sampling if the test confirms the diagnosis..  Adrenal nodule  I have personally reviewed CT scan from 11/18/24 and agree with the interpretation of left adrenal nodule measuring about 1.2 cm in diameter with Hounsfield units <10 consistent with adrenal adenoma..    Test and/or medications prescribed today:  Admit therapy placed for aldosterone suppression test using IV saline.        Foreign Connors MD  Staff Endocrinologist    Division of Endocrinology and Diabetes      Subjective:         HPI: Cassy Avila is a 69 year old female with history of hypertension and adrenal nodule who is seen in consultation at Referred Self's request for the same.    Longstanding hypertension on  multiple blood pressure medications however was noted to have significantly elevated blood pressure few months ago.  Systolic blood pressure approaching 200s per report.  At the time patient was screened for primary aldosteronism with aldosterone renin activity labs which showed the following and was initiated on spironolactone 50 mg daily.  Since spironolactone was added blood pressure has significantly improved and her last blood pressure was 108/60.  She continues to take other blood pressure medications including metoprolol & losartan.  While her blood pressure was high she was also found to have congestive heart failure but right now clinically stable from congestive failure standpoint.  Has past medical history of neurofibromatosis type I.  On review of system no headache or vision change.  Shortness of breath with activity for which she is currently following with cardiology.  No significant weight gain other than weight loss of 13 pounds after treated for CHF previously.  No significant stretch marks.  No nausea or vomiting but reports frequent bowel movements.  Reports she has been feeling dizzy and tired yesterday and her blood pressure was low at the time and metoprolol dose was reduced.     Latest Ref Rng 9/19/2024  10:41 AM   ENDO ADRENAL LABS     Aldosterone 0.0 - 31.0 ng/dL 19.4    Creatinine 0.51 - 0.95 mg/dL 0.97 (H)    Potassium 3.4 - 5.3 mmol/L 3.8    Renin Activity ng/mL/hr 0.2    Sodium 135 - 145 mmol/L 139       Component      Latest Ref Rng 11/8/2024  11:41 AM 11/10/2024  12:01 PM   Cortisol Free Urine Random      ug/L  9.86    Cortisol ug/g creatinine      ug/g CRT  14.94    Cortisol Free Urine      <=45.0 ug/d  21.2    Creatinine, Urine per volume      mg/dL  66    Creatinine, Urine per volume      mg/dL  67    Creatinine, Urine per 24hr      500 - 1400 mg/d  1419 (H)    Creatinine, Urine per 24hr      500 - 1400 mg/d  1440 (H)    Cortisol Free Urine Intrepretation  See Note    Creatinine  Urine      mg/dL  65.8    Duration in hours      h  24.0    Duration in hours      h  24.0    Volume in mL      mL  2,150    Volume in mL      mL  2,150    Creatinine Urine Timed      0.72 - 1.51 g/spec  1.41    Sodium Urine mmol/L      mmol/L  81    Sodium Urine mmol/24 h      40 - 220 mmol/spec  174    Aldosterone Urine      1.2 - 28.1 ug/d  10.0    Metanephrine      0.00 - 0.49 nmol/L 0.33     Normetanephrine      0.00 - 0.89 nmol/L 0.44     Metanephrines Interpretation See Note     DHEA Sulfate      35 - 430 ug/dL 52     Adrenal Corticotropin      <47 pg/mL 13        Allergies   Allergen Reactions    Allopurinol Shortness Of Breath    Amlodipine Besylate Swelling     Norvasc    Amoxicillin     Atorvastatin      myualgia    Cephalexin Monohydrate Hives     Keflex    Erythromycin Base [Erythromycin Base] Nausea and Vomiting    Meloxicam Other (See Comments)     Mobic - confusion, depression    Sulfa Antibiotics Hives    Adhesive Tape Rash    Prochlorperazine Edisylate Swelling and Rash     Compazine    Prochlorperazine Maleate Swelling and Rash       Current Outpatient Medications   Medication Sig Dispense Refill    albuterol (PROVENTIL) (2.5 MG/3ML) 0.083% neb solution Take 2.5 mg by nebulization every 6 hours as needed for shortness of breath / dyspnea or wheezing      aspirin 81 MG EC tablet Take 81 mg by mouth daily HS      Cholecalciferol (VITAMIN D3 PO) Take 3,000 mg by mouth daily AM      escitalopram (LEXAPRO) 10 MG tablet Take 1 tablet (10 mg) by mouth daily. 90 tablet 1    Fluticasone-Umeclidin-Vilant (TRELEGY ELLIPTA) 100-62.5-25 MCG/ACT oral inhaler Inhale 1 puff into the lungs daily. 60 each 3    furosemide (LASIX) 20 MG tablet Take 1 tablet (20 mg) by mouth as needed (swelling, weight gain).      ketoconazole (NIZORAL) 2 % external cream APPLY TO THE RASH ON FULL BACK TWICE A DAY FOR 4-6 WEEKS      losartan (COZAAR) 50 MG tablet Take 1 tablet by mouth daily.      metoprolol succinate ER (TOPROL XL)  50 MG 24 hr tablet Take 0.5 tablets (25 mg) by mouth daily.      order for DME Nebulizer machine and tubing    DX:  Bronchitis 1 Device 0    spironolactone (ALDACTONE) 25 MG tablet Take 2 tablets (50 mg) by mouth daily.      traZODone (DESYREL) 50 MG tablet TAKE 1 TO 2 TABLETS BY MOUTH NIGHTLY AS NEEDED 180 tablet 3    warfarin ANTICOAGULANT (COUMADIN) 5 MG tablet TAKE 1 & 1/2 (ONE & ONE-HALF) TABLETS BY MOUTH MONDAY WEDNESDAY AND FRIDAY AND 1 TABLET ALL OTHER DAYS OR AS DIRECTED BY [facility]IN CLINIC 109 tablet 1       Review of Systems     11 point review system (Constitutional, HENT, Eyes, Respiratory, Cardiovascular, Gastrointestinal, Genitourinary, Musculoskeletal,Neurological, Psychiatric/Behavioural, Endocrine) is negative or is as per HPI above.  Past medical history, past surgical history, social and, family history and social determinants of health reviewed.  Past medical history pertinent to the visit reviewed.    Social Drivers of Health     Financial Resource Strain: Low Risk  (10/15/2024)    Financial Resource Strain     Within the past 12 months, have you or your family members you live with been unable to get utilities (heat, electricity) when it was really needed?: No   Food Insecurity: Low Risk  (10/15/2024)    Food Insecurity     Within the past 12 months, did you worry that your food would run out before you got money to buy more?: No     Within the past 12 months, did the food you bought just not last and you didn t have money to get more?: No   Transportation Needs: Low Risk  (10/15/2024)    Transportation Needs     Within the past 12 months, has lack of transportation kept you from medical appointments, getting your medicines, non-medical meetings or appointments, work, or from getting things that you need?: No   Physical Activity: Inactive (10/15/2024)    Exercise Vital Sign     Days of Exercise per Week: 0 days     Minutes of Exercise per Session: 0 min   Stress: No Stress Concern Present  (10/15/2024)    [employer] [place] [employer] - Occupational Stress Questionnaire     Feeling of Stress : Not at all   Social Connections: Unknown (10/15/2024)    Social Connection and Isolation Panel [NHANES]     Frequency of Social Gatherings with Friends and Family: Twice a week   Interpersonal Safety: Not At Risk (10/17/2024)    Received from [facility] IP Custom IPV     Do you feel UNSAFE in any of your personal relationships with your family members or any other acquaintances?: No   Housing Stability: Low Risk  (10/15/2024)    Housing Stability     Do you have housing? : Yes     Are you worried about losing your housing?: No       Objective:   GENERAL: alert and no distress  EYES: Eyes grossly normal to inspection.    RESP: No audible wheeze, cough, or visible cyanosis.    PSYCH: Appropriate affect, tone, and pace of words      In House Labs:   Lab Results   Component Value Date    A1C 5.6 03/17/2023    A1C 5.5 11/05/2013       TSH   Date Value Ref Range Status   09/19/2024 2.20 0.30 - 4.20 uIU/mL Final   04/01/2024 2.73 0.30 - 4.20 uIU/mL Final   09/29/2023 3.34 0.30 - 4.20 uIU/mL Final   03/17/2023 3.05 0.30 - 4.20 uIU/mL Final   09/16/2022 2.44 0.40 - 4.00 mU/L Final   08/27/2021 2.86 0.40 - 4.00 mU/L Final   10/26/2020 2.08 0.40 - 4.00 mU/L Final   09/27/2019 1.42 0.40 - 4.00 mU/L Final   02/05/2019 2.71 0.40 - 4.00 mU/L Final   10/31/2018 2.32 0.40 - 4.00 mU/L Final   07/13/2018 2.51 0.40 - 4.00 mU/L Final     T4 Free   Date Value Ref Range Status   01/17/2018 0.89 0.76 - 1.46 ng/dL Final   09/28/2015 0.88 0.76 - 1.46 ng/dL Final   11/05/2013 0.61 0.59 - 1.61 ng/dL Final       Creatinine   Date Value Ref Range Status   11/05/2024 1.04 (H) 0.51 - 0.95 mg/dL Final   04/16/2021 0.95 0.52 - 1.04 mg/dL Final     This note has been dictated using voice recognition software.  As a result, there may be errors in the documentation that have gone undetected.   Please consider this when interpreting information in this documentation.      Video-Visit Details    Type of service:  Video Visit  Joined the call at 11/19/2024, 1:02:08 pm.  Left the call at 11/19/2024, 1:18:09 pm.  You were on the call for 16 minutes 1 second .  Distant Location (provider location):  ON-site.   Platform used for Video Visit: [employer]   The longitudinal plan of care for the diagnosis(es)/condition(s) as documented were addressed during this visit. Due to the added complexity in care, I will continue to support Kaitlin in the subsequent management and with ongoing continuity of care.

## 2024-11-19 NOTE — NURSING NOTE
Current patient location: [address] [place] [address] [address]  [place] [address]    Is the patient currently in the state of [place]? YES    Visit mode:VIDEO    If the visit is dropped, the patient can be reconnected by:VIDEO VISIT: Text to cell phone:   Telephone Information:   Mobile [phone]       Will anyone else be joining the visit? NO  (If patient encounters technical issues they should call [phone]    Are changes needed to the allergy or medication list? No, verified at e-check in    Are refills needed on medications prescribed by this physician? Discuss with provider    Rooming Documentation:  Not applicable    Reason for visit: RECHECK (RETURN ENDOCRINE - results)    Fide MARTINEZ

## 2024-11-19 NOTE — PATIENT INSTRUCTIONS
What is Primary Aldosteronism?  Primary aldosteronism (PA) is a condition where your adrenal glands produce too much of the hormone aldosterone. This hormone helps balance your body's salt and water levels. When there is too much aldosterone, it can lead to high blood pressure and low potassium levels.    Why Does It Happen?  Primary aldosteronism is often caused by:  Adrenal Tumor: A benign growth on one of the adrenal glands.  Overactive Adrenal Glands: Both glands may be producing too much aldosterone.    Who Is at Risk and Who Needs to be Screened?  People with high blood pressure that remains elevated despite taking three or more medications, including a diuretic, or those requiring four or more medications to control their blood pressure may be at risk. Additionally, individuals with low potassium levels, an adrenal tumor, or high blood pressure diagnosed at a young age could also be at risk. This list is not exhaustive, so if you have high blood pressure, talk to your healthcare provider about whether screening for primary aldosteronism might be right for you.      Symptoms of Primary Aldosteronism  Not everyone will have symptoms, but common ones include:  High Blood Pressure: Often resistant to standard treatment.  Low Potassium Levels: Can cause fatigue, muscle cramps or weakness.    How is Primary Aldosteronism Diagnosed?  To diagnose primary aldosteronism, your doctor may order:  Blood Tests: To check aldosterone and renin (a kidney enzyme) levels. And if the testing shows that you have this condition then -  Imaging Tests: CT scan or MRI to look at your adrenal glands.  Adrenal Vein Sampling: A test to see which adrenal gland is producing too much aldosterone.    Treatment Options  Primary aldosteronism is treatable. Your treatment may include:  Medications:  Aldosterone Blockers: Medications like spironolactone or eplerenone can help block the effects of aldosterone.  Blood Pressure Medications:  These may also be prescribed if high blood pressure is a concern.  Surgery:  Adrenalectomy: If only one adrenal gland is overproducing aldosterone, removing that gland may be recommended.    Lifestyle Changes to Help Manage PA  Limit Salt: A low-salt diet can help reduce blood pressure.  Eat Potassium-Rich Foods: Such as bananas, oranges, tomatoes, and potatoes.  Stay Active: Regular physical activity can help manage blood pressure.  Quit Smoking and Limit Alcohol: Smoking and alcohol can worsen high blood pressure.    Living with Primary Aldosteronism  Primary aldosteronism can be managed with proper treatment. Many people see a reduction in their blood pressure and improved health. Regular check-ups, medication, and lifestyle changes can all contribute to better management of the condition.    Key Takeaway  Primary aldosteronism is a treatable condition. Early diagnosis and treatment can greatly improve your quality of life, reduce blood pressure, and prevent complications.  We will schedule a different confirmatory test for further assessment.     Foreign Connors MD

## 2024-11-26 ENCOUNTER — MYC REFILL (OUTPATIENT)
Dept: FAMILY MEDICINE | Facility: OTHER | Age: 69
End: 2024-11-26

## 2024-11-26 ENCOUNTER — ANTICOAGULATION THERAPY VISIT (OUTPATIENT)
Dept: ANTICOAGULATION | Facility: OTHER | Age: 69
End: 2024-11-26
Attending: NURSE PRACTITIONER
Payer: COMMERCIAL

## 2024-11-26 DIAGNOSIS — F33.1 MODERATE EPISODE OF RECURRENT MAJOR DEPRESSIVE DISORDER (H): ICD-10-CM

## 2024-11-26 DIAGNOSIS — I10 PRIMARY HYPERTENSION: Primary | ICD-10-CM

## 2024-11-26 DIAGNOSIS — Z79.01 LONG TERM CURRENT USE OF ANTICOAGULANT THERAPY: Primary | ICD-10-CM

## 2024-11-26 DIAGNOSIS — I82.401 DEEP VEIN THROMBOSIS (DVT) OF RIGHT LOWER EXTREMITY, UNSPECIFIED CHRONICITY, UNSPECIFIED VEIN (H): ICD-10-CM

## 2024-11-26 DIAGNOSIS — I26.99 PULMONARY EMBOLISM AND INFARCTION (H): ICD-10-CM

## 2024-11-26 LAB — INR HOME MONITORING: 2.6 (ref 2–3)

## 2024-11-26 RX ORDER — LOSARTAN POTASSIUM 50 MG/1
50 TABLET ORAL DAILY
Qty: 90 TABLET | Refills: 1 | Status: SHIPPED | OUTPATIENT
Start: 2024-11-26

## 2024-11-26 RX ORDER — ESCITALOPRAM OXALATE 10 MG/1
10 TABLET ORAL DAILY
Qty: 90 TABLET | Refills: 1 | Status: SHIPPED | OUTPATIENT
Start: 2024-11-26

## 2024-11-26 NOTE — PROGRESS NOTES
ANTICOAGULATION MANAGEMENT     Cassy Avila 69 year old female is on warfarin with therapeutic INR result. (Goal INR 2.0-3.0)    Recent labs: (last 7 days)     11/26/24  0000   INR 2.6       ASSESSMENT     Source(s): Chart Review and Patient/Caregiver Call     Warfarin doses taken: Warfarin taken as instructed  Diet: No new diet changes identified  Medication/supplement changes: None noted  New illness, injury, or hospitalization: No  Signs or symptoms of bleeding or clotting: No  Previous result: Supratherapeutic  Additional findings: None       PLAN     Recommended plan for no diet, medication or health factor changes affecting INR     Dosing Instructions: Continue your current warfarin dose with next INR in 2 weeks       Summary  As of 11/26/2024      Full warfarin instructions:  7.5 mg every Mon; 5 mg all other days   Next INR check:  12/10/2024               Telephone call with Kaitlin who verbalizes understanding and agrees to plan    Patient to recheck with home meter    Education provided: None required    Plan made per [facility] anticoagulation protocol    Corry Galvan RN  11/26/2024  Anticoagulation Clinic  [place]plo for routing messages: erwin CAMEJO  [facility] patient phone line: [phone]        _______________________________________________________________________     Anticoagulation Episode Summary       Current INR goal:  2.0-3.0   TTR:  78.7% (1 y)   Target end date:  Indefinite   Send INR reminders to:  DAREN CAMEJO    Indications    Long-term (current) use of anticoagulants [Z79.01] [Z79.01]  Pulmonary embolism and infarction (H) [I26.99]  Deep vein thrombosis (DVT) (H) [I82.409] (Resolved) [I82.409]  Deep vein thrombosis (DVT) of right lower extremity  unspecified chronicity  unspecified vein (H) [I82.401]             Comments:  [employer] Monitoring. Q2 week testing  Call cell phone with any dosing.             Anticoagulation Care Providers       Provider Role Specialty Phone number     Eliz Hartman, CNP [facility] [phone]

## 2024-11-26 NOTE — TELEPHONE ENCOUNTER
escitalopram (LEXAPRO) 10 MG tablet       Last Written Prescription Date:  10/16/24  Last Fill Quantity: 90,   # refills: 1  Last Office Visit: 10/16/24  Future Office visit:    Next 5 appointments (look out 90 days)      Feb 11, 2025 11:00 AM  (Arrive by 10:45 AM)  Return Visit with Ashli Tijerina CNP  [facility] ([facility] ) [address] [place] [address]  [phone]        Routing refill request to provider for review/approval because:    SSRIs Protocol [id number]/26/2024 10:09 AM   Protocol Details   PHQ-9 score less than 5 in past 6 months            9/29/2023    10:05 AM 4/1/2024     8:52 AM 10/15/2024     9:00 AM   PHQ   PHQ-9 Total Score 1 2 8   Q9: Thoughts of better off dead/self-harm past 2 weeks Not at all Not at all  Not at all        Patient-reported       losartan (COZAAR) 50 MG tablet       Last Written Prescription Date:  10/20/24  Last Fill Quantity: --,   # refills: --  Last Office Visit: 10/16/24  Future Office visit:    Next 5 appointments (look out 90 days)      Feb 11, 2025 11:00 AM  (Arrive by 10:45 AM)  Return Visit with Ashli Tijerina CNP  [facility] ([facility] ) [address] [place] [address]  [phone]         Routing refill request to provider for review/approval because:  Angiotensin-II Receptors [id number]/26/2024 10:09 AM   Protocol Details   Has GFR on file in past 12 months and most recent value is normal    Medication indicated for associated diagnosis   MEDICATION IS REPORTED HISTORICAL  GFR Estimate   Date Value Ref Range Status   11/05/2024 58 (L) >60 mL/min/1.73m2 Final     Comment:     eGFR calculated using 2021 CKD-EPI equation.   04/16/2021 62 >60 mL/min/[1.73_m2] Final     Comment:     Non  GFR Calc  Starting 12/18/2018, serum creatinine based estimated GFR (eGFR) will be   calculated using the Chronic Kidney  Disease Epidemiology Collaboration   (CKD-EPI) equation.

## 2024-11-27 ENCOUNTER — TELEPHONE (OUTPATIENT)
Dept: FAMILY MEDICINE | Facility: OTHER | Age: 69
End: 2024-11-27

## 2024-12-03 ENCOUNTER — TELEPHONE (OUTPATIENT)
Dept: FAMILY MEDICINE | Facility: OTHER | Age: 69
End: 2024-12-03

## 2024-12-05 ENCOUNTER — INFUSION THERAPY VISIT (OUTPATIENT)
Dept: INFUSION THERAPY | Facility: HOSPITAL | Age: 69
End: 2024-12-05
Payer: MEDICARE

## 2024-12-05 VITALS
TEMPERATURE: 98.2 F | BODY MASS INDEX: 37.15 KG/M2 | SYSTOLIC BLOOD PRESSURE: 126 MMHG | HEART RATE: 78 BPM | DIASTOLIC BLOOD PRESSURE: 62 MMHG | OXYGEN SATURATION: 95 % | WEIGHT: 223 LBS | HEIGHT: 65 IN | RESPIRATION RATE: 16 BRPM

## 2024-12-05 DIAGNOSIS — E26.9 HIGH ALDOSTERONE TO RENIN RATIO (H): Primary | ICD-10-CM

## 2024-12-05 LAB
POTASSIUM SERPL-SCNC: 4.3 MMOL/L (ref 3.4–5.3)
POTASSIUM SERPL-SCNC: 4.4 MMOL/L (ref 3.4–5.3)

## 2024-12-05 PROCEDURE — 999N000127 HC STATISTIC PERIPHERAL IV START W US GUIDANCE

## 2024-12-05 PROCEDURE — 258N000003 HC RX IP 258 OP 636: Performed by: INTERNAL MEDICINE

## 2024-12-05 PROCEDURE — 84132 ASSAY OF SERUM POTASSIUM: CPT | Performed by: INTERNAL MEDICINE

## 2024-12-05 PROCEDURE — 84244 ASSAY OF RENIN: CPT | Performed by: INTERNAL MEDICINE

## 2024-12-05 PROCEDURE — 36415 COLL VENOUS BLD VENIPUNCTURE: CPT | Performed by: INTERNAL MEDICINE

## 2024-12-05 RX ADMIN — SODIUM CHLORIDE 2000 ML: 9 INJECTION, SOLUTION INTRAVENOUS at 09:43

## 2024-12-05 ASSESSMENT — PAIN SCALES - GENERAL: PAINLEVEL_OUTOF10: NO PAIN (0)

## 2024-12-05 NOTE — PROGRESS NOTES
Infusion Nursing Note:  Cassy Avila presents today for Labs/Salt Loading.    Patient seen by provider today: No   present during visit today: Not Applicable.    Note: Kaitlin arrived into the infusion center ambulatory in a stable condition accompanied by a family friend for Salt Loading, VSS. Plan of are reviewed, there is baseline/pretest before the salt loading and post test after the salt loading. She verbalized understanding of her plan of care. Pre lab drawn, potasium before NS was 4.3 which is WNL. Kaitlin tolerated salt loading with 2 liters of NS over 4 hours with no ill effect noted. Potasium after Salt loading was 4.3. Her provider will reach out to her with the rest of the test results.    Intravenous Access:  Labs drawn without difficulty.  Peripheral IV placed.    Treatment Conditions:  Lab Results   Component Value Date    HGB 12.9 10/17/2024    WBC 6.8 10/17/2024    ANEU 3.3 04/16/2021    ANEUTAUTO 4.8 10/17/2024     10/17/2024        Lab Results   Component Value Date     11/05/2024    POTASSIUM 4.3 12/05/2024    MAG 2.0 10/26/2020    CR 1.04 (H) 11/05/2024    JIMENA 9.4 11/05/2024    BILITOTAL 0.3 08/24/2024    ALBUMIN 4.2 08/24/2024    ALT 20 08/24/2024    AST 21 08/24/2024     Results reviewed, labs MET treatment parameters, ok to proceed with treatment.    Post Infusion Assessment:  Patient tolerated infusion without incident.  Site patent and intact, free from redness, edema or discomfort.  No evidence of extravasations.  Access discontinued per protocol.     Discharge Plan:   Discharge instructions reviewed with: Patient.  Patient and/or family verbalized understanding of discharge instructions and all questions answered.  Patient discharged in stable condition accompanied by: friend.  Departure Mode: Ambulatory.    Lakia Ferguson RN

## 2024-12-08 LAB — RENIN PLAS-CCNC: 0.3 NG/ML/HR

## 2024-12-09 ENCOUNTER — VIRTUAL VISIT (OUTPATIENT)
Dept: ENDOCRINOLOGY | Facility: CLINIC | Age: 69
End: 2024-12-09
Payer: COMMERCIAL

## 2024-12-09 ENCOUNTER — MYC MEDICAL ADVICE (OUTPATIENT)
Dept: CARDIOLOGY | Facility: OTHER | Age: 69
End: 2024-12-09

## 2024-12-09 VITALS — WEIGHT: 220 LBS | BODY MASS INDEX: 36.65 KG/M2 | HEIGHT: 65 IN

## 2024-12-09 DIAGNOSIS — E27.9 ADRENAL NODULE (H): ICD-10-CM

## 2024-12-09 DIAGNOSIS — E26.09 PRIMARY ALDOSTERONISM (H): Primary | ICD-10-CM

## 2024-12-09 DIAGNOSIS — I16.0 HYPERTENSIVE URGENCY: ICD-10-CM

## 2024-12-09 DIAGNOSIS — R79.89 ABNORMAL ALDOSTERONE TO RENIN RATIO: ICD-10-CM

## 2024-12-09 PROCEDURE — 99214 OFFICE O/P EST MOD 30 MIN: CPT | Mod: 95 | Performed by: INTERNAL MEDICINE

## 2024-12-09 PROCEDURE — G2211 COMPLEX E/M VISIT ADD ON: HCPCS | Mod: 95 | Performed by: INTERNAL MEDICINE

## 2024-12-09 RX ORDER — SPIRONOLACTONE 25 MG/1
50 TABLET ORAL DAILY
Qty: 180 TABLET | Refills: 1 | Status: SHIPPED | OUTPATIENT
Start: 2024-12-09

## 2024-12-09 ASSESSMENT — PAIN SCALES - GENERAL: PAINLEVEL_OUTOF10: MODERATE PAIN (5)

## 2024-12-09 NOTE — LETTER
12/9/2024      Cassy Aivla  [address] [place] [address]      Dear Colleague,    Thank you for referring your patient, Cassy Avila, to the [facility]. Please see a copy of my visit note below.    Endocrinology Clinic Visit    Chief Complaint: RECHECK     Information obtained from:Patient      Assessment/Treatment Plan:    Primary aldosteronism   Adrenal nodule  Resistant hypertension      Aldosterone of 19.4 with renin activity 0.2, ARR 97  Confirmatory test Aldosterone of 25.1 with renin activity 0.3 and 4 hours after normal saline infusion aldosterone was 13.9 which confirms the diagnosis of primary aldosteronism.   CT scan from 11/18/24 and agree with the interpretation of left adrenal nodule measuring about 1.2 cm in diameter with Hounsfield units <10 consistent with adrenal adenoma.  Blood pressure previously systolic 200s however since spironolactone to 50 mg daily added blood pressure 120s which also supports a diagnosis of primary aldosteronism.  Adrenal venous sampling is a next step.  After discussing risk and benefit decision was made to proceed with adrenal venous sampling and referral to interventional radiology placed.  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  Due to blood pressure running in the 200s prior to starting spironolactone, it is unsafe to stop MRA at this time.  Her renin activity is currently at 0.2-0.3 therefore allows adrenal venous sampling without discontinuing MRA.  Will reach out to interventional radiology team.      Adrenal nodule  I have personally reviewed CT scan from 11/18/24 and agree with the interpretation of left adrenal nodule measuring about 1.2 cm in diameter with Hounsfield units <10 consistent with adrenal adenoma..    Metanephrines normal.  Normal ACTH and DHEA-sulfate within the range at 52 - 24 hour urine  cortisol normal. Will complete cortisol after dexamethasone suppression.       Foreign Connors MD  Staff Endocrinologist    Division of Endocrinology and Diabetes      Subjective:         HPI: Cassy Avila is a 69 year old female with history of hypertension and adrenal nodule who is here for follow-up of results.    Longstanding hypertension on multiple blood pressure medications however was noted to have significantly elevated blood pressure few months ago.  Systolic blood pressure approaching 200s per report.  At the time patient was screened for primary aldosteronism with aldosterone renin activity labs which showed the following and was initiated on spironolactone 50 mg daily.  Since spironolactone was added blood pressure has significantly improved and her last blood pressure was 108/60.  She continues to take other blood pressure medications including metoprolol & losartan.  While her blood pressure was high she was also found to have congestive heart failure but right now clinically stable from congestive failure standpoint.  Has past medical history of neurofibromatosis type I.  On review of system no headache or vision change.  Shortness of breath with activity for which she is currently following with cardiology.  No significant weight gain other than weight loss of 13 pounds after treated for CHF previously.  No significant stretch marks.  No nausea or vomiting but reports frequent bowel movements.       Latest Ref Rng 9/19/2024  10:41 AM   ENDO ADRENAL LABS     Aldosterone 0.0 - 31.0 ng/dL 19.4    Creatinine 0.51 - 0.95 mg/dL 0.97 (H)    Potassium 3.4 - 5.3 mmol/L 3.8    Renin Activity ng/mL/hr 0.2    Sodium 135 - 145 mmol/L 139       Component      Latest Ref Rng 11/8/2024  11:41 AM 11/10/2024  12:01 PM   Cortisol Free Urine Random      ug/L  9.86    Cortisol ug/g creatinine      ug/g CRT  14.94    Cortisol Free Urine      <=45.0 ug/d  21.2    Creatinine, Urine per volume      mg/dL  66     Creatinine, Urine per volume      mg/dL  67    Creatinine, Urine per 24hr      500 - 1400 mg/d  1419 (H)    Creatinine, Urine per 24hr      500 - 1400 mg/d  1440 (H)    Cortisol Free Urine Intrepretation  See Note    Creatinine Urine      mg/dL  65.8    Duration in hours      h  24.0    Duration in hours      h  24.0    Volume in mL      mL  2,150    Volume in mL      mL  2,150    Creatinine Urine Timed      0.72 - 1.51 g/spec  1.41    Sodium Urine mmol/L      mmol/L  81    Sodium Urine mmol/24 h      40 - 220 mmol/spec  174    Aldosterone Urine      1.2 - 28.1 ug/d  10.0    Metanephrine      0.00 - 0.49 nmol/L 0.33     Normetanephrine      0.00 - 0.89 nmol/L 0.44     Metanephrines Interpretation See Note     DHEA Sulfate      35 - 430 ug/dL 52     Adrenal Corticotropin      <47 pg/mL 13         Latest Ref Rng 12/5/2024  8:25 AM 12/5/2024  1:54 PM   ENDO ADRENAL LABS      Aldosterone Urine 1.2 - 28.1 ug/d     Aldosterone 0.0 - 31.0 ng/dL 25.1  13.9    Cortisol Free Creat R UR mg/dL     Cortisol Free Creat R UR mg/dL     Cortisol Free Creat T  - 1400 mg/d     Cortisol Free Creat T  - 1400 mg/d     Cortisol Free Urine Random ug/L     Cortisol Free Urine <=45.0 ug/d     Cortisol ug/g creatinine ug/g CRT     Creatinine 0.51 - 0.95 mg/dL     Creatinine Urine mg/dL     DHEA Sulfate 35 - 430 ug/dL     Metanephrine 0.00 - 0.49 nmol/L     Normetanephrine 0.00 - 0.89 nmol/L     Potassium 3.4 - 5.3 mmol/L 4.4  4.3    Renin Activity ng/mL/hr 0.3         Allergies   Allergen Reactions     Allopurinol Shortness Of Breath     Amlodipine Besylate Swelling     Norvasc     Amoxicillin      Atorvastatin      myualgia     Cephalexin Monohydrate Hives     Keflex     Erythromycin Base [Erythromycin Base] Nausea and Vomiting     Meloxicam Other (See Comments)     Mobic - confusion, depression     Sulfa Antibiotics Hives     Adhesive Tape Rash     Prochlorperazine Edisylate Swelling and Rash     Compazine     Prochlorperazine  Maleate Swelling and Rash       Current Outpatient Medications   Medication Sig Dispense Refill     albuterol (PROVENTIL) (2.5 MG/3ML) 0.083% neb solution Take 2.5 mg by nebulization every 6 hours as needed for shortness of breath / dyspnea or wheezing       aspirin 81 MG EC tablet Take 81 mg by mouth daily HS       Cholecalciferol (VITAMIN D3 PO) Take 3,000 mg by mouth daily AM       escitalopram (LEXAPRO) 10 MG tablet Take 1 tablet (10 mg) by mouth daily. 90 tablet 1     Fluticasone-Umeclidin-Vilant (TRELEGY ELLIPTA) 100-62.5-25 MCG/ACT oral inhaler Inhale 1 puff into the lungs daily. 60 each 3     furosemide (LASIX) 20 MG tablet Take 1 tablet (20 mg) by mouth as needed (swelling, weight gain).       ketoconazole (NIZORAL) 2 % external cream APPLY TO THE RASH ON FULL BACK TWICE A DAY FOR 4-6 WEEKS       losartan (COZAAR) 50 MG tablet Take 1 tablet (50 mg) by mouth daily. 90 tablet 1     metoprolol succinate ER (TOPROL XL) 50 MG 24 hr tablet Take 0.5 tablets (25 mg) by mouth daily.       order for DME Nebulizer machine and tubing    DX:  Bronchitis 1 Device 0     spironolactone (ALDACTONE) 25 MG tablet Take 2 tablets (50 mg) by mouth daily.       traZODone (DESYREL) 50 MG tablet TAKE 1 TO 2 TABLETS BY MOUTH NIGHTLY AS NEEDED 180 tablet 3     warfarin ANTICOAGULANT (COUMADIN) 5 MG tablet TAKE 1 & 1/2 (ONE & ONE-HALF) TABLETS BY MOUTH MONDAY WEDNESDAY AND FRIDAY AND 1 TABLET ALL OTHER DAYS OR AS DIRECTED BY WARFARIN CLINIC 109 tablet 1       Review of Systems     8 point review system (Constitutional, HENT, Eyes, Respiratory, Cardiovascular, Gastrointestinal, Genitourinary, Musculoskeletal,Neurological, Psychiatric/Behavioural, Endocrine) is negative or is as per HPI above.  Past medical history, past surgical history, social and, family history and social determinants of health reviewed.  Past medical history pertinent to the visit reviewed.    Social Drivers of Health     Financial Resource Strain: Low Risk   (10/15/2024)    Financial Resource Strain      Within the past 12 months, have you or your family members you live with been unable to get utilities (heat, electricity) when it was really needed?: No   Food Insecurity: Low Risk  (10/15/2024)    Food Insecurity      Within the past 12 months, did you worry that your food would run out before you got money to buy more?: No      Within the past 12 months, did the food you bought just not last and you didn t have money to get more?: No   Transportation Needs: Low Risk  (10/15/2024)    Transportation Needs      Within the past 12 months, has lack of transportation kept you from medical appointments, getting your medicines, non-medical meetings or appointments, work, or from getting things that you need?: No   Physical Activity: Inactive (10/15/2024)    Exercise Vital Sign      Days of Exercise per Week: 0 days      Minutes of Exercise per Session: 0 min   Stress: No Stress Concern Present (10/15/2024)    [employer] - Occupational Stress Questionnaire      Feeling of Stress : Not at all   Social Connections: Unknown (10/15/2024)    Social Connection and Isolation Panel [NHANES]      Frequency of Social Gatherings with Friends and Family: Twice a week   Interpersonal Safety: Not At Risk (10/17/2024)    Received from  and [facility]     IP Custom IPV      Do you feel UNSAFE in any of your personal relationships with your family members or any other acquaintances?: No   Housing Stability: Low Risk  (10/15/2024)    Housing Stability      Do you have housing? : Yes      Are you worried about losing your housing?: No       Objective:   GENERAL: alert and no distress  EYES: Eyes grossly normal to inspection.    RESP: No audible wheeze, cough, or visible cyanosis.    PSYCH: Appropriate affect, tone, and pace of words      In House Labs:   Lab Results   Component Value Date    A1C 5.6 03/17/2023    A1C 5.5 11/05/2013        TSH   Date Value Ref Range Status   09/19/2024 2.20 0.30 - 4.20 uIU/mL Final   04/01/2024 2.73 0.30 - 4.20 uIU/mL Final   09/29/2023 3.34 0.30 - 4.20 uIU/mL Final   03/17/2023 3.05 0.30 - 4.20 uIU/mL Final   09/16/2022 2.44 0.40 - 4.00 mU/L Final   08/27/2021 2.86 0.40 - 4.00 mU/L Final   10/26/2020 2.08 0.40 - 4.00 mU/L Final   09/27/2019 1.42 0.40 - 4.00 mU/L Final   02/05/2019 2.71 0.40 - 4.00 mU/L Final   10/31/2018 2.32 0.40 - 4.00 mU/L Final   07/13/2018 2.51 0.40 - 4.00 mU/L Final     T4 Free   Date Value Ref Range Status   01/17/2018 0.89 0.76 - 1.46 ng/dL Final   09/28/2015 0.88 0.76 - 1.46 ng/dL Final   11/05/2013 0.61 0.59 - 1.61 ng/dL Final       Creatinine   Date Value Ref Range Status   11/05/2024 1.04 (H) 0.51 - 0.95 mg/dL Final   04/16/2021 0.95 0.52 - 1.04 mg/dL Final     This note has been dictated using voice recognition software.  As a result, there may be errors in the documentation that have gone undetected.  Please consider this when interpreting information in this documentation.      Video-Visit Details    Type of service:  Video Visit  Joined the call at 12/9/2024, 12:07:17 pm.  Left the call at 12/9/2024, 12:15:23 pm.  You were on the call for 8 minutes 5 seconds .  Distant Location (provider location):  ON-site.   Platform used for Video Visit: [employer]  The longitudinal plan of care for the diagnosis(es)/condition(s) as documented were addressed during this visit. Due to the added complexity in care, I will continue to support Kaitlin in the subsequent management and with ongoing continuity of care.      Again, thank you for allowing me to participate in the care of your patient.        Sincerely,      Foreign Connors MD

## 2024-12-09 NOTE — TELEPHONE ENCOUNTER
Disp Refills Start End JERONIMO   spironolactone (ALDACTONE) 25 MG tablet -- -- 11/5/2024 -- No     Last Office Visit: 11/05/2024  Future Office visit:    Next 5 appointments (look out 90 days)      Feb 11, 2025 11:00 AM  (Arrive by 10:45 AM)  Return Visit with Ashli Tijerina CNP  [facility] ([facility] ) [address] [place] [address] [phone]             Routing refill request to provider for review/approval because:

## 2024-12-09 NOTE — PROGRESS NOTES
Endocrinology Clinic Visit    Chief Complaint: RECHECK     Information obtained from:Patient      Assessment/Treatment Plan:    Primary aldosteronism   Adrenal nodule  Resistant hypertension      Aldosterone of 19.4 with renin activity 0.2, ARR 97  Confirmatory test Aldosterone of 25.1 with renin activity 0.3 and 4 hours after normal saline infusion aldosterone was 13.9 which confirms the diagnosis of primary aldosteronism.   CT scan from 11/18/24 and agree with the interpretation of left adrenal nodule measuring about 1.2 cm in diameter with Hounsfield units <10 consistent with adrenal adenoma.  Blood pressure previously systolic 200s however since spironolactone to 50 mg daily added blood pressure 120s which also supports a diagnosis of primary aldosteronism.  Adrenal venous sampling is a next step.  After discussing risk and benefit decision was made to proceed with adrenal venous sampling and referral to interventional radiology placed.  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  Due to blood pressure running in the 200s prior to starting spironolactone, it is unsafe to stop MRA at this time.  Her renin activity is currently at 0.2-0.3 therefore allows adrenal venous sampling without discontinuing MRA.  Will reach out to interventional radiology team.      Adrenal nodule  I have personally reviewed CT scan from 11/18/24 and agree with the interpretation of left adrenal nodule measuring about 1.2 cm in diameter with Hounsfield units <10 consistent with adrenal adenoma..    Metanephrines normal.  Normal ACTH and DHEA-sulfate within the range at 52 - 24 hour urine cortisol normal. Will complete cortisol after dexamethasone suppression.       Foreign Connors MD  Staff Endocrinologist    Division of Endocrinology and Diabetes      Subjective:         HPI: Cassy Avila is a 69 year old female with history  of hypertension and adrenal nodule who is here for follow-up of results.    Longstanding hypertension on multiple blood pressure medications however was noted to have significantly elevated blood pressure few months ago.  Systolic blood pressure approaching 200s per report.  At the time patient was screened for primary aldosteronism with aldosterone renin activity labs which showed the following and was initiated on spironolactone 50 mg daily.  Since spironolactone was added blood pressure has significantly improved and her last blood pressure was 108/60.  She continues to take other blood pressure medications including metoprolol & losartan.  While her blood pressure was high she was also found to have congestive heart failure but right now clinically stable from congestive failure standpoint.  Has past medical history of neurofibromatosis type I.  On review of system no headache or vision change.  Shortness of breath with activity for which she is currently following with cardiology.  No significant weight gain other than weight loss of 13 pounds after treated for CHF previously.  No significant stretch marks.  No nausea or vomiting but reports frequent bowel movements.       Latest Ref Rng 9/19/2024  10:41 AM   ENDO ADRENAL LABS     Aldosterone 0.0 - 31.0 ng/dL 19.4    Creatinine 0.51 - 0.95 mg/dL 0.97 (H)    Potassium 3.4 - 5.3 mmol/L 3.8    Renin Activity ng/mL/hr 0.2    Sodium 135 - 145 mmol/L 139       Component      Latest Ref Rng 11/8/2024  11:41 AM 11/10/2024  12:01 PM   Cortisol Free Urine Random      ug/L  9.86    Cortisol ug/g creatinine      ug/g CRT  14.94    Cortisol Free Urine      <=45.0 ug/d  21.2    Creatinine, Urine per volume      mg/dL  66    Creatinine, Urine per volume      mg/dL  67    Creatinine, Urine per 24hr      500 - 1400 mg/d  1419 (H)    Creatinine, Urine per 24hr      500 - 1400 mg/d  1440 (H)    Cortisol Free Urine Intrepretation  See Note    Creatinine Urine      mg/dL  65.8     Duration in hours      h  24.0    Duration in hours      h  24.0    Volume in mL      mL  2,150    Volume in mL      mL  2,150    Creatinine Urine Timed      0.72 - 1.51 g/spec  1.41    Sodium Urine mmol/L      mmol/L  81    Sodium Urine mmol/24 h      40 - 220 mmol/spec  174    Aldosterone Urine      1.2 - 28.1 ug/d  10.0    Metanephrine      0.00 - 0.49 nmol/L 0.33     Normetanephrine      0.00 - 0.89 nmol/L 0.44     Metanephrines Interpretation See Note     DHEA Sulfate      35 - 430 ug/dL 52     Adrenal Corticotropin      <47 pg/mL 13         Latest Ref Rng 12/5/2024  8:25 AM 12/5/2024  1:54 PM   ENDO ADRENAL LABS      Aldosterone Urine 1.2 - 28.1 ug/d     Aldosterone 0.0 - 31.0 ng/dL 25.1  13.9    Cortisol Free Creat R UR mg/dL     Cortisol Free Creat R UR mg/dL     Cortisol Free Creat T  - 1400 mg/d     Cortisol Free Creat T  - 1400 mg/d     Cortisol Free Urine Random ug/L     Cortisol Free Urine <=45.0 ug/d     Cortisol ug/g creatinine ug/g CRT     Creatinine 0.51 - 0.95 mg/dL     Creatinine Urine mg/dL     DHEA Sulfate 35 - 430 ug/dL     Metanephrine 0.00 - 0.49 nmol/L     Normetanephrine 0.00 - 0.89 nmol/L     Potassium 3.4 - 5.3 mmol/L 4.4  4.3    Renin Activity ng/mL/hr 0.3         Allergies   Allergen Reactions    Allopurinol Shortness Of Breath    Amlodipine Besylate Swelling     Norvasc    Amoxicillin     Atorvastatin      myualgia    Cephalexin Monohydrate Hives     Keflex    Erythromycin Base [Erythromycin Base] Nausea and Vomiting    Meloxicam Other (See Comments)     Mobic - confusion, depression    Sulfa Antibiotics Hives    Adhesive Tape Rash    Prochlorperazine Edisylate Swelling and Rash     Compazine    Prochlorperazine Maleate Swelling and Rash       Current Outpatient Medications   Medication Sig Dispense Refill    albuterol (PROVENTIL) (2.5 MG/3ML) 0.083% neb solution Take 2.5 mg by nebulization every 6 hours as needed for shortness of breath / dyspnea or wheezing      aspirin  81 MG EC tablet Take 81 mg by mouth daily HS      Cholecalciferol (VITAMIN D3 PO) Take 3,000 mg by mouth daily AM      escitalopram (LEXAPRO) 10 MG tablet Take 1 tablet (10 mg) by mouth daily. 90 tablet 1    Fluticasone-Umeclidin-Vilant (TRELEGY ELLIPTA) 100-62.5-25 MCG/ACT oral inhaler Inhale 1 puff into the lungs daily. 60 each 3    furosemide (LASIX) 20 MG tablet Take 1 tablet (20 mg) by mouth as needed (swelling, weight gain).      ketoconazole (NIZORAL) 2 % external cream APPLY TO THE RASH ON FULL BACK TWICE A DAY FOR 4-6 WEEKS      losartan (COZAAR) 50 MG tablet Take 1 tablet (50 mg) by mouth daily. 90 tablet 1    metoprolol succinate ER (TOPROL XL) 50 MG 24 hr tablet Take 0.5 tablets (25 mg) by mouth daily.      order for DME Nebulizer machine and tubing    DX:  Bronchitis 1 Device 0    spironolactone (ALDACTONE) 25 MG tablet Take 2 tablets (50 mg) by mouth daily.      traZODone (DESYREL) 50 MG tablet TAKE 1 TO 2 TABLETS BY MOUTH NIGHTLY AS NEEDED 180 tablet 3    warfarin ANTICOAGULANT (COUMADIN) 5 MG tablet TAKE 1 & 1/2 (ONE & ONE-HALF) TABLETS BY MOUTH MONDAY WEDNESDAY AND FRIDAY AND 1 TABLET ALL OTHER DAYS OR AS DIRECTED BY WARFARIN CLINIC 109 tablet 1       Review of Systems     8 point review system (Constitutional, HENT, Eyes, Respiratory, Cardiovascular, Gastrointestinal, Genitourinary, Musculoskeletal,Neurological, Psychiatric/Behavioural, Endocrine) is negative or is as per HPI above.  Past medical history, past surgical history, social and, family history and social determinants of health reviewed.  Past medical history pertinent to the visit reviewed.    Social Drivers of Health     Financial Resource Strain: Low Risk  (10/15/2024)    Financial Resource Strain     Within the past 12 months, have you or your family members you live with been unable to get utilities (heat, electricity) when it was really needed?: No   Food Insecurity: Low Risk  (10/15/2024)    Food Insecurity     Within the past 12  months, did you worry that your food would run out before you got money to buy more?: No     Within the past 12 months, did the food you bought just not last and you didn t have money to get more?: No   Transportation Needs: Low Risk  (10/15/2024)    Transportation Needs     Within the past 12 months, has lack of transportation kept you from medical appointments, getting your medicines, non-medical meetings or appointments, work, or from getting things that you need?: No   Physical Activity: Inactive (10/15/2024)    Exercise Vital Sign     Days of Exercise per Week: 0 days     Minutes of Exercise per Session: 0 min   Stress: No Stress Concern Present (10/15/2024)    [employer] - Occupational Stress Questionnaire     Feeling of Stress : Not at all   Social Connections: Unknown (10/15/2024)    Social Connection and Isolation Panel [NHANES]     Frequency of Social Gatherings with Friends and Family: Twice a week   Interpersonal Safety: Not At Risk (10/17/2024)    Received from [facility] and [facility] IP Custom IPV     Do you feel UNSAFE in any of your personal relationships with your family members or any other acquaintances?: No   Housing Stability: Low Risk  (10/15/2024)    Housing Stability     Do you have housing? : Yes     Are you worried about losing your housing?: No       Objective:   GENERAL: alert and no distress  EYES: Eyes grossly normal to inspection.    RESP: No audible wheeze, cough, or visible cyanosis.    PSYCH: Appropriate affect, tone, and pace of words      In House Labs:   Lab Results   Component Value Date    A1C 5.6 03/17/2023    A1C 5.5 11/05/2013       TSH   Date Value Ref Range Status   09/19/2024 2.20 0.30 - 4.20 uIU/mL Final   04/01/2024 2.73 0.30 - 4.20 uIU/mL Final   09/29/2023 3.34 0.30 - 4.20 uIU/mL Final   03/17/2023 3.05 0.30 - 4.20 uIU/mL Final   09/16/2022 2.44 0.40 - 4.00 mU/L Final   08/27/2021 2.86 0.40 - 4.00 mU/L Final    10/26/2020 2.08 0.40 - 4.00 mU/L Final   09/27/2019 1.42 0.40 - 4.00 mU/L Final   02/05/2019 2.71 0.40 - 4.00 mU/L Final   10/31/2018 2.32 0.40 - 4.00 mU/L Final   07/13/2018 2.51 0.40 - 4.00 mU/L Final     T4 Free   Date Value Ref Range Status   01/17/2018 0.89 0.76 - 1.46 ng/dL Final   09/28/2015 0.88 0.76 - 1.46 ng/dL Final   11/05/2013 0.61 0.59 - 1.61 ng/dL Final       Creatinine   Date Value Ref Range Status   11/05/2024 1.04 (H) 0.51 - 0.95 mg/dL Final   04/16/2021 0.95 0.52 - 1.04 mg/dL Final     This note has been dictated using voice recognition software.  As a result, there may be errors in the documentation that have gone undetected.  Please consider this when interpreting information in this documentation.      Video-Visit Details    Type of service:  Video Visit  Joined the call at 12/9/2024, 12:07:17 pm.  Left the call at 12/9/2024, 12:15:23 pm.  You were on the call for 8 minutes 5 seconds .  Distant Location (provider location):  ON-site.   Platform used for Video Visit: González  The longitudinal plan of care for the diagnosis(es)/condition(s) as documented were addressed during this visit. Due to the added complexity in care, I will continue to support Kaitlin in the subsequent management and with ongoing continuity of care.

## 2024-12-09 NOTE — NURSING NOTE
Current patient location: [address] [place] [address] [address]  [place] [address]    Is the patient currently in the state of [place]? YES    Visit mode:VIDEO    If the visit is dropped, the patient can be reconnected by:VIDEO VISIT: Text to cell phone:   Telephone Information:   Mobile [phone]       Will anyone else be joining the visit? NO  (If patient encounters technical issues they should call [phone]    Are changes needed to the allergy or medication list? No    Are refills needed on medications prescribed by this physician? NO    Rooming Documentation:  Questionnaire(s) completed    Reason for visit: RECHECK    Gilma BISWASF

## 2024-12-10 ENCOUNTER — TELEPHONE (OUTPATIENT)
Dept: ENDOCRINOLOGY | Facility: CLINIC | Age: 69
End: 2024-12-10

## 2024-12-10 ENCOUNTER — ANTICOAGULATION THERAPY VISIT (OUTPATIENT)
Dept: ANTICOAGULATION | Facility: OTHER | Age: 69
End: 2024-12-10
Attending: NURSE PRACTITIONER
Payer: MEDICARE

## 2024-12-10 DIAGNOSIS — I26.99 PULMONARY EMBOLISM AND INFARCTION (H): ICD-10-CM

## 2024-12-10 DIAGNOSIS — I82.401 DEEP VEIN THROMBOSIS (DVT) OF RIGHT LOWER EXTREMITY, UNSPECIFIED CHRONICITY, UNSPECIFIED VEIN (H): ICD-10-CM

## 2024-12-10 DIAGNOSIS — Z79.01 LONG TERM CURRENT USE OF ANTICOAGULANT THERAPY: Primary | ICD-10-CM

## 2024-12-10 LAB
INR HOME MONITORING: 2.4 (ref 2–3)
RENIN PLAS-CCNC: 0.1 NG/ML/HR

## 2024-12-10 NOTE — PROGRESS NOTES
ANTICOAGULATION MANAGEMENT     Cassy Avila 69 year old female is on warfarin with therapeutic INR result. (Goal INR 2.0-3.0)    Recent labs: (last 7 days)     12/10/24  0000   INR 2.4       ASSESSMENT     Source(s): Chart Review and Patient/Caregiver Call     Warfarin doses taken: Warfarin taken as instructed  Diet: No new diet changes identified  New illness, injury, or hospitalization: No  Medication/supplement changes: None noted  Signs or symptoms of bleeding or clotting: No  Previous INR: Therapeutic last visit; previously outside of goal range  Additional findings: Potential upcoming procedure TBD-patient verbalized she will keep us updated       PLAN     Recommended plan for no diet, medication or health factor changes affecting INR     Dosing Instructions: Continue your current warfarin dose with next INR in 2 weeks       Summary  As of 12/10/2024      Full warfarin instructions:  7.5 mg every Mon; 5 mg all other days   Next INR check:  12/23/2024               Telephone call with Kaitlin who verbalizes understanding and agrees to plan    Patient to recheck with home meter    Education provided: Please call back if any changes to your diet, medications or how you've been taking warfarin    Plan made per [facility] anticoagulation protocol    Ashanti Velazquez RN  Anticoagulation Clinic  12/10/2024    _______________________________________________________________________     Anticoagulation Episode Summary       Current INR goal:  2.0-3.0   TTR:  82.2% (1 y)   Target end date:  Indefinite   Send INR reminders to:  [facility] [place]    Indications    Long-term (current) use of anticoagulants [Z79.01] [Z79.01]  Pulmonary embolism and infarction (H) [I26.99]  Deep vein thrombosis (DVT) (H) [I82.409] (Resolved) [I82.409]  Deep vein thrombosis (DVT) of right lower extremity  unspecified chronicity  unspecified vein (H) [I82.401]             Comments:  [employer] Monitoring. Q2 week testing  Call cell phone with any  dosing.             Anticoagulation Care Providers       Provider Role Specialty Phone number    Eliz Hartman CNP Referring Family Medicine [phone]

## 2024-12-10 NOTE — TELEPHONE ENCOUNTER
12/10 Called and left voicemail, provided phone number [phone] to schedule a follow up with Dr. Connors on 3/11/2025 at 130 or 200 in person at [place] or [facility].     Michelle senior Complex   Orthopedics, Podiatry, Sports Medicine, Ent ,Eye , Audiology, Adult Endocrine & Diabetes, Nutrition & Medication Therapy Management Specialties   [facility]        ----- Message from Pepper RIVERA sent at 12/9/2024  3:11 PM CST -----  Could use 3/11/2025, provider has 2 DANA's spots open this day at 1:30 pm and 2:00 pm in person at [place] or [facility] dependant on wrap instructions.     [facility]  Adult Endocrinology   [facility]  ----- Message -----  From: Nazanin Rodriguez RN  Sent: 12/9/2024   2:47 PM CST  To: [facility]      ----- Message -----  From: Michelle East  Sent: 12/9/2024   2:46 PM CST  To: [facility] Endocr[facility]     These were my check out notes for Dr. Connors    Check Out Comments: Okay to use DANA [CER [id number]]         Disposition: Return in about 3 months (around 3/9/2025).    Where can  I schedule this patient?     Thanks  Michelle Brantley   Orthopedics, Podiatry, Sports Medicine, Ent ,Eye , Audiology, Adult Endocrine & Diabetes, Nutrition & Medication Therapy Management Specialties   [facility]

## 2024-12-23 ENCOUNTER — APPOINTMENT (OUTPATIENT)
Dept: ANTICOAGULATION | Facility: OTHER | Age: 69
End: 2024-12-23
Attending: NURSE PRACTITIONER
Payer: MEDICARE

## 2024-12-30 ENCOUNTER — ANTICOAGULATION THERAPY VISIT (OUTPATIENT)
Dept: ANTICOAGULATION | Facility: OTHER | Age: 69
End: 2024-12-30
Attending: NURSE PRACTITIONER
Payer: MEDICARE

## 2024-12-30 DIAGNOSIS — I26.99 PULMONARY EMBOLISM AND INFARCTION (H): ICD-10-CM

## 2024-12-30 DIAGNOSIS — Z79.01 LONG TERM CURRENT USE OF ANTICOAGULANT THERAPY: Primary | ICD-10-CM

## 2024-12-30 DIAGNOSIS — I82.401 DEEP VEIN THROMBOSIS (DVT) OF RIGHT LOWER EXTREMITY, UNSPECIFIED CHRONICITY, UNSPECIFIED VEIN (H): ICD-10-CM

## 2024-12-30 LAB — INR HOME MONITORING: 2.4 (ref 2–3)

## 2024-12-30 NOTE — PROGRESS NOTES
ANTICOAGULATION  MANAGEMENT-Home Monitor Managed by Exception    Cassy APPLE Avila 69 year old female is on warfarin with therapeutic INR result. (Goal INR 2.0-3.0)    Recent labs: (last 7 days)     12/30/24  0000   INR 2.4       Previous INR was Therapeutic  Medication, diet, health changes since last INR:chart reviewed; none identified  Contacted within the last 12 weeks by phone on 12/10/24  Last [facility] referral date: 11/05/2024      DARIO     Cassy was NOT contacted regarding therapeutic result today per home monitoring policy manage by exception agreement.   Current warfarin dose is to be continued:     Summary  As of 12/30/2024      Full warfarin instructions:  7.5 mg every Mon; 5 mg all other days   Next INR check:  1/13/2025             ?   Corry Galvan RN  Anticoagulation Clinic  12/30/2024    _______________________________________________________________________     Anticoagulation Episode Summary       Current INR goal:  2.0-3.0   TTR:  83.3% (1 y)   Target end date:  Indefinite   Send INR reminders to:  [facility] [place]    Indications    Long-term (current) use of anticoagulants [Z79.01] [Z79.01]  Pulmonary embolism and infarction (H) [I26.99]  Deep vein thrombosis (DVT) (H) [I82.409] (Resolved) [I82.409]  Deep vein thrombosis (DVT) of right lower extremity  unspecified chronicity  unspecified vein (H) [I82.401]             Comments:  [employer] Monitoring. Q2 week testing  Call cell phone with any dosing.             Anticoagulation Care Providers       Provider Role Specialty Phone number    Eliz Hartman CNP Referring Family Medicine [phone]

## 2024-12-31 ENCOUNTER — MYC MEDICAL ADVICE (OUTPATIENT)
Dept: ENDOCRINOLOGY | Facility: CLINIC | Age: 69
End: 2024-12-31
Payer: COMMERCIAL

## 2025-01-14 ENCOUNTER — VIRTUAL VISIT (OUTPATIENT)
Dept: RADIOLOGY | Facility: CLINIC | Age: 70
End: 2025-01-14
Attending: STUDENT IN AN ORGANIZED HEALTH CARE EDUCATION/TRAINING PROGRAM
Payer: COMMERCIAL

## 2025-01-14 DIAGNOSIS — E26.09 PRIMARY ALDOSTERONISM: ICD-10-CM

## 2025-01-14 DIAGNOSIS — E27.9 ADRENAL NODULE: ICD-10-CM

## 2025-01-14 PROCEDURE — 98002 SYNCH AUDIO-VIDEO NEW MOD 45: CPT | Mod: GC | Performed by: STUDENT IN AN ORGANIZED HEALTH CARE EDUCATION/TRAINING PROGRAM

## 2025-01-14 NOTE — PROGRESS NOTES
INTERVENTIONAL RADIOLOGY CONSULTATION    Name: Cassy Avila  Age: 69 year old   Referring Physician: Dr. Connors   REASON FOR REFERRAL: AVS     HPI:   Mrs Avila is a 69 year old female with a history of factor 5 Leiden, migraines, NF1, HFpEF, and medically refractory hypertension found to have primary hyperaldosteronism on laboratory analysis. She currently is on spironolactone 100mg daily, losartan 50 mg daily, furosemide 20 mg daily, and metoprolol 25 mg daily for HTN management. She is here today to learn about adrenal vein sampling as an option in her management going forward to lateralize adrenal gland for possible surgical planning.     She endorses some vision changes (which she describes as a blind spot that goes away throughout the day), low back pain, and fatigue. She is on warfarin for factor 5 Leiden and prior DVT PE, now resolved. She describes shortness of breath with exertion, but no chest pain, fevers, chills, nausea, or vomiting.      PAST MEDICAL HISTORY:   Past Medical History:   Diagnosis Date    Abdominal pain, generalized 02/08/2021    Arthritis     Cervicalgia 01/09/2001    Closed dislocation of shoulder, unspecified site 2000    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 Provider to review    Factor V Leiden     Factor V Leiden mutation 07/26/2019    Family history of colon cancer 01/02/2018    Gastro-oesophageal reflux disease     Herpes zoster without mention of complication 2003    resolved from problem list    Hyperlipidemia LDL goal <100 07/11/2002     Problem list name updated by automated process. Provider to review    Long term  (current) use of anticoagulants 2003    Major depression 2014    Mammographic microcalcification     resolved from problem list    Migraine, unspecified, without mention of intractable migraine without mention of status migrainosus 2001    Moderate episode of recurrent major depressive disorder (H) 2014    Neurofibromatosis, peripheral, NF1 (H) 2012     Problem list name updated by automated process. Provider to review    Neurofibromatosis, unspecified(237.70) 2012    Other and unspecified hyperlipidemia 2002    Other chronic pain     Other pulmonary embolism and infarction 2003    Primary insomnia 10/31/2018    Statin medication not prescribed per physician orders 2018    Tachycardia, unspecified 2001    Thrombosis of leg     Unspecified essential hypertension 2001    Vitamin D deficiency 2018       PAST SURGICAL HISTORY:   Past Surgical History:   Procedure Laterality Date    ------------OTHER-------------      shoulder replacement; Provider: Karen    ARTHROPLASTY KNEE  2014    Procedure: ARTHROPLASTY KNEE;  Surgeon: Sean Alexander MD;  Location: [facility]    ARTHROSCOPY SHOULDER  2012    right, bone spurs    BIOPSY      CA ANESTH,SHOULDER REPLACEMENT Right 2020     SECTION      x3    CHOLECYSTECTOMY      COLONOSCOPY  02/15/2018    [place],,polyps    elbow ulnar tunnel release      ELECTROTHERMAL THERAPY INTRADISC  2017    stimulator    ENDOSCOPIC SINUS SURGERY, SEPTOPLASTY, TURBINOPLASTY, MAXILLARY SINUSOTOMY, COMBINED N/A 2015    Procedure: COMBINED ENDOSCOPIC SINUS SURGERY, SEPTOPLASTY, TURBINOPLASTY, MAXILLARY SINUSOTOMY;  Surgeon: Seema Conn MD;  Location: [facility]    ENT SURGERY      ESOPHAGOSCOPY, GASTROSCOPY, DUODENOSCOPY (EGD), COMBINED      with biopsy and endoscopic U/S    EXCISE NEUROMA LOWER EXTREMITY Left 2016    Procedure: EXCISE NEUROMA LOWER EXTREMITY;  Surgeon:  Edi Reed MD;  Location: [facility]    EYE SURGERY Right 2020    FUSION LUMBAR ANTERIOR WITH MARCY CAGES      L5-S1    HYSTERECTOMY TOTAL ABDOMINAL, BILATERAL SALPINGO-OOPHORECTOMY, COMBINED N/A     ORTHOPEDIC SURGERY  2015    right shoulder    ORTHOPEDIC SURGERY  2015    right knee    ORTHOPEDIC SURGERY Right 2018    hip labrum tear    pionidal cyst excision      TRANSPOSITION ULNAR NERVE (ELBOW)         FAMILY HISTORY:   Family History   Problem Relation Age of Onset    Cancer Mother     Colon Polyps Mother     Heart Failure Mother 87        congestive, cause of death    Myocardial Infarction Mother         myocardial infarction    Coronary Artery Disease Mother             Hypertension Mother     Colon Cancer Mother     Osteoporosis Mother     Genetic Disorder Mother     Myocardial Infarction Father         myocardial infarction - cause of death    C.A.D. Father     Coronary Artery Disease Father             Hypertension Father             Hyperlipidemia Father     Cancer Paternal Uncle         cause of death    C.A.D. Brother     Other - See Comments Other         factor 5 - family h/o    Diabetes Maternal Grandmother             Hypertension Maternal Half-Sister     Depression Maternal Half-Sister     Hypertension Brother     Other Cancer Son         testicular    Asthma Daughter        SOCIAL HISTORY:   Social History     Tobacco Use    Smoking status: Former     Current packs/day: 0.00     Average packs/day: 1 pack/day for 30.0 years (30.0 ttl pk-yrs)     Types: Cigarettes, Pipe     Start date: 1969     Quit date: 2000     Years since quittin.0     Passive exposure: Past    Smokeless tobacco: Never    Tobacco comments:     quit in    Substance Use Topics    Alcohol use: No       PROBLEM LIST:   Patient Active Problem List    Diagnosis Date Noted    Primary aldosteronism 2024     Priority: Medium    High aldosterone to renin ratio  11/19/2024     Priority: Medium    History of gout 02/27/2024     Priority: Medium    Deep vein thrombosis (DVT) of right lower extremity, unspecified chronicity, unspecified vein (H) 01/24/2023     Priority: Medium    Shoulder pain 01/31/2022     Priority: Medium     Formatting of this note might be different from the original.  Added automatically from request for surgery [id number]      Muscle weakness of right upper extremity 04/15/2021     Priority: Medium    Pain in right upper arm 04/15/2021     Priority: Medium    Stiffness of right shoulder joint 04/15/2021     Priority: Medium    Diarrhea 02/08/2021     Priority: Medium    Obesity (BMI 35.0-39.9) with comorbidity (H) 01/28/2020     Priority: Medium    Factor V Leiden mutation 07/26/2019     Priority: Medium    Activated protein C resistance 07/26/2019     Priority: Medium    Primary insomnia 10/31/2018     Priority: Medium    Vitamin D deficiency 07/13/2018     Priority: Medium    Family history of colon cancer 01/02/2018     Priority: Medium    Lumbar spondylosis with myelopathy 07/12/2017     Priority: Medium    Degeneration of lumbar or lumbosacral intervertebral disc 07/12/2017     Priority: Medium    Long-term (current) use of anticoagulants [Z79.01] 07/20/2016     Priority: Medium    Moderate episode of recurrent major depressive disorder (H) 08/08/2014     Priority: Medium    H/O total shoulder replacement, left 06/17/2014     Priority: Medium    Failed arthroplasty 01/24/2014     Priority: Medium    Advanced care planning/counseling discussion 03/12/2013     Priority: Medium     Pt has information at home , not completed      Neurofibromatosis, type 1 (H) 08/22/2012     Priority: Medium     Problem list name updated by automated process. Provider to review    Formatting of this note might be different from the original.  Formatting of this note might be different from the original.  Problem list name updated by automated process. Provider to  review      Chest pain, unspecified 02/11/2010     Priority: Medium     Formatting of this note might be different from the original.  IMO Update 10/11      Contact dermatitis and other eczema, due to unspecified cause 06/01/2004     Priority: Medium    Pulmonary embolism and infarction (H) 04/11/2003     Priority: Medium     Problem list name updated by automated process. Provider to review      Hyperlipidemia LDL goal <100 07/11/2002     Priority: Medium     Problem list name updated by automated process. Provider to review      Edema 01/18/2002     Priority: Medium    Primary hypertension 02/20/2001     Priority: Medium     Problem list name updated by automated process. Provider to review    Formatting of this note might be different from the original.  Formatting of this note might be different from the original.  IMO Update 10/11  Formatting of this note might be different from the original.  Problem list name updated by automated process. Provider to review         MEDICATIONS:   Prescription Medications as of 1/14/2025         Rx Number Disp Refills Start End Last Dispensed Date Next Fill Date Owning Pharmacy    albuterol (PROVENTIL) (2.5 MG/3ML) 0.083% neb solution  -- --  --       Sig: Take 2.5 mg by nebulization every 6 hours as needed for shortness of breath / dyspnea or wheezing    Class: Historical    Route: Nebulization    aspirin 81 MG EC tablet  -- --  --       Sig: Take 81 mg by mouth daily HS    Class: Historical    Route: Oral    Cholecalciferol (VITAMIN D3 PO)  -- --  --       Sig: Take 3,000 mg by mouth daily AM    Class: Historical    Route: Oral    escitalopram (LEXAPRO) 10 MG tablet  90 tablet 1 11/26/2024 --   [facility] [address] [facility]    Sig: Take 1 tablet (10 mg) by mouth daily.    Class: E-Prescribe    Route: Oral    Fluticasone-Umeclidin-Vilant (TRELEGY ELLIPTA) 100-62.5-25 MCG/ACT oral inhaler  60 each 3 11/5/2024 --   [place] [id number] -  [address]    Sig: Inhale 1 puff into the lungs daily.    Class: E-Prescribe    Notes to Pharmacy: If too costly- Advair    Route: Inhalation    furosemide (LASIX) 20 MG tablet  -- -- 11/5/2024 --       Sig: Take 1 tablet (20 mg) by mouth as needed (swelling, weight gain).    Class: Historical    Route: Oral    ketoconazole (NIZORAL) 2 % external cream  -- -- 11/30/2022 --       Sig: APPLY TO THE RASH ON FULL BACK TWICE A DAY FOR 4-6 WEEKS    Class: Historical    losartan (COZAAR) 50 MG tablet  90 tablet 1 11/26/2024 --   [facility] [address] [place] [address]    Sig: Take 1 tablet (50 mg) by mouth daily.    Class: E-Prescribe    Route: Oral    metoprolol succinate ER (TOPROL XL) 50 MG 24 hr tablet  -- -- 11/5/2024 --       Sig: Take 0.5 tablets (25 mg) by mouth daily.    Class: Historical    Route: Oral    order for DME  1 Device 0 4/5/2018 --   [id number] [place] [id number] [facility] [address]    Sig: Nebulizer machine and tubing    DX:  Bronchitis    Class: Local Print    spironolactone (ALDACTONE) 25 MG tablet  180 tablet 1 12/9/2024 --   [facility] [address] [place] [address]    Sig: Take 2 tablets (50 mg) by mouth daily.    Class: E-Prescribe    Route: Oral    traZODone (DESYREL) 50 MG tablet  180 tablet 3 9/30/2024 --   [facility] [address] [place] [address]    Sig: TAKE 1 TO 2 TABLETS BY MOUTH NIGHTLY AS NEEDED    Class: E-Prescribe    warfarin ANTICOAGULANT (COUMADIN) 5 MG tablet  109 tablet 1 9/30/2024 --   [facility] [address] [place] [address]    Sig: TAKE 1 & 1/2 (ONE & ONE-HALF) TABLETS BY MOUTH MONDAY WEDNESDAY AND FRIDAY AND 1 TABLET ALL OTHER DAYS OR AS DIRECTED BY WARFARIN CLINIC    Class: E-Prescribe            ALLERGIES:   Allopurinol, Amlodipine besylate, Amoxicillin, Atorvastatin, Cephalexin monohydrate, Erythromycin  base [erythromycin base], Meloxicam, Sulfa antibiotics, Adhesive tape, Prochlorperazine edisylate, and Prochlorperazine maleate    ROS:  Negative unless otherwise stated in HPI.      Physical Examination:   VITALS:   There were no vitals taken for this visit.    Patient is seen via a billable video visit    CONSTITUTIONAL: healthy, alert and no distress.  PSYCHIATRIC: mentation appears normal and affect normal.  NEURO: Normal movements and speech.  EYES: No jaundice or pallor.  SKIN: No jaundice.   RESP: No audible cough or wheeze.    Labs:    Wellington: 19.4 / renin: 0.2 with ARR of 97  Confirmatory testing with saline infusion with Wellington 13 from 25    BMP RESULTS:  Lab Results   Component Value Date     11/05/2024     04/16/2021    POTASSIUM 4.3 12/05/2024    POTASSIUM 3.7 09/16/2022    POTASSIUM 3.3 (L) 04/16/2021    CHLORIDE 103 11/05/2024    CHLORIDE 106 09/16/2022    CHLORIDE 103 04/16/2021    CO2 19 (L) 11/05/2024    CO2 27 09/16/2022    CO2 27 04/16/2021    ANIONGAP 17 (H) 11/05/2024    ANIONGAP 4 09/16/2022    ANIONGAP 7 04/16/2021     (H) 11/05/2024    GLC 99 09/16/2022    GLC 98 04/16/2021    BUN 14.0 11/05/2024    BUN 16 09/16/2022    BUN 9 04/16/2021    CR 1.04 (H) 11/05/2024    CR 0.95 04/16/2021    GFRESTIMATED 58 (L) 11/05/2024    GFRESTIMATED 62 04/16/2021    GFRESTBLACK 72 04/16/2021    JIMENA 9.4 11/05/2024    JIMENA 8.8 04/16/2021        CBC RESULTS:  Lab Results   Component Value Date    WBC 6.8 10/17/2024    WBC 5.8 04/16/2021    RBC 4.36 10/17/2024    RBC 4.66 04/16/2021    HGB 12.9 10/17/2024    HGB 14.1 04/16/2021    HCT 36.8 10/17/2024    HCT 40.2 04/16/2021    MCV 84 10/17/2024    MCV 86 04/16/2021    MCH 29.6 10/17/2024    MCH 30.3 04/16/2021    MCHC 35.1 10/17/2024    MCHC 35.1 04/16/2021    RDW 14.4 10/17/2024    RDW 14.6 04/16/2021     10/17/2024     04/16/2021       INR/PTT:  Lab Results   Component Value Date    INR 2.4 12/30/2024    INR 1.8 (A) 04/21/2022    INR  1.8 (A) 06/07/2021    PTT 38 (H) 07/12/2017       Diagnostic studies:   I reviewed below imaging independently:  CT adrenal protocol 11/18/2024 with left adrenal lipid rich adenoma.     CT abdomen pelvis 8/29/2023 with variant right adrenal vein anatomy with apparent drainage into accessory right hepatic vein.       Radiologist interpretation  IMPRESSION:  12 mm hypodense left adrenal nodule, consistent with benign adenoma.      Assessment   Mrs Avila is a 69 year old female with a history of factor 5 Leiden, migraines, NF1, HFpEF, and medically refractory hypertension found to have primary hyperaldosteronism on laboratory analysis.      I reviewed clinical history, labs and images and she is a candidate for adrenal vein sampling.    The procedure was described to the patient including that this procedure does not provide any therapeutic benefit but is instead to determine if there is a surgical option for her hyperaldosteronism. It was described that this is a same day procedure performed at the [facility] under moderate sedation. The risks of bleeding and infection were discussed. The risk of need for repeat sampling was also discussed due to the possibility of non diagnostic sample. The patient is agreeable and wishes to proceed.    Plan   - plan for adrenal vein sampling under moderate sedation 4-6 weeks after stopping spironolactone (anticipated to change to eplerenone by 1/20/24).  - no warfarin or other medication hold is necessary for the procedure.         CC  Patient Care Team:  Eliz Hartman CNP as PCP - General (Family Practice)  Eliz Hartman CNP as Assigned PCP  Ashli Tijerina CNP as Assigned Heart and Vascular Provider  Foreign Connors MD as Assigned Endocrinology Provider  SELF, REFERRED      ----- Service Performed and Documented by Resident or Fellow ------        Primary aldosteronism  Adrenal nodule  - AVS    Review of external notes as documented above   Independent  interpretation of a test performed by another physician/other qualified health care professional (not separately reported) - CT abdomen pelvis 8/29/2023 and 11/18/2024    Oscar Horn MD    Patient independently seen by me. Imaging and history reviewed. Plan derived by me. I have reviewed this note and agree with the assessment and plan.    40 minutes spent by me on the date of the encounter doing chart review, history and exam, documentation and further activities per the note. More than 50% of that time was spent counseling the patient.     Rose Yu MD    Interventional Radiology  Pager  [phone]    Video-Visit Details     Type of service:  Video Visit     Video Start and End Time: 2:57pm - 3:21 pm    Originating Location (pt. Location): Home     Distant Location (provider location):  [facility] [place]      Platform used for Video Visit: [employer]

## 2025-01-14 NOTE — PROGRESS NOTES
"Virtual Visit Details    Type of service:  Video Visit   Video Start Time: {video visit start/end time for provider to select:236074}  Video End Time:{video visit start/end time for provider to select:294428}    Originating Location (pt. Location): {video visit patient location:430830::\"Home\"}  {PROVIDER LOCATION On-site should be selected for visits conducted from your clinic location or adjoining [facility] hospital, academic office, or other nearby [facility] building. Off-site should be selected for all other provider locations, including home:515314}  Distant Location (provider location):  {virtual location provider:897305}  Platform used for Video Visit: {Virtual Visit Platforms:595355::\"[employer]\"}  "

## 2025-01-14 NOTE — NURSING NOTE
Current patient location: [address] [place] [address] [address]  [facility] [address]    Is the patient currently in the state of [place]? YES    Visit mode: VIDEO    If the visit is dropped, the patient can be reconnected by:VIDEO VISIT: Text to cell phone:   Telephone Information:   Mobile [phone]       Will anyone else be joining the visit? NO  (If patient encounters technical issues they should call [phone])    Are changes needed to the allergy or medication list? Pt stated no changes to allergies and Pt stated no med changes    Are refills needed on medications prescribed by this physician? NO    Rooming Documentation:  Not applicable    Reason for visit: Consult    Mindy MARTINEZ

## 2025-01-14 NOTE — LETTER
1/14/2025      Cassy Avila  [address] [place] [address]      Dear Colleague,    Thank you for referring your patient, Cassy Avila, to the [facility]. Please see a copy of my visit note below.        INTERVENTIONAL RADIOLOGY CONSULTATION    Name: Cassy Avila  Age: 69 year old   Referring Physician: Dr. Connors   REASON FOR REFERRAL: AVS     HPI:   Mrs Avila is a 69 year old female with a history of factor 5 Leiden, migraines, NF1, HFpEF, and medically refractory hypertension found to have primary hyperaldosteronism on laboratory analysis. She currently is on spironolactone 100mg daily, losartan 50 mg daily, furosemide 20 mg daily, and metoprolol 25 mg daily for HTN management. She is here today to learn about adrenal vein sampling as an option in her management going forward to lateralize adrenal gland for possible surgical planning.     She endorses some vision changes (which she describes as a blind spot that goes away throughout the day), low back pain, and fatigue. She is on warfarin for factor 5 Leiden and prior DVT PE, now resolved. She describes shortness of breath with exertion, but no chest pain, fevers, chills, nausea, or vomiting.      PAST MEDICAL HISTORY:   Past Medical History:   Diagnosis Date     Abdominal pain, generalized 02/08/2021     Arthritis      Cervicalgia 01/09/2001     Closed dislocation of shoulder, unspecified site 2000     Congenital deficiency of other clotting factors 09/07/2012    factor V deficiency, congenital     Congenital factor VIII disorder (H) 07/26/2019     Contact dermatitis and other eczema, due to unspecified cause 06/01/2004     Coughing      Depressive disorder 8/8/2014     Diarrhea 02/08/2021     Edema 01/18/2002     Essential hypertension 02/20/2001     Problem list name updated by automated process. Provider to review     Factor V Leiden      Factor V Leiden mutation 07/26/2019     Family history of colon cancer  2018     Gastro-oesophageal reflux disease      Herpes zoster without mention of complication     resolved from problem list     Hyperlipidemia LDL goal <100 2002     Problem list name updated by automated process. Provider to review     Long term (current) use of anticoagulants 2003     Major depression 2014     Mammographic microcalcification     resolved from problem list     Migraine, unspecified, without mention of intractable migraine without mention of status migrainosus 2001     Moderate episode of recurrent major depressive disorder (H) 2014     Neurofibromatosis, peripheral, NF1 (H) 2012     Problem list name updated by automated process. Provider to review     Neurofibromatosis, unspecified(237.70) 2012     Other and unspecified hyperlipidemia 2002     Other chronic pain      Other pulmonary embolism and infarction 2003     Primary insomnia 10/31/2018     Statin medication not prescribed per physician orders 2018     Tachycardia, unspecified 2001     Thrombosis of leg      Unspecified essential hypertension 2001     Vitamin D deficiency 2018       PAST SURGICAL HISTORY:   Past Surgical History:   Procedure Laterality Date     ------------OTHER-------------      shoulder replacement; Provider: Karen     ARTHROPLASTY KNEE  2014    Procedure: ARTHROPLASTY KNEE;  Surgeon: Sean Alexander MD;  Location: [facility]     ARTHROSCOPY SHOULDER      right, bone spurs     BIOPSY       CA ANESTH,SHOULDER REPLACEMENT Right 2020      SECTION      x3     CHOLECYSTECTOMY       COLONOSCOPY  02/15/2018    [place],,polyps     elbow ulnar tunnel release       ELECTROTHERMAL THERAPY INTRADISC  2017    stimulator     ENDOSCOPIC SINUS SURGERY, SEPTOPLASTY, TURBINOPLASTY, MAXILLARY SINUSOTOMY, COMBINED N/A 2015    Procedure: COMBINED ENDOSCOPIC SINUS SURGERY, SEPTOPLASTY, TURBINOPLASTY, MAXILLARY  SINUSOTOMY;  Surgeon: Seema Conn MD;  Location: [facility]     ENT SURGERY       ESOPHAGOSCOPY, GASTROSCOPY, DUODENOSCOPY (EGD), COMBINED      with biopsy and endoscopic U/S     EXCISE NEUROMA LOWER EXTREMITY Left 2016    Procedure: EXCISE NEUROMA LOWER EXTREMITY;  Surgeon: Edi Reed MD;  Location: [facility]     EYE SURGERY Right 2020     FUSION LUMBAR ANTERIOR WITH MARCY CAGES      L5-S1     HYSTERECTOMY TOTAL ABDOMINAL, BILATERAL SALPINGO-OOPHORECTOMY, COMBINED N/A      ORTHOPEDIC SURGERY  2015    right shoulder     ORTHOPEDIC SURGERY  2015    right knee     ORTHOPEDIC SURGERY Right 2018    hip labrum tear     pionidal cyst excision       TRANSPOSITION ULNAR NERVE (ELBOW)         FAMILY HISTORY:   Family History   Problem Relation Age of Onset     Cancer Mother      Colon Polyps Mother      Heart Failure Mother 87        congestive, cause of death     Myocardial Infarction Mother         myocardial infarction     Coronary Artery Disease Mother              Hypertension Mother      Colon Cancer Mother      Osteoporosis Mother      Genetic Disorder Mother      Myocardial Infarction Father         myocardial infarction - cause of death     C.A.D. Father      Coronary Artery Disease Father              Hypertension Father              Hyperlipidemia Father      Cancer Paternal Uncle         cause of death     C.A.D. Brother      Other - See Comments Other         factor 5 - family h/o     Diabetes Maternal Grandmother              Hypertension Maternal Half-Sister      Depression Maternal Half-Sister      Hypertension Brother      Other Cancer Son         testicular     Asthma Daughter        SOCIAL HISTORY:   Social History     Tobacco Use     Smoking status: Former     Current packs/day: 0.00     Average packs/day: 1 pack/day for 30.0 years (30.0 ttl pk-yrs)     Types: Cigarettes, Pipe     Start date: 1969     Quit date: 2000      Years since quittin.0     Passive exposure: Past     Smokeless tobacco: Never     Tobacco comments:     quit in    Substance Use Topics     Alcohol use: No       PROBLEM LIST:   Patient Active Problem List    Diagnosis Date Noted     Primary aldosteronism 2024     Priority: Medium     High aldosterone to renin ratio 2024     Priority: Medium     History of gout 2024     Priority: Medium     Deep vein thrombosis (DVT) of right lower extremity, unspecified chronicity, unspecified vein (H) 2023     Priority: Medium     Shoulder pain 2022     Priority: Medium     Formatting of this note might be different from the original.  Added automatically from request for surgery [id number]       Muscle weakness of right upper extremity 04/15/2021     Priority: Medium     Pain in right upper arm 04/15/2021     Priority: Medium     Stiffness of right shoulder joint 04/15/2021     Priority: Medium     Diarrhea 2021     Priority: Medium     Obesity (BMI 35.0-39.9) with comorbidity (H) 2020     Priority: Medium     Factor V Leiden mutation 2019     Priority: Medium     Activated protein C resistance 2019     Priority: Medium     Primary insomnia 10/31/2018     Priority: Medium     Vitamin D deficiency 2018     Priority: Medium     Family history of colon cancer 2018     Priority: Medium     Lumbar spondylosis with myelopathy 2017     Priority: Medium     Degeneration of lumbar or lumbosacral intervertebral disc 2017     Priority: Medium     Long-term (current) use of anticoagulants [Z79.01] 2016     Priority: Medium     Moderate episode of recurrent major depressive disorder (H) 2014     Priority: Medium     H/O total shoulder replacement, left 2014     Priority: Medium     Failed arthroplasty 2014     Priority: Medium     Advanced care planning/counseling discussion 2013     Priority: Medium     Pt has information at  home , not completed       Neurofibromatosis, type 1 (H) 08/22/2012     Priority: Medium     Problem list name updated by automated process. Provider to review    Formatting of this note might be different from the original.  Formatting of this note might be different from the original.  Problem list name updated by automated process. Provider to review       Chest pain, unspecified 02/11/2010     Priority: Medium     Formatting of this note might be different from the original.  IMO Update 10/11       Contact dermatitis and other eczema, due to unspecified cause 06/01/2004     Priority: Medium     Pulmonary embolism and infarction (H) 04/11/2003     Priority: Medium     Problem list name updated by automated process. Provider to review       Hyperlipidemia LDL goal <100 07/11/2002     Priority: Medium     Problem list name updated by automated process. Provider to review       Edema 01/18/2002     Priority: Medium     Primary hypertension 02/20/2001     Priority: Medium     Problem list name updated by automated process. Provider to review    Formatting of this note might be different from the original.  Formatting of this note might be different from the original.  IMO Update 10/11  Formatting of this note might be different from the original.  Problem list name updated by automated process. Provider to review         MEDICATIONS:   Prescription Medications as of 1/14/2025         Rx Number Disp Refills Start End Last Dispensed Date Next Fill Date Owning Pharmacy    albuterol (PROVENTIL) (2.5 MG/3ML) 0.083% neb solution  -- --  --       Sig: Take 2.5 mg by nebulization every 6 hours as needed for shortness of breath / dyspnea or wheezing    Class: Historical    Route: Nebulization    aspirin 81 MG EC tablet  -- --  --       Sig: Take 81 mg by mouth daily HS    Class: Historical    Route: Oral    Cholecalciferol (VITAMIN D3 PO)  -- --  --       Sig: Take 3,000 mg by mouth daily AM    Class: Historical    Route:  Oral    escitalopram (LEXAPRO) 10 MG tablet  90 tablet 1 11/26/2024 --   [facility] [address] [place] [address]    Sig: Take 1 tablet (10 mg) by mouth daily.    Class: E-Prescribe    Route: Oral    Fluticasone-Umeclidin-Vilant (TRELEGY ELLIPTA) 100-62.5-25 MCG/ACT oral inhaler  60 each 3 11/5/2024 --   [facility] [address] [place] [address]    Sig: Inhale 1 puff into the lungs daily.    Class: E-Prescribe    Notes to Pharmacy: If too costly- Advair    Route: Inhalation    furosemide (LASIX) 20 MG tablet  -- -- 11/5/2024 --       Sig: Take 1 tablet (20 mg) by mouth as needed (swelling, weight gain).    Class: Historical    Route: Oral    ketoconazole (NIZORAL) 2 % external cream  -- -- 11/30/2022 --       Sig: APPLY TO THE RASH ON FULL BACK TWICE A DAY FOR 4-6 WEEKS    Class: Historical    losartan (COZAAR) 50 MG tablet  90 tablet 1 11/26/2024 --   [address]    Sig: Take 1 tablet (50 mg) by mouth daily.    Class: E-Prescribe    Route: Oral    metoprolol succinate ER (TOPROL XL) 50 MG 24 hr tablet  -- -- 11/5/2024 --       Sig: Take 0.5 tablets (25 mg) by mouth daily.    Class: Historical    Route: Oral    order for DME  1 Device 0 4/5/2018 --   [id number] - [place] - [address]  AT [facility] [address]    Sig: Nebulizer machine and tubing    DX:  Bronchitis    Class: Local Print    spironolactone (ALDACTONE) 25 MG tablet  180 tablet 1 12/9/2024 --   [address]    Sig: Take 2 tablets (50 mg) by mouth daily.    Class: E-Prescribe    Route: Oral    traZODone (DESYREL) 50 MG tablet  180 tablet 3 9/30/2024 --   [address]    Sig: TAKE 1 TO 2 TABLETS BY MOUTH NIGHTLY AS NEEDED    Class: E-Prescribe    warfarin ANTICOAGULANT (COUMADIN) 5 MG tablet  109 tablet 1 9/30/2024 --    [facility] [address] [place] [address] [address] [facility]    Sig: TAKE 1 & 1/2 (ONE & ONE-HALF) TABLETS BY MOUTH MONDAY WEDNESDAY AND FRIDAY AND 1 TABLET ALL OTHER DAYS OR AS DIRECTED BY WARFARIN CLINIC    Class: E-Prescribe            ALLERGIES:   Allopurinol, Amlodipine besylate, Amoxicillin, Atorvastatin, Cephalexin monohydrate, Erythromycin base [erythromycin base], Meloxicam, Sulfa antibiotics, Adhesive tape, Prochlorperazine edisylate, and Prochlorperazine maleate    ROS:  Negative unless otherwise stated in HPI.      Physical Examination:   VITALS:   There were no vitals taken for this visit.    Patient is seen via a billable video visit    CONSTITUTIONAL: healthy, alert and no distress.  PSYCHIATRIC: mentation appears normal and affect normal.  NEURO: Normal movements and speech.  EYES: No jaundice or pallor.  SKIN: No jaundice.   RESP: No audible cough or wheeze.    Labs:    Wellington: 19.4 / renin: 0.2 with ARR of 97  Confirmatory testing with saline infusion with [place] 13 from 25    BMP RESULTS:  Lab Results   Component Value Date     11/05/2024     04/16/2021    POTASSIUM 4.3 12/05/2024    POTASSIUM 3.7 09/16/2022    POTASSIUM 3.3 (L) 04/16/2021    CHLORIDE 103 11/05/2024    CHLORIDE 106 09/16/2022    CHLORIDE 103 04/16/2021    CO2 19 (L) 11/05/2024    CO2 27 09/16/2022    CO2 27 04/16/2021    ANIONGAP 17 (H) 11/05/2024    ANIONGAP 4 09/16/2022    ANIONGAP 7 04/16/2021     (H) 11/05/2024    GLC 99 09/16/2022    GLC 98 04/16/2021    BUN 14.0 11/05/2024    BUN 16 09/16/2022    BUN 9 04/16/2021    CR 1.04 (H) 11/05/2024    CR 0.95 04/16/2021    GFRESTIMATED 58 (L) 11/05/2024    GFRESTIMATED 62 04/16/2021    GFRESTBLACK 72 04/16/2021    JIMENA 9.4 11/05/2024    JIMENA 8.8 04/16/2021        CBC RESULTS:  Lab Results   Component Value Date    WBC 6.8 10/17/2024    WBC 5.8 04/16/2021    RBC 4.36 10/17/2024    RBC 4.66 04/16/2021    HGB 12.9 10/17/2024    HGB 14.1 04/16/2021    HCT 36.8 10/17/2024     HCT 40.2 04/16/2021    MCV 84 10/17/2024    MCV 86 04/16/2021    MCH 29.6 10/17/2024    MCH 30.3 04/16/2021    MCHC 35.1 10/17/2024    MCHC 35.1 04/16/2021    RDW 14.4 10/17/2024    RDW 14.6 04/16/2021     10/17/2024     04/16/2021       INR/PTT:  Lab Results   Component Value Date    INR 2.4 12/30/2024    INR 1.8 (A) 04/21/2022    INR 1.8 (A) 06/07/2021    PTT 38 (H) 07/12/2017       Diagnostic studies:   I reviewed below imaging independently:  CT adrenal protocol 11/18/2024 with left adrenal lipid rich adenoma.     CT abdomen pelvis 8/29/2023 with variant right adrenal vein anatomy with apparent drainage into accessory right hepatic vein.       Radiologist interpretation  IMPRESSION:  12 mm hypodense left adrenal nodule, consistent with benign adenoma.      Assessment   Mrs Avila is a 69 year old female with a history of factor 5 Leiden, migraines, NF1, HFpEF, and medically refractory hypertension found to have primary hyperaldosteronism on laboratory analysis.      I reviewed clinical history, labs and images and she is a candidate for adrenal vein sampling.    The procedure was described to the patient including that this procedure does not provide any therapeutic benefit but is instead to determine if there is a surgical option for her hyperaldosteronism. It was described that this is a same day procedure performed at the [facility] under moderate sedation. The risks of bleeding and infection were discussed. The risk of need for repeat sampling was also discussed due to the possibility of non diagnostic sample. The patient is agreeable and wishes to proceed.    Plan   - plan for adrenal vein sampling under moderate sedation 4-6 weeks after stopping spironolactone (anticipated to change to eplerenone by 1/20/24).  - no warfarin or other medication hold is necessary for the procedure.         CC  Patient Care Team:  Eliz Hartman CNP as PCP - General (Family Practice)  Eliz Hartman CNP  as Assigned PCP  Ashli Tijerina CNP as Assigned Heart and Vascular Provider  Foreign Connors MD as Assigned Endocrinology Provider  SELF, REFERRED      ----- Service Performed and Documented by Resident or Fellow ------        Primary aldosteronism  Adrenal nodule  - AVS    Review of external notes as documented above   Independent interpretation of a test performed by another physician/other qualified health care professional (not separately reported) - CT abdomen pelvis 8/29/2023 and 11/18/2024    Oscar Horn MD    Patient independently seen by me. Imaging and history reviewed. Plan derived by me. I have reviewed this note and agree with the assessment and plan.    40 minutes spent by me on the date of the encounter doing chart review, history and exam, documentation and further activities per the note. More than 50% of that time was spent counseling the patient.     Rose Yu MD    Interventional Radiology  Pager  [phone]    Video-Visit Details     Type of service:  Video Visit     Video Start and End Time: 2:57pm - 3:21 pm    Originating Location (pt. Location): Home     Distant Location (provider location):  [facility] [place]      Platform used for Video Visit: Well         Again, thank you for allowing me to participate in the care of your patient.        Sincerely,        Rose Yu MD    Electronically signed

## 2025-01-17 ENCOUNTER — VIRTUAL VISIT (OUTPATIENT)
Dept: PHARMACY | Facility: CLINIC | Age: 70
End: 2025-01-17
Attending: INTERNAL MEDICINE
Payer: COMMERCIAL

## 2025-01-17 DIAGNOSIS — E26.09 PRIMARY ALDOSTERONISM: Primary | ICD-10-CM

## 2025-01-17 DIAGNOSIS — F51.01 PRIMARY INSOMNIA: ICD-10-CM

## 2025-01-17 DIAGNOSIS — I10 PRIMARY HYPERTENSION: ICD-10-CM

## 2025-01-17 DIAGNOSIS — E55.9 VITAMIN D DEFICIENCY: ICD-10-CM

## 2025-01-17 DIAGNOSIS — I26.99 PULMONARY EMBOLISM AND INFARCTION (H): ICD-10-CM

## 2025-01-17 DIAGNOSIS — I82.401 DEEP VEIN THROMBOSIS (DVT) OF RIGHT LOWER EXTREMITY, UNSPECIFIED CHRONICITY, UNSPECIFIED VEIN (H): ICD-10-CM

## 2025-01-17 DIAGNOSIS — E78.5 HYPERLIPIDEMIA LDL GOAL <100: ICD-10-CM

## 2025-01-17 DIAGNOSIS — F33.1 MODERATE EPISODE OF RECURRENT MAJOR DEPRESSIVE DISORDER (H): ICD-10-CM

## 2025-01-17 NOTE — PROGRESS NOTES
Medication Therapy Management (MTM) Encounter    ASSESSMENT:                            Medication Adherence/Access: See below for considerations    Primary aldosteronism/Hypertension:  Improvement in BP since starting spironolactone last fall, but still above 130/80 most days. Patient referred to me to discuss plan to switch spironolactone to eplerenone, as she has noticed increased drowsiness and dizziness since starting spironolactone.  Currently taking spironolactone 50 mg once daily, and Dr. Connors recommended to transition to eplerenone 50 mg twice daily, which has a better tolerability profile.      Patient inquired if she could take eplerenone 100 mg once per day to simplify medication regimen when she makes the transition.  Although eplerenone is indicated to treat hypertension once daily or twice daily, for primary aldosteronism it is recommended to be given twice daily for optimal effect due to its shorter half-life (Endocrine Society Guidelines  https://doi.org/10.1210/irlanda.7477-5281). Will message Dr. Connors for thoughts on this, but will send prescription for twice daily dosing today as recommended by provider.      recommends monitoring serum potassium and creatinine 3-7 days after initiating eplerenone if patient is on ARB (patient taking losartan) and/or serum potassium 1 month after starting therapy. Given patient has been stable on spironolactone for over 2 months and last potassium WNL, a BMP check after 1 month may be reasonable for a monotoring plan. Will verify with Dr. Connors and place order as appropriate.    Patient has scheduled for adrenal venous sampling with interventional radiology for further management of primary aldosteronism.    Hyperlipidemia/Hx DVT/PE:  LDL above goal of < 100. Previous intolerance to statins warrants trial of PCSK-9 inhibitor to help achieve LDL goal. Will defer discussion to future visits with patient's PCP or cardiology team, but I am happy to  help with coverage/cost/education if patient and providers are agreeable to try this.     MDD/Insomnia:   Appears stable.    Hx Vitamin D Deficiency:  Last vitamin D lab obtained 3 months ago WNL, discontinued vitamin D3 3000 units daily last April after lab was elevated (60 ng/mL). Appears the plan was to hold this for one month and then to resume 2000 units daily , but patient has not been taking this. Will clarify with PCP if patient should resume this.    PLAN:                            Reviewed side effect profile and MOA of eplerenone vs. Spironolactone. Patient agreeable to switch to eplerenone today.    STOP spironolactone and START eplerenone 50 mg twice daily. Prescription sent today. Sent message to Dr. Connors to see if once daily dosing would be reasonable per patient preference, but patient will start twice daily dosing until we hear back from her.    Will consider BMP at 1 month, but will verify with Dr. Connors before I place the order.     Patient will contact me if she has any issues with side effects with eplerenone and will continue her home blood pressure monitoring plan     Sent message to patient's PCP about plan for restarting Vitamin D3    Medication reconciliation complete and updated EHR accordingly    Endocrine Team & Next Follow-Up:  3/11/2025 with Dr. Connors  [facility]    SUBJECTIVE/OBJECTIVE:                          Kaitlin Avila is a 69 year old female seen for an initial visit. She was referred to me from Foreign Connors.    Reason for visit: MTM.    Allergies/ADRs: Reviewed in chart  Past Medical History: Reviewed in chart  Social History     Tobacco Use    Smoking status: Former     Current packs/day: 0.00     Average packs/day: 1 pack/day for 30.0 years (30.0 ttl pk-yrs)     Types: Cigarettes, Pipe     Start date: 1969     Quit date: 2000     Years since quittin.0     Passive exposure: Past    Smokeless tobacco: Never    Tobacco  "comments:     quit in    Vaping Use    Vaping status: Never Used   Substance Use Topics    Alcohol use: No    Drug use: No        Medication Adherence/Access: No concerns identified -- adherence reflected well in the dispense report. Patient knows medication regimen well.    Primary aldosteronism/Hypertension:  Spironolactone 50 mg once daily (started last )  Losartan 50 mg daily  Metoprolol XL 25 mg daily    Patient reports  dizziness and \"extreme tiredness\" -- started about two months ago (early November) after starting spironolactone.  had back surgery at end of November, and taking care of him so might have played a part in this as well, but Dr. Connors would like us to discuss switching spironolactone to eplerenone today .   Patient self monitors blood pressure.  Home BP monitorins/80s (patient reported). No remarkable symptoms of high blood pressure. Patient notes her blood pressure has improved since starting spironolactone (previously 160-170s/100-90s).  Patient to schedule adrenal venous sampling with interventional radiology for further management of primary aldosteronism.    Potassium   Date Value Ref Range Status   2024 4.3 3.4 - 5.3 mmol/L Final   2022 3.7 3.4 - 5.3 mmol/L Final   2021 3.3 (L) 3.4 - 5.3 mmol/L Final     Pertinent history from last visit note with Dr. Connors (2024):  Aldosterone of 19.4 with renin activity 0.2, ARR 97  Confirmatory test Aldosterone of 25.1 with renin activity 0.3 and 4 hours after normal saline infusion aldosterone was 13.9 which confirms the diagnosis of primary aldosteronism.   CT scan from 24 and agree with the interpretation of left adrenal nodule measuring about 1.2 cm in diameter with Hounsfield units <10 consistent with adrenal adenoma.  Blood pressure previously systolic 200s however since spironolactone to 50 mg daily added blood pressure 120s which also supports a diagnosis of primary aldosteronism.  Adrenal " "venous sampling is a next step.  After discussing risk and benefit decision was made to proceed with adrenal venous sampling and referral to interventional radiology placed.  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  Due to blood pressure running in the 200s prior to starting spironolactone, it is unsafe to stop MRA at this time.  Her renin activity is currently at 0.2-0.3 therefore allows adrenal venous sampling without discontinuing MRA.  Will reach out to interventional radiology team.     Hx DVT/PE:  Aspirin 81 mg daily  Furosemide 20 mg as needed if gain > 3 lb in 24-48 hours (infrequent use per patient)  Warfarin 7.5 mg on Mondays, 5 mg all other days    INR goal 2-3, follows anticoagulation team closely.  No reports of side effects.    Estimated body mass index is 36.61 kg/m  as calculated from the following:    Height as of 12/9/24: 5' 5\" (1.651 m).    Weight as of 12/9/24: 220 lb (99.8 kg).    Wt Readings from Last 3 Encounters:   12/09/24 220 lb (99.8 kg)   12/05/24 223 lb (101.2 kg)   11/05/24 223 lb (101.2 kg)     MDD/Insomnia:   Escitalopram 10 mg daily.  Trazodone 50 mg hs  No concerns today.      9/29/2023    10:05 AM 4/1/2024     8:52 AM 10/15/2024     9:00 AM   PHQ   PHQ-9 Total Score 1 2 8   Q9: Thoughts of better off dead/self-harm past 2 weeks Not at all Not at all Not at all     Hx Vitamin D Deficiency:  Previously taking vitamin D3 3000 units daily, but was told to discontinue this in April last year and has not restarted this yet. It appears that PCP planned to restart 2000 units daily after 1 month of holding her 3000 unit dose.         ----------------  I spent 30 minutes with this patient today. All changes were made via collaborative practice agreement with Foreign Connors.     A summary of these recommendations was given to the patient.    Enzo Burns, PharmD, " [facility]  Endocrine & Diabetes MTM Pharmacist  [address] [place] [address]    Telemedicine Visit Details  The patient's medications can be safely assessed via a telemedicine encounter.  Type of service:  Video Conference via [employer]  Originating Location (pt. Location): Home    Distant Location (provider location):  On-site  Start Time:  11AM  End Time:  11:30AM     Medication Therapy Recommendations  Primary aldosteronism   1 Current Medication: eplerenone (INSPRA) 50 MG tablet   Current Medication Sig: Take 1 tablet (50 mg) by mouth 2 times daily.   Rationale: Does not understand instructions - Adherence - Adherence   Recommendation: Provide Education   Status: Accepted per CPA   Identified Date: 1/17/2025 Completed Date: 1/17/2025

## 2025-01-21 ENCOUNTER — ANTICOAGULATION THERAPY VISIT (OUTPATIENT)
Dept: ANTICOAGULATION | Facility: OTHER | Age: 70
End: 2025-01-21
Payer: MEDICARE

## 2025-01-21 DIAGNOSIS — I26.99 PULMONARY EMBOLISM AND INFARCTION (H): ICD-10-CM

## 2025-01-21 DIAGNOSIS — I82.401 DEEP VEIN THROMBOSIS (DVT) OF RIGHT LOWER EXTREMITY, UNSPECIFIED CHRONICITY, UNSPECIFIED VEIN (H): ICD-10-CM

## 2025-01-21 DIAGNOSIS — Z79.01 LONG TERM CURRENT USE OF ANTICOAGULANT THERAPY: Primary | ICD-10-CM

## 2025-01-21 LAB — INR HOME MONITORING: 2 (ref 2–3)

## 2025-01-21 NOTE — PROGRESS NOTES
ANTICOAGULATION  MANAGEMENT-Home Monitor Managed by Exception    Cassy APPLE Avila 69 year old female is on warfarin with therapeutic INR result. (Goal INR 2.0-3.0)    Recent labs: (last 7 days)     01/21/25  0000   INR 2.0       Previous INR was Therapeutic  Medication, diet, health changes since last INR:Plan   - plan for adrenal vein sampling under moderate sedation 4-6 weeks after stopping spironolactone (anticipated to change to eplerenone by 1/20/24).  - no warfarin or other medication hold is necessary for the procedure.   Contacted within the last 12 weeks by phone on 12/10/24  Last [facility] referral date: 11/05/2024      PLAN     Cassy was NOT contacted regarding therapeutic result today per home monitoring policy manage by exception agreement.   Current warfarin dose is to be continued:     Summary  As of 1/21/2025      Full warfarin instructions:  7.5 mg every Mon; 5 mg all other days   Next INR check:  2/4/2025             ?   Corry Galvan RN  Anticoagulation Clinic  1/21/2025    _______________________________________________________________________     Anticoagulation Episode Summary       Current INR goal:  2.0-3.0   TTR:  86.7% (1 y)   Target end date:  Indefinite   Send INR reminders to:  [facility] [place]    Indications    Long-term (current) use of anticoagulants [Z79.01] [Z79.01]  Pulmonary embolism and infarction (H) [I26.99]  Deep vein thrombosis (DVT) (H) [I82.409] (Resolved) [I82.409]  Deep vein thrombosis (DVT) of right lower extremity  unspecified chronicity  unspecified vein (H) [I82.401]             Comments:  [employer] Home Monitoring. Q2 week testing  Call cell phone with any dosing.             Anticoagulation Care Providers       Provider Role Specialty Phone number    Eliz Hartman CNP Referring Family Medicine [phone]

## 2025-01-22 ENCOUNTER — MYC MEDICAL ADVICE (OUTPATIENT)
Dept: ENDOCRINOLOGY | Facility: CLINIC | Age: 70
End: 2025-01-22
Payer: COMMERCIAL

## 2025-01-22 ENCOUNTER — TELEPHONE (OUTPATIENT)
Dept: ENDOCRINOLOGY | Facility: CLINIC | Age: 70
End: 2025-01-22

## 2025-01-22 DIAGNOSIS — E26.09 PRIMARY ALDOSTERONISM: Primary | ICD-10-CM

## 2025-01-22 RX ORDER — EPLERENONE 50 MG/1
50 TABLET, FILM COATED ORAL 2 TIMES DAILY
Qty: 60 TABLET | Refills: 3 | Status: SHIPPED | OUTPATIENT
Start: 2025-01-22

## 2025-01-22 NOTE — TELEPHONE ENCOUNTER
Prior Authorization Retail Medication Request    Medication/Dose: Eplerenone 50 mg    [email or web address]  Key: [id number]    Gilma Harrison CMA  Adult Endocrinology  [facility], [place]

## 2025-01-22 NOTE — TELEPHONE ENCOUNTER
PA submitted, will follow-up with plan Friday. Patient aware.    Enzo Burns, PharmD, [facility] [place] [facility] [place]  Endocrine & Diabetes [place] Pharmacist  [address] [place] [id number]

## 2025-01-22 NOTE — TELEPHONE ENCOUNTER
Met with patient last Friday and order sent. No action required at this time by [facility] endo team -- I replied in separate [facility] encounter from 1/21/25.    Enzo Burns, PharmD, [facility] [place] [facility]  Endocrine & Diabetes [facility] Pharmacist  [address] [place] [phone]

## 2025-01-22 NOTE — TELEPHONE ENCOUNTER
I will help with this PA -- going into CMM encounter shortly to complete this.    Enzo Burns, PharmD, [facility]  Endocrine & Diabetes MTM Pharmacist  [address] [place] [address]

## 2025-02-06 ENCOUNTER — MYC MEDICAL ADVICE (OUTPATIENT)
Dept: CARDIOLOGY | Facility: OTHER | Age: 70
End: 2025-02-06

## 2025-02-06 DIAGNOSIS — I10 ESSENTIAL HYPERTENSION: ICD-10-CM

## 2025-02-06 RX ORDER — METOPROLOL SUCCINATE 50 MG/1
25 TABLET, EXTENDED RELEASE ORAL DAILY
Qty: 45 TABLET | Refills: 1 | Status: SHIPPED | OUTPATIENT
Start: 2025-02-06

## 2025-02-06 NOTE — TELEPHONE ENCOUNTER
Looks like medication was put in as historical instead of ordered at 11/5/24 visit. Please reorder    Last Office Visit: 11/5/2024  Future Office visit:    Next 5 appointments (look out 90 days)      Feb 11, 2025 11:00 AM  (Arrive by 10:45 AM)  Return Visit with Ashli Tijerina CNP  [facility] ([facility] ) [address] [place] [id number] [phone]     Apr 18, 2025 10:00 AM  (Arrive by 9:45 AM)  Provider Visit with Eliz Hartman CNP  [facility] ([facility] ) [address] [place] [address]  [phone]             Routing refill request to provider for review/approval because:        metoprolol succinate ER (TOPROL XL) 50 MG 24 hr tablet -- -- 11/5/2024 -- No   Sig - Route: Take 0.5 tablets (25 mg) by mouth daily. - Oral   Class: Historical   Order: [id number]      4/6/2022).      Raul Wheeler MD  03/09/2022

## 2025-02-06 NOTE — TELEPHONE ENCOUNTER
Requested Prescriptions   Pending Prescriptions Disp Refills    metoprolol succinate ER (TOPROL XL) 50 MG 24 hr tablet 45 tablet 1     Sig: Take 0.5 tablets (25 mg) by mouth daily.       Beta-Blockers Protocol Passed - 2/6/2025 10:45 AM        Passed - Most recent blood pressure under 140/90 in past 12 months     BP Readings from Last 3 Encounters:   12/05/24 126/62   11/05/24 108/60   10/22/24 (!) 152/66       Systolic (Patient Reported): 0 (None to report) (1/14/2025  2:35 PM)  Diastolic (Patient Reported): 0 (None to report) (1/14/2025  2:35 PM)              Passed - Patient is age 6 or older        Passed - Medication is active on med list        Passed - Medication indicated for associated diagnosis     Medication is associated with one or more of the following diagnoses:     Hypertension (HTN)   Atrial fibrillation/flutter   Angina   ASCVD   Migraine   Heart Failure   Tremor   Anxiety   Ocular hypertension   Glaucoma   PTSD   Obstructive hypertrophic cardiomyopathy   History of myocarditis   Palpitations   POTS (postural orthostatic tachycardia syndrome)   SVT (supraventricular tachycardia)   Hyperthyroidism   Tachycardia   Acute non-st segment elevation myocardial infarction   Subsequent non-st segment elevation myocardial infarction   Ischemic myocardial dysfunction          Passed - Recent (12 mo) or future (90 days) visit within the authorizing provider's specialty     The patient must have completed an in-person or virtual visit within the past 12 months or has a future visit scheduled within the next 90 days with the authorizing provider s specialty.  Urgent care and e-visits do not qualify as an office visit for this protocol.             Prescription approved per [facility] Refill Protocol.

## 2025-02-11 ENCOUNTER — ANTICOAGULATION THERAPY VISIT (OUTPATIENT)
Dept: ANTICOAGULATION | Facility: OTHER | Age: 70
End: 2025-02-11
Attending: NURSE PRACTITIONER
Payer: COMMERCIAL

## 2025-02-11 ENCOUNTER — OFFICE VISIT (OUTPATIENT)
Dept: CARDIOLOGY | Facility: OTHER | Age: 70
End: 2025-02-11
Attending: NURSE PRACTITIONER
Payer: MEDICARE

## 2025-02-11 VITALS
RESPIRATION RATE: 20 BRPM | OXYGEN SATURATION: 96 % | DIASTOLIC BLOOD PRESSURE: 70 MMHG | HEIGHT: 65 IN | HEART RATE: 79 BPM | WEIGHT: 225.2 LBS | SYSTOLIC BLOOD PRESSURE: 126 MMHG | BODY MASS INDEX: 37.52 KG/M2

## 2025-02-11 DIAGNOSIS — I82.401 DEEP VEIN THROMBOSIS (DVT) OF RIGHT LOWER EXTREMITY, UNSPECIFIED CHRONICITY, UNSPECIFIED VEIN (H): ICD-10-CM

## 2025-02-11 DIAGNOSIS — E26.9 HYPERALDOSTERONISM: ICD-10-CM

## 2025-02-11 DIAGNOSIS — E66.01 MORBID OBESITY (H): ICD-10-CM

## 2025-02-11 DIAGNOSIS — I26.99 PULMONARY EMBOLISM AND INFARCTION (H): ICD-10-CM

## 2025-02-11 DIAGNOSIS — Z79.01 ON CONTINUOUS ORAL ANTICOAGULATION: ICD-10-CM

## 2025-02-11 DIAGNOSIS — E78.5 HYPERLIPIDEMIA LDL GOAL <100: ICD-10-CM

## 2025-02-11 DIAGNOSIS — D68.51 FACTOR V LEIDEN MUTATION: ICD-10-CM

## 2025-02-11 DIAGNOSIS — R06.09 DYSPNEA ON EXERTION: ICD-10-CM

## 2025-02-11 DIAGNOSIS — Z79.01 LONG TERM CURRENT USE OF ANTICOAGULANT THERAPY: Primary | ICD-10-CM

## 2025-02-11 DIAGNOSIS — J43.9 PULMONARY EMPHYSEMA, UNSPECIFIED EMPHYSEMA TYPE (H): ICD-10-CM

## 2025-02-11 DIAGNOSIS — I1A.0 RESISTANT HYPERTENSION: Primary | ICD-10-CM

## 2025-02-11 LAB — INR HOME MONITORING: 2.2 (ref 2–3)

## 2025-02-11 PROCEDURE — G0463 HOSPITAL OUTPT CLINIC VISIT: HCPCS

## 2025-02-11 ASSESSMENT — PAIN SCALES - GENERAL: PAINLEVEL_OUTOF10: NO PAIN (0)

## 2025-02-11 NOTE — PROGRESS NOTES
ANTICOAGULATION MANAGEMENT     Cassy Avila 69 year old female is on warfarin with therapeutic INR result. (Goal INR 2.0-3.0)    Recent labs: (last 7 days)     02/11/25  0000   INR 2.2       ASSESSMENT     Source(s): Chart Review and Patient/Caregiver Call     Warfarin doses taken: Warfarin taken as instructed  Diet: No new diet changes identified  New illness, injury, or hospitalization: No  Medication/supplement changes:  Inspra started on 1/22/25 No interaction anticipated  Signs or symptoms of bleeding or clotting: No  Previous INR: Therapeutic last 2(+) visits  Additional findings: Upcoming surgery/procedure 2/28/25- continue warfarin as normal       PLAN     Recommended plan for no diet, medication or health factor changes affecting INR     Dosing Instructions: Continue your current warfarin dose with next INR in 2 weeks       Summary  As of 2/11/2025      Full warfarin instructions:  7.5 mg every Mon; 5 mg all other days   Next INR check:  2/25/2025               Telephone call with Kaitlin who verbalizes understanding and agrees to plan    Patient to recheck with home meter    Education provided: Please call back if any changes to your diet, medications or how you've been taking warfarin    Plan made per [facility] anticoagulation protocol    Ashanti Velazquez RN  Anticoagulation Clinic  2/11/2025    _______________________________________________________________________     Anticoagulation Episode Summary       Current INR goal:  2.0-3.0   TTR:  86.7% (1 y)   Target end date:  Indefinite   Send INR reminders to:  [facility] [place]    Indications    Long-term (current) use of anticoagulants [Z79.01] [Z79.01]  Pulmonary embolism and infarction (H) [I26.99]  Deep vein thrombosis (DVT) (H) [I82.409] (Resolved) [I82.409]  Deep vein thrombosis (DVT) of right lower extremity  unspecified chronicity  unspecified vein (H) [I82.401]             Comments:  [employer] Home Monitoring. Q2 week testing  Call cell phone with any  dosing.             Anticoagulation Care Providers       Provider Role Specialty Phone number    Eliz Hartman CNP Referring Family Medicine [phone]

## 2025-02-11 NOTE — PATIENT INSTRUCTIONS
Thank you for allowing Ashil Tijerina CNP and our  team to participate in your care. Please call our office at [phone] with scheduling questions or if you need to cancel or change your appointment. With any other questions or concerns you may call cardiology nurse at  [phone].       If you experience chest pain, chest pressure, chest tightness, shortness of breath, fainting, lightheadedness, nausea, vomiting, or other concerning symptoms, please report to the Emergency Department or call 911. These symptoms may be emergent, and best treated in the Emergency Department.

## 2025-02-11 NOTE — PROGRESS NOTES
[facility] HEART CARE   CARDIOLOGY PROGRESS NOTE     Chief Complaint   Patient presents with    Follow Up     3 month          Diagnosis:    ICD-10-CM    1. Resistant hypertension  I1A.0       2. Hyperaldosteronism  E26.9       3. Dyspnea on exertion  R06.09       4. Pulmonary emphysema, unspecified emphysema type (H)  J43.9       5. Hyperlipidemia LDL goal <100  E78.5       6. Factor V Leiden mutation  D68.51       7. On continuous oral anticoagulation  Z79.01       8. Obesity (BMI 35.0-39.9) with comorbidity (H)  E66.01                 Assessment/Plan:    Dyspnea on exertion  Atypical chest discomfort  Racing sensation in chest   NF-1  Reviewed zio patch- no significant concerns. Some symptomatic PACs, PVCs and short non-sustained SVT. Given average heart rate of 63 bpm no adjustment in beta-blocker at this time  Given cardiovascular risk factors and progressive symptoms over the last month she underwent NM lexiscan stress testing 9/25/2024 which was negative for inducible myocardial ischemia or infarction. No EKG changes.  Transthoracic echocardiogram 9/20/2024  No significant findings. Normal LV systolic function with LVEF 55-60%  Suspecting HFpEF could be contributing. Low sodium diet, daily weights, furosemide as needed.       Hypertension  Adrenal gland mass (CT abdomen 2023)  Hyperaldosteronism  BPs improved with spironolactone. Switched to eplerenone due to significant fatigue and this seemed to help. Has upcoming venous sampling at [facility].   Continue current medications without change.     BP Readings from Last 6 Encounters:   02/11/25 126/70   12/05/24 126/62   11/05/24 108/60   10/22/24 (!) 152/66   10/16/24 (!) 175/76   10/15/24 (!) 162/88       Hyperlipidemia with LDL goal less than 100, uncontrolled  Carotid artery calcifications without stenosis 8/2024 CTA head/neck  Statin would be recommended.   She is not on a statin medication. Prior side effects. Recent improvement in LDL. Continue lifestyle changes  "to see if this can get under 100. We could consider zetia versus PCSK-9 inhibitor.   Heart healthy lifestyle encouraged    Recent Labs   Lab Test 10/17/24  0801 04/01/24  0926   CHOL 163 192   HDL 38* 35*   * 127*   TRIG 106 148       The 10-year ASCVD risk score (Rupert NAQVI, et al., 2019) is: 12.3%    Values used to calculate the score:      Age: 69 years      Sex: Female      Is Non- : No      Diabetic: No      Tobacco smoker: No      Systolic Blood Pressure: 126 mmHg      Is BP treated: Yes      HDL Cholesterol: 38 mg/dL      Total Cholesterol: 163 mg/dL      Obesity  Heart healthy lifestyle  Body mass index is 37.48 kg/m .    Wt Readings from Last 5 Encounters:   02/11/25 102.2 kg (225 lb 3.2 oz)   12/09/24 99.8 kg (220 lb)   12/05/24 101.2 kg (223 lb)   11/05/24 101.2 kg (223 lb)   10/22/24 100.7 kg (222 lb)       Factor V leiden  History of pulmonary embolus  Chronic oral anticoagulation  Diagnosed after surgery on her leg. No recurrence.   INR levels have been therapeutic  US RLE 5/2024: negative for DVT    Follow-up in 6 months, certainly sooner with acute concerns.      Interval history:  Cassy \"Kaitlin\" Austin is a pleasant 69-year old female who presents for cardiology follow-up. Prior visit was initial consult regarding irregular heart rate and T wave changes on EKG, progressive dyspnea on exertion. She has a cardiovascular history including hyperlipidemia, hypertension. She has a non-cardiac medical history including Factor V Leiden with history of pulmonary embolus on chronic oral anticoagulation, depression, insomnia.       Today, Kaitlin presents without significant concerns.  BPs have been much improved and she is following with endocrinology at [facility].   [place] significantly fatigued on spironolactone. Has improved with switch to eplerenone.   Dyspnea on exertion persists.   LE edema has been stable. No orthopnea or PND.  She does continue with intermittent episodes of \"a fast " "hard pounding.\" Typically occurs at rest. Lasts 15-20 seconds and resolves. This occurs at most a few times per week.   She has been working on losing weight- eating more vegetables, yogurt  Daily weights and monitoring symptoms. Taking every couple of weeks.     Smoking History: Started smoking at age 12, quit smoking around age 49. At peak was smoking 0.5 ppd.    Alcohol History: None    Substance Abuse History: No history of elicit substance use. History of fen-phen use.    Sleep History: Sleeps in bed. Usually goes to bed around 10/10:30 p.m. Wakes up around 7/7:30 am. Wakes up most mornings in the last month feeling tired \"and like I want to go back to bed.\" She has been taking naps lately due to fatigue- usually 1 nap- lasting 2 hours. She does feel rested when she wakes up from this nap. She does snore. No witnessed apnea. Sleep study in . Lately has had some orthopnea. No PND.     Family History:   - Maternal grandfather: heart attack at age 72  - Maternal grandmother: heart disease due to diabetes age 59  - Mother: CHF, pacemaker  - Father:  of MI at the age of 49 years old  - Father had a large family- several members less than 50 with history of heart attacks  - Brother: atrial fibrillation  - Sister: atrial fibrillation    HPI:    Cassy \"Kaitlin\" Austin is a pleasant 68-year old female who presents for cardiology consult regarding irregular heart rate and T wave changes on EKG. She has a cardiovascular history including hyperlipidemia, hypertension. She has a non-cardiac medical history including Factor V Leiden with history of pulmonary embolus on chronic oral anticoagulation, depression, insomnia.       Today, Kaitlin is concerned about recent elevated blood pressures, progressive dyspnea on exertion and fatigue.   \"My blood pressure is still high and I can feel some pounding. I get so short of breath and tired. Sometimes my pulse is really down. Chest discomfort.\"   She has been taking her blood " "pressures at home. When they are good usually 120-130 systolic. Lately, in the last month, her blood pressures have been elevated 180-200 systolic and 100 diastolic. She has been more active with the nicer weather this summer. Symptoms of fatigue, dyspnea, pounding in chest is also newer.   She describes occasional \"heavy feeling\" in the mid-sternal chest area. She does admit that when she presses on the area her chest wall does hurt. No radiation of discomfort. Chest discomfort can come on at rest, laying, sitting, moving. She has had this discomfort every day. She does not usually wake up with discomfort.   She was able to walk to her sister's house about a block away. In the past month she has been feeling so short of breath she feels like she is going to pass out. \"Going up and down the basement steps about does me in. Walking down the [place] at [facility] is tough.\" She estimates she could not even walk a block without needing to rest. Prior to onset of symptoms in the last month she was able to walk down 4 blocks \"no problem.\"   She gets sensation of \"pounding\" randomly. Usually lasts maybe a minute, no longer than 2 minutes, and goes back to normal. She gets associated lightheadedness.   Fatigue- per  \"she has been sleeping a lot.\"   She has a history of bilateral LE edema. 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 Ankle is always swollen. Sometimes has a little swelling in left leg.         RELEVANT TESTING:    NM Lexiscan Stress test 9/2024  Narrative    The nuclear stress test is negative for inducible myocardial ischemia  or infarction.    Left ventricular function is normal.    The left ventricular ejection fraction at rest is 75%.  The left  ventricular ejection fraction at stress is 82%.    A prior study was conducted on 10/11/2019.      ECG Summary    ECG Baseline electrocardiogram demonstrates sinus rhythm.   The patient did not develop any acute EKG changes or arrhythmias during the Lexiscan " portion of the study       Transthoracic Echocardiogram 9/2024  Interpretation Summary  Global and regional left ventricular function is normal with an EF of 55-60%.  Left ventricular wall thickness is normal. Left ventricular size is normal.  Left ventricular diastolic function is indeterminate. No regional wall motion  abnormalities are seen.  Right ventricular function, chamber size, wall motion, and thickness are  normal.  Pulmonary artery systolic pressure cannot be assessed.   The inferior vena cava is normal.  No pericardial effusion is present.  There is no prior study for direct comparison.    Zio Patch 8/2024  Conclusion  Zio XT patch report on Cassy Avila.  Ordered secondary to hypertension.   Worn for 13 days and 21 hr's.  After removing artifact, total time was 13 days and 5 hr's. Placed on August 29, 2024 at 1:39 PM and completed on 8/12/2024 at 10:22 AM.     Underlying rhythm was sinus.   Hrt rate ranged from 48 bpm, maximum heart rate of 182 bpm, averaging 63 bpm.   No significant bradycardia, pauses, Mobitz type II or 3rd degree heart block.   No atrial fibrillation on this study.   x 15 triggered events and x 15 diary entries.  These corresponded to SVT, normal sinus rhythm, SVE's and VE's.   x 0 runs of VT.     x 13 runs of SVT longest lasting 7 beats with a maximum heart rate of 182 bpm.   Rare, <1% of PAC's, atrial couplets, atrial triplets, and PVC's.   No episodes of ventricular bigeminy.   No episodes of ventricular trigeminy.      Facundo Pittman, DO     Zio Patch 2020  Conclusion  Zio XT patch report on Cassy Avila.  Ordered secondary to palpitations.   Worn for 6 days and 21 hr's.  After removing artifact, total time was 6 days and 21 hr's. Placed on 10/27/2020 at 1:23 PM and completed on 11/3/2020 at 9:29 AM.     Underlying rhythm was sinus.   Hrt rate ranged from 51 bpm, maximum heart rate of 167 bmp, averaging 79 bmp.   No significant bradycardia, pauses, 2nd degree Mobitz  type II or 3rd degree heart block.   No atrial fibrillation was identified on this study.   x2 triggered events and x2 diary entries.  These corresponded to SVE, VE, and sinus rhythm.   x0 runs of VT.     x3 runs of SVT lasting up to 19 beats with a maximum heart rate of 167 bmp.   Rare, less than 1% of PAC's, atrial couplets, atrial triplets, and ventricular couplets.   Occasional PVC's at 1.8%.   + episodes of ventricular bigeminy lasting up to 6.7 sec's.   + episodes of ventricular trigeminy lasting up to 15.3 sec's.    Transthoracic Echocardiogram 2016  ASSESSMENT:  Echocardiographic study revealing  1.  Normal left ventricular size and systolic function.  Ejection  fraction is 60%.  2.  Borderline left atrial enlargement.  3.  Normal right ventricular size and function.  4.  Trace mitral regurgitation.  5.  Normal aortic valve.  6.  Trace tricuspid regurgitation.  Peak systolic pressure of the  right ventricle is estimated at 23 plus right atrium.  Exam Date: Randall 15, 2016 10:03:57 AM  Author: MARIYA AGUILERA      Past Medical History:   Diagnosis Date    Abdominal pain, generalized 02/08/2021    Arthritis     Cervicalgia 01/09/2001    Closed dislocation of shoulder, unspecified site 2000    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 Provider to review    Factor V Leiden     Factor V Leiden mutation 07/26/2019    Family history of colon cancer 01/02/2018    Gastro-oesophageal reflux disease     Herpes zoster without mention of complication 2003    resolved from problem list    Hyperlipidemia LDL goal <100 07/11/2002     Problem list name updated by automated  process. Provider to review    Long term (current) use of anticoagulants 2003    Major depression 2014    Mammographic microcalcification     resolved from problem list    Migraine, unspecified, without mention of intractable migraine without mention of status migrainosus 2001    Moderate episode of recurrent major depressive disorder (H) 2014    Neurofibromatosis, peripheral, NF1 (H) 2012     Problem list name updated by automated process. Provider to review    Neurofibromatosis, unspecified(237.70) 2012    Other and unspecified hyperlipidemia 2002    Other chronic pain     Other pulmonary embolism and infarction 2003    Primary insomnia 10/31/2018    Statin medication not prescribed per physician orders 2018    Tachycardia, unspecified 2001    Thrombosis of leg     Unspecified essential hypertension 2001    Vitamin D deficiency 2018       Past Surgical History:   Procedure Laterality Date    ------------OTHER-------------      shoulder replacement; Provider: Karen    ARTHROPLASTY KNEE  2014    Procedure: ARTHROPLASTY KNEE;  Surgeon: Sean Alexander MD;  Location: [facility] OR    ARTHROSCOPY SHOULDER  2012    right, bone spurs    BIOPSY      CA ANESTH,SHOULDER REPLACEMENT Right 2020     SECTION      x3    CHOLECYSTECTOMY      COLONOSCOPY  02/15/2018    [place],,polyps    elbow ulnar tunnel release      ELECTROTHERMAL THERAPY INTRADISC  2017    stimulator    ENDOSCOPIC SINUS SURGERY, SEPTOPLASTY, TURBINOPLASTY, MAXILLARY SINUSOTOMY, COMBINED N/A 2015    Procedure: COMBINED ENDOSCOPIC SINUS SURGERY, SEPTOPLASTY, TURBINOPLASTY, MAXILLARY SINUSOTOMY;  Surgeon: Seema Conn MD;  Location: [facility] OR    ENT SURGERY      ESOPHAGOSCOPY, GASTROSCOPY, DUODENOSCOPY (EGD), COMBINED      with biopsy and endoscopic U/S    EXCISE NEUROMA LOWER EXTREMITY Left 2016    Procedure: EXCISE NEUROMA LOWER  EXTREMITY;  Surgeon: Edi Reed MD;  Location: [facility] OR    EYE SURGERY Right 09/2020    FUSION LUMBAR ANTERIOR WITH BAK CAGES      L5-S1    HYSTERECTOMY TOTAL ABDOMINAL, BILATERAL SALPINGO-OOPHORECTOMY, COMBINED N/A     ORTHOPEDIC SURGERY  02/2015    right shoulder    ORTHOPEDIC SURGERY  08/28/2015    right knee    ORTHOPEDIC SURGERY Right 06/11/2018    hip labrum tear    pionidal cyst excision      TRANSPOSITION ULNAR NERVE (ELBOW)         Allergies   Allergen Reactions    Allopurinol Shortness Of Breath    Amlodipine Besylate Swelling     Norvasc    Amoxicillin     Atorvastatin      myualgia    Cephalexin Monohydrate Hives     Keflex    Erythromycin Base [Erythromycin Base] Nausea and Vomiting    Meloxicam Other (See Comments)     Mobic - confusion, depression    Sulfa Antibiotics Hives    Adhesive Tape Rash    Prochlorperazine Edisylate Swelling and Rash     Compazine    Prochlorperazine Maleate Swelling and Rash       Current Outpatient Medications   Medication Sig Dispense Refill    albuterol (PROVENTIL) (2.5 MG/3ML) 0.083% neb solution Take 2.5 mg by nebulization every 6 hours as needed for shortness of breath / dyspnea or wheezing      aspirin 81 MG EC tablet Take 81 mg by mouth daily HS      eplerenone (INSPRA) 50 MG tablet Take 1 tablet (50 mg) by mouth 2 times daily. 60 tablet 3    escitalopram (LEXAPRO) 10 MG tablet Take 1 tablet (10 mg) by mouth daily. 90 tablet 1    furosemide (LASIX) 20 MG tablet Take 1 tablet (20 mg) by mouth as needed (swelling, weight gain).      ketoconazole (NIZORAL) 2 % external cream APPLY TO THE RASH ON FULL BACK TWICE A DAY FOR 4-6 WEEKS      losartan (COZAAR) 50 MG tablet Take 1 tablet (50 mg) by mouth daily. 90 tablet 1    metoprolol succinate ER (TOPROL XL) 50 MG 24 hr tablet Take 0.5 tablets (25 mg) by mouth daily. 45 tablet 1    order for DME Nebulizer machine and tubing    DX:  Bronchitis 1 Device 0    traZODone (DESYREL) 50 MG tablet TAKE 1 TO 2 TABLETS BY MOUTH  NIGHTLY AS NEEDED 180 tablet 3    warfarin ANTICOAGULANT (COUMADIN) 5 MG tablet TAKE 1 & 1/2 (ONE & ONE-HALF) TABLETS BY MOUTH  AND FRIDAY AND 1 TABLET ALL OTHER DAYS OR AS DIRECTED BY [facility] 109 tablet 1    Cholecalciferol (VITAMIN D3 PO) Take 3,000 mg by mouth daily AM (Patient not taking: Reported on 2025)         Social History     Socioeconomic History    Marital status:      Spouse name: Not on file    Number of children: Not on file    Years of education: Not on file    Highest education level: Not on file   Occupational History    Not on file   Tobacco Use    Smoking status: Former     Current packs/day: 0.00     Average packs/day: 1 pack/day for 30.0 years (30.0 ttl pk-yrs)     Types: Cigarettes, Pipe     Start date: 1969     Quit date: 2000     Years since quittin.1     Passive exposure: Past    Smokeless tobacco: Never    Tobacco comments:     quit in    Vaping Use    Vaping status: Never Used   Substance and Sexual Activity    Alcohol use: No    Drug use: No    Sexual activity: Not Currently     Partners: Male   Other Topics Concern     Service Not Asked    Blood Transfusions Yes    Caffeine Concern Yes     Comment: 2 cups coffee daily    Occupational Exposure Not Asked    Hobby Hazards Not Asked    Sleep Concern Not Asked    Stress Concern Not Asked    Weight Concern Not Asked    Special Diet Not Asked    Back Care Not Asked    Exercise Yes     Comment: walking, aerobic daily (5-10 hrs/week)    Bike Helmet Not Asked    Seat Belt Not Asked    Self-Exams Not Asked    Parent/sibling w/ CABG, MI or angioplasty before 65F 55M? No   Social History Narrative    Not on file     Social Drivers of Health     Financial Resource Strain: Low Risk  (10/15/2024)    Financial Resource Strain     Within the past 12 months, have you or your family members you live with been unable to get utilities (heat, electricity) when it was really needed?: No   Food  Insecurity: Low Risk  (10/15/2024)    Food Insecurity     Within the past 12 months, did you worry that your food would run out before you got money to buy more?: No     Within the past 12 months, did the food you bought just not last and you didn t have money to get more?: No   Transportation Needs: Low Risk  (10/15/2024)    Transportation Needs     Within the past 12 months, has lack of transportation kept you from medical appointments, getting your medicines, non-medical meetings or appointments, work, or from getting things that you need?: No   Physical Activity: Inactive (10/15/2024)    Exercise Vital Sign     Days of Exercise per Week: 0 days     Minutes of Exercise per Session: 0 min   Stress: No Stress Concern Present (10/15/2024)    [place] [employer] - Occupational Stress Questionnaire     Feeling of Stress : Not at all   Social Connections: Unknown (10/15/2024)    Social Connection and Isolation Panel [NHANES]     Frequency of Communication with Friends and Family: Not on file     Frequency of Social Gatherings with Friends and Family: Twice a week     Attends Orthodox Services: Not on file     Active Member of Clubs or Organizations: Not on file     Attends Club or Organization Meetings: Not on file     Marital Status: Not on file   Interpersonal Safety: Not At Risk (10/17/2024)    Received from [facility]     IP Custom IPV     Do you feel UNSAFE in any of your personal relationships with your family members or any other acquaintances?: No   Housing Stability: Low Risk  (10/15/2024)    Housing Stability     Do you have housing? : Yes     Are you worried about losing your housing?: No       TEST RESULTS:   Results for orders placed or performed in visit on 02/11/25   INR (External Result)     Status: None   Result Value Ref Range    INR HOME MONITORING 2.2 2.000 - 3.000           Review of systems: Negative except that which was noted in the  "HPI.    Physical examination:    Vitals: /70 (BP Location: Right arm, Patient Position: Sitting)   Pulse 79   Resp 20   Ht 1.651 m (5' 5\")   Wt 102.2 kg (225 lb 3.2 oz)   SpO2 96%   BMI 37.48 kg/m    BMI= Body mass index is 37.48 kg/m .      GENERAL APPEARANCE: alert and no distress  CHEST: lungs clear to auscultation - no rales, rhonchi or wheezes, no use of accessory muscles, no retractions, respirations are unlabored, normal respiratory rate  CARDIOVASCULAR: regular rhythm, normal S1 with physiologic split S2, no S3 or S4 and no murmur, click or rub  EXTREMITIES: +trace bilateral LE edema  NEURO: alert and oriented normal speech, and affect  VASC: No carotid bruits heard.      Thank you for allowing me to participate in the care of your patient. Please do not hesitate to contact me if you have any questions.       Ashli Tijerina, CNP    "

## 2025-02-17 ENCOUNTER — ANCILLARY PROCEDURE (OUTPATIENT)
Dept: MAMMOGRAPHY | Facility: OTHER | Age: 70
End: 2025-02-17
Attending: NURSE PRACTITIONER
Payer: MEDICARE

## 2025-02-17 ENCOUNTER — TELEPHONE (OUTPATIENT)
Dept: MAMMOGRAPHY | Facility: OTHER | Age: 70
End: 2025-02-17

## 2025-02-17 DIAGNOSIS — Z12.31 ENCOUNTER FOR SCREENING MAMMOGRAM FOR MALIGNANT NEOPLASM OF BREAST: ICD-10-CM

## 2025-02-17 PROCEDURE — 77063 BREAST TOMOSYNTHESIS BI: CPT | Mod: TC

## 2025-02-17 ASSESSMENT — PATIENT HEALTH QUESTIONNAIRE - PHQ9
10. IF YOU CHECKED OFF ANY PROBLEMS, HOW DIFFICULT HAVE THESE PROBLEMS MADE IT FOR YOU TO DO YOUR WORK, TAKE CARE OF THINGS AT HOME, OR GET ALONG WITH OTHER PEOPLE: NOT DIFFICULT AT ALL
SUM OF ALL RESPONSES TO PHQ QUESTIONS 1-9: 3
SUM OF ALL RESPONSES TO PHQ QUESTIONS 1-9: 3

## 2025-02-17 NOTE — PROGRESS NOTES
Assessment & Plan         Thumb injury, initial encounter  - XR Finger Right G/E 2 Views (Clinic Performed); Future  - Orthopedic  Referral; Future        Closed avulsion fracture of phalanx of right thumb, initial encounter  - Orthopedic  Referral; Future          PROCEDURE:  XR FINGER RIGHT G/E 2 VIEWS     HISTORY: Thumb injury, initial encounter     COMPARISON: No relevant priors available for comparison     TECHNIQUE:  XR FINGER RIGHT 3 VIEWS     FINDINGS:     No acute fracture or dislocation is identified. No suspicious osseous  lesion. Moderate first MCP joint space loss. Small osseous corner  fracture fragment along the radial base of the first proximal phalanx.     No foreign body or subcutaneous emphysema.                                                                       IMPRESSION:     Avulsion fracture of the radial base of the first proximal phalanx.     DEEPA FISHER MD           When your lab results return, we will call you with abnormal findings  You can also view this information in your MyChart, if you have an account.  Please call or [employer] message us with any questions you may have.          Eliz Hartman -[facility]  [phone]           Kaitlin is a 69 year old, presenting for the following health issues:  Thumb Discomfort          Via the Health Maintenance questionnaire, the patient has reported the following services have been completed -Mammogram: [place] 2025-02-17, this information has not been sent to the abstraction team.        Musculoskeletal problem/pain - right thumb injury  When did you first notice your pain? 4 nights ago    Have you seen anyone else for your pain? No  How has your pain affected your ability to work? Not applicable  Where in your body do you have pain?   Onset/Duration: 4 days   Description  Location: thumb - right  Joint Swelling: YES  Redness: No  Pain: YES  Warmth: No  Intensity:  moderate  Progression of Symptoms:  same  Accompanying signs and  symptoms:   Fevers: No  Numbness/tingling/weakness: No  History  Trauma to the area: YES- tripped and unsure if jammed at all   Recent illness:  No  Previous similar problem: No  Previous evaluation:  No  Precipitating or alleviating factors:  Aggravating factors include: overuse  Therapies tried and outcome: support wrap and acetaminophen          Answers submitted by the patient for this visit:  Patient Health Questionnaire (Submitted on 2/17/2025)  If you checked off any problems, how difficult have these problems made it for you to do your work, take care of things at home, or get along with other people?: Not difficult at all  PHQ9 TOTAL SCORE: 3  General Questionnaire (Submitted on 2/17/2025)  Chief Complaint: Chronic problems general questions HPI Form  How many days per week do you miss taking your medication?: 0  General Concern (Submitted on 2/17/2025)  Chief Complaint: Chronic problems general questions HPI Form  What is the reason for your visit today?: right thumb injury  When did your symptoms begin?: 3-7 days ago  What are your symptoms?: swelling pain that radiates  How would you describe these symptoms?: Moderate  Are your symptoms:: Staying the same  Have you had these symptoms before?: No  Is there anything that makes you feel worse?: trying to use it  Is there anything that makes you feel better?: no  Questionnaire about: Chronic problems general questions HPI Form (Submitted on 2/17/2025)  Chief Complaint: Chronic problems general questions HPI Form          Patient Active Problem List   Diagnosis    Primary hypertension    Pulmonary embolism and infarction (H)    Contact dermatitis and other eczema, due to unspecified cause    Edema    Hyperlipidemia LDL goal <100    Neurofibromatosis, type 1 (H)    Advanced care planning/counseling discussion    Moderate episode of recurrent major depressive disorder (H)    Long-term (current) use of anticoagulants [Z79.01]    Lumbar spondylosis with  myelopathy    Degeneration of lumbar or lumbosacral intervertebral disc    Family history of colon cancer    Vitamin D deficiency    Failed arthroplasty    H/O total shoulder replacement, left    Primary insomnia    Factor V Leiden mutation    Obesity (BMI 35.0-39.9) with comorbidity (H)    Diarrhea    Chest pain, unspecified    Muscle weakness of right upper extremity    Pain in right upper arm    Stiffness of right shoulder joint    Activated protein C resistance    Shoulder pain    Deep vein thrombosis (DVT) of right lower extremity, unspecified chronicity, unspecified vein (H)    History of gout    Primary aldosteronism    High aldosterone to renin ratio     Past Surgical History:   Procedure Laterality Date    ------------OTHER-------------      shoulder replacement; Provider: Karen    ARTHROPLASTY KNEE  2014    Procedure: ARTHROPLASTY KNEE;  Surgeon: Sean Alexander MD;  Location: [facility] OR    ARTHROSCOPY SHOULDER      right, bone spurs    BIOPSY      CA ANESTH,SHOULDER REPLACEMENT Right 2020     SECTION      x3    CHOLECYSTECTOMY      COLONOSCOPY  02/15/2018    [place],,polyps    elbow ulnar tunnel release      ELECTROTHERMAL THERAPY INTRADISC  2017    stimulator    ENDOSCOPIC SINUS SURGERY, SEPTOPLASTY, TURBINOPLASTY, MAXILLARY SINUSOTOMY, COMBINED N/A 2015    Procedure: COMBINED ENDOSCOPIC SINUS SURGERY, SEPTOPLASTY, TURBINOPLASTY, MAXILLARY SINUSOTOMY;  Surgeon: Seema Conn MD;  Location: [facility] OR    ENT SURGERY      ESOPHAGOSCOPY, GASTROSCOPY, DUODENOSCOPY (EGD), COMBINED      with biopsy and endoscopic U/S    EXCISE NEUROMA LOWER EXTREMITY Left 2016    Procedure: EXCISE NEUROMA LOWER EXTREMITY;  Surgeon: Edi Reed MD;  Location: [facility]    EYE SURGERY Right 2020    FUSION LUMBAR ANTERIOR WITH MARCY CAGES      L5-S1    HYSTERECTOMY TOTAL ABDOMINAL, BILATERAL SALPINGO-OOPHORECTOMY, COMBINED N/A     ORTHOPEDIC SURGERY  2015    right  shoulder    ORTHOPEDIC SURGERY  2015    right knee    ORTHOPEDIC SURGERY Right 2018    hip labrum tear    pionidal cyst excision      TRANSPOSITION ULNAR NERVE (ELBOW)         Social History     Tobacco Use    Smoking status: Former     Current packs/day: 0.00     Average packs/day: 1 pack/day for 30.0 years (30.0 ttl pk-yrs)     Types: Cigarettes, Pipe     Start date: 1969     Quit date: 2000     Years since quittin.1     Passive exposure: Past    Smokeless tobacco: Never    Tobacco comments:     quit in    Substance Use Topics    Alcohol use: No     Family History   Problem Relation Age of Onset    Cancer Mother     Colon Polyps Mother     Heart Failure Mother 87        congestive, cause of death    Myocardial Infarction Mother         myocardial infarction    Coronary Artery Disease Mother             Hypertension Mother     Colon Cancer Mother     Osteoporosis Mother     Genetic Disorder Mother     Myocardial Infarction Father         myocardial infarction - cause of death    C.A.D. Father     Coronary Artery Disease Father             Hypertension Father             Hyperlipidemia Father     Cancer Paternal Uncle         cause of death    C.A.D. Brother     Other - See Comments Other         factor 5 - family h/o    Diabetes Maternal Grandmother             Hypertension Maternal Half-Sister     Depression Maternal Half-Sister     Hypertension Brother     Other Cancer Son         testicular    Asthma Daughter            Current Outpatient Medications   Medication Sig Dispense Refill    albuterol (PROVENTIL) (2.5 MG/3ML) 0.083% neb solution Take 2.5 mg by nebulization every 6 hours as needed for shortness of breath / dyspnea or wheezing      aspirin 81 MG EC tablet Take 81 mg by mouth daily HS      eplerenone (INSPRA) 50 MG tablet Take 1 tablet (50 mg) by mouth 2 times daily. 60 tablet 3    escitalopram (LEXAPRO) 10 MG tablet Take 1 tablet (10 mg) by  mouth daily. 90 tablet 1    furosemide (LASIX) 20 MG tablet Take 1 tablet (20 mg) by mouth as needed (swelling, weight gain).      ketoconazole (NIZORAL) 2 % external cream APPLY TO THE RASH ON FULL BACK TWICE A DAY FOR 4-6 WEEKS      losartan (COZAAR) 50 MG tablet Take 1 tablet (50 mg) by mouth daily. 90 tablet 1    metoprolol succinate ER (TOPROL XL) 50 MG 24 hr tablet Take 0.5 tablets (25 mg) by mouth daily. 45 tablet 1    order for DME Nebulizer machine and tubing    DX:  Bronchitis 1 Device 0    traZODone (DESYREL) 50 MG tablet TAKE 1 TO 2 TABLETS BY MOUTH NIGHTLY AS NEEDED 180 tablet 3    warfarin ANTICOAGULANT (COUMADIN) 5 MG tablet TAKE 1 & 1/2 (ONE & ONE-HALF) TABLETS BY MOUTH MONDAY WEDNESDAY AND FRIDAY AND 1 TABLET ALL OTHER DAYS OR AS DIRECTED BY [facility] CLINIC 109 tablet 1         Allergies   Allergen Reactions    Allopurinol Shortness Of Breath    Amlodipine Besylate Swelling     Norvasc    Amoxicillin     Atorvastatin      myualgia    Cephalexin Monohydrate Hives     Keflex    Erythromycin Base [Erythromycin Base] Nausea and Vomiting    Meloxicam Other (See Comments)     Mobic - confusion, depression    Sulfa Antibiotics Hives    Adhesive Tape Rash    Prochlorperazine Edisylate Swelling and Rash     Compazine    Prochlorperazine Maleate Swelling and Rash         Recent Labs   Lab Test 12/05/24  1354 12/05/24  0825 11/05/24  1058 10/29/24  1034 10/17/24  0801 09/19/24  1041 08/24/24  2109 04/01/24  0926 12/15/23  0910 09/29/23  1022 03/17/23  1029 08/27/21  0917 04/16/21  1014 02/08/21  1432   A1C  --   --   --   --   --   --   --   --   --   --  5.6  --   --   --    LDL  --   --   --   --  104*  --   --  127*  --  119*  --    < >  --   --    HDL  --   --   --   --  38*  --   --  35*  --  37*  --    < >  --   --    TRIG  --   --   --   --  106  --   --  148  --  171*  --    < >  --   --    ALT  --   --   --   --   --   --  20 17 21 18  --    < > 29 36   CR  --   --  1.04* 1.07*  --  0.97* 1.10* 0.92  "0.96* 1.00*  --    < > 0.95 0.92   GFRESTIMATED  --   --  58* 56*  --  63 54* 67 64 61  --    < > 62 65   GFRESTBLACK  --   --   --   --   --   --   --   --   --   --   --   --  72 75   POTASSIUM 4.3 4.4 4.0 4.2  --  3.8 4.5 3.4 3.6 3.9  --    < > 3.3* 3.4   TSH  --   --   --   --   --  2.20  --  2.73  --  3.34  --    < >  --   --     < > = values in this interval not displayed.        BP Readings from Last 3 Encounters:   02/18/25 122/68   02/11/25 126/70   12/05/24 126/62    Wt Readings from Last 3 Encounters:   02/18/25 102.5 kg (225 lb 14.4 oz)   02/11/25 102.2 kg (225 lb 3.2 oz)   12/09/24 99.8 kg (220 lb)                       Review of Systems  Constitutional, neuro, ENT, endocrine, pulmonary, cardiac, gastrointestinal, genitourinary, musculoskeletal, integument and psychiatric systems are negative, except as otherwise noted.          Objective    /68 (BP Location: Left arm, Patient Position: Chair, Cuff Size: Adult Large)   Pulse 64   Temp 97.5  F (36.4  C) (Tympanic)   Resp 16   Ht 1.651 m (5' 5\")   Wt 102.5 kg (225 lb 14.4 oz)   SpO2 98%   BMI 37.59 kg/m    Body mass index is 37.59 kg/m .        Physical Exam   GENERAL: alert and no distress  RESP: lungs clear to auscultation - no rales, rhonchi or wheezes  CV: regular rate and rhythm, normal S1 S2, no S3 or S4, no murmur, click or rub, no peripheral edema  MS: Right thumb - swelling, ecchymosis, pain, limited ROM  SKIN: no suspicious lesions or rashes  PSYCH: mentation appears normal, affect normal/bright          Results for orders placed or performed in visit on 02/18/25   XR Finger Right G/E 2 Views (Clinic Performed)     Status: None    Narrative    PROCEDURE:  XR FINGER RIGHT G/E 2 VIEWS    HISTORY: Thumb injury, initial encounter    COMPARISON: No relevant priors available for comparison    TECHNIQUE:  XR FINGER RIGHT 3 VIEWS    FINDINGS:    No acute fracture or dislocation is identified. No suspicious osseous  lesion. Moderate first " MCP joint space loss. Small osseous corner  fracture fragment along the radial base of the first proximal phalanx.    No foreign body or subcutaneous emphysema.       Impression    IMPRESSION:    Avulsion fracture of the radial base of the first proximal phalanx.    DEEPA FISHER MD         SYSTEM ID:  [id number]   Results for orders placed or performed in visit on 02/18/25   Extra Tube     Status: None (In process)    Narrative    The following orders were created for panel order Extra Tube.  Procedure                               Abnormality         Status                     ---------                               -----------         ------                     Extra Purple Top Tube[[id number]]                            In process                   Please view results for these tests on the individual orders.            The longitudinal plan of care for the diagnosis(es)/condition(s) as documented were addressed during this visit. Due to the added complexity in care, I will continue to support Kaitlin in the subsequent management and with ongoing continuity of care.             Signed Electronically by: Eliz Hartman CNP

## 2025-02-18 ENCOUNTER — LAB (OUTPATIENT)
Dept: LAB | Facility: OTHER | Age: 70
End: 2025-02-18
Attending: NURSE PRACTITIONER
Payer: COMMERCIAL

## 2025-02-18 ENCOUNTER — ANCILLARY PROCEDURE (OUTPATIENT)
Dept: GENERAL RADIOLOGY | Facility: OTHER | Age: 70
End: 2025-02-18
Attending: NURSE PRACTITIONER
Payer: MEDICARE

## 2025-02-18 ENCOUNTER — OFFICE VISIT (OUTPATIENT)
Dept: FAMILY MEDICINE | Facility: OTHER | Age: 70
End: 2025-02-18
Attending: NURSE PRACTITIONER
Payer: MEDICARE

## 2025-02-18 VITALS
DIASTOLIC BLOOD PRESSURE: 68 MMHG | SYSTOLIC BLOOD PRESSURE: 122 MMHG | TEMPERATURE: 97.5 F | BODY MASS INDEX: 37.64 KG/M2 | HEART RATE: 64 BPM | OXYGEN SATURATION: 98 % | WEIGHT: 225.9 LBS | RESPIRATION RATE: 16 BRPM | HEIGHT: 65 IN

## 2025-02-18 DIAGNOSIS — E26.09 PRIMARY ALDOSTERONISM: ICD-10-CM

## 2025-02-18 DIAGNOSIS — S69.90XA THUMB INJURY, INITIAL ENCOUNTER: ICD-10-CM

## 2025-02-18 DIAGNOSIS — S69.90XA THUMB INJURY, INITIAL ENCOUNTER: Primary | ICD-10-CM

## 2025-02-18 DIAGNOSIS — S62.501A CLOSED AVULSION FRACTURE OF PHALANX OF RIGHT THUMB, INITIAL ENCOUNTER: ICD-10-CM

## 2025-02-18 LAB
ANION GAP SERPL CALCULATED.3IONS-SCNC: 12 MMOL/L (ref 7–15)
BUN SERPL-MCNC: 14.1 MG/DL (ref 8–23)
CALCIUM SERPL-MCNC: 9.2 MG/DL (ref 8.8–10.4)
CHLORIDE SERPL-SCNC: 103 MMOL/L (ref 98–107)
CREAT SERPL-MCNC: 1.07 MG/DL (ref 0.51–0.95)
EGFRCR SERPLBLD CKD-EPI 2021: 56 ML/MIN/1.73M2
GLUCOSE SERPL-MCNC: 109 MG/DL (ref 70–99)
HCO3 SERPL-SCNC: 22 MMOL/L (ref 22–29)
HOLD SPECIMEN: NORMAL
POTASSIUM SERPL-SCNC: 4.4 MMOL/L (ref 3.4–5.3)
SODIUM SERPL-SCNC: 137 MMOL/L (ref 135–145)

## 2025-02-18 PROCEDURE — 82565 ASSAY OF CREATININE: CPT | Mod: ZL

## 2025-02-18 PROCEDURE — G0463 HOSPITAL OUTPT CLINIC VISIT: HCPCS

## 2025-02-18 PROCEDURE — 73140 X-RAY EXAM OF FINGER(S): CPT | Mod: TC,RT,FY

## 2025-02-18 PROCEDURE — 84295 ASSAY OF SERUM SODIUM: CPT | Mod: ZL

## 2025-02-18 PROCEDURE — 36415 COLL VENOUS BLD VENIPUNCTURE: CPT | Mod: ZL

## 2025-02-18 PROCEDURE — 80048 BASIC METABOLIC PNL TOTAL CA: CPT | Mod: ZL

## 2025-02-18 ASSESSMENT — PAIN SCALES - GENERAL: PAINLEVEL_OUTOF10: MILD PAIN (3)

## 2025-02-18 NOTE — PATIENT INSTRUCTIONS
Assessment & Plan           Thumb injury, initial encounter  - XR Finger Right G/E 2 Views (Clinic Performed); Future  - Orthopedic  Referral; Future        Closed avulsion fracture of phalanx of right thumb, initial encounter  - Orthopedic  Referral; Future          PROCEDURE:  XR FINGER RIGHT G/E 2 VIEWS     HISTORY: Thumb injury, initial encounter     COMPARISON: No relevant priors available for comparison     TECHNIQUE:  XR FINGER RIGHT 3 VIEWS     FINDINGS:     No acute fracture or dislocation is identified. No suspicious osseous  lesion. Moderate first MCP joint space loss. Small osseous corner  fracture fragment along the radial base of the first proximal phalanx.     No foreign body or subcutaneous emphysema.                                                                       IMPRESSION:     Avulsion fracture of the radial base of the first proximal phalanx.     DEEAP FISHER MD           When your lab results return, we will call you with abnormal findings  You can also view this information in your MyChart, if you have an account.  Please call or MyChart message us with any questions you may have.            [facility]  [phone]

## 2025-02-19 ENCOUNTER — TELEPHONE (OUTPATIENT)
Dept: FAMILY MEDICINE | Facility: OTHER | Age: 70
End: 2025-02-19

## 2025-02-19 ENCOUNTER — HOSPITAL ENCOUNTER (OUTPATIENT)
Dept: MAMMOGRAPHY | Facility: OTHER | Age: 70
Discharge: HOME | End: 2025-02-19
Attending: NURSE PRACTITIONER
Payer: MEDICARE

## 2025-02-19 ENCOUNTER — HOSPITAL ENCOUNTER (OUTPATIENT)
Dept: ULTRASOUND IMAGING | Facility: HOSPITAL | Age: 70
Discharge: HOME | End: 2025-02-19
Attending: NURSE PRACTITIONER
Payer: MEDICARE

## 2025-02-19 DIAGNOSIS — R92.8 ABNORMAL FINDING ON BREAST IMAGING: ICD-10-CM

## 2025-02-19 PROCEDURE — 77065 DX MAMMO INCL CAD UNI: CPT | Mod: TC,LT

## 2025-02-19 PROCEDURE — 76642 ULTRASOUND BREAST LIMITED: CPT | Mod: LT

## 2025-02-19 NOTE — TELEPHONE ENCOUNTER
Call to patient she will check her INR today and call me back with result as soon as she gets home.     Patient called back INR is 2.3, no changes needed. Procedure is scheduled on 3/21/25. INR to be below 3.0.

## 2025-02-21 ENCOUNTER — ANCILLARY PROCEDURE (OUTPATIENT)
Dept: MAMMOGRAPHY | Facility: OTHER | Age: 70
End: 2025-02-21
Attending: RADIOLOGY
Payer: MEDICARE

## 2025-02-21 ENCOUNTER — HOSPITAL ENCOUNTER (OUTPATIENT)
Facility: HOSPITAL | Age: 70
Discharge: HOME | End: 2025-02-21
Attending: RADIOLOGY | Admitting: RADIOLOGY
Payer: MEDICARE

## 2025-02-21 ENCOUNTER — HOSPITAL ENCOUNTER (OUTPATIENT)
Dept: ULTRASOUND IMAGING | Facility: HOSPITAL | Age: 70
Discharge: HOME | End: 2025-02-21
Attending: SURGERY | Admitting: RADIOLOGY
Payer: MEDICARE

## 2025-02-21 VITALS — SYSTOLIC BLOOD PRESSURE: 149 MMHG | OXYGEN SATURATION: 96 % | DIASTOLIC BLOOD PRESSURE: 85 MMHG | HEART RATE: 70 BPM

## 2025-02-21 DIAGNOSIS — R92.8 OTHER ABNORMAL AND INCONCLUSIVE FINDINGS ON DIAGNOSTIC IMAGING OF BREAST: ICD-10-CM

## 2025-02-21 DIAGNOSIS — D68.51 FACTOR V LEIDEN MUTATION: Primary | ICD-10-CM

## 2025-02-21 DIAGNOSIS — N63.20 MASS OF LEFT BREAST: ICD-10-CM

## 2025-02-21 LAB — INR PPP: 1.94 (ref 0.85–1.15)

## 2025-02-21 PROCEDURE — 88305 TISSUE EXAM BY PATHOLOGIST: CPT | Mod: TC | Performed by: SURGERY

## 2025-02-21 PROCEDURE — 999N000065 MA POST PROCEDURE LEFT: Mod: TC

## 2025-02-21 PROCEDURE — 250N000009 HC RX 250: Performed by: RADIOLOGY

## 2025-02-21 PROCEDURE — 36415 COLL VENOUS BLD VENIPUNCTURE: CPT | Performed by: RADIOLOGY

## 2025-02-21 PROCEDURE — 85610 PROTHROMBIN TIME: CPT | Performed by: RADIOLOGY

## 2025-02-21 PROCEDURE — 272N000032 US BREAST BIOPSY VACUUM LEFT

## 2025-02-21 PROCEDURE — 19083 BX BREAST 1ST LESION US IMAG: CPT | Mod: LT

## 2025-02-21 RX ORDER — LIDOCAINE HYDROCHLORIDE 10 MG/ML
5-10 INJECTION, SOLUTION EPIDURAL; INFILTRATION; INTRACAUDAL; PERINEURAL
Status: COMPLETED | OUTPATIENT
Start: 2025-02-21 | End: 2025-02-21

## 2025-02-21 RX ADMIN — LIDOCAINE HYDROCHLORIDE 10 ML: 10 INJECTION, SOLUTION EPIDURAL; INFILTRATION; INTRACAUDAL; PERINEURAL at 10:25

## 2025-02-21 ASSESSMENT — ACTIVITIES OF DAILY LIVING (ADL)
ADLS_ACUITY_SCORE: 41

## 2025-02-21 NOTE — PROGRESS NOTES
Patient here for ultrasound guided biopsy of left breast.  Procedure reviewed with patient by writer and radiologist, questions answered.  Time out performed prior to biopsy.  Biopsy completed by radiologist, clip placed.  Pressure held to biopsy site for 10 minutes.  Medipore dressing and steri strips  applied.   Post clip mammogram completed.  Sports bra and ice pack applied over dressing.  Discharge instructions reviewed with patient, patient verbalizes understanding of instructions.  Discharged to home in stable condition with no evidence of bleeding from biopsy site.     Did educate that since patient is on blood thinner that if she starts actively bleeding at home to hold pressure for about 10 minutes and if she does not stop bleeding to go into clinic/hospital.       Hawa GALE RN

## 2025-02-24 ENCOUNTER — OFFICE VISIT (OUTPATIENT)
Dept: FAMILY MEDICINE | Facility: OTHER | Age: 70
End: 2025-02-24
Attending: NURSE PRACTITIONER
Payer: COMMERCIAL

## 2025-02-24 ENCOUNTER — TELEPHONE (OUTPATIENT)
Dept: MAMMOGRAPHY | Facility: OTHER | Age: 70
End: 2025-02-24

## 2025-02-24 VITALS
SYSTOLIC BLOOD PRESSURE: 146 MMHG | OXYGEN SATURATION: 95 % | HEIGHT: 65 IN | RESPIRATION RATE: 20 BRPM | BODY MASS INDEX: 37.23 KG/M2 | WEIGHT: 223.44 LBS | TEMPERATURE: 99.8 F | HEART RATE: 88 BPM | DIASTOLIC BLOOD PRESSURE: 84 MMHG

## 2025-02-24 DIAGNOSIS — J20.8 ACUTE BRONCHITIS DUE TO OTHER SPECIFIED ORGANISMS: ICD-10-CM

## 2025-02-24 DIAGNOSIS — R06.2 WHEEZING: ICD-10-CM

## 2025-02-24 DIAGNOSIS — R05.1 ACUTE COUGH: Primary | ICD-10-CM

## 2025-02-24 DIAGNOSIS — R50.9 FEVER, UNSPECIFIED FEVER CAUSE: ICD-10-CM

## 2025-02-24 PROBLEM — N18.31 CHRONIC KIDNEY DISEASE, STAGE 3A (H): Status: ACTIVE | Noted: 2025-02-24

## 2025-02-24 PROBLEM — Z79.4 ENCOUNTER FOR LONG-TERM (CURRENT) USE OF INSULIN (H): Status: ACTIVE | Noted: 2025-02-24

## 2025-02-24 PROBLEM — I50.9 CONGESTIVE HEART FAILURE, UNSPECIFIED HF CHRONICITY, UNSPECIFIED HEART FAILURE TYPE (H): Status: ACTIVE | Noted: 2025-02-24

## 2025-02-24 LAB
FLUAV AG SPEC QL IA: NEGATIVE
FLUBV AG SPEC QL IA: NEGATIVE
PATH REPORT.COMMENTS IMP SPEC: NORMAL
PATH REPORT.COMMENTS IMP SPEC: NORMAL
PATH REPORT.FINAL DX SPEC: NORMAL
PATH REPORT.GROSS SPEC: NORMAL
PATH REPORT.MICROSCOPIC SPEC OTHER STN: NORMAL
PHOTO IMAGE: NORMAL

## 2025-02-24 PROCEDURE — 99213 OFFICE O/P EST LOW 20 MIN: CPT | Performed by: NURSE PRACTITIONER

## 2025-02-24 PROCEDURE — G0463 HOSPITAL OUTPT CLINIC VISIT: HCPCS

## 2025-02-24 PROCEDURE — 87804 INFLUENZA ASSAY W/OPTIC: CPT | Mod: ZL | Performed by: NURSE PRACTITIONER

## 2025-02-24 PROCEDURE — G2211 COMPLEX E/M VISIT ADD ON: HCPCS | Performed by: NURSE PRACTITIONER

## 2025-02-24 RX ORDER — AZITHROMYCIN 250 MG/1
TABLET, FILM COATED ORAL
Qty: 6 TABLET | Refills: 0 | Status: SHIPPED | OUTPATIENT
Start: 2025-02-24 | End: 2025-03-01

## 2025-02-24 ASSESSMENT — PAIN SCALES - GENERAL: PAINLEVEL_OUTOF10: MILD PAIN (3)

## 2025-02-24 NOTE — PROGRESS NOTES
Assessment & Plan       Acute cough  - Influenza A/B antigen (MT & NA Only)      Wheezing  - Inhaler as directed      Fever, unspecified fever cause  - Influenza A/B antigen (MT & NA Only)      Acute bronchitis due to other specified organisms  - azithromycin (ZITHROMAX) 250 MG tablet; Take 2 tablets (500 mg) by mouth daily for 1 day, THEN 1 tablet (250 mg) daily for 4 days.          Insure adequate fluid intake  Get plenty of rest  Monitor for temp at home, treat with OTC Tylenol or Ibuprofen per package instruction.  Humidity at home (add bacteriostatic solution to humidifier)  Please return in you do not improve  To UC or ER with persistent, worsening, or concerning symptoms        Eliz Hartman -[facility]  [phone]           Kaitlin is a 69 year old, presenting for the following health issues:  Illness        Via the Health Maintenance questionnaire, the patient has reported the following services have been completed -Mammogram: [place] 2025-02-17, this information has not been sent to the abstraction team.        Acute Illness  Acute illness concerns: Sore throat, cough, fever   Onset/Duration: 2/23/2025  Symptoms:  Fever: YES  Chills/Sweats: YES  Headache (location?): YES  Sinus Pressure: No  Conjunctivitis:  No  Ear Pain: YES: right  Rhinorrhea: YES  Congestion: YES  Sore Throat: YES  Cough: YES-productive of yellow sputum  Wheeze: YES  Decreased Appetite: YES  Nausea: No  Vomiting: No  Diarrhea: No  Dysuria/Freq.: No  Dysuria or Hematuria: No  Fatigue/Achiness: YES- fatigue   Sick/Strep Exposure: No  Therapies tried and outcome: Robitussin DM, Tylenol           Patient Active Problem List   Diagnosis    Primary hypertension    Pulmonary embolism and infarction (H)    Contact dermatitis and other eczema, due to unspecified cause    Edema    Hyperlipidemia LDL goal <100    Neurofibromatosis, type 1 (H)    Advanced care planning/counseling discussion    Moderate episode of recurrent major depressive disorder (H)     Long-term (current) use of anticoagulants [Z79.01]    Lumbar spondylosis with myelopathy    Degeneration of lumbar or lumbosacral intervertebral disc    Family history of colon cancer    Vitamin D deficiency    Failed arthroplasty    H/O total shoulder replacement, left    Primary insomnia    Factor V Leiden mutation    Obesity (BMI 35.0-39.9) with comorbidity (H)    Diarrhea    Chest pain, unspecified    Muscle weakness of right upper extremity    Pain in right upper arm    Stiffness of right shoulder joint    Activated protein C resistance    Shoulder pain    Deep vein thrombosis (DVT) of right lower extremity, unspecified chronicity, unspecified vein (H)    History of gout    Primary aldosteronism    High aldosterone to renin ratio     Past Surgical History:   Procedure Laterality Date    ------------OTHER-------------      shoulder replacement; Provider: Karen    ARTHROPLASTY KNEE  2014    Procedure: ARTHROPLASTY KNEE;  Surgeon: Sean Alexander MD;  Location: [facility]    ARTHROSCOPY SHOULDER      right, bone spurs    BIOPSY      CA ANESTH,SHOULDER REPLACEMENT Right 2020     SECTION      x3    CHOLECYSTECTOMY      COLONOSCOPY  02/15/2018    [place],,polyps    elbow ulnar tunnel release      ELECTROTHERMAL THERAPY INTRADISC  2017    stimulator    ENDOSCOPIC SINUS SURGERY, SEPTOPLASTY, TURBINOPLASTY, MAXILLARY SINUSOTOMY, COMBINED N/A 2015    Procedure: COMBINED ENDOSCOPIC SINUS SURGERY, SEPTOPLASTY, TURBINOPLASTY, MAXILLARY SINUSOTOMY;  Surgeon: Seema Conn MD;  Location: [facility] OR    ENT SURGERY      ESOPHAGOSCOPY, GASTROSCOPY, DUODENOSCOPY (EGD), COMBINED      with biopsy and endoscopic U/S    EXCISE NEUROMA LOWER EXTREMITY Left 2016    Procedure: EXCISE NEUROMA LOWER EXTREMITY;  Surgeon: Edi Reed MD;  [facility]    EYE SURGERY Right 2020    FUSION LUMBAR ANTERIOR WITH MARCY CAGES      L5-S1    HYSTERECTOMY TOTAL ABDOMINAL, BILATERAL  SALPINGO-OOPHORECTOMY, COMBINED N/A     ORTHOPEDIC SURGERY  2015    right shoulder    ORTHOPEDIC SURGERY  2015    right knee    ORTHOPEDIC SURGERY Right 2018    hip labrum tear    pionidal cyst excision      TRANSPOSITION ULNAR NERVE (ELBOW)         Social History     Tobacco Use    Smoking status: Former     Current packs/day: 0.00     Average packs/day: 1 pack/day for 30.0 years (30.0 ttl pk-yrs)     Types: Cigarettes, Pipe     Start date: 1969     Quit date: 2000     Years since quittin.1     Passive exposure: Past    Smokeless tobacco: Never    Tobacco comments:     quit in    Substance Use Topics    Alcohol use: No     Family History   Problem Relation Age of Onset    Cancer Mother     Colon Polyps Mother     Heart Failure Mother 87        congestive, cause of death    Myocardial Infarction Mother         myocardial infarction    Coronary Artery Disease Mother             Hypertension Mother     Colon Cancer Mother     Osteoporosis Mother     Genetic Disorder Mother     Myocardial Infarction Father         myocardial infarction - cause of death    C.A.D. Father     Coronary Artery Disease Father             Hypertension Father             Hyperlipidemia Father     Cancer Paternal Uncle         cause of death    C.A.D. Brother     Other - See Comments Other         factor 5 - family h/o    Diabetes Maternal Grandmother             Hypertension Maternal Half-Sister     Depression Maternal Half-Sister     Hypertension Brother     Other Cancer Son         testicular    Asthma No family hx of              Current Outpatient Medications   Medication Sig Dispense Refill    albuterol (PROVENTIL) (2.5 MG/3ML) 0.083% neb solution Take 2.5 mg by nebulization every 6 hours as needed for shortness of breath / dyspnea or wheezing      aspirin 81 MG EC tablet Take 81 mg by mouth daily HS      eplerenone (INSPRA) 50 MG tablet Take 1 tablet (50 mg) by mouth 2 times  daily. 60 tablet 3    escitalopram (LEXAPRO) 10 MG tablet Take 1 tablet (10 mg) by mouth daily. 90 tablet 1    furosemide (LASIX) 20 MG tablet Take 1 tablet (20 mg) by mouth as needed (swelling, weight gain).      ketoconazole (NIZORAL) 2 % external cream APPLY TO THE RASH ON FULL BACK TWICE A DAY FOR 4-6 WEEKS      losartan (COZAAR) 50 MG tablet Take 1 tablet (50 mg) by mouth daily. 90 tablet 1    metoprolol succinate ER (TOPROL XL) 50 MG 24 hr tablet Take 0.5 tablets (25 mg) by mouth daily. 45 tablet 1    order for DME Nebulizer machine and tubing    DX:  Bronchitis 1 Device 0    traZODone (DESYREL) 50 MG tablet TAKE 1 TO 2 TABLETS BY MOUTH NIGHTLY AS NEEDED 180 tablet 3    warfarin ANTICOAGULANT (COUMADIN) 5 MG tablet TAKE 1 & 1/2 (ONE & ONE-HALF) TABLETS BY MOUTH MONDAY WEDNESDAY AND FRIDAY AND 1 TABLET ALL OTHER DAYS OR AS DIRECTED BY WARFARIN CLINIC 109 tablet 1         Allergies   Allergen Reactions    Allopurinol Shortness Of Breath    Amlodipine Besylate Swelling     Norvasc    Amoxicillin     Atorvastatin      myualgia    Cephalexin Monohydrate Hives     Keflex    Erythromycin Base [Erythromycin Base] Nausea and Vomiting    Meloxicam Other (See Comments)     Mobic - confusion, depression    Sulfa Antibiotics Hives    Adhesive Tape Rash    Prochlorperazine Edisylate Swelling and Rash     Compazine    Prochlorperazine Maleate Swelling and Rash         Recent Labs   Lab Test 02/18/25  0936 12/05/24  1354 12/05/24  0825 11/05/24  1058 10/29/24  1034 10/17/24  0801 09/19/24  1041 08/24/24  2109 04/01/24  0926 12/15/23  0910 09/29/23  1022 03/17/23  1029 08/27/21  0917 04/16/21  1014 02/08/21  1432   A1C  --   --   --   --   --   --   --   --   --   --   --  5.6  --   --   --    LDL  --   --   --   --   --  104*  --   --  127*  --  119*  --    < >  --   --    HDL  --   --   --   --   --  38*  --   --  35*  --  37*  --    < >  --   --    TRIG  --   --   --   --   --  106  --   --  148  --  171*  --    < >  --    "--    ALT  --   --   --   --   --   --   --  20 17 21 18  --    < > 29 36   CR 1.07*  --   --  1.04*   < >  --  0.97* 1.10* 0.92 0.96* 1.00*  --    < > 0.95 0.92   GFRESTIMATED 56*  --   --  58*   < >  --  63 54* 67 64 61  --    < > 62 65   GFRESTBLACK  --   --   --   --   --   --   --   --   --   --   --   --   --  72 75   POTASSIUM 4.4 4.3   < > 4.0   < >  --  3.8 4.5 3.4 3.6 3.9  --    < > 3.3* 3.4   TSH  --   --   --   --   --   --  2.20  --  2.73  --  3.34  --    < >  --   --     < > = values in this interval not displayed.        BP Readings from Last 3 Encounters:   02/24/25 (!) 146/84   02/21/25 (!) 149/85   02/18/25 122/68    Wt Readings from Last 3 Encounters:   02/24/25 101.4 kg (223 lb 7 oz)   02/18/25 102.5 kg (225 lb 14.4 oz)   02/11/25 102.2 kg (225 lb 3.2 oz)                   Review of Systems  Constitutional, neuro, ENT, endocrine, pulmonary, cardiac, gastrointestinal, genitourinary, musculoskeletal, integument and psychiatric systems are negative, except as otherwise noted.          Objective    BP (!) 146/84 (BP Location: Left arm, Patient Position: Sitting, Cuff Size: Adult Large)   Pulse 88   Temp 99.8  F (37.7  C) (Tympanic)   Resp 20   Ht 1.651 m (5' 5\")   Wt 101.4 kg (223 lb 7 oz)   SpO2 95%   BMI 37.18 kg/m    Body mass index is 37.18 kg/m .        Physical Exam   GENERAL: alert and no distress  EYES: Eyes grossly normal to inspection, PERRL and conjunctivae and sclerae normal  HENT: posterior oropharynx erythematous  NECK: no adenopathy, no asymmetry, masses, or scars  RESP: clear lungs - deep cough  CV: regular rate and rhythm, normal S1 S2, no S3 or S4, no murmur, click or rub, no peripheral edema        The longitudinal plan of care for the diagnosis(es)/condition(s) as documented were addressed during this visit. Due to the added complexity in care, I will continue to support Kaitlin in the subsequent management and with ongoing continuity of care.           Signed Electronically " by: Eliz Hartman, CNP

## 2025-02-24 NOTE — PATIENT INSTRUCTIONS
Assessment & Plan       Acute cough  - Influenza A/B antigen (MT & NA Only)      Wheezing  - Inhaler as directed      Fever, unspecified fever cause  - Influenza A/B antigen (MT & NA Only)      Acute bronchitis due to other specified organisms  - azithromycin (ZITHROMAX) 250 MG tablet; Take 2 tablets (500 mg) by mouth daily for 1 day, THEN 1 tablet (250 mg) daily for 4 days.          Insure adequate fluid intake  Get plenty of rest  Monitor for temp at home, treat with OTC Tylenol or Ibuprofen per package instruction.  Humidity at home (add bacteriostatic solution to humidifier)  Please return in you do not improve  To UC or ER with persistent, worsening, or concerning symptoms        Eliz SCRUGGSC[place]AND  [phone]

## 2025-02-24 NOTE — TELEPHONE ENCOUNTER
Called patient to check on status post ultrasound breast biopsy.  Patient reports a little bit of soreness.  Patient is using Tylenol with relief.  Patient reports no bleeding and some bruising but denies any hard lumps or swelling.     Hawa GALE RN

## 2025-02-25 ENCOUNTER — TELEPHONE (OUTPATIENT)
Dept: RADIOLOGY | Facility: CLINIC | Age: 70
End: 2025-02-25
Payer: COMMERCIAL

## 2025-02-25 NOTE — TELEPHONE ENCOUNTER
[facility] Call Center    Phone Message    May a detailed message be left on voicemail: yes     Reason for Call: Other: Kaitlin neds to reschedule procedure for Friday, heads up pt has very hoarse voice can be difficult to hear, available for a call back anytime     Action Taken: Message routed to:  [facility] ([facility]): Areli/BRAYAN    Travel Screening: Not Applicable     Date of Service:

## 2025-03-04 ENCOUNTER — ANTICOAGULATION THERAPY VISIT (OUTPATIENT)
Dept: ANTICOAGULATION | Facility: OTHER | Age: 70
End: 2025-03-04
Attending: NURSE PRACTITIONER
Payer: MEDICARE

## 2025-03-04 DIAGNOSIS — Z79.01 LONG TERM CURRENT USE OF ANTICOAGULANT THERAPY: Primary | ICD-10-CM

## 2025-03-04 DIAGNOSIS — I82.401 DEEP VEIN THROMBOSIS (DVT) OF RIGHT LOWER EXTREMITY, UNSPECIFIED CHRONICITY, UNSPECIFIED VEIN (H): ICD-10-CM

## 2025-03-04 DIAGNOSIS — I26.99 PULMONARY EMBOLISM AND INFARCTION (H): ICD-10-CM

## 2025-03-04 LAB
INR (EXTERNAL): 2
INR HOME MONITORING: 2 (ref 2–3)

## 2025-03-04 NOTE — PROGRESS NOTES
ANTICOAGULATION MANAGEMENT     Cassy Avila 69 year old female is on warfarin with therapeutic INR result. (Goal INR 2.0-3.0)    Recent labs: (last 7 days)     03/04/25  0953   INR 2.0       ASSESSMENT     Source(s): Chart Review and Patient mychart message      Warfarin doses taken: Warfarin taken as instructed  Diet: No new diet changes identified  New illness, injury, or hospitalization: Yes: Sick last week  Medication/supplement changes:   Kaitlin APPLE Avila to P Maribel Camejo (supporting You)  yes i was sick last week and had to take zpac.  a bit better now and inr is 2.0 today.  sorry  Signs or symptoms of bleeding or clotting: No  Previous INR: Therapeutic last 2(+) visits  Subtherapeutic on 2/21/25  Additional findings: None       PLAN     Recommended plan for temporary change(s) affecting INR     Dosing Instructions: Continue your current warfarin dose with next INR in 2 weeks       Summary  As of 3/4/2025      Full warfarin instructions:  7.5 mg every Mon; 5 mg all other days   Next INR check:  3/18/2025               Detailed voice message left for Kaitlin with dosing instructions and follow up date.     Patient to recheck with home meter    Education provided: Please call back if any changes to your diet, medications or how you've been taking warfarin and Contact [phone] with any changes, questions or concerns.     Plan made per [facility] anticoagulation protocol    Ashanti Velazquez, RN  Anticoagulation Clinic  3/4/2025    _______________________________________________________________________     Anticoagulation Episode Summary       Current INR goal:  2.0-3.0   TTR:  83.2% (1 y)   Target end date:  Indefinite   Send INR reminders to:  MARIBEL CAMEJO    Indications    Long-term (current) use of anticoagulants [Z79.01] [Z79.01]  Pulmonary embolism and infarction (H) [I26.99]  Deep vein thrombosis (DVT) (H) [I82.409] (Resolved) [I82.409]  Deep vein thrombosis (DVT) of right lower extremity  unspecified  chronicity  unspecified vein (H) [I82.401]             Comments:  [employer]. Q2 week testing  Call cell phone with any dosing.             Anticoagulation Care Providers       Provider Role Specialty Phone number    Eliz Hartman CNP Referring Family Medicine [phone]

## 2025-03-05 ENCOUNTER — PREP FOR PROCEDURE (OUTPATIENT)
Dept: SURGERY | Facility: OTHER | Age: 70
End: 2025-03-05

## 2025-03-05 ENCOUNTER — OFFICE VISIT (OUTPATIENT)
Dept: SURGERY | Facility: OTHER | Age: 70
End: 2025-03-05
Attending: SURGERY
Payer: MEDICARE

## 2025-03-05 VITALS
SYSTOLIC BLOOD PRESSURE: 126 MMHG | OXYGEN SATURATION: 96 % | HEIGHT: 65 IN | DIASTOLIC BLOOD PRESSURE: 70 MMHG | BODY MASS INDEX: 37.15 KG/M2 | HEART RATE: 83 BPM | WEIGHT: 223 LBS | RESPIRATION RATE: 18 BRPM

## 2025-03-05 DIAGNOSIS — N63.20 LEFT BREAST MASS: Primary | ICD-10-CM

## 2025-03-05 DIAGNOSIS — N63.20 MASS OF LEFT BREAST, UNSPECIFIED QUADRANT: Primary | ICD-10-CM

## 2025-03-05 PROCEDURE — G0463 HOSPITAL OUTPT CLINIC VISIT: HCPCS | Mod: 25

## 2025-03-05 ASSESSMENT — PAIN SCALES - GENERAL: PAINLEVEL_OUTOF10: MILD PAIN (3)

## 2025-03-05 NOTE — PROGRESS NOTES
CLINIC NOTE - CONSULT  3/5/2025    Patient: Cassy Avila    Reason for Referral   None concordant left breast biopsy     This is a 69 year old female recently had a ultrasound-guided biopsy of her left breast.  Pathology returned as benign but not concordant.  The patient is here to discuss options.     Family  History of Breast Cancer: a positive family history for breast cancer in her mother and half-brother.   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 Provider to review    Factor V Leiden     Factor V Leiden mutation 07/26/2019    Family history of colon cancer 01/02/2018    Gastro-oesophageal reflux disease     Herpes zoster without mention of complication 2003    resolved from problem list    Hyperlipidemia LDL goal <100 07/11/2002     Problem list name updated by automated process. Provider to review    Long term (current) use of anticoagulants 08/20/2003    Major depression 08/08/2014    Mammographic  microcalcification     resolved from problem list    Migraine, unspecified, without mention of intractable migraine without mention of status migrainosus 2001    Moderate episode of recurrent major depressive disorder (H) 2014    Neurofibromatosis, peripheral, NF1 (H) 2012     Problem list name updated by automated process. Provider to review    Neurofibromatosis, unspecified(237.70) 2012    Other and unspecified hyperlipidemia 2002    Other chronic pain     Other pulmonary embolism and infarction 2003    Primary insomnia 10/31/2018    Statin medication not prescribed per physician orders 2018    Tachycardia, unspecified 2001    Thrombosis of leg     Unspecified essential hypertension 2001    Vitamin D deficiency 2018       Past Surgical History:  Past Surgical History:   Procedure Laterality Date    ------------OTHER-------------      shoulder replacement; Provider: Karen    ARTHROPLASTY KNEE  2014    Procedure: ARTHROPLASTY KNEE;  Surgeon: Sean Alexander MD;  Location: [facility]    ARTHROSCOPY SHOULDER  2012    right, bone spurs    BIOPSY      CA ANESTH,SHOULDER REPLACEMENT Right 2020     SECTION      x3    CHOLECYSTECTOMY      COLONOSCOPY  02/15/2018    [place],,polyps    elbow ulnar tunnel release      ELECTROTHERMAL THERAPY INTRADISC  2017    stimulator    ENDOSCOPIC SINUS SURGERY, SEPTOPLASTY, TURBINOPLASTY, MAXILLARY SINUSOTOMY, COMBINED N/A 2015    Procedure: COMBINED ENDOSCOPIC SINUS SURGERY, SEPTOPLASTY, TURBINOPLASTY, MAXILLARY SINUSOTOMY;  Surgeon: Seema Conn MD;  Location: [facility]    ENT SURGERY      ESOPHAGOSCOPY, GASTROSCOPY, DUODENOSCOPY (EGD), COMBINED      with biopsy and endoscopic U/S    EXCISE NEUROMA LOWER EXTREMITY Left 2016    Procedure: EXCISE NEUROMA LOWER EXTREMITY;  Surgeon: Edi Reed MD;  Location: [facility]    EYE SURGERY Right 2020    FUSION LUMBAR  ANTERIOR WITH BAK CAGES      L5-S1    HYSTERECTOMY TOTAL ABDOMINAL, BILATERAL SALPINGO-OOPHORECTOMY, COMBINED N/A     ORTHOPEDIC SURGERY  2015    right shoulder    ORTHOPEDIC SURGERY  2015    right knee    ORTHOPEDIC SURGERY Right 2018    hip labrum tear    pionidal cyst excision      TRANSPOSITION ULNAR NERVE (ELBOW)         Family History History:  Family History   Problem Relation Age of Onset    Cancer Mother     Colon Polyps Mother     Heart Failure Mother 87        congestive, cause of death    Myocardial Infarction Mother         myocardial infarction    Coronary Artery Disease Mother             Hypertension Mother     Colon Cancer Mother     Osteoporosis Mother     Genetic Disorder Mother     Myocardial Infarction Father         myocardial infarction - cause of death    C.A.D. Father     Coronary Artery Disease Father             Hypertension Father             Hyperlipidemia Father     Cancer Paternal Uncle         cause of death    C.A.D. Brother     Other - See Comments Other         factor 5 - family h/o    Diabetes Maternal Grandmother             Hypertension Maternal Half-Sister     Depression Maternal Half-Sister     Hypertension Brother     Other Cancer Son         testicular    Asthma No family hx of        History of Tobacco Use:  History   Smoking Status    Former    Types: Cigarettes, Pipe   Smokeless Tobacco    Never       Current Medications:  Current Outpatient Medications   Medication Sig Dispense Refill    albuterol (PROVENTIL) (2.5 MG/3ML) 0.083% neb solution Take 2.5 mg by nebulization every 6 hours as needed for shortness of breath / dyspnea or wheezing      aspirin 81 MG EC tablet Take 81 mg by mouth daily HS      eplerenone (INSPRA) 50 MG tablet Take 1 tablet (50 mg) by mouth 2 times daily. 60 tablet 3    escitalopram (LEXAPRO) 10 MG tablet Take 1 tablet (10 mg) by mouth daily. 90 tablet 1    furosemide (LASIX) 20 MG tablet Take 1 tablet  "(20 mg) by mouth as needed (swelling, weight gain).      ketoconazole (NIZORAL) 2 % external cream APPLY TO THE RASH ON FULL BACK TWICE A DAY FOR 4-6 WEEKS      losartan (COZAAR) 50 MG tablet Take 1 tablet (50 mg) by mouth daily. 90 tablet 1    metoprolol succinate ER (TOPROL XL) 50 MG 24 hr tablet Take 0.5 tablets (25 mg) by mouth daily. 45 tablet 1    order for DME Nebulizer machine and tubing    DX:  Bronchitis 1 Device 0    traZODone (DESYREL) 50 MG tablet TAKE 1 TO 2 TABLETS BY MOUTH NIGHTLY AS NEEDED 180 tablet 3    warfarin ANTICOAGULANT (COUMADIN) 5 MG tablet TAKE 1 & 1/2 (ONE & ONE-HALF) TABLETS BY MOUTH MONDAY WEDNESDAY AND FRIDAY AND 1 TABLET ALL OTHER DAYS OR AS DIRECTED BY [facility] CLINIC 109 tablet 1       Allergies:  Allergies   Allergen Reactions    Allopurinol Shortness Of Breath    Amlodipine Besylate Swelling     Norvasc    Amoxicillin     Atorvastatin      myualgia    Cephalexin Monohydrate Hives     Keflex    Erythromycin Base [Erythromycin Base] Nausea and Vomiting    Meloxicam Other (See Comments)     Mobic - confusion, depression    Sulfa Antibiotics Hives    Adhesive Tape Rash    Prochlorperazine Edisylate Swelling and Rash     Compazine    Prochlorperazine Maleate Swelling and Rash       ROS:  Pertinent items are noted in HPI.  All other systems are negative.    PHYSICAL EXAM:     Vital signs: /70 (BP Location: Right arm, Cuff Size: Adult Large)   Pulse 83   Resp 18   Ht 1.651 m (5' 5\")   Wt 101.2 kg (223 lb)   SpO2 96%   BMI 37.11 kg/m     Weight: [unfilled]   BMI: Body mass index is 37.11 kg/m .   General: Normal, healthy, cooperative, in no acute distress, alert   Skin: no jaundice   HEENT: PERRLA and EOMI   Neck: supple   Lungs: non labored     PATHOLOGY:    Case Report     Surgical Pathology Report                         Case: [id number]                                     Authorizing Provider:  Avila Masters MD        Collected:           02/21/2025 10:34 AM           "   Ordering Location:     [facility]              Received:            02/21/2025 11:35 AM             Pathologist:           Sixto Suero DO                                                           Specimen:    Breast, Left, left breast 9:00                                                                Final Diagnosis     A.  Breast, left, 9:00, 3 cm from nipple, ultrasound-guided needle core biopsy:  -Focal atrophic breast tissue with mild stromal fibrosis.  -Predominance of mature adipose tissue.  -See comment.     Electronically signed by Sixto Suero DO on 2/24/2025 at  2:39 PM     Comment      The biopsy material is predominantly adipose tissue without breast elements.  A focus of atrophic breast tissue is present in one of the core samples with some associated stromal fibrosis.  The biopsy material may not be fully representative of the targeted lesion.  Please correlate with pertinent imaging studies.     Gross Description      A(1). Breast, Left, left breast 9:00:  The specimen is received in a container labeled with the patient's name, medical record number and left breast 9:00.  In formalin are 7 yellow to yellow-tan needle core biopsies that range in length from 11 mm up to 20 mm.  The core segments have a diameter of 3 mm on average.  Also present in the specimen container are a few additional fragments of similar-appearing yellow-tan tissue and red-brown blood clot that measure in aggregate 2 x 1 x 0.2 cm.  All in 3 cassettes.     Microscopic Description      A microscopic examination was performed.        Resulting Agency [facility]               Specimen Collected: 02/21/25 10:34 AM Last Resulted: 02/24/25  2:39 PM         RADIOLOGY:    Results for orders placed during the hospital encounter of 02/21/25    US Breast Biopsy Vacuum Left    Addendum 2/24/2025  2:52 PM  Pathology demonstrates focal atrophic breast tissue with mild stromal  fibrosis and the comments on the biopsy material  predominantly  representing adipose tissue with a small amount of atrophic breast  tissue in one of the samples. Given the ultrasound appearance, this is  a discordant biopsy result. Surgical consultation and possible  excision is recommended.    GABE ZHANG MD      SYSTEM ID:  [id number]    Narrative  PROCEDURE: US BREAST BIOPSY VACUUM LEFT 2/21/2025 10:54 AM    HISTORY: Mass of left breast; Other abnormal and inconclusive findings  on diagnostic imaging of breast    COMPARISONS: 2/19/2025.    TECHNIQUE: The procedure was discussed with the patient and informed  consent was obtained. Timeout procedure was followed. Skin was  cleansed with ChloraPrep and local anesthesia was administered. Small  skin incision was made.    Vacuum assisted biopsy device was advanced into the lesion in the 9:00  o'clock position of the left breast 3 cm from the nipple. Multiple  core needle biopsy specimens were obtained. There was some bleeding in  the biopsy site which may visualization of the lesion somewhat  difficult. Following the procedure a biopsy clip was placed.    Impression  IMPRESSION: Ultrasound-guided biopsy of lesion in the medial left  breast.    KAT DACOSTA MD      SYSTEM ID:  [id number]    ASSESSMENT:  69 year old female with nonconcordant left breast biopsy     PLAN:   Discussed with the patient the options.  Recommendations would be to do a needle directed left breast biopsy.  She is amenable to that.      We will get a preop evaluation prior to surgery.  We will ask primary care to address her anticoagulation needs prior to surgery.

## 2025-03-05 NOTE — PATIENT INSTRUCTIONS
Thank you for allowing Dr Michael Cage and our surgical team to participate in your care. Please call our health unit coordinator at [phone] with scheduling questions or the nurse at [phone] with any other questions or concerns.      You have been scheduled for: LEFT BREAST WIRE LOCALIZATION BIOPSY with Dr. Michael Cage on MONDAY MARCH 24TH.     You will be notified the weekday before the procedure for your check in time, you can also call admitting at  after 5:00PM    You will need to have and adult  to bring you home.    Surgery Education nurse (PAT phone call) to call one week prior to procedure, If you do not hear from them you can call them at this number    MARCH 13TH      Please see handout for additional instruction.    You WILL need a pre-operative appointment with your primary care provider.    You may call [phone] or [phone] with any questions.

## 2025-03-11 PROBLEM — I65.23 CALCIFICATION OF BOTH CAROTID ARTERIES: Status: ACTIVE | Noted: 2025-03-11

## 2025-03-11 NOTE — PATIENT INSTRUCTIONS
How to Take Your Medication Before Surgery       Please avoid aspirin, anti-inflammatories, vitamins, minerals and supplements for 2 weeks prior to your surgery.       Please take your BP medication the morning of surgery with a small sip of water        Preparing for Your Surgery  For Adults  Getting started  In most cases, a nurse will call to review your health history and instructions. They will give you an arrival time based on your scheduled surgery time. Please be ready to share:  Your doctor's clinic name and phone number  Your medical, surgical, and anesthesia history  A list of allergies and sensitivities  A list of medicines, including herbal treatments and over-the-counter drugs  Whether the patient has a legal guardian (ask how to send us the papers in advance)  Note: You may not receive a call if you were seen at our PAC (Preoperative Assessment Center).  Please tell us if you're pregnant--or if there's any chance you might be pregnant. Some surgeries may injure a fetus (unborn baby), so they require a pregnancy test. Surgeries that are safe for a fetus don't always need a test, and you can choose whether to have one.   Preparing for surgery  Within 10 to 30 days of surgery: Have a pre-op exam (sometimes called an H&P, or History and Physical). This can be done at a clinic or pre-operative center.  If you're having a , you may not need this exam. Talk to your care team.  At your pre-op exam, talk to your care team about all medicines you take. (This includes CBD oil and any drugs, such as THC, marijuana, and other forms of cannabis.) If you need to stop any medicine before surgery, ask when to start taking it again.  This is for your safety. Many medicines and drugs can make you bleed too much during surgery. Some change how well surgery (anesthesia) drugs work.  Call your insurance company to let them know you're having surgery. (If you don't have insurance, call [phone].)  Call your  clinic if there's any change in your health. This includes a scrape or scratch near the surgery site, or any signs of a cold (sore throat, runny nose, cough, rash, fever).  Eating and drinking guidelines  For your safety: Unless your surgeon tells you otherwise, follow the guidelines below.  Eat and drink as normal until 8 hours before you arrive for surgery. After that, no food or milk. You can spit out gum when you arrive.  Drink clear liquids until 2 hours before you arrive. These are liquids you can see through, like water, Gatorade, and Propel Water. They also include plain black coffee and tea (no cream or milk).  No alcohol for 24 hours before you arrive. The night before surgery, stop any drinks that contain THC.  If your care team tells you to take medicine on the morning of surgery, it's okay to take it with a sip of water. No other medicines or drugs are allowed (including CBD oil)--follow your care team's instructions.  If you have questions the day of surgery, call your hospital or surgery center.   Preventing infection  Shower or bathe the night before and the morning of surgery. Follow the instructions your clinic gave you. (If no instructions, use regular soap.)  Don't shave or clip hair near your surgery site. We'll remove the hair if needed.  Don't smoke or vape the morning of surgery. No chewing tobacco for 6 hours before you arrive. A nicotine patch is okay. You may spit out nicotine gum when you arrive.  For some surgeries, the surgeon will tell you to fully quit smoking and nicotine.  We will make every effort to keep you safe from infection. We will:  Clean our hands often with soap and water (or an alcohol-based hand rub).  Clean the skin at your surgery site with a special soap that kills germs.  Give you a special gown to keep you warm. (Cold raises the risk of infection.)  Wear hair covers, masks, gowns, and gloves during surgery.  Give antibiotic medicine, if prescribed. Not all surgeries  need this medicine.  What to bring on the day of surgery  Photo ID and insurance card  Copy of your health care directive, if you have one  Glasses and hearing aids (bring cases)  You can't wear contacts during surgery  Inhaler and eye drops, if you use them (tell us about these when you arrive)  CPAP machine or breathing device, if you use them  A few personal items, if spending the night  If you have . . .  A pacemaker, ICD (cardiac defibrillator), or other implant: Bring the ID card.  An implanted stimulator: Bring the remote control.  A legal guardian: Bring a copy of the certified (court-stamped) guardianship papers.  Please remove any jewelry, including body piercings. Leave jewelry and other valuables at home.  If you're going home the day of surgery  You must have a responsible adult drive you home. They should stay with you overnight as well.  If you don't have someone to stay with you, and you aren't safe to go home alone, we may keep you overnight. Insurance often won't pay for this.  After surgery  If it's hard to control your pain or you need more pain medicine, please call your surgeon's office.  Questions?   If you have any questions for your care team, list them here:   ____________________________________________________________________________________________________________________________________________________________________________________________________________________________________________________________  For informational purposes only. Not to replace the advice of your health care provider. Copyright   2003, 2019 [employer] [facility]. All rights reserved. Clinically reviewed by Cisco Vega MD. [employer] [id number] - REV 08/24.

## 2025-03-12 ENCOUNTER — TELEPHONE (OUTPATIENT)
Dept: FAMILY MEDICINE | Facility: OTHER | Age: 70
End: 2025-03-12

## 2025-03-12 ENCOUNTER — OFFICE VISIT (OUTPATIENT)
Dept: FAMILY MEDICINE | Facility: OTHER | Age: 70
End: 2025-03-12
Attending: NURSE PRACTITIONER
Payer: MEDICARE

## 2025-03-12 VITALS
TEMPERATURE: 99 F | DIASTOLIC BLOOD PRESSURE: 80 MMHG | WEIGHT: 225 LBS | BODY MASS INDEX: 37.44 KG/M2 | OXYGEN SATURATION: 94 % | SYSTOLIC BLOOD PRESSURE: 128 MMHG | HEART RATE: 69 BPM | RESPIRATION RATE: 20 BRPM

## 2025-03-12 DIAGNOSIS — I26.99 PULMONARY EMBOLISM AND INFARCTION (H): ICD-10-CM

## 2025-03-12 DIAGNOSIS — Z01.818 PRE-OP EXAM: Primary | ICD-10-CM

## 2025-03-12 DIAGNOSIS — D68.51 FACTOR V LEIDEN MUTATION: ICD-10-CM

## 2025-03-12 DIAGNOSIS — N63.20 MASS OF LEFT BREAST, UNSPECIFIED QUADRANT: ICD-10-CM

## 2025-03-12 DIAGNOSIS — I82.401 DEEP VEIN THROMBOSIS (DVT) OF RIGHT LOWER EXTREMITY, UNSPECIFIED CHRONICITY, UNSPECIFIED VEIN (H): ICD-10-CM

## 2025-03-12 DIAGNOSIS — Z79.01 LONG TERM CURRENT USE OF ANTICOAGULANT THERAPY: Primary | ICD-10-CM

## 2025-03-12 LAB
ALBUMIN SERPL BCG-MCNC: 4 G/DL (ref 3.5–5.2)
ALP SERPL-CCNC: 67 U/L (ref 40–150)
ALT SERPL W P-5'-P-CCNC: 19 U/L (ref 0–50)
ANION GAP SERPL CALCULATED.3IONS-SCNC: 16 MMOL/L (ref 7–15)
AST SERPL W P-5'-P-CCNC: 19 U/L (ref 0–45)
ATRIAL RATE - MUSE: 69 BPM
BASOPHILS # BLD AUTO: 0.1 10E3/UL (ref 0–0.2)
BASOPHILS NFR BLD AUTO: 1 %
BILIRUB SERPL-MCNC: 0.2 MG/DL
BUN SERPL-MCNC: 18.5 MG/DL (ref 8–23)
CALCIUM SERPL-MCNC: 9.3 MG/DL (ref 8.8–10.4)
CHLORIDE SERPL-SCNC: 105 MMOL/L (ref 98–107)
CREAT SERPL-MCNC: 1.03 MG/DL (ref 0.51–0.95)
DIASTOLIC BLOOD PRESSURE - MUSE: NORMAL MMHG
EGFRCR SERPLBLD CKD-EPI 2021: 58 ML/MIN/1.73M2
EOSINOPHIL # BLD AUTO: 1 10E3/UL (ref 0–0.7)
EOSINOPHIL NFR BLD AUTO: 13 %
ERYTHROCYTE [DISTWIDTH] IN BLOOD BY AUTOMATED COUNT: 13.3 % (ref 10–15)
GLUCOSE SERPL-MCNC: 111 MG/DL (ref 70–99)
HCO3 SERPL-SCNC: 20 MMOL/L (ref 22–29)
HCT VFR BLD AUTO: 39.3 % (ref 35–47)
HGB BLD-MCNC: 13.5 G/DL (ref 11.7–15.7)
IMM GRANULOCYTES # BLD: 0 10E3/UL
IMM GRANULOCYTES NFR BLD: 0 %
INR PPP: 2.27 (ref 0.85–1.15)
INTERPRETATION ECG - MUSE: NORMAL
LYMPHOCYTES # BLD AUTO: 2 10E3/UL (ref 0.8–5.3)
LYMPHOCYTES NFR BLD AUTO: 25 %
MCH RBC QN AUTO: 29.1 PG (ref 26.5–33)
MCHC RBC AUTO-ENTMCNC: 34.4 G/DL (ref 31.5–36.5)
MCV RBC AUTO: 85 FL (ref 78–100)
MONOCYTES # BLD AUTO: 0.6 10E3/UL (ref 0–1.3)
MONOCYTES NFR BLD AUTO: 7 %
NEUTROPHILS # BLD AUTO: 4.3 10E3/UL (ref 1.6–8.3)
NEUTROPHILS NFR BLD AUTO: 54 %
P AXIS - MUSE: 54 DEGREES
PLATELET # BLD AUTO: 210 10E3/UL (ref 150–450)
POTASSIUM SERPL-SCNC: 4.5 MMOL/L (ref 3.4–5.3)
PR INTERVAL - MUSE: 148 MS
PROT SERPL-MCNC: 7.1 G/DL (ref 6.4–8.3)
QRS DURATION - MUSE: 82 MS
QT - MUSE: 378 MS
QTC - MUSE: 405 MS
R AXIS - MUSE: 62 DEGREES
RBC # BLD AUTO: 4.64 10E6/UL (ref 3.8–5.2)
SODIUM SERPL-SCNC: 141 MMOL/L (ref 135–145)
SYSTOLIC BLOOD PRESSURE - MUSE: NORMAL MMHG
T AXIS - MUSE: 66 DEGREES
VENTRICULAR RATE- MUSE: 69 BPM
WBC # BLD AUTO: 7.9 10E3/UL (ref 4–11)

## 2025-03-12 PROCEDURE — 82040 ASSAY OF SERUM ALBUMIN: CPT | Mod: ZL | Performed by: NURSE PRACTITIONER

## 2025-03-12 PROCEDURE — 85018 HEMOGLOBIN: CPT | Mod: ZL | Performed by: NURSE PRACTITIONER

## 2025-03-12 PROCEDURE — 85004 AUTOMATED DIFF WBC COUNT: CPT | Mod: ZL | Performed by: NURSE PRACTITIONER

## 2025-03-12 PROCEDURE — 85610 PROTHROMBIN TIME: CPT | Mod: ZL | Performed by: NURSE PRACTITIONER

## 2025-03-12 PROCEDURE — 80053 COMPREHEN METABOLIC PANEL: CPT | Mod: ZL | Performed by: NURSE PRACTITIONER

## 2025-03-12 PROCEDURE — 36415 COLL VENOUS BLD VENIPUNCTURE: CPT | Mod: ZL | Performed by: NURSE PRACTITIONER

## 2025-03-12 ASSESSMENT — PAIN SCALES - GENERAL: PAINLEVEL_OUTOF10: MODERATE PAIN (5)

## 2025-03-12 NOTE — H&P (VIEW-ONLY)
Preoperative Evaluation  [facility]  [address]  [facility] [address]  Phone: [phone]        Primary Provider: Eliz Hartman CNP  Pre-op Performing Provider: Eliz Hartman CNP          Mar 12, 2025             3/9/2025   Surgical Information   What procedure is being done? Left breast biopsy   Facility or Hospital where procedure/surgery will be performed: [facility]   Who is doing the procedure / surgery? Dr. Michael Cage   Date of surgery / procedure: 03/24/2025   Time of surgery / procedure: TBD   Where do you plan to recover after surgery? at home with family           Fax number for surgical facility: Note does not need to be faxed, will be available electronically in Epic.          Kaitlin is a 69 year old, presenting for the following:  Pre-Op Exam        HPI:      EXAM: MA DIAGNOSTIC LEFT W/ KIMBER, US BREAST LEFT LIMITED 1-3 QUADRANTS     INDICATION: SIA ROLLE who is 69 years old is referred for  Abnormal finding on breast imaging.      TECHNIQUE: Diagnostic mammogram with tomosynthesis; focused grayscale  and color ultrasound of the mammographic abnormality.     COMPARISON: Mammograms 2/17/2025, 10/24/2023     BREAST DENSITY: The breasts are heterogeneously dense, which may  obscure small masses.     FINDINGS:   MAMMOGRAM  In the area of interest, at the 9:00 position, 3 cm from the nipple,  there is an irregular isodense mass measuring 11 x 11 mm. Left breast  lumpectomy changes, stable. The remainder of the left breast is within  normal limits.     ULTRASOUND  Focused ultrasound of the left breast in the area of interest at the  9:00 position, 3 cm from nipple, demonstrates a heterogeneously  hypoechoic mass with irregular margins, echogenic rim and posterior  acoustic shadowing. It measures 1.2 x 0.9 x 0.4 cm. Demonstrated no  substantial internal hypervascularity. Finding correlates with  mammographic abnormality in this area.     A few prominent  retroareolar ducts are noted. Architectural distortion  in the area of previous lumpectomy noted.                                                                       IMPRESSION: BI-RADS CATEGORY: 4 - Suspicious.     RECOMMENDED FOLLOW-UP:   Biopsy     Ultrasound-guided biopsy of left breast mass at the 9:00 position.     Results and recommendations discussed with patient.     Analysis by computer assisted detection software was done.     DEEPA FISHER MD              EXAM: MA DIAGNOSTIC LEFT W/ KIMBER, US BREAST LEFT LIMITED 1-3 QUADRANTS     INDICATION: SIA ROLLE who is 69 years old is referred for  Abnormal finding on breast imaging.      TECHNIQUE: Diagnostic mammogram with tomosynthesis; focused grayscale  and color ultrasound of the mammographic abnormality.     COMPARISON: Mammograms 2/17/2025, 10/24/2023     BREAST DENSITY: The breasts are heterogeneously dense, which may  obscure small masses.     FINDINGS:   MAMMOGRAM  In the area of interest, at the 9:00 position, 3 cm from the nipple,  there is an irregular isodense mass measuring 11 x 11 mm. Left breast  lumpectomy changes, stable. The remainder of the left breast is within  normal limits.     ULTRASOUND  Focused ultrasound of the left breast in the area of interest at the  9:00 position, 3 cm from nipple, demonstrates a heterogeneously  hypoechoic mass with irregular margins, echogenic rim and posterior  acoustic shadowing. It measures 1.2 x 0.9 x 0.4 cm. Demonstrated no  substantial internal hypervascularity. Finding correlates with  mammographic abnormality in this area.     A few prominent retroareolar ducts are noted. Architectural distortion  in the area of previous lumpectomy noted.                                                                       IMPRESSION: BI-RADS CATEGORY: 4 - Suspicious.     RECOMMENDED FOLLOW-UP:   Biopsy     Ultrasound-guided biopsy of left breast mass at the 9:00 position.     Results and recommendations discussed  with patient.     Analysis by computer assisted detection software was done.     DEEPA FISHER MD           Pathology demonstrates focal atrophic breast tissue with mild stromal  fibrosis and the comments on the biopsy material predominantly  representing adipose tissue with a small amount of atrophic breast  tissue in one of the samples. Given the ultrasound appearance, this is  a discordant biopsy result. Surgical consultation and possible  excision is recommended.     GABE ZHANG MD               PROCEDURE: MA POST PROCEDURE LEFT 2/21/2025 11:25 AM     HISTORY: no; Mass of left breast     COMPARISONS: 2/19/2025.     TECHNIQUE: 2 views.     FINDINGS: There is some postbiopsy change in the anterior medial left  breast. Biopsy clip appears appropriately positioned relative to the  previously seen mass.                                                                        IMPRESSION: Satisfactory clip placement status post ultrasound-guided  biopsy.     KAT DACOSTA MD            3/9/2025   Pre-Op Questionnaire   Have you ever had a heart attack or stroke? No   Have you ever had surgery on your heart or blood vessels, such as a stent placement, a coronary artery bypass, or surgery on an artery in your head, neck, heart, or legs? No   Do you have chest pain with activity? No   Do you have a history of heart failure? No   Do you currently have a cold, bronchitis or symptoms of other infection? Recent cold symptoms, resolved   Do you have a cough, shortness of breath, or wheezing? (!) YES - cough - improved   Do you or anyone in your family have previous history of blood clots? (!) YES    Do you or does anyone in your family have a serious bleeding problem such as prolonged bleeding following surgeries or cuts? No   Have you ever had problems with anemia or been told to take iron pills? No   Have you had any abnormal blood loss such as black, tarry or bloody stools, or abnormal vaginal bleeding? No   Have you  ever had a blood transfusion? No   Are you willing to have a blood transfusion if it is medically needed before, during, or after your surgery? Yes   Have you or any of your relatives ever had problems with anesthesia? No   Do you have sleep apnea, excessive snoring or daytime drowsiness? No   Do you have any artifical heart valves or other implanted medical devices like a pacemaker, defibrillator, or continuous glucose monitor? No   Do you have artificial joints? (!) YES   Are you allergic to latex? No           Health Care Directive  Patient has a Health Care Directive on file            Status of Chronic Conditions:  See problem list for active medical problems.  Problems all longstanding and stable, except as noted/documented.  See ROS for pertinent symptoms related to these conditions.          Patient Active Problem List    Diagnosis Date Noted    Calcification of both carotid arteries 03/11/2025     Priority: Medium    Congestive heart failure, unspecified HF chronicity, unspecified heart failure type (H) 02/24/2025     Priority: Medium    Encounter for long-term (current) use of insulin (H) 02/24/2025     Priority: Medium    Chronic kidney disease, stage 3a (H) 02/24/2025     Priority: Medium    Primary aldosteronism 11/19/2024     Priority: Medium    High aldosterone to renin ratio 11/19/2024     Priority: Medium    History of gout 02/27/2024     Priority: Medium    Deep vein thrombosis (DVT) of right lower extremity, unspecified chronicity, unspecified vein (H) 01/24/2023     Priority: Medium    Shoulder pain 01/31/2022     Priority: Medium     Formatting of this note might be different from the original.  Added automatically from request for surgery [id number]      Muscle weakness of right upper extremity 04/15/2021     Priority: Medium    Pain in right upper arm 04/15/2021     Priority: Medium    Stiffness of right shoulder joint 04/15/2021     Priority: Medium    Diarrhea 02/08/2021     Priority: Medium     Obesity (BMI 35.0-39.9) with comorbidity (H) 01/28/2020     Priority: Medium    Factor V Leiden mutation 07/26/2019     Priority: Medium    Activated protein C resistance 07/26/2019     Priority: Medium    Primary insomnia 10/31/2018     Priority: Medium    Vitamin D deficiency 07/13/2018     Priority: Medium    Family history of colon cancer 01/02/2018     Priority: Medium    Lumbar spondylosis with myelopathy 07/12/2017     Priority: Medium    Degeneration of lumbar or lumbosacral intervertebral disc 07/12/2017     Priority: Medium    Long-term (current) use of anticoagulants [Z79.01] 07/20/2016     Priority: Medium    Moderate episode of recurrent major depressive disorder (H) 08/08/2014     Priority: Medium    H/O total shoulder replacement, left 06/17/2014     Priority: Medium    Failed arthroplasty 01/24/2014     Priority: Medium    Advanced care planning/counseling discussion 03/12/2013     Priority: Medium     Pt has information at home , not completed      Neurofibromatosis, type 1 (H) 08/22/2012     Priority: Medium     Problem list name updated by automated process. Provider to review    Formatting of this note might be different from the original.  Formatting of this note might be different from the original.  Problem list name updated by automated process. Provider to review      Chest pain, unspecified 02/11/2010     Priority: Medium     Formatting of this note might be different from the original.  IMO Update 10/11      Contact dermatitis and other eczema, due to unspecified cause 06/01/2004     Priority: Medium    Pulmonary embolism and infarction (H) 04/11/2003     Priority: Medium     Problem list name updated by automated process. Provider to review      Hyperlipidemia LDL goal <100 07/11/2002     Priority: Medium     Problem list name updated by automated process. Provider to review      Edema 01/18/2002     Priority: Medium    Primary hypertension 02/20/2001     Priority: Medium     Problem  list name updated by automated process. Provider to review    Formatting of this note might be different from the original.  Formatting of this note might be different from the original.  IMO Update 10/11  Formatting of this note might be different from the original.  Problem list name updated by automated process. Provider to review            Past Medical History:   Diagnosis Date    Abdominal pain, generalized 02/08/2021    Arthritis     Cervicalgia 01/09/2001    Closed dislocation of shoulder, unspecified site 2000    Congenital deficiency of other clotting factors 09/07/2012    factor V deficiency, congenital    Congenital factor VIII disorder (H) 07/26/2019    Congestive heart failure, unspecified HF chronicity, unspecified heart failure type (H) 2/24/2025    Contact dermatitis and other eczema, due to unspecified cause 06/01/2004    Coughing     Depressive disorder 8/8/2014    Diarrhea 02/08/2021    Edema 01/18/2002    Essential hypertension 02/20/2001     Problem list name updated by automated process. Provider to review    Factor V Leiden     Factor V Leiden mutation 07/26/2019    Family history of colon cancer 01/02/2018    Gastro-oesophageal reflux disease     Herpes zoster without mention of complication 2003    resolved from problem list    Hyperlipidemia LDL goal <100 07/11/2002     Problem list name updated by automated process. Provider to review    Long term (current) use of anticoagulants 08/20/2003    Major depression 08/08/2014    Mammographic microcalcification 2003    resolved from problem list    Migraine, unspecified, without mention of intractable migraine without mention of status migrainosus 03/05/2001    Moderate episode of recurrent major depressive disorder (H) 08/08/2014    Neurofibromatosis, peripheral, NF1 (H) 08/22/2012     Problem list name updated by automated process. Provider to review    Neurofibromatosis, unspecified(237.70) 08/22/2012    Other and unspecified hyperlipidemia  2002    Other chronic pain     Other pulmonary embolism and infarction 2003    Primary insomnia 10/31/2018    Statin medication not prescribed per physician orders 2018    Tachycardia, unspecified 2001    Thrombosis of leg     Unspecified essential hypertension 2001    Vitamin D deficiency 2018         Past Surgical History:   Procedure Laterality Date    ------------OTHER-------------      shoulder replacement; Provider: Karen    ARTHROPLASTY KNEE  2014    Procedure: ARTHROPLASTY KNEE;  Surgeon: Sean Alexander MD;  Location: [facility] OR    ARTHROSCOPY SHOULDER      right, bone spurs    BIOPSY      CA ANESTH,SHOULDER REPLACEMENT Right 2020     SECTION      x3    CHOLECYSTECTOMY      COLONOSCOPY  02/15/2018    Cotter,,polyps    elbow ulnar tunnel release      ELECTROTHERMAL THERAPY INTRADISC  2017    stimulator    ENDOSCOPIC SINUS SURGERY, SEPTOPLASTY, TURBINOPLASTY, MAXILLARY SINUSOTOMY, COMBINED N/A 2015    Procedure: COMBINED ENDOSCOPIC SINUS SURGERY, SEPTOPLASTY, TURBINOPLASTY, MAXILLARY SINUSOTOMY;  Surgeon: Seema Conn MD;  Location: [facility] OR    ENT SURGERY      ESOPHAGOSCOPY, GASTROSCOPY, DUODENOSCOPY (EGD), COMBINED      with biopsy and endoscopic U/S    EXCISE NEUROMA LOWER EXTREMITY Left 2016    Procedure: EXCISE NEUROMA LOWER EXTREMITY;  Surgeon: Edi Reed MD;  Location: [facility] OR    EYE SURGERY Right 2020    FUSION LUMBAR ANTERIOR WITH MARCY CAGES      L5-S1    HYSTERECTOMY TOTAL ABDOMINAL, BILATERAL SALPINGO-OOPHORECTOMY, COMBINED N/A     ORTHOPEDIC SURGERY  2015    right shoulder    ORTHOPEDIC SURGERY  2015    right knee    ORTHOPEDIC SURGERY Right 2018    hip labrum tear    pionidal cyst excision      TRANSPOSITION ULNAR NERVE (ELBOW)           Current Outpatient Medications   Medication Sig Dispense Refill    albuterol (PROVENTIL) (2.5 MG/3ML) 0.083% neb solution Take 2.5 mg by  nebulization every 6 hours as needed for shortness of breath / dyspnea or wheezing      aspirin 81 MG EC tablet Take 81 mg by mouth daily HS      eplerenone (INSPRA) 50 MG tablet Take 1 tablet (50 mg) by mouth 2 times daily. 60 tablet 3    escitalopram (LEXAPRO) 10 MG tablet Take 1 tablet (10 mg) by mouth daily. 90 tablet 1    furosemide (LASIX) 20 MG tablet Take 1 tablet (20 mg) by mouth as needed (swelling, weight gain).      ketoconazole (NIZORAL) 2 % external cream APPLY TO THE RASH ON FULL BACK TWICE A DAY FOR 4-6 WEEKS      losartan (COZAAR) 50 MG tablet Take 1 tablet (50 mg) by mouth daily. 90 tablet 1    metoprolol succinate ER (TOPROL XL) 50 MG 24 hr tablet Take 0.5 tablets (25 mg) by mouth daily. 45 tablet 1    order for DME Nebulizer machine and tubing    DX:  Bronchitis 1 Device 0    traZODone (DESYREL) 50 MG tablet TAKE 1 TO 2 TABLETS BY MOUTH NIGHTLY AS NEEDED 180 tablet 3    warfarin ANTICOAGULANT (COUMADIN) 5 MG tablet TAKE 1 & 1/2 (ONE & ONE-HALF) TABLETS BY MOUTH  AND FRIDAY AND 1 TABLET ALL OTHER DAYS OR AS DIRECTED BY WARFARIN CLINIC 109 tablet 1         Allergies   Allergen Reactions    Allopurinol Shortness Of Breath    Amlodipine Besylate Swelling     Norvasc    Amoxicillin     Atorvastatin      myualgia    Cephalexin Monohydrate Hives     Keflex    Erythromycin Base [Erythromycin Base] Nausea and Vomiting    Meloxicam Other (See Comments)     Mobic - confusion, depression    Sulfa Antibiotics Hives    Adhesive Tape Rash    Prochlorperazine Edisylate Swelling and Rash     Compazine    Prochlorperazine Maleate Swelling and Rash          Social History     Tobacco Use    Smoking status: Former     Current packs/day: 0.00     Average packs/day: 1 pack/day for 30.0 years (30.0 ttl pk-yrs)     Types: Cigarettes, Pipe     Start date: 1969     Quit date: 2000     Years since quittin.2     Passive exposure: Past    Smokeless tobacco: Never    Tobacco comments:     quit in  "   Substance Use Topics    Alcohol use: No         Family History   Problem Relation Age of Onset    Cancer Mother     Colon Polyps Mother     Heart Failure Mother 87        congestive, cause of death    Myocardial Infarction Mother         myocardial infarction    Coronary Artery Disease Mother             Hypertension Mother     Colon Cancer Mother     Osteoporosis Mother     Genetic Disorder Mother     Myocardial Infarction Father         myocardial infarction - cause of death    C.A.D. Father     Coronary Artery Disease Father             Hypertension Father             Hyperlipidemia Father     Cancer Paternal Uncle         cause of death    C.A.D. Brother     Other - See Comments Other         factor 5 - family h/o    Diabetes Maternal Grandmother             Hypertension Maternal Half-Sister     Depression Maternal Half-Sister     Hypertension Brother     Other Cancer Son         testicular    Asthma No family hx of          History   Drug Use No             Review of Systems  Constitutional, HEENT, cardiovascular, pulmonary, GI, , musculoskeletal, neuro, skin, endocrine and psych systems are negative, except as otherwise noted.        Objective    /80 (BP Location: Right arm, Patient Position: Sitting, Cuff Size: Adult Regular)   Pulse 69   Temp 99  F (37.2  C) (Tympanic)   Resp 20   Wt 102.1 kg (225 lb)   SpO2 94%   BMI 37.44 kg/m     Estimated body mass index is 37.44 kg/m  as calculated from the following:    Height as of 3/11/25: 1.651 m (5' 5\").    Weight as of this encounter: 102.1 kg (225 lb).          Physical Exam  GENERAL: alert and no distress  EYES: Eyes grossly normal to inspection, PERRL and conjunctivae and sclerae normal  HENT: ear canals and TM's normal, nose and mouth without ulcers or lesions  NECK: no adenopathy, no asymmetry, masses, or scars  RESP: lungs clear to auscultation - no rales, rhonchi or wheezes  CV: regular rate and rhythm, " normal S1 S2, no S3 or S4, no murmur, click or rub, no peripheral edema  MS: no gross musculoskeletal defects noted, no edema  PSYCH: mentation appears normal, affect normal/bright          Recent Labs   Lab Test 03/04/25  0953 03/04/25  0000 02/21/25  0927 02/18/25  0936 12/10/24  0000 12/05/24  1354 11/19/24  0000 11/05/24  1058 10/22/24  0000 10/17/24  0801 08/27/24  0000 08/24/24  2109   HGB  --   --   --   --   --   --   --   --   --  12.9  --  14.1   PLT  --   --   --   --   --   --   --   --   --  179  --  203   INR 2.0 2.0   < >  --    < >  --    < >  --    < >  --    < > 1.72*   NA  --   --   --  137  --   --   --  139   < >  --    < > 139   POTASSIUM  --   --   --  4.4  --  4.3   < > 4.0   < >  --    < > 4.5   CR  --   --   --  1.07*  --   --   --  1.04*   < >  --    < > 1.10*    < > = values in this interval not displayed.          Diagnostics  Recent Results (from the past week)   EKG 12-lead complete w/read - (Clinic Performed)    Collection Time: 03/12/25  1:27 PM   Result Value Ref Range    Systolic Blood Pressure  mmHg    Diastolic Blood Pressure  mmHg    Ventricular Rate 69 BPM    Atrial Rate 69 BPM    VA Interval 148 ms    QRS Duration 82 ms     ms    QTc 405 ms    P Axis 54 degrees    R AXIS 62 degrees    T Axis 66 degrees    Interpretation ECG       Sinus rhythm  Normal ECG  When compared with ECG of 19-Sep-2024 09:36,  No significant change was found     INR    Collection Time: 03/12/25  1:41 PM   Result Value Ref Range    INR 2.27 (H) 0.85 - 1.15   Comprehensive metabolic panel    Collection Time: 03/12/25  1:41 PM   Result Value Ref Range    Sodium 141 135 - 145 mmol/L    Potassium 4.5 3.4 - 5.3 mmol/L    Carbon Dioxide (CO2) 20 (L) 22 - 29 mmol/L    Anion Gap 16 (H) 7 - 15 mmol/L    Urea Nitrogen 18.5 8.0 - 23.0 mg/dL    Creatinine 1.03 (H) 0.51 - 0.95 mg/dL    GFR Estimate 58 (L) >60 mL/min/1.73m2    Calcium 9.3 8.8 - 10.4 mg/dL    Chloride 105 98 - 107 mmol/L    Glucose 111 (H) 70 -  99 mg/dL    Alkaline Phosphatase 67 40 - 150 U/L    AST 19 0 - 45 U/L    ALT 19 0 - 50 U/L    Protein Total 7.1 6.4 - 8.3 g/dL    Albumin 4.0 3.5 - 5.2 g/dL    Bilirubin Total 0.2 <=1.2 mg/dL   CBC with platelets and differential    Collection Time: 03/12/25  1:41 PM   Result Value Ref Range    WBC Count 7.9 4.0 - 11.0 10e3/uL    RBC Count 4.64 3.80 - 5.20 10e6/uL    Hemoglobin 13.5 11.7 - 15.7 g/dL    Hematocrit 39.3 35.0 - 47.0 %    MCV 85 78 - 100 fL    MCH 29.1 26.5 - 33.0 pg    MCHC 34.4 31.5 - 36.5 g/dL    RDW 13.3 10.0 - 15.0 %    Platelet Count 210 150 - 450 10e3/uL    % Neutrophils 54 %    % Lymphocytes 25 %    % Monocytes 7 %    % Eosinophils 13 %    % Basophils 1 %    % Immature Granulocytes 0 %    Absolute Neutrophils 4.3 1.6 - 8.3 10e3/uL    Absolute Lymphocytes 2.0 0.8 - 5.3 10e3/uL    Absolute Monocytes 0.6 0.0 - 1.3 10e3/uL    Absolute Eosinophils 1.0 (H) 0.0 - 0.7 10e3/uL    Absolute Basophils 0.1 0.0 - 0.2 10e3/uL    Absolute Immature Granulocytes 0.0 <=0.4 10e3/uL            EKG: appears normal, NSR, unchanged from previous tracings        Revised Cardiac Risk Index (RCRI)  The patient has the following serious cardiovascular risks for perioperative complications:   - No serious cardiac risks = 0 points       RCRI Interpretation: 0 points: Class I (very low risk - 0.4% complication rate)          Assessment & Plan       The proposed surgical procedure is considered LOW risk.      Pre-op exam  - EKG 12-lead complete w/read - (Clinic Performed)  - Comprehensive metabolic panel  - CBC with platelets and differential      Mass of left breast, unspecified quadrant  - See above      Factor V Leiden mutation  - INR            - No identified additional risk factors other than previously addressed        Anticoagulant instruction per coumadin clinic          How to Take Your Medication Before Surgery       Please avoid aspirin, anti-inflammatories, vitamins, minerals and supplements for 2 weeks prior to  your surgery.       Please take your BP medication the morning of surgery with a small sip of water          Recommendation  Approval given to proceed with proposed procedure, without further diagnostic evaluation.           The longitudinal plan of care for the diagnosis(es)/condition(s) as documented were addressed during this visit. Due to the added complexity in care, I will continue to support Kaitlin in the subsequent management and with ongoing continuity of care.         Signed Electronically by: Eliz Hartman CNP  A copy of this evaluation report is provided to the requesting physician.

## 2025-03-12 NOTE — TELEPHONE ENCOUNTER
If you would like patients INR to be around 2.0 for this procedure I can direct her with dosing to get a close a possible. If patient needs her INR to below 2.0, we will need direction from provider for hold and if patient needs to be on Lovenox while INR is out of range. Just to clarify INR should be around 2.0 for this procedure?

## 2025-03-12 NOTE — TELEPHONE ENCOUNTER
[facility]-PROCEDURAL ANTICOAGULATION  MANAGEMENT    Cassy requesting pre-procedure hold orders for warfarin and review for bridging      Procedure date: 3/24/25       Procedure:  breast wire placement       Procedure location and phone number (if external): [facility]     Number of warfarin hold days requested and/or target INR: unknown    Pre-op date:  patient seen today but called protime clinic said she was not told what was needed as far a hold on warfarin or if INR just needs to be on lower end of range as in the last procedure on 2/21/25. She just needed INR to be just below 3.0  Does she need INR to be below 2.0 please advise?      Routing to pcp for review.     ACC pool: erwin Galvan RN

## 2025-03-12 NOTE — TELEPHONE ENCOUNTER
Eliz Hartman, CNP  [facility] (2:43 PM)     AF  Hi1  I asked her to reach out to you for instruction.  I think we would be fine to get her to 2.0 for the localized procedure.  Please let me know so I can add it to her pre op note    Eliz Hartman C-FNP  [phone]

## 2025-03-12 NOTE — PROGRESS NOTES
Preoperative Evaluation  [facility]  [address]  [facility] [address]  Phone: [phone]        Primary Provider: Eliz Hartman CNP  Pre-op Performing Provider: Eliz Hartman CNP          Mar 12, 2025             3/9/2025   Surgical Information   What procedure is being done? Left breast biopsy   Facility or Hospital where procedure/surgery will be performed: [facility]   Who is doing the procedure / surgery? Dr. Michael Cage   Date of surgery / procedure: 03/24/2025   Time of surgery / procedure: TBD   Where do you plan to recover after surgery? at home with family           Fax number for surgical facility: Note does not need to be faxed, will be available electronically in Epic.          Kaitlin is a 69 year old, presenting for the following:  Pre-Op Exam        HPI:      EXAM: MA DIAGNOSTIC LEFT W/ KIMBER, US BREAST LEFT LIMITED 1-3 QUADRANTS     INDICATION: SIA ROLLE who is 69 years old is referred for  Abnormal finding on breast imaging.      TECHNIQUE: Diagnostic mammogram with tomosynthesis; focused grayscale  and color ultrasound of the mammographic abnormality.     COMPARISON: Mammograms 2/17/2025, 10/24/2023     BREAST DENSITY: The breasts are heterogeneously dense, which may  obscure small masses.     FINDINGS:   MAMMOGRAM  In the area of interest, at the 9:00 position, 3 cm from the nipple,  there is an irregular isodense mass measuring 11 x 11 mm. Left breast  lumpectomy changes, stable. The remainder of the left breast is within  normal limits.     ULTRASOUND  Focused ultrasound of the left breast in the area of interest at the  9:00 position, 3 cm from nipple, demonstrates a heterogeneously  hypoechoic mass with irregular margins, echogenic rim and posterior  acoustic shadowing. It measures 1.2 x 0.9 x 0.4 cm. Demonstrated no  substantial internal hypervascularity. Finding correlates with  mammographic abnormality in this area.     A few prominent  retroareolar ducts are noted. Architectural distortion  in the area of previous lumpectomy noted.                                                                       IMPRESSION: BI-RADS CATEGORY: 4 - Suspicious.     RECOMMENDED FOLLOW-UP:   Biopsy     Ultrasound-guided biopsy of left breast mass at the 9:00 position.     Results and recommendations discussed with patient.     Analysis by computer assisted detection software was done.     DEEPA FISHER MD              EXAM: MA DIAGNOSTIC LEFT W/ KIMBER, US BREAST LEFT LIMITED 1-3 QUADRANTS     INDICATION: SIA ROLLE who is 69 years old is referred for  Abnormal finding on breast imaging.      TECHNIQUE: Diagnostic mammogram with tomosynthesis; focused grayscale  and color ultrasound of the mammographic abnormality.     COMPARISON: Mammograms 2/17/2025, 10/24/2023     BREAST DENSITY: The breasts are heterogeneously dense, which may  obscure small masses.     FINDINGS:   MAMMOGRAM  In the area of interest, at the 9:00 position, 3 cm from the nipple,  there is an irregular isodense mass measuring 11 x 11 mm. Left breast  lumpectomy changes, stable. The remainder of the left breast is within  normal limits.     ULTRASOUND  Focused ultrasound of the left breast in the area of interest at the  9:00 position, 3 cm from nipple, demonstrates a heterogeneously  hypoechoic mass with irregular margins, echogenic rim and posterior  acoustic shadowing. It measures 1.2 x 0.9 x 0.4 cm. Demonstrated no  substantial internal hypervascularity. Finding correlates with  mammographic abnormality in this area.     A few prominent retroareolar ducts are noted. Architectural distortion  in the area of previous lumpectomy noted.                                                                       IMPRESSION: BI-RADS CATEGORY: 4 - Suspicious.     RECOMMENDED FOLLOW-UP:   Biopsy     Ultrasound-guided biopsy of left breast mass at the 9:00 position.     Results and recommendations discussed  with patient.     Analysis by computer assisted detection software was done.     DEEPA FISHER MD           Pathology demonstrates focal atrophic breast tissue with mild stromal  fibrosis and the comments on the biopsy material predominantly  representing adipose tissue with a small amount of atrophic breast  tissue in one of the samples. Given the ultrasound appearance, this is  a discordant biopsy result. Surgical consultation and possible  excision is recommended.     GABE ZHANG MD               PROCEDURE: MA POST PROCEDURE LEFT 2/21/2025 11:25 AM     HISTORY: no; Mass of left breast     COMPARISONS: 2/19/2025.     TECHNIQUE: 2 views.     FINDINGS: There is some postbiopsy change in the anterior medial left  breast. Biopsy clip appears appropriately positioned relative to the  previously seen mass.                                                                        IMPRESSION: Satisfactory clip placement status post ultrasound-guided  biopsy.     KAT DACOSTA MD            3/9/2025   Pre-Op Questionnaire   Have you ever had a heart attack or stroke? No   Have you ever had surgery on your heart or blood vessels, such as a stent placement, a coronary artery bypass, or surgery on an artery in your head, neck, heart, or legs? No   Do you have chest pain with activity? No   Do you have a history of heart failure? No   Do you currently have a cold, bronchitis or symptoms of other infection? Recent cold symptoms, resolved   Do you have a cough, shortness of breath, or wheezing? (!) YES - cough - improved   Do you or anyone in your family have previous history of blood clots? (!) YES    Do you or does anyone in your family have a serious bleeding problem such as prolonged bleeding following surgeries or cuts? No   Have you ever had problems with anemia or been told to take iron pills? No   Have you had any abnormal blood loss such as black, tarry or bloody stools, or abnormal vaginal bleeding? No   Have you  ever had a blood transfusion? No   Are you willing to have a blood transfusion if it is medically needed before, during, or after your surgery? Yes   Have you or any of your relatives ever had problems with anesthesia? No   Do you have sleep apnea, excessive snoring or daytime drowsiness? No   Do you have any artifical heart valves or other implanted medical devices like a pacemaker, defibrillator, or continuous glucose monitor? No   Do you have artificial joints? (!) YES   Are you allergic to latex? No           Health Care Directive  Patient has a Health Care Directive on file            Status of Chronic Conditions:  See problem list for active medical problems.  Problems all longstanding and stable, except as noted/documented.  See ROS for pertinent symptoms related to these conditions.          Patient Active Problem List    Diagnosis Date Noted    Calcification of both carotid arteries 03/11/2025     Priority: Medium    Congestive heart failure, unspecified HF chronicity, unspecified heart failure type (H) 02/24/2025     Priority: Medium    Encounter for long-term (current) use of insulin (H) 02/24/2025     Priority: Medium    Chronic kidney disease, stage 3a (H) 02/24/2025     Priority: Medium    Primary aldosteronism 11/19/2024     Priority: Medium    High aldosterone to renin ratio 11/19/2024     Priority: Medium    History of gout 02/27/2024     Priority: Medium    Deep vein thrombosis (DVT) of right lower extremity, unspecified chronicity, unspecified vein (H) 01/24/2023     Priority: Medium    Shoulder pain 01/31/2022     Priority: Medium     Formatting of this note might be different from the original.  Added automatically from request for surgery [id number]      Muscle weakness of right upper extremity 04/15/2021     Priority: Medium    Pain in right upper arm 04/15/2021     Priority: Medium    Stiffness of right shoulder joint 04/15/2021     Priority: Medium    Diarrhea 02/08/2021     Priority: Medium     Obesity (BMI 35.0-39.9) with comorbidity (H) 01/28/2020     Priority: Medium    Factor V Leiden mutation 07/26/2019     Priority: Medium    Activated protein C resistance 07/26/2019     Priority: Medium    Primary insomnia 10/31/2018     Priority: Medium    Vitamin D deficiency 07/13/2018     Priority: Medium    Family history of colon cancer 01/02/2018     Priority: Medium    Lumbar spondylosis with myelopathy 07/12/2017     Priority: Medium    Degeneration of lumbar or lumbosacral intervertebral disc 07/12/2017     Priority: Medium    Long-term (current) use of anticoagulants [Z79.01] 07/20/2016     Priority: Medium    Moderate episode of recurrent major depressive disorder (H) 08/08/2014     Priority: Medium    H/O total shoulder replacement, left 06/17/2014     Priority: Medium    Failed arthroplasty 01/24/2014     Priority: Medium    Advanced care planning/counseling discussion 03/12/2013     Priority: Medium     Pt has information at home , not completed      Neurofibromatosis, type 1 (H) 08/22/2012     Priority: Medium     Problem list name updated by automated process. Provider to review    Formatting of this note might be different from the original.  Formatting of this note might be different from the original.  Problem list name updated by automated process. Provider to review      Chest pain, unspecified 02/11/2010     Priority: Medium     Formatting of this note might be different from the original.  IMO Update 10/11      Contact dermatitis and other eczema, due to unspecified cause 06/01/2004     Priority: Medium    Pulmonary embolism and infarction (H) 04/11/2003     Priority: Medium     Problem list name updated by automated process. Provider to review      Hyperlipidemia LDL goal <100 07/11/2002     Priority: Medium     Problem list name updated by automated process. Provider to review      Edema 01/18/2002     Priority: Medium    Primary hypertension 02/20/2001     Priority: Medium     Problem  list name updated by automated process. Provider to review    Formatting of this note might be different from the original.  Formatting of this note might be different from the original.  IMO Update 10/11  Formatting of this note might be different from the original.  Problem list name updated by automated process. Provider to review            Past Medical History:   Diagnosis Date    Abdominal pain, generalized 02/08/2021    Arthritis     Cervicalgia 01/09/2001    Closed dislocation of shoulder, unspecified site 2000    Congenital deficiency of other clotting factors 09/07/2012    factor V deficiency, congenital    Congenital factor VIII disorder (H) 07/26/2019    Congestive heart failure, unspecified HF chronicity, unspecified heart failure type (H) 2/24/2025    Contact dermatitis and other eczema, due to unspecified cause 06/01/2004    Coughing     Depressive disorder 8/8/2014    Diarrhea 02/08/2021    Edema 01/18/2002    Essential hypertension 02/20/2001     Problem list name updated by automated process. Provider to review    Factor V Leiden     Factor V Leiden mutation 07/26/2019    Family history of colon cancer 01/02/2018    Gastro-oesophageal reflux disease     Herpes zoster without mention of complication 2003    resolved from problem list    Hyperlipidemia LDL goal <100 07/11/2002     Problem list name updated by automated process. Provider to review    Long term (current) use of anticoagulants 08/20/2003    Major depression 08/08/2014    Mammographic microcalcification 2003    resolved from problem list    Migraine, unspecified, without mention of intractable migraine without mention of status migrainosus 03/05/2001    Moderate episode of recurrent major depressive disorder (H) 08/08/2014    Neurofibromatosis, peripheral, NF1 (H) 08/22/2012     Problem list name updated by automated process. Provider to review    Neurofibromatosis, unspecified(237.70) 08/22/2012    Other and unspecified hyperlipidemia  2002    Other chronic pain     Other pulmonary embolism and infarction 2003    Primary insomnia 10/31/2018    Statin medication not prescribed per physician orders 2018    Tachycardia, unspecified 2001    Thrombosis of leg     Unspecified essential hypertension 2001    Vitamin D deficiency 2018         Past Surgical History:   Procedure Laterality Date    ------------OTHER-------------      shoulder replacement; Provider: Karen    ARTHROPLASTY KNEE  2014    Procedure: ARTHROPLASTY KNEE;  Surgeon: Sean Alexander MD;  Location: [facility] OR    ARTHROSCOPY SHOULDER      right, bone spurs    BIOPSY      CA ANESTH,SHOULDER REPLACEMENT Right 2020     SECTION      x3    CHOLECYSTECTOMY      COLONOSCOPY  02/15/2018    Adrian,,polyps    elbow ulnar tunnel release      ELECTROTHERMAL THERAPY INTRADISC  2017    stimulator    ENDOSCOPIC SINUS SURGERY, SEPTOPLASTY, TURBINOPLASTY, MAXILLARY SINUSOTOMY, COMBINED N/A 2015    Procedure: COMBINED ENDOSCOPIC SINUS SURGERY, SEPTOPLASTY, TURBINOPLASTY, MAXILLARY SINUSOTOMY;  Surgeon: Seema Conn MD;  Location: [facility] OR    ENT SURGERY      ESOPHAGOSCOPY, GASTROSCOPY, DUODENOSCOPY (EGD), COMBINED      with biopsy and endoscopic U/S    EXCISE NEUROMA LOWER EXTREMITY Left 2016    Procedure: EXCISE NEUROMA LOWER EXTREMITY;  Surgeon: Edi Reed MD;  Location: [facility] OR    EYE SURGERY Right 2020    FUSION LUMBAR ANTERIOR WITH MARCY CAGES      L5-S1    HYSTERECTOMY TOTAL ABDOMINAL, BILATERAL SALPINGO-OOPHORECTOMY, COMBINED N/A     ORTHOPEDIC SURGERY  2015    right shoulder    ORTHOPEDIC SURGERY  2015    right knee    ORTHOPEDIC SURGERY Right 2018    hip labrum tear    pionidal cyst excision      TRANSPOSITION ULNAR NERVE (ELBOW)           Current Outpatient Medications   Medication Sig Dispense Refill    albuterol (PROVENTIL) (2.5 MG/3ML) 0.083% neb solution Take 2.5 mg by  nebulization every 6 hours as needed for shortness of breath / dyspnea or wheezing      aspirin 81 MG EC tablet Take 81 mg by mouth daily HS      eplerenone (INSPRA) 50 MG tablet Take 1 tablet (50 mg) by mouth 2 times daily. 60 tablet 3    escitalopram (LEXAPRO) 10 MG tablet Take 1 tablet (10 mg) by mouth daily. 90 tablet 1    furosemide (LASIX) 20 MG tablet Take 1 tablet (20 mg) by mouth as needed (swelling, weight gain).      ketoconazole (NIZORAL) 2 % external cream APPLY TO THE RASH ON FULL BACK TWICE A DAY FOR 4-6 WEEKS      losartan (COZAAR) 50 MG tablet Take 1 tablet (50 mg) by mouth daily. 90 tablet 1    metoprolol succinate ER (TOPROL XL) 50 MG 24 hr tablet Take 0.5 tablets (25 mg) by mouth daily. 45 tablet 1    order for DME Nebulizer machine and tubing    DX:  Bronchitis 1 Device 0    traZODone (DESYREL) 50 MG tablet TAKE 1 TO 2 TABLETS BY MOUTH NIGHTLY AS NEEDED 180 tablet 3    warfarin ANTICOAGULANT (COUMADIN) 5 MG tablet TAKE 1 & 1/2 (ONE & ONE-HALF) TABLETS BY MOUTH  AND FRIDAY AND 1 TABLET ALL OTHER DAYS OR AS DIRECTED BY [facility] CLINIC 109 tablet 1         Allergies   Allergen Reactions    Allopurinol Shortness Of Breath    Amlodipine Besylate Swelling     Norvasc    Amoxicillin     Atorvastatin      myualgia    Cephalexin Monohydrate Hives     Keflex    Erythromycin Base [Erythromycin Base] Nausea and Vomiting    Meloxicam Other (See Comments)     Mobic - confusion, depression    Sulfa Antibiotics Hives    Adhesive Tape Rash    Prochlorperazine Edisylate Swelling and Rash     Compazine    Prochlorperazine Maleate Swelling and Rash          Social History     Tobacco Use    Smoking status: Former     Current packs/day: 0.00     Average packs/day: 1 pack/day for 30.0 years (30.0 ttl pk-yrs)     Types: Cigarettes, Pipe     Start date: 1969     Quit date: 2000     Years since quittin.2     Passive exposure: Past    Smokeless tobacco: Never    Tobacco comments:     quit in  "   Substance Use Topics    Alcohol use: No         Family History   Problem Relation Age of Onset    Cancer Mother     Colon Polyps Mother     Heart Failure Mother 87        congestive, cause of death    Myocardial Infarction Mother         myocardial infarction    Coronary Artery Disease Mother             Hypertension Mother     Colon Cancer Mother     Osteoporosis Mother     Genetic Disorder Mother     Myocardial Infarction Father         myocardial infarction - cause of death    C.A.D. Father     Coronary Artery Disease Father             Hypertension Father             Hyperlipidemia Father     Cancer Paternal Uncle         cause of death    C.A.D. Brother     Other - See Comments Other         factor 5 - family h/o    Diabetes Maternal Grandmother             Hypertension Maternal Half-Sister     Depression Maternal Half-Sister     Hypertension Brother     Other Cancer Son         testicular    Asthma No family hx of          History   Drug Use No             Review of Systems  Constitutional, HEENT, cardiovascular, pulmonary, GI, , musculoskeletal, neuro, skin, endocrine and psych systems are negative, except as otherwise noted.        Objective    /80 (BP Location: Right arm, Patient Position: Sitting, Cuff Size: Adult Regular)   Pulse 69   Temp 99  F (37.2  C) (Tympanic)   Resp 20   Wt 102.1 kg (225 lb)   SpO2 94%   BMI 37.44 kg/m     Estimated body mass index is 37.44 kg/m  as calculated from the following:    Height as of 3/11/25: 1.651 m (5' 5\").    Weight as of this encounter: 102.1 kg (225 lb).          Physical Exam  GENERAL: alert and no distress  EYES: Eyes grossly normal to inspection, PERRL and conjunctivae and sclerae normal  HENT: ear canals and TM's normal, nose and mouth without ulcers or lesions  NECK: no adenopathy, no asymmetry, masses, or scars  RESP: lungs clear to auscultation - no rales, rhonchi or wheezes  CV: regular rate and rhythm, " normal S1 S2, no S3 or S4, no murmur, click or rub, no peripheral edema  MS: no gross musculoskeletal defects noted, no edema  PSYCH: mentation appears normal, affect normal/bright          Recent Labs   Lab Test 03/04/25  0953 03/04/25  0000 02/21/25  0927 02/18/25  0936 12/10/24  0000 12/05/24  1354 11/19/24  0000 11/05/24  1058 10/22/24  0000 10/17/24  0801 08/27/24  0000 08/24/24  2109   HGB  --   --   --   --   --   --   --   --   --  12.9  --  14.1   PLT  --   --   --   --   --   --   --   --   --  179  --  203   INR 2.0 2.0   < >  --    < >  --    < >  --    < >  --    < > 1.72*   NA  --   --   --  137  --   --   --  139   < >  --    < > 139   POTASSIUM  --   --   --  4.4  --  4.3   < > 4.0   < >  --    < > 4.5   CR  --   --   --  1.07*  --   --   --  1.04*   < >  --    < > 1.10*    < > = values in this interval not displayed.          Diagnostics  {LABS:262622}         EKG: appears normal, NSR, unchanged from previous tracings        Revised Cardiac Risk Index (RCRI)  The patient has the following serious cardiovascular risks for perioperative complications:   - No serious cardiac risks = 0 points       RCRI Interpretation: 0 points: Class I (very low risk - 0.4% complication rate)          Assessment & Plan       The proposed surgical procedure is considered LOW risk.      Pre-op exam  - EKG 12-lead complete w/read - (Clinic Performed)  - Comprehensive metabolic panel  - CBC with platelets and differential      Mass of left breast, unspecified quadrant  - See above      Factor V Leiden mutation  - INR            - No identified additional risk factors other than previously addressed        Antiplatelet or Anticoagulation Medication Instructions  {ANTIPLATELET AND ANTI COAG :871170}          How to Take Your Medication Before Surgery       Please avoid aspirin, anti-inflammatories, vitamins, minerals and supplements for 2 weeks prior to your surgery.       Please take your BP medication the morning of  surgery with a small sip of water          Recommendation  Approval given to proceed with proposed procedure, without further diagnostic evaluation.           The longitudinal plan of care for the diagnosis(es)/condition(s) as documented were addressed during this visit. Due to the added complexity in care, I will continue to support Kaitlin in the subsequent management and with ongoing continuity of care.         Signed Electronically by: Eliz Hartman CNP  A copy of this evaluation report is provided to the requesting physician.

## 2025-03-12 NOTE — TELEPHONE ENCOUNTER
Called patient she will check her next INR on 3/18 due date. We will then monitor INR to keep in 2.0-2.5 range for her upcoming procedure.

## 2025-03-17 ENCOUNTER — ANESTHESIA EVENT (OUTPATIENT)
Dept: SURGERY | Facility: HOSPITAL | Age: 70
End: 2025-03-17
Payer: MEDICARE

## 2025-03-17 ASSESSMENT — LIFESTYLE VARIABLES: TOBACCO_USE: 1

## 2025-03-17 NOTE — OR NURSING
Per Eliz Hartman: from [employer] message: patient to hold asa.  Called and notified patient to hold ASA.  Patient verbalized understanding.

## 2025-03-17 NOTE — OR NURSING
Message to Eliz Hartman via epic to clarify if patient should continue or hold baby ASA prior to procedure.

## 2025-03-17 NOTE — ANESTHESIA PREPROCEDURE EVALUATION
Anesthesia Pre-Procedure Evaluation    Patient: Cassy Avila   MRN: [id number] : 1955        Procedure : Procedure(s):  Needle Localization Left Breast Biopsy          Past Medical History:   Diagnosis Date    Abdominal pain, generalized 2021    Arthritis     Cervicalgia 2001    Closed dislocation of shoulder, unspecified site     Congenital deficiency of other clotting factors 2012    factor V deficiency, congenital    Congenital factor VIII disorder (H) 2019    Congestive heart failure, unspecified HF chronicity, unspecified heart failure type (H) 2025    Contact dermatitis and other eczema, due to unspecified cause 2004    Coughing     Depressive disorder 2014    Diarrhea 2021    Edema 2002    Essential hypertension 2001     Problem list name updated by automated process. Provider to review    Factor V Leiden     Factor V Leiden mutation 2019    Family history of colon cancer 2018    Gastro-oesophageal reflux disease     Herpes zoster without mention of complication     resolved from problem list    Hyperlipidemia LDL goal <100 2002     Problem list name updated by automated process. Provider to review    Long term (current) use of anticoagulants 2003    Major depression 2014    Mammographic microcalcification     resolved from problem list    Migraine, unspecified, without mention of intractable migraine without mention of status migrainosus 2001    Moderate episode of recurrent major depressive disorder (H) 2014    Neurofibromatosis, peripheral, NF1 (H) 2012     Problem list name updated by automated process. Provider to review    Neurofibromatosis, unspecified(237.70) 2012    Other and unspecified hyperlipidemia 2002    Other chronic pain     Other pulmonary embolism and infarction 2003    Primary insomnia 10/31/2018    Statin medication not prescribed per physician  orders 2018    Tachycardia, unspecified 2001    Thrombosis of leg     Unspecified essential hypertension 2001    Vitamin D deficiency 2018      Past Surgical History:   Procedure Laterality Date    ------------OTHER-------------      shoulder replacement; Provider: Karen    ARTHROPLASTY KNEE  2014    Procedure: ARTHROPLASTY KNEE;  Surgeon: Sean Alexander MD;  Location: [facility]    ARTHROSCOPY SHOULDER      right, bone spurs    BIOPSY      CA ANESTH,SHOULDER REPLACEMENT Right 2020     SECTION      x3    CHOLECYSTECTOMY      COLONOSCOPY  02/15/2018    [place],,polyps    elbow ulnar tunnel release      ELECTROTHERMAL THERAPY INTRADISC  2017    stimulator    ENDOSCOPIC SINUS SURGERY, SEPTOPLASTY, TURBINOPLASTY, MAXILLARY SINUSOTOMY, COMBINED N/A 2015    Procedure: COMBINED ENDOSCOPIC SINUS SURGERY, SEPTOPLASTY, TURBINOPLASTY, MAXILLARY SINUSOTOMY;  Surgeon: Seema Conn MD;  Location: [facility]    ENT SURGERY      ESOPHAGOSCOPY, GASTROSCOPY, DUODENOSCOPY (EGD), COMBINED      with biopsy and endoscopic U/S    EXCISE NEUROMA LOWER EXTREMITY Left 2016    Procedure: EXCISE NEUROMA LOWER EXTREMITY;  Surgeon: Edi Reed MD;  Location: [facility]    EYE SURGERY Right 2020    FUSION LUMBAR ANTERIOR WITH MARCY CAGES      L5-S1    HYSTERECTOMY TOTAL ABDOMINAL, BILATERAL SALPINGO-OOPHORECTOMY, COMBINED N/A     ORTHOPEDIC SURGERY  2015    right shoulder    ORTHOPEDIC SURGERY  2015    right knee    ORTHOPEDIC SURGERY Right 2018    hip labrum tear    pionidal cyst excision      TRANSPOSITION ULNAR NERVE (ELBOW)        Allergies   Allergen Reactions    Allopurinol Shortness Of Breath    Amlodipine Besylate Swelling     Norvasc    Amoxicillin     Atorvastatin      myualgia    Cephalexin Monohydrate Hives     Keflex    Erythromycin Base [Erythromycin Base] Nausea and Vomiting    Meloxicam Other (See Comments)     Mobic - confusion,  depression    Sulfa Antibiotics Hives    Adhesive Tape Rash    Prochlorperazine Edisylate Swelling and Rash     Compazine    Prochlorperazine Maleate Swelling and Rash      Social History     Tobacco Use    Smoking status: Former     Current packs/day: 0.00     Average packs/day: 1 pack/day for 30.0 years (30.0 ttl pk-yrs)     Types: Cigarettes, Pipe     Start date: 1969     Quit date: 2000     Years since quittin.2     Passive exposure: Past    Smokeless tobacco: Never    Tobacco comments:     quit in    Substance Use Topics    Alcohol use: No      Wt Readings from Last 1 Encounters:   25 102.1 kg (225 lb)        Anesthesia Evaluation   Pt has had prior anesthetic. Type: MAC and General.    No history of anesthetic complications       ROS/MED HX  ENT/Pulmonary: Comment:  PFT's: mild obstructive airway disease - diffusion defect    (+)     PADILLA risk factors,  hypertension, obese,        tobacco use, Past use,  30  Pack-Year Hx,        COPD,    recent URI, resolved, 25 bronchitis - zithromax:        Neurologic: Comment: insomnia    (+)      migraines,                          Cardiovascular: Comment: 25 cardiology visit: Continues with some GUERRERO and intermittent palpitations. Recent ziopatch with some symptomatic PAC's, PVC's, short non-sustained SVT. Given average heart rate of 63 bpm no adjustment in beta-blocker at this time. Recent negative stress test and echo. Recommend statin with carotid calcifications. F/up 6 months    (+) Dyslipidemia hypertension-range: lasix, losartan, metoprolol, inspra/ Peripheral Vascular Disease-- Carotid Stenosis.   -  - -   Taking blood thinners Pt has received instructions: Instructions Given to patient: asa, warfarin (keeping INR between 2-2.5 per coumadin clinic notes 3/12/25). CHF     GUERRERO.               Irregular Heartbeat/Palpitations,       Previous cardiac testing   Echo: Date: 24 Results:  No significant findings. Normal LV systolic  function with LVEF 55-60%  Stress Test:  Date: 9/25/24 Results:  Negative for ischemia  ECG Reviewed:  Date: 3/12/25 Results:  HR 69, sinus  Cath:  Date: Results:      METS/Exercise Tolerance: 3 - Able to walk 1-2 blocks without stopping    Hematologic: Comments: Hx of PE (4/2003) and DVT (1/2023)  Factor V Leiden mutation  Congenital factor VIII disorder    (+) History of blood clots,    pt is anticoagulated,           Musculoskeletal: Comment: Cervicalgia   (+)  arthritis,             GI/Hepatic:     (+) GERD, Other,                  Renal/Genitourinary:     (+) renal disease, type: CRI,            Endo: Comment: Primary aldosteronism   Adrenal nodule - follows with endocrinology - last visit 12/9/24       (+)               Obesity,       Psychiatric/Substance Use:     (+) psychiatric history depression   Recreational drug usage: Cannabis.    Infectious Disease:  - neg infectious disease ROS     Malignancy:  - neg malignancy ROS     Other: Comment: Neurofibromatosis, peripheral, NF1     (+)  , H/O Chronic Pain,         Physical Exam    Airway        Mallampati: III   TM distance: > 3 FB   Neck ROM: full   Mouth opening: > 3 cm    Respiratory Devices and Support         Dental     Comment: Upper  plate      (+) Removable bridges or other hardware      Cardiovascular          Rhythm and rate: regular and bradycardia     Pulmonary           breath sounds clear to auscultation           OUTSIDE LABS:  CBC:   Lab Results   Component Value Date    WBC 7.9 03/12/2025    WBC 6.8 10/17/2024    HGB 13.5 03/12/2025    HGB 12.9 10/17/2024    HCT 39.3 03/12/2025    HCT 36.8 10/17/2024     03/12/2025     10/17/2024     BMP:   Lab Results   Component Value Date     03/12/2025     02/18/2025    POTASSIUM 4.5 03/12/2025    POTASSIUM 4.4 02/18/2025    CHLORIDE 105 03/12/2025    CHLORIDE 103 02/18/2025    CO2 20 (L) 03/12/2025    CO2 22 02/18/2025    BUN 18.5 03/12/2025    BUN 14.1 02/18/2025    CR 1.03 (H)  "03/12/2025    CR 1.07 (H) 02/18/2025     (H) 03/12/2025     (H) 02/18/2025     COAGS:   Lab Results   Component Value Date    PTT 38 (H) 07/12/2017    INR 2.27 (H) 03/12/2025     POC: No results found for: \"BGM\", \"HCG\", \"HCGS\"  HEPATIC:   Lab Results   Component Value Date    ALBUMIN 4.0 03/12/2025    PROTTOTAL 7.1 03/12/2025    ALT 19 03/12/2025    AST 19 03/12/2025    ALKPHOS 67 03/12/2025    BILITOTAL 0.2 03/12/2025     OTHER:   Lab Results   Component Value Date    LACT 1.9 08/24/2024    A1C 5.6 03/17/2023    JIMENA 9.3 03/12/2025    MAG 2.0 10/26/2020    LIPASE 112 02/03/2020    AMYLASE 50 02/03/2020    TSH 2.20 09/19/2024    T4 0.89 01/17/2018    T3 114 09/28/2015    CRP <2.9 11/23/2021    SED 8 04/24/2023       Anesthesia Plan    ASA Status:  3    NPO Status:  NPO Appropriate    Anesthesia Type: General.     - Airway: LMA              Consents    Anesthesia Plan(s) and associated risks, benefits, and realistic alternatives discussed. Questions answered and patient/representative(s) expressed understanding.     - Discussed: Risks, Benefits and Alternatives for BOTH SEDATION and the PROCEDURE were discussed     - Discussed with:  Patient      - Specific Concerns: Discussed risks and benefits with patient for general anesthesia including sore throat, nausea, vomiting, aspiration, dental damage, loss of airway, CV complications, stroke, MI, death. Pt wishes to proceed..     - Extended Intubation/Ventilatory Support Discussed: No.      - Patient is DNR/DNI Status: No     Use of blood products discussed: Yes.     - Discussed with: Patient.     Postoperative Care            Comments:    Other Comments: Reviewed 3/12 [facility]    INR ordered             JESUS Vides CNP    I have reviewed the pertinent notes and labs in the chart from the past 30 days.  Any updates or changes from those notes are reflected in this note.    Clinically Significant Risk Factors Present on Admission                " "   # Hypertension: Noted on problem list  # Chronic heart failure with preserved ejection fraction: heart failure noted on problem list and last echo with EF >50%          # Obesity: Estimated body mass index is 37.44 kg/m  as calculated from the following:    Height as of 3/11/25: 1.651 m (5' 5\").    Weight as of 3/12/25: 102.1 kg (225 lb).                "

## 2025-03-18 ASSESSMENT — COPD QUESTIONNAIRES: COPD: 1

## 2025-03-20 ENCOUNTER — ANCILLARY PROCEDURE (OUTPATIENT)
Dept: GENERAL RADIOLOGY | Facility: OTHER | Age: 70
End: 2025-03-20
Attending: ORTHOPAEDIC SURGERY
Payer: MEDICARE

## 2025-03-20 ENCOUNTER — OFFICE VISIT (OUTPATIENT)
Dept: ORTHOPEDICS | Facility: OTHER | Age: 70
End: 2025-03-20
Attending: ORTHOPAEDIC SURGERY
Payer: MEDICARE

## 2025-03-20 VITALS
SYSTOLIC BLOOD PRESSURE: 122 MMHG | HEIGHT: 65 IN | WEIGHT: 217 LBS | DIASTOLIC BLOOD PRESSURE: 60 MMHG | HEART RATE: 64 BPM | RESPIRATION RATE: 18 BRPM | BODY MASS INDEX: 36.15 KG/M2 | OXYGEN SATURATION: 97 %

## 2025-03-20 DIAGNOSIS — S62.501A CLOSED AVULSION FRACTURE OF PHALANX OF RIGHT THUMB, INITIAL ENCOUNTER: Primary | ICD-10-CM

## 2025-03-20 DIAGNOSIS — S62.501A CLOSED AVULSION FRACTURE OF PHALANX OF RIGHT THUMB, INITIAL ENCOUNTER: ICD-10-CM

## 2025-03-20 PROCEDURE — G0463 HOSPITAL OUTPT CLINIC VISIT: HCPCS

## 2025-03-20 PROCEDURE — 73140 X-RAY EXAM OF FINGER(S): CPT | Mod: TC,RT

## 2025-03-20 ASSESSMENT — PAIN SCALES - GENERAL: PAINLEVEL_OUTOF10: MODERATE PAIN (5)

## 2025-03-20 NOTE — PROGRESS NOTES
ORTHOPEDIC CLINIC CONSULT    Referred by:     Chief Complaint: Cassy Avila is a 69 year old female who is being seen for No chief complaint on file.      History of Present Illness:   Patient is a 69-year-old individual with previous right thumb proximal phalanx avulsion fracture.  She notes she tripped over her dog steps when she got up in the middle night the end of her bed about a month ago and injured her thumb.  She has been in a thumb spica splint since that time.  And that was on 2/17/2025    Patient's past medical, surgical, social and family histories reviewed.     Past Medical History:   Diagnosis Date    Abdominal pain, generalized 02/08/2021    Arthritis     Cervicalgia 01/09/2001    Closed dislocation of shoulder, unspecified site 2000    Congenital deficiency of other clotting factors 09/07/2012    factor V deficiency, congenital    Congenital factor VIII disorder (H) 07/26/2019    Congestive heart failure, unspecified HF chronicity, unspecified heart failure type (H) 2/24/2025    Contact dermatitis and other eczema, due to unspecified cause 06/01/2004    Coughing     Depressive disorder 8/8/2014    Diarrhea 02/08/2021    Edema 01/18/2002    Essential hypertension 02/20/2001     Problem list name updated by automated process. Provider to review    Factor V Leiden     Factor V Leiden mutation 07/26/2019    Family history of colon cancer 01/02/2018    Gastro-oesophageal reflux disease     Herpes zoster without mention of complication 2003    resolved from problem list    Hyperlipidemia LDL goal <100 07/11/2002     Problem list name updated by automated process. Provider to review    Long term (current) use of anticoagulants 08/20/2003    Major depression 08/08/2014    Mammographic microcalcification 2003    resolved from problem list    Migraine, unspecified, without mention of intractable migraine without mention of status migrainosus 03/05/2001    Moderate episode of recurrent major depressive  disorder (H) 2014    Neurofibromatosis, peripheral, NF1 (H) 2012     Problem list name updated by automated process. Provider to review    Neurofibromatosis, unspecified(237.70) 2012    Other and unspecified hyperlipidemia 2002    Other chronic pain     Other pulmonary embolism and infarction 2003    Primary insomnia 10/31/2018    Statin medication not prescribed per physician orders 2018    Tachycardia, unspecified 2001    Thrombosis of leg     Unspecified essential hypertension 2001    Vitamin D deficiency 2018       Past Surgical History:   Procedure Laterality Date    ------------OTHER-------------      shoulder replacement; Provider: Karen    ARTHROPLASTY KNEE  2014    Procedure: ARTHROPLASTY KNEE;  Surgeon: Sean Alexander MD;  Location: [facility]    ARTHROSCOPY SHOULDER      right, bone spurs    BIOPSY      CA ANESTH,SHOULDER REPLACEMENT Right 2020     SECTION      x3    CHOLECYSTECTOMY      COLONOSCOPY  02/15/2018    [place],,polyps    elbow ulnar tunnel release      ELECTROTHERMAL THERAPY INTRADISC  2017    stimulator    ENDOSCOPIC SINUS SURGERY, SEPTOPLASTY, TURBINOPLASTY, MAXILLARY SINUSOTOMY, COMBINED N/A 2015    Procedure: COMBINED ENDOSCOPIC SINUS SURGERY, SEPTOPLASTY, TURBINOPLASTY, MAXILLARY SINUSOTOMY;  Surgeon: Seema Conn MD;  Location: [facility]    ENT SURGERY      ESOPHAGOSCOPY, GASTROSCOPY, DUODENOSCOPY (EGD), COMBINED      with biopsy and endoscopic U/S    EXCISE NEUROMA LOWER EXTREMITY Left 2016    Procedure: EXCISE NEUROMA LOWER EXTREMITY;  Surgeon: Edi Reed MD;  Location: [facility]    EYE SURGERY Right 2020    FUSION LUMBAR ANTERIOR WITH MARCY CAGES      L5-S1    HYSTERECTOMY TOTAL ABDOMINAL, BILATERAL SALPINGO-OOPHORECTOMY, COMBINED N/A     ORTHOPEDIC SURGERY  2015    right shoulder    ORTHOPEDIC SURGERY  2015    right knee    ORTHOPEDIC SURGERY Right  06/11/2018    hip labrum tear    pionidal cyst excision      TRANSPOSITION ULNAR NERVE (ELBOW)         Home Medications:  Prior to Admission medications    Medication Sig Start Date End Date Taking? Authorizing Provider   albuterol (PROVENTIL) (2.5 MG/3ML) 0.083% neb solution Take 2.5 mg by nebulization every 6 hours as needed for shortness of breath / dyspnea or wheezing   Yes Reported, Patient   aspirin 81 MG EC tablet Take 81 mg by mouth daily HS   Yes Reported, Patient   eplerenone (INSPRA) 50 MG tablet Take 1 tablet (50 mg) by mouth 2 times daily. 1/22/25  Yes Foreign Connors MD   escitalopram (LEXAPRO) 10 MG tablet Take 1 tablet (10 mg) by mouth daily. 11/26/24  Yes Eliz Hartman CNP   furosemide (LASIX) 20 MG tablet Take 1 tablet (20 mg) by mouth as needed (swelling, weight gain). 11/5/24  Yes Ashli Tijerina CNP   ketoconazole (NIZORAL) 2 % external cream APPLY TO THE RASH ON FULL BACK TWICE A DAY FOR 4-6 WEEKS 11/30/22  Yes Reported, Patient   losartan (COZAAR) 50 MG tablet Take 1 tablet (50 mg) by mouth daily. 11/26/24  Yes Eliz Hartman CNP   metoprolol succinate ER (TOPROL XL) 50 MG 24 hr tablet Take 0.5 tablets (25 mg) by mouth daily. 2/6/25  Yes Ashli Tijerina CNP   order for DME Nebulizer machine and tubing    DX:  Bronchitis 4/5/18  Yes Maegan Sharma NP   traZODone (DESYREL) 50 MG tablet TAKE 1 TO 2 TABLETS BY MOUTH NIGHTLY AS NEEDED 9/30/24  Yes Eliz Hartman CNP   warfarin ANTICOAGULANT (COUMADIN) 5 MG tablet TAKE 1 & 1/2 (ONE & ONE-HALF) TABLETS BY MOUTH MONDAY WEDNESDAY AND FRIDAY AND 1 TABLET ALL OTHER DAYS OR AS DIRECTED BY WARFARIN CLINIC 9/30/24  Yes Eliz Hartman CNP       Allergies   Allergen Reactions    Allopurinol Shortness Of Breath    Amlodipine Besylate Swelling     Norvasc    Amoxicillin     Atorvastatin      myualgia    Cephalexin Monohydrate Hives     Keflex    Erythromycin Base [Erythromycin Base] Nausea and Vomiting    Meloxicam Other (See Comments)     Mobic  "- confusion, depression    Sulfa Antibiotics Hives    Adhesive Tape Rash    Prochlorperazine Edisylate Swelling and Rash     Compazine    Prochlorperazine Maleate Swelling and Rash       Social History     Occupational History    Not on file   Tobacco Use    Smoking status: Former     Current packs/day: 0.00     Average packs/day: 1 pack/day for 30.0 years (30.0 ttl pk-yrs)     Types: Cigarettes, Pipe     Start date: 1969     Quit date: 2000     Years since quittin.2     Passive exposure: Past    Smokeless tobacco: Never    Tobacco comments:     quit in    Vaping Use    Vaping status: Never Used   Substance and Sexual Activity    Alcohol use: No    Drug use: Yes     Types: Marijuana    Sexual activity: Not Currently     Partners: Male       Family History   Problem Relation Age of Onset    Cancer Mother     Colon Polyps Mother     Heart Failure Mother 87        congestive, cause of death    Myocardial Infarction Mother         myocardial infarction    Coronary Artery Disease Mother             Hypertension Mother     Colon Cancer Mother     Osteoporosis Mother     Genetic Disorder Mother     Myocardial Infarction Father         myocardial infarction - cause of death    C.A.D. Father     Coronary Artery Disease Father             Hypertension Father             Hyperlipidemia Father     Cancer Paternal Uncle         cause of death    C.A.D. Brother     Other - See Comments Other         factor 5 - family h/o    Diabetes Maternal Grandmother             Hypertension Maternal Half-Sister     Depression Maternal Half-Sister     Hypertension Brother     Other Cancer Son         testicular    Asthma No family hx of        REVIEW OF SYSTEMS            Physical Exam:    Vitals: /60 (BP Location: Left arm, Patient Position: Sitting, Cuff Size: Adult Regular)   Pulse 64   Resp 18   Ht 1.651 m (5' 5\")   Wt 98.4 kg (217 lb)   SpO2 97%   BMI 36.11 kg/m    BMI= Body " mass index is 36.11 kg/m .  On examination is awake alert oriented cooperative no apparent distress.  Has been in a thumb spica splint taken out she has a bit of stiffness about the thumb and she is splinted for about a month but his wife still seems to have good stability to varus valgus stress at the MP joint no gross angular deformity.  Has some discomfort and guarding during active and passive flexion at this time due to lengthy period of time of immobilization.  Otherwise sensory tact light touch and brisk cap refill  Radiographs: On radiographs AP lateral bleak of the thumb which notes a nicely healing small avulsion fracture off the proximal and of the proximal phalanx but in good alignment.  In comparison to the previous one basically also had limited to no displacement still good alignment and no change of the onset of a nice healing.     Independent visualization of the films was made.         Impression:      ICD-10-CM    1. Closed avulsion fracture of phalanx of right thumb, initial encounter  S62.501A XR Finger Right G/E 2 Views (Clinic Performed)          Plan: Pression plan healing avulsion fracture proximal phalanx thumb right hand.  I like her to at this point in time she is over a month out start coming out with the working some motion flexion extension.  If she is having to lift or push pull something put her brace back on to protect it still but just during light activities such as the weight of a coffee cup drinking or just lightly eating have added out and start working the motion and any other lifting activities back in the brace.  Have her back in about 3 weeks repeat evaluation and x-rays and anticipate getting her more aggressively with the occupational therapy for strengthening and activity progression.    All of the above pertinent physical exam and imaging modalities findings was reviewed with Cassy.    Return to clinic in 3 weeks weeks.    Further imaging required repeat    Time spent  with evaluation: 30 minutes    Avila Gregorio MD  3/20/2025  5:21 PM

## 2025-03-20 NOTE — PATIENT INSTRUCTIONS
Thank you for allowing Dr. Avila Gregorio and his team to participate in your care. Please send a [employer] message, call our health unit coordinator at [phone] with scheduling questions or the nurse at [phone] with any other questions or concerns.        You may contact the Orthopedic nurse by [employer] message or [phone] with any questions.

## 2025-03-24 ENCOUNTER — HOSPITAL ENCOUNTER (OUTPATIENT)
Dept: ULTRASOUND IMAGING | Facility: HOSPITAL | Age: 70
Discharge: HOME | End: 2025-03-24
Attending: SURGERY | Admitting: SURGERY
Payer: MEDICARE

## 2025-03-24 ENCOUNTER — ANESTHESIA (OUTPATIENT)
Dept: SURGERY | Facility: HOSPITAL | Age: 70
End: 2025-03-24
Payer: MEDICARE

## 2025-03-24 ENCOUNTER — ANCILLARY PROCEDURE (OUTPATIENT)
Dept: MAMMOGRAPHY | Facility: OTHER | Age: 70
End: 2025-03-24
Attending: RADIOLOGY
Payer: MEDICARE

## 2025-03-24 ENCOUNTER — HOSPITAL ENCOUNTER (OUTPATIENT)
Facility: HOSPITAL | Age: 70
Discharge: HOME | End: 2025-03-24
Attending: SURGERY | Admitting: SURGERY
Payer: MEDICARE

## 2025-03-24 VITALS
BODY MASS INDEX: 37.29 KG/M2 | TEMPERATURE: 97.2 F | HEART RATE: 68 BPM | HEIGHT: 65 IN | WEIGHT: 223.8 LBS | RESPIRATION RATE: 16 BRPM | DIASTOLIC BLOOD PRESSURE: 80 MMHG | SYSTOLIC BLOOD PRESSURE: 161 MMHG | OXYGEN SATURATION: 95 %

## 2025-03-24 DIAGNOSIS — N63.20 MASS OF LEFT BREAST: ICD-10-CM

## 2025-03-24 DIAGNOSIS — N63.20 MASS OF LEFT BREAST, UNSPECIFIED QUADRANT: ICD-10-CM

## 2025-03-24 DIAGNOSIS — R92.8 ABNORMAL RADIOGRAPHIC FINDINGS IN SPECIMEN FROM FEMALE BREAST: Primary | ICD-10-CM

## 2025-03-24 LAB
HOLD SPECIMEN: NORMAL
HOLD SPECIMEN: NORMAL
INR PPP: 1.72 (ref 0.85–1.15)

## 2025-03-24 PROCEDURE — 360N000075 HC SURGERY LEVEL 2, PER MIN: Performed by: SURGERY

## 2025-03-24 PROCEDURE — 258N000003 HC RX IP 258 OP 636: Performed by: NURSE PRACTITIONER

## 2025-03-24 PROCEDURE — C1819 TISSUE LOCALIZATION-EXCISION: HCPCS

## 2025-03-24 PROCEDURE — 250N000009 HC RX 250: Performed by: NURSE ANESTHETIST, CERTIFIED REGISTERED

## 2025-03-24 PROCEDURE — 250N000013 HC RX MED GY IP 250 OP 250 PS 637: Performed by: SURGERY

## 2025-03-24 PROCEDURE — 250N000011 HC RX IP 250 OP 636: Performed by: NURSE PRACTITIONER

## 2025-03-24 PROCEDURE — 85610 PROTHROMBIN TIME: CPT | Performed by: NURSE PRACTITIONER

## 2025-03-24 PROCEDURE — 710N000012 HC RECOVERY PHASE 2, PER MINUTE: Performed by: SURGERY

## 2025-03-24 PROCEDURE — 250N000009 HC RX 250: Performed by: RADIOLOGY

## 2025-03-24 PROCEDURE — 999N000141 HC STATISTIC PRE-PROCEDURE NURSING ASSESSMENT: Performed by: SURGERY

## 2025-03-24 PROCEDURE — 76098 X-RAY EXAM SURGICAL SPECIMEN: CPT | Mod: TC

## 2025-03-24 PROCEDURE — 88307 TISSUE EXAM BY PATHOLOGIST: CPT | Mod: 26 | Performed by: PATHOLOGY

## 2025-03-24 PROCEDURE — 250N000009 HC RX 250: Performed by: SURGERY

## 2025-03-24 PROCEDURE — 250N000011 HC RX IP 250 OP 636: Performed by: SURGERY

## 2025-03-24 PROCEDURE — 250N000025 HC SEVOFLURANE, PER MIN: Performed by: SURGERY

## 2025-03-24 PROCEDURE — 272N000001 HC OR GENERAL SUPPLY STERILE: Performed by: SURGERY

## 2025-03-24 PROCEDURE — 250N000011 HC RX IP 250 OP 636: Performed by: NURSE ANESTHETIST, CERTIFIED REGISTERED

## 2025-03-24 PROCEDURE — 36415 COLL VENOUS BLD VENIPUNCTURE: CPT | Performed by: NURSE PRACTITIONER

## 2025-03-24 PROCEDURE — 710N000010 HC RECOVERY PHASE 1, LEVEL 2, PER MIN: Performed by: SURGERY

## 2025-03-24 PROCEDURE — 999N000065 MA POST PROCEDURE LEFT: Mod: TC

## 2025-03-24 PROCEDURE — 88307 TISSUE EXAM BY PATHOLOGIST: CPT | Mod: TC | Performed by: SURGERY

## 2025-03-24 PROCEDURE — 19125 EXCISION BREAST LESION: CPT | Mod: LT | Performed by: SURGERY

## 2025-03-24 PROCEDURE — 370N000017 HC ANESTHESIA TECHNICAL FEE, PER MIN: Performed by: SURGERY

## 2025-03-24 RX ORDER — ISOSULFAN BLUE 50 MG/5ML
INJECTION, SOLUTION SUBCUTANEOUS
Status: DISCONTINUED
Start: 2025-03-24 | End: 2025-03-24 | Stop reason: WASHOUT

## 2025-03-24 RX ORDER — FENTANYL CITRATE 50 UG/ML
INJECTION, SOLUTION INTRAMUSCULAR; INTRAVENOUS PRN
Status: DISCONTINUED | OUTPATIENT
Start: 2025-03-24 | End: 2025-03-24

## 2025-03-24 RX ORDER — KETAMINE HYDROCHLORIDE 10 MG/ML
INJECTION INTRAMUSCULAR; INTRAVENOUS PRN
Status: DISCONTINUED | OUTPATIENT
Start: 2025-03-24 | End: 2025-03-24

## 2025-03-24 RX ORDER — DEXAMETHASONE SODIUM PHOSPHATE 4 MG/ML
INJECTION, SOLUTION INTRA-ARTICULAR; INTRALESIONAL; INTRAMUSCULAR; INTRAVENOUS; SOFT TISSUE PRN
Status: DISCONTINUED | OUTPATIENT
Start: 2025-03-24 | End: 2025-03-24

## 2025-03-24 RX ORDER — SODIUM CHLORIDE, SODIUM LACTATE, POTASSIUM CHLORIDE, CALCIUM CHLORIDE 600; 310; 30; 20 MG/100ML; MG/100ML; MG/100ML; MG/100ML
INJECTION, SOLUTION INTRAVENOUS CONTINUOUS
Status: DISCONTINUED | OUTPATIENT
Start: 2025-03-24 | End: 2025-03-24 | Stop reason: HOSPADM

## 2025-03-24 RX ORDER — DEXAMETHASONE SODIUM PHOSPHATE 4 MG/ML
4 INJECTION, SOLUTION INTRA-ARTICULAR; INTRALESIONAL; INTRAMUSCULAR; INTRAVENOUS; SOFT TISSUE
Status: DISCONTINUED | OUTPATIENT
Start: 2025-03-24 | End: 2025-03-24 | Stop reason: HOSPADM

## 2025-03-24 RX ORDER — ONDANSETRON 4 MG/1
4 TABLET, ORALLY DISINTEGRATING ORAL EVERY 30 MIN PRN
Status: DISCONTINUED | OUTPATIENT
Start: 2025-03-24 | End: 2025-03-24 | Stop reason: HOSPADM

## 2025-03-24 RX ORDER — ONDANSETRON 2 MG/ML
INJECTION INTRAMUSCULAR; INTRAVENOUS PRN
Status: DISCONTINUED | OUTPATIENT
Start: 2025-03-24 | End: 2025-03-24

## 2025-03-24 RX ORDER — CEFAZOLIN SODIUM/WATER 2 G/20 ML
2 SYRINGE (ML) INTRAVENOUS SEE ADMIN INSTRUCTIONS
Status: DISCONTINUED | OUTPATIENT
Start: 2025-03-24 | End: 2025-03-24 | Stop reason: HOSPADM

## 2025-03-24 RX ORDER — BUPIVACAINE HYDROCHLORIDE 2.5 MG/ML
INJECTION, SOLUTION EPIDURAL; INFILTRATION; INTRACAUDAL; PERINEURAL
Status: DISCONTINUED
Start: 2025-03-24 | End: 2025-03-24 | Stop reason: HOSPADM

## 2025-03-24 RX ORDER — LIDOCAINE HYDROCHLORIDE 20 MG/ML
INJECTION, SOLUTION INFILTRATION; PERINEURAL PRN
Status: DISCONTINUED | OUTPATIENT
Start: 2025-03-24 | End: 2025-03-24

## 2025-03-24 RX ORDER — LIDOCAINE 40 MG/G
CREAM TOPICAL
Status: DISCONTINUED | OUTPATIENT
Start: 2025-03-24 | End: 2025-03-24 | Stop reason: HOSPADM

## 2025-03-24 RX ORDER — CEFAZOLIN SODIUM/WATER 2 G/20 ML
2 SYRINGE (ML) INTRAVENOUS
Status: COMPLETED | OUTPATIENT
Start: 2025-03-24 | End: 2025-03-24

## 2025-03-24 RX ORDER — BUPIVACAINE HYDROCHLORIDE 2.5 MG/ML
INJECTION, SOLUTION INFILTRATION; PERINEURAL PRN
Status: DISCONTINUED | OUTPATIENT
Start: 2025-03-24 | End: 2025-03-24 | Stop reason: HOSPADM

## 2025-03-24 RX ORDER — HYDROMORPHONE HYDROCHLORIDE 1 MG/ML
0.2 INJECTION, SOLUTION INTRAMUSCULAR; INTRAVENOUS; SUBCUTANEOUS EVERY 5 MIN PRN
Status: DISCONTINUED | OUTPATIENT
Start: 2025-03-24 | End: 2025-03-24 | Stop reason: HOSPADM

## 2025-03-24 RX ORDER — OXYCODONE HYDROCHLORIDE 5 MG/1
5 TABLET ORAL
Status: DISCONTINUED | OUTPATIENT
Start: 2025-03-24 | End: 2025-03-24 | Stop reason: HOSPADM

## 2025-03-24 RX ORDER — EPHEDRINE SULFATE 50 MG/ML
INJECTION, SOLUTION INTRAMUSCULAR; INTRAVENOUS; SUBCUTANEOUS PRN
Status: DISCONTINUED | OUTPATIENT
Start: 2025-03-24 | End: 2025-03-24

## 2025-03-24 RX ORDER — PROPOFOL 10 MG/ML
INJECTION, EMULSION INTRAVENOUS PRN
Status: DISCONTINUED | OUTPATIENT
Start: 2025-03-24 | End: 2025-03-24

## 2025-03-24 RX ORDER — HYDROMORPHONE HYDROCHLORIDE 1 MG/ML
0.4 INJECTION, SOLUTION INTRAMUSCULAR; INTRAVENOUS; SUBCUTANEOUS EVERY 5 MIN PRN
Status: DISCONTINUED | OUTPATIENT
Start: 2025-03-24 | End: 2025-03-24 | Stop reason: HOSPADM

## 2025-03-24 RX ORDER — ONDANSETRON 2 MG/ML
4 INJECTION INTRAMUSCULAR; INTRAVENOUS EVERY 30 MIN PRN
Status: DISCONTINUED | OUTPATIENT
Start: 2025-03-24 | End: 2025-03-24 | Stop reason: HOSPADM

## 2025-03-24 RX ORDER — HYDROCODONE BITARTRATE AND ACETAMINOPHEN 5; 325 MG/1; MG/1
1 TABLET ORAL EVERY 6 HOURS PRN
Qty: 18 TABLET | Refills: 0 | Status: SHIPPED | OUTPATIENT
Start: 2025-03-24 | End: 2025-03-27

## 2025-03-24 RX ORDER — ACETAMINOPHEN 325 MG/1
975 TABLET ORAL ONCE
Status: COMPLETED | OUTPATIENT
Start: 2025-03-24 | End: 2025-03-24

## 2025-03-24 RX ORDER — LIDOCAINE HYDROCHLORIDE 10 MG/ML
5-10 INJECTION, SOLUTION EPIDURAL; INFILTRATION; INTRACAUDAL; PERINEURAL
Status: COMPLETED | OUTPATIENT
Start: 2025-03-24 | End: 2025-03-24

## 2025-03-24 RX ORDER — NALOXONE HYDROCHLORIDE 0.4 MG/ML
0.1 INJECTION, SOLUTION INTRAMUSCULAR; INTRAVENOUS; SUBCUTANEOUS
Status: DISCONTINUED | OUTPATIENT
Start: 2025-03-24 | End: 2025-03-24 | Stop reason: HOSPADM

## 2025-03-24 RX ORDER — FENTANYL CITRATE 50 UG/ML
25 INJECTION, SOLUTION INTRAMUSCULAR; INTRAVENOUS EVERY 5 MIN PRN
Status: DISCONTINUED | OUTPATIENT
Start: 2025-03-24 | End: 2025-03-24 | Stop reason: HOSPADM

## 2025-03-24 RX ORDER — FENTANYL CITRATE 50 UG/ML
50 INJECTION, SOLUTION INTRAMUSCULAR; INTRAVENOUS EVERY 5 MIN PRN
Status: DISCONTINUED | OUTPATIENT
Start: 2025-03-24 | End: 2025-03-24 | Stop reason: HOSPADM

## 2025-03-24 RX ADMIN — ONDANSETRON 4 MG: 2 INJECTION INTRAMUSCULAR; INTRAVENOUS at 14:18

## 2025-03-24 RX ADMIN — WATER 2 G: 1 INJECTION INTRAMUSCULAR; INTRAVENOUS; SUBCUTANEOUS at 13:09

## 2025-03-24 RX ADMIN — PROPOFOL 200 MG: 10 INJECTION, EMULSION INTRAVENOUS at 13:16

## 2025-03-24 RX ADMIN — DEXAMETHASONE SODIUM PHOSPHATE 10 MG: 4 INJECTION, SOLUTION INTRA-ARTICULAR; INTRALESIONAL; INTRAMUSCULAR; INTRAVENOUS; SOFT TISSUE at 13:21

## 2025-03-24 RX ADMIN — FENTANYL CITRATE 50 MCG: 50 INJECTION INTRAMUSCULAR; INTRAVENOUS at 13:15

## 2025-03-24 RX ADMIN — Medication 20 MG: at 13:22

## 2025-03-24 RX ADMIN — LIDOCAINE HYDROCHLORIDE 40 MG: 20 INJECTION, SOLUTION INFILTRATION; PERINEURAL at 13:15

## 2025-03-24 RX ADMIN — SODIUM CHLORIDE, SODIUM LACTATE, POTASSIUM CHLORIDE, AND CALCIUM CHLORIDE: .6; .31; .03; .02 INJECTION, SOLUTION INTRAVENOUS at 11:48

## 2025-03-24 RX ADMIN — LIDOCAINE HYDROCHLORIDE 10 ML: 10 INJECTION, SOLUTION EPIDURAL; INFILTRATION; INTRACAUDAL; PERINEURAL at 09:42

## 2025-03-24 RX ADMIN — ONDANSETRON 4 MG: 2 INJECTION INTRAMUSCULAR; INTRAVENOUS at 13:21

## 2025-03-24 RX ADMIN — ACETAMINOPHEN 975 MG: 325 TABLET, FILM COATED ORAL at 11:47

## 2025-03-24 RX ADMIN — Medication 5 MG: at 13:26

## 2025-03-24 RX ADMIN — Medication 5 MG: at 13:41

## 2025-03-24 RX ADMIN — FENTANYL CITRATE 50 MCG: 50 INJECTION INTRAMUSCULAR; INTRAVENOUS at 13:25

## 2025-03-24 ASSESSMENT — ACTIVITIES OF DAILY LIVING (ADL)
ADLS_ACUITY_SCORE: 18

## 2025-03-24 NOTE — INTERVAL H&P NOTE
"I have reviewed the surgical (or preoperative) H&P that is linked to this encounter, and examined the patient. There are no significant changes    Clinical Conditions Present on Arrival:  Clinically Significant Risk Factors Present on Admission                 # Drug Induced Coagulation Defect: home medication list includes an anticoagulant medication  # Drug Induced Platelet Defect: home medication list includes an antiplatelet medication      # Obesity: Estimated body mass index is 37.24 kg/m  as calculated from the following:    Height as of this encounter: 1.651 m (5' 5\").    Weight as of this encounter: 101.5 kg (223 lb 12.8 oz).       "

## 2025-03-24 NOTE — ANESTHESIA PROCEDURE NOTES
Airway       Patient location during procedure: OR  Staff -        Resident/Fellow: Merna Juárez       CRNA: Nayeli Rod APRN CRNA       Performed By: CRNA and SRNAIndications and Patient Condition       Indications for airway management: alanis-procedural       Induction type:intravenous       Mask difficulty assessment: 0 - not attempted    Final Airway Details       Final airway type: supraglottic airway    Supraglottic Airway Details        Type: LMA       Brand: I-Gel       LMA size: 4    Post intubation assessment        Placement verified by: capnometry and chest rise        Number of attempts at approach: 1       Number of other approaches attempted: 0       Secured with: tape       Ease of procedure: easy       Dentition: Intact

## 2025-03-24 NOTE — DISCHARGE INSTRUCTIONS
Thank you for allowing [facility] to care for you today.  If you have any questions and/or concerns please reach out to us Monday-Friday 0800-4:00 PM at [phone].  After hours please go to the Urgent Care and/or Emergency Room.  If you feel like it is an emergency call 911.

## 2025-03-24 NOTE — OP NOTE
REPORT OF OPERATION  DATE OF PROCEDURE: 3/24/2025    PATIENT: Cassy Avila    SURGERY PERFORMED: Wire localized Left Breast Biopsy    PREOPERATIVE DIAGNOSIS: Left  breast abnormality on mammogram    POSTOPERATIVE DIAGNOSIS: Same    SURGEON: Michael Cage MD    ASSISTANTS: Vanessa Bro    ANESTHESIA: General Endotracheal Anesthesia    ESTIMATED BLOOD LOSS: 20 cc     COMPLICATIONS: None apparent    TRANSFUSIONS: None    TISSUE TO PATHOLOGY: Left breast mass to pathology for pathological diagnosis    FINDINGS: Area of abnormality contained within the specimen is verified and post procedure mammography of the specimen.     INDICATIONS: This is a 69 year old female abnormal left breast mammogram.  The patient will be taken to the operating room for an excisional wire localized left  breast biopsy    DESCRIPTIONS OF PROCEDURE IN DETAIL: After consent was obtained the patient was taken to the operative suite and peri in the supine position.  The patient was identified and the correct patient was confirmed.  General Endotracheal Anesthesia was administered by anesthesia.  The patient was sterilely prepped and draped in the usual fashion.  A time out was performed verifying the correct patient and the correct procedure.  The entire operative team was in agreement.  All necessary equipment and supplies were in the room.    Prior to coming to the operating room the lesion had been localized and mammography.  The skin was sharply entered and dissection was carried down until isolation of the lesion.  Using the wire as a guide a core of tissue was removed.  Hemostasis was assured.  The lesion was marked with orienting stitches with short stitch being anterior medial, long stitch being inferior deep and medial ankle suture being superficial areolar.  The specimen was then sent to mammography and mammography of the specimen showed the area of abnormality contained within the specimen.  The specimen was then sent to  pathology for pathological diagnosis.  Hemostasis was again assured.  The wound was closed by closing the breast capsule with simple interrupted 3-0 Vicryl sutures.  The skin was then closed using subarticular 4-0 Monocryl suture.  Sterile dressings were applied.  The patient was awakened and taken to the recovery room in stable condition tolerated the procedure well.

## 2025-03-24 NOTE — CARE PLAN
Patient and responsible adult given discharge instructions with no questions regarding instructions. Trisha score 20. Pain level 0/10.  Discharged from unit via ambulation. Patient discharged to home with .

## 2025-03-24 NOTE — ANESTHESIA CARE TRANSFER NOTE
Patient: Cassy Avila    Procedure: Procedure(s):  Needle Localization Left Breast Biopsy       Diagnosis: Left breast mass [N63.20]  Diagnosis Additional Information: No value filed.    Anesthesia Type:   General     Note:    Oropharynx: oropharynx clear of all foreign objects and spontaneously breathing  Level of Consciousness: drowsy  Oxygen Supplementation: nasal cannula  Level of Supplemental Oxygen (L/min / FiO2): 2  Independent Airway: airway patency satisfactory and stable  Dentition: dentition unchanged  Vital Signs Stable: post-procedure vital signs reviewed and stable  Report to RN Given: handoff report given  Patient transferred to: PACU    Handoff Report: Identifed the Patient, Identified the Reponsible Provider, Reviewed the pertinent medical history, Discussed the surgical course, Reviewed Intra-OP anesthesia mangement and issues during anesthesia, Set expectations for post-procedure period and Allowed opportunity for questions and acknowledgement of understanding      Vitals:  Vitals Value Taken Time   BP     Temp     Pulse     Resp     SpO2         Electronically Signed By: JESUS Rendon CRNA  March 24, 2025  2:08 PM

## 2025-03-24 NOTE — PROGRESS NOTES
Patient here for wire localization of left breast. Time-out completed. Tolerated procedure well. Post mammogram completed. Patient brought back down to [facility] where handoff was given to patient's nurse.    Hawa GALE RN

## 2025-03-25 ENCOUNTER — OFFICE VISIT (OUTPATIENT)
Dept: SURGERY | Facility: OTHER | Age: 70
End: 2025-03-25
Attending: NURSE PRACTITIONER
Payer: MEDICARE

## 2025-03-25 ENCOUNTER — TELEPHONE (OUTPATIENT)
Dept: MAMMOGRAPHY | Facility: OTHER | Age: 70
End: 2025-03-25

## 2025-03-25 ENCOUNTER — ANTICOAGULATION THERAPY VISIT (OUTPATIENT)
Dept: ANTICOAGULATION | Facility: OTHER | Age: 70
End: 2025-03-25
Attending: NURSE PRACTITIONER
Payer: COMMERCIAL

## 2025-03-25 VITALS
OXYGEN SATURATION: 95 % | HEART RATE: 67 BPM | TEMPERATURE: 98.4 F | SYSTOLIC BLOOD PRESSURE: 122 MMHG | RESPIRATION RATE: 16 BRPM | DIASTOLIC BLOOD PRESSURE: 64 MMHG | HEIGHT: 65 IN | BODY MASS INDEX: 37.15 KG/M2 | WEIGHT: 223 LBS

## 2025-03-25 DIAGNOSIS — Z98.890 STATUS POST BREAST BIOPSY: Primary | ICD-10-CM

## 2025-03-25 DIAGNOSIS — Z79.01 LONG TERM CURRENT USE OF ANTICOAGULANT THERAPY: Primary | ICD-10-CM

## 2025-03-25 DIAGNOSIS — I82.401 DEEP VEIN THROMBOSIS (DVT) OF RIGHT LOWER EXTREMITY, UNSPECIFIED CHRONICITY, UNSPECIFIED VEIN (H): ICD-10-CM

## 2025-03-25 DIAGNOSIS — I26.99 PULMONARY EMBOLISM AND INFARCTION (H): ICD-10-CM

## 2025-03-25 DIAGNOSIS — N64.89 SEROMA OF BREAST: ICD-10-CM

## 2025-03-25 LAB — INR PPP: 1.4 (ref 0.85–1.15)

## 2025-03-25 PROCEDURE — 36415 COLL VENOUS BLD VENIPUNCTURE: CPT | Mod: ZL | Performed by: NURSE PRACTITIONER

## 2025-03-25 PROCEDURE — 85610 PROTHROMBIN TIME: CPT | Mod: ZL | Performed by: NURSE PRACTITIONER

## 2025-03-25 PROCEDURE — G0463 HOSPITAL OUTPT CLINIC VISIT: HCPCS | Mod: 25

## 2025-03-25 ASSESSMENT — PAIN SCALES - GENERAL: PAINLEVEL_OUTOF10: NO PAIN (0)

## 2025-03-25 NOTE — PROGRESS NOTES
ANTICOAGULATION MANAGEMENT     Cassy Avila 69 year old female is on warfarin with subtherapeutic INR result. (Goal INR 2.0-3.0)    Recent labs: (last 7 days)     03/24/25  0904   INR 1.72*       ASSESSMENT     Source(s): Chart Review and Patient/Caregiver Call     Warfarin doses taken: Missed dose(s) may be affecting INR and Held for procedure  recently which may be affecting INR  Diet: No new diet changes identified  New illness, injury, or hospitalization: No  Medication/supplement changes: None noted  Signs or symptoms of bleeding or clotting: Yes: mild bleeding at incision site from procedure yesterday 3/24/25, provider told her that is normal  Previous INR: Therapeutic last 2(+) visits  Additional findings: Upcoming surgery/procedure 4/11/25- IR adrenal venogram bilateral- Patient was told by radiology that she does not need to hold her warfarin for procedure. Patient is going to officially confirm that with radiology when they call her for further details       PLAN     Recommended plan for temporary change(s) affecting INR     Dosing Instructions: Continue your current warfarin dose with next INR in 2 weeks       Summary  As of 3/25/2025      Full warfarin instructions:  7.5 mg every Mon; 5 mg all other days   Next INR check:  4/7/2025               Telephone call with Kaitlin who verbalizes understanding and agrees to plan    Patient to recheck with home meter    Education provided: Please call back if any changes to your diet, medications or how you've been taking warfarin    Plan made per [facility] anticoagulation protocol    Ashanti Velazquez RN  Anticoagulation Clinic  3/25/2025    _______________________________________________________________________     Anticoagulation Episode Summary       Current INR goal:  2.0-3.0   TTR:  81.5% (1 y)   Target end date:  Indefinite   Send INR reminders to:  [facility]    Indications    Long-term (current) use of anticoagulants [Z79.01] [Z79.01]  Pulmonary  embolism and infarction (H) [I26.99]  Deep vein thrombosis (DVT) (H) [I82.409] (Resolved) [I82.409]  Deep vein thrombosis (DVT) of right lower extremity  unspecified chronicity  unspecified vein (H) [I82.401]             Comments:  [employer] Monitoring. Q2 week testing  Call cell phone with any dosing.             Anticoagulation Care Providers       Provider Role Specialty Phone number    Eliz Hartman CNP Referring Family Medicine [phone]

## 2025-03-25 NOTE — ANESTHESIA POSTPROCEDURE EVALUATION
Patient: Cassy Avila    Procedure: Procedure(s):  Needle Localization Left Breast Biopsy       Anesthesia Type:  General    Note:  Disposition: Outpatient   Postop Pain Control: Uneventful            Sign Out: Well controlled pain   PONV: No   Neuro/Psych: Uneventful            Sign Out: Acceptable/Baseline neuro status   Airway/Respiratory: Uneventful            Sign Out: Acceptable/Baseline resp. status   CV/Hemodynamics: Uneventful            Sign Out: Acceptable CV status; No obvious hypovolemia; No obvious fluid overload   Other NRE: NONE   DID A NON-ROUTINE EVENT OCCUR? No       Last vitals:  Vitals Value Taken Time   /92 03/24/25 1430   Temp 97  F (36.1  C) 03/24/25 1430   Pulse 73 03/24/25 1433   Resp 11 03/24/25 1434   SpO2 95 % 03/24/25 1436   Vitals shown include unfiled device data.    Electronically Signed By: JESUS Hutchins CRNA  March 24, 2025  7:20 PM

## 2025-03-25 NOTE — TELEPHONE ENCOUNTER
Patient called to state that her bra that she received yesterday post surgery is full of blood so she put a different one on and that she believes her incisions are opening. Spoke to Vanessa ALMAZAN regarding this and she would like to see patient ASAP.   Called patient and told patient to arrive on clinic side of hospital to see surgeon. Patient reported understanding.     Hawa GALE RN

## 2025-03-25 NOTE — PROGRESS NOTES
CLINIC NOTE - POST-OP SURGERY  3/25/2025    Patient:Cassy Avila    Procedure: Wire localized Left Breast Biopsy     This is a 69 year old female who is 1 day s/p Wire localized Left Breast Biopsy.  The patient is noting drainage from the incisional site which is concerning.    Current Medications:  Current Outpatient Medications   Medication Sig Dispense Refill    albuterol (PROVENTIL) (2.5 MG/3ML) 0.083% neb solution Take 2.5 mg by nebulization every 6 hours as needed for shortness of breath / dyspnea or wheezing      aspirin 81 MG EC tablet Take 81 mg by mouth daily HS      eplerenone (INSPRA) 50 MG tablet Take 1 tablet (50 mg) by mouth 2 times daily. 60 tablet 3    escitalopram (LEXAPRO) 10 MG tablet Take 1 tablet (10 mg) by mouth daily. 90 tablet 1    furosemide (LASIX) 20 MG tablet Take 1 tablet (20 mg) by mouth as needed (swelling, weight gain).      HYDROcodone-acetaminophen (NORCO) 5-325 MG tablet Take 1 tablet by mouth every 6 hours as needed for pain. 18 tablet 0    ketoconazole (NIZORAL) 2 % external cream APPLY TO THE RASH ON FULL BACK TWICE A DAY FOR 4-6 WEEKS      losartan (COZAAR) 50 MG tablet Take 1 tablet (50 mg) by mouth daily. 90 tablet 1    metoprolol succinate ER (TOPROL XL) 50 MG 24 hr tablet Take 0.5 tablets (25 mg) by mouth daily. 45 tablet 1    order for DME Nebulizer machine and tubing    DX:  Bronchitis 1 Device 0    traZODone (DESYREL) 50 MG tablet TAKE 1 TO 2 TABLETS BY MOUTH NIGHTLY AS NEEDED 180 tablet 3    warfarin ANTICOAGULANT (COUMADIN) 5 MG tablet TAKE 1 & 1/2 (ONE & ONE-HALF) TABLETS BY MOUTH MONDAY WEDNESDAY AND FRIDAY AND 1 TABLET ALL OTHER DAYS OR AS DIRECTED BY [facility] 109 tablet 1       Allergies:  Allergies   Allergen Reactions    Allopurinol Shortness Of Breath    Amlodipine Besylate Swelling     Norvasc    Amoxicillin     Atorvastatin      myualgia    Cephalexin Monohydrate Hives     Keflex    Erythromycin Base [Erythromycin Base] Nausea and Vomiting     "Meloxicam Other (See Comments)     Mobic - confusion, depression    Sulfa Antibiotics Hives    Adhesive Tape Rash    Prochlorperazine Edisylate Swelling and Rash     Compazine    Prochlorperazine Maleate Swelling and Rash       PHYSICAL EXAM:   Vital signs: /64 (BP Location: Right arm, Cuff Size: Adult Large)   Pulse 67   Temp 98.4  F (36.9  C) (Tympanic)   Resp 16   Ht 1.651 m (5' 5\")   Wt 101.2 kg (223 lb)   SpO2 95%   BMI 37.11 kg/m     BMI: Body mass index is 37.11 kg/m .   General: Normal, healthy, cooperative, in no acute distress, alert   Lungs: respirations are non-labored   Abdominal: non-distended   Wounds:  Incisional site is intact with serosanguineous drainage. No signs/symptoms of infection are noted to the area.     Pathology:  Pending    ASSESSMENT:    69 year old female who is 1 day s/p Wire localized Left Breast Biopsy, post-op seroma.      PLAN:   Patient is on coumadin and an INR was checked today which was noted to be 1.4.  Patient can continue to take her coumadin at this time.  Pressure dressing applied to incisional site.  Patient to be seen tomorrow for a recheck of her incisional site.  Sooner by a provider with problems/concerns.      "

## 2025-03-26 ENCOUNTER — TELEPHONE (OUTPATIENT)
Dept: SURGERY | Facility: OTHER | Age: 70
End: 2025-03-26

## 2025-03-26 ENCOUNTER — OFFICE VISIT (OUTPATIENT)
Dept: SURGERY | Facility: OTHER | Age: 70
End: 2025-03-26
Attending: NURSE PRACTITIONER
Payer: MEDICARE

## 2025-03-26 VITALS
DIASTOLIC BLOOD PRESSURE: 62 MMHG | SYSTOLIC BLOOD PRESSURE: 106 MMHG | BODY MASS INDEX: 37.65 KG/M2 | HEART RATE: 60 BPM | OXYGEN SATURATION: 96 % | HEIGHT: 65 IN | WEIGHT: 226 LBS | RESPIRATION RATE: 18 BRPM

## 2025-03-26 DIAGNOSIS — Z98.890 STATUS POST LEFT BREAST BIOPSY: Primary | ICD-10-CM

## 2025-03-26 LAB
PATH REPORT.COMMENTS IMP SPEC: NORMAL
PATH REPORT.COMMENTS IMP SPEC: NORMAL
PATH REPORT.FINAL DX SPEC: NORMAL
PATH REPORT.GROSS SPEC: NORMAL
PATH REPORT.MICROSCOPIC SPEC OTHER STN: NORMAL
PATH REPORT.RELEVANT HX SPEC: NORMAL
PHOTO IMAGE: NORMAL

## 2025-03-26 PROCEDURE — G0463 HOSPITAL OUTPT CLINIC VISIT: HCPCS | Mod: 25

## 2025-03-26 ASSESSMENT — PAIN SCALES - GENERAL: PAINLEVEL_OUTOF10: MILD PAIN (3)

## 2025-03-26 NOTE — TELEPHONE ENCOUNTER
Called patient to let her know her pathology results from breast surgery were all negative.     Hawa GALE RN

## 2025-03-26 NOTE — PROGRESS NOTES
CLINIC NOTE - POST-OP SURGERY  3/26/2025    Patient:Cassy Avila    Procedure: Wire localized Left Breast Biopsy     This is a 69 year old female who is 2 days s/p Wire localized Left Breast Biopsy.  The patient is without complaint when seen today. She did change the dressing placed yesterday once as the dressing got wet with showering.     Current Medications:  Current Outpatient Medications   Medication Sig Dispense Refill    albuterol (PROVENTIL) (2.5 MG/3ML) 0.083% neb solution Take 2.5 mg by nebulization every 6 hours as needed for shortness of breath / dyspnea or wheezing      aspirin 81 MG EC tablet Take 81 mg by mouth daily HS      eplerenone (INSPRA) 50 MG tablet Take 1 tablet (50 mg) by mouth 2 times daily. 60 tablet 3    escitalopram (LEXAPRO) 10 MG tablet Take 1 tablet (10 mg) by mouth daily. 90 tablet 1    furosemide (LASIX) 20 MG tablet Take 1 tablet (20 mg) by mouth as needed (swelling, weight gain).      HYDROcodone-acetaminophen (NORCO) 5-325 MG tablet Take 1 tablet by mouth every 6 hours as needed for pain. 18 tablet 0    ketoconazole (NIZORAL) 2 % external cream APPLY TO THE RASH ON FULL BACK TWICE A DAY FOR 4-6 WEEKS      losartan (COZAAR) 50 MG tablet Take 1 tablet (50 mg) by mouth daily. 90 tablet 1    metoprolol succinate ER (TOPROL XL) 50 MG 24 hr tablet Take 0.5 tablets (25 mg) by mouth daily. 45 tablet 1    order for DME Nebulizer machine and tubing    DX:  Bronchitis 1 Device 0    traZODone (DESYREL) 50 MG tablet TAKE 1 TO 2 TABLETS BY MOUTH NIGHTLY AS NEEDED 180 tablet 3    warfarin ANTICOAGULANT (COUMADIN) 5 MG tablet TAKE 1 & 1/2 (ONE & ONE-HALF) TABLETS BY MOUTH MONDAY WEDNESDAY AND FRIDAY AND 1 TABLET ALL OTHER DAYS OR AS DIRECTED BY [facility] 109 tablet 1       Allergies:  Allergies   Allergen Reactions    Allopurinol Shortness Of Breath    Amlodipine Besylate Swelling     Norvasc    Amoxicillin     Atorvastatin      myualgia    Cephalexin Monohydrate Hives     Keflex     "Erythromycin Base [Erythromycin Base] Nausea and Vomiting    Meloxicam Other (See Comments)     Mobic - confusion, depression    Sulfa Antibiotics Hives    Adhesive Tape Rash    Prochlorperazine Edisylate Swelling and Rash     Compazine    Prochlorperazine Maleate Swelling and Rash       PHYSICAL EXAM:   Vital signs: /62 (BP Location: Right arm, Cuff Size: Adult Regular)   Pulse 60   Resp 18   Ht 1.651 m (5' 5\")   Wt 102.5 kg (226 lb)   SpO2 96%   BMI 37.61 kg/m     BMI: Body mass index is 37.61 kg/m .   General: Normal, healthy, cooperative, in no acute distress, alert   Lungs: respirations are non-labored   Abdominal: non-distended   Wounds:  Incisional site is intact with scant serosanguineous drainage. No signs/symptoms of infection are noted to the area.     Pathology:  Pending    ASSESSMENT:    69 year old female who is 2 days s/p Wire localized Left Breast Biopsy, post-op seroma.      PLAN:   Glue to the incisional site is coming off the incisional site so incisional site was steri stripped today.  Patient to cover incisional site with gauze changed daily or as needed due to drainage.  Patient is to follow up next week for a recheck.  Sooner with problems/concerns.       "

## 2025-04-02 ENCOUNTER — OFFICE VISIT (OUTPATIENT)
Dept: SURGERY | Facility: OTHER | Age: 70
End: 2025-04-02
Attending: NURSE PRACTITIONER
Payer: MEDICARE

## 2025-04-02 VITALS
BODY MASS INDEX: 37.82 KG/M2 | RESPIRATION RATE: 18 BRPM | HEIGHT: 65 IN | OXYGEN SATURATION: 96 % | HEART RATE: 69 BPM | SYSTOLIC BLOOD PRESSURE: 122 MMHG | DIASTOLIC BLOOD PRESSURE: 72 MMHG | WEIGHT: 227 LBS | TEMPERATURE: 97.4 F

## 2025-04-02 DIAGNOSIS — Z98.890 STATUS POST LEFT BREAST BIOPSY: Primary | ICD-10-CM

## 2025-04-02 PROCEDURE — G0463 HOSPITAL OUTPT CLINIC VISIT: HCPCS

## 2025-04-02 ASSESSMENT — PAIN SCALES - GENERAL: PAINLEVEL_OUTOF10: MODERATE PAIN (4)

## 2025-04-02 NOTE — PROGRESS NOTES
CLINIC NOTE - POST-OP SURGERY  4/2/2025    Patient:Cassy Avila    Procedure: Wire localized Left Breast Biopsy     This is a 69 year old female who is 1 week s/p Wire localized Left Breast Biopsy.  The patient is without complaint when seen today.     Current Medications:  Current Outpatient Medications   Medication Sig Dispense Refill    aspirin 81 MG EC tablet Take 81 mg by mouth daily HS      eplerenone (INSPRA) 50 MG tablet Take 1 tablet (50 mg) by mouth 2 times daily. 60 tablet 3    escitalopram (LEXAPRO) 10 MG tablet Take 1 tablet (10 mg) by mouth daily. 90 tablet 1    furosemide (LASIX) 20 MG tablet Take 1 tablet (20 mg) by mouth as needed (swelling, weight gain).      ketoconazole (NIZORAL) 2 % external cream APPLY TO THE RASH ON FULL BACK TWICE A DAY FOR 4-6 WEEKS      losartan (COZAAR) 50 MG tablet Take 1 tablet (50 mg) by mouth daily. 90 tablet 1    metoprolol succinate ER (TOPROL XL) 50 MG 24 hr tablet Take 0.5 tablets (25 mg) by mouth daily. 45 tablet 1    order for DME Nebulizer machine and tubing    DX:  Bronchitis 1 Device 0    traZODone (DESYREL) 50 MG tablet TAKE 1 TO 2 TABLETS BY MOUTH NIGHTLY AS NEEDED 180 tablet 3    warfarin ANTICOAGULANT (COUMADIN) 5 MG tablet TAKE 1 & 1/2 (ONE & ONE-HALF) TABLETS BY MOUTH MONDAY WEDNESDAY AND FRIDAY AND 1 TABLET ALL OTHER DAYS OR AS DIRECTED BY [facility] 109 tablet 1    albuterol (PROVENTIL) (2.5 MG/3ML) 0.083% neb solution Take 2.5 mg by nebulization every 6 hours as needed for shortness of breath / dyspnea or wheezing (Patient not taking: Reported on 4/2/2025)         Allergies:  Allergies   Allergen Reactions    Allopurinol Shortness Of Breath    Amlodipine Besylate Swelling     Norvasc    Amoxicillin     Atorvastatin      myualgia    Cephalexin Monohydrate Hives     Keflex    Erythromycin Base [Erythromycin Base] Nausea and Vomiting    Meloxicam Other (See Comments)     Mobic - confusion, depression    Sulfa Antibiotics Hives    Adhesive Tape  "Rash    Prochlorperazine Edisylate Swelling and Rash     Compazine    Prochlorperazine Maleate Swelling and Rash       PHYSICAL EXAM:   Vital signs: /72 (BP Location: Right arm, Cuff Size: Adult Regular)   Pulse 69   Temp 97.4  F (36.3  C) (Tympanic)   Resp 18   Ht 1.651 m (5' 5\")   Wt 103 kg (227 lb)   SpO2 96%   BMI 37.77 kg/m     BMI: Body mass index is 37.77 kg/m .   General: Normal, healthy, cooperative, in no acute distress, alert   Lungs: respirations are non-labored   Abdominal: non-distended   Wounds:  Incisional site is well healed at this time    Pathology:  A.  Breast, left, mass, needle-localization excisional biopsy:  -Pseudoangiomatous stromal hyperplasia (see comment).  -Biopsy site related changes including fat necrosis, fibrosis, foreign body-type inflammatory reaction, hemorrhage and hemosiderin deposition.  -Negative for atypical hyperplasia, in situ or invasive carcinoma.    A mass-like focus of pseudoangiomatous stromal hyperplasia is identified in close association with the previous biopsy site. The lesion focally abuts the margin designated as anterior-medial     ASSESSMENT:    69 year old female who is 1 week s/p Wire localized Left Breast Biopsy, post-op seroma.      PLAN:   Patient to have a left breast mammogram in 6 months for a new breast baseline.  Follow up with me as needed at this time.       "

## 2025-04-08 ENCOUNTER — ANTICOAGULATION THERAPY VISIT (OUTPATIENT)
Dept: ANTICOAGULATION | Facility: OTHER | Age: 70
End: 2025-04-08
Payer: COMMERCIAL

## 2025-04-08 DIAGNOSIS — Z79.01 LONG TERM CURRENT USE OF ANTICOAGULANT THERAPY: Primary | ICD-10-CM

## 2025-04-08 DIAGNOSIS — I26.99 PULMONARY EMBOLISM AND INFARCTION (H): ICD-10-CM

## 2025-04-08 DIAGNOSIS — I82.401 DEEP VEIN THROMBOSIS (DVT) OF RIGHT LOWER EXTREMITY, UNSPECIFIED CHRONICITY, UNSPECIFIED VEIN (H): ICD-10-CM

## 2025-04-08 LAB — INR HOME MONITORING: 2.3 (ref 2–3)

## 2025-04-08 NOTE — PROGRESS NOTES
ANTICOAGULATION MANAGEMENT     Cassy Avila 69 year old female is on warfarin with therapeutic INR result. (Goal INR 2.0-3.0)    Recent labs: (last 7 days)     04/08/25  0000   INR 2.3       ASSESSMENT     Source(s): Chart Review and Patient/Caregiver Call     Warfarin doses taken: Resumed warfarin on 3/25 after interruption for surgery/procedure which may be affecting INR  Diet: No new diet changes identified  Medication/supplement changes: None noted  New illness, injury, or hospitalization: No  Signs or symptoms of bleeding or clotting: No  Previous result: Subtherapeutic  Additional findings: None       PLAN     Recommended plan for temporary change(s) affecting INR     Dosing Instructions: Continue your current warfarin dose with next INR in 2 weeks       Summary  As of 4/8/2025      Full warfarin instructions:  7.5 mg every Mon; 5 mg all other days   Next INR check:  4/22/2025               Telephone call with Kaitlin who verbalizes understanding and agrees to plan    Patient to recheck with home meter    Education provided: Resume manage by exception with home monitor. Continue to submit INR results to home monitor company.You will only be called when your result is out of range and at 90 day check in. Please call and notify [facility] if new medication started, dose missed, signs or symptoms of bleeding or clotting, or a surgery/procedure is scheduled. Due for next call no later than: 7/7/25.    Plan made per [facility] anticoagulation protocol    Corry Galvan RN  4/8/2025  Anticoagulation Clinic  [facility] for routing messages: erwin CAMEJO  [facility] patient phone line: [phone]        _______________________________________________________________________     Anticoagulation Episode Summary       Current INR goal:  2.0-3.0   TTR:  78.7% (1 y)   Target end date:  Indefinite   Send INR reminders to:  DAREN CAMEJO    Indications    Long-term (current) use of anticoagulants [Z79.01] [Z79.01]  Pulmonary embolism  and infarction (H) [I26.99]  Deep vein thrombosis (DVT) (H) [I82.409] (Resolved) [I82.409]  Deep vein thrombosis (DVT) of right lower extremity  unspecified chronicity  unspecified vein (H) [I82.401]             Comments:  [employer] Monitoring. Q2 week testing  Call cell phone with any dosing.             Anticoagulation Care Providers       Provider Role Specialty Phone number    Eliz Hartman CNP Referring Family Medicine [phone]

## 2025-04-11 ENCOUNTER — APPOINTMENT (OUTPATIENT)
Dept: MEDSURG UNIT | Facility: CLINIC | Age: 70
End: 2025-04-11
Attending: STUDENT IN AN ORGANIZED HEALTH CARE EDUCATION/TRAINING PROGRAM
Payer: MEDICARE

## 2025-04-11 ENCOUNTER — APPOINTMENT (OUTPATIENT)
Dept: INTERVENTIONAL RADIOLOGY/VASCULAR | Facility: CLINIC | Age: 70
End: 2025-04-11
Attending: STUDENT IN AN ORGANIZED HEALTH CARE EDUCATION/TRAINING PROGRAM
Payer: MEDICARE

## 2025-04-11 ENCOUNTER — HOSPITAL ENCOUNTER (OUTPATIENT)
Facility: CLINIC | Age: 70
Discharge: HOME | End: 2025-04-11
Attending: STUDENT IN AN ORGANIZED HEALTH CARE EDUCATION/TRAINING PROGRAM | Admitting: STUDENT IN AN ORGANIZED HEALTH CARE EDUCATION/TRAINING PROGRAM
Payer: MEDICARE

## 2025-04-11 VITALS
WEIGHT: 225.6 LBS | OXYGEN SATURATION: 96 % | BODY MASS INDEX: 37.59 KG/M2 | DIASTOLIC BLOOD PRESSURE: 73 MMHG | SYSTOLIC BLOOD PRESSURE: 150 MMHG | HEIGHT: 65 IN | TEMPERATURE: 98.2 F | RESPIRATION RATE: 18 BRPM | HEART RATE: 69 BPM

## 2025-04-11 DIAGNOSIS — E27.9 ADRENAL NODULE: ICD-10-CM

## 2025-04-11 DIAGNOSIS — E26.09 PRIMARY ALDOSTERONISM: ICD-10-CM

## 2025-04-11 LAB
ALDOST SERPL-MCNC: 26.3 NG/DL (ref 0–31)
ANION GAP SERPL CALCULATED.3IONS-SCNC: 12 MMOL/L (ref 7–15)
BUN SERPL-MCNC: 14.4 MG/DL (ref 8–23)
CALCIUM SERPL-MCNC: 9.2 MG/DL (ref 8.8–10.4)
CHLORIDE SERPL-SCNC: 105 MMOL/L (ref 98–107)
CORTIS SERPL-MCNC: 1158 UG/DL
CORTIS SERPL-MCNC: 1179 UG/DL
CORTIS SERPL-MCNC: 12.4 UG/DL
CORTIS SERPL-MCNC: 25 UG/DL
CORTIS SERPL-MCNC: 26.8 UG/DL
CORTIS SERPL-MCNC: 264 UG/DL
CORTIS SERPL-MCNC: 267 UG/DL
CORTIS SERPL-MCNC: 28.6 UG/DL
CORTIS SERPL-MCNC: 28.7 UG/DL
CORTIS SERPL-MCNC: 30.2 UG/DL
CORTIS SERPL-MCNC: 31.8 UG/DL
CORTIS SERPL-MCNC: 32.6 UG/DL
CORTIS SERPL-MCNC: 33.9 UG/DL
CORTIS SERPL-MCNC: 36.1 UG/DL
CORTIS SERPL-MCNC: 36.4 UG/DL
CORTIS SERPL-MCNC: 38 UG/DL
CORTIS SERPL-MCNC: 38.7 UG/DL
CREAT SERPL-MCNC: 1.19 MG/DL (ref 0.51–0.95)
EGFRCR SERPLBLD CKD-EPI 2021: 49 ML/MIN/1.73M2
ERYTHROCYTE [DISTWIDTH] IN BLOOD BY AUTOMATED COUNT: 14 % (ref 10–15)
GLUCOSE SERPL-MCNC: 104 MG/DL (ref 70–99)
HBV SURFACE AG SERPL QL IA: NONREACTIVE
HCO3 SERPL-SCNC: 23 MMOL/L (ref 22–29)
HCT VFR BLD AUTO: 39.4 % (ref 35–47)
HGB BLD-MCNC: 13.3 G/DL (ref 11.7–15.7)
HIV 1+2 AB+HIV1 P24 AG SERPL QL IA: NONREACTIVE
INR PPP: 2.23 (ref 0.85–1.15)
MCH RBC QN AUTO: 29.5 PG (ref 26.5–33)
MCHC RBC AUTO-ENTMCNC: 33.8 G/DL (ref 31.5–36.5)
MCV RBC AUTO: 87 FL (ref 78–100)
PLATELET # BLD AUTO: 181 10E3/UL (ref 150–450)
POTASSIUM SERPL-SCNC: 4.8 MMOL/L (ref 3.4–5.3)
RBC # BLD AUTO: 4.51 10E6/UL (ref 3.8–5.2)
SODIUM SERPL-SCNC: 140 MMOL/L (ref 135–145)
SPECIMEN SOURCE, AVS: NORMAL
WBC # BLD AUTO: 5.7 10E3/UL (ref 4–11)

## 2025-04-11 PROCEDURE — 999N000104 HEPATITIS B SURFACE ANTIGEN: Performed by: INTERNAL MEDICINE

## 2025-04-11 PROCEDURE — C1887 CATHETER, GUIDING: HCPCS

## 2025-04-11 PROCEDURE — 99152 MOD SED SAME PHYS/QHP 5/>YRS: CPT

## 2025-04-11 PROCEDURE — 272N000143 HC KIT CR3

## 2025-04-11 PROCEDURE — 75893 VENOUS SAMPLING BY CATHETER: CPT | Mod: XS

## 2025-04-11 PROCEDURE — 85610 PROTHROMBIN TIME: CPT | Performed by: NURSE PRACTITIONER

## 2025-04-11 PROCEDURE — 80048 BASIC METABOLIC PNL TOTAL CA: CPT | Performed by: PHYSICIAN ASSISTANT

## 2025-04-11 PROCEDURE — 99152 MOD SED SAME PHYS/QHP 5/>YRS: CPT | Mod: GC | Performed by: STUDENT IN AN ORGANIZED HEALTH CARE EDUCATION/TRAINING PROGRAM

## 2025-04-11 PROCEDURE — C1769 GUIDE WIRE: HCPCS

## 2025-04-11 PROCEDURE — 82088 ASSAY OF ALDOSTERONE: CPT | Performed by: STUDENT IN AN ORGANIZED HEALTH CARE EDUCATION/TRAINING PROGRAM

## 2025-04-11 PROCEDURE — 272N000504 HC NEEDLE CR4

## 2025-04-11 PROCEDURE — 36500 INSERTION OF CATHETER VEIN: CPT | Mod: XS

## 2025-04-11 PROCEDURE — 82533 TOTAL CORTISOL: CPT | Performed by: PHYSICIAN ASSISTANT

## 2025-04-11 PROCEDURE — 76937 US GUIDE VASCULAR ACCESS: CPT | Mod: 26 | Performed by: STUDENT IN AN ORGANIZED HEALTH CARE EDUCATION/TRAINING PROGRAM

## 2025-04-11 PROCEDURE — 255N000002 HC RX 255 OP 636: Performed by: STUDENT IN AN ORGANIZED HEALTH CARE EDUCATION/TRAINING PROGRAM

## 2025-04-11 PROCEDURE — 36011 PLACE CATHETER IN VEIN: CPT | Mod: 50

## 2025-04-11 PROCEDURE — 250N000011 HC RX IP 250 OP 636: Performed by: STUDENT IN AN ORGANIZED HEALTH CARE EDUCATION/TRAINING PROGRAM

## 2025-04-11 PROCEDURE — 272N000566 HC SHEATH CR3

## 2025-04-11 PROCEDURE — 82533 TOTAL CORTISOL: CPT | Performed by: STUDENT IN AN ORGANIZED HEALTH CARE EDUCATION/TRAINING PROGRAM

## 2025-04-11 PROCEDURE — 999N000133 HC STATISTIC PP CARE STAGE 2

## 2025-04-11 PROCEDURE — 36500 INSERTION OF CATHETER VEIN: CPT

## 2025-04-11 PROCEDURE — 272N000192 HC ACCESSORY CR2

## 2025-04-11 PROCEDURE — 85018 HEMOGLOBIN: CPT | Performed by: NURSE PRACTITIONER

## 2025-04-11 PROCEDURE — 258N000003 HC RX IP 258 OP 636: Performed by: PHYSICIAN ASSISTANT

## 2025-04-11 PROCEDURE — 250N000011 HC RX IP 250 OP 636: Mod: JZ | Performed by: PHYSICIAN ASSISTANT

## 2025-04-11 PROCEDURE — 76937 US GUIDE VASCULAR ACCESS: CPT

## 2025-04-11 PROCEDURE — 75893 VENOUS SAMPLING BY CATHETER: CPT | Mod: 26 | Performed by: STUDENT IN AN ORGANIZED HEALTH CARE EDUCATION/TRAINING PROGRAM

## 2025-04-11 PROCEDURE — 85041 AUTOMATED RBC COUNT: CPT | Performed by: NURSE PRACTITIONER

## 2025-04-11 PROCEDURE — 36005 INJECTION EXT VENOGRAPHY: CPT

## 2025-04-11 PROCEDURE — 36415 COLL VENOUS BLD VENIPUNCTURE: CPT | Performed by: STUDENT IN AN ORGANIZED HEALTH CARE EDUCATION/TRAINING PROGRAM

## 2025-04-11 PROCEDURE — 82088 ASSAY OF ALDOSTERONE: CPT | Performed by: PHYSICIAN ASSISTANT

## 2025-04-11 PROCEDURE — 999N000142 HC STATISTIC PROCEDURE PREP ONLY

## 2025-04-11 PROCEDURE — 36500 INSERTION OF CATHETER VEIN: CPT | Mod: 22 | Performed by: STUDENT IN AN ORGANIZED HEALTH CARE EDUCATION/TRAINING PROGRAM

## 2025-04-11 PROCEDURE — 75893 VENOUS SAMPLING BY CATHETER: CPT

## 2025-04-11 PROCEDURE — 82565 ASSAY OF CREATININE: CPT | Performed by: PHYSICIAN ASSISTANT

## 2025-04-11 PROCEDURE — 84132 ASSAY OF SERUM POTASSIUM: CPT | Performed by: PHYSICIAN ASSISTANT

## 2025-04-11 PROCEDURE — 250N000009 HC RX 250: Performed by: STUDENT IN AN ORGANIZED HEALTH CARE EDUCATION/TRAINING PROGRAM

## 2025-04-11 PROCEDURE — 36500 INSERTION OF CATHETER VEIN: CPT | Mod: XU | Performed by: STUDENT IN AN ORGANIZED HEALTH CARE EDUCATION/TRAINING PROGRAM

## 2025-04-11 PROCEDURE — 36415 COLL VENOUS BLD VENIPUNCTURE: CPT | Performed by: PHYSICIAN ASSISTANT

## 2025-04-11 RX ORDER — LIDOCAINE 40 MG/G
CREAM TOPICAL
Status: DISCONTINUED | OUTPATIENT
Start: 2025-04-11 | End: 2025-04-11 | Stop reason: HOSPADM

## 2025-04-11 RX ORDER — IODIXANOL 320 MG/ML
150 INJECTION, SOLUTION INTRAVASCULAR ONCE
Status: COMPLETED | OUTPATIENT
Start: 2025-04-11 | End: 2025-04-11

## 2025-04-11 RX ORDER — POTASSIUM CHLORIDE 1.5 G/1.58G
40 POWDER, FOR SOLUTION ORAL
Status: DISCONTINUED | OUTPATIENT
Start: 2025-04-11 | End: 2025-04-11 | Stop reason: HOSPADM

## 2025-04-11 RX ORDER — SODIUM CHLORIDE 9 MG/ML
INJECTION, SOLUTION INTRAVENOUS CONTINUOUS
Status: DISCONTINUED | OUTPATIENT
Start: 2025-04-11 | End: 2025-04-11 | Stop reason: HOSPADM

## 2025-04-11 RX ORDER — NALOXONE HYDROCHLORIDE 0.4 MG/ML
0.4 INJECTION, SOLUTION INTRAMUSCULAR; INTRAVENOUS; SUBCUTANEOUS
Status: DISCONTINUED | OUTPATIENT
Start: 2025-04-11 | End: 2025-04-11 | Stop reason: HOSPADM

## 2025-04-11 RX ORDER — FLUMAZENIL 0.1 MG/ML
0.2 INJECTION, SOLUTION INTRAVENOUS
Status: DISCONTINUED | OUTPATIENT
Start: 2025-04-11 | End: 2025-04-11 | Stop reason: HOSPADM

## 2025-04-11 RX ORDER — NALOXONE HYDROCHLORIDE 0.4 MG/ML
0.2 INJECTION, SOLUTION INTRAMUSCULAR; INTRAVENOUS; SUBCUTANEOUS
Status: DISCONTINUED | OUTPATIENT
Start: 2025-04-11 | End: 2025-04-11 | Stop reason: HOSPADM

## 2025-04-11 RX ORDER — FENTANYL CITRATE 50 UG/ML
25-50 INJECTION, SOLUTION INTRAMUSCULAR; INTRAVENOUS EVERY 5 MIN PRN
Status: DISCONTINUED | OUTPATIENT
Start: 2025-04-11 | End: 2025-04-11 | Stop reason: HOSPADM

## 2025-04-11 RX ORDER — ONDANSETRON 2 MG/ML
4 INJECTION INTRAMUSCULAR; INTRAVENOUS
Status: DISCONTINUED | OUTPATIENT
Start: 2025-04-11 | End: 2025-04-11 | Stop reason: HOSPADM

## 2025-04-11 RX ORDER — HEPARIN SODIUM 200 [USP'U]/100ML
1 INJECTION, SOLUTION INTRAVENOUS EVERY 5 MIN PRN
Status: DISCONTINUED | OUTPATIENT
Start: 2025-04-11 | End: 2025-04-11 | Stop reason: HOSPADM

## 2025-04-11 RX ADMIN — FENTANYL CITRATE 25 MCG: 50 INJECTION, SOLUTION INTRAMUSCULAR; INTRAVENOUS at 09:09

## 2025-04-11 RX ADMIN — IODIXANOL 175 ML: 320 INJECTION, SOLUTION INTRAVASCULAR at 11:44

## 2025-04-11 RX ADMIN — MIDAZOLAM 0.5 MG: 1 INJECTION INTRAMUSCULAR; INTRAVENOUS at 10:15

## 2025-04-11 RX ADMIN — LIDOCAINE HYDROCHLORIDE 30 ML: 10 INJECTION, SOLUTION EPIDURAL; INFILTRATION; INTRACAUDAL; PERINEURAL at 08:45

## 2025-04-11 RX ADMIN — COSYNTROPIN 50 MCG/HR: 0.25 INJECTION, POWDER, LYOPHILIZED, FOR SOLUTION INTRAMUSCULAR; INTRAVENOUS at 08:09

## 2025-04-11 RX ADMIN — SODIUM CHLORIDE: 0.9 INJECTION, SOLUTION INTRAVENOUS at 08:44

## 2025-04-11 RX ADMIN — FENTANYL CITRATE 50 MCG: 50 INJECTION, SOLUTION INTRAMUSCULAR; INTRAVENOUS at 08:43

## 2025-04-11 RX ADMIN — MIDAZOLAM 1 MG: 1 INJECTION INTRAMUSCULAR; INTRAVENOUS at 08:44

## 2025-04-11 RX ADMIN — FENTANYL CITRATE 25 MCG: 50 INJECTION, SOLUTION INTRAMUSCULAR; INTRAVENOUS at 10:15

## 2025-04-11 RX ADMIN — MIDAZOLAM 0.5 MG: 1 INJECTION INTRAMUSCULAR; INTRAVENOUS at 09:10

## 2025-04-11 ASSESSMENT — ACTIVITIES OF DAILY LIVING (ADL)
ADLS_ACUITY_SCORE: 43
ADLS_ACUITY_SCORE: 41

## 2025-04-11 NOTE — DISCHARGE INSTRUCTIONS
[facility]   Interventional Radiology  Discharge Instructions Post Adrenal Vein Sampling      AFTER YOU GO HOME          Relax and take it easy for 24 hours.       Drink plenty of fluids.       Resume your regular diet, unless otherwise instructed by your Primary Physician.       DO NOT smoke for at least 24 hours, if you were given any sedation.       DO NOT drink alcoholic beverages the day of your procedure.       DO NOT drive or operate machinery at home or at work for 24 hours.          DO NOT make any important or legal decisions for 24 hours following your procedure.       DO NOT take a shower for at least 12 hours following your procedure. No tub bath, hot tubs, or swimming for 5 days        Care of groin site  It is normal to have a small bruise or lump at the site.  For the first 2 days, when you cough, sneeze or move your bowels, hold your hand over the puncture site and press gently.  Do NOT lift more than 10 pounds or do any strenuous exercise for at least 3 days.  Do not use lotion or powder near the puncture site for 3 days.  If you start bleeding from the site in your groin: lie down flat and press firmly on the site. Call your doctor as soon as you can.   Call 911 right away if you have: Heavy bleeding, bleeding that does not stop.    CALL THE PHYSICIAN IF:       - You start bleeding from the procedure site. A small lump or bruise is common at the puncture site. Your physician will tell you if you need to return to the hospital.      - You develop numbness, coolness or a change in color of the leg that was punctured.      - You experience increased pain or redness at the puncture site.      - You develop hives or a rash or unexplained itching.      - You develop a temperature of 101 degrees F or greater.        [facility] INTERVENTIONAL RADIOLOGY DEPARTMENT         Procedure Physician: Dr. Yu                               Date of Procedure:April 11, 2025       Telephone Numbers:    [phone]      Monday-Friday 7:30 am to 4:00 pm                                        [phone]     After 4:00 pm Monday-Friday, Weekend and Holidays. Ask for the Interventional Radiologist on call. Someone is on call 24 hrs/day.

## 2025-04-11 NOTE — PROCEDURES
[facility]    Procedure: IR Procedure Note    Date/Time: 4/11/2025 11:44 AM    Performed by: Rose Yu MD  Authorized by: Rose Yu MD  IR Fellow Physician: Cassandra Clark    Pre Procedure Diagnosis: Primary hyperaldosteronism  Post Procedure Diagnosis: Same    UNIVERSAL PROTOCOL   Site Marked: NA  Prior Images Obtained and Reviewed:  Yes  Required items: Required blood products, implants, devices and special equipment available    Patient identity confirmed:  Arm band, provided demographic data, hospital-assigned identification number and verbally with patient  Patient was reevaluated immediately before administering moderate or deep sedation or anesthesia  Confirmation Checklist:  Correct equipment/implants were available, procedure was appropriate and matched the consent or emergent situation, relevant allergies and patient's identity using two indicators  Time out: Immediately prior to the procedure a time out was called    Universal Protocol: the Joint Commission Universal Protocol was followed    Preparation: Patient was prepped and draped in usual sterile fashion    ESBL (mL):  50     ANESTHESIA    Anesthesia:  Local infiltration  Local Anesthetic:  Lidocaine 1% without epinephrine  Anesthetic Total (mL):  5      SEDATION  Patient Sedated: Yes    Sedation Type:  Moderate (conscious) sedation  Vital signs: Vital signs monitored during sedation    See dictated procedure note for full details.  Findings: Adrenal vein sampling with difficulty selecting right adrenal vein, which is arising off the accessory right hepatic vein. Numerous attempts made, but no definite right adrenal selective sampling. Right accessory hepatic vein sampled (samples 3 &4).     Specimens: other (see comment)    Procedural Complications: None    Condition: Stable    Plan: Bedrest for 2 hours.  Then can discharge home.      PROCEDURE  Describe Procedure: Adrenal vein sampling with difficulty  selecting right adrenal vein, which is arising off the accessory right hepatic vein. Numerous attempts made, but no definite right adrenal selective sampling achieved. Right accessory hepatic vein and near origin of the right adrenal vein sampled (samples 3&4) instead.   Patient Tolerance:  Patient tolerated the procedure well with no immediate complications  Length of time physician/provider present for 1:1 monitoring during sedation:  172-186 min

## 2025-04-11 NOTE — PRE-PROCEDURE
GENERAL PRE-PROCEDURE:   Procedure:  Adrenal vein sampling    Written consent obtained?: Yes    Risks and benefits: Risks, benefits and alternatives were discussed    Consent given by:  Patient  Patient states understanding of procedure being performed: Yes    Patient's understanding of procedure matches consent: Yes    Procedure consent matches procedure scheduled: Yes    Expected level of sedation:  Moderate  Appropriately NPO:  Yes  ASA Class:  2  Mallampati  :  Grade 1- soft palate, uvula, tonsillar pillars, and posterior pharyngeal wall visible  Lungs:  Lungs clear with good breath sounds bilaterally  Heart:  Normal heart sounds and rate  History & Physical reviewed:  History and physical reviewed and no updates needed  Statement of review:  I have reviewed the lab findings, diagnostic data, medications, and the plan for sedation

## 2025-04-11 NOTE — PROGRESS NOTES
Pt arrived to 2A for adrenal vein sampling. VSS, denies pain. PIV x2. Labs sent. Consent signed. Pt prepped.  Анна, friend, available for ride post procedure.

## 2025-04-11 NOTE — PROGRESS NOTES
Pt has met discharge criteria. VSS, denies pain. R groin site CDI, soft, flat, no hematoma, CMS intact, DP 2+. Discharge instructions given to pt; verbalizes understanding. Tolerated food and drink. Ambulated to bathroom and voided. PIVs removed. Pt brought by wheelchair to shuttle; friend, Анна, on shuttle back to [facility] with pt.

## 2025-04-11 NOTE — PROGRESS NOTES
Pt returned to 2A following adrenal vein sampling. VSS, denies pain. R groin site covered with clear dressing, CDI, soft, flat, no hematoma, CMS intact and DP 2+. Pt given food and drink.  2 hour bedrest.

## 2025-04-11 NOTE — IR NOTE
Patient Name: Cassy Avila  Medical Record Number: [id number]  Today's Date: 4/11/2025    Procedure: Adrenal vein sampling  Proceduralist: CHAIM Yu  Pathology present: n/a    Procedure Start: 0842  Procedure end: 1139  Sedation medications administered:   Lidocaine  Fentanyl mcg-100 mcg  Versed mg- 2 mg    Report given to:   : n/a    Other Notes: Pt arrived to IR room 4 from . Consent reviewed. Pt denies any questions or concerns regarding procedure. Pt positioned supine and monitored per protocol. Pt tolerated procedure without any noted complications. Pt transferred back to .

## 2025-04-12 LAB — HCV RNA SERPL NAA+PROBE-ACNC: NOT DETECTED IU/ML

## 2025-04-15 LAB
ALDOST SERPL-MCNC: 19 NG/DL
ALDOST SERPL-MCNC: 21.3 NG/DL
ALDOST SERPL-MCNC: 2400 NG/DL
ALDOST SERPL-MCNC: 3000 NG/DL
ALDOST SERPL-MCNC: 56.8 NG/DL
ALDOST SERPL-MCNC: 60.7 NG/DL
ALDOST SERPL-MCNC: 66.3 NG/DL
ALDOST SERPL-MCNC: 79.3 NG/DL
SPECIMEN SOURCE, AVS: NORMAL

## 2025-04-17 PROBLEM — M25.562 ACUTE PAIN OF LEFT KNEE: Status: ACTIVE | Noted: 2023-01-17

## 2025-04-17 PROBLEM — M62.81 MUSCLE WEAKNESS: Status: ACTIVE | Noted: 2023-01-17

## 2025-04-17 PROBLEM — M25.662 KNEE STIFFNESS, LEFT: Status: ACTIVE | Noted: 2023-01-17

## 2025-04-17 PROBLEM — D49.7 ADRENAL TUMOR: Status: ACTIVE | Noted: 2024-10-18

## 2025-04-17 PROBLEM — I50.31 ACUTE HEART FAILURE WITH PRESERVED EJECTION FRACTION (HFPEF) (H): Status: ACTIVE | Noted: 2024-10-18

## 2025-04-22 ENCOUNTER — TELEPHONE (OUTPATIENT)
Dept: FAMILY MEDICINE | Facility: OTHER | Age: 70
End: 2025-04-22

## 2025-04-22 ENCOUNTER — TELEPHONE (OUTPATIENT)
Dept: RADIOLOGY | Facility: CLINIC | Age: 70
End: 2025-04-22

## 2025-04-22 ENCOUNTER — ANTICOAGULATION THERAPY VISIT (OUTPATIENT)
Dept: ANTICOAGULATION | Facility: OTHER | Age: 70
End: 2025-04-22
Payer: MEDICARE

## 2025-04-22 DIAGNOSIS — E26.09 PRIMARY ALDOSTERONISM: Primary | ICD-10-CM

## 2025-04-22 DIAGNOSIS — Z79.01 LONG TERM CURRENT USE OF ANTICOAGULANT THERAPY: Primary | ICD-10-CM

## 2025-04-22 DIAGNOSIS — I26.99 PULMONARY EMBOLISM AND INFARCTION (H): ICD-10-CM

## 2025-04-22 DIAGNOSIS — I82.401 DEEP VEIN THROMBOSIS (DVT) OF RIGHT LOWER EXTREMITY, UNSPECIFIED CHRONICITY, UNSPECIFIED VEIN (H): ICD-10-CM

## 2025-04-22 DIAGNOSIS — E27.9 ADRENAL NODULE: ICD-10-CM

## 2025-04-22 LAB — INR HOME MONITORING: 4 (ref 2–3)

## 2025-04-22 NOTE — TELEPHONE ENCOUNTER
Screening Questions for the Scheduling of Screening Colonoscopies     (If Colonoscopy is diagnostic, Provider should review the chart before scheduling.)    Are you younger than 50 or older than 80?  No    Do you take aspirin or fish oil?  Yes: (if yes, tell patient to stop 1 week prior to     Do you take warfarin (Coumadin), clopidogrel (Plavix), apixaban (Eliquis), dabigatram (Pradaxa), rivaroxaban (Xarelto) or any blood thinner? Yes:     Do you use oxygen at home?  No    Do you have kidney disease? No    Are you on dialysis? No    Have you had a stroke or heart attack in the last year? No    Have you had a stent in your heart or any blood vessel in the last year? No    Have you had a transplant of any organ?  No    Have you had a colonoscopy or upper endoscopy (EGD) before?  YES. Date-.         Date of scheduled Colonoscopy: 8/21/25    Provider: Dr. DONATO Cage     Pharmacy [facility]

## 2025-04-22 NOTE — TELEPHONE ENCOUNTER
I called and spoke with Kaitlin. She is interested in repeating the AVS. Information sent to our scheduling team. Should be done with Dr. Yu and Dr. Villa. Dr. Connors updated.     An Islas RN  Nurse Care Coordinator-Interventional Radiology  Dr. Daisy Oshea  [phone]  [email or web address]

## 2025-04-22 NOTE — PROGRESS NOTES
ANTICOAGULATION MANAGEMENT     Cassy Avila 69 year old female is on warfarin with supratherapeutic INR result. (Goal INR 2.0-3.0)    Recent labs: (last 7 days)     04/22/25  0000   INR 4.0*       ASSESSMENT     Source(s): Chart Review and Patient/Caregiver Call     Warfarin doses taken: Warfarin taken as instructed  Diet:  patient had procedure 4/11 for adrenal gland sampling may be affecting diet and INR  Medication/supplement changes: None noted  New illness, injury, or hospitalization: Yes: procedure 4/11/25   Signs or symptoms of bleeding or clotting: No  Previous result: Therapeutic last visit; previously outside of goal range  Additional findings: None       PLAN     Recommended plan for temporary change(s) affecting INR     Dosing Instructions: hold dose then continue your current warfarin dose with next INR in 1 week       Summary  As of 4/22/2025      Full warfarin instructions:  4/22: Hold; Otherwise 7.5 mg every Mon; 5 mg all other days   Next INR check:  4/29/2025               Telephone call with Kaitlin who verbalizes understanding and agrees to plan    Patient to recheck with home meter    Education provided: Please call back if any changes to your diet, medications or how you've been taking warfarin    Plan made per [facility] anticoagulation protocol    Corry Galvan RN  4/22/2025  Anticoagulation Clinic  [employer] for routing messages: erwin CAMEJO  [facility] patient phone line: [phone]        _______________________________________________________________________     Anticoagulation Episode Summary       Current INR goal:  2.0-3.0   TTR:  78.4% (1 y)   Target end date:  Indefinite   Send INR reminders to:  DAREN CAMEJO    Indications    Long-term (current) use of anticoagulants [Z79.01] [Z79.01]  Pulmonary embolism and infarction (H) [I26.99]  Deep vein thrombosis (DVT) (H) [I82.409] (Resolved) [I82.409]  Deep vein thrombosis (DVT) of right lower extremity  unspecified chronicity  unspecified  vein (H) [I82.401]             Comments:  [employer]. Q2 week testing  Call cell phone with any dosing.             Anticoagulation Care Providers       Provider Role Specialty Phone number    Eliz Hartman CNP Referring Family Medicine [phone]

## 2025-04-29 ENCOUNTER — ANTICOAGULATION THERAPY VISIT (OUTPATIENT)
Dept: ANTICOAGULATION | Facility: OTHER | Age: 70
End: 2025-04-29
Attending: NURSE PRACTITIONER
Payer: COMMERCIAL

## 2025-04-29 DIAGNOSIS — I82.401 DEEP VEIN THROMBOSIS (DVT) OF RIGHT LOWER EXTREMITY, UNSPECIFIED CHRONICITY, UNSPECIFIED VEIN (H): ICD-10-CM

## 2025-04-29 DIAGNOSIS — I26.99 PULMONARY EMBOLISM AND INFARCTION (H): ICD-10-CM

## 2025-04-29 DIAGNOSIS — Z79.01 LONG TERM CURRENT USE OF ANTICOAGULANT THERAPY: Primary | ICD-10-CM

## 2025-04-29 LAB — INR HOME MONITORING: 2.1 (ref 2–3)

## 2025-04-29 NOTE — PROGRESS NOTES
ANTICOAGULATION MANAGEMENT     Cassy Avila 69 year old female is on warfarin with therapeutic INR result. (Goal INR 2.0-3.0)    Recent labs: (last 7 days)     04/29/25  0000   INR 2.1       ASSESSMENT     Source(s): Chart Review and Patient/Caregiver Call     Warfarin doses taken: Warfarin taken as instructed  Diet: No new diet changes identified  Medication/supplement changes: None noted  New illness, injury, or hospitalization: No  Signs or symptoms of bleeding or clotting: No  Previous result: Supratherapeutic  Additional findings: None       PLAN     Recommended plan for no diet, medication or health factor changes affecting INR     Dosing Instructions: Continue your current warfarin dose with next INR in 1 week       Summary  As of 4/29/2025      Full warfarin instructions:  7.5 mg every Mon; 5 mg all other days   Next INR check:  5/6/2025               Telephone call with Kaitlin who verbalizes understanding and agrees to plan    Patient to recheck with home meter    Education provided: None required    Plan made per [facility] anticoagulation protocol    Corry Galvan RN  4/29/2025  Anticoagulation Clinic  [employer] for routing messages: erwin CAMEJO  [facility] patient phone line: [phone]        _______________________________________________________________________     Anticoagulation Episode Summary       Current INR goal:  2.0-3.0   TTR:  78.0% (1 y)   Target end date:  Indefinite   Send INR reminders to:  DAREN CAMEJO    Indications    Long-term (current) use of anticoagulants [Z79.01] [Z79.01]  Pulmonary embolism and infarction (H) [I26.99]  Deep vein thrombosis (DVT) (H) [I82.409] (Resolved) [I82.409]  Deep vein thrombosis (DVT) of right lower extremity  unspecified chronicity  unspecified vein (H) [I82.401]             Comments:  [employer]. Q2 week testing  Call cell phone with any dosing.             Anticoagulation Care Providers       Provider Role Specialty Phone number    Niesm,  JANINA [facility] ([place], [place]) Family Medicine [phone]

## 2025-05-04 ENCOUNTER — MYC REFILL (OUTPATIENT)
Dept: FAMILY MEDICINE | Facility: OTHER | Age: 70
End: 2025-05-04

## 2025-05-04 DIAGNOSIS — Z79.01 LONG TERM CURRENT USE OF ANTICOAGULANT THERAPY: ICD-10-CM

## 2025-05-05 RX ORDER — WARFARIN SODIUM 5 MG/1
TABLET ORAL
Qty: 100 TABLET | Refills: 1 | Status: SHIPPED | OUTPATIENT
Start: 2025-05-05

## 2025-05-05 NOTE — TELEPHONE ENCOUNTER
ANTICOAGULATION MANAGEMENT:  Medication Refill    Anticoagulation Summary  As of 4/29/2025      Warfarin maintenance plan:  7.5 mg (5 mg x 1.5) every Mon; 5 mg (5 mg x 1) all other days   Next INR check:  5/6/2025   Target end date:  Indefinite    Indications    Long-term (current) use of anticoagulants [Z79.01] [Z79.01]  Pulmonary embolism and infarction (H) [I26.99]  Deep vein thrombosis (DVT) (H) [I82.409] (Resolved) [I82.409]  Deep vein thrombosis (DVT) of right lower extremity  unspecified chronicity  unspecified vein (H) [I82.401]                 Anticoagulation Care Providers       Provider Role Specialty Phone number    Eliz Hartamn CNP Referring Family Medicine [phone]            Visit with referring provider/group within last year: Yes 4/18/25    [facility] referral signed within last year: Yes    Cassy meets all criteria for refill (current [facility] referral, visit with referring provider/group in last 15 months unless directed to return in 2 years in last referring provider visit note, lab monitoring up to date or not exceeding 2 weeks overdue). Rx instructions and quantity supplied updated to match patient's current dosing plan. Warfarin 90 day supply with 1 refill granted per [facility] protocol     Ashanti Velazquez RN  Anticoagulation Clinic

## 2025-05-06 ENCOUNTER — VIRTUAL VISIT (OUTPATIENT)
Dept: ENDOCRINOLOGY | Facility: CLINIC | Age: 70
End: 2025-05-06
Payer: COMMERCIAL

## 2025-05-06 ENCOUNTER — ANTICOAGULATION THERAPY VISIT (OUTPATIENT)
Dept: ANTICOAGULATION | Facility: OTHER | Age: 70
End: 2025-05-06
Attending: NURSE PRACTITIONER
Payer: MEDICARE

## 2025-05-06 DIAGNOSIS — E26.09 PRIMARY ALDOSTERONISM: Primary | ICD-10-CM

## 2025-05-06 DIAGNOSIS — I82.401 DEEP VEIN THROMBOSIS (DVT) OF RIGHT LOWER EXTREMITY, UNSPECIFIED CHRONICITY, UNSPECIFIED VEIN (H): ICD-10-CM

## 2025-05-06 DIAGNOSIS — I26.99 PULMONARY EMBOLISM AND INFARCTION (H): ICD-10-CM

## 2025-05-06 DIAGNOSIS — E27.9 ADRENAL NODULE: ICD-10-CM

## 2025-05-06 DIAGNOSIS — I10 PRIMARY HYPERTENSION: ICD-10-CM

## 2025-05-06 DIAGNOSIS — Z79.01 LONG TERM CURRENT USE OF ANTICOAGULANT THERAPY: Primary | ICD-10-CM

## 2025-05-06 LAB — INR HOME MONITORING: 2.3 (ref 2–3)

## 2025-05-06 RX ORDER — AMLODIPINE BESYLATE 5 MG/1
5 TABLET ORAL DAILY
Qty: 90 TABLET | Refills: 0 | Status: SHIPPED | OUTPATIENT
Start: 2025-05-06

## 2025-05-06 RX ORDER — LOSARTAN POTASSIUM 100 MG/1
100 TABLET ORAL DAILY
Qty: 90 TABLET | Refills: 0 | Status: SHIPPED | OUTPATIENT
Start: 2025-05-06

## 2025-05-06 RX ORDER — DEXAMETHASONE 1 MG
1 TABLET ORAL ONCE
Qty: 1 TABLET | Refills: 0 | Status: SHIPPED | OUTPATIENT
Start: 2025-05-06 | End: 2025-05-06

## 2025-05-06 ASSESSMENT — PAIN SCALES - GENERAL: PAINLEVEL_OUTOF10: NO PAIN (0)

## 2025-05-06 NOTE — PATIENT INSTRUCTIONS
# Instructions for Cortisol-Dexamethasone Blood Test    1. Schedule lab appointment for 8:00 AM  2. Take dexamethasone between 11:00 PM - 12:00 AM the night before  3. Arrive at lab for cortisol blood draw at 8:00 AM sharp the next morning

## 2025-05-06 NOTE — LETTER
5/6/2025      Cassy Avila  [address]  [place] [address]      Dear Colleague,    Thank you for referring your patient, Cassy Avila, to the [facility]. Please see a copy of my visit note below.    Endocrinology Clinic Visit    Chief Complaint: RECHECK     Information obtained from:Patient      Assessment/Treatment Plan:    Primary aldosteronism   Adrenal nodule  Resistant hypertension      Aldosterone of 19.4 with renin activity 0.2, ARR 97  Confirmatory test Aldosterone of 25.1 with renin activity 0.3 and 4 hours after normal saline infusion aldosterone was 13.9 which confirms the diagnosis of primary aldosteronism.   CT scan from 11/18/24 and agree with the interpretation of left adrenal nodule measuring about 1.2 cm in diameter with Hounsfield units <10 consistent with adrenal adenoma.  Blood pressure previously systolic 200s however since spironolactone to 50 mg daily added blood pressure 120s which also supports a diagnosis of primary aldosteronism.  Adrenal venous sampling is a next step.  After discussing risk and benefit decision was made to proceed with adrenal venous sampling and referral to interventional radiology placed.  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  Due to blood pressure running in the 200s prior to starting spironolactone, it is unsafe to stop MRA at this time.  Her renin activity is currently at 0.2-0.3 therefore allows adrenal venous sampling without discontinuing MRA.    AVS was not successful and she would have a repeat procedure soon.   In the meantime, BP medications adjusted as follows -start amlodipine 5 mg daily, increase losartan to 100 mg daily.  Continue eplerenone 50 mg twice a day.  Will not increase MRA dose until adrenal venous complaint is completed.     Adrenal nodule  I have personally reviewed CT scan from 11/18/24  and agree with the interpretation of left adrenal nodule measuring about 1.2 cm in diameter with Hounsfield units <10 consistent with adrenal adenoma..    Metanephrines normal.  Normal ACTH and DHEA-sulfate within the range at 52 - 24 hour urine cortisol normal.  cortisol after dexamethasone suppression ordered today. .       Foreign Connors MD  Staff Endocrinologist    Division of Endocrinology and Diabetes      Subjective:         HPI: Cassy Avila is a 69 year old female with history of hypertension and adrenal nodule who is here for follow-up of results.    Longstanding hypertension on multiple blood pressure medications however was noted to have significantly elevated blood pressure few months ago.  Systolic blood pressure approaching 200s per report.  At the time patient was screened for primary aldosteronism with aldosterone renin activity labs which showed the following and was initiated on spironolactone 50 mg daily.  Since spironolactone was added blood pressure has significantly improved and her last blood pressure was 108/60.  She continues to take other blood pressure medications including metoprolol & losartan.  While her blood pressure was high she was also found to have congestive heart failure but right now clinically stable from congestive failure standpoint.  Has past medical history of neurofibromatosis type I.  On review of system no headache or vision change.  Shortness of breath with activity for which she is currently following with cardiology.  No significant weight gain other than weight loss of 13 pounds after treated for CHF previously.  No significant stretch marks.  No nausea or vomiting but reports frequent bowel movements.       Latest Ref Rng 9/19/2024  10:41 AM   ENDO ADRENAL LABS     Aldosterone 0.0 - 31.0 ng/dL 19.4    Creatinine 0.51 - 0.95 mg/dL 0.97 (H)    Potassium 3.4 - 5.3 mmol/L 3.8    Renin Activity ng/mL/hr 0.2    Sodium 135 - 145 mmol/L 139       Component       Latest Ref Rng 11/8/2024  11:41 AM 11/10/2024  12:01 PM   Cortisol Free Urine Random      ug/L  9.86    Cortisol ug/g creatinine      ug/g CRT  14.94    Cortisol Free Urine      <=45.0 ug/d  21.2    Creatinine, Urine per volume      mg/dL  66    Creatinine, Urine per volume      mg/dL  67    Creatinine, Urine per 24hr      500 - 1400 mg/d  1419 (H)    Creatinine, Urine per 24hr      500 - 1400 mg/d  1440 (H)    Cortisol Free Urine Intrepretation  See Note    Creatinine Urine      mg/dL  65.8    Duration in hours      h  24.0    Duration in hours      h  24.0    Volume in mL      mL  2,150    Volume in mL      mL  2,150    Creatinine Urine Timed      0.72 - 1.51 g/spec  1.41    Sodium Urine mmol/L      mmol/L  81    Sodium Urine mmol/24 h      40 - 220 mmol/spec  174    Aldosterone Urine      1.2 - 28.1 ug/d  10.0    Metanephrine      0.00 - 0.49 nmol/L 0.33     Normetanephrine      0.00 - 0.89 nmol/L 0.44     Metanephrines Interpretation See Note     DHEA Sulfate      35 - 430 ug/dL 52     Adrenal Corticotropin      <47 pg/mL 13         Latest Ref Rng 12/5/2024  8:25 AM 12/5/2024  1:54 PM   ENDO ADRENAL LABS      Aldosterone Urine 1.2 - 28.1 ug/d     Aldosterone 0.0 - 31.0 ng/dL 25.1  13.9    Cortisol Free Creat R UR mg/dL     Cortisol Free Creat R UR mg/dL     Cortisol Free Creat T  - 1400 mg/d     Cortisol Free Creat T  - 1400 mg/d     Cortisol Free Urine Random ug/L     Cortisol Free Urine <=45.0 ug/d     Cortisol ug/g creatinine ug/g CRT     Creatinine 0.51 - 0.95 mg/dL     Creatinine Urine mg/dL     DHEA Sulfate 35 - 430 ug/dL     Metanephrine 0.00 - 0.49 nmol/L     Normetanephrine 0.00 - 0.89 nmol/L     Potassium 3.4 - 5.3 mmol/L 4.4  4.3    Renin Activity ng/mL/hr 0.3         Allergies   Allergen Reactions     Allopurinol Shortness Of Breath     Amlodipine Besylate Swelling     Norvasc     Amoxicillin      Atorvastatin      myualgia     Cephalexin Monohydrate Hives     Keflex     Erythromycin Base  [Erythromycin Base] Nausea and Vomiting     Meloxicam Other (See Comments)     Mobic - confusion, depression     Sulfa Antibiotics Hives     Adhesive Tape Rash     Prochlorperazine Edisylate Swelling and Rash     Compazine     Prochlorperazine Maleate Swelling and Rash       Current Outpatient Medications   Medication Sig Dispense Refill     aspirin 81 MG EC tablet Take 81 mg by mouth daily HS       eplerenone (INSPRA) 50 MG tablet Take 1 tablet (50 mg) by mouth 2 times daily. 60 tablet 3     escitalopram (LEXAPRO) 10 MG tablet Take 1 tablet (10 mg) by mouth daily. 90 tablet 1     furosemide (LASIX) 20 MG tablet Take 1 tablet (20 mg) by mouth as needed (swelling, weight gain).       ketoconazole (NIZORAL) 2 % external cream APPLY TO THE RASH ON FULL BACK TWICE A DAY FOR 4-6 WEEKS       losartan (COZAAR) 50 MG tablet Take 1 tablet (50 mg) by mouth daily. 90 tablet 1     metoprolol succinate ER (TOPROL XL) 50 MG 24 hr tablet Take 0.5 tablets (25 mg) by mouth daily. 45 tablet 1     traZODone (DESYREL) 50 MG tablet TAKE 1 TO 2 TABLETS BY MOUTH NIGHTLY AS NEEDED 180 tablet 3     warfarin ANTICOAGULANT (COUMADIN) 5 MG tablet TAKE 1 & 1/2 (ONE & ONE-HALF) TABLETS BY MOUTH MONDAY AND 1 TABLET ALL OTHER DAYS OR AS DIRECTED BY WARFARIN CLINIC 100 tablet 1       Review of Systems     8 point review system (Constitutional, HENT, Eyes, Respiratory, Cardiovascular, Gastrointestinal, Genitourinary, Musculoskeletal,Neurological, Psychiatric/Behavioural, Endocrine) is negative or is as per HPI above.  Past medical history, past surgical history, social and, family history and social determinants of health reviewed.  Past medical history pertinent to the visit reviewed.    Social Drivers of Health     Financial Resource Strain: Low Risk  (10/15/2024)    Financial Resource Strain      Within the past 12 months, have you or your family members you live with been unable to get utilities (heat, electricity) when it was really needed?: No    Food Insecurity: Low Risk  (10/15/2024)    Food Insecurity      Within the past 12 months, did you worry that your food would run out before you got money to buy more?: No      Within the past 12 months, did the food you bought just not last and you didn t have money to get more?: No   Transportation Needs: Low Risk  (10/15/2024)    Transportation Needs      Within the past 12 months, has lack of transportation kept you from medical appointments, getting your medicines, non-medical meetings or appointments, work, or from getting things that you need?: No   Physical Activity: Inactive (10/15/2024)    Exercise Vital Sign      Days of Exercise per Week: 0 days      Minutes of Exercise per Session: 0 min   Stress: No Stress Concern Present (10/15/2024)    Citizen of [place] of Occupational Health - Occupational Stress Questionnaire      Feeling of Stress : Not at all   Social Connections: Unknown (10/15/2024)    Social Connection and Isolation Panel [NHANES]      Frequency of Social Gatherings with Friends and Family: Twice a week   Interpersonal Safety: Not At Risk (10/17/2024)    Received from [facility]     IP Custom IPV      Do you feel UNSAFE in any of your personal relationships with your family members or any other acquaintances?: No   Housing Stability: Low Risk  (10/15/2024)    Housing Stability      Do you have housing? : Yes      Are you worried about losing your housing?: No       Objective:   GENERAL: alert and no distress  EYES: Eyes grossly normal to inspection.    RESP: No audible wheeze, cough, or visible cyanosis.    PSYCH: Appropriate affect, tone, and pace of words      In House Labs:   Lab Results   Component Value Date    A1C 5.6 03/17/2023    A1C 5.5 11/05/2013       TSH   Date Value Ref Range Status   04/18/2025 3.05 0.30 - 4.20 uIU/mL Final   09/19/2024 2.20 0.30 - 4.20 uIU/mL Final   04/01/2024 2.73 0.30 - 4.20 uIU/mL Final   09/29/2023 3.34 0.30 - 4.20  uIU/mL Final   03/17/2023 3.05 0.30 - 4.20 uIU/mL Final   09/16/2022 2.44 0.40 - 4.00 mU/L Final   08/27/2021 2.86 0.40 - 4.00 mU/L Final   10/26/2020 2.08 0.40 - 4.00 mU/L Final   09/27/2019 1.42 0.40 - 4.00 mU/L Final   02/05/2019 2.71 0.40 - 4.00 mU/L Final   10/31/2018 2.32 0.40 - 4.00 mU/L Final   07/13/2018 2.51 0.40 - 4.00 mU/L Final     T4 Free   Date Value Ref Range Status   01/17/2018 0.89 0.76 - 1.46 ng/dL Final   09/28/2015 0.88 0.76 - 1.46 ng/dL Final   11/05/2013 0.61 0.59 - 1.61 ng/dL Final       Creatinine   Date Value Ref Range Status   04/18/2025 0.98 (H) 0.51 - 0.95 mg/dL Final   04/16/2021 0.95 0.52 - 1.04 mg/dL Final     This note has been dictated using voice recognition software.  As a result, there may be errors in the documentation that have gone undetected.  Please consider this when interpreting information in this documentation.      Video-Visit Details    Type of service:  Video Visit  Joined the call at 5/6/2025, 1:30:51 pm.  Left the call at 5/6/2025, 1:47:29 pm.  You were on the call for 16 minutes 38 seconds.  Distant Location (provider location):  ON-site.   Platform used for Video Visit: González  The longitudinal plan of care for the diagnosis(es)/condition(s) as documented were addressed during this visit. Due to the added complexity in care, I will continue to support Kaitlin in the subsequent management and with ongoing continuity of care.      Again, thank you for allowing me to participate in the care of your patient.        Sincerely,        Foreign Connors MD    Electronically signed

## 2025-05-06 NOTE — PROGRESS NOTES
"ANTICOAGULATION MANAGEMENT     Cassy Avila 69 year old female is on warfarin with therapeutic INR result. (Goal INR 2.0-3.0)    Recent labs: (last 7 days)     05/06/25  0000   INR 2.3       ASSESSMENT     Source(s): Chart Review and Patient/Caregiver Call     Warfarin doses taken: Warfarin taken as instructed  Diet: Started a \"ozempic\" drink 2 weeks ago    New illness, injury, or hospitalization: No  Medication/supplement changes: None noted  Signs or symptoms of bleeding or clotting: No  Previous INR: Therapeutic last visit; previously outside of goal range  Additional findings: Upcoming surgery/procedure 6/16/25- adrenal sampling down at the [facility]-Patient to reach out to them about warfarin       PLAN     Recommended plan for ongoing change(s) affecting INR     Dosing Instructions: Continue your current warfarin dose with next INR in 2 weeks       Summary  As of 5/6/2025      Full warfarin instructions:  7.5 mg every Mon; 5 mg all other days   Next INR check:  5/20/2025               Telephone call with Kaitlin who verbalizes understanding and agrees to plan    Patient to recheck with home meter    Education provided: Please call back if any changes to your diet, medications or how you've been taking warfarin    Plan made per [facility] anticoagulation protocol    Ashanti Velazquez RN  Anticoagulation Clinic  5/6/2025    _______________________________________________________________________     Anticoagulation Episode Summary       Current INR goal:  2.0-3.0   TTR:  78.0% (1 y)   Target end date:  Indefinite   Send INR reminders to:  [facility] [place]    Indications    Long-term (current) use of anticoagulants [Z79.01] [Z79.01]  Pulmonary embolism and infarction (H) [I26.99]  Deep vein thrombosis (DVT) (H) [I82.409] (Resolved) [I82.409]  Deep vein thrombosis (DVT) of right lower extremity  unspecified chronicity  unspecified vein (H) [I82.401]             Comments:  [employer]. Q2 week testing  Call cell " phone with any dosing.             Anticoagulation Care Providers       Provider Role Specialty Phone number    Eliz Hartman CNP Referring Family Medicine [phone]

## 2025-05-06 NOTE — PROGRESS NOTES
Endocrinology Clinic Visit    Chief Complaint: RECHECK     Information obtained from:Patient      Assessment/Treatment Plan:    Primary aldosteronism   Adrenal nodule  Resistant hypertension      Aldosterone of 19.4 with renin activity 0.2, ARR 97  Confirmatory test Aldosterone of 25.1 with renin activity 0.3 and 4 hours after normal saline infusion aldosterone was 13.9 which confirms the diagnosis of primary aldosteronism.   CT scan from 11/18/24 and agree with the interpretation of left adrenal nodule measuring about 1.2 cm in diameter with Hounsfield units <10 consistent with adrenal adenoma.  Blood pressure previously systolic 200s however since spironolactone to 50 mg daily added blood pressure 120s which also supports a diagnosis of primary aldosteronism.  Adrenal venous sampling is a next step.  After discussing risk and benefit decision was made to proceed with adrenal venous sampling and referral to interventional radiology placed.  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  Due to blood pressure running in the 200s prior to starting spironolactone, it is unsafe to stop MRA at this time.  Her renin activity is currently at 0.2-0.3 therefore allows adrenal venous sampling without discontinuing MRA.    AVS was not successful and she would have a repeat procedure soon.   In the meantime, BP medications adjusted as follows -start amlodipine 5 mg daily, increase losartan to 100 mg daily.  Continue eplerenone 50 mg twice a day.  Will not increase MRA dose until adrenal venous complaint is completed.     Adrenal nodule  I have personally reviewed CT scan from 11/18/24 and agree with the interpretation of left adrenal nodule measuring about 1.2 cm in diameter with Hounsfield units <10 consistent with adrenal adenoma..    Metanephrines normal.  Normal ACTH and DHEA-sulfate within the range at 52 - 24 hour  urine cortisol normal.  cortisol after dexamethasone suppression ordered today. .       Foreign Connors MD  Staff Endocrinologist    Division of Endocrinology and Diabetes      Subjective:         HPI: Cassy Avila is a 69 year old female with history of hypertension and adrenal nodule who is here for follow-up of results.    Longstanding hypertension on multiple blood pressure medications however was noted to have significantly elevated blood pressure few months ago.  Systolic blood pressure approaching 200s per report.  At the time patient was screened for primary aldosteronism with aldosterone renin activity labs which showed the following and was initiated on spironolactone 50 mg daily.  Since spironolactone was added blood pressure has significantly improved and her last blood pressure was 108/60.  She continues to take other blood pressure medications including metoprolol & losartan.  While her blood pressure was high she was also found to have congestive heart failure but right now clinically stable from congestive failure standpoint.  Has past medical history of neurofibromatosis type I.  On review of system no headache or vision change.  Shortness of breath with activity for which she is currently following with cardiology.  No significant weight gain other than weight loss of 13 pounds after treated for CHF previously.  No significant stretch marks.  No nausea or vomiting but reports frequent bowel movements.       Latest Ref Rng 9/19/2024  10:41 AM   ENDO ADRENAL LABS     Aldosterone 0.0 - 31.0 ng/dL 19.4    Creatinine 0.51 - 0.95 mg/dL 0.97 (H)    Potassium 3.4 - 5.3 mmol/L 3.8    Renin Activity ng/mL/hr 0.2    Sodium 135 - 145 mmol/L 139       Component      Latest Ref Rng 11/8/2024  11:41 AM 11/10/2024  12:01 PM   Cortisol Free Urine Random      ug/L  9.86    Cortisol ug/g creatinine      ug/g CRT  14.94    Cortisol Free Urine      <=45.0 ug/d  21.2    Creatinine, Urine per volume      mg/dL  66     Creatinine, Urine per volume      mg/dL  67    Creatinine, Urine per 24hr      500 - 1400 mg/d  1419 (H)    Creatinine, Urine per 24hr      500 - 1400 mg/d  1440 (H)    Cortisol Free Urine Intrepretation  See Note    Creatinine Urine      mg/dL  65.8    Duration in hours      h  24.0    Duration in hours      h  24.0    Volume in mL      mL  2,150    Volume in mL      mL  2,150    Creatinine Urine Timed      0.72 - 1.51 g/spec  1.41    Sodium Urine mmol/L      mmol/L  81    Sodium Urine mmol/24 h      40 - 220 mmol/spec  174    Aldosterone Urine      1.2 - 28.1 ug/d  10.0    Metanephrine      0.00 - 0.49 nmol/L 0.33     Normetanephrine      0.00 - 0.89 nmol/L 0.44     Metanephrines Interpretation See Note     DHEA Sulfate      35 - 430 ug/dL 52     Adrenal Corticotropin      <47 pg/mL 13         Latest Ref Rng 12/5/2024  8:25 AM 12/5/2024  1:54 PM   ENDO ADRENAL LABS      Aldosterone Urine 1.2 - 28.1 ug/d     Aldosterone 0.0 - 31.0 ng/dL 25.1  13.9    Cortisol Free Creat R UR mg/dL     Cortisol Free Creat R UR mg/dL     Cortisol Free Creat T  - 1400 mg/d     Cortisol Free Creat T  - 1400 mg/d     Cortisol Free Urine Random ug/L     Cortisol Free Urine <=45.0 ug/d     Cortisol ug/g creatinine ug/g CRT     Creatinine 0.51 - 0.95 mg/dL     Creatinine Urine mg/dL     DHEA Sulfate 35 - 430 ug/dL     Metanephrine 0.00 - 0.49 nmol/L     Normetanephrine 0.00 - 0.89 nmol/L     Potassium 3.4 - 5.3 mmol/L 4.4  4.3    Renin Activity ng/mL/hr 0.3         Allergies   Allergen Reactions    Allopurinol Shortness Of Breath    Amlodipine Besylate Swelling     Norvasc    Amoxicillin     Atorvastatin      myualgia    Cephalexin Monohydrate Hives     Keflex    Erythromycin Base [Erythromycin Base] Nausea and Vomiting    Meloxicam Other (See Comments)     Mobic - confusion, depression    Sulfa Antibiotics Hives    Adhesive Tape Rash    Prochlorperazine Edisylate Swelling and Rash     Compazine    Prochlorperazine Maleate  Swelling and Rash       Current Outpatient Medications   Medication Sig Dispense Refill    aspirin 81 MG EC tablet Take 81 mg by mouth daily HS      eplerenone (INSPRA) 50 MG tablet Take 1 tablet (50 mg) by mouth 2 times daily. 60 tablet 3    escitalopram (LEXAPRO) 10 MG tablet Take 1 tablet (10 mg) by mouth daily. 90 tablet 1    furosemide (LASIX) 20 MG tablet Take 1 tablet (20 mg) by mouth as needed (swelling, weight gain).      ketoconazole (NIZORAL) 2 % external cream APPLY TO THE RASH ON FULL BACK TWICE A DAY FOR 4-6 WEEKS      losartan (COZAAR) 50 MG tablet Take 1 tablet (50 mg) by mouth daily. 90 tablet 1    metoprolol succinate ER (TOPROL XL) 50 MG 24 hr tablet Take 0.5 tablets (25 mg) by mouth daily. 45 tablet 1    traZODone (DESYREL) 50 MG tablet TAKE 1 TO 2 TABLETS BY MOUTH NIGHTLY AS NEEDED 180 tablet 3    warfarin ANTICOAGULANT (COUMADIN) 5 MG tablet TAKE 1 & 1/2 (ONE & ONE-HALF) TABLETS BY MOUTH MONDAY AND 1 TABLET ALL OTHER DAYS OR AS DIRECTED BY WARFARIN CLINIC 100 tablet 1       Review of Systems     8 point review system (Constitutional, HENT, Eyes, Respiratory, Cardiovascular, Gastrointestinal, Genitourinary, Musculoskeletal,Neurological, Psychiatric/Behavioural, Endocrine) is negative or is as per HPI above.  Past medical history, past surgical history, social and, family history and social determinants of health reviewed.  Past medical history pertinent to the visit reviewed.    Social Drivers of Health     Financial Resource Strain: Low Risk  (10/15/2024)    Financial Resource Strain     Within the past 12 months, have you or your family members you live with been unable to get utilities (heat, electricity) when it was really needed?: No   Food Insecurity: Low Risk  (10/15/2024)    Food Insecurity     Within the past 12 months, did you worry that your food would run out before you got money to buy more?: No     Within the past 12 months, did the food you bought just not last and you didn t have  money to get more?: No   Transportation Needs: Low Risk  (10/15/2024)    Transportation Needs     Within the past 12 months, has lack of transportation kept you from medical appointments, getting your medicines, non-medical meetings or appointments, work, or from getting things that you need?: No   Physical Activity: Inactive (10/15/2024)    Exercise Vital Sign     Days of Exercise per Week: 0 days     Minutes of Exercise per Session: 0 min   Stress: No Stress Concern Present (10/15/2024)    Citizen of [place] of Occupational Health - Occupational Stress Questionnaire     Feeling of Stress : Not at all   Social Connections: Unknown (10/15/2024)    Social Connection and Isolation Panel [NHANES]     Frequency of Social Gatherings with Friends and Family: Twice a week   Interpersonal Safety: Not At Risk (10/17/2024)    [facility]     Do you feel UNSAFE in any of your personal relationships with your family members or any other acquaintances?: No   Housing Stability: Low Risk  (10/15/2024)    Housing Stability     Do you have housing? : Yes     Are you worried about losing your housing?: No       Objective:   GENERAL: alert and no distress  EYES: Eyes grossly normal to inspection.    RESP: No audible wheeze, cough, or visible cyanosis.    PSYCH: Appropriate affect, tone, and pace of words      In House Labs:   Lab Results   Component Value Date    A1C 5.6 03/17/2023    A1C 5.5 11/05/2013       TSH   Date Value Ref Range Status   04/18/2025 3.05 0.30 - 4.20 uIU/mL Final   09/19/2024 2.20 0.30 - 4.20 uIU/mL Final   04/01/2024 2.73 0.30 - 4.20 uIU/mL Final   09/29/2023 3.34 0.30 - 4.20 uIU/mL Final   03/17/2023 3.05 0.30 - 4.20 uIU/mL Final   09/16/2022 2.44 0.40 - 4.00 mU/L Final   08/27/2021 2.86 0.40 - 4.00 mU/L Final   10/26/2020 2.08 0.40 - 4.00 mU/L Final   09/27/2019 1.42 0.40 - 4.00 mU/L Final   02/05/2019 2.71 0.40 - 4.00 mU/L Final   10/31/2018  2.32 0.40 - 4.00 mU/L Final   07/13/2018 2.51 0.40 - 4.00 mU/L Final     T4 Free   Date Value Ref Range Status   01/17/2018 0.89 0.76 - 1.46 ng/dL Final   09/28/2015 0.88 0.76 - 1.46 ng/dL Final   11/05/2013 0.61 0.59 - 1.61 ng/dL Final       Creatinine   Date Value Ref Range Status   04/18/2025 0.98 (H) 0.51 - 0.95 mg/dL Final   04/16/2021 0.95 0.52 - 1.04 mg/dL Final     This note has been dictated using voice recognition software.  As a result, there may be errors in the documentation that have gone undetected.  Please consider this when interpreting information in this documentation.      Video-Visit Details    Type of service:  Video Visit  Joined the call at 5/6/2025, 1:30:51 pm.  Left the call at 5/6/2025, 1:47:29 pm.  You were on the call for 16 minutes 38 seconds.  Distant Location (provider location):  ON-site.   Platform used for Video Visit: Gonz[employer]lez  The longitudinal plan of care for the diagnosis(es)/condition(s) as documented were addressed during this visit. Due to the added complexity in care, I will continue to support Kaitlin in the subsequent management and with ongoing continuity of care.

## 2025-05-06 NOTE — NURSING NOTE
Current patient location: [address] [place] [address] [address]  [place] [address]    Is the patient currently in the state of [place]? YES    Visit mode: VIDEO    If the visit is dropped, the patient can be reconnected by:VIDEO VISIT: Send to e-mail at: [email or web address]    Will anyone else be joining the visit? NO  (If patient encounters technical issues they should call [phone]    Are changes needed to the allergy or medication list? No    Are refills needed on medications prescribed by this physician? NO    Rooming Documentation:  Questionnaire(s) completed    Reason for visit: RECHWAI MARTINEZ

## 2025-05-06 NOTE — LETTER
5/6/2025       RE: Cassy Avila  [address]   [place] [address]     Dear Colleague,    Thank you for referring your patient, Cassy Avila, to the [facility] at [facility]. Please see a copy of my visit note below.    Endocrinology Clinic Visit    Chief Complaint: RECHECK     Information obtained from:Patient      Assessment/Treatment Plan:    Primary aldosteronism   Adrenal nodule  Resistant hypertension      Aldosterone of 19.4 with renin activity 0.2, ARR 97  Confirmatory test Aldosterone of 25.1 with renin activity 0.3 and 4 hours after normal saline infusion aldosterone was 13.9 which confirms the diagnosis of primary aldosteronism.   CT scan from 11/18/24 and agree with the interpretation of left adrenal nodule measuring about 1.2 cm in diameter with Hounsfield units <10 consistent with adrenal adenoma.  Blood pressure previously systolic 200s however since spironolactone to 50 mg daily added blood pressure 120s which also supports a diagnosis of primary aldosteronism.  Adrenal venous sampling is a next step.  After discussing risk and benefit decision was made to proceed with adrenal venous sampling and referral to interventional radiology placed.  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  Due to blood pressure running in the 200s prior to starting spironolactone, it is unsafe to stop MRA at this time.  Her renin activity is currently at 0.2-0.3 therefore allows adrenal venous sampling without discontinuing MRA.    AVS was not successful and she would have a repeat procedure soon.   In the meantime, BP medications adjusted as follows -start amlodipine 5 mg daily, increase losartan to 100 mg daily.  Continue eplerenone 50 mg twice a day.  Will not increase MRA dose until adrenal venous complaint is completed.      Adrenal nodule  I have personally reviewed CT scan from 11/18/24 and agree with the interpretation of left adrenal nodule measuring about 1.2 cm in diameter with Hounsfield units <10 consistent with adrenal adenoma..    Metanephrines normal.  Normal ACTH and DHEA-sulfate within the range at 52 - 24 hour urine cortisol normal.  cortisol after dexamethasone suppression ordered today. .       Foreign Connors MD  Staff Endocrinologist    Division of Endocrinology and Diabetes      Subjective:         HPI: Cassy Avila is a 69 year old female with history of hypertension and adrenal nodule who is here for follow-up of results.    Longstanding hypertension on multiple blood pressure medications however was noted to have significantly elevated blood pressure few months ago.  Systolic blood pressure approaching 200s per report.  At the time patient was screened for primary aldosteronism with aldosterone renin activity labs which showed the following and was initiated on spironolactone 50 mg daily.  Since spironolactone was added blood pressure has significantly improved and her last blood pressure was 108/60.  She continues to take other blood pressure medications including metoprolol & losartan.  While her blood pressure was high she was also found to have congestive heart failure but right now clinically stable from congestive failure standpoint.  Has past medical history of neurofibromatosis type I.  On review of system no headache or vision change.  Shortness of breath with activity for which she is currently following with cardiology.  No significant weight gain other than weight loss of 13 pounds after treated for CHF previously.  No significant stretch marks.  No nausea or vomiting but reports frequent bowel movements.       Latest Ref Rng 9/19/2024  10:41 AM   ENDO ADRENAL LABS     Aldosterone 0.0 - 31.0 ng/dL 19.4    Creatinine 0.51 - 0.95 mg/dL 0.97 (H)    Potassium 3.4 - 5.3 mmol/L 3.8    Renin Activity  ng/mL/hr 0.2    Sodium 135 - 145 mmol/L 139       Component      Latest Ref Rng 11/8/2024  11:41 AM 11/10/2024  12:01 PM   Cortisol Free Urine Random      ug/L  9.86    Cortisol ug/g creatinine      ug/g CRT  14.94    Cortisol Free Urine      <=45.0 ug/d  21.2    Creatinine, Urine per volume      mg/dL  66    Creatinine, Urine per volume      mg/dL  67    Creatinine, Urine per 24hr      500 - 1400 mg/d  1419 (H)    Creatinine, Urine per 24hr      500 - 1400 mg/d  1440 (H)    Cortisol Free Urine Intrepretation  See Note    Creatinine Urine      mg/dL  65.8    Duration in hours      h  24.0    Duration in hours      h  24.0    Volume in mL      mL  2,150    Volume in mL      mL  2,150    Creatinine Urine Timed      0.72 - 1.51 g/spec  1.41    Sodium Urine mmol/L      mmol/L  81    Sodium Urine mmol/24 h      40 - 220 mmol/spec  174    Aldosterone Urine      1.2 - 28.1 ug/d  10.0    Metanephrine      0.00 - 0.49 nmol/L 0.33     Normetanephrine      0.00 - 0.89 nmol/L 0.44     Metanephrines Interpretation See Note     DHEA Sulfate      35 - 430 ug/dL 52     Adrenal Corticotropin      <47 pg/mL 13         Latest Ref [facility] 12/5/2024  8:25 AM 12/5/2024  1:54 PM   ENDO ADRENAL LABS      Aldosterone Urine 1.2 - 28.1 ug/d     Aldosterone 0.0 - 31.0 ng/dL 25.1  13.9    Cortisol Free Creat R UR mg/dL     Cortisol Free Creat R UR mg/dL     Cortisol Free Creat T  - 1400 mg/d     Cortisol Free Creat T  - 1400 mg/d     Cortisol Free Urine Random ug/L     Cortisol Free Urine <=45.0 ug/d     Cortisol ug/g creatinine ug/g CRT     Creatinine 0.51 - 0.95 mg/dL     Creatinine Urine mg/dL     DHEA Sulfate 35 - 430 ug/dL     Metanephrine 0.00 - 0.49 nmol/L     Normetanephrine 0.00 - 0.89 nmol/L     Potassium 3.4 - 5.3 mmol/L 4.4  4.3    Renin Activity ng/mL/hr 0.3         Allergies   Allergen Reactions     Allopurinol Shortness Of Breath     Amlodipine Besylate Swelling     Norvasc     Amoxicillin      Atorvastatin      myualgia      Cephalexin Monohydrate Hives     Keflex     Erythromycin Base [Erythromycin Base] Nausea and Vomiting     Meloxicam Other (See Comments)     Mobic - confusion, depression     Sulfa Antibiotics Hives     Adhesive Tape Rash     Prochlorperazine Edisylate Swelling and Rash     Compazine     Prochlorperazine Maleate Swelling and Rash       Current Outpatient Medications   Medication Sig Dispense Refill     aspirin 81 MG EC tablet Take 81 mg by mouth daily HS       eplerenone (INSPRA) 50 MG tablet Take 1 tablet (50 mg) by mouth 2 times daily. 60 tablet 3     escitalopram (LEXAPRO) 10 MG tablet Take 1 tablet (10 mg) by mouth daily. 90 tablet 1     furosemide (LASIX) 20 MG tablet Take 1 tablet (20 mg) by mouth as needed (swelling, weight gain).       ketoconazole (NIZORAL) 2 % external cream APPLY TO THE RASH ON FULL BACK TWICE A DAY FOR 4-6 WEEKS       losartan (COZAAR) 50 MG tablet Take 1 tablet (50 mg) by mouth daily. 90 tablet 1     metoprolol succinate ER (TOPROL XL) 50 MG 24 hr tablet Take 0.5 tablets (25 mg) by mouth daily. 45 tablet 1     traZODone (DESYREL) 50 MG tablet TAKE 1 TO 2 TABLETS BY MOUTH NIGHTLY AS NEEDED 180 tablet 3     warfarin ANTICOAGULANT (COUMADIN) 5 MG tablet TAKE 1 & 1/2 (ONE & ONE-HALF) TABLETS BY MOUTH MONDAY AND 1 TABLET ALL OTHER DAYS OR AS DIRECTED BY WARFARIN CLINIC 100 tablet 1       Review of Systems     8 point review system (Constitutional, HENT, Eyes, Respiratory, Cardiovascular, Gastrointestinal, Genitourinary, Musculoskeletal,Neurological, Psychiatric/Behavioural, Endocrine) is negative or is as per HPI above.  Past medical history, past surgical history, social and, family history and social determinants of health reviewed.  Past medical history pertinent to the visit reviewed.    Social Drivers of Health     Financial Resource Strain: Low Risk  (10/15/2024)    Financial Resource Strain      Within the past 12 months, have you or your family members you live with been unable  to get utilities (heat, electricity) when it was really needed?: No   Food Insecurity: Low Risk  (10/15/2024)    Food Insecurity      Within the past 12 months, did you worry that your food would run out before you got money to buy more?: No      Within the past 12 months, did the food you bought just not last and you didn t have money to get more?: No   Transportation Needs: Low Risk  (10/15/2024)    Transportation Needs      Within the past 12 months, has lack of transportation kept you from medical appointments, getting your medicines, non-medical meetings or appointments, work, or from getting things that you need?: No   Physical Activity: Inactive (10/15/2024)    Exercise Vital Sign      Days of Exercise per Week: 0 days      Minutes of Exercise per Session: 0 min   Stress: No Stress Concern Present (10/15/2024)    [employer] - Occupational Stress Questionnaire      Feeling of Stress : Not at all   Social Connections: Unknown (10/15/2024)    Social Connection and Isolation Panel [NHANES]      Frequency of Social Gatherings with Friends and Family: Twice a week   Interpersonal Safety: Not At Risk (10/17/2024)    Received from [facility] and [facility]     IP Custom IPV      Do you feel UNSAFE in any of your personal relationships with your family members or any other acquaintances?: No   Housing Stability: Low Risk  (10/15/2024)    Housing Stability      Do you have housing? : Yes      Are you worried about losing your housing?: No       Objective:   GENERAL: alert and no distress  EYES: Eyes grossly normal to inspection.    RESP: No audible wheeze, cough, or visible cyanosis.    PSYCH: Appropriate affect, tone, and pace of words      In House Labs:   Lab Results   Component Value Date    A1C 5.6 03/17/2023    A1C 5.5 11/05/2013       TSH   Date Value Ref Range Status   04/18/2025 3.05 0.30 - 4.20 uIU/mL Final   09/19/2024 2.20 0.30 - 4.20 uIU/mL Final    04/01/2024 2.73 0.30 - 4.20 uIU/mL Final   09/29/2023 3.34 0.30 - 4.20 uIU/mL Final   03/17/2023 3.05 0.30 - 4.20 uIU/mL Final   09/16/2022 2.44 0.40 - 4.00 mU/L Final   08/27/2021 2.86 0.40 - 4.00 mU/L Final   10/26/2020 2.08 0.40 - 4.00 mU/L Final   09/27/2019 1.42 0.40 - 4.00 mU/L Final   02/05/2019 2.71 0.40 - 4.00 mU/L Final   10/31/2018 2.32 0.40 - 4.00 mU/L Final   07/13/2018 2.51 0.40 - 4.00 mU/L Final     T4 Free   Date Value Ref Range Status   01/17/2018 0.89 0.76 - 1.46 ng/dL Final   09/28/2015 0.88 0.76 - 1.46 ng/dL Final   11/05/2013 0.61 0.59 - 1.61 ng/dL Final       Creatinine   Date Value Ref Range Status   04/18/2025 0.98 (H) 0.51 - 0.95 mg/dL Final   04/16/2021 0.95 0.52 - 1.04 mg/dL Final     This note has been dictated using voice recognition software.  As a result, there may be errors in the documentation that have gone undetected.  Please consider this when interpreting information in this documentation.      Video-Visit Details    Type of service:  Video Visit  Joined the call at 5/6/2025, 1:30:51 pm.  Left the call at 5/6/2025, 1:47:29 pm.  You were on the call for 16 minutes 38 seconds.  Distant Location (provider location):  ON-site.   Platform used for Video Visit: González  The longitudinal plan of care for the diagnosis(es)/condition(s) as documented were addressed during this visit. Due to the added complexity in care, I will continue to support Kaitlin in the subsequent management and with ongoing continuity of care.        Again, thank you for allowing me to participate in the care of your patient.        Sincerely,    Foreign Connors MD

## 2025-05-07 ENCOUNTER — TELEPHONE (OUTPATIENT)
Dept: ENDOCRINOLOGY | Facility: CLINIC | Age: 70
End: 2025-05-07
Payer: COMMERCIAL

## 2025-05-07 NOTE — TELEPHONE ENCOUNTER
Left Voicemail (1st Attempt) for the patient to call back and schedule the following:    Appointment type: return  Provider: dr. lassiter  Return date: 8/6/2025  Specialty phone number: [phone]   Additonal Notes: Return in about 3 months (around 8/6/2025). Okay to use wilda spot around this time frame.     [facility]le [facility]   Orthopedics, Podiatry, Sports Medicine, Ent ,Eye , Audiology, Adult Endocrine & Diabetes, Nutrition & Medication Therapy Management Specialties   [facility]

## 2025-05-09 ENCOUNTER — HOSPITAL ENCOUNTER (OUTPATIENT)
Facility: HOSPITAL | Age: 70
End: 2025-05-09
Attending: SURGERY | Admitting: SURGERY
Payer: MEDICARE

## 2025-05-09 ENCOUNTER — LAB (OUTPATIENT)
Dept: LAB | Facility: OTHER | Age: 70
End: 2025-05-09
Payer: MEDICARE

## 2025-05-09 DIAGNOSIS — E27.9 ADRENAL NODULE: ICD-10-CM

## 2025-05-09 LAB — CORTIS 8H P 1 MG DEX SERPL-MCNC: 1.4 UG/DL

## 2025-05-09 PROCEDURE — 36415 COLL VENOUS BLD VENIPUNCTURE: CPT | Mod: ZL

## 2025-05-09 PROCEDURE — 82533 TOTAL CORTISOL: CPT | Mod: ZL

## 2025-05-14 ENCOUNTER — TRANSFERRED RECORDS (OUTPATIENT)
Dept: HEALTH INFORMATION MANAGEMENT | Facility: CLINIC | Age: 70
End: 2025-05-14

## 2025-05-15 ENCOUNTER — RESULTS FOLLOW-UP (OUTPATIENT)
Dept: ENDOCRINOLOGY | Facility: CLINIC | Age: 70
End: 2025-05-15

## 2025-05-15 NOTE — RESULT ENCOUNTER NOTE
Hello -    We are pleased to inform you that your recent dexamethasone suppression test results are normal. This means your body is responding appropriately to the dexamethasone medication that was administered during the test.  Understanding Your Test  The dexamethasone suppression test evaluates how well your adrenal glands respond to signals from your brain. Specifically, it checks if your body can properly regulate cortisol, which is an important hormone that helps manage stress, regulate blood sugar levels, control inflammation, and contribute to many other essential bodily functions.  What Normal Results Mean  Your normal test results indicate that:    Your adrenal glands are functioning properly  Your body's feedback system for regulating cortisol is working as expected  There is no evidence of conditions that cause excess cortisol production (such as Cushing's syndrome)    Please let us know if you have any questions or concerns.      Regards,  Foreign Connors MD

## 2025-05-27 ENCOUNTER — ANTICOAGULATION THERAPY VISIT (OUTPATIENT)
Dept: ANTICOAGULATION | Facility: OTHER | Age: 70
End: 2025-05-27
Attending: NURSE PRACTITIONER
Payer: MEDICARE

## 2025-05-27 DIAGNOSIS — I82.401 DEEP VEIN THROMBOSIS (DVT) OF RIGHT LOWER EXTREMITY, UNSPECIFIED CHRONICITY, UNSPECIFIED VEIN (H): ICD-10-CM

## 2025-05-27 DIAGNOSIS — I26.99 PULMONARY EMBOLISM AND INFARCTION (H): ICD-10-CM

## 2025-05-27 DIAGNOSIS — Z79.01 LONG TERM CURRENT USE OF ANTICOAGULANT THERAPY: Primary | ICD-10-CM

## 2025-05-27 LAB — INR HOME MONITORING: 1.8 (ref 2–3)

## 2025-05-27 NOTE — PROGRESS NOTES
ANTICOAGULATION MANAGEMENT     Cassy Avila 69 year old female is on warfarin with subtherapeutic INR result. (Goal INR 2.0-3.0)    Recent labs: (last 7 days)     05/27/25  0000   INR 1.8*       ASSESSMENT     Source(s): Chart Review and Patient/Caregiver Call     Warfarin doses taken: Warfarin taken as instructed  Diet: No new diet changes identified  New illness, injury, or hospitalization: No  Medication/supplement changes: None noted  Signs or symptoms of bleeding or clotting: No  Previous INR: Therapeutic last 2(+) visits  Additional findings: Upcoming surgery/procedure 6/16/25- The [facility] denice LUCERO will be calling her patient verbalized,  as well as setting up a pre-op with Eliz espinal on 6/6/25, I can not see a visit set up on writer's end, Patient advised to call  or check her my chart    Patient has been a lot more active outside in the last 2 week due to gardening, patient plans to continue this        PLAN     Recommended plan for ongoing change(s) affecting INR     Dosing Instructions: Increase your warfarin dose (6.7% change) with next INR in 2 weeks       Summary  As of 5/27/2025      Full warfarin instructions:  7.5 mg every Mon, Thu; 5 mg all other days   Next INR check:  6/10/2025               Telephone call with Kaitlin who verbalizes understanding and agrees to plan    Patient to recheck with home meter    Education provided: Please call back if any changes to your diet, medications or how you've been taking warfarin    Plan made per [facility] anticoagulation protocol    Ashanti Velazquez, RN  Anticoagulation Clinic  5/27/2025    _______________________________________________________________________     Anticoagulation Episode Summary       Current INR goal:  2.0-3.0   TTR:  75.7% (1 y)   Target end date:  Indefinite   Send INR reminders to:  [facility] [place]    Indications    Long-term (current) use of anticoagulants [Z79.01] [Z79.01]  Pulmonary embolism and infarction (H) [I26.99]  Deep vein  thrombosis (DVT) (H) [I82.409] (Resolved) [I82.409]  Deep vein thrombosis (DVT) of right lower extremity  unspecified chronicity  unspecified vein (H) [I82.401]             Comments:  [employer] Monitoring. Q2 week testing  Call cell phone with any dosing.             Anticoagulation Care Providers       Provider Role Specialty Phone number    Eliz Hartman CNP Referring Family Medicine [phone]

## 2025-06-09 ENCOUNTER — TELEPHONE (OUTPATIENT)
Dept: SURGERY | Facility: OTHER | Age: 70
End: 2025-06-09

## 2025-06-10 DIAGNOSIS — E26.09 PRIMARY ALDOSTERONISM: Primary | ICD-10-CM

## 2025-06-10 RX ORDER — POTASSIUM CHLORIDE 1.5 G/1.58G
20 POWDER, FOR SOLUTION ORAL ONCE
Qty: 1 PACKET | Refills: 0 | Status: SHIPPED | OUTPATIENT
Start: 2025-06-10 | End: 2025-06-10

## 2025-06-10 NOTE — PROCEDURES
I called and spoke with pt. She was aware of procedure date and time. Reviewed pre instructions and sent K+ script to the [facility] listed in chart. Pt verbalized understanding. Wenwot message sent. No hold on any medications.     An Islas RN  Nurse Care Coordinator-Interventional Radiology  Dr. Daisy Oshea  [phone]  [email or web address]

## 2025-06-16 ENCOUNTER — RESULTS FOLLOW-UP (OUTPATIENT)
Dept: RADIOLOGY | Facility: CLINIC | Age: 70
End: 2025-06-16

## 2025-06-16 ENCOUNTER — APPOINTMENT (OUTPATIENT)
Dept: INTERVENTIONAL RADIOLOGY/VASCULAR | Facility: CLINIC | Age: 70
End: 2025-06-16
Attending: STUDENT IN AN ORGANIZED HEALTH CARE EDUCATION/TRAINING PROGRAM
Payer: MEDICARE

## 2025-06-16 ENCOUNTER — HOSPITAL ENCOUNTER (OUTPATIENT)
Facility: CLINIC | Age: 70
Discharge: HOME | End: 2025-06-16
Attending: STUDENT IN AN ORGANIZED HEALTH CARE EDUCATION/TRAINING PROGRAM | Admitting: STUDENT IN AN ORGANIZED HEALTH CARE EDUCATION/TRAINING PROGRAM
Payer: MEDICARE

## 2025-06-16 VITALS
RESPIRATION RATE: 12 BRPM | BODY MASS INDEX: 37.44 KG/M2 | TEMPERATURE: 98 F | SYSTOLIC BLOOD PRESSURE: 126 MMHG | WEIGHT: 225 LBS | OXYGEN SATURATION: 97 % | HEART RATE: 74 BPM | DIASTOLIC BLOOD PRESSURE: 93 MMHG

## 2025-06-16 DIAGNOSIS — E27.9 ADRENAL NODULE: ICD-10-CM

## 2025-06-16 DIAGNOSIS — E26.09 PRIMARY ALDOSTERONISM: ICD-10-CM

## 2025-06-16 LAB
ANION GAP SERPL CALCULATED.3IONS-SCNC: 10 MMOL/L (ref 7–15)
BUN SERPL-MCNC: 18.3 MG/DL (ref 8–23)
CALCIUM SERPL-MCNC: 8.8 MG/DL (ref 8.8–10.4)
CHLORIDE SERPL-SCNC: 106 MMOL/L (ref 98–107)
CORTIS SERPL-MCNC: 10.1 UG/DL
CORTIS SERPL-MCNC: 1135 UG/DL
CORTIS SERPL-MCNC: 27.7 UG/DL
CORTIS SERPL-MCNC: 28.5 UG/DL
CORTIS SERPL-MCNC: 886 UG/DL
CREAT SERPL-MCNC: 1.07 MG/DL (ref 0.51–0.95)
EGFRCR SERPLBLD CKD-EPI 2021: 56 ML/MIN/1.73M2
ERYTHROCYTE [DISTWIDTH] IN BLOOD BY AUTOMATED COUNT: 13.5 % (ref 10–15)
GLUCOSE SERPL-MCNC: 96 MG/DL (ref 70–99)
HCO3 SERPL-SCNC: 22 MMOL/L (ref 22–29)
HCT VFR BLD AUTO: 39.1 % (ref 35–47)
HGB BLD-MCNC: 13.4 G/DL (ref 11.7–15.7)
INR PPP: 2.3 (ref 0.85–1.15)
MCH RBC QN AUTO: 29.8 PG (ref 26.5–33)
MCHC RBC AUTO-ENTMCNC: 34.3 G/DL (ref 31.5–36.5)
MCV RBC AUTO: 87 FL (ref 78–100)
PLATELET # BLD AUTO: 180 10E3/UL (ref 150–450)
POTASSIUM SERPL-SCNC: 4.7 MMOL/L (ref 3.4–5.3)
PROTHROMBIN TIME: 24.8 SECONDS (ref 11.8–14.8)
RBC # BLD AUTO: 4.49 10E6/UL (ref 3.8–5.2)
SODIUM SERPL-SCNC: 138 MMOL/L (ref 135–145)
SPECIMEN SOURCE, AVS: NORMAL
WBC # BLD AUTO: 5.6 10E3/UL (ref 4–11)

## 2025-06-16 PROCEDURE — 250N000011 HC RX IP 250 OP 636: Mod: JZ | Performed by: RADIOLOGY

## 2025-06-16 PROCEDURE — 80048 BASIC METABOLIC PNL TOTAL CA: CPT | Performed by: RADIOLOGY

## 2025-06-16 PROCEDURE — 82088 ASSAY OF ALDOSTERONE: CPT | Performed by: STUDENT IN AN ORGANIZED HEALTH CARE EDUCATION/TRAINING PROGRAM

## 2025-06-16 PROCEDURE — 258N000003 HC RX IP 258 OP 636: Performed by: RADIOLOGY

## 2025-06-16 PROCEDURE — 75893 VENOUS SAMPLING BY CATHETER: CPT | Mod: 26 | Performed by: STUDENT IN AN ORGANIZED HEALTH CARE EDUCATION/TRAINING PROGRAM

## 2025-06-16 PROCEDURE — 85610 PROTHROMBIN TIME: CPT | Performed by: RADIOLOGY

## 2025-06-16 PROCEDURE — 36500 INSERTION OF CATHETER VEIN: CPT | Mod: GC | Performed by: STUDENT IN AN ORGANIZED HEALTH CARE EDUCATION/TRAINING PROGRAM

## 2025-06-16 PROCEDURE — 76937 US GUIDE VASCULAR ACCESS: CPT

## 2025-06-16 PROCEDURE — C1887 CATHETER, GUIDING: HCPCS

## 2025-06-16 PROCEDURE — 272N000570 HC SHEATH CR7

## 2025-06-16 PROCEDURE — 272N000566 HC SHEATH CR3

## 2025-06-16 PROCEDURE — 36500 INSERTION OF CATHETER VEIN: CPT

## 2025-06-16 PROCEDURE — 75893 VENOUS SAMPLING BY CATHETER: CPT

## 2025-06-16 PROCEDURE — 250N000011 HC RX IP 250 OP 636: Mod: JZ

## 2025-06-16 PROCEDURE — 36415 COLL VENOUS BLD VENIPUNCTURE: CPT | Performed by: RADIOLOGY

## 2025-06-16 PROCEDURE — 272N000504 HC NEEDLE CR4

## 2025-06-16 PROCEDURE — 76937 US GUIDE VASCULAR ACCESS: CPT | Mod: 26 | Performed by: STUDENT IN AN ORGANIZED HEALTH CARE EDUCATION/TRAINING PROGRAM

## 2025-06-16 PROCEDURE — 272N000209 HC BALLOON (NON-PTA) CR6

## 2025-06-16 PROCEDURE — C1769 GUIDE WIRE: HCPCS

## 2025-06-16 PROCEDURE — 99152 MOD SED SAME PHYS/QHP 5/>YRS: CPT | Mod: GC | Performed by: STUDENT IN AN ORGANIZED HEALTH CARE EDUCATION/TRAINING PROGRAM

## 2025-06-16 PROCEDURE — 999N000285 HC STATISTIC VASC ACCESS LAB DRAW WITH PIV START

## 2025-06-16 PROCEDURE — 999N000127 HC STATISTIC PERIPHERAL IV START W US GUIDANCE

## 2025-06-16 PROCEDURE — 85018 HEMOGLOBIN: CPT | Performed by: RADIOLOGY

## 2025-06-16 PROCEDURE — 250N000009 HC RX 250: Mod: JW

## 2025-06-16 PROCEDURE — 99152 MOD SED SAME PHYS/QHP 5/>YRS: CPT

## 2025-06-16 PROCEDURE — 82533 TOTAL CORTISOL: CPT | Performed by: STUDENT IN AN ORGANIZED HEALTH CARE EDUCATION/TRAINING PROGRAM

## 2025-06-16 PROCEDURE — 82088 ASSAY OF ALDOSTERONE: CPT | Performed by: RADIOLOGY

## 2025-06-16 PROCEDURE — 36500 INSERTION OF CATHETER VEIN: CPT | Mod: XS | Performed by: STUDENT IN AN ORGANIZED HEALTH CARE EDUCATION/TRAINING PROGRAM

## 2025-06-16 PROCEDURE — 255N000002 HC RX 255 OP 636: Performed by: STUDENT IN AN ORGANIZED HEALTH CARE EDUCATION/TRAINING PROGRAM

## 2025-06-16 PROCEDURE — 272N000143 HC KIT CR3

## 2025-06-16 PROCEDURE — 36011 PLACE CATHETER IN VEIN: CPT

## 2025-06-16 PROCEDURE — 82533 TOTAL CORTISOL: CPT | Performed by: RADIOLOGY

## 2025-06-16 RX ORDER — POTASSIUM CHLORIDE 1.5 G/1.58G
40 POWDER, FOR SOLUTION ORAL
Status: DISCONTINUED | OUTPATIENT
Start: 2025-06-16 | End: 2025-06-16 | Stop reason: HOSPADM

## 2025-06-16 RX ORDER — FLUMAZENIL 0.1 MG/ML
0.2 INJECTION, SOLUTION INTRAVENOUS
Status: DISCONTINUED | OUTPATIENT
Start: 2025-06-16 | End: 2025-06-16 | Stop reason: HOSPADM

## 2025-06-16 RX ORDER — NALOXONE HYDROCHLORIDE 0.4 MG/ML
0.2 INJECTION, SOLUTION INTRAMUSCULAR; INTRAVENOUS; SUBCUTANEOUS
Status: DISCONTINUED | OUTPATIENT
Start: 2025-06-16 | End: 2025-06-16 | Stop reason: HOSPADM

## 2025-06-16 RX ORDER — NALOXONE HYDROCHLORIDE 0.4 MG/ML
0.4 INJECTION, SOLUTION INTRAMUSCULAR; INTRAVENOUS; SUBCUTANEOUS
Status: DISCONTINUED | OUTPATIENT
Start: 2025-06-16 | End: 2025-06-16 | Stop reason: HOSPADM

## 2025-06-16 RX ORDER — IODIXANOL 320 MG/ML
150 INJECTION, SOLUTION INTRAVASCULAR ONCE
Status: COMPLETED | OUTPATIENT
Start: 2025-06-16 | End: 2025-06-16

## 2025-06-16 RX ORDER — SODIUM CHLORIDE 9 MG/ML
INJECTION, SOLUTION INTRAVENOUS CONTINUOUS
Status: DISCONTINUED | OUTPATIENT
Start: 2025-06-16 | End: 2025-06-16 | Stop reason: HOSPADM

## 2025-06-16 RX ORDER — LIDOCAINE 40 MG/G
CREAM TOPICAL
Status: DISCONTINUED | OUTPATIENT
Start: 2025-06-16 | End: 2025-06-16 | Stop reason: HOSPADM

## 2025-06-16 RX ORDER — HEPARIN SODIUM 200 [USP'U]/100ML
1 INJECTION, SOLUTION INTRAVENOUS EVERY 5 MIN PRN
Status: DISCONTINUED | OUTPATIENT
Start: 2025-06-16 | End: 2025-06-16 | Stop reason: HOSPADM

## 2025-06-16 RX ORDER — FENTANYL CITRATE 50 UG/ML
25-50 INJECTION, SOLUTION INTRAMUSCULAR; INTRAVENOUS EVERY 5 MIN PRN
Refills: 0 | Status: DISCONTINUED | OUTPATIENT
Start: 2025-06-16 | End: 2025-06-16 | Stop reason: HOSPADM

## 2025-06-16 RX ADMIN — IODIXANOL 150 ML: 320 INJECTION, SOLUTION INTRAVASCULAR at 11:47

## 2025-06-16 RX ADMIN — FENTANYL CITRATE 25 MCG: 50 INJECTION, SOLUTION INTRAMUSCULAR; INTRAVENOUS at 09:12

## 2025-06-16 RX ADMIN — FENTANYL CITRATE 50 MCG: 50 INJECTION, SOLUTION INTRAMUSCULAR; INTRAVENOUS at 08:43

## 2025-06-16 RX ADMIN — FENTANYL CITRATE 25 MCG: 50 INJECTION, SOLUTION INTRAMUSCULAR; INTRAVENOUS at 09:27

## 2025-06-16 RX ADMIN — MIDAZOLAM 0.5 MG: 1 INJECTION INTRAMUSCULAR; INTRAVENOUS at 09:11

## 2025-06-16 RX ADMIN — MIDAZOLAM 0.5 MG: 1 INJECTION INTRAMUSCULAR; INTRAVENOUS at 09:27

## 2025-06-16 RX ADMIN — HEPARIN SODIUM IN SODIUM CHLORIDE 2 BAG: 200 INJECTION INTRAVENOUS at 08:55

## 2025-06-16 RX ADMIN — SODIUM CHLORIDE: 0.9 INJECTION, SOLUTION INTRAVENOUS at 07:26

## 2025-06-16 RX ADMIN — LIDOCAINE HYDROCHLORIDE 7 ML: 10 INJECTION, SOLUTION EPIDURAL; INFILTRATION; INTRACAUDAL; PERINEURAL at 08:44

## 2025-06-16 RX ADMIN — MIDAZOLAM 1 MG: 1 INJECTION INTRAMUSCULAR; INTRAVENOUS at 08:43

## 2025-06-16 RX ADMIN — FENTANYL CITRATE 50 MCG: 50 INJECTION, SOLUTION INTRAMUSCULAR; INTRAVENOUS at 08:38

## 2025-06-16 RX ADMIN — MIDAZOLAM 1 MG: 1 INJECTION INTRAMUSCULAR; INTRAVENOUS at 08:38

## 2025-06-16 RX ADMIN — COSYNTROPIN 50 MCG/HR: 0.25 INJECTION, POWDER, LYOPHILIZED, FOR SOLUTION INTRAMUSCULAR; INTRAVENOUS at 08:13

## 2025-06-16 ASSESSMENT — ACTIVITIES OF DAILY LIVING (ADL)
ADLS_ACUITY_SCORE: 43

## 2025-06-16 NOTE — PRE-PROCEDURE
GENERAL PRE-PROCEDURE:   Procedure:  Adrenal vein sampling  Date/Time:  6/16/2025 8:23 AM    Written consent obtained?: Yes    Risks and benefits: Risks, benefits and alternatives were discussed    Consent given by:  Patient  Patient states understanding of procedure being performed: Yes    Patient's understanding of procedure matches consent: Yes    Procedure consent matches procedure scheduled: Yes    Expected level of sedation:  Moderate  Appropriately NPO:  Yes  ASA Class:  2  Mallampati  :  Grade 2- soft palate, base of uvula, tonsillar pillars, and portion of posterior pharyngeal wall visible  Lungs:  Lungs clear with good breath sounds bilaterally  Heart:  Normal heart sounds and rate  History & Physical reviewed:  History and physical reviewed and no updates needed  Statement of review:  I have reviewed the lab findings, diagnostic data, medications, and the plan for sedation

## 2025-06-16 NOTE — PROGRESS NOTES
Pt returned to 2A via litter accompanied by RN. LYNNETTE. RA. Denies pain. Right internal jugular dressing CDI and covered with tegederm. Pt tolerating oral intake. Nurse will continue to monitor.

## 2025-06-16 NOTE — PROGRESS NOTES
Pt discharged after adrenal vein sampling. VSS, denies pain. R neck site CDI with no hematoma. Pt ambulating, voiding, eating and drinking without issue. PIVs intact upon removal. Pt demonstrated understanding of discharge teaching. Pt friend, Анна, is ride home.

## 2025-06-16 NOTE — DISCHARGE INSTRUCTIONS
[facility]  Going Home after Adrenal Vein Sampling                                                     After you go home:  Drink plenty of fluids.  Resume your normal diet  Do not scrub the procedure site vigorously for 3 days  No lotion or powder to the puncture site for 3 days  DO NOT do any strenuous exercise or lifting greater than 10 pounds for at least 2 days following your procedure  HOB elevated to at least 30 degrees. Do not lay flat for at least 2 hours after you go home.    IF YOU WERE GIVEN SEDATION  No driving or alcoholic beverages today  Do not make any important or legal decisions today  We recommend an adult stay with you for the first 24 hours    CALL THE PHYSICIAN IF:  Monitor neck site for bleeding, swelling. If any bleeding or swelling immediately apply pressure and call the doctor.  If you notice any changes in your voice or breathing you should call your doctor immediately & come to the closest Emergency Department.  Do Not Drive Yourself.  You develop nausea or vomiting  You develop hives or a rash or any unexplained itching    [facility] INTERVENTIONAL RADIOLOGY DEPARTMENT        Procedure Physician:  Dr. Yu and Dr. Katie MISTRY           Date of procedure:June 16, 2025        Telephone numbers:     [phone]      Monday-Friday 7:30 am to 4:00 pm                                              [phone]  After 4:00 pm Monday-Friday, Weekends & Holidays. Option#4 for the , and Ask for the Interventional Radiologist on call.Someone is available  24 hours/day        [facility] toll free number: [phone]  Monday-Friday 8:00 am to 4:30 pm

## 2025-06-16 NOTE — IR NOTE
Patient Name: Cassy Avila  Medical Record Number: [id number]  Today's Date: 6/16/2025    Procedure: adrenal vein sampling  Proceduralist: SEN Yu MD, GABBY Wilson MD  Pathology present: n/a  Brief completed: n/a    Procedure Start: 0840  Procedure end: 1150  Sedation medications administered: 3 mg of versed, 150 mcg of fentanyl  Sedation time:  130 minutes     Report given to: Enrrique TURNER RN  : n/a  Right internal jugular site de-accessed at 1143 using manual pressure  for closure - flat bedrest x 2 hours, till 1350.    Other Notes: Pt arrived to IR room 04 from . Consent reviewed. Pt denies any questions or concerns regarding procedure. Pt positioned supine and monitored per protocol. Pt tolerated procedure without any noted complications. Pt transferred back to .

## 2025-06-16 NOTE — PROGRESS NOTES
Pt prepped for adrenal vein sampling. Labs in process, consent needs to be signed, PIVs in place with fluids infusing per MAR. Pt friend, Анна, at bedside will be ride home. Awaiting K+ to result for possible replacement.   Awaiting call from IR to start cosyntropin.

## 2025-06-16 NOTE — PROCEDURES
[facility]    Procedure: IR Procedure Note    Date/Time: 6/16/2025 11:46 AM    Performed by: Rose Yu MD  Authorized by: Rose Yu MD  IR Fellow Physician:    Pre Procedure Diagnosis: Aldosteronism  Post Procedure Diagnosis: Same    UNIVERSAL PROTOCOL   Site Marked: NA  Prior Images Obtained and Reviewed:  Yes  Required items: Required blood products, implants, devices and special equipment available    Patient identity confirmed:  Arm band, provided demographic data, hospital-assigned identification number and verbally with patient  Patient was reevaluated immediately before administering moderate or deep sedation or anesthesia  Confirmation Checklist:  Correct equipment/implants were available, procedure was appropriate and matched the consent or emergent situation, relevant allergies and patient's identity using two indicators  Time out: Immediately prior to the procedure a time out was called    Universal Protocol: the Joint Commission Universal Protocol was followed    Preparation: Patient was prepped and draped in usual sterile fashion    ESBL (mL):  20     ANESTHESIA    Anesthesia:  Local infiltration  Local Anesthetic:  Lidocaine 1% without epinephrine  Anesthetic Total (mL):  8      SEDATION  Patient Sedated: Yes    Sedation Type:  Moderate (conscious) sedation  Sedation:  Midazolam and fentanyl  Vital signs: Vital signs monitored during sedation    See dictated procedure note for full details.  Findings: Bilateral adrenal vein sampling with right adrenal vein draining into middle accessory hepatic vein.    Specimens: other (see comment)    Procedural Complications: None    Condition: Stable    Plan: Bedrest for 1 hour.      PROCEDURE  Describe Procedure: Bilateral adrenal vein sampling with right adrenal vein draining into middle accessory hepatic vein.    Patient Tolerance:  Patient tolerated the procedure well with no immediate complications  Length of  time physician/provider present for 1:1 monitoring during sedation:  113-127 min

## 2025-06-17 ENCOUNTER — ANTICOAGULATION THERAPY VISIT (OUTPATIENT)
Dept: ANTICOAGULATION | Facility: OTHER | Age: 70
End: 2025-06-17
Attending: NURSE PRACTITIONER
Payer: MEDICARE

## 2025-06-17 DIAGNOSIS — I26.99 PULMONARY EMBOLISM AND INFARCTION (H): ICD-10-CM

## 2025-06-17 DIAGNOSIS — Z79.01 LONG TERM CURRENT USE OF ANTICOAGULANT THERAPY: Primary | ICD-10-CM

## 2025-06-17 DIAGNOSIS — I82.401 DEEP VEIN THROMBOSIS (DVT) OF RIGHT LOWER EXTREMITY, UNSPECIFIED CHRONICITY, UNSPECIFIED VEIN (H): ICD-10-CM

## 2025-06-17 LAB
ALDOST SERPL-MCNC: 3970 NG/DL
ALDOST SERPL-MCNC: 4100 NG/DL
ALDOST SERPL-MCNC: 43.5 NG/DL (ref 0–31)
ALDOST SERPL-MCNC: 61 NG/DL
ALDOST SERPL-MCNC: 63.6 NG/DL
SPECIMEN SOURCE, AVS: NORMAL

## 2025-06-17 NOTE — PROGRESS NOTES
ANTICOAGULATION MANAGEMENT     Cassy Avila 69 year old female is on warfarin with therapeutic INR result. (Goal INR 2.0-3.0)    Recent labs: (last 7 days)     06/16/25  0720   INR 2.30*       ASSESSMENT     Source(s): Chart Review and Patient/Caregiver Call     Warfarin doses taken: Warfarin taken as instructed  Diet: No new diet changes identified  Medication/supplement changes: None noted  New illness, injury, or hospitalization: Yes: procedure 6/16 for adrenal vein sampling  Signs or symptoms of bleeding or clotting: No  Previous result: Subtherapeutic  Additional findings: None       PLAN     Recommended plan for no diet, medication or health factor changes affecting INR     Dosing Instructions: Continue your current warfarin dose with next INR in 2 weeks       Summary  As of 6/17/2025      Full warfarin instructions:  7.5 mg every Mon, Thu; 5 mg all other days   Next INR check:  7/1/2025               Telephone call with [facility] who verbalizes understanding and agrees to plan    Patient to recheck with home meter    Education provided: Please call back if any changes to your diet, medications or how you've been taking warfarin  Resume manage by exception with home monitor. Continue to submit INR results to home monitor company.You will only be called when your result is out of range and at 90 day check in. Please call and notify [facility] if new medication started, dose missed, signs or symptoms of bleeding or clotting, or a surgery/procedure is scheduled. Due for next call no later than: 9/15/25.    Plan made per [facility] anticoagulation protocol    Corry Galvan RN  6/17/2025  Anticoagulation Clinic  [facility] for routing messages: erwin CAMEJO  [facility] patient phone line: [phone]        _______________________________________________________________________     Anticoagulation Episode Summary       Current INR goal:  2.0-3.0   TTR:  73.4% (1 y)   Target end date:  Indefinite   Send INR reminders to:  DAREN  [place]    Indications    Long-term (current) use of anticoagulants [Z79.01] [Z79.01]  Pulmonary embolism and infarction (H) [I26.99]  Deep vein thrombosis (DVT) (H) [I82.409] (Resolved) [I82.409]  Deep vein thrombosis (DVT) of right lower extremity  unspecified chronicity  unspecified vein (H) [I82.401]             Comments:  [employer]. Q2 week testing  Call cell phone with any dosing.             Anticoagulation Care Providers       Provider Role Specialty Phone number    Eliz Hartman CNP Referring Family Medicine [phone]

## 2025-06-23 ENCOUNTER — TRANSFERRED RECORDS (OUTPATIENT)
Dept: HEALTH INFORMATION MANAGEMENT | Facility: CLINIC | Age: 70
End: 2025-06-23
Payer: COMMERCIAL

## 2025-07-01 ENCOUNTER — ANTICOAGULATION THERAPY VISIT (OUTPATIENT)
Dept: ANTICOAGULATION | Facility: OTHER | Age: 70
End: 2025-07-01
Attending: NURSE PRACTITIONER
Payer: MEDICARE

## 2025-07-01 DIAGNOSIS — I26.99 PULMONARY EMBOLISM AND INFARCTION (H): ICD-10-CM

## 2025-07-01 DIAGNOSIS — Z79.01 LONG TERM CURRENT USE OF ANTICOAGULANT THERAPY: Primary | ICD-10-CM

## 2025-07-01 DIAGNOSIS — I82.401 DEEP VEIN THROMBOSIS (DVT) OF RIGHT LOWER EXTREMITY, UNSPECIFIED CHRONICITY, UNSPECIFIED VEIN (H): ICD-10-CM

## 2025-07-01 LAB — INR HOME MONITORING: 2.6 (ref 2–3)

## 2025-07-01 NOTE — PROGRESS NOTES
ANTICOAGULATION  MANAGEMENT-Home Monitor Managed by Exception    Cassy APPLE Avila 69 year old female is on warfarin with therapeutic INR result. (Goal INR 2.0-3.0)    Recent labs: (last 7 days)     07/01/25  0000   INR 2.6       Previous INR was Therapeutic  Medication, diet, health changes since last INR:chart reviewed; none identified  Contacted within the last 12 weeks by phone on 6/17/25  Last [facility] referral date: 11/05/2024      DARIO     Cassy was NOT contacted regarding therapeutic result today per home monitoring policy manage by exception agreement.   Current warfarin dose is to be continued:     Summary  As of 7/1/2025      Full warfarin instructions:  7.5 mg every Mon, Thu; 5 mg all other days   Next INR check:  7/15/2025             ?   Corry Galvan RN  Anticoagulation Clinic  7/1/2025    _______________________________________________________________________     Anticoagulation Episode Summary       Current INR goal:  2.0-3.0   TTR:  73.5% (1 y)   Target end date:  Indefinite   Send INR reminders to:  [facility] [place]    Indications    Long-term (current) use of anticoagulants [Z79.01] [Z79.01]  Pulmonary embolism and infarction (H) [I26.99]  Deep vein thrombosis (DVT) (H) [I82.409] (Resolved) [I82.409]  Deep vein thrombosis (DVT) of right lower extremity  unspecified chronicity  unspecified vein (H) [I82.401]             Comments:  [employer]. Q2 week testing  Call cell phone with any dosing.             Anticoagulation Care Providers       Provider Role Specialty Phone number    Eliz Hartman CNP Referring Family Medicine [phone]

## 2025-07-15 ENCOUNTER — TRANSFERRED RECORDS (OUTPATIENT)
Dept: HEALTH INFORMATION MANAGEMENT | Facility: CLINIC | Age: 70
End: 2025-07-15
Payer: COMMERCIAL

## 2025-07-16 ENCOUNTER — OFFICE VISIT (OUTPATIENT)
Dept: ORTHOPEDICS | Facility: OTHER | Age: 70
End: 2025-07-16
Attending: ORTHOPAEDIC SURGERY
Payer: MEDICARE

## 2025-07-16 ENCOUNTER — ANCILLARY PROCEDURE (OUTPATIENT)
Dept: GENERAL RADIOLOGY | Facility: OTHER | Age: 70
End: 2025-07-16
Attending: ORTHOPAEDIC SURGERY
Payer: MEDICARE

## 2025-07-16 VITALS
BODY MASS INDEX: 37.49 KG/M2 | OXYGEN SATURATION: 98 % | HEART RATE: 59 BPM | WEIGHT: 225 LBS | SYSTOLIC BLOOD PRESSURE: 120 MMHG | HEIGHT: 65 IN | DIASTOLIC BLOOD PRESSURE: 70 MMHG | TEMPERATURE: 97.5 F

## 2025-07-16 DIAGNOSIS — M25.562 LEFT KNEE PAIN: ICD-10-CM

## 2025-07-16 DIAGNOSIS — T84.498A: Primary | ICD-10-CM

## 2025-07-16 DIAGNOSIS — M25.562 LEFT KNEE PAIN: Primary | ICD-10-CM

## 2025-07-16 PROCEDURE — 73562 X-RAY EXAM OF KNEE 3: CPT | Mod: 26 | Performed by: RADIOLOGY

## 2025-07-16 PROCEDURE — G0463 HOSPITAL OUTPT CLINIC VISIT: HCPCS

## 2025-07-16 PROCEDURE — 73562 X-RAY EXAM OF KNEE 3: CPT | Mod: TC,LT

## 2025-07-16 ASSESSMENT — PAIN SCALES - GENERAL: PAINLEVEL_OUTOF10: NO PAIN (0)

## 2025-07-18 ENCOUNTER — TELEPHONE (OUTPATIENT)
Dept: ORTHOPEDICS | Facility: OTHER | Age: 70
End: 2025-07-18

## 2025-07-21 ENCOUNTER — PATIENT OUTREACH (OUTPATIENT)
Dept: CARE COORDINATION | Facility: CLINIC | Age: 70
End: 2025-07-21
Payer: COMMERCIAL

## 2025-07-21 PROBLEM — T84.498A: Status: ACTIVE | Noted: 2025-07-21

## 2025-07-21 NOTE — PROGRESS NOTES
ORTHOPEDIC CLINIC CONSULT    Referred by: Primary Care Providers:  Eliz Hartman, JANINA, NP (General)    Chief Complaint:    Chief Complaint   Patient presents with    Musculoskeletal Problem     Left knee pain       History of Present Illness: Cassy Avila is a 69 year old female who is being seen for Musculoskeletal Problem (Left knee pain) has had previous left total knee arthroplasty in 6/23/2014.  She notes when she is actually up and ambulating she is doing fairly well but when she is done during her activities and particularly trying to lay in bed or at night she started to have more increasing medial joint line pain and tenderness to.  She sits for any length of time she starts to have increasing pain discomfort and then struggles when she first initially gets up to mobilize.  Once she is up and ambulating she starts to feel much better.  She did see another orthopedics and was said to maybe have a pes anserine tendinitis or bursitis in the underwent an injection about 4 to 6 weeks ago but has had no change or improvement.  She does note that she has a history of neurofibromatosis and is wondering if that has anything to do with any of her symptoms or issues.  She presents for evaluation      Patient's past medical, surgical, social and family histories reviewed.     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 Provider to review    Factor V Leiden     Factor V Leiden mutation 2019    Family history of colon cancer 2018    Gastro-oesophageal reflux disease     Herpes zoster without mention of complication     resolved from problem list    Hyperlipidemia LDL goal <100 2002     Problem list name updated by automated process. Provider to review    Long term (current) use of anticoagulants 2003    Major depression 2014    Mammographic microcalcification     resolved from problem list    Migraine, unspecified, without mention of intractable migraine without mention of status migrainosus 2001    Moderate episode of recurrent major depressive disorder (H) 2014    Neurofibromatosis, peripheral, NF1 (H) 2012     Problem list name updated by automated process. Provider to review    Neurofibromatosis, unspecified(237.70) 2012    Other and unspecified hyperlipidemia 2002    Other chronic pain     Other pulmonary embolism and infarction 2003    Primary insomnia 10/31/2018    Statin medication not prescribed per physician orders 2018    Tachycardia, unspecified 2001    Thrombosis of leg     Unspecified essential hypertension 2001    Vitamin D deficiency 2018       Past Surgical History:   Procedure Laterality Date    ------------OTHER-------------      shoulder replacement; Provider: Karen    ARTHROPLASTY KNEE  2014    Procedure: ARTHROPLASTY KNEE;  Surgeon: Sean Alexander MD;  Location: [facility] OR    ARTHROSCOPY SHOULDER      right, bone spurs    BIOPSY      BIOPSY BREAST NEEDLE LOCALIZATION Left 3/24/2025    Procedure: Needle Localization Left Breast Biopsy;  Surgeon: Michael Cage MD;  Location: [facility] OR    CA ANESTH,SHOULDER REPLACEMENT Right 2020     SECTION      x3    CHOLECYSTECTOMY      COLONOSCOPY  02/15/2018    Numa,,polyps    elbow ulnar tunnel release       ELECTROTHERMAL THERAPY INTRADISC  2017    stimulator    ENDOSCOPIC SINUS SURGERY, SEPTOPLASTY, TURBINOPLASTY, MAXILLARY SINUSOTOMY, COMBINED N/A 04/29/2015    Procedure: COMBINED ENDOSCOPIC SINUS SURGERY, SEPTOPLASTY, TURBINOPLASTY, MAXILLARY SINUSOTOMY;  Surgeon: Seema Conn MD;  Location: [facility] OR    ENT SURGERY  2014    ESOPHAGOSCOPY, GASTROSCOPY, DUODENOSCOPY (EGD), COMBINED  2011    with biopsy and endoscopic U/S    EXCISE NEUROMA LOWER EXTREMITY Left 07/13/2016    Procedure: EXCISE NEUROMA LOWER EXTREMITY;  Surgeon: Edi Reed MD;  Location: [facility] OR    EYE SURGERY Right 09/2020    FUSION LUMBAR ANTERIOR WITH BAK CAGES      L5-S1    HYSTERECTOMY TOTAL ABDOMINAL, BILATERAL SALPINGO-OOPHORECTOMY, COMBINED N/A     IR ADRENAL VENOGRAM BILATERAL  4/11/2025    IR ADRENAL VENOGRAM BILATERAL  6/16/2025    ORTHOPEDIC SURGERY  02/2015    right shoulder    ORTHOPEDIC SURGERY  08/28/2015    right knee    ORTHOPEDIC SURGERY Right 06/11/2018    hip labrum tear    pionidal cyst excision      TRANSPOSITION ULNAR NERVE (ELBOW)         Home Medications:  Prior to Admission medications    Medication Sig Start Date End Date Taking? Authorizing Provider   amLODIPine (NORVASC) 5 MG tablet Take 1 tablet (5 mg) by mouth daily. 5/6/25  Yes Foreign Connors MD   aspirin 81 MG EC tablet Take 81 mg by mouth daily HS   Yes Reported, Patient   eplerenone (INSPRA) 50 MG tablet Take 1 tablet (50 mg) by mouth 2 times daily. 4/18/25  Yes Eliz Hartman CNP   escitalopram (LEXAPRO) 10 MG tablet Take 1 tablet (10 mg) by mouth daily. 4/18/25  Yes Eliz Hartman CNP   furosemide (LASIX) 20 MG tablet Take 1 tablet (20 mg) by mouth as needed (swelling, weight gain). 11/5/24  Yes Ashli Tijerina CNP   ketoconazole (NIZORAL) 2 % external cream APPLY TO THE RASH ON FULL BACK TWICE A DAY FOR 4-6 WEEKS 11/30/22  Yes Reported, Patient   losartan (COZAAR) 100 MG tablet Take 1 tablet (100 mg) by mouth daily. 5/6/25  Yes Foreign Connors  MD   metoprolol succinate ER (TOPROL XL) 50 MG 24 hr tablet Take 0.5 tablets (25 mg) by mouth daily. 25  Yes Ashli Tijerina CNP   traZODone (DESYREL) 50 MG tablet TAKE 1 TO 2 TABLETS BY MOUTH NIGHTLY AS NEEDED 24  Yes Eliz Hartman CNP   warfarin ANTICOAGULANT (COUMADIN) 5 MG tablet TAKE 1 & 1/2 (ONE & ONE-HALF) TABLETS BY MOUTH MONDAY AND 1 TABLET ALL OTHER DAYS OR AS DIRECTED BY WARFARIN CLINIC 25  Yes Eliz Hartman CNP   dexAMETHasone (DECADRON) 1 MG tablet Take 1 tablet (1 mg) by mouth once for 1 dose. Between 11:00 PM to midnight the night before the cortisol blood work. 25  Foreign Connors MD       Allergies   Allergen Reactions    Allopurinol Shortness Of Breath    Amoxicillin     Atorvastatin      myualgia    Cephalexin Monohydrate Hives     Keflex    Erythromycin Base [Erythromycin Base] Nausea and Vomiting    Meloxicam Other (See Comments)     Mobic - confusion, depression    Sulfa Antibiotics Hives    Adhesive Tape Rash    Prochlorperazine Edisylate Swelling and Rash     Compazine    Prochlorperazine Maleate Swelling and Rash       Social History     Occupational History    Not on file   Tobacco Use    Smoking status: Former     Current packs/day: 0.00     Average packs/day: 1 pack/day for 30.0 years (30.0 ttl pk-yrs)     Types: Cigarettes, Pipe     Start date: 1969     Quit date: 2000     Years since quittin.5     Passive exposure: Past    Smokeless tobacco: Never    Tobacco comments:     quit in    Vaping Use    Vaping status: Never Used   Substance and Sexual Activity    Alcohol use: No    Drug use: Yes     Types: Marijuana    Sexual activity: Not Currently     Partners: Male       Family History   Problem Relation Age of Onset    Cancer Mother     Colon Polyps Mother     Heart Failure Mother 87        congestive, cause of death    Myocardial Infarction Mother         myocardial infarction    Coronary Artery Disease Mother             Hypertension  "Mother     Colon Cancer Mother     Osteoporosis Mother     Genetic Disorder Mother     Myocardial Infarction Father         myocardial infarction - cause of death    C.A.D. Father     Coronary Artery Disease Father             Hypertension Father             Hyperlipidemia Father     Cancer Paternal Uncle         cause of death    C.A.D. Brother     Other - See Comments Other         factor 5 - family h/o    Diabetes Maternal Grandmother             Hypertension Maternal Half-Sister     Depression Maternal Half-Sister     Hypertension Brother     Other Cancer Son         testicular    Asthma No family hx of        REVIEW OF SYSTEMS            Physical Exam:    Vitals: /70 (BP Location: Left arm, Patient Position: Sitting, Cuff Size: Adult Large)   Pulse 59   Temp 97.5  F (36.4  C) (Tympanic)   Ht 1.651 m (5' 5\")   Wt 102.1 kg (225 lb)   SpO2 98%   BMI 37.44 kg/m    BMI= Body mass index is 37.44 kg/m .  On examination she is awake alert and cooperative.  She has got good stability about her knee to varus valgus stress and Lachman going from 0 to greater than 118.  She still has some medial sided tenderness around the knee near the joint line and just above and then extends down onto the towards the tibia.  And is otherwise no calf tenderness.  Radiographs: Radiographs on examination show an intact implant with good bone implant interface no signs of implant loosening good alignment     Independent visualization of the films was made.         Impression:      ICD-10-CM    1. Left knee pain  M25.562 XR Knee Left 3 Views (Clinic Performed)          Plan: Persistent left knee pain post total knee arthroplasty with more recent onset.  She has failed to get adequate improvement she does have a history of a she says neurofibromatosis that may be giving her similar symptoms and she is questioning.  With that said then she has had her implant and is now from  and would like to still " proceed to rule out any signs of implant issues or loosening of the implant implant on x-rays looks pretty good has been in for at least 10 years old like to start with a bone scan and a CT scan.  Will have her follow-up post study and proceed from there may need to proceed more towards a MRI examination looking for other lesions in around the knee that may be causing issues as well.  She is comfortable this plan    All of the above pertinent physical exam and imaging modalities findings was reviewed with Cassy.    Return to clinic in post bone scan and CT weeks.    Further imaging required scan bone and CT knee    Time spent with evaluation:  30 minutes    Avila Gregorio MD  7/21/2025  8:22 AM

## 2025-07-22 ENCOUNTER — ANTICOAGULATION THERAPY VISIT (OUTPATIENT)
Dept: ANTICOAGULATION | Facility: OTHER | Age: 70
End: 2025-07-22
Payer: COMMERCIAL

## 2025-07-22 ENCOUNTER — TRANSFERRED RECORDS (OUTPATIENT)
Dept: HEALTH INFORMATION MANAGEMENT | Facility: CLINIC | Age: 70
End: 2025-07-22

## 2025-07-22 DIAGNOSIS — I26.99 PULMONARY EMBOLISM AND INFARCTION (H): ICD-10-CM

## 2025-07-22 DIAGNOSIS — Z79.01 LONG TERM CURRENT USE OF ANTICOAGULANT THERAPY: Primary | ICD-10-CM

## 2025-07-22 DIAGNOSIS — I82.401 DEEP VEIN THROMBOSIS (DVT) OF RIGHT LOWER EXTREMITY, UNSPECIFIED CHRONICITY, UNSPECIFIED VEIN (H): ICD-10-CM

## 2025-07-22 LAB — INR HOME MONITORING: 3.2 (ref 2–3)

## 2025-07-22 NOTE — PROGRESS NOTES
ANTICOAGULATION MANAGEMENT     Cassy Avila 69 year old female is on warfarin with supratherapeutic INR result. (Goal INR 2.0-3.0)    Recent labs: (last 7 days)     07/22/25  0000   INR 3.2*       ASSESSMENT     Source(s): Chart Review and Patient/Caregiver Call     Warfarin doses taken: Warfarin taken as instructed  Diet: Patient verbalized she had been eating more lettuce salads.  Medication/supplement changes: Increased PRN Tylenol due to post op pain loosening of hardware on 7/16/25 With Dr. Gregorio  New illness, injury, or hospitalization: No  Signs or symptoms of bleeding or clotting: No  Previous result: Therapeutic last 2(+) visits  Additional findings: None       PLAN     Recommended plan for temporary change(s) affecting INR     Dosing Instructions: hold dose then continue your current warfarin dose with next INR in 2 weeks       Summary  As of 7/22/2025      Full warfarin instructions:  7/22: Hold; Otherwise 7.5 mg every Mon, Thu; 5 mg all other days   Next INR check:  8/5/2025               Telephone call with Kaitlin who verbalizes understanding and agrees to plan    Patient to recheck with home meter    Education provided: Please call back if any changes to your diet, medications or how you've been taking warfarin  Symptom monitoring: monitoring for bleeding signs and symptoms    Plan made per [facility] anticoagulation protocol    Ely Dangelo RN  7/22/2025  Anticoagulation Clinic  [employer] for routing messages: erwin CAMEJO  [facility] patient phone line: [phone]        _______________________________________________________________________     Anticoagulation Episode Summary       Current INR goal:  2.0-3.0   TTR:  71.6% (1 y)   Target end date:  Indefinite   Send INR reminders to:  DAREN CAMEJO    Indications    Long-term (current) use of anticoagulants [Z79.01] [Z79.01]  Pulmonary embolism and infarction (H) [I26.99]  Deep vein thrombosis (DVT) (H) [I82.409] (Resolved) [I82.409]  Deep vein  thrombosis (DVT) of right lower extremity  unspecified chronicity  unspecified vein (H) [I82.401]             Comments:  [employer]. Q2 week testing. Patient takes warfarin in the am  Call cell phone with any dosing.             Anticoagulation Care Providers       Provider Role Specialty Phone number    Eliz Hartman CNP Referring Family Medicine [phone]

## 2025-08-05 ENCOUNTER — VIRTUAL VISIT (OUTPATIENT)
Dept: ENDOCRINOLOGY | Facility: CLINIC | Age: 70
End: 2025-08-05
Payer: COMMERCIAL

## 2025-08-05 DIAGNOSIS — E66.812 CLASS 2 SEVERE OBESITY WITH SERIOUS COMORBIDITY AND BODY MASS INDEX (BMI) OF 37.0 TO 37.9 IN ADULT, UNSPECIFIED OBESITY TYPE (H): ICD-10-CM

## 2025-08-05 DIAGNOSIS — R53.83 OTHER FATIGUE: ICD-10-CM

## 2025-08-05 DIAGNOSIS — N18.31 STAGE 3A CHRONIC KIDNEY DISEASE (H): ICD-10-CM

## 2025-08-05 DIAGNOSIS — E27.9 ADRENAL NODULE: ICD-10-CM

## 2025-08-05 DIAGNOSIS — E26.09 PRIMARY ALDOSTERONISM: Primary | ICD-10-CM

## 2025-08-05 DIAGNOSIS — R06.83 SNORING: ICD-10-CM

## 2025-08-05 DIAGNOSIS — I10 PRIMARY HYPERTENSION: ICD-10-CM

## 2025-08-05 DIAGNOSIS — E66.01 CLASS 2 SEVERE OBESITY WITH SERIOUS COMORBIDITY AND BODY MASS INDEX (BMI) OF 37.0 TO 37.9 IN ADULT, UNSPECIFIED OBESITY TYPE (H): ICD-10-CM

## 2025-08-05 PROCEDURE — 98007 SYNCH AUDIO-VIDEO EST HI 40: CPT | Performed by: INTERNAL MEDICINE

## 2025-08-05 PROCEDURE — 1126F AMNT PAIN NOTED NONE PRSNT: CPT | Mod: 95 | Performed by: INTERNAL MEDICINE

## 2025-08-05 PROCEDURE — G2211 COMPLEX E/M VISIT ADD ON: HCPCS | Performed by: INTERNAL MEDICINE

## 2025-08-05 ASSESSMENT — PAIN SCALES - GENERAL: PAINLEVEL_OUTOF10: NO PAIN (0)

## 2025-08-06 ENCOUNTER — PATIENT OUTREACH (OUTPATIENT)
Dept: CARE COORDINATION | Facility: CLINIC | Age: 70
End: 2025-08-06
Payer: COMMERCIAL

## 2025-08-07 ENCOUNTER — TELEPHONE (OUTPATIENT)
Dept: ENDOCRINOLOGY | Facility: CLINIC | Age: 70
End: 2025-08-07
Payer: COMMERCIAL

## 2025-08-07 DIAGNOSIS — I10 ESSENTIAL HYPERTENSION: ICD-10-CM

## 2025-08-07 RX ORDER — METOPROLOL SUCCINATE 50 MG/1
25 TABLET, EXTENDED RELEASE ORAL DAILY
Qty: 45 TABLET | Refills: 0 | Status: SHIPPED | OUTPATIENT
Start: 2025-08-07

## 2025-08-12 ENCOUNTER — OFFICE VISIT (OUTPATIENT)
Dept: CARDIOLOGY | Facility: OTHER | Age: 70
End: 2025-08-12
Attending: NURSE PRACTITIONER
Payer: MEDICARE

## 2025-08-12 ENCOUNTER — ANTICOAGULATION THERAPY VISIT (OUTPATIENT)
Dept: ANTICOAGULATION | Facility: OTHER | Age: 70
End: 2025-08-12
Attending: NURSE PRACTITIONER
Payer: COMMERCIAL

## 2025-08-12 VITALS
RESPIRATION RATE: 16 BRPM | DIASTOLIC BLOOD PRESSURE: 58 MMHG | HEIGHT: 65 IN | WEIGHT: 229 LBS | SYSTOLIC BLOOD PRESSURE: 104 MMHG | OXYGEN SATURATION: 98 % | HEART RATE: 77 BPM | BODY MASS INDEX: 38.15 KG/M2

## 2025-08-12 DIAGNOSIS — I26.99 PULMONARY EMBOLISM AND INFARCTION (H): ICD-10-CM

## 2025-08-12 DIAGNOSIS — J43.9 PULMONARY EMPHYSEMA, UNSPECIFIED EMPHYSEMA TYPE (H): ICD-10-CM

## 2025-08-12 DIAGNOSIS — R79.89 ABNORMAL ALDOSTERONE TO RENIN RATIO: ICD-10-CM

## 2025-08-12 DIAGNOSIS — I82.401 DEEP VEIN THROMBOSIS (DVT) OF RIGHT LOWER EXTREMITY, UNSPECIFIED CHRONICITY, UNSPECIFIED VEIN (H): ICD-10-CM

## 2025-08-12 DIAGNOSIS — E26.9 HYPERALDOSTERONISM: ICD-10-CM

## 2025-08-12 DIAGNOSIS — E66.01 MORBID OBESITY (H): Primary | ICD-10-CM

## 2025-08-12 DIAGNOSIS — E78.5 HYPERLIPIDEMIA LDL GOAL <100: ICD-10-CM

## 2025-08-12 DIAGNOSIS — D68.51 FACTOR V LEIDEN MUTATION: ICD-10-CM

## 2025-08-12 DIAGNOSIS — I1A.0 RESISTANT HYPERTENSION: ICD-10-CM

## 2025-08-12 DIAGNOSIS — Z79.01 ON CONTINUOUS ORAL ANTICOAGULATION: ICD-10-CM

## 2025-08-12 DIAGNOSIS — E26.09 PRIMARY ALDOSTERONISM: ICD-10-CM

## 2025-08-12 DIAGNOSIS — Z79.01 LONG TERM CURRENT USE OF ANTICOAGULANT THERAPY: Primary | ICD-10-CM

## 2025-08-12 DIAGNOSIS — R06.09 DYSPNEA ON EXERTION: ICD-10-CM

## 2025-08-12 LAB
ANION GAP SERPL CALCULATED.3IONS-SCNC: 10 MMOL/L (ref 7–15)
BUN SERPL-MCNC: 13.6 MG/DL (ref 8–23)
CALCIUM SERPL-MCNC: 9 MG/DL (ref 8.8–10.4)
CHLORIDE SERPL-SCNC: 105 MMOL/L (ref 98–107)
CREAT SERPL-MCNC: 1.1 MG/DL (ref 0.51–0.95)
EGFRCR SERPLBLD CKD-EPI 2021: 54 ML/MIN/1.73M2
GLUCOSE SERPL-MCNC: 116 MG/DL (ref 70–99)
HCO3 SERPL-SCNC: 24 MMOL/L (ref 22–29)
HOLD SPECIMEN: NORMAL
INR HOME MONITORING: 3.3 (ref 2–3)
POTASSIUM SERPL-SCNC: 4 MMOL/L (ref 3.4–5.3)
SODIUM SERPL-SCNC: 139 MMOL/L (ref 135–145)

## 2025-08-12 PROCEDURE — 80048 BASIC METABOLIC PNL TOTAL CA: CPT | Mod: ZL | Performed by: NURSE PRACTITIONER

## 2025-08-12 PROCEDURE — 36415 COLL VENOUS BLD VENIPUNCTURE: CPT | Mod: ZL | Performed by: NURSE PRACTITIONER

## 2025-08-12 PROCEDURE — G0463 HOSPITAL OUTPT CLINIC VISIT: HCPCS

## 2025-08-12 PROCEDURE — 84244 ASSAY OF RENIN: CPT | Mod: ZL | Performed by: NURSE PRACTITIONER

## 2025-08-12 RX ORDER — LOSARTAN POTASSIUM 50 MG/1
50 TABLET ORAL DAILY
Qty: 90 TABLET | Refills: 3 | Status: SHIPPED | OUTPATIENT
Start: 2025-08-12

## 2025-08-12 ASSESSMENT — PAIN SCALES - GENERAL: PAINLEVEL_OUTOF10: MODERATE PAIN (5)

## 2025-08-13 ENCOUNTER — TELEPHONE (OUTPATIENT)
Dept: CARDIOLOGY | Facility: OTHER | Age: 70
End: 2025-08-13

## 2025-08-13 ENCOUNTER — DOCUMENTATION ONLY (OUTPATIENT)
Dept: EDUCATION SERVICES | Facility: HOSPITAL | Age: 70
End: 2025-08-13

## 2025-08-14 ENCOUNTER — MYC REFILL (OUTPATIENT)
Dept: FAMILY MEDICINE | Facility: OTHER | Age: 70
End: 2025-08-14

## 2025-08-14 DIAGNOSIS — E26.09 PRIMARY ALDOSTERONISM: ICD-10-CM

## 2025-08-15 ENCOUNTER — MYC MEDICAL ADVICE (OUTPATIENT)
Dept: ENDOCRINOLOGY | Facility: CLINIC | Age: 70
End: 2025-08-15
Payer: COMMERCIAL

## 2025-08-18 RX ORDER — EPLERENONE 50 MG/1
50 TABLET ORAL 2 TIMES DAILY
Qty: 60 TABLET | Refills: 3 | Status: SHIPPED | OUTPATIENT
Start: 2025-08-18

## 2025-08-19 ENCOUNTER — MYC MEDICAL ADVICE (OUTPATIENT)
Dept: ENDOCRINOLOGY | Facility: CLINIC | Age: 70
End: 2025-08-19
Payer: COMMERCIAL

## 2025-08-19 ENCOUNTER — HOSPITAL ENCOUNTER (OUTPATIENT)
Dept: CT IMAGING | Facility: HOSPITAL | Age: 70
Discharge: HOME | End: 2025-08-19
Attending: ORTHOPAEDIC SURGERY
Payer: MEDICARE

## 2025-08-19 DIAGNOSIS — T84.498A: ICD-10-CM

## 2025-08-19 PROCEDURE — 73700 CT LOWER EXTREMITY W/O DYE: CPT | Mod: LT

## 2025-08-19 PROCEDURE — 73700 CT LOWER EXTREMITY W/O DYE: CPT | Mod: 26 | Performed by: RADIOLOGY

## 2025-08-21 ENCOUNTER — OFFICE VISIT (OUTPATIENT)
Dept: PULMONOLOGY | Facility: OTHER | Age: 70
End: 2025-08-21
Attending: PHYSICIAN ASSISTANT
Payer: MEDICARE

## 2025-08-21 DIAGNOSIS — F51.01 PRIMARY INSOMNIA: Primary | ICD-10-CM

## 2025-08-21 DIAGNOSIS — I50.9 CONGESTIVE HEART FAILURE, UNSPECIFIED HF CHRONICITY, UNSPECIFIED HEART FAILURE TYPE (H): ICD-10-CM

## 2025-08-21 DIAGNOSIS — E66.01 MORBID OBESITY (H): ICD-10-CM

## 2025-08-21 DIAGNOSIS — I10 PRIMARY HYPERTENSION: ICD-10-CM

## 2025-08-25 DIAGNOSIS — G89.29 CHRONIC KNEE PAIN, UNSPECIFIED LATERALITY: Primary | ICD-10-CM

## 2025-08-25 DIAGNOSIS — M25.569 CHRONIC KNEE PAIN, UNSPECIFIED LATERALITY: Primary | ICD-10-CM

## 2025-08-26 ENCOUNTER — ANTICOAGULATION THERAPY VISIT (OUTPATIENT)
Dept: ANTICOAGULATION | Facility: OTHER | Age: 70
End: 2025-08-26
Payer: COMMERCIAL

## 2025-08-26 DIAGNOSIS — I82.401 DEEP VEIN THROMBOSIS (DVT) OF RIGHT LOWER EXTREMITY, UNSPECIFIED CHRONICITY, UNSPECIFIED VEIN (H): ICD-10-CM

## 2025-08-26 DIAGNOSIS — I26.99 PULMONARY EMBOLISM AND INFARCTION (H): ICD-10-CM

## 2025-08-26 DIAGNOSIS — Z79.01 LONG TERM CURRENT USE OF ANTICOAGULANT THERAPY: Primary | ICD-10-CM

## 2025-08-26 LAB — INR HOME MONITORING: 2.1 (ref 2–3)

## 2025-08-28 ENCOUNTER — TELEPHONE (OUTPATIENT)
Dept: ORTHOPEDICS | Facility: OTHER | Age: 70
End: 2025-08-28

## 2025-08-28 DIAGNOSIS — F51.01 PRIMARY INSOMNIA: Primary | ICD-10-CM

## 2025-08-29 ENCOUNTER — TELEPHONE (OUTPATIENT)
Dept: CARDIOLOGY | Facility: OTHER | Age: 70
End: 2025-08-29

## 2025-08-29 DIAGNOSIS — E78.5 HYPERLIPIDEMIA LDL GOAL <100: ICD-10-CM

## 2025-08-29 DIAGNOSIS — E66.01 MORBID OBESITY (H): ICD-10-CM

## 2025-08-29 DIAGNOSIS — I1A.0 RESISTANT HYPERTENSION: Primary | ICD-10-CM

## (undated) DEVICE — FLUID TRAP FOR VIROSAFE FILTERS VSFT10-10

## (undated) DEVICE — SOL NACL 0.9% IRRIG 1000ML BOTTLE 2F7124

## (undated) DEVICE — DRAPE SHEET REV FOLD 3/4 9349

## (undated) DEVICE — ESU GROUND PAD ADULT W/CORD E7507

## (undated) DEVICE — COVER LT HANDLE 2/PK 5160-2FG

## (undated) DEVICE — GOWN SURG XL LVL 3 REINFORCED 9541

## (undated) DEVICE — PACK BASIN SET UP SUTCNBSBBA

## (undated) DEVICE — SU VICRYL 0 UR-6 27" J603H

## (undated) DEVICE — CANISTER SUCTION MEDI-VAC GUARDIAN 2000ML 90D 65651-220

## (undated) DEVICE — SU DERMABOND ADVANCED .7ML DNX12

## (undated) DEVICE — PACK LAPAROTOMY CUSTOM SBA32LPMBG

## (undated) DEVICE — SU MONOCRYL 4-0 PS-2 18" UND Y496G

## (undated) DEVICE — SLEEVE SCD EXPRESS KNEE LENGTH MED 9529

## (undated) DEVICE — Device

## (undated) DEVICE — ADH LIQUID MASTISOL TOPICAL VIAL 2-3ML 0523-48

## (undated) DEVICE — GLOVE 8.5 PROTEXIS PI CLSC PF BD CUF STRL LF 12IN 2D72PL85X

## (undated) DEVICE — SU VICRYL 3-0 SH 27" UND J416H

## (undated) DEVICE — LABEL STERILE PREPRINTED FOR OR FRRH01-2M

## (undated) RX ORDER — LIDOCAINE HYDROCHLORIDE 10 MG/ML
INJECTION, SOLUTION EPIDURAL; INFILTRATION; INTRACAUDAL; PERINEURAL
Status: DISPENSED
Start: 2025-06-16

## (undated) RX ORDER — FENTANYL CITRATE 50 UG/ML
INJECTION, SOLUTION INTRAMUSCULAR; INTRAVENOUS
Status: DISPENSED
Start: 2025-06-16

## (undated) RX ORDER — SODIUM CHLORIDE 9 MG/ML
INJECTION, SOLUTION INTRAVENOUS
Status: DISPENSED
Start: 2025-04-11

## (undated) RX ORDER — FENTANYL CITRATE 50 UG/ML
INJECTION, SOLUTION INTRAMUSCULAR; INTRAVENOUS
Status: DISPENSED
Start: 2025-03-24

## (undated) RX ORDER — PROPOFOL 10 MG/ML
INJECTION, EMULSION INTRAVENOUS
Status: DISPENSED
Start: 2025-03-24

## (undated) RX ORDER — DEXAMETHASONE SODIUM PHOSPHATE 10 MG/ML
INJECTION, SOLUTION INTRAMUSCULAR; INTRAVENOUS
Status: DISPENSED
Start: 2025-03-24

## (undated) RX ORDER — ONDANSETRON 2 MG/ML
INJECTION INTRAMUSCULAR; INTRAVENOUS
Status: DISPENSED
Start: 2025-03-24

## (undated) RX ORDER — SODIUM CHLORIDE 9 MG/ML
INJECTION, SOLUTION INTRAVENOUS
Status: DISPENSED
Start: 2025-06-16

## (undated) RX ORDER — LIDOCAINE HYDROCHLORIDE 10 MG/ML
INJECTION, SOLUTION EPIDURAL; INFILTRATION; INTRACAUDAL; PERINEURAL
Status: DISPENSED
Start: 2025-04-11

## (undated) RX ORDER — FENTANYL CITRATE 50 UG/ML
INJECTION, SOLUTION INTRAMUSCULAR; INTRAVENOUS
Status: DISPENSED
Start: 2025-04-11

## (undated) RX ORDER — EPHEDRINE SULFATE 50 MG/ML
INJECTION, SOLUTION INTRAMUSCULAR; INTRAVENOUS; SUBCUTANEOUS
Status: DISPENSED
Start: 2025-03-24